# Patient Record
Sex: FEMALE | Race: WHITE | Employment: OTHER | URBAN - METROPOLITAN AREA
[De-identification: names, ages, dates, MRNs, and addresses within clinical notes are randomized per-mention and may not be internally consistent; named-entity substitution may affect disease eponyms.]

---

## 2017-02-19 ENCOUNTER — HOSPITAL ENCOUNTER (INPATIENT)
Age: 82
LOS: 3 days | Discharge: NURSING, REHAB OR LONG-TERM CARE | DRG: 254 | End: 2017-02-23
Attending: EMERGENCY MEDICINE
Payer: MEDICARE

## 2017-02-19 DIAGNOSIS — I73.9 ARTERIAL INSUFFICIENCY OF LOWER EXTREMITY (HCC): Primary | ICD-10-CM

## 2017-02-19 LAB
ALBUMIN SERPL BCP-MCNC: 3.5 G/DL (ref 3.5–5)
ALBUMIN/GLOB SERPL: 0.9 {RATIO} (ref 1.1–2.2)
ALP SERPL-CCNC: 98 U/L (ref 45–117)
ALT SERPL-CCNC: 20 U/L (ref 12–78)
ANION GAP BLD CALC-SCNC: 9 MMOL/L (ref 5–15)
APTT PPP: 27.1 SEC (ref 22.1–32.5)
AST SERPL W P-5'-P-CCNC: 12 U/L (ref 15–37)
BASOPHILS # BLD AUTO: 0 K/UL (ref 0–0.1)
BASOPHILS # BLD: 0 % (ref 0–1)
BILIRUB SERPL-MCNC: 0.7 MG/DL (ref 0.2–1)
BUN SERPL-MCNC: 19 MG/DL (ref 6–20)
BUN/CREAT SERPL: 18 (ref 12–20)
CALCIUM SERPL-MCNC: 9.3 MG/DL (ref 8.5–10.1)
CHLORIDE SERPL-SCNC: 96 MMOL/L (ref 97–108)
CO2 SERPL-SCNC: 24 MMOL/L (ref 21–32)
CREAT SERPL-MCNC: 1.07 MG/DL (ref 0.55–1.02)
EOSINOPHIL # BLD: 0.5 K/UL (ref 0–0.4)
EOSINOPHIL NFR BLD: 4 % (ref 0–7)
ERYTHROCYTE [DISTWIDTH] IN BLOOD BY AUTOMATED COUNT: 13.3 % (ref 11.5–14.5)
GLOBULIN SER CALC-MCNC: 3.7 G/DL (ref 2–4)
GLUCOSE SERPL-MCNC: 119 MG/DL (ref 65–100)
HCT VFR BLD AUTO: 36.3 % (ref 35–47)
HGB BLD-MCNC: 12.4 G/DL (ref 11.5–16)
INR PPP: 0.9 (ref 0.9–1.1)
LYMPHOCYTES # BLD AUTO: 26 % (ref 12–49)
LYMPHOCYTES # BLD: 3.6 K/UL (ref 0.8–3.5)
MAGNESIUM SERPL-MCNC: 1.6 MG/DL (ref 1.6–2.4)
MCH RBC QN AUTO: 26.6 PG (ref 26–34)
MCHC RBC AUTO-ENTMCNC: 34.2 G/DL (ref 30–36.5)
MCV RBC AUTO: 77.9 FL (ref 80–99)
MONOCYTES # BLD: 1.1 K/UL (ref 0–1)
MONOCYTES NFR BLD AUTO: 8 % (ref 5–13)
NEUTS SEG # BLD: 8.6 K/UL (ref 1.8–8)
NEUTS SEG NFR BLD AUTO: 62 % (ref 32–75)
PHOSPHATE SERPL-MCNC: 3.1 MG/DL (ref 2.6–4.7)
PLATELET # BLD AUTO: 238 K/UL (ref 150–400)
POTASSIUM SERPL-SCNC: 4.3 MMOL/L (ref 3.5–5.1)
PROT SERPL-MCNC: 7.2 G/DL (ref 6.4–8.2)
PROTHROMBIN TIME: 9.5 SEC (ref 9–11.1)
RBC # BLD AUTO: 4.66 M/UL (ref 3.8–5.2)
SODIUM SERPL-SCNC: 129 MMOL/L (ref 136–145)
THERAPEUTIC RANGE,PTTT: NORMAL SECS (ref 58–77)
WBC # BLD AUTO: 13.9 K/UL (ref 3.6–11)

## 2017-02-19 PROCEDURE — 93005 ELECTROCARDIOGRAM TRACING: CPT

## 2017-02-19 PROCEDURE — 85610 PROTHROMBIN TIME: CPT | Performed by: EMERGENCY MEDICINE

## 2017-02-19 PROCEDURE — 85025 COMPLETE CBC W/AUTO DIFF WBC: CPT | Performed by: EMERGENCY MEDICINE

## 2017-02-19 PROCEDURE — 83735 ASSAY OF MAGNESIUM: CPT | Performed by: EMERGENCY MEDICINE

## 2017-02-19 PROCEDURE — 85730 THROMBOPLASTIN TIME PARTIAL: CPT | Performed by: EMERGENCY MEDICINE

## 2017-02-19 PROCEDURE — 84100 ASSAY OF PHOSPHORUS: CPT | Performed by: EMERGENCY MEDICINE

## 2017-02-19 PROCEDURE — 36415 COLL VENOUS BLD VENIPUNCTURE: CPT | Performed by: EMERGENCY MEDICINE

## 2017-02-19 PROCEDURE — 80053 COMPREHEN METABOLIC PANEL: CPT | Performed by: EMERGENCY MEDICINE

## 2017-02-19 PROCEDURE — 99285 EMERGENCY DEPT VISIT HI MDM: CPT

## 2017-02-19 NOTE — IP AVS SNAPSHOT
Current Discharge Medication List  
  
Take these medications at their scheduled times Dose & Instructions Dispensing Information Comments Morning Noon Evening Bedtime * amLODIPine 5 mg tablet Commonly known as:  Perrin Mend Your next dose is: Today, Tomorrow Other:  ____________ Dose:  5 mg Take 5 mg by mouth every evening. Indications: hypertension Refills:  0  
     
   
   
   
  
 * amLODIPine 10 mg tablet Commonly known as:  Perrin Mend Your next dose is: Today, Tomorrow Other:  ____________ Dose:  10 mg Take 1 Tab by mouth daily. Quantity:  180 Tab Refills:  3 Evening dose stopped 12/9/16  
    
   
   
   
  
 aspirin 325 mg tablet Commonly known as:  ASPIRIN Your next dose is: Today, Tomorrow Other:  ____________ Dose:  325 mg Take 1 Tab by mouth daily. Quantity:  30 Tab Refills:  6  
     
   
   
   
  
 isosorbide mononitrate ER 60 mg CR tablet Commonly known as:  IMDUR Your next dose is: Today, Tomorrow Other:  ____________ Dose:  60 mg Take 1 Tab by mouth two (2) times a day. Quantity:  180 Tab Refills:  3  
     
   
   
   
  
 metFORMIN 500 mg tablet Commonly known as:  GLUCOPHAGE Your next dose is: Today, Tomorrow Other:  ____________ Dose:  500 mg Take 1 Tab by mouth two (2) times daily (with meals). Quantity:  180 Tab Refills:  3 Resume from 3/14/14 on  
    
   
   
   
  
 omeprazole 20 mg capsule Commonly known as:  PriLOSEC Your next dose is: Today, Tomorrow Other:  ____________ Dose:  20 mg Take 1 Cap by mouth daily. Quantity:  90 Cap Refills:  3  
     
   
   
   
  
 spironolactone 25 mg tablet Commonly known as:  ALDACTONE Your next dose is: Today, Tomorrow Other:  ____________ Dose:  25 mg Take 1 Tab by mouth daily. Quantity:  90 Tab Refills:  3 VITAMIN D3 1,000 unit tablet Generic drug:  cholecalciferol Your next dose is: Today, Tomorrow Other:  ____________ Dose:  2000 Units Take 2,000 Units by mouth daily. Refills:  0  
     
   
   
   
  
 * Notice: This list has 2 medication(s) that are the same as other medications prescribed for you. Read the directions carefully, and ask your doctor or other care provider to review them with you. Take these medications as needed Dose & Instructions Dispensing Information Comments Morning Noon Evening Bedtime HYDROcodone-acetaminophen 7.5-325 mg per tablet Commonly known as:  Viva Uribe Your next dose is: Today, Tomorrow Other:  ____________ Dose:  1 Tab Take 1 Tab by mouth every four (4) hours as needed. Max Daily Amount: 6 Tabs. Quantity:  25 Tab Refills:  0  
     
   
   
   
  
 nitroglycerin 0.4 mg SL tablet Commonly known as:  NITROSTAT Your next dose is: Today, Tomorrow Other:  ____________ Dose:  0.4 mg  
1 Tab by SubLINGual route every five (5) minutes as needed for Chest Pain. Quantity:  25 Tab Refills:  3 Take these medications as directed Dose & Instructions Dispensing Information Comments Morning Noon Evening Bedtime  
 atorvastatin 40 mg tablet Commonly known as:  LIPITOR Your next dose is: Today, Tomorrow Other:  ____________ TAKE 1 TABLET DAILY Quantity:  90 Tab Refills:  2  
     
   
   
   
  
 enalapril 20 mg tablet Commonly known as:  Sherren Bookbinder Your next dose is: Today, Tomorrow Other:  ____________ TAKE 1 TABLET TWICE A DAY Quantity:  180 Tab Refills:  3  
     
   
   
   
  
 glipiZIDE 5 mg tablet Commonly known as:  Yael  Your next dose is: Today, Tomorrow Other:  ____________ Take 2 tabs bid Quantity:  360 Tab Refills:  3  
     
   
   
   
  
 glucose blood VI test strips strip Commonly known as:  ONETOUCH ULTRA TEST Your next dose is: Today, Tomorrow Other:  ____________ Check blood sugar bid Quantity:  200 Strip Refills:  3 One touch test strips  
    
   
   
   
  
 hydroCHLOROthiazide 50 mg tablet Commonly known as:  HYDRODIURIL Your next dose is: Today, Tomorrow Other:  ____________ TAKE 1 TABLET DAILY Quantity:  90 Tab Refills:  3  
     
   
   
   
  
 insulin detemir 100 unit/mL (3 mL) Inpn Commonly known as:  Anselmo Dose Your next dose is: Today, Tomorrow Other:  ____________ Inject 10 units sc daily Quantity:  15 Pen Refills:  3 Insulin Syringe-Needle U-100 0.5 mL 31 gauge x 5/16 Syrg Commonly known as:  BD INSULIN SYRINGE ULT-FINE II Your next dose is: Today, Tomorrow Other:  ____________ Use as directed Quantity:  100 Syringe Refills:  4  
 E11.9  
    
   
   
   
  
 metoprolol tartrate 100 mg IR tablet Commonly known as:  LOPRESSOR Your next dose is: Today, Tomorrow Other:  ____________ TAKE 1 TABLET TWICE A DAY Quantity:  180 Tab Refills:  3 Where to Get Your Medications Information about where to get these medications is not yet available ! Ask your nurse or doctor about these medications HYDROcodone-acetaminophen 7.5-325 mg per tablet

## 2017-02-19 NOTE — IP AVS SNAPSHOT
[address] 
[phone] Patient: Saravanan Merino MRN: [id number] MRV:1/2/5090 You are allergic to the following Allergen Reactions Codeine Unknown (comments) Keflin Itching Pcn (Penicillins) Unknown (comments) Tramadol Other (comments) Makes her head feel swishy Recent Documentation Height 1.6 m Emergency Contacts Name Discharge Info Relation Home Work Mobile Melvina Whitehead  Child [2] [phone] Laura Garvin  Child [2] [phone] About your hospitalization You were admitted on:  February 20, 2017 You last received care in the:  [id number] You were discharged on:  February 23, 2017 Unit phone number:  [phone] Why you were hospitalized Your primary diagnosis was:  Not on File Your diagnoses also included:  Pvd (Peripheral Vascular Disease) With Claudication (Hcc) Providers Seen During Your Hospitalizations Provider Role Specialty Primary office phone Irlanda Uribe MD Attending Provider Emergency Medicine [phone] Shira Blount MD Attending Provider Vascular Surgery [phone] Your Primary Care Physician (PCP) Primary Care Physician Office Phone Office Fax [address] [phone] Follow-up Information Follow up With Details Comments [place]   [address] [address] 
[phone] [address] [place] [address], [address] In 1 day [address] [place] [address] 
[phone] Your Appointments Tuesday March 21, 2017 10:15 AM EDT  
ESTABLISHED PATIENT with MD Umu [address], [address] [facility] ([address]) [address] [address]  
[phone] Current Discharge Medication List  
  
START taking these medications Dose & Instructions Dispensing Information Comments Morning Noon Evening Bedtime HYDROcodone-acetaminophen 7.5-325 mg per tablet Commonly known as:  Tiffany Rincon Your next dose is: Today, Tomorrow Other:  _________ Dose:  1 Tab Take 1 Tab by mouth every four (4) hours as needed. Max Daily Amount: 6 Tabs. Quantity:  25 Tab Refills:  0 CONTINUE these medications which have NOT CHANGED Dose & Instructions Dispensing Information Comments Morning Noon Evening Bedtime * amLODIPine 5 mg tablet Commonly known as:  Herriman David Your next dose is: Today, Tomorrow Other:  _________ Dose:  5 mg Take 5 mg by mouth every evening. Indications: hypertension Refills:  0  
     
   
   
   
  
 * amLODIPine 10 mg tablet Commonly known as:  Moose David Your next dose is: Today, Tomorrow Other:  _________ Dose:  10 mg Take 1 Tab by mouth daily. Quantity:  180 Tab Refills:  3 Evening dose stopped 12/9/16  
    
   
   
   
  
 aspirin 325 mg tablet Commonly known as:  ASPIRIN Your next dose is: Today, Tomorrow Other:  _________ Dose:  325 mg Take 1 Tab by mouth daily. Quantity:  30 Tab Refills:  6  
     
   
   
   
  
 atorvastatin 40 mg tablet Commonly known as:  LIPITOR Your next dose is: Today, Tomorrow Other:  _________ TAKE 1 TABLET DAILY Quantity:  90 Tab Refills:  2  
     
   
   
   
  
 enalapril 20 mg tablet Commonly known as:  Pricila Cam Your next dose is: Today, Tomorrow Other:  _________ TAKE 1 TABLET TWICE A DAY Quantity:  180 Tab Refills:  3  
     
   
   
   
  
 glipiZIDE 5 mg tablet Commonly known as:  Atlee Crystal Your next dose is: Today, Tomorrow Other:  _________ Take 2 tabs bid Quantity:  360 Tab Refills:  3  
     
   
   
   
  
 glucose blood VI test strips strip Commonly known as:  ONETOUCH ULTRA TEST Your next dose is: Today, Tomorrow Other:  _________ Check blood sugar bid Quantity:  200 Strip Refills:  3 One touch test strips  
    
   
   
   
  
 hydroCHLOROthiazide 50 mg tablet Commonly known as:  HYDRODIURIL Your next dose is: Today, Tomorrow Other:  _________ TAKE 1 TABLET DAILY Quantity:  90 Tab Refills:  3  
     
   
   
   
  
 insulin detemir 100 unit/mL (3 mL) Inpn Commonly known as:  Scot Spry Your next dose is: Today, Tomorrow Other:  _________ Inject 10 units sc daily Quantity:  15 Pen Refills:  3 Insulin Syringe-Needle U-100 0.5 mL 31 gauge x 5/16 Syrg Commonly known as:  BD INSULIN SYRINGE ULT-FINE II Your next dose is: Today, Tomorrow Other:  _________ Use as directed Quantity:  100 Syringe Refills:  4  
 E11.9  
    
   
   
   
  
 isosorbide mononitrate ER 60 mg CR tablet Commonly known as:  IMDUR Your next dose is: Today, Tomorrow Other:  _________ Dose:  60 mg Take 1 Tab by mouth two (2) times a day. Quantity:  180 Tab Refills:  3  
     
   
   
   
  
 metFORMIN 500 mg tablet Commonly known as:  GLUCOPHAGE Your next dose is: Today, Tomorrow Other:  _________ Dose:  500 mg Take 1 Tab by mouth two (2) times daily (with meals). Quantity:  180 Tab Refills:  3 Resume from 3/14/14 on  
    
   
   
   
  
 metoprolol tartrate 100 mg IR tablet Commonly known as:  LOPRESSOR Your next dose is: Today, Tomorrow Other:  _________ TAKE 1 TABLET TWICE A DAY Quantity:  180 Tab Refills:  3  
     
   
   
   
  
 nitroglycerin 0.4 mg SL tablet Commonly known as:  NITROSTAT Your next dose is: Today, Tomorrow Other:  _________ Dose:  0.4 mg  
1 Tab by SubLINGual route every five (5) minutes as needed for Chest Pain. Quantity:  25 Tab Refills:  3  
     
   
   
   
  
 omeprazole 20 mg capsule Commonly known as:  PriLOSEC Your next dose is: Today, Tomorrow Other:  _________ Dose:  20 mg Take 1 Cap by mouth daily. Quantity:  90 Cap Refills:  3  
     
   
   
   
  
 spironolactone 25 mg tablet Commonly known as:  ALDACTONE Your next dose is: Today, Tomorrow Other:  _________ Dose:  25 mg Take 1 Tab by mouth daily. Quantity:  90 Tab Refills:  3 VITAMIN D3 1,000 unit tablet Generic drug:  cholecalciferol Your next dose is: Today, Tomorrow Other:  _________ Dose:  2000 Units Take 2,000 Units by mouth daily. Refills:  0  
     
   
   
   
  
 * Notice: This list has 2 medication(s) that are the same as other medications prescribed for you. Read the directions carefully, and ask your doctor or other care provider to review them with you. Where to Get Your Medications Information on where to get these meds will be given to you by the nurse or doctor. ! Ask your nurse or doctor about these medications HYDROcodone-acetaminophen 7.5-325 mg per tablet Discharge Instructions Patient Discharge Instructions Gaston Curet / [id number] : 1935 Admitted 2017 Discharged: 2017 Take Home Medications · It is important that you take the medication exactly as they are prescribed. · Keep your medication in the bottles provided by the pharmacist and keep a list of the medication names, dosages, and times to be taken in your wallet. · Do not take other medications without consulting your doctor. What to do at [facility] Recommended diet: Diabetic Diet, Recommended activity: Activity as tolerated,  
 
 Additional Instructions: elevate leg for swelling Follow-up with Dr Dilan Tucker in 2 weeks, call [phone] for appt Information obtained by : 
I understand that if any problems occur once I am at home I am to contact my physician. I understand and acknowledge receipt of the instructions indicated above. Physician's or R.N.'s Signature                                                                  Date/Time DISCHARGE SUMMARY from Nurse The following personal items are in your possession at time of discharge: 
 
Dental Appliances: None Visual Aid: Glasses Home Medications: None Jewelry: Watch Clothing: None, Sent home Other Valuables: Elías Foots PATIENT INSTRUCTIONS: 
 
 
F-face looks uneven A-arms unable to move or move unevenly S-speech slurred or non-existent T-time-call 911 as soon as signs and symptoms begin-DO NOT go Back to bed or wait to see if you get better-TIME IS BRAIN. Warning Signs of HEART ATTACK Call 911 if you have these symptoms: 
? Chest discomfort. Most heart attacks involve discomfort in the center of the chest that lasts more than a few minutes, or that goes away and comes back. It can feel like uncomfortable pressure, squeezing, fullness, or pain. ? Discomfort in other areas of the upper body. Symptoms can include pain or discomfort in one or both arms, the back, neck, jaw, or stomach. ? Shortness of breath with or without chest discomfort. ? Other signs may include breaking out in a cold sweat, nausea, or lightheadedness. Don't wait more than five minutes to call [address]! Fast action can save your life. Calling 911 is almost always the fastest way to get lifesaving treatment. Emergency Medical Services staff can begin treatment when they arrive  up to an hour sooner than if someone gets to the hospital by car. The discharge information has been reviewed with the {PATIENT PARENT GUARDIAN:29627}. The {PATIENT PARENT GUARDIAN:92188} verbalized understanding. Discharge medications reviewed with the {Dishcarge meds reviewed CSLU:37403} and appropriate educational materials and side effects teaching were provided. Patient or Representative Signature                                                          Date/Time Campaign Monitorhart Activation Thank you for requesting access to [employer]. Please follow the instructions below to securely access and download your online medical record. [employer] allows you to send messages to your doctor, view your test results, renew your prescriptions, schedule appointments, and more. How Do I Sign Up? 1. In your internet browser, go to [email or web address]. 2. Click on the First Time User? Click Here link in the Sign In box. You will see the New Member Sign Up page. 3. Enter your [employer] Access Code exactly as it appears below. You will not need to use this code after youve completed the sign-up process. If you do not sign up before the expiration date, you must request a new code. [employer] Access Code: [email or web address] Expires: 2017 11:12 PM (This is the date your [employer] access code will ) 4. Enter the last four digits of your Social Security Number (xxxx) and Date of Birth (mm/dd/yyyy) as indicated and click Submit. You will be taken to the next sign-up page. 5. Create a [employer] ID. This will be your [employer] login ID and cannot be changed, so think of one that is secure and easy to remember. 6. Create a [email or web address] password. You can change your password at any time. 7. Enter your Password Reset Question and Answer. This can be used at a later time if you forget your password. 8. Enter your e-mail address. You will receive e-mail notification when new information is available in [address] 9. Click Sign Up. You can now view and download portions of your medical record. 10. Click the Download Summary menu link to download a portable copy of your medical information. Additional Information If you have questions, please visit the Frequently Asked Questions section of the [email or web address] website at [email or web address]/. Remember, [email or web address] is NOT to be used for urgent needs. For medical emergencies, dial 911. Discharge Orders None Introducing [facility]! Janie Mcarthur introduces [employer] patient portal. Now you can access parts of your medical record, email your doctor's office, and request medication refills online. 1. In your internet browser, go to [email or web address] 2. Click on the First Time User? Click Here link in the Sign In box. You will see the New Member Sign Up page. 3. Enter your [employer] Access Code exactly as it appears below. You will not need to use this code after youve completed the sign-up process. If you do not sign up before the expiration date, you must request a new code. · [email or web address] Access Code: [email or web address] Expires: 5/20/2017 11:12 PM 
 
4. Enter the last four digits of your Social Security Number (xxxx) and Date of Birth (mm/dd/yyyy) as indicated and click Submit. You will be taken to the next sign-up page. 5. Create a [employer] ID. This will be your [employer] login ID and cannot be changed, so think of one that is secure and easy to remember. 6. Create a [employer] password. You can change your password at any time. 7. Enter your Password Reset Question and Answer.  This can be used at a later time if you forget your password. 8. Enter your e-mail address. You will receive e-mail notification when new information is available in [address] 9. Click Sign Up. You can now view and download portions of your medical record. 10. Click the Download Summary menu link to download a portable copy of your medical information. If you have questions, please visit the Frequently Asked Questions section of the [employer] website. Remember, [employer] is NOT to be used for urgent needs. For medical emergencies, dial 911. Now available from your iPhone and Android! General Information Please provide this summary of care documentation to your next provider. Patient Signature:  ____________________________________________________________ Date:  ____________________________________________________________  
  
Monica Santiagoer Provider Signature:  ____________________________________________________________ Date:  ____________________________________________________________

## 2017-02-20 ENCOUNTER — APPOINTMENT (OUTPATIENT)
Dept: GENERAL RADIOLOGY | Age: 82
DRG: 254 | End: 2017-02-20
Attending: EMERGENCY MEDICINE
Payer: MEDICARE

## 2017-02-20 ENCOUNTER — ANESTHESIA (OUTPATIENT)
Dept: SURGERY | Age: 82
DRG: 254 | End: 2017-02-20
Payer: MEDICARE

## 2017-02-20 ENCOUNTER — ANESTHESIA EVENT (OUTPATIENT)
Dept: SURGERY | Age: 82
DRG: 254 | End: 2017-02-20
Payer: MEDICARE

## 2017-02-20 PROBLEM — I73.9 PVD (PERIPHERAL VASCULAR DISEASE) WITH CLAUDICATION (HCC): Status: ACTIVE | Noted: 2017-02-20

## 2017-02-20 LAB
ABO + RH BLD: NORMAL
APPEARANCE UR: CLEAR
ATRIAL RATE: 68 BPM
BACTERIA URNS QL MICRO: NEGATIVE /HPF
BILIRUB UR QL: NEGATIVE
BLOOD GROUP ANTIBODIES SERPL: NORMAL
CALCULATED P AXIS, ECG09: 49 DEGREES
CALCULATED R AXIS, ECG10: -4 DEGREES
CALCULATED T AXIS, ECG11: 39 DEGREES
COLOR UR: NORMAL
DIAGNOSIS, 93000: NORMAL
EPITH CASTS URNS QL MICRO: NORMAL /LPF
GLUCOSE BLD STRIP.AUTO-MCNC: 104 MG/DL (ref 65–100)
GLUCOSE BLD STRIP.AUTO-MCNC: 115 MG/DL (ref 65–100)
GLUCOSE BLD STRIP.AUTO-MCNC: 174 MG/DL (ref 65–100)
GLUCOSE BLD STRIP.AUTO-MCNC: 213 MG/DL (ref 65–100)
GLUCOSE BLD STRIP.AUTO-MCNC: 333 MG/DL (ref 65–100)
GLUCOSE UR STRIP.AUTO-MCNC: NEGATIVE MG/DL
HGB UR QL STRIP: NEGATIVE
HYALINE CASTS URNS QL MICRO: NORMAL /LPF (ref 0–5)
KETONES UR QL STRIP.AUTO: NEGATIVE MG/DL
LEUKOCYTE ESTERASE UR QL STRIP.AUTO: NEGATIVE
NITRITE UR QL STRIP.AUTO: NEGATIVE
P-R INTERVAL, ECG05: 176 MS
PH UR STRIP: 7 [PH] (ref 5–8)
PROT UR STRIP-MCNC: NEGATIVE MG/DL
Q-T INTERVAL, ECG07: 404 MS
QRS DURATION, ECG06: 96 MS
QTC CALCULATION (BEZET), ECG08: 429 MS
RBC #/AREA URNS HPF: NORMAL /HPF (ref 0–5)
SERVICE CMNT-IMP: ABNORMAL
SP GR UR REFRACTOMETRY: 1.01 (ref 1–1.03)
SPECIMEN EXP DATE BLD: NORMAL
UA: UC IF INDICATED,UAUC: NORMAL
UROBILINOGEN UR QL STRIP.AUTO: 0.2 EU/DL (ref 0.2–1)
VENTRICULAR RATE, ECG03: 68 BPM
WBC URNS QL MICRO: NORMAL /HPF (ref 0–4)

## 2017-02-20 PROCEDURE — 77030008467 HC STPLR SKN COVD -B

## 2017-02-20 PROCEDURE — 041K09L BYPASS RIGHT FEMORAL ARTERY TO POPLITEAL ARTERY WITH AUTOLOGOUS VENOUS TISSUE, OPEN APPROACH: ICD-10-PCS

## 2017-02-20 PROCEDURE — 74011250636 HC RX REV CODE- 250/636

## 2017-02-20 PROCEDURE — 74011000250 HC RX REV CODE- 250

## 2017-02-20 PROCEDURE — 77030011640 HC PAD GRND REM COVD -A

## 2017-02-20 PROCEDURE — 77030026438 HC STYL ET INTUB CARD -A: Performed by: ANESTHESIOLOGY

## 2017-02-20 PROCEDURE — 93922 UPR/L XTREMITY ART 2 LEVELS: CPT

## 2017-02-20 PROCEDURE — 36415 COLL VENOUS BLD VENIPUNCTURE: CPT | Performed by: ANESTHESIOLOGY

## 2017-02-20 PROCEDURE — 86900 BLOOD TYPING SEROLOGIC ABO: CPT | Performed by: ANESTHESIOLOGY

## 2017-02-20 PROCEDURE — 77030002987 HC SUT PROL J&J -B

## 2017-02-20 PROCEDURE — 77030005518 HC CATH URETH FOL 2W BARD -B

## 2017-02-20 PROCEDURE — 81001 URINALYSIS AUTO W/SCOPE: CPT | Performed by: EMERGENCY MEDICINE

## 2017-02-20 PROCEDURE — 77030020256 HC SOL INJ NACL 0.9%  500ML

## 2017-02-20 PROCEDURE — 77030002986 HC SUT PROL J&J -A

## 2017-02-20 PROCEDURE — 74011250637 HC RX REV CODE- 250/637

## 2017-02-20 PROCEDURE — 77030008684 HC TU ET CUF COVD -B: Performed by: ANESTHESIOLOGY

## 2017-02-20 PROCEDURE — 77030031139 HC SUT VCRL2 J&J -A

## 2017-02-20 PROCEDURE — 65660000000 HC RM CCU STEPDOWN

## 2017-02-20 PROCEDURE — 74011250636 HC RX REV CODE- 250/636: Performed by: ANESTHESIOLOGY

## 2017-02-20 PROCEDURE — 76010000171 HC OR TIME 2 TO 2.5 HR INTENSV-TIER 1

## 2017-02-20 PROCEDURE — 71010 XR CHEST PORT: CPT

## 2017-02-20 PROCEDURE — 74011250636 HC RX REV CODE- 250/636: Performed by: EMERGENCY MEDICINE

## 2017-02-20 PROCEDURE — 77030018846 HC SOL IRR STRL H20 ICUM -A

## 2017-02-20 PROCEDURE — P9045 ALBUMIN (HUMAN), 5%, 250 ML: HCPCS

## 2017-02-20 PROCEDURE — 76210000016 HC OR PH I REC 1 TO 1.5 HR

## 2017-02-20 PROCEDURE — 74011636637 HC RX REV CODE- 636/637

## 2017-02-20 PROCEDURE — 77030013079 HC BLNKT BAIR HGGR 3M -A: Performed by: ANESTHESIOLOGY

## 2017-02-20 PROCEDURE — 82962 GLUCOSE BLOOD TEST: CPT

## 2017-02-20 PROCEDURE — 76060000035 HC ANESTHESIA 2 TO 2.5 HR

## 2017-02-20 RX ORDER — LIDOCAINE HYDROCHLORIDE 20 MG/ML
INJECTION, SOLUTION EPIDURAL; INFILTRATION; INTRACAUDAL; PERINEURAL AS NEEDED
Status: DISCONTINUED | OUTPATIENT
Start: 2017-02-20 | End: 2017-02-20 | Stop reason: HOSPADM

## 2017-02-20 RX ORDER — MIDAZOLAM HYDROCHLORIDE 1 MG/ML
0.5 INJECTION, SOLUTION INTRAMUSCULAR; INTRAVENOUS
Status: DISCONTINUED | OUTPATIENT
Start: 2017-02-20 | End: 2017-02-20 | Stop reason: HOSPADM

## 2017-02-20 RX ORDER — ENOXAPARIN SODIUM 100 MG/ML
40 INJECTION SUBCUTANEOUS EVERY 24 HOURS
Status: DISCONTINUED | OUTPATIENT
Start: 2017-02-20 | End: 2017-02-23 | Stop reason: HOSPADM

## 2017-02-20 RX ORDER — ACETAMINOPHEN 10 MG/ML
INJECTION, SOLUTION INTRAVENOUS AS NEEDED
Status: DISCONTINUED | OUTPATIENT
Start: 2017-02-20 | End: 2017-02-20 | Stop reason: HOSPADM

## 2017-02-20 RX ORDER — SODIUM CHLORIDE, SODIUM LACTATE, POTASSIUM CHLORIDE, CALCIUM CHLORIDE 600; 310; 30; 20 MG/100ML; MG/100ML; MG/100ML; MG/100ML
75 INJECTION, SOLUTION INTRAVENOUS CONTINUOUS
Status: DISCONTINUED | OUTPATIENT
Start: 2017-02-20 | End: 2017-02-20 | Stop reason: HOSPADM

## 2017-02-20 RX ORDER — HEPARIN SODIUM 5000 [USP'U]/ML
5000 INJECTION, SOLUTION INTRAVENOUS; SUBCUTANEOUS
Status: COMPLETED | OUTPATIENT
Start: 2017-02-20 | End: 2017-02-20

## 2017-02-20 RX ORDER — ONDANSETRON 2 MG/ML
4 INJECTION INTRAMUSCULAR; INTRAVENOUS AS NEEDED
Status: DISCONTINUED | OUTPATIENT
Start: 2017-02-20 | End: 2017-02-20 | Stop reason: HOSPADM

## 2017-02-20 RX ORDER — FENTANYL CITRATE 50 UG/ML
INJECTION, SOLUTION INTRAMUSCULAR; INTRAVENOUS AS NEEDED
Status: DISCONTINUED | OUTPATIENT
Start: 2017-02-20 | End: 2017-02-20 | Stop reason: HOSPADM

## 2017-02-20 RX ORDER — ISOSORBIDE MONONITRATE 60 MG/1
60 TABLET, EXTENDED RELEASE ORAL DAILY
Status: DISCONTINUED | OUTPATIENT
Start: 2017-02-20 | End: 2017-02-23 | Stop reason: HOSPADM

## 2017-02-20 RX ORDER — INSULIN LISPRO 100 [IU]/ML
INJECTION, SOLUTION INTRAVENOUS; SUBCUTANEOUS
Status: DISCONTINUED | OUTPATIENT
Start: 2017-02-20 | End: 2017-02-23 | Stop reason: HOSPADM

## 2017-02-20 RX ORDER — MAGNESIUM SULFATE 100 %
4 CRYSTALS MISCELLANEOUS AS NEEDED
Status: DISCONTINUED | OUTPATIENT
Start: 2017-02-20 | End: 2017-02-23 | Stop reason: HOSPADM

## 2017-02-20 RX ORDER — MIDAZOLAM HYDROCHLORIDE 1 MG/ML
1 INJECTION, SOLUTION INTRAMUSCULAR; INTRAVENOUS AS NEEDED
Status: DISCONTINUED | OUTPATIENT
Start: 2017-02-20 | End: 2017-02-20 | Stop reason: HOSPADM

## 2017-02-20 RX ORDER — HEPARIN SODIUM 1000 [USP'U]/ML
INJECTION, SOLUTION INTRAVENOUS; SUBCUTANEOUS AS NEEDED
Status: DISCONTINUED | OUTPATIENT
Start: 2017-02-20 | End: 2017-02-20 | Stop reason: HOSPADM

## 2017-02-20 RX ORDER — ATORVASTATIN CALCIUM 40 MG/1
40 TABLET, FILM COATED ORAL
Status: DISCONTINUED | OUTPATIENT
Start: 2017-02-20 | End: 2017-02-23 | Stop reason: HOSPADM

## 2017-02-20 RX ORDER — SODIUM CHLORIDE 0.9 % (FLUSH) 0.9 %
5-10 SYRINGE (ML) INJECTION AS NEEDED
Status: DISCONTINUED | OUTPATIENT
Start: 2017-02-20 | End: 2017-02-20 | Stop reason: HOSPADM

## 2017-02-20 RX ORDER — OMEPRAZOLE 20 MG/1
20 CAPSULE, DELAYED RELEASE ORAL DAILY
Status: DISCONTINUED | OUTPATIENT
Start: 2017-02-20 | End: 2017-02-20 | Stop reason: CLARIF

## 2017-02-20 RX ORDER — OXYCODONE AND ACETAMINOPHEN 5; 325 MG/1; MG/1
1 TABLET ORAL AS NEEDED
Status: DISCONTINUED | OUTPATIENT
Start: 2017-02-20 | End: 2017-02-20 | Stop reason: HOSPADM

## 2017-02-20 RX ORDER — SODIUM CHLORIDE, SODIUM LACTATE, POTASSIUM CHLORIDE, CALCIUM CHLORIDE 600; 310; 30; 20 MG/100ML; MG/100ML; MG/100ML; MG/100ML
INJECTION, SOLUTION INTRAVENOUS
Status: DISCONTINUED | OUTPATIENT
Start: 2017-02-20 | End: 2017-02-20 | Stop reason: HOSPADM

## 2017-02-20 RX ORDER — FENTANYL CITRATE 50 UG/ML
50 INJECTION, SOLUTION INTRAMUSCULAR; INTRAVENOUS AS NEEDED
Status: DISCONTINUED | OUTPATIENT
Start: 2017-02-20 | End: 2017-02-20 | Stop reason: HOSPADM

## 2017-02-20 RX ORDER — ONDANSETRON 2 MG/ML
INJECTION INTRAMUSCULAR; INTRAVENOUS AS NEEDED
Status: DISCONTINUED | OUTPATIENT
Start: 2017-02-20 | End: 2017-02-20 | Stop reason: HOSPADM

## 2017-02-20 RX ORDER — DEXTROSE 50 % IN WATER (D50W) INTRAVENOUS SYRINGE
12.5-25 AS NEEDED
Status: DISCONTINUED | OUTPATIENT
Start: 2017-02-20 | End: 2017-02-23 | Stop reason: HOSPADM

## 2017-02-20 RX ORDER — METOPROLOL TARTRATE 50 MG/1
100 TABLET ORAL 2 TIMES DAILY
Status: DISCONTINUED | OUTPATIENT
Start: 2017-02-20 | End: 2017-02-23 | Stop reason: HOSPADM

## 2017-02-20 RX ORDER — SODIUM CHLORIDE 0.9 % (FLUSH) 0.9 %
5-10 SYRINGE (ML) INJECTION EVERY 8 HOURS
Status: DISCONTINUED | OUTPATIENT
Start: 2017-02-20 | End: 2017-02-20 | Stop reason: HOSPADM

## 2017-02-20 RX ORDER — ASPIRIN 325 MG
325 TABLET ORAL DAILY
Status: DISCONTINUED | OUTPATIENT
Start: 2017-02-20 | End: 2017-02-23 | Stop reason: HOSPADM

## 2017-02-20 RX ORDER — PANTOPRAZOLE SODIUM 40 MG/1
40 TABLET, DELAYED RELEASE ORAL DAILY
Status: DISCONTINUED | OUTPATIENT
Start: 2017-02-20 | End: 2017-02-23 | Stop reason: HOSPADM

## 2017-02-20 RX ORDER — GLYCOPYRROLATE 0.2 MG/ML
INJECTION INTRAMUSCULAR; INTRAVENOUS AS NEEDED
Status: DISCONTINUED | OUTPATIENT
Start: 2017-02-20 | End: 2017-02-20 | Stop reason: HOSPADM

## 2017-02-20 RX ORDER — HYDROMORPHONE HYDROCHLORIDE 1 MG/ML
0.2 INJECTION, SOLUTION INTRAMUSCULAR; INTRAVENOUS; SUBCUTANEOUS
Status: DISCONTINUED | OUTPATIENT
Start: 2017-02-20 | End: 2017-02-20 | Stop reason: HOSPADM

## 2017-02-20 RX ORDER — ACETAMINOPHEN 325 MG/1
650 TABLET ORAL
Status: DISCONTINUED | OUTPATIENT
Start: 2017-02-20 | End: 2017-02-23 | Stop reason: HOSPADM

## 2017-02-20 RX ORDER — DIPHENHYDRAMINE HYDROCHLORIDE 50 MG/ML
12.5 INJECTION, SOLUTION INTRAMUSCULAR; INTRAVENOUS AS NEEDED
Status: DISCONTINUED | OUTPATIENT
Start: 2017-02-20 | End: 2017-02-20 | Stop reason: HOSPADM

## 2017-02-20 RX ORDER — AMLODIPINE BESYLATE 5 MG/1
5 TABLET ORAL EVERY EVENING
COMMUNITY
End: 2017-04-24

## 2017-02-20 RX ORDER — SUCCINYLCHOLINE CHLORIDE 20 MG/ML
INJECTION INTRAMUSCULAR; INTRAVENOUS AS NEEDED
Status: DISCONTINUED | OUTPATIENT
Start: 2017-02-20 | End: 2017-02-20 | Stop reason: HOSPADM

## 2017-02-20 RX ORDER — ACETAMINOPHEN 325 MG/1
650 TABLET ORAL
Status: DISCONTINUED | OUTPATIENT
Start: 2017-02-20 | End: 2017-02-20 | Stop reason: SDUPTHER

## 2017-02-20 RX ORDER — FENTANYL CITRATE 50 UG/ML
25 INJECTION, SOLUTION INTRAMUSCULAR; INTRAVENOUS
Status: COMPLETED | OUTPATIENT
Start: 2017-02-20 | End: 2017-02-20

## 2017-02-20 RX ORDER — SODIUM CHLORIDE 0.9 % (FLUSH) 0.9 %
5-10 SYRINGE (ML) INJECTION EVERY 8 HOURS
Status: DISCONTINUED | OUTPATIENT
Start: 2017-02-20 | End: 2017-02-23 | Stop reason: HOSPADM

## 2017-02-20 RX ORDER — ALBUMIN HUMAN 50 G/1000ML
SOLUTION INTRAVENOUS AS NEEDED
Status: DISCONTINUED | OUTPATIENT
Start: 2017-02-20 | End: 2017-02-20 | Stop reason: HOSPADM

## 2017-02-20 RX ORDER — AMLODIPINE BESYLATE 5 MG/1
10 TABLET ORAL DAILY
Status: DISCONTINUED | OUTPATIENT
Start: 2017-02-20 | End: 2017-02-23 | Stop reason: HOSPADM

## 2017-02-20 RX ORDER — SPIRONOLACTONE 25 MG/1
25 TABLET ORAL DAILY
Status: DISCONTINUED | OUTPATIENT
Start: 2017-02-20 | End: 2017-02-23 | Stop reason: HOSPADM

## 2017-02-20 RX ORDER — SODIUM CHLORIDE 9 MG/ML
250 INJECTION, SOLUTION INTRAVENOUS AS NEEDED
Status: DISCONTINUED | OUTPATIENT
Start: 2017-02-20 | End: 2017-02-20

## 2017-02-20 RX ORDER — MORPHINE SULFATE 10 MG/ML
2 INJECTION, SOLUTION INTRAMUSCULAR; INTRAVENOUS
Status: DISCONTINUED | OUTPATIENT
Start: 2017-02-20 | End: 2017-02-20 | Stop reason: HOSPADM

## 2017-02-20 RX ORDER — SODIUM CHLORIDE 0.9 % (FLUSH) 0.9 %
5-10 SYRINGE (ML) INJECTION AS NEEDED
Status: DISCONTINUED | OUTPATIENT
Start: 2017-02-20 | End: 2017-02-23 | Stop reason: HOSPADM

## 2017-02-20 RX ORDER — DEXAMETHASONE SODIUM PHOSPHATE 4 MG/ML
INJECTION, SOLUTION INTRA-ARTICULAR; INTRALESIONAL; INTRAMUSCULAR; INTRAVENOUS; SOFT TISSUE AS NEEDED
Status: DISCONTINUED | OUTPATIENT
Start: 2017-02-20 | End: 2017-02-20 | Stop reason: HOSPADM

## 2017-02-20 RX ORDER — LIDOCAINE HYDROCHLORIDE 10 MG/ML
0.1 INJECTION, SOLUTION EPIDURAL; INFILTRATION; INTRACAUDAL; PERINEURAL AS NEEDED
Status: DISCONTINUED | OUTPATIENT
Start: 2017-02-20 | End: 2017-02-20 | Stop reason: HOSPADM

## 2017-02-20 RX ORDER — ONDANSETRON 2 MG/ML
4 INJECTION INTRAMUSCULAR; INTRAVENOUS
Status: DISCONTINUED | OUTPATIENT
Start: 2017-02-20 | End: 2017-02-23 | Stop reason: HOSPADM

## 2017-02-20 RX ORDER — MORPHINE SULFATE 4 MG/ML
4 INJECTION, SOLUTION INTRAMUSCULAR; INTRAVENOUS
Status: DISCONTINUED | OUTPATIENT
Start: 2017-02-20 | End: 2017-02-23 | Stop reason: HOSPADM

## 2017-02-20 RX ORDER — SODIUM CHLORIDE 9 MG/ML
50 INJECTION, SOLUTION INTRAVENOUS CONTINUOUS
Status: DISCONTINUED | OUTPATIENT
Start: 2017-02-20 | End: 2017-02-20 | Stop reason: HOSPADM

## 2017-02-20 RX ORDER — ROCURONIUM BROMIDE 10 MG/ML
INJECTION, SOLUTION INTRAVENOUS AS NEEDED
Status: DISCONTINUED | OUTPATIENT
Start: 2017-02-20 | End: 2017-02-20 | Stop reason: HOSPADM

## 2017-02-20 RX ORDER — NEOSTIGMINE METHYLSULFATE 1 MG/ML
INJECTION INTRAVENOUS AS NEEDED
Status: DISCONTINUED | OUTPATIENT
Start: 2017-02-20 | End: 2017-02-20 | Stop reason: HOSPADM

## 2017-02-20 RX ORDER — ENALAPRIL MALEATE 5 MG/1
20 TABLET ORAL 2 TIMES DAILY
Status: DISCONTINUED | OUTPATIENT
Start: 2017-02-20 | End: 2017-02-23 | Stop reason: HOSPADM

## 2017-02-20 RX ORDER — ENOXAPARIN SODIUM 100 MG/ML
40 INJECTION SUBCUTANEOUS EVERY 24 HOURS
Status: DISCONTINUED | OUTPATIENT
Start: 2017-02-20 | End: 2017-02-20 | Stop reason: SDUPTHER

## 2017-02-20 RX ORDER — SODIUM CHLORIDE 9 MG/ML
75 INJECTION, SOLUTION INTRAVENOUS CONTINUOUS
Status: DISCONTINUED | OUTPATIENT
Start: 2017-02-20 | End: 2017-02-21

## 2017-02-20 RX ORDER — HYDROCODONE BITARTRATE AND ACETAMINOPHEN 7.5; 325 MG/1; MG/1
1 TABLET ORAL
Status: DISCONTINUED | OUTPATIENT
Start: 2017-02-20 | End: 2017-02-23 | Stop reason: HOSPADM

## 2017-02-20 RX ORDER — PROPOFOL 10 MG/ML
INJECTION, EMULSION INTRAVENOUS AS NEEDED
Status: DISCONTINUED | OUTPATIENT
Start: 2017-02-20 | End: 2017-02-20 | Stop reason: HOSPADM

## 2017-02-20 RX ORDER — HYDROCHLOROTHIAZIDE 25 MG/1
50 TABLET ORAL DAILY
Status: DISCONTINUED | OUTPATIENT
Start: 2017-02-20 | End: 2017-02-23 | Stop reason: HOSPADM

## 2017-02-20 RX ADMIN — ENALAPRIL MALEATE 20 MG: 5 TABLET ORAL at 09:28

## 2017-02-20 RX ADMIN — ROCURONIUM BROMIDE 35 MG: 10 INJECTION, SOLUTION INTRAVENOUS at 12:57

## 2017-02-20 RX ADMIN — Medication 10 ML: at 06:51

## 2017-02-20 RX ADMIN — FENTANYL CITRATE 25 MCG: 50 INJECTION, SOLUTION INTRAMUSCULAR; INTRAVENOUS at 16:40

## 2017-02-20 RX ADMIN — FENTANYL CITRATE 50 MCG: 50 INJECTION, SOLUTION INTRAMUSCULAR; INTRAVENOUS at 12:39

## 2017-02-20 RX ADMIN — NEOSTIGMINE METHYLSULFATE 3 MG: 1 INJECTION INTRAVENOUS at 14:41

## 2017-02-20 RX ADMIN — ISOSORBIDE MONONITRATE 60 MG: 60 TABLET, EXTENDED RELEASE ORAL at 09:30

## 2017-02-20 RX ADMIN — SODIUM CHLORIDE, SODIUM LACTATE, POTASSIUM CHLORIDE, CALCIUM CHLORIDE: 600; 310; 30; 20 INJECTION, SOLUTION INTRAVENOUS at 12:32

## 2017-02-20 RX ADMIN — ATORVASTATIN CALCIUM 40 MG: 40 TABLET, FILM COATED ORAL at 22:31

## 2017-02-20 RX ADMIN — PROPOFOL 120 MG: 10 INJECTION, EMULSION INTRAVENOUS at 12:39

## 2017-02-20 RX ADMIN — HEPARIN SODIUM 5000 UNITS: 1000 INJECTION, SOLUTION INTRAVENOUS; SUBCUTANEOUS at 13:43

## 2017-02-20 RX ADMIN — HYDROCODONE BITARTRATE AND ACETAMINOPHEN 1 TABLET: 7.5; 325 TABLET ORAL at 20:50

## 2017-02-20 RX ADMIN — HEPARIN SODIUM 5000 UNITS: 5000 INJECTION, SOLUTION INTRAVENOUS; SUBCUTANEOUS at 01:22

## 2017-02-20 RX ADMIN — LIDOCAINE HYDROCHLORIDE 60 MG: 20 INJECTION, SOLUTION EPIDURAL; INFILTRATION; INTRACAUDAL; PERINEURAL at 12:39

## 2017-02-20 RX ADMIN — SODIUM CHLORIDE 75 ML/HR: 900 INJECTION, SOLUTION INTRAVENOUS at 06:59

## 2017-02-20 RX ADMIN — FENTANYL CITRATE 25 MCG: 50 INJECTION, SOLUTION INTRAMUSCULAR; INTRAVENOUS at 15:10

## 2017-02-20 RX ADMIN — METOPROLOL TARTRATE 100 MG: 50 TABLET ORAL at 09:29

## 2017-02-20 RX ADMIN — INSULIN LISPRO 2 UNITS: 100 INJECTION, SOLUTION INTRAVENOUS; SUBCUTANEOUS at 11:45

## 2017-02-20 RX ADMIN — METOPROLOL TARTRATE 100 MG: 50 TABLET ORAL at 22:31

## 2017-02-20 RX ADMIN — DEXAMETHASONE SODIUM PHOSPHATE 4 MG: 4 INJECTION, SOLUTION INTRA-ARTICULAR; INTRALESIONAL; INTRAMUSCULAR; INTRAVENOUS; SOFT TISSUE at 12:58

## 2017-02-20 RX ADMIN — ONDANSETRON 4 MG: 2 INJECTION INTRAMUSCULAR; INTRAVENOUS at 14:42

## 2017-02-20 RX ADMIN — FENTANYL CITRATE 25 MCG: 50 INJECTION, SOLUTION INTRAMUSCULAR; INTRAVENOUS at 16:15

## 2017-02-20 RX ADMIN — INSULIN LISPRO 4 UNITS: 100 INJECTION, SOLUTION INTRAVENOUS; SUBCUTANEOUS at 22:00

## 2017-02-20 RX ADMIN — AMLODIPINE BESYLATE 10 MG: 5 TABLET ORAL at 09:30

## 2017-02-20 RX ADMIN — SUCCINYLCHOLINE CHLORIDE 100 MG: 20 INJECTION INTRAMUSCULAR; INTRAVENOUS at 12:40

## 2017-02-20 RX ADMIN — PANTOPRAZOLE SODIUM 40 MG: 40 TABLET, DELAYED RELEASE ORAL at 09:31

## 2017-02-20 RX ADMIN — SPIRONOLACTONE 25 MG: 25 TABLET, FILM COATED ORAL at 09:31

## 2017-02-20 RX ADMIN — SODIUM CHLORIDE 75 ML/HR: 900 INJECTION, SOLUTION INTRAVENOUS at 15:28

## 2017-02-20 RX ADMIN — ENALAPRIL MALEATE 20 MG: 5 TABLET ORAL at 22:31

## 2017-02-20 RX ADMIN — ACETAMINOPHEN 1000 MG: 10 INJECTION, SOLUTION INTRAVENOUS at 12:37

## 2017-02-20 RX ADMIN — HYDROCHLOROTHIAZIDE 50 MG: 25 TABLET ORAL at 09:29

## 2017-02-20 RX ADMIN — ENOXAPARIN SODIUM 40 MG: 40 INJECTION SUBCUTANEOUS at 06:51

## 2017-02-20 RX ADMIN — ALBUMIN HUMAN 250 ML: 50 SOLUTION INTRAVENOUS at 12:44

## 2017-02-20 RX ADMIN — GLYCOPYRROLATE 0.4 MG: 0.2 INJECTION INTRAMUSCULAR; INTRAVENOUS at 14:41

## 2017-02-20 RX ADMIN — ROCURONIUM BROMIDE 5 MG: 10 INJECTION, SOLUTION INTRAVENOUS at 12:39

## 2017-02-20 RX ADMIN — FENTANYL CITRATE 25 MCG: 50 INJECTION, SOLUTION INTRAMUSCULAR; INTRAVENOUS at 15:15

## 2017-02-20 RX ADMIN — INSULIN LISPRO 3 UNITS: 100 INJECTION, SOLUTION INTRAVENOUS; SUBCUTANEOUS at 17:42

## 2017-02-20 RX ADMIN — FENTANYL CITRATE 50 MCG: 50 INJECTION, SOLUTION INTRAMUSCULAR; INTRAVENOUS at 12:59

## 2017-02-20 NOTE — PROCEDURES
[facility]  *** FINAL REPORT ***    Name: Laure Gann  MRN: [id number]  : 1935  HIS Order #: [id number]  [id number] [facility] Visit #: [id number]  Date: 2017    TYPE OF TEST: Peripheral Arterial Testing    REASON FOR TEST  Claudication, Right leg pain. S/P stent 17. Right Leg  Doppler:  Ankle/Brachial: 0.40    Left Leg  Doppler:  Ankle/Brachial: 0.59    INTERPRETATION/FINDINGS  PROCEDURE:  Evaluation of lower extremity arteries with systolic blood   pressure measurement at the ankle and brachial level and calculation  of the ankle/brachial pressure index (ARUNA). Unless otherwise  indicated, the exam also includes pulse volume recording (PVR)  plethysmography at the ankle level. FINDINGS:  ARUNA is 0.44 on the right and 0.59 on the left. PVR  waveforms are severe at the right ankle and moderate at the left  ankle. IMPRESSION:  There is evidence of hemodynamically significant lower  extremity arterial obstruction on the right and left leg. .    ADDITIONAL COMMENTS  S/P stent in right leg on 17. I have personally reviewed the data relevant to the interpretation of  this  study.     TECHNOLOGIST: DAISY Alba, ERMIAS  Signed: 2017 12:54 AM    PHYSICIAN: Chuck Harris., MD  Signed: 2017 06:23 AM

## 2017-02-20 NOTE — H&P
Brief Admit Note    Impression: Occluded right SFA stent with recurrent claudication and rest pain    Plan: Admit for bypass        Full H & P dictated

## 2017-02-20 NOTE — CDMP QUERY
Dr. Linda Bourgeois,     Please clarify if this patient is being treated/managed for:    =>Hyponatremia in the setting of abnormal serum sodium level requiring NS-IVFs and monitoring  =>Other Explanation of clinical findings  =>Unable to Determine (no explanation of clinical findings)    The medical record reflects the following clinical findings, treatment, and risk factors:    Risk Factors: [de-identified] y/o pt   Clinical Indicators: Na+ 129  Treatment: NS-IVFs, monitoring    Please clarify and document your clinical opinion in the progress notes and discharge summary including the definitive and/or presumptive diagnosis, (suspected or probable), related to the above clinical findings. Please include clinical findings supporting your diagnosis.     Thank you,   Sophie Franklin, [facility]

## 2017-02-20 NOTE — H&P
[address] [place] [address], [address] [place] [address]   HISTORY AND PHYSICAL       Name:  Eliza Rasmussen   MR#:  [id number]   :  1935   Account #:  [de-identified]        Date of Adm:  2017       REASON FOR ADMISSION: Peripheral vascular disease with   claudication and rest pain, right leg. HISTORY: The patient is an 76-year-old female who has a recent   history of right leg and foot symptoms related to peripheral vascular   disease. She underwent angiographic evaluation with placement of a   superficial femoral artery and proximal popliteal artery stent 3 days   ago. She initially had a good result with a palpable posterior tibial   pulse. She developed recurrent symptoms today consistent with   thrombosis of the stent with claudication and foot pain. She presented   to the ER and was confirmed to have a reduced ankle pressure with an   ARUNA of 0.4. She is otherwise stable and without complaints. PAST MEDICAL HISTORY: Diabetes, hypertension, coronary artery   disease with previous coronary stent placement. MEDICATIONS:   1. Amlodipine. 2. Aspirin. 3. Lipitor. 4. Enalapril. 5. Glipizide. 6. Hydrochlorothiazide. 7. Insulin. 8. Isosorbide. 9. Metformin. 10. Metoprolol. 11. Prilosec. 12. Spironolactone. ALLERGIES:   1. CODEINE. 2. Ervin Fogo. 3. PENICILLIN. 4. TRAMADOL. SOCIAL HISTORY: The patient is single. She lives alone. She has a   daughter who is her closest family member who is here with her in the   emergency room. FAMILY HISTORY: Unremarkable. REVIEW OF SYSTEMS   She has had no recent cardiac, respiratory, GI, , neurologic,   hematologic or psychiatric complaints. PHYSICAL EXAMINATION   GENERAL: She is an elderly female in no distress. LUNGS: Clear. HEART: Regular rate and rhythm. ABDOMEN: Soft and nontender. PERIPHERAL VASCULAR: She has palpable femoral pulses   bilaterally but no pulses distally in either leg.    EXTREMITIES: She has intact motor and sensory function in both feet   and legs. NEUROLOGIC: Grossly normal with intact motor and sensory function. IMPRESSION: The patient has occluded her right distal SFA popliteal   stent with recurrent claudication symptoms and recurrent foot pain. She is not in imminent jeopardy. She will be admitted for   revascularization with a fem-pop bypass.         MD HANY Chapman / HC   D:  02/20/2017   01:22   T:  02/20/2017   03:01   Job #:  [id number]

## 2017-02-20 NOTE — PROGRESS NOTES
Vascular:    Stable overnight - plan fem pop bypass today    In response to coding inquiry - patient has mild hyponatremia not currently of clinical significance

## 2017-02-20 NOTE — PERIOP NOTES
TRANSFER - IN REPORT:    Verbal report received from Lora(name) on Chema Marina  being received from [facility](unit) for ordered procedure  Right fem-pop    Report consisted of patients Situation, Background, Assessment and   Recommendations(SBAR). Information from the following report(s) SBAR and Recent Results was reviewed with the receiving nurse. Opportunity for questions and clarification was provided. Assessment completed upon patients arrival to unit and care assumed.      Efraín Cormier RN

## 2017-02-20 NOTE — ED NOTES
Doppler tech to bedside to perform study. Pt is A&OX3 airway patent resting on str without signs of distress. Vital signs within defined limits.

## 2017-02-20 NOTE — PROGRESS NOTES
TRANSFER - OUT REPORT:    Verbal report given to PIPER Aguirre(name) on Liberty Ventura  being transferred to 449(unit) for routine post - op       Report consisted of patients Situation, Background, Assessment and   Recommendations(SBAR). Time Pre op antibiotic given:12:59 Vancomycin  Anesthesia Stop time: 14:55  Ness Present on Transfer to floor:yes  Order for Ness on Chart:yes  May remove per Dr. Celestina Gallegos if it is too uncomfortable for patient    Information from the following report(s) SBAR, Kardex, OR Summary, Procedure Summary, Intake/Output and MAR was reviewed with the receiving nurse. Opportunity for questions and clarification was provided. Is the patient on 02? YES       L/Min 2       Other     Is the patient on a monitor? YES    Is the nurse transporting with the patient? YES    Surgical Waiting Area notified of patient's transfer from PACU? YES, talked to daughter at 16:30,but when called at 17:00 to come down, daughter had gone home. The following personal items collected during your admission accompanied patient upon transfer:   Dental Appliance: Dental Appliances: None  Vision: Visual Aid: Glasses  Hearing Aid:    Jewelry: Jewelry: Watch  Clothing: Clothing: None, Sent home  Other Valuables:  Other Valuables: Eyeglasses  Valuables sent to safe:

## 2017-02-20 NOTE — ROUTINE PROCESS
Patient: Fredi Morales MRN: [id number]  SSN: [id number]   YOB: 1935  Age: [de-identified] y.o. Sex: female     Patient is status post Procedure(s):  Right FEMORAL-POPLITEAL BYPASS WITH VEIN. Surgeon(s) and Role:     * Damian Forrester MD - Primary    Local/Dose/Irrigation:  Heparinized saline (2,000 units heparin in 500mL ns) for irrigation                  Peripheral IV 02/19/17 Left Antecubital (Active)   Site Assessment Clean, dry, & intact 2/20/2017 10:00 AM   Phlebitis Assessment 0 2/20/2017 10:00 AM   Infiltration Assessment 0 2/20/2017 10:00 AM   Dressing Status Clean, dry, & intact 2/20/2017 10:00 AM   Dressing Type Transparent 2/20/2017 10:00 AM   Hub Color/Line Status Green; Infusing 2/20/2017 10:00 AM   Alcohol Cap Used Yes 2/20/2017  2:45 AM       Peripheral IV 02/20/17 Left Wrist (Active)            Airway - Endotracheal Tube 02/20/17 Oral (Active)                   Dressing/Packing:  Wound Leg Right-DRESSING TYPE: Staples;Special tape (comment);4 x 4 (02/20/17 0405)    Indwelling urinary catheter in place.

## 2017-02-20 NOTE — PROGRESS NOTES
Primary Nurse Nickie Duarte RN and PIPER Gutierrez performed a dual skin assessment on this patient No impairment noted other than surgical incision. Ruperto score is 18    7:32 PM  Bedside shift change report given to Lucía Saini RN (oncoming nurse) by PIPER Rojas (offgoing nurse). Report included the following information SBAR, OR Summary, Intake/Output and Recent Results.

## 2017-02-20 NOTE — ED NOTES
TRANSFER - OUT REPORT:    Verbal report given to Stephanie(name) on Stiven Rojas  being transferred to [facility](unit) for routine progression of care       Report consisted of patients Situation, Background, Assessment and   Recommendations(SBAR). Information from the following report(s) SBAR and ED Summary was reviewed with the receiving nurse. Lines:   Peripheral IV 02/19/17 Left Antecubital (Active)   Site Assessment Clean, dry, & intact 2/19/2017 11:01 PM   Dressing Status Clean, dry, & intact 2/19/2017 11:01 PM        Opportunity for questions and clarification was provided.       Patient transported with:   Registered Nurse

## 2017-02-20 NOTE — BRIEF OP NOTE
BRIEF OPERATIVE NOTE    Date of Procedure: 2/20/2017   Preoperative Diagnosis: PVD with claudication  Postoperative Diagnosis: PVD with claudication    Procedure(s):  Right FEMORAL-POPLITEAL BYPASS WITH VEIN  Surgeon(s) and Role:     * Lissette Ahuja MD - Primary            Surgical Staff:  Circ-1: Dylon Neal RN  Circ-Intern: José Miguel Bedoya RN  Scrub RN-1: Ruben Velazco RN  Surg Asst-1: Tierney Cary  Event Time In   Incision Start 1300   Incision Close 1437     Anesthesia: General   Estimated Blood Loss: 100 ml  Specimens: * No specimens in log *   Findings: good flow at completion   Complications: none  Implants: * No implants in log *

## 2017-02-20 NOTE — ED PROVIDER NOTES
HPI Comments: [de-identified] y.o. female with past medical history significant for CAD, T2DM, HLD, HTN, heart catheterization with stent, hysterectomy, and stroke who presents from home with chief complaint of leg pain. Pt reports to the ED c/o R-leg pain that began this morning. Pt states that the pain is localized to the posterior aspect of her R-calf, R-knee, and R-thigh. Pt states that the pain has led to her having difficulty walking even \"2 feet,\" and she notes that the pain seems to be exacerbated when she gets cold. Pt had a stent placed in her R-leg 3 days ago by Dr. Nickie Lewis at [facility]. Pt takes 324 mg ASA daily, but denies taking any other blood thinners. Pt is allergic to Tramadol, Codeine, Percocet, and other unspecified pain medications. Pt denies experiencing any CP, SOB, abdominal pain, back pain, nausea, or vomiting. There are no other acute medical concerns at this time. PCP: Raeann Trejo MD    Note written by Kamari Gómez, as dictated by Ana Maria Meadows MD 10:57 PM      The history is provided by the patient and a relative. Past Medical History:   Diagnosis Date    CAD (coronary artery disease)     Cerebrovascular accident stroke, other, unspec 11/18/2010    Coronary atherosclerosis of native coronary artery 11/18/2010    Essential hypertension     Essential hypertension, benign 11/18/2010    HLD (hyperlipidemia) 11/18/2010    Type II or unspecified type diabetes mellitus without mention of complication, not stated as uncontrolled 11/18/2010       Past Surgical History:   Procedure Laterality Date    Hx heart catheterization      Hx coronary stent placement      Hx hysterectomy  1972         History reviewed. No pertinent family history. Social History     Social History    Marital status:      Spouse name: N/A    Number of children: N/A    Years of education: N/A     Occupational History    Not on file.      Social History Main Topics    Smoking status: Former Smoker     Packs/day: 0.80     Years: 20.00     Types: Cigarettes     Quit date: 4/18/1986    Smokeless tobacco: Never Used    Alcohol use No    Drug use: No    Sexual activity: Not on file     Other Topics Concern    Not on file     Social History Narrative         ALLERGIES: Codeine; Keflin; Pcn [penicillins]; and Tramadol    Review of Systems   Respiratory: Negative for shortness of breath. Cardiovascular: Negative for chest pain. Gastrointestinal: Negative for abdominal pain, nausea and vomiting. Musculoskeletal: Positive for gait problem. Negative for back pain. +R-leg pain   All other systems reviewed and are negative. Vitals:    02/19/17 2246   BP: 154/74   Pulse: 72   Resp: 16   Temp: 98.2 °F (36.8 °C)   SpO2: 98%   Weight: 72.6 kg (160 lb)   Height: 5' 3\" (1.6 m)            Physical Exam   Nursing note and vitals reviewed. CONSTITUTIONAL: Well-appearing; well-nourished; in mild distress  HEAD: Normocephalic; atraumatic  EYES: PERRL; EOM intact; conjunctiva and sclera are clear bilaterally. ENT: No rhinorrhea; normal pharynx with no tonsillar hypertrophy; mucous membranes pink/moist, no erythema, no exudate. NECK: Supple; non-tender; no cervical lymphadenopathy  CARD: Normal S1, S2; no murmurs, rubs, or gallops. Regular rate and rhythm. RESP: Normal respiratory effort; breath sounds clear and equal bilaterally; no wheezes, rhonchi, or rales. ABD: Normal bowel sounds; non-distended; non-tender; no palpable organomegaly, no masses, no bruits. Back Exam: Normal inspection; no vertebral point tenderness, no CVA tenderness. Normal range of motion. EXT: Normal ROM in all four extremities; tender to palpation in right leg and thigh; no swelling or deformity; Cool right foot with very weak DP, no edema. SKIN: Warm; dry; no rash.   NEURO:Alert and oriented x 3, coherent, STEPHENIE-XII grossly intact, sensory and motor are non-focal.        MDM  Number of Diagnoses or Management Options  Arterial insufficiency of lower extremity [facility]):   Diagnosis management comments: Assessment: 59-year-old female, status post stent placement and history of claudication, peripheral vascular disease, who presents with increased pain; ambulation, decreased range of motion of the right foot since procedure 4 days ago, the symptoms appear consistent with stent occlusion, and acute arterial insufficiency. Plan: EKG/ lab/ IV fluid/ analgesia/  Arterial study of the right leg and foot/ consult vascular surgeon/ serial exam/ Monitor and Reevaluate. Amount and/or Complexity of Data Reviewed  Clinical lab tests: ordered and reviewed  Tests in the radiology section of CPT®: ordered and reviewed  Tests in the medicine section of CPT®: reviewed and ordered  Discussion of test results with the performing providers: yes  Decide to obtain previous medical records or to obtain history from someone other than the patient: yes  Obtain history from someone other than the patient: yes  Review and summarize past medical records: yes  Independent visualization of images, tracings, or specimens: yes    Risk of Complications, Morbidity, and/or Mortality  Presenting problems: moderate  Diagnostic procedures: moderate  Management options: moderate    Critical Care  Total time providing critical care: (Total critical care time spend exclusive of procedures: 45 minutes)    ED Course       Procedures     ED EKG interpretation:  Rhythm: normal sinus rhythm; and regular . Rate (approx.): 68; Axis: left axis deviation; P wave: normal; QRS interval: normal ; ST/T wave: non-specific changes; in  Lead: Diffusely; Other findings: abnormal ekg. This EKG was interpreted by Preet Borja MD,ED Provider. XRAY INTERPRETATION (ED MD)  Chest Xray  No acute process seen. Normal heart size. No bony abnormalities. No infiltrate.   Preet Borja MD 12:49 AM    PROGRESS NOTE:  Pt has been reexamined by Preet Borja MD all available results have been reviewed with pt and any available family. Pt understands sx, dx, and tx in ED. Care plan has been outlined and questions have been answered. Pt and any available family understands and agrees to need for admission to hospital for further tx not available in ED. Pt is ready for admission. Written by Isabelle Royal MD,  12:30 AM    CONSULT NOTE:  Isabelle Royal MD spoke with Dr. Leonard Rosales of vascular surgery. Discussed patient's presentation, history, physical assessment, and available diagnostic results.  He will evaluate, write orders and admit the patient to the hospital. 12:49 AM    .

## 2017-02-20 NOTE — ANESTHESIA PREPROCEDURE EVALUATION
Anesthetic History   No history of anesthetic complications            Review of Systems / Medical History  Patient summary reviewed, nursing notes reviewed and pertinent labs reviewed    Pulmonary  Within defined limits                 Neuro/Psych   Within defined limits           Cardiovascular  Within defined limits  Hypertension          CAD         GI/Hepatic/Renal  Within defined limits              Endo/Other  Within defined limits  Diabetes: type 2         Other Findings              Physical Exam    Airway  Mallampati: III  TM Distance: > 6 cm  Neck ROM: normal range of motion   Mouth opening: Normal     Cardiovascular  Regular rate and rhythm,  S1 and S2 normal,  no murmur, click, rub, or gallop             Dental  No notable dental hx       Pulmonary  Breath sounds clear to auscultation               Abdominal  GI exam deferred       Other Findings            Anesthetic Plan    ASA: 3  Anesthesia type: general          Induction: Intravenous  Anesthetic plan and risks discussed with: Patient

## 2017-02-20 NOTE — ED TRIAGE NOTES
Pt reports she had a stent placed in her R leg on Thursday, today reports right leg pain, cold to touch, and numb.

## 2017-02-20 NOTE — ROUTINE PROCESS
TRANSFER - IN REPORT:    Verbal report received from [facility]) on Estrella Me  being received from ED(unit) for routine progression of care      Report consisted of patients Situation, Background, Assessment and   Recommendations(SBAR). Information from the following report(s) SBAR, Kardex, ED Summary, Intake/Output, MAR, Accordion and Recent Results was reviewed with the receiving nurse. Opportunity for questions and clarification was provided. Assessment completed upon patients arrival to unit and care assumed. Primary Nurse Percy Encinas and Damián Henriquez RN performed a dual skin assessment on this patient No impairment noted  Ruperto score is 22    Bedside shift change report given to Marcelo Johnson (oncoming nurse) by Arleth Lyn (offgoing nurse). Report included the following information SBAR, Kardex, Intake/Output, MAR, Accordion and Recent Results.

## 2017-02-20 NOTE — PROGRESS NOTES
Reviewed medical chart; met with the patient at the bedside. History: Patient has a history of PVD, CAD, stroke, coronary atherosclerosis of native coronary artery, hypertension, HLD, type II diabetes. Living Situation:  Patient states that her daughter, Rafat ROBB#[phone], lives with her. The patient owns DME from when her  was alive, but does not use any of it (walker, wheelchair, motor scooter). She has two daughters and a son. Her PCP is Dr. David Hdz and she gets her prescriptions at [facility] on [address] or through [facility] [place] mail order. She explained that her  of 62 years  10 years ago. Current Discharge Plan:  Patient will have a fem pop bypass today. Patient will return home with her daughter. Care Management will continue to follow her disposition. ROSA Villa     Care Management Interventions  PCP Verified by CM:  Yes  Palliative Care Consult (Criteria: CHF and RRAT>21): No  Mode of Transport at Discharge:  (Patient will likely travel by car at discharge.  )  Transition of Care Consult (CM Consult): Discharge Planning  MyChart Signup: No  Discharge Durable Medical Equipment: No  Physical Therapy Consult: No  Occupational Therapy Consult: No  Speech Therapy Consult: No  Current Support Network:  (Patient's daughter lives with her.  )  Confirm Follow Up Transport:  (Patient will likely travel by car at discharge.  )  Plan discussed with Pt/Family/Caregiver: Yes  Freedom of Choice Offered: Yes  Discharge Location  Discharge Placement: Home

## 2017-02-20 NOTE — ED NOTES
Pt is A&OX3 airway patent resting on str without signs of distress. Pt taken to bathroom, by wheelchair, per request. Urine obtained and sent to lab.

## 2017-02-20 NOTE — PROGRESS NOTES
TRANSFER - IN REPORT:    Verbal report received from [address] RN(name) on Damaris Ward  being received from PACU(unit) for routine progression of care      Report consisted of patients Situation, Background, Assessment and   Recommendations(SBAR). Information from the following report(s) SBAR, OR Summary, Intake/Output and Recent Results was reviewed with the receiving nurse. Opportunity for questions and clarification was provided. Assessment completed upon patients arrival to unit and care assumed.

## 2017-02-20 NOTE — ANESTHESIA POSTPROCEDURE EVALUATION
Post-Anesthesia Evaluation and Assessment    Patient: Claudine Pascual MRN: [id number]  SSN: [id number]    YOB: 1935  Age: [de-identified] y.o. Sex: female       Cardiovascular Function/Vital Signs  Visit Vitals    /49    Pulse 60    Temp 36.7 °C (98 °F)    Resp 15    Ht 5' 3\" (1.6 m)    Wt 72.6 kg (160 lb)    SpO2 96%    BMI 28.34 kg/m2       Patient is status post general anesthesia for Procedure(s):  Right FEMORAL-POPLITEAL BYPASS WITH VEIN. Nausea/Vomiting: None    Postoperative hydration reviewed and adequate. Pain:  Pain Scale 1: Numeric (0 - 10) (02/20/17 1530)  Pain Intensity 1: 0 (02/20/17 1530)   Managed    Neurological Status:   Neuro (WDL): Exceptions to WDL (02/20/17 1530)  Neuro  Neurologic State: Drowsy (02/20/17 1530)  LUE Motor Response: Purposeful (02/20/17 1530)  LLE Motor Response: Purposeful (02/20/17 1530)  RUE Motor Response: Purposeful (02/20/17 1530)  RLE Motor Response: Purposeful (02/20/17 1530)   At baseline    Mental Status and Level of Consciousness: Alert and oriented     Pulmonary Status:   O2 Device: Nasal cannula (02/20/17 1530)   Adequate oxygenation and airway patent    Complications related to anesthesia: None    Post-anesthesia assessment completed.  No concerns    Signed By: Thalia Davis DO     February 20, 2017

## 2017-02-20 NOTE — OP NOTES
[address] [facility] [place] [address]   OP NOTE       Name:  Uriah Wakefield   MR#:  [id number]   :  1935   Account #:  [de-identified]    Surgery Date:  2017   Date of Adm:  2017       PREOPERATIVE DIAGNOSIS: Peripheral vascular disease with   claudication. POSTOPERATIVE DIAGNOSIS: Peripheral vascular disease with   claudication. PROCEDURES PERFORMED: Right mid superficial femoral artery to   below-knee popliteal artery bypass using nonreversed saphenous vein. SURGEON: Grayson Bajwa MD.    ANESTHESIA: General.    ESTIMATED BLOOD LOSS: 100 mL. SPECIMENS REMOVED: None. COMPLICATIONS: None. INDICATIONS: The patient is an [de-identified] female with significant   peripheral vascular disease, who had a stent placed in her occluded   distal superficial femoral and proximal popliteal arteries last week. The   patient initially had a good result with a palpable posterior tibial pulse. However, the stent thrombosed yesterday and she had recurrent   symptoms. She will now undergo bypass. DESCRIPTION OF PROCEDURE: The patient's right leg was prepped   and draped. A longitudinal incision was made in the proximal medial   thigh. The saphenous vein was identified and dissected free up to the   saphenofemoral junction, where it was ligated and divided. The vein   was then dissected to the distal portion of the incision. Side branches   were ligated with silk ties. A longitudinal incision was made in the mid   medial thigh and the saphenous vein was further dissected free into   the distal thigh. The superficial part femoral artery was dissected free   through the mid thigh incision. A longitudinal incision was then made in   the proximal medial lower leg and the popliteal artery was identified   and dissected free. The patient was systemically heparinized. The vein   was harvested from the groin to the distal thigh.  The superficial femoral   artery was opened longitudinally and an end-to-side anastomosis was   performed between the vein graft and the artery using running 5-0   Prolene. The vein was oriented in a nonreversed fashion. Flow was   then restored. The valves within the vein graft were then cut using a   valvulotome. This established good flow through the graft. The   graft was then tunneled through the popliteal space. It was then sewn   end-to-side to the popliteal artery below the knee using running 6-0   Prolene. Following this, there was a good pulse in the graft and good   flow by Doppler. Hemostasis was obtained and the incisions were   closed with Vicryl subcutaneous and fascial sutures and skin staples. Dressings were applied, and the patient was returned to the recovery   room in stable condition.         Reese See MD      Dutch Fillers / [place]   D:  02/20/2017   14:48   T:  02/20/2017   15:45   Job #:  [id number]

## 2017-02-21 LAB
ANION GAP BLD CALC-SCNC: 10 MMOL/L (ref 5–15)
BASOPHILS # BLD AUTO: 0 K/UL (ref 0–0.1)
BASOPHILS # BLD: 0 % (ref 0–1)
BUN SERPL-MCNC: 19 MG/DL (ref 6–20)
BUN/CREAT SERPL: 19 (ref 12–20)
CALCIUM SERPL-MCNC: 8.2 MG/DL (ref 8.5–10.1)
CHLORIDE SERPL-SCNC: 97 MMOL/L (ref 97–108)
CO2 SERPL-SCNC: 23 MMOL/L (ref 21–32)
CREAT SERPL-MCNC: 0.99 MG/DL (ref 0.55–1.02)
EOSINOPHIL # BLD: 0 K/UL (ref 0–0.4)
EOSINOPHIL NFR BLD: 0 % (ref 0–7)
ERYTHROCYTE [DISTWIDTH] IN BLOOD BY AUTOMATED COUNT: 12.9 % (ref 11.5–14.5)
GLUCOSE BLD STRIP.AUTO-MCNC: 228 MG/DL (ref 65–100)
GLUCOSE BLD STRIP.AUTO-MCNC: 256 MG/DL (ref 65–100)
GLUCOSE BLD STRIP.AUTO-MCNC: 284 MG/DL (ref 65–100)
GLUCOSE BLD STRIP.AUTO-MCNC: 298 MG/DL (ref 65–100)
GLUCOSE SERPL-MCNC: 274 MG/DL (ref 65–100)
HCT VFR BLD AUTO: 31.2 % (ref 35–47)
HGB BLD-MCNC: 10.7 G/DL (ref 11.5–16)
LYMPHOCYTES # BLD AUTO: 7 % (ref 12–49)
LYMPHOCYTES # BLD: 0.9 K/UL (ref 0.8–3.5)
MCH RBC QN AUTO: 27 PG (ref 26–34)
MCHC RBC AUTO-ENTMCNC: 34.3 G/DL (ref 30–36.5)
MCV RBC AUTO: 78.8 FL (ref 80–99)
MONOCYTES # BLD: 0.4 K/UL (ref 0–1)
MONOCYTES NFR BLD AUTO: 3 % (ref 5–13)
NEUTS SEG # BLD: 10.8 K/UL (ref 1.8–8)
NEUTS SEG NFR BLD AUTO: 90 % (ref 32–75)
PLATELET # BLD AUTO: 206 K/UL (ref 150–400)
POTASSIUM SERPL-SCNC: 4.6 MMOL/L (ref 3.5–5.1)
RBC # BLD AUTO: 3.96 M/UL (ref 3.8–5.2)
SERVICE CMNT-IMP: ABNORMAL
SODIUM SERPL-SCNC: 130 MMOL/L (ref 136–145)
WBC # BLD AUTO: 12 K/UL (ref 3.6–11)

## 2017-02-21 PROCEDURE — 85025 COMPLETE CBC W/AUTO DIFF WBC: CPT

## 2017-02-21 PROCEDURE — 65270000032 HC RM SEMIPRIVATE

## 2017-02-21 PROCEDURE — 74011636637 HC RX REV CODE- 636/637

## 2017-02-21 PROCEDURE — 82962 GLUCOSE BLOOD TEST: CPT

## 2017-02-21 PROCEDURE — 36415 COLL VENOUS BLD VENIPUNCTURE: CPT

## 2017-02-21 PROCEDURE — G8979 MOBILITY GOAL STATUS: HCPCS

## 2017-02-21 PROCEDURE — 77010033678 HC OXYGEN DAILY

## 2017-02-21 PROCEDURE — 74011250637 HC RX REV CODE- 250/637

## 2017-02-21 PROCEDURE — 97161 PT EVAL LOW COMPLEX 20 MIN: CPT

## 2017-02-21 PROCEDURE — 74011250636 HC RX REV CODE- 250/636

## 2017-02-21 PROCEDURE — G8978 MOBILITY CURRENT STATUS: HCPCS

## 2017-02-21 PROCEDURE — 80048 BASIC METABOLIC PNL TOTAL CA: CPT

## 2017-02-21 RX ORDER — GLIPIZIDE 5 MG/1
5 TABLET ORAL
Status: DISCONTINUED | OUTPATIENT
Start: 2017-02-21 | End: 2017-02-23 | Stop reason: HOSPADM

## 2017-02-21 RX ORDER — INSULIN GLARGINE 100 [IU]/ML
10 INJECTION, SOLUTION SUBCUTANEOUS DAILY
Status: DISCONTINUED | OUTPATIENT
Start: 2017-02-22 | End: 2017-02-23 | Stop reason: HOSPADM

## 2017-02-21 RX ORDER — AMLODIPINE BESYLATE 5 MG/1
5 TABLET ORAL EVERY EVENING
Status: DISCONTINUED | OUTPATIENT
Start: 2017-02-21 | End: 2017-02-23 | Stop reason: HOSPADM

## 2017-02-21 RX ORDER — METFORMIN HYDROCHLORIDE 500 MG/1
500 TABLET ORAL 2 TIMES DAILY WITH MEALS
Status: DISCONTINUED | OUTPATIENT
Start: 2017-02-21 | End: 2017-02-23 | Stop reason: HOSPADM

## 2017-02-21 RX ADMIN — GLIPIZIDE 5 MG: 5 TABLET ORAL at 17:16

## 2017-02-21 RX ADMIN — INSULIN LISPRO 5 UNITS: 100 INJECTION, SOLUTION INTRAVENOUS; SUBCUTANEOUS at 11:50

## 2017-02-21 RX ADMIN — GLIPIZIDE 5 MG: 5 TABLET ORAL at 09:42

## 2017-02-21 RX ADMIN — METFORMIN HYDROCHLORIDE 500 MG: 500 TABLET, FILM COATED ORAL at 09:39

## 2017-02-21 RX ADMIN — ASPIRIN 325 MG: 325 TABLET ORAL at 09:39

## 2017-02-21 RX ADMIN — INSULIN LISPRO 5 UNITS: 100 INJECTION, SOLUTION INTRAVENOUS; SUBCUTANEOUS at 06:37

## 2017-02-21 RX ADMIN — ACETAMINOPHEN 650 MG: 325 TABLET ORAL at 09:38

## 2017-02-21 RX ADMIN — ACETAMINOPHEN 650 MG: 325 TABLET ORAL at 22:09

## 2017-02-21 RX ADMIN — ISOSORBIDE MONONITRATE 60 MG: 60 TABLET, EXTENDED RELEASE ORAL at 09:38

## 2017-02-21 RX ADMIN — INSULIN LISPRO 3 UNITS: 100 INJECTION, SOLUTION INTRAVENOUS; SUBCUTANEOUS at 22:09

## 2017-02-21 RX ADMIN — METOPROLOL TARTRATE 100 MG: 50 TABLET ORAL at 09:39

## 2017-02-21 RX ADMIN — ENOXAPARIN SODIUM 40 MG: 40 INJECTION SUBCUTANEOUS at 06:28

## 2017-02-21 RX ADMIN — AMLODIPINE BESYLATE 10 MG: 5 TABLET ORAL at 09:39

## 2017-02-21 RX ADMIN — INSULIN LISPRO 3 UNITS: 100 INJECTION, SOLUTION INTRAVENOUS; SUBCUTANEOUS at 17:15

## 2017-02-21 RX ADMIN — PANTOPRAZOLE SODIUM 40 MG: 40 TABLET, DELAYED RELEASE ORAL at 09:39

## 2017-02-21 RX ADMIN — SPIRONOLACTONE 25 MG: 25 TABLET, FILM COATED ORAL at 09:40

## 2017-02-21 RX ADMIN — HYDROCODONE BITARTRATE AND ACETAMINOPHEN 1 TABLET: 7.5; 325 TABLET ORAL at 00:32

## 2017-02-21 RX ADMIN — HYDROCHLOROTHIAZIDE 50 MG: 25 TABLET ORAL at 09:39

## 2017-02-21 RX ADMIN — Medication 10 ML: at 14:00

## 2017-02-21 RX ADMIN — METFORMIN HYDROCHLORIDE 500 MG: 500 TABLET, FILM COATED ORAL at 17:16

## 2017-02-21 RX ADMIN — ATORVASTATIN CALCIUM 40 MG: 40 TABLET, FILM COATED ORAL at 22:09

## 2017-02-21 RX ADMIN — ENALAPRIL MALEATE 20 MG: 5 TABLET ORAL at 09:38

## 2017-02-21 NOTE — PROGRESS NOTES
Bedside and Verbal shift change report given to Darrick Ruiz RN (oncoming nurse) by Jair Antonio RN (offgoing nurse). Report included the following information SBAR, Kardex, Intake/Output, MAR and Recent Results.

## 2017-02-21 NOTE — PROGRESS NOTES
Bedside shift change report given to [facility] (oncoming nurse) by Carlene Sandy (offgoing nurse). Report included the following information SBAR, ED Summary, OR Summary, Intake/Output, MAR, Recent Results and Cardiac Rhythm NSR-Sinus Arrhythmia.

## 2017-02-21 NOTE — INTERDISCIPLINARY ROUNDS
Interdisciplinary team rounds were held 2/21/2017 with the following team members:Care Management, Nursing, Nutrition and Clinical Coordinator and the patient. Plan of care discussed. See clinical pathway and/or care plan for interventions and desired outcomes.

## 2017-02-21 NOTE — PROGRESS NOTES
Vascular:    Awake, alert, no complaints    Leg dressed, palpable PT pulse    Labs OK    Doing well POD #1 after right fem pop bypass - to floor, DC Grover, PT consult

## 2017-02-21 NOTE — DIABETES MGMT
DTC Progress Note    Recommendations/ Comments: Chart reviewed for hyperglycemia. If appropriate, please consider adding Lantus 10 units - patient takes Levemir 10 units at home. Patient is a [de-identified] y.o. female with a history of Type 2 Diabetes on oral agents (dual therapy): glipizide (generic), metformin (generic) and Levemir at home. A1c:   Lab Results   Component Value Date/Time    Hemoglobin A1c 8.1 06/09/2011 08:56 AM       Recent Glucose Results:   Lab Results   Component Value Date/Time     (H) 02/21/2017 12:36 AM    GLUCPOC 298 (H) 02/21/2017 11:45 AM    GLUCPOC 256 (H) 02/21/2017 06:34 AM    GLUCPOC 333 (H) 02/20/2017 10:37 PM        Lab Results   Component Value Date/Time    Creatinine 0.99 02/21/2017 12:36 AM       Active Orders   Diet    DIET CARDIAC Regular; No Conc. Sweets        PO intake: No data found. Current hospital DM medication: Glipizide, Metformin and Humalog for correction, normal sensitivity    Will continue to follow as needed.     Thank you  Deonte Escalera, MS, RN, CDE

## 2017-02-21 NOTE — PROGRESS NOTES
Spiritual Care Partner Volunteer visited patient in [address] [place] [address] on 2/21/17. Documented by:  Juan Soria M.Div.    Paging Service [phone])

## 2017-02-21 NOTE — PROGRESS NOTES
Problem: Surgical Pathway Day of Surgery  Goal: *No signs and symptoms of infection or wound complications  Outcome: Progressing Towards Goal  Pt afebrile with no erythema or swelling of sx site  Goal: *Optimal pain control at patients stated goal  Outcome: Progressing Towards Goal  Pt states pain adequately controlled by prn meds  Goal: *Adequate urinary output (equal to or greater than 30 milliliters/hour)  Ambulatory Surgery patients voiding without difficulty. Outcome: Progressing Towards Goal  Pt producing adequate urinary output  Goal: *Hemodynamically stable  Outcome: Progressing Towards Goal  Vital signs stable at this time  Goal: *Tolerating diet  Outcome: Progressing Towards Goal  Pt tolerating cardiac diet     Problem: Falls - Risk of  Goal: *Absence of falls  Outcome: Progressing Towards Goal  Pt alert and oriented. Call bell and belongings within reach. Ness in place  Goal: *Knowledge of fall prevention  Outcome: Not Progressing Towards Goal  Pt calls out appropriately for assistance.     Problem: Pressure Ulcer - Risk of  Goal: *Prevention of pressure ulcer  Outcome: Progressing Towards Goal  Skin on pressure points cdi and blanchable with no breakdown

## 2017-02-21 NOTE — PROGRESS NOTES
TRANSFER - OUT REPORT:    Verbal report given to Deven Manning RN(name) on Kate Alcala  being transferred to (unit) for routine progression of care       Report consisted of patients Situation, Background, Assessment and   Recommendations(SBAR). Information from the following report(s) SBAR was reviewed with the receiving nurse. Lines:   Peripheral IV 02/19/17 Left Antecubital (Active)   Site Assessment Clean, dry, & intact 2/21/2017  8:00 AM   Phlebitis Assessment 0 2/21/2017  8:00 AM   Infiltration Assessment 0 2/21/2017  8:00 AM   Dressing Status Clean, dry, & intact 2/21/2017  8:00 AM   Dressing Type Transparent 2/21/2017  8:00 AM   Hub Color/Line Status Green;Flushed 2/21/2017  8:00 AM   Alcohol Cap Used Yes 2/21/2017  8:00 AM       Peripheral IV 02/20/17 Left Wrist (Active)   Site Assessment Clean, dry, & intact 2/21/2017  8:00 AM   Phlebitis Assessment 0 2/21/2017  8:00 AM   Infiltration Assessment 0 2/21/2017  8:00 AM   Dressing Status Clean, dry, & intact 2/21/2017  8:00 AM   Dressing Type Transparent 2/21/2017  8:00 AM   Hub Color/Line Status Green; Infusing 2/21/2017  8:00 AM   Action Taken Open ports on tubing capped 2/21/2017  8:00 AM   Alcohol Cap Used Yes 2/21/2017  8:00 AM        Opportunity for questions and clarification was provided.       Patient transported with:   [employer]

## 2017-02-21 NOTE — PROGRESS NOTES
Problem: Mobility Impaired (Adult and Pediatric)  Goal: *Acute Goals and Plan of Care (Insert Text)  Physical Therapy Goals  Initiated 2/21/2017  1. Patient will move from supine to sit and sit to supine , scoot up and down and roll side to side in bed with modified independence within 7 day(s). 2. Patient will transfer from bed to chair and chair to bed with modified independence using the least restrictive device within 7 day(s). 3. Patient will perform sit to stand with modified independence within 7 day(s). 4. Patient will ambulate with modified independence for 150 feet with the least restrictive device within 7 day(s). 5. Patient will ascend/descend 4 stairs with 2 handrail(s) with supervision/set-up within 7 day(s). PHYSICAL THERAPY EVALUATION  Patient: Lulu Garcia (89 y.o. female)  Date: 2/21/2017  Primary Diagnosis: PVD (peripheral vascular disease) with claudication (HCC)  unknown  Procedure(s) (LRB):  Right FEMORAL-POPLITEAL BYPASS WITH VEIN (Right) 1 Day Post-Op   Precautions:   Fall      ASSESSMENT :  Based on the objective data described below, the patient presents with decreased LE strength, impaired balance, and decreased independence following R fem pop bypass POD 1. PTA patient was independent. Currently limited by pain. Overall mod A for bed mobility and min A x 2 for transfers and sidestepping. Unable to tolerate ambulation further than sidestepping due to lightheadedness, unable to obtain BP in standing. Returned to supine with mod A. Patient preparing for transfer to [address] [place] [address] good gains once pain improves. Recommend HHPT. Patient will benefit from skilled intervention to address the above impairments.   Patients rehabilitation potential is considered to be Good  Factors which may influence rehabilitation potential include:   [X]         None noted  [ ]         Mental ability/status  [ ]         Medical condition  [ ]         Home/family situation and support systems  [ ]         Safety awareness  [ ]         Pain tolerance/management  [ ]         Other:        PLAN :  Recommendations and Planned Interventions:  [X]           Bed Mobility Training             [X]    Neuromuscular Re-Education  [X]           Transfer Training                   [ ]    Orthotic/Prosthetic Training  [X]           Gait Training                         [ ]    Modalities  [X]           Therapeutic Exercises           [ ]    Edema Management/Control  [X]           Therapeutic Activities            [X]    Patient and Family Training/Education  [ ]           Other (comment):     Frequency/Duration: Patient will be followed by physical therapy  5 times a week to address goals. Discharge Recommendations: Home Health  Further Equipment Recommendations for Discharge: TBD       SUBJECTIVE:   Patient stated It hurts more than I thought.       OBJECTIVE DATA SUMMARY:   HISTORY:    Past Medical History   Diagnosis Date    CAD (coronary artery disease)      Cerebrovascular accident stroke, other, unspec 11/18/2010    Coronary atherosclerosis of native coronary artery 11/18/2010    Essential hypertension      Essential hypertension, benign 11/18/2010    HLD (hyperlipidemia) 11/18/2010    PVD (peripheral vascular disease) ([address] 2017    Type II or unspecified type diabetes mellitus without mention of complication, not stated as uncontrolled 11/18/2010     Past Surgical History   Procedure Laterality Date    Hx heart catheterization        Hx coronary stent placement        Hx hysterectomy   1972     Prior Level of Function/Home Situation: independent  Personal factors and/or comorbidities impacting plan of care:      Home Situation  Home Environment: Private residence  # Steps to Enter: 4  Rails to Enter: Yes  Hand Rails : Bilateral  One/Two Story Residence: Two story, live on 1st floor  Living Alone: No  Support Systems: Family member(s)  Patient Expects to be Discharged to[de-identified] Private residence  Current DME Used/Available at Home: [employer], straight, Walker, rolling     EXAMINATION/PRESENTATION/DECISION MAKING:   Critical Behavior:  Neurologic State: Alert           Skin:    Strength:    Strength: Generally decreased, functional                    Tone & Sensation:                                  Range Of Motion:  AROM: Generally decreased, functional           PROM: Generally decreased, functional           Coordination:        Functional Mobility:  Bed Mobility:     Supine to Sit: Moderate assistance;Assist x1  Sit to Supine: Moderate assistance;Assist x1     Transfers:  Sit to Stand: Assist x2;Minimum assistance  Stand to Sit: Minimum assistance                       Balance:   Sitting: Intact  Standing: Impaired  Standing - Static: Fair  Standing - Dynamic : Fair  Ambulation/Gait Training:              Gait Description (WDL):  (sidestep to HOB min A x 2)                                                                Stairs: Therapeutic Exercises:         Functional Measure:  Timed up and go:      Timed Get Up And Go Test:  (unable, greater than 20 seconds)      Timed Up and Go and G-code impairment scale:  Percentage of Impairment CH     0%    CI     1-19% CJ     20-39% CK     40-59% CL     60-79% CM     80-99% CN      100%   Timed   Score 0-56 10 11-12 13-14 15-16 17-18 19 20          < than 10 seconds=Normal  Greater then 13.5 seconds (in elderly)=Increased fall risk   Dee Dee COLEMAN, Alexandru Barkre. Predicting the probability for falls in community dwelling older adults using the Timed Up and Go Test. Phys Ther. 2000;80:896-903. G codes: In compliance with CMSs Claims Based Outcome Reporting, the following G-code set was chosen for this patient based on their primary functional limitation being treated:      The outcome measure chosen to determine the severity of the functional limitation was the TUG with a score of UNABLE which was correlated with the impairment scale. · Mobility - Walking and Moving Around:               - CURRENT STATUS:    CN - 100% impaired, limited or restricted               - GOAL STATUS:           CM - 80%-99% impaired, limited or restricted               - D/C STATUS:                       ---------------To be determined---------------      Physical Therapy Evaluation Charge Determination   History Examination Presentation Decision-Making   HIGH Complexity :3+ comorbidities / personal factors will impact the outcome/ POC  LOW Complexity : 1-2 Standardized tests and measures addressing body structure, function, activity limitation and / or participation in recreation  LOW Complexity : Stable, uncomplicated  Other outcome measures TUG  HIGH       Based on the above components, the patient evaluation is determined to be of the following complexity level: LOW      Pain:  Pain Scale 1: Numeric (0 - 10)  Pain Intensity 1: 2  Pain Location 1: Leg  Pain Orientation 1: Right  Pain Description 1: Sore  Pain Intervention(s) 1: Medication (see MAR)  Activity Tolerance:   Fair  Please refer to the flowsheet for vital signs taken during this treatment. After treatment:   [ ]         Patient left in no apparent distress sitting up in chair  [X]         Patient left in no apparent distress in bed  [X]         Call bell left within reach  [X]         Nursing notified  [X]         Caregiver present  [ ]         Bed alarm activated      COMMUNICATION/EDUCATION:   The patients plan of care was discussed with: Registered Nurse.  [X]         Fall prevention education was provided and the patient/caregiver indicated understanding. [X]         Patient/family have participated as able in goal setting and plan of care. [X]         Patient/family agree to work toward stated goals and plan of care. [ ]         Patient understands intent and goals of therapy, but is neutral about his/her participation.   [ ] Patient is unable to participate in goal setting and plan of care.      Thank you for this referral.  Melissa Bustos, PT   Time Calculation: 15 mins

## 2017-02-21 NOTE — PROGRESS NOTES
Spiritual Care Partner Volunteer visited patient in [address] [place] [address] on 2/21/17. Documented by:  Winter Harper M.Div.    Paging Service [phone])

## 2017-02-22 LAB
GLUCOSE BLD STRIP.AUTO-MCNC: 101 MG/DL (ref 65–100)
GLUCOSE BLD STRIP.AUTO-MCNC: 147 MG/DL (ref 65–100)
GLUCOSE BLD STRIP.AUTO-MCNC: 217 MG/DL (ref 65–100)
GLUCOSE BLD STRIP.AUTO-MCNC: 219 MG/DL (ref 65–100)
SERVICE CMNT-IMP: ABNORMAL

## 2017-02-22 PROCEDURE — 82962 GLUCOSE BLOOD TEST: CPT

## 2017-02-22 PROCEDURE — 97535 SELF CARE MNGMENT TRAINING: CPT

## 2017-02-22 PROCEDURE — 74011250637 HC RX REV CODE- 250/637: Performed by: SURGERY

## 2017-02-22 PROCEDURE — 65270000032 HC RM SEMIPRIVATE

## 2017-02-22 PROCEDURE — 97166 OT EVAL MOD COMPLEX 45 MIN: CPT

## 2017-02-22 PROCEDURE — 97116 GAIT TRAINING THERAPY: CPT

## 2017-02-22 PROCEDURE — 74011636637 HC RX REV CODE- 636/637

## 2017-02-22 PROCEDURE — 74011250637 HC RX REV CODE- 250/637

## 2017-02-22 PROCEDURE — 74011250636 HC RX REV CODE- 250/636

## 2017-02-22 PROCEDURE — 97530 THERAPEUTIC ACTIVITIES: CPT

## 2017-02-22 RX ORDER — FACIAL-BODY WIPES
10 EACH TOPICAL DAILY PRN
Status: DISCONTINUED | OUTPATIENT
Start: 2017-02-22 | End: 2017-02-23 | Stop reason: HOSPADM

## 2017-02-22 RX ORDER — DOCUSATE SODIUM 100 MG/1
100 CAPSULE, LIQUID FILLED ORAL DAILY
Status: DISCONTINUED | OUTPATIENT
Start: 2017-02-22 | End: 2017-02-23 | Stop reason: HOSPADM

## 2017-02-22 RX ORDER — ADHESIVE BANDAGE
30 BANDAGE TOPICAL DAILY PRN
Status: DISCONTINUED | OUTPATIENT
Start: 2017-02-22 | End: 2017-02-23 | Stop reason: HOSPADM

## 2017-02-22 RX ADMIN — METOPROLOL TARTRATE 100 MG: 50 TABLET ORAL at 17:19

## 2017-02-22 RX ADMIN — PANTOPRAZOLE SODIUM 40 MG: 40 TABLET, DELAYED RELEASE ORAL at 10:28

## 2017-02-22 RX ADMIN — INSULIN GLARGINE 10 UNITS: 100 INJECTION, SOLUTION SUBCUTANEOUS at 10:38

## 2017-02-22 RX ADMIN — ATORVASTATIN CALCIUM 40 MG: 40 TABLET, FILM COATED ORAL at 22:35

## 2017-02-22 RX ADMIN — AMLODIPINE BESYLATE 10 MG: 5 TABLET ORAL at 10:29

## 2017-02-22 RX ADMIN — Medication 10 ML: at 15:45

## 2017-02-22 RX ADMIN — SPIRONOLACTONE 25 MG: 25 TABLET, FILM COATED ORAL at 10:29

## 2017-02-22 RX ADMIN — ENALAPRIL MALEATE 20 MG: 5 TABLET ORAL at 17:18

## 2017-02-22 RX ADMIN — ASPIRIN 325 MG: 325 TABLET ORAL at 10:29

## 2017-02-22 RX ADMIN — METOPROLOL TARTRATE 100 MG: 50 TABLET ORAL at 10:29

## 2017-02-22 RX ADMIN — GLIPIZIDE 5 MG: 5 TABLET ORAL at 07:44

## 2017-02-22 RX ADMIN — METFORMIN HYDROCHLORIDE 500 MG: 500 TABLET, FILM COATED ORAL at 07:44

## 2017-02-22 RX ADMIN — HYDROCHLOROTHIAZIDE 50 MG: 25 TABLET ORAL at 10:29

## 2017-02-22 RX ADMIN — GLIPIZIDE 5 MG: 5 TABLET ORAL at 17:19

## 2017-02-22 RX ADMIN — INSULIN LISPRO 3 UNITS: 100 INJECTION, SOLUTION INTRAVENOUS; SUBCUTANEOUS at 13:14

## 2017-02-22 RX ADMIN — ACETAMINOPHEN 650 MG: 325 TABLET ORAL at 03:15

## 2017-02-22 RX ADMIN — ISOSORBIDE MONONITRATE 60 MG: 60 TABLET, EXTENDED RELEASE ORAL at 10:29

## 2017-02-22 RX ADMIN — Medication 10 ML: at 22:35

## 2017-02-22 RX ADMIN — DOCUSATE SODIUM 100 MG: 100 CAPSULE, LIQUID FILLED ORAL at 10:29

## 2017-02-22 RX ADMIN — INSULIN LISPRO 3 UNITS: 100 INJECTION, SOLUTION INTRAVENOUS; SUBCUTANEOUS at 18:24

## 2017-02-22 RX ADMIN — ENALAPRIL MALEATE 20 MG: 5 TABLET ORAL at 10:29

## 2017-02-22 RX ADMIN — METFORMIN HYDROCHLORIDE 500 MG: 500 TABLET, FILM COATED ORAL at 17:19

## 2017-02-22 RX ADMIN — ENOXAPARIN SODIUM 40 MG: 40 INJECTION SUBCUTANEOUS at 05:23

## 2017-02-22 RX ADMIN — AMLODIPINE BESYLATE 5 MG: 5 TABLET ORAL at 17:18

## 2017-02-22 NOTE — PROGRESS NOTES
Reviewed medical chart; met with the patient at the bedside. Patient asked to send a referral to [address] [place] [address]. Referral was sent via [employer] Note that the patient may be ready for discharge as of tomorrow. Care Management will continue to follow her disposition.    ROSA Teixeira

## 2017-02-22 NOTE — PROGRESS NOTES
Vascular:    Complains of pain with ambulation     Leg dressed, foot warm with palpable PT pulse    Doing well - not getting around well enough for DC.

## 2017-02-22 NOTE — PROGRESS NOTES
Problem: Mobility Impaired (Adult and Pediatric)  Goal: *Acute Goals and Plan of Care (Insert Text)  Physical Therapy Goals  Initiated 2/21/2017  1. Patient will move from supine to sit and sit to supine , scoot up and down and roll side to side in bed with modified independence within 7 day(s). 2. Patient will transfer from bed to chair and chair to bed with modified independence using the least restrictive device within 7 day(s). 3. Patient will perform sit to stand with modified independence within 7 day(s). 4. Patient will ambulate with modified independence for 150 feet with the least restrictive device within 7 day(s). 5. Patient will ascend/descend 4 stairs with 2 handrail(s) with supervision/set-up within 7 day(s). PHYSICAL THERAPY TREATMENT  Patient: Deven Iniguez (25 y.o. female)  Date: 2/22/2017  Diagnosis: PVD (peripheral vascular disease) with claudication ([address]  unknown <principal problem not specified>  Procedure(s) (LRB):  Right FEMORAL-POPLITEAL BYPASS WITH VEIN (Right) 2 Days Post-Op  Precautions: Fall      ASSESSMENT: Patient improving but continues to need assist for all mobility. Today she needed min assist for supine to sit. She was able to stand with min assist. Using a wheeled walker she was able to ambulate 35 feet with min assist for walker management limiting weight bearing on RLE secondary to pain. Without the walker she needed HHA( min assist) of therapist to off load weight on right during swing phase on left secondary to pain. At times she also needed assist for balance. Patient would benefit from inpatient rehab before returning home. If she does return home, she would need 24/7 assist for mobility and benefit from [address]      Progression toward goals:  [ ]    Improving appropriately and progressing toward goals  [X]    Improving slowly and progressing toward goals  [ ]    Not making progress toward goals and plan of care will be adjusted       PLAN:  Patient continues to benefit from skilled intervention to address the above impairments. Continue treatment per established plan of care. Discharge Recommendations:  Rehab, Home Health and To Be Determined  Further Equipment Recommendations for Discharge: Will continue to assess       SUBJECTIVE:   Patient stated I don't think I can go home like this.       OBJECTIVE DATA SUMMARY:   Critical Behavior:  Neurologic State: Alert   Functional Mobility Training:  Bed Mobility:   Supine to Sit: Minimum assistance;Assist x1;Additional time   Transfers:  Sit to Stand: Minimum assistance;Assist x1;Additional time  Stand to Sit: Contact guard assistance         Balance:  Sitting: Intact  Standing: Impaired; With support  Standing - Static: Fair  Standing - Dynamic : Fair  Ambulation/Gait Training:  Distance (ft): 35 Feet (ft) (x2)  Assistive Device: Walker, rolling;Gait belt; Other (comment) (HHA)  Ambulation - Level of Assistance: Minimal assistance (constant min assist for balance when not using an assistive device, occasional min assist for walker managment when using walker)   Gait Abnormalities: Antalgic;Decreased step clearance; Step to gait   Stance: Right decreased  Speed/Teri: Slow  Step Length: Left shortened;Right shortened        Therapeutic Exercises: Ankle pumps and LAQ x 10 reps  Pain:  Pain Scale 1: Numeric (0 - 10)  Pain Intensity 1: 0   Activity Tolerance:   Sitting /64 HR 84 Standing /63 HR 86 Post activity sitting /65 HR 92  Please refer to the flowsheet for vital signs taken during this treatment.   After treatment:   [ ]    Patient left in no apparent distress sitting up in chair  [ ]    Patient left in no apparent distress in bed  [ ]    Call bell left within reach  [ ]    Nursing notified  [ ]    Caregiver present  [ ]    Bed alarm activated      COMMUNICATION/COLLABORATION:   The patients plan of care was discussed with: Registered Nurse     Kevin Diaz, PT   Time Calculation: 24 mins

## 2017-02-22 NOTE — PROGRESS NOTES
Problem: Self Care Deficits Care Plan (Adult)  Goal: *Acute Goals and Plan of Care (Insert Text)  Occupational Therapy Goals  Initiated 2/22/2017  1. Patient will perform grooming in standing at the sink without a seated rest break with modified independence within 7 day(s). 2. Patient will perform lower body dressing with minimal assistance/contact guard assist and AE PRN within 7 day(s). 3. Patient will perform bathing with minimal assistance/contact guard assist and AE PRN within 7 day(s). 4. Patient will perform toilet transfers with minimal assistance/contact guard assist within 7 day(s). 5. Patient will perform all aspects of toileting with minimal assistance/contact guard assist within 7 day(s). 6. Patient will participate in upper extremity therapeutic exercise/activities with minimal assistance/contact guard assist for 15 minutes within 7 day(s). 7. Patient will utilize energy conservation techniques during functional activities with min verbal cues within 7 day(s). OCCUPATIONAL THERAPY EVALUATION  Patient: Lauren Bautista (7 y.o. female)  Date: 2/22/2017  Primary Diagnosis: PVD (peripheral vascular disease) with claudication (HCC)  unknown  Procedure(s) (LRB):  Right FEMORAL-POPLITEAL BYPASS WITH VEIN (Right) 2 Days Post-Op   Precautions:   Fall      ASSESSMENT :  Based on the objective data described below, the patient presents with pain with all functional transfers up to 8/10, decreased mobility, inability to reach to feet or get feet up for dressing, slight residual weakness on right side from CVA 9 years ago, decreased AROM in hands due to arthritis and decreased activity tolerance all leading to a decreased ability to perform self care. She is at a max a level for LE bathing and dressing, and needs help with toilet transfers. She is unable to get into a tub/shower at this time. She is eager to return to her prior level of self care as she was independent and used no adaptive equipment or DME. She still drives and cooks 2 meals a day and cuts her own grass. She lives with her daughter who is an LPN, and she has 2 other children in the area. She will require intensive skilled OT to maximize her recovery process and is familiar with inpatient rehab as she went to an IRF after her stroke 9 years ago. She states she wants to work hard and be pushed to be better so she can return home mod I. Patient will benefit from skilled intervention to address the above impairments. Patients rehabilitation potential is considered to be Excellent  Factors which may influence rehabilitation potential include:   [X]             None noted  [ ]             Mental ability/status  [ ]             Medical condition  [ ]             Home/family situation and support systems  [ ]             Safety awareness  [ ]             Pain tolerance/management  [ ]             Other:        PLAN :  Recommendations and Planned Interventions:  [X]               Self Care Training                  [ ]        Therapeutic Activities  [X]               Functional Mobility Training    [ ]        Cognitive Retraining  [X]               Therapeutic Exercises           [X]        Endurance Activities  [ ]               Balance Training                   [ ]        Neuromuscular Re-Education  [ ]               Visual/Perceptual Training     [X]   Home Safety Training  [X]               Patient Education                 [X]        Family Training/Education  [ ]               Other (comment):     Frequency/Duration: Patient will be followed by occupational therapy 5 times a week to address goals. Discharge Recommendations: Rehab  Further Equipment Recommendations for Discharge: has her husbands equipment- he passed away 9 years ago so it may not be functional       SUBJECTIVE:   Patient stated I want to be pushed hard so I can go home. I don't want anything to happen when I am (at home).       OBJECTIVE DATA SUMMARY:   HISTORY:   Past Medical History:   Diagnosis Date    CAD (coronary artery disease)      Cerebrovascular accident stroke, other, unspec 11/18/2010    Coronary atherosclerosis of native coronary artery 11/18/2010    Essential hypertension      Essential hypertension, benign 11/18/2010    HLD (hyperlipidemia) 11/18/2010    PVD (peripheral vascular disease) ([facility] [address] 2017    Type II or unspecified type diabetes mellitus without mention of complication, not stated as uncontrolled 11/18/2010     Past Surgical History:   Procedure Laterality Date    HX CORONARY STENT PLACEMENT        HX HEART CATHETERIZATION        HX HYSTERECTOMY   1972        Prior Level of Function/Home Situation: independent  Expanded or extensive additional review of patient history: She is a retired . She was independent in all self care. She is still driving, cuts her own grass, and cooks 2 meals a day. Home Situation  Home Environment: Private residence  # Steps to Enter: 4  Rails to Enter: Yes  Hand Rails : Bilateral  One/Two Story Residence: Two story, live on 1st floor  Living Alone: No ([facility])  Support Systems: Child(baldemar), Family member(s)  Patient Expects to be Discharged to[de-identified] Private residence  Current DME Used/Available at Home: None (has husbands old equipment)  Tub or Shower Type: Tub/Shower combination (has shower seat from )  [X]  Right hand dominant             [ ]  Left hand dominant     EXAMINATION OF PERFORMANCE DEFICITS:  Cognitive/Behavioral Status:  Neurologic State: Alert                 Skin: thin and dry but intact  Edema: none in UE  Vision/Perceptual:                                Corrective Lenses: Glasses  Range of Motion:  Functional in bilateral shoulders and elbows. Notable arthritis in hands that limits full ROM and strength but still functional.                          Strength:  Fair in both UE but not great                 Coordination:     Fine Motor Skills-Upper: Left Intact; Right Intact; Comment (arthritis limits strength)    Gross Motor Skills-Upper: Left Intact; Right Intact  Tone & Sensation:  Not formally assessed - states she has some residual weakness from her stroke and noted a tremor recently           Balance:  Sitting: Intact  Standing: Impaired; With support  Standing - Static: Fair  Standing - Dynamic : Fair     Functional Mobility and Transfers for ADLs:  Bed Mobility:  Supine to Sit: Minimum assistance;Assist x1;Additional time  Sit to Supine: Moderate assistance;Assist x1;Additional time (assiost with legs)     Transfers:  Sit to Stand: Minimum assistance;Assist x1;Additional time  Stand to Sit: Contact guard assistance  Bed to Chair: Moderate assistance;Assist x1  Toilet Transfer : Moderate assistance (tall toilet with grab bar at home)  Tub Transfer: Maximum assistance     ADL Assessment:  Feeding: Independent     Oral Facial Hygiene/Grooming: Setup     Bathing: Moderate assistance     Upper Body Dressing: Minimum assistance     Lower Body Dressing: Maximum assistance     Toileting: Moderate assistance                 ADL Intervention and task modifications:     Completed a full sponge bath. Required education for safety with sit to stand and max assist to wash right leg and buttocks as well as left leg below the knee. She required extra time due to fatigue. Therapeutic Exercise:  Recommend education on a home exercise program   Functional Measure:  Barthel Index:      Bathin  Bladder: 10  Bowels: 0 (no BM since surgery)  Groomin  Dressin  Feeding: 10  Mobility: 10  Stairs: 0  Toilet Use: 5  Transfer (Bed to Chair and Back): 10  Total: 55         Barthel and G-code impairment scale:  Percentage of impairment CH  0% CI  1-19% CJ  20-39% CK  40-59% CL  60-79% CM  80-99% CN  100%   Barthel Score 0-100 100 99-80 79-60 59-40 20-39 1-19    0   Barthel Score 0-20 20 17-19 13-16 9-12 5-8 1-4 0      The Barthel ADL Index: Guidelines  1.  The index should be used as a record of what a patient does, not as a record of what a patient could do. 2. The main aim is to establish degree of independence from any help, physical or verbal, however minor and for whatever reason. 3. The need for supervision renders the patient not independent. 4. A patient's performance should be established using the best available evidence. Asking the patient, friends/relatives and nurses are the usual sources, but direct observation and common sense are also important. However direct testing is not needed. 5. Usually the patient's performance over the preceding 24-48 hours is important, but occasionally longer periods will be relevant. 6. Middle categories imply that the patient supplies over 50 per cent of the effort. 7. Use of aids to be independent is allowed. Beny Garcia., Barthel, D.W. (1408). Functional evaluation: the Barthel Index. [address] [facility] (14)2. COLTON Hartman, Nick Rosado., Carlo Cervantes., [facility], [address] [place] [address] (1999). Measuring the change indisability after inpatient rehabilitation; comparison of the responsiveness of the Barthel Index and Functional [place] Measure. Journal of Neurology, Neurosurgery, and Psychiatry, 66(4), 589-070. Garett Escobar, N.J.A, Kendra Song,  W.J.M, & Kevin Ortiz, M.A. (2004.) Assessment of post-stroke quality of life in cost-effectiveness studies: The usefulness of the Barthel Index and the EuroQoL-5D. Quality of Life Research, 13, 881-18            G codes: In compliance with CMSs Claims Based Outcome Reporting, the following G-code set was chosen for this patient based on their primary functional limitation being treated: The outcome measure chosen to determine the severity of the functional limitation was the Barthel with a score of 55/100 which was correlated with the impairment scale.       · Self Care:               - CURRENT STATUS:    CK - 40%-59% impaired, limited or restricted               - GOAL STATUS:           CI - 1%-19% impaired, limited or restricted               - D/C STATUS:                       ---------------To be determined---------------      Occupational Therapy Evaluation Charge Determination   History Examination Decision-Making   MEDIUM Complexity : Expanded review of history including physical, cognitive and psychosocial  history  MEDIUM Complexity : 3-5 performance deficits relating to physical, cognitive , or psychosocial skils that result in activity limitations and / or participation restrictions MEDIUM Complexity : Patient may present with comorbidities that affect occupational performnce. Miniml to moderate modification of tasks or assistance (eg, physical or verbal ) with assesment(s) is necessary to enable patient to complete evaluation       Based on the above components, the patient evaluation is determined to be of the following complexity level: MEDIUM  Pain:  Pain Scale 1: Numeric (0 - 10)  Pain Intensity 1: 0              Activity Tolerance:   Fair- needed to return to bed after sponge bath  Please refer to the flowsheet for vital signs taken during this treatment. After treatment:   [ ] Patient left in no apparent distress sitting up in chair  [X] Patient left in no apparent distress in bed  [X] Call bell left within reach  [X] Nursing notified  [ ] Caregiver present  [ ] Bed alarm activated      COMMUNICATION/EDUCATION:   The patients plan of care was discussed with: Physical Therapist, Registered Nurse and .  [X] Home safety education was provided and the patient/caregiver indicated understanding. [X] Patient/family have participated as able in goal setting and plan of care. [X] Patient/family agree to work toward stated goals and plan of care. [ ] Patient understands intent and goals of therapy, but is neutral about his/her participation. [ ] Patient is unable to participate in goal setting and plan of care. This patients plan of care is appropriate for delegation to [facility]. Thank you for this referral.  Sudhir Wright, OT  Time Calculation: 35 mins

## 2017-02-23 VITALS
HEIGHT: 63 IN | WEIGHT: 159.4 LBS | HEART RATE: 80 BPM | TEMPERATURE: 97.9 F | DIASTOLIC BLOOD PRESSURE: 64 MMHG | BODY MASS INDEX: 28.24 KG/M2 | RESPIRATION RATE: 16 BRPM | OXYGEN SATURATION: 96 % | SYSTOLIC BLOOD PRESSURE: 114 MMHG

## 2017-02-23 LAB
GLUCOSE BLD STRIP.AUTO-MCNC: 125 MG/DL (ref 65–100)
GLUCOSE BLD STRIP.AUTO-MCNC: 274 MG/DL (ref 65–100)
SERVICE CMNT-IMP: ABNORMAL
SERVICE CMNT-IMP: ABNORMAL

## 2017-02-23 PROCEDURE — 82962 GLUCOSE BLOOD TEST: CPT

## 2017-02-23 PROCEDURE — 74011250637 HC RX REV CODE- 250/637

## 2017-02-23 PROCEDURE — 74011250637 HC RX REV CODE- 250/637: Performed by: SURGERY

## 2017-02-23 PROCEDURE — 74011636637 HC RX REV CODE- 636/637

## 2017-02-23 PROCEDURE — 74011250636 HC RX REV CODE- 250/636

## 2017-02-23 RX ORDER — HYDROCODONE BITARTRATE AND ACETAMINOPHEN 7.5; 325 MG/1; MG/1
1 TABLET ORAL
Qty: 25 TAB | Refills: 0 | Status: SHIPPED | OUTPATIENT
Start: 2017-02-23 | End: 2017-03-21

## 2017-02-23 RX ADMIN — INSULIN LISPRO 5 UNITS: 100 INJECTION, SOLUTION INTRAVENOUS; SUBCUTANEOUS at 12:07

## 2017-02-23 RX ADMIN — HYDROCHLOROTHIAZIDE 50 MG: 25 TABLET ORAL at 10:09

## 2017-02-23 RX ADMIN — METOPROLOL TARTRATE 100 MG: 50 TABLET ORAL at 10:08

## 2017-02-23 RX ADMIN — SPIRONOLACTONE 25 MG: 25 TABLET, FILM COATED ORAL at 10:10

## 2017-02-23 RX ADMIN — ENOXAPARIN SODIUM 40 MG: 40 INJECTION SUBCUTANEOUS at 06:34

## 2017-02-23 RX ADMIN — AMLODIPINE BESYLATE 10 MG: 5 TABLET ORAL at 10:08

## 2017-02-23 RX ADMIN — PANTOPRAZOLE SODIUM 40 MG: 40 TABLET, DELAYED RELEASE ORAL at 10:09

## 2017-02-23 RX ADMIN — INSULIN GLARGINE 10 UNITS: 100 INJECTION, SOLUTION SUBCUTANEOUS at 10:17

## 2017-02-23 RX ADMIN — METFORMIN HYDROCHLORIDE 500 MG: 500 TABLET, FILM COATED ORAL at 07:12

## 2017-02-23 RX ADMIN — ASPIRIN 325 MG: 325 TABLET ORAL at 10:07

## 2017-02-23 RX ADMIN — ISOSORBIDE MONONITRATE 60 MG: 60 TABLET, EXTENDED RELEASE ORAL at 10:10

## 2017-02-23 RX ADMIN — GLIPIZIDE 5 MG: 5 TABLET ORAL at 07:13

## 2017-02-23 RX ADMIN — ENALAPRIL MALEATE 20 MG: 5 TABLET ORAL at 10:07

## 2017-02-23 NOTE — PROGRESS NOTES
Hospital to [address] [place] [address] ZEYNEP Dunaway                                                                        [de-identified] y.o.   female    [address]   Room: 508/01    [id number]  Unit Phone# :  [phone]      [address] [address]  Dept: [address] [place] [address] Street: [phone]                    SITUATION     Admitted:  2/19/2017         Attending Provider:  No att. providers found       Consultations:  None    PCP:  Paul Baig MD   [phone]    Treatment Team: Consulting Provider: Doc Hdz MD; Utilization Review: Rita Handley RN; Care Manager: ROSA Aviles    Admitting Dx:  PVD (peripheral vascular disease) with claudication ([facility] [address]  unknown       Principal Problem: <principal problem not specified>    3 Days Post-Op of   Procedure(s):  Right FEMORAL-POPLITEAL BYPASS WITH VEIN   BY: Doc Hdz MD             ON: 2/20/2017                  Code Status: Full Code                Advance Directives:   Advance Care Planning 2/20/2017   Patient's Healthcare Decision Maker is: Legal Next of Kin   Confirm Advance Directive None   Patient Would Like to Complete Advance Directive No    (Send w/patient)   No Doesnt Have       Isolation:  There are currently no Active Isolations       MDRO: No current active infections      Special Equipment needed: yes  Type of equipment:       BACKGROUND     Allergies:   Allergies   Allergen Reactions    Codeine Unknown (comments)    Keflin Itching    Pcn [Penicillins] Unknown (comments)    Tramadol Other (comments)     Makes her head feel swishy       Past Medical History:   Diagnosis Date    CAD (coronary artery disease)     Cerebrovascular accident stroke, other, unspec 11/18/2010    Coronary atherosclerosis of native coronary artery 11/18/2010    Essential hypertension     Essential hypertension, benign 11/18/2010    HLD (hyperlipidemia) 11/18/2010    PVD (peripheral vascular disease) ([facility] [address] 2017    Type II or unspecified type diabetes mellitus without mention of complication, not stated as uncontrolled 11/18/2010       Past Surgical History:   Procedure Laterality Date    HX CORONARY STENT PLACEMENT      HX HEART CATHETERIZATION      HX HYSTERECTOMY  1972       No prescriptions prior to admission. Hard scripts included in transfer packet yes    Vaccinations: There is no immunization history on file for this patient. The Charlson Co[employer] Index tool is an evidenced based tool that has more automatic generated information. The tool looks at many different items such as the age of the patient, how many times they were admitted in the last calendar year, current length of stay in the hospital and their diagnosis. All of these items are pulled automatically from information documented in the chart from various places and will generate a score that predicts whether a patient is at low (less than 13), medium (13-20) or high (21 or greater) risk of being readmitted. ASSESSMENT                Temp: 97.9 °F (36.6 °C) (02/23/17 1217) Pulse (Heart Rate): 80 (02/23/17 1217)     Resp Rate: 16 (02/23/17 1217)           BP: 114/64 (02/23/17 1217)     O2 Sat (%): 96 % (02/23/17 1217)     Weight: 72.3 kg (159 lb 6.4 oz)    Height: 5' 3\" (160 cm) (02/19/17 2246)       If above not within 1 hour of discharge:    BP:_____  P:____  R:____ T:_____ O2 Sat: ___%  O2: ______    Active Orders   Diet    DIET CARDIAC Regular; No Conc.  Sweets         Orientation: oriented to time, place, person and situation                               Active Lines/Drains:  (Peg Tube / Ness / CL or S/L?): no    Urinary Status: Voiding     Last BM: Last Bowel Movement Date: 02/22/17     Skin Integrity: Incision (comment), Intact   Wound Leg Right-DRESSING STATUS: Clean, dry, and intact    Wound Leg Right-DRESSING TYPE: Open to air    Mobility: Slightly limited   Weight Bearing Status: WBAT (Weight Bearing as Tolerated)      Gait Training  Assistive Device: Walker, rolling, Gait belt, Other (comment) (HHA)  Ambulation - Level of Assistance: Minimal assistance (constant min assist for balance when not using an assistive device, occasional min assist for walker managment when using walker)  Distance (ft): 35 Feet (ft) (x2)         Lab Results   Component Value Date/Time    Glucose 274 02/21/2017 12:36 AM    Hemoglobin A1c 8.1 06/09/2011 08:56 AM    INR 0.9 02/19/2017 11:15 PM    INR 1.0 03/05/2014 11:47 AM    HGB 10.7 02/21/2017 12:36 AM    HGB 12.4 02/19/2017 11:15 PM        RECOMMENDATION     See After Visit Summary (AVS) for:  · Discharge instructions  · After [address]   · Special equipment needed (entered pre-discharge by Care Management)  · Medication Reconciliation    · Follow up Appointment(s)         Report given/sent by:  Chavo Bowling                    Verbal report given to:  Phil SALDAÑA           Estimated discharge time:  2/23/2017 at 1215

## 2017-02-23 NOTE — PROGRESS NOTES
Vascular:    Doing well POD #3 after right fem pop bypass    Incisions OK, foot well perfused with palpable PT pulse    To rehab when bed available

## 2017-02-23 NOTE — PROGRESS NOTES
[facility], report given to [employer]. Report included information on SBAR, MAR, Kardex, shift assessments and continuous care needs.

## 2017-02-23 NOTE — PROGRESS NOTES
2/23/17, 8:28AM - Reviewed medical chart; called and left a voicemail message with the [facility] liaison, Johney Eisenmenger - F#[phone]. Still awaiting a decision. Note that the patient has a discharge order. Care Management will continue to follow her disposition. ROSA Carter     2/23/17, 10:24AM Amrti Sanders called back; [facility] can accept the patient into care today. Spoke with the patient who feels that [facility] [address] [facility] and spoke with Brody MOLINA#[phone]; they can pick her up at 12PM.  Patient's daughter will pay for the trip and bring clothes to the hospital at [address],[place]      Discharge envelope is on the hard chart. The nurse will need to add the [facility], Kardex, discharge instructions, completed EMTALA, and call report to G#[phone]. This  faxed the discharge instructions to E#[phone]. The patient was notified of the Medicare Important Message and signed a copy of this document that was then placed on the hard chart. Care Management will continue to follow her disposition.    ROSA Carter

## 2017-02-23 NOTE — PROGRESS NOTES
Bedside shift change report given to Milana Simon RN (oncoming nurse) by Jeana Rg RN (offgoing nurse). Report included the following information SBAR, Kardex, Procedure Summary, Intake/Output, Recent Results and Cardiac Rhythm NSR.

## 2017-02-23 NOTE — PROGRESS NOTES
Went over discharge instructions and follow-up appointments with patient and patient's daughter. Patient had no further questions.

## 2017-02-23 NOTE — DISCHARGE INSTRUCTIONS
Patient Discharge Instructions    Montse Carvalho / [id number] : 1935    Admitted 2017 Discharged: 2017     Take Home Medications            · It is important that you take the medication exactly as they are prescribed. · Keep your medication in the bottles provided by the pharmacist and keep a list of the medication names, dosages, and times to be taken in your wallet. · Do not take other medications without consulting your doctor. What to do at Home    Recommended diet: Diabetic Diet,     Recommended activity: Activity as tolerated,     Additional Instructions: elevate leg for swelling    Follow-up with Dr Micheal Matos in 2 weeks, call [phone] for appt        Information obtained by :  I understand that if any problems occur once I am at home I am to contact my physician. I understand and acknowledge receipt of the instructions indicated above. Physician's or R.N.'s Signature                                                                  Date/Time                                                                                                                                                  DISCHARGE SUMMARY from Nurse    The following personal items are in your possession at time of discharge:    Dental Appliances: None  Visual Aid: Glasses     Home Medications: None  Jewelry: Watch  Clothing: None, Sent home  Other Valuables: Eyeglasses             PATIENT INSTRUCTIONS:    After general anesthesia or intravenous sedation, for 24 hours or while taking prescription Narcotics:  · Limit your activities  · Do not drive and operate hazardous machinery  · Do not make important personal or business decisions  · Do  not drink alcoholic beverages  · If you have not urinated within 8 hours after discharge, please contact your surgeon on call.     Report the following to your surgeon:  · Excessive pain, swelling, redness or odor of or around the surgical area  · Temperature over 100.5  · Nausea and vomiting lasting longer than 4 hours or if unable to take medications  · Any signs of decreased circulation or nerve impairment to extremity: change in color, persistent  numbness, tingling, coldness or increase pain  · Any questions        What to do at Home:    If you experience any of the following symptoms ***, please follow up with ***. *  Please give a list of your current medications to your Primary Care Provider. *  Please update this list whenever your medications are discontinued, doses are      changed, or new medications (including over-the-counter products) are added. *  Please carry medication information at all times in case of emergency situations. These are general instructions for a healthy lifestyle:    No smoking/ No tobacco products/ Avoid exposure to second hand smoke    Surgeon General's Warning:  Quitting smoking now greatly reduces serious risk to your health. Obesity, smoking, and sedentary lifestyle greatly increases your risk for illness    A healthy diet, regular physical exercise & weight monitoring are important for maintaining a healthy lifestyle    You may be retaining fluid if you have a history of heart failure or if you experience any of the following symptoms:  Weight gain of 3 pounds or more overnight or 5 pounds in a week, increased swelling in our hands or feet or shortness of breath while lying flat in bed. Please call your doctor as soon as you notice any of these symptoms; do not wait until your next office visit. Recognize signs and symptoms of STROKE:    F-face looks uneven    A-arms unable to move or move unevenly    S-speech slurred or non-existent    T-time-call 911 as soon as signs and symptoms begin-DO NOT go       Back to bed or wait to see if you get better-TIME IS BRAIN.     Warning Signs of HEART ATTACK     Call 911 if you have these symptoms:   Chest discomfort. Most heart attacks involve discomfort in the center of the chest that lasts more than a few minutes, or that goes away and comes back. It can feel like uncomfortable pressure, squeezing, fullness, or pain.  Discomfort in other areas of the upper body. Symptoms can include pain or discomfort in one or both arms, the back, neck, jaw, or stomach.  Shortness of breath with or without chest discomfort.  Other signs may include breaking out in a cold sweat, nausea, or lightheadedness. Don't wait more than five minutes to call 911 - MINUTES MATTER! Fast action can save your life. Calling 911 is almost always the fastest way to get lifesaving treatment. Emergency Medical Services staff can begin treatment when they arrive -- up to an hour sooner than if someone gets to the hospital by car. The discharge information has been reviewed with the {PATIENT PARENT GUARDIAN:45990}. The {PATIENT PARENT GUARDIAN:26621} verbalized understanding. Discharge medications reviewed with the {Dishcarge meds reviewed MDTZ:76188} and appropriate educational materials and side effects teaching were provided. Patient or Representative Signature                                                          Date/Time      [employer] Activation    Thank you for requesting access to [employer]. Please follow the instructions below to securely access and download your online medical record. [employer] allows you to send messages to your doctor, view your test results, renew your prescriptions, schedule appointments, and more. How Do I Sign Up? 1. In your internet browser, go to [email or web address]. 2. Click on the First Time User? Click Here link in the Sign In box. You will see the New Member Sign Up page. 3. Enter your [employer] Access Code exactly as it appears below.  You will not need to use this code after youve completed the sign-up process. If you do not sign up before the expiration date, you must request a new code. [employer] Access Code: [email or web address]  Expires: 2017 11:12 PM (This is the date your [employer] access code will )    4. Enter the last four digits of your Social Security Number (xxxx) and Date of Birth (mm/dd/yyyy) as indicated and click Submit. You will be taken to the next sign-up page. 5. Create a [employer] ID. This will be your [employer] login ID and cannot be changed, so think of one that is secure and easy to remember. 6. Create a [employer] password. You can change your password at any time. 7. Enter your Password Reset Question and Answer. This can be used at a later time if you forget your password. 8. Enter your e-mail address. You will receive e-mail notification when new information is available in [address]. 9. Click Sign Up. You can now view and download portions of your medical record. 10. Click the Download Summary menu link to download a portable copy of your medical information. Additional Information    If you have questions, please visit the Frequently Asked Questions section of the [employer] website at [email or web address] Remember, [employer] is NOT to be used for urgent needs. For medical emergencies, dial 911.

## 2017-02-28 NOTE — DISCHARGE SUMMARY
[address] [place] [address], [address] [place] [address] SUMMARY       Name:  Suhas De Jesus   MR#:  [id number]   :  1935   Account #:  [de-identified]        Date of Adm:  2017       FINAL DIAGNOSES: Peripheral vascular disease with rest pain. PROCEDURES PERFORMED: Right fem-pop bypass. HISTORY: The patient is an 26-year-old female with peripheral   vascular disease, who underwent a stent placement in her right distal   superficial femoral and popliteal arteries several days prior to   presentation for rest pain in the right foot. She developed recurrence of   symptoms approximately 3 days after the procedure and presented to   the emergency room. She was found to have occlusion of the stent   and was admitted for further revascularization. HOSPITAL COURSE: The patient was admitted to the surgical floor. She was taken to the operating room on 2017, underwent a fem-  pop bypass using saphenous vein. Postoperative course was   uncomplicated. The procedure reestablished palpable pulse in the   posterior tibial artery. Her ambulation was limited and inpatient   rehabilitation was recommended. She was discharged to [facility] on 2017. At that time she was clinically stable with well   healing incisions and a patent bypass. She is discharged on a cardiac diabetic diet, activity as tolerated, her   usual medications with the addition of Norco for pain, and followup in   my office in 2 weeks.         MD HANY Peraza / CHAPIN   D:  2017   07:19   T:  2017   10:56   Job #:  [id number]

## 2017-04-24 ENCOUNTER — HOSPITAL ENCOUNTER (INPATIENT)
Age: 82
LOS: 4 days | Discharge: HOME | DRG: 309 | End: 2017-04-28
Attending: EMERGENCY MEDICINE | Admitting: SPECIALIST
Payer: MEDICARE

## 2017-04-24 ENCOUNTER — APPOINTMENT (OUTPATIENT)
Dept: GENERAL RADIOLOGY | Age: 82
DRG: 309 | End: 2017-04-24
Attending: EMERGENCY MEDICINE
Payer: MEDICARE

## 2017-04-24 ENCOUNTER — APPOINTMENT (OUTPATIENT)
Dept: CT IMAGING | Age: 82
DRG: 309 | End: 2017-04-24
Attending: EMERGENCY MEDICINE
Payer: MEDICARE

## 2017-04-24 DIAGNOSIS — I48.91 ATRIAL FIBRILLATION WITH RAPID VENTRICULAR RESPONSE (HCC): Primary | ICD-10-CM

## 2017-04-24 LAB
ALBUMIN SERPL BCP-MCNC: 3.3 G/DL (ref 3.5–5)
ALBUMIN/GLOB SERPL: 1 {RATIO} (ref 1.1–2.2)
ALP SERPL-CCNC: 104 U/L (ref 45–117)
ALT SERPL-CCNC: 27 U/L (ref 12–78)
ANION GAP BLD CALC-SCNC: 8 MMOL/L (ref 5–15)
APPEARANCE UR: CLEAR
APTT PPP: 21.6 SEC (ref 22.1–32.5)
AST SERPL W P-5'-P-CCNC: 20 U/L (ref 15–37)
ATRIAL RATE: 357 BPM
BASOPHILS # BLD AUTO: 0 K/UL (ref 0–0.1)
BASOPHILS # BLD: 0 % (ref 0–1)
BILIRUB SERPL-MCNC: 0.5 MG/DL (ref 0.2–1)
BILIRUB UR QL: NEGATIVE
BUN SERPL-MCNC: 21 MG/DL (ref 6–20)
BUN/CREAT SERPL: 18 (ref 12–20)
CALCIUM SERPL-MCNC: 9 MG/DL (ref 8.5–10.1)
CALCULATED R AXIS, ECG10: 3 DEGREES
CALCULATED T AXIS, ECG11: 42 DEGREES
CHLORIDE SERPL-SCNC: 101 MMOL/L (ref 97–108)
CK SERPL-CCNC: 54 U/L (ref 26–192)
CO2 SERPL-SCNC: 29 MMOL/L (ref 21–32)
COLOR UR: NORMAL
CREAT SERPL-MCNC: 1.19 MG/DL (ref 0.55–1.02)
DIAGNOSIS, 93000: NORMAL
EOSINOPHIL # BLD: 0.3 K/UL (ref 0–0.4)
EOSINOPHIL NFR BLD: 3 % (ref 0–7)
ERYTHROCYTE [DISTWIDTH] IN BLOOD BY AUTOMATED COUNT: 13.9 % (ref 11.5–14.5)
GLOBULIN SER CALC-MCNC: 3.4 G/DL (ref 2–4)
GLUCOSE BLD STRIP.AUTO-MCNC: 156 MG/DL (ref 65–100)
GLUCOSE BLD STRIP.AUTO-MCNC: 157 MG/DL (ref 65–100)
GLUCOSE BLD STRIP.AUTO-MCNC: 186 MG/DL (ref 65–100)
GLUCOSE BLD STRIP.AUTO-MCNC: 203 MG/DL (ref 65–100)
GLUCOSE SERPL-MCNC: 198 MG/DL (ref 65–100)
GLUCOSE UR STRIP.AUTO-MCNC: NEGATIVE MG/DL
HCT VFR BLD AUTO: 36.4 % (ref 35–47)
HGB BLD-MCNC: 11.8 G/DL (ref 11.5–16)
HGB UR QL STRIP: NEGATIVE
INR PPP: 1 (ref 0.9–1.1)
KETONES UR QL STRIP.AUTO: NEGATIVE MG/DL
LEUKOCYTE ESTERASE UR QL STRIP.AUTO: NEGATIVE
LYMPHOCYTES # BLD AUTO: 14 % (ref 12–49)
LYMPHOCYTES # BLD: 1.9 K/UL (ref 0.8–3.5)
MAGNESIUM SERPL-MCNC: 1.6 MG/DL (ref 1.6–2.4)
MCH RBC QN AUTO: 26 PG (ref 26–34)
MCHC RBC AUTO-ENTMCNC: 32.4 G/DL (ref 30–36.5)
MCV RBC AUTO: 80.2 FL (ref 80–99)
MONOCYTES # BLD: 0.9 K/UL (ref 0–1)
MONOCYTES NFR BLD AUTO: 7 % (ref 5–13)
NEUTS SEG # BLD: 9.7 K/UL (ref 1.8–8)
NEUTS SEG NFR BLD AUTO: 76 % (ref 32–75)
NITRITE UR QL STRIP.AUTO: NEGATIVE
PH UR STRIP: 7 [PH] (ref 5–8)
PLATELET # BLD AUTO: 256 K/UL (ref 150–400)
POTASSIUM SERPL-SCNC: 3.9 MMOL/L (ref 3.5–5.1)
PROT SERPL-MCNC: 6.7 G/DL (ref 6.4–8.2)
PROT UR STRIP-MCNC: NEGATIVE MG/DL
PROTHROMBIN TIME: 9.9 SEC (ref 9–11.1)
Q-T INTERVAL, ECG07: 294 MS
QRS DURATION, ECG06: 84 MS
QTC CALCULATION (BEZET), ECG08: 399 MS
RBC # BLD AUTO: 4.54 M/UL (ref 3.8–5.2)
SERVICE CMNT-IMP: ABNORMAL
SODIUM SERPL-SCNC: 138 MMOL/L (ref 136–145)
SP GR UR REFRACTOMETRY: 1.01 (ref 1–1.03)
T4 SERPL-MCNC: 10.5 UG/DL (ref 4.8–13.9)
THERAPEUTIC RANGE,PTTT: ABNORMAL SECS (ref 58–77)
TROPONIN I SERPL-MCNC: <0.04 NG/ML
TSH SERPL DL<=0.05 MIU/L-ACNC: 0.35 UIU/ML (ref 0.36–3.74)
UROBILINOGEN UR QL STRIP.AUTO: 0.2 EU/DL (ref 0.2–1)
VENTRICULAR RATE, ECG03: 111 BPM
WBC # BLD AUTO: 12.8 K/UL (ref 3.6–11)

## 2017-04-24 PROCEDURE — 82550 ASSAY OF CK (CPK): CPT | Performed by: EMERGENCY MEDICINE

## 2017-04-24 PROCEDURE — 85730 THROMBOPLASTIN TIME PARTIAL: CPT | Performed by: EMERGENCY MEDICINE

## 2017-04-24 PROCEDURE — 74011250636 HC RX REV CODE- 250/636: Performed by: NURSE PRACTITIONER

## 2017-04-24 PROCEDURE — 74011636637 HC RX REV CODE- 636/637: Performed by: NURSE PRACTITIONER

## 2017-04-24 PROCEDURE — 71020 XR CHEST PA LAT: CPT

## 2017-04-24 PROCEDURE — 83735 ASSAY OF MAGNESIUM: CPT | Performed by: EMERGENCY MEDICINE

## 2017-04-24 PROCEDURE — 74011250637 HC RX REV CODE- 250/637: Performed by: NURSE PRACTITIONER

## 2017-04-24 PROCEDURE — 82962 GLUCOSE BLOOD TEST: CPT

## 2017-04-24 PROCEDURE — 84484 ASSAY OF TROPONIN QUANT: CPT | Performed by: EMERGENCY MEDICINE

## 2017-04-24 PROCEDURE — 85025 COMPLETE CBC W/AUTO DIFF WBC: CPT | Performed by: EMERGENCY MEDICINE

## 2017-04-24 PROCEDURE — 74011000250 HC RX REV CODE- 250: Performed by: EMERGENCY MEDICINE

## 2017-04-24 PROCEDURE — 76060000032 HC ANESTHESIA 0.5 TO 1 HR

## 2017-04-24 PROCEDURE — 74011000258 HC RX REV CODE- 258: Performed by: NURSE PRACTITIONER

## 2017-04-24 PROCEDURE — 99285 EMERGENCY DEPT VISIT HI MDM: CPT

## 2017-04-24 PROCEDURE — 84443 ASSAY THYROID STIM HORMONE: CPT | Performed by: EMERGENCY MEDICINE

## 2017-04-24 PROCEDURE — 36415 COLL VENOUS BLD VENIPUNCTURE: CPT | Performed by: EMERGENCY MEDICINE

## 2017-04-24 PROCEDURE — 93306 TTE W/DOPPLER COMPLETE: CPT

## 2017-04-24 PROCEDURE — 65660000000 HC RM CCU STEPDOWN

## 2017-04-24 PROCEDURE — 96374 THER/PROPH/DIAG INJ IV PUSH: CPT

## 2017-04-24 PROCEDURE — 80053 COMPREHEN METABOLIC PANEL: CPT | Performed by: EMERGENCY MEDICINE

## 2017-04-24 PROCEDURE — 81003 URINALYSIS AUTO W/O SCOPE: CPT | Performed by: EMERGENCY MEDICINE

## 2017-04-24 PROCEDURE — 85610 PROTHROMBIN TIME: CPT | Performed by: EMERGENCY MEDICINE

## 2017-04-24 PROCEDURE — 74011000250 HC RX REV CODE- 250: Performed by: NURSE PRACTITIONER

## 2017-04-24 PROCEDURE — 71250 CT THORAX DX C-: CPT

## 2017-04-24 PROCEDURE — 84436 ASSAY OF TOTAL THYROXINE: CPT | Performed by: EMERGENCY MEDICINE

## 2017-04-24 PROCEDURE — 93005 ELECTROCARDIOGRAM TRACING: CPT

## 2017-04-24 RX ORDER — ISOSORBIDE MONONITRATE 60 MG/1
60 TABLET, EXTENDED RELEASE ORAL 2 TIMES DAILY
Status: DISCONTINUED | OUTPATIENT
Start: 2017-04-24 | End: 2017-04-28 | Stop reason: HOSPADM

## 2017-04-24 RX ORDER — INSULIN LISPRO 100 [IU]/ML
INJECTION, SOLUTION INTRAVENOUS; SUBCUTANEOUS
Status: DISCONTINUED | OUTPATIENT
Start: 2017-04-24 | End: 2017-04-28 | Stop reason: HOSPADM

## 2017-04-24 RX ORDER — ATORVASTATIN CALCIUM 40 MG/1
40 TABLET, FILM COATED ORAL DAILY
Status: DISCONTINUED | OUTPATIENT
Start: 2017-04-25 | End: 2017-04-28 | Stop reason: HOSPADM

## 2017-04-24 RX ORDER — INSULIN GLARGINE 100 [IU]/ML
10 INJECTION, SOLUTION SUBCUTANEOUS
Status: DISCONTINUED | OUTPATIENT
Start: 2017-04-25 | End: 2017-04-28 | Stop reason: HOSPADM

## 2017-04-24 RX ORDER — ASPIRIN 325 MG
325 TABLET ORAL DAILY
Status: DISCONTINUED | OUTPATIENT
Start: 2017-04-25 | End: 2017-04-25

## 2017-04-24 RX ORDER — MAGNESIUM SULFATE 100 %
4 CRYSTALS MISCELLANEOUS AS NEEDED
Status: DISCONTINUED | OUTPATIENT
Start: 2017-04-24 | End: 2017-04-28 | Stop reason: HOSPADM

## 2017-04-24 RX ORDER — DILTIAZEM HYDROCHLORIDE 5 MG/ML
10 INJECTION INTRAVENOUS
Status: COMPLETED | OUTPATIENT
Start: 2017-04-24 | End: 2017-04-24

## 2017-04-24 RX ORDER — HYDROCHLOROTHIAZIDE 25 MG/1
50 TABLET ORAL DAILY
Status: DISCONTINUED | OUTPATIENT
Start: 2017-04-25 | End: 2017-04-25

## 2017-04-24 RX ORDER — METFORMIN HYDROCHLORIDE 500 MG/1
250 TABLET ORAL 2 TIMES DAILY WITH MEALS
COMMUNITY
End: 2017-07-14 | Stop reason: SDUPTHER

## 2017-04-24 RX ORDER — ENALAPRIL MALEATE 5 MG/1
10 TABLET ORAL 2 TIMES DAILY
Status: DISCONTINUED | OUTPATIENT
Start: 2017-04-24 | End: 2017-04-28 | Stop reason: HOSPADM

## 2017-04-24 RX ORDER — MAGNESIUM SULFATE HEPTAHYDRATE 40 MG/ML
2 INJECTION, SOLUTION INTRAVENOUS ONCE
Status: COMPLETED | OUTPATIENT
Start: 2017-04-24 | End: 2017-04-24

## 2017-04-24 RX ORDER — METOPROLOL TARTRATE 50 MG/1
100 TABLET ORAL 2 TIMES DAILY
Status: DISCONTINUED | OUTPATIENT
Start: 2017-04-24 | End: 2017-04-27

## 2017-04-24 RX ORDER — DEXTROSE 50 % IN WATER (D50W) INTRAVENOUS SYRINGE
12.5-25 AS NEEDED
Status: DISCONTINUED | OUTPATIENT
Start: 2017-04-24 | End: 2017-04-28 | Stop reason: HOSPADM

## 2017-04-24 RX ORDER — ENOXAPARIN SODIUM 100 MG/ML
1 INJECTION SUBCUTANEOUS EVERY 12 HOURS
Status: DISCONTINUED | OUTPATIENT
Start: 2017-04-24 | End: 2017-04-25

## 2017-04-24 RX ORDER — GLIPIZIDE 5 MG/1
10 TABLET ORAL
Status: DISCONTINUED | OUTPATIENT
Start: 2017-04-24 | End: 2017-04-28 | Stop reason: HOSPADM

## 2017-04-24 RX ORDER — METFORMIN HYDROCHLORIDE 500 MG/1
250 TABLET ORAL 2 TIMES DAILY WITH MEALS
Status: DISCONTINUED | OUTPATIENT
Start: 2017-04-24 | End: 2017-04-28 | Stop reason: HOSPADM

## 2017-04-24 RX ORDER — PANTOPRAZOLE SODIUM 40 MG/1
40 TABLET, DELAYED RELEASE ORAL
Status: DISCONTINUED | OUTPATIENT
Start: 2017-04-24 | End: 2017-04-28 | Stop reason: HOSPADM

## 2017-04-24 RX ORDER — INSULIN GLARGINE 100 [IU]/ML
10 INJECTION, SOLUTION SUBCUTANEOUS DAILY
COMMUNITY
End: 2017-08-23

## 2017-04-24 RX ORDER — MELATONIN
2000 DAILY
Status: DISCONTINUED | OUTPATIENT
Start: 2017-04-25 | End: 2017-04-28 | Stop reason: HOSPADM

## 2017-04-24 RX ORDER — NITROGLYCERIN 0.4 MG/1
0.4 TABLET SUBLINGUAL
Status: DISCONTINUED | OUTPATIENT
Start: 2017-04-24 | End: 2017-04-28 | Stop reason: HOSPADM

## 2017-04-24 RX ADMIN — METOPROLOL TARTRATE 100 MG: 50 TABLET ORAL at 22:25

## 2017-04-24 RX ADMIN — ENALAPRIL MALEATE 10 MG: 5 TABLET ORAL at 17:27

## 2017-04-24 RX ADMIN — PANTOPRAZOLE SODIUM 40 MG: 40 TABLET, DELAYED RELEASE ORAL at 14:22

## 2017-04-24 RX ADMIN — DILTIAZEM HYDROCHLORIDE 10 MG: 5 INJECTION INTRAVENOUS at 07:40

## 2017-04-24 RX ADMIN — ISOSORBIDE MONONITRATE 60 MG: 60 TABLET, EXTENDED RELEASE ORAL at 16:11

## 2017-04-24 RX ADMIN — ENOXAPARIN SODIUM 80 MG: 80 INJECTION SUBCUTANEOUS at 22:25

## 2017-04-24 RX ADMIN — DILTIAZEM HYDROCHLORIDE 10 MG/HR: 5 INJECTION, SOLUTION INTRAVENOUS at 22:59

## 2017-04-24 RX ADMIN — DILTIAZEM HYDROCHLORIDE 10 MG/HR: 5 INJECTION, SOLUTION INTRAVENOUS at 10:18

## 2017-04-24 RX ADMIN — METFORMIN HYDROCHLORIDE 250 MG: 500 TABLET, FILM COATED ORAL at 16:12

## 2017-04-24 RX ADMIN — MAGNESIUM SULFATE HEPTAHYDRATE 2 G: 40 INJECTION, SOLUTION INTRAVENOUS at 10:18

## 2017-04-24 RX ADMIN — ENOXAPARIN SODIUM 80 MG: 80 INJECTION SUBCUTANEOUS at 10:17

## 2017-04-24 RX ADMIN — ENALAPRIL MALEATE 10 MG: 5 TABLET ORAL at 14:22

## 2017-04-24 RX ADMIN — INSULIN LISPRO 2 UNITS: 100 INJECTION, SOLUTION INTRAVENOUS; SUBCUTANEOUS at 10:50

## 2017-04-24 RX ADMIN — GLIPIZIDE 10 MG: 5 TABLET ORAL at 16:12

## 2017-04-24 RX ADMIN — INSULIN LISPRO 3 UNITS: 100 INJECTION, SOLUTION INTRAVENOUS; SUBCUTANEOUS at 17:27

## 2017-04-24 NOTE — CONSULTS
CARDIOLOGY CONSULT NOTE     [facility] [address] [place] [address] [address], [address] [address],[address], [address]   [phone] fax [phone]    Name: Lory Longoria  1935 [id number]  4/24/2017 9:03 AM     Assessment/Plan:      1. Atrial Fib - new onset, tachycardic, will continue Metoprolol 100mg BID and begin IV Diltiazem 10mg/hr for rate control, K 3.9 and Mg 1.6 - will replete Mg level as below, TSH low 0.35 but T4 ok at 10.5, will order TTE to assess cardiac structure and function, [id number] (age, sex, HTN, DM, CVA, Vascular disease) so will begin Lovenox 80mg SQ every 12 hours and plan to begin Eliquis prior to discharge for anticoagulation  2. CAD - hx of false positive stress test in 2014, hx of CVA post cath in the past, now admitted with chest heaviness/dyspnea - see plan for evaluation below, continue ASA, statin, metoprolol, imdur  3. Dyspnea/chest heaviness - could be related to AFib or CAD, will follow troponin for trend with serial ECGs and plan to proceed with Lexiscan nuclear stress test in AM to assess for ischemia  4. HTN- elevated currently, will order home medications and reassess, off amlodipine now due to LE edema  5. Dyslipidemia - on atorvastatin 40mg daily, will check fasting lipids in AM  -could not tolerate niacin in the past due to feeling \"sick\"  6. DM type 2 - on lantus, metformin and glipizide, Hgb A1c 7.0% in 3/17, will check POC blood glucose AC TID and at bedtime, lispro sliding scale insulin PRN  7. PAD - s/p right fem-pop bypass with Dr. Roly Lara in 2/17, continue ASA and statin   8. Elevated WBC - no evidence of infection on CXR or Chest CT, awaiting UA results, no symptoms of infection  9. Hypomagnesemia - Mg 1.6, will give Mg Sulfate 2g IV now, recheck in AM  I have seen and examined the patient and agree with N.P. assessment.   Discussed above plan with patient   Rate control and anticoagulation  Chest ct noted  Check up  Obtain echo follow tropoinin  ecg with Af and minor nstt   On account of her cad  And recent cp proceed with nuclear stress test as far as she rules out    1996 - PTCA and stent of right coronary artery in 1996 7/26/2007 - cardiac catheterization revealed mild 10% tapering of the ostium. The left anterior descending artery had mild intimal disease throughout. There was a 30% stenosis in the proximal portion of the LAD between the first and second septal . The remainder of the LAD had only mild intimal disease. The circumflex artery had a 30%-40% stenosis in the proximal mid segment prior to take off of obtuse marginal branch. The right coronary artery was a large dominant artery with 80% proximal stenosis followed by 90% proximal to mid stenosis, followed by 30% residual stenosis. Ejection fraction was estimated at 50%-55%. Stress test normal on 11/10    2/14: nuclear stress test: mild ischemia mid distal kailey lateral wall EF 59%    3/14: cardiac catheterization: Left main: The vessel was normal sized. Angiography showed minor luminal irregularities. LAD: The vessel was normal sized. Angiography showed mild atherosclerosis. There was a 30 % stenosis in the middle third of the vessel segment. 1st diagonal: The vessel was small sized. Angiography showed minor luminal irregularities. 2nd diagonal: The vessel was normal sized. Angiography showed mild atherosclerosis. Circumflex: The vessel was normal sized. Angiography showed mild atherosclerosis. There was a 30 % stenosis. RCA: The vessel was large sized (dominant). Angiography showed mild atherosclerosis and 3 patent prior stents. There was a tubular 30 % stenosis in the distal third of the vessel segment.  MEDICAL RX ONLY at that time   2/15 - seen at [facility] for HTN - aldactone increased to 25 mg at that time  Renal US on 3/15 no MANDO    Admit Date: 4/24/2017     Admit Diagnosis: Atrial fibrillation [facility])  Primary Care Physician:TRE Black MD     Attending Provider: Annie Ott MD  Primary Cardiologist: Dr. Amanda Lund Cardiologist: Dr. [place]: Atrial Fib  Requesting Physician: Dr. Herrera Scale    Subjective:    Bonnie Jenkins is a [de-identified] y.o. female with hx of HTN, dyslipidemia, DM Type 2, CAD, CVA post cath, PAD s/p right fem-pop bypass 2/17 who has been having increasing dyspnea the last few days which worsened last night. She was having some orthopnea and also intermittent mid-sternal chest heaviness. Prior to the last few days she had not been having any chest pain/tightness/pressure. She denies any palpitations but was found in Afib with RVR upon arrival to ER. She denies any new neurologic symptoms but had a brief episode of dizziness this AM.  She has been feeling pretty well since her fem-pop bypass in 2/17. She denies any LE edema, headache, PND, syncope. She has not taken any of her medications yet this AM.  She denies any fevers or chills, productive cough or dysuria. Review of Symptoms:  Pertinent items are noted in HPI. Previous treatment/evaluation includes Percutaneous Coronary Intervention, stress thallium and cardiac catheterization .   Cardiac risk factors: dyslipidemia, diabetes mellitus, sedentary life style, hypertension, stress, post-menopausal.    Past Medical History:   Diagnosis Date    CAD (coronary artery disease)     Cerebrovascular accident stroke, other, unspec 11/18/2010    Coronary atherosclerosis of native coronary artery 11/18/2010    Essential hypertension     Essential hypertension, benign 11/18/2010    HLD (hyperlipidemia) 11/18/2010    PVD (peripheral vascular disease) ([facility] [address] 2017    Type II or unspecified type diabetes mellitus without mention of complication, not stated as uncontrolled 11/18/2010     Past Surgical History:   Procedure Laterality Date    HX CORONARY STENT PLACEMENT      HX HEART CATHETERIZATION      HX HYSTERECTOMY  1972     Current Facility-Administered Medications   Medication Dose Route Frequency    enoxaparin (LOVENOX) injection 80 mg  1 mg/kg SubCUTAneous Q12H    magnesium sulfate 2 g/50 ml IVPB (premix or compounded)  2 g IntraVENous ONCE    dilTIAZem (CARDIZEM) 125 mg in dextrose 5% 125 mL infusion  10 mg/hr IntraVENous CONTINUOUS    insulin lispro (HUMALOG) injection   SubCUTAneous AC&HS    glucose chewable tablet 16 g  4 Tab Oral PRN    dextrose (D50W) injection syrg 12.5-25 g  12.5-25 g IntraVENous PRN    glucagon (GLUCAGEN) injection 1 mg  1 mg IntraMUSCular PRN     Current Outpatient Prescriptions   Medication Sig    insulin glargine (LANTUS) 100 unit/mL injection 10 Units by SubCUTAneous route nightly.  atorvastatin (LIPITOR) 40 mg tablet TAKE 1 TABLET DAILY    enalapril (VASOTEC) 20 mg tablet TAKE 1 TABLET TWICE A DAY    hydroCHLOROthiazide (HYDRODIURIL) 50 mg tablet TAKE 1 TABLET DAILY    isosorbide mononitrate ER (IMDUR) 60 mg CR tablet Take 1 Tab by mouth two (2) times a day.  metFORMIN (GLUCOPHAGE) 500 mg tablet Take 1 Tab by mouth two (2) times daily (with meals).  glipiZIDE (GLUCOTROL) 5 mg tablet Take 2 tabs bid (Patient taking differently: Take 10 mg by mouth two (2) times a day. Take 2 tabs bid)    omeprazole (PRILOSEC) 20 mg capsule Take 1 Cap by mouth daily.  metoprolol tartrate (LOPRESSOR) 100 mg IR tablet TAKE 1 TABLET TWICE A DAY    aspirin (ASPIRIN) 325 mg tablet Take 1 Tab by mouth daily.  cholecalciferol, vitamin d3, (VITAMIN D) 1,000 unit tablet Take 2,000 Units by mouth daily.  nitroglycerin (NITROSTAT) 0.4 mg SL tablet 1 Tab by SubLINGual route every five (5) minutes as needed for Chest Pain. Allergies   Allergen Reactions    Codeine Unknown (comments)    Keflin Itching    Pcn [Penicillins] Unknown (comments)    Tramadol Other (comments)     Makes her head feel swishy      History reviewed. No pertinent family history.    Social History     Social History    Marital status:      Spouse name: N/A    Number of children: N/A    Years of education: N/A     Social History Main Topics    Smoking status: Former Smoker     Packs/day: 0.80     Years: 20.00     Types: Cigarettes     Quit date: 4/18/1986    Smokeless tobacco: Never Used    Alcohol use No    Drug use: No    Sexual activity: Not Asked     Other Topics Concern    None     Social History Narrative          Objective:      Physical Exam  Vitals:    04/24/17 0800 04/24/17 0830 04/24/17 0900 04/24/17 0930   BP: 148/83 167/84 161/79 140/85   Pulse: 97 96 (!) 107 (!) 114   Resp:       Temp:       SpO2: 96% 95% 96% 95%   Weight:       Height:           General:  Alert, cooperative, no distress, appears stated age. Eyes:  Conjunctivae/corneas clear. Ears:  Normal external ear canals both ears. Nose: Nares normal.     Mouth/Throat: Moist mucous membranes. Neck: Supple, symmetrical, trachea midline, no carotid bruit and no JVD. Back:   Symmetric, no curvature. ROM normal.    Lungs:   Clear to auscultation bilaterally. Heart:  Tachycardic, irregular rhythm, S1, S2 normal, no murmur, click, rub or gallop. Abdomen:   Soft, non-tender. Bowel sounds normal.    Extremities: Extremities normal, atraumatic, no cyanosis, trace LE edema in right leg, no LE edema in left leg    Vascular: 2+ and symmetric all extremities. Skin: Skin color normal. No rashes or lesions   Lymph nodes: Not assessed   Neurologic: CNII-XII intact. Normal strength throughout.        Telemetry: AFIB VR 100s  ECG: atrial fibrillation, rate 111 bpm, PVC    Data Review:     Recent Labs      04/24/17 0725   TROIQ  <0.04     Recent Labs      04/24/17 0725   NA  138   K  3.9   CL  101   CO2  29   BUN  21*   CREA  1.19*   GLU  198*   CA  9.0     Recent Labs      04/24/17 0725   WBC  12.8*   HGB  11.8   HCT  36.4   PLT  256     Recent Labs      04/24/17 0725   PTP  9.9   INR  1.0   SGOT  20   AP  104     No results for input(s): TGL, CHOL, LDLC in the last 72 hours. No lab exists for component: HDLC,  HBA1C  Recent Labs      04/24/17   0725   TSH  0.35*     Thank you very much for this referral. I appreciate the opportunity to participate in this patient's care. I will follow along with above stated plan.     Bartolo Auguste MD  [facility] [address] [place] [address] 13, [address] [place] [address],[address] [place] [address]  [phone]    CC:TRE Doran MD

## 2017-04-24 NOTE — IP AVS SNAPSHOT
[address] [facility] Maninder Plascencia  
[phone] Patient: Siena Black MRN: [id number] ZQU:[id number] You are allergic to the following Allergen Reactions Codeine Unknown (comments) Keflin Itching Pcn (Penicillins) Unknown (comments) Tramadol Other (comments) Makes her head feel swishy Recent Documentation Height Weight BMI OB Status Smoking Status 1.6 m 75 kg 29.29 kg/m2 Hysterectomy Former Smoker Emergency Contacts Name Discharge Info Relation Home Work Mobile Ronal Mayer  Child [2] [phone] Melina Castellano  Child [2] [phone] About your hospitalization You were admitted on:  April 24, 2017 You last received care in the:  [facility] CV SERVICES UNIT You were discharged on:  April 28, 2017 Unit phone number:  [phone] Why you were hospitalized Your primary diagnosis was:  Not on File Your diagnoses also included:  Atrial Fibrillation (Hcc) Providers Seen During Your Hospitalizations Provider Role Specialty Primary office phone Roberth Mosher MD Attending Provider Emergency Medicine [phone] Tonie Sellers MD Attending Provider Cardiology [phone] Your Primary Care Physician (PCP) Primary Care Physician Office Phone Office Fax [address] [phone] Follow-up Information Follow up With Details Comments Contact Info Angela Bustamante MD   [address] [address] [facility] 57 
[phone] Tonie Sellers MD On 5/3/2017 Appointment scheduled for 11 am [address] [facility] 57 
[phone] Your Appointments Wednesday May 03, 2017 11:00 AM EDT HOSPITAL DISCHARGE with Tonie Sellers MD  
[facility] ([facility]) [address] [place] [address] [address] [facility] 57  
[phone] Wednesday June 07, 2017 11:20 AM EDT  
ESTABLISHED PATIENT with Zamzam Harrington MD  
[facility] ([facility]) [address]  [address] [facility]  
[phone] Current Discharge Medication List  
  
START taking these medications Dose & Instructions Dispensing Information Comments Morning Noon Evening Bedtime  
 amiodarone 400 mg tablet Commonly known as:  Kenya Hillary Your last dose was: Your next dose is:    
   
   
 Dose:  400 mg Take 1 Tab by mouth three (3) times daily. Quantity:  60 Tab Refills:  3  
     
   
   
   
  
 apixaban 5 mg tablet Commonly known as:  Lottie Rojas Your last dose was: Your next dose is:    
   
   
 Dose:  5 mg Take 1 Tab by mouth two (2) times a day. Indications: PREVENT THROMBOEMBOLISM IN CHRONIC ATRIAL FIBRILLATION Quantity:  60 Tab Refills:  3  
     
   
   
   
  
 aspirin 81 mg chewable tablet Replaces:  aspirin 325 mg tablet Your last dose was: Your next dose is:    
   
   
 Dose:  81 mg Take 1 Tab by mouth daily. Quantity:  30 Tab Refills:  3 CONTINUE these medications which have CHANGED Dose & Instructions Dispensing Information Comments Morning Noon Evening Bedtime  
 glipiZIDE 5 mg tablet Commonly known as:  Kaye Chambers What changed:   
- how much to take 
- how to take this - when to take this 
- additional instructions Your last dose was: Your next dose is: Take 2 tabs bid Quantity:  360 Tab Refills:  3  
     
   
   
   
  
 metoprolol tartrate 50 mg tablet Commonly known as:  LOPRESSOR What changed:   
- medication strength 
- how much to take 
- how to take this - when to take this 
- additional instructions Your last dose was: Your next dose is:    
   
   
 Dose:  50 mg Take 1 Tab by mouth two (2) times a day. Quantity:  60 Tab Refills:  3 CONTINUE these medications which have NOT CHANGED Dose & Instructions Dispensing Information Comments Morning Noon Evening Bedtime  
 atorvastatin 40 mg tablet Commonly known as:  LIPITOR Your last dose was: Your next dose is: TAKE 1 TABLET DAILY Quantity:  90 Tab Refills:  2  
     
   
   
   
  
 enalapril 20 mg tablet Commonly known as:  Bernarda Genesis Your last dose was: Your next dose is: TAKE 1 TABLET TWICE A DAY Quantity:  180 Tab Refills:  3  
     
   
   
   
  
 hydroCHLOROthiazide 50 mg tablet Commonly known as:  HYDRODIURIL Your last dose was: Your next dose is: TAKE 1 TABLET DAILY Quantity:  90 Tab Refills:  3  
     
   
   
   
  
 isosorbide mononitrate ER 60 mg CR tablet Commonly known as:  IMDUR Your last dose was: Your next dose is:    
   
   
 Dose:  60 mg Take 1 Tab by mouth two (2) times a day. Quantity:  180 Tab Refills:  3 LANTUS 100 unit/mL injection Generic drug:  insulin glargine Your last dose was: Your next dose is:    
   
   
 Dose:  10 Units 10 Units by SubCUTAneous route daily. Refills:  0  
     
   
   
   
  
 * metFORMIN 500 mg tablet Commonly known as:  GLUCOPHAGE Your last dose was: Your next dose is:    
   
   
 Dose:  250 mg Take 250 mg by mouth two (2) times daily (with meals). Refills:  0  
     
   
   
   
  
 * metFORMIN 500 mg tablet Commonly known as:  GLUCOPHAGE Your last dose was: Your next dose is:    
   
   
 Dose:  500 mg Take 1 Tab by mouth two (2) times daily (with meals). Quantity:  180 Tab Refills:  3 Resume from 3/14/14 on  
    
   
   
   
  
 nitroglycerin 0.4 mg SL tablet Commonly known as:  NITROSTAT Your last dose was: Your next dose is: Dose:  0.4 mg  
1 Tab by SubLINGual route every five (5) minutes as needed for Chest Pain. Quantity:  25 Tab Refills:  3  
     
   
   
   
  
 omeprazole 20 mg capsule Commonly known as:  PriLOSEC Your last dose was: Your next dose is:    
   
   
 Dose:  20 mg Take 1 Cap by mouth daily. Quantity:  90 Cap Refills:  3 VITAMIN D3 1,000 unit tablet Generic drug:  cholecalciferol Your last dose was: Your next dose is:    
   
   
 Dose:  2000 Units Take 2,000 Units by mouth daily. Refills:  0  
     
   
   
   
  
 * Notice: This list has 2 medication(s) that are the same as other medications prescribed for you. Read the directions carefully, and ask your doctor or other care provider to review them with you. STOP taking these medications   
 aspirin 325 mg tablet Commonly known as:  ASPIRIN Replaced by:  aspirin 81 mg chewable tablet Where to Get Your Medications These medications were sent to [address] [place] [address] Phone:  [phone]  
  amiodarone 400 mg tablet  
 apixaban 5 mg tablet  
 aspirin 81 mg chewable tablet  
 metoprolol tartrate 50 mg tablet Discharge Instructions None Discharge Orders None [employer] Announcement We are excited to announce that we are making your provider's discharge notes available to you in [employer]. You will see these notes when they are completed and signed by the physician that discharged you from your recent hospital stay. If you have any questions or concerns about any information you see in [employer], please call the Health Information Department where you were seen or reach out to your Primary Care Provider for more information about your plan of care. Introducing [facility]!    
 Agata Williamson introduces [employer] patient portal. Now you can access parts of your medical record, email your doctor's office, and request medication refills online. 1. In your internet browser, go to [email or web address] 2. Click on the First Time User? Click Here link in the Sign In box. You will see the New Member Sign Up page. 3. Enter your [employer] Access Code exactly as it appears below. You will not need to use this code after youve completed the sign-up process. If you do not sign up before the expiration date, you must request a new code. · [employer] Access Code: [email or web address] Expires: 5/21/2017 12:12 AM 
 
4. Enter the last four digits of your Social Security Number (xxxx) and Date of Birth (mm/dd/yyyy) as indicated and click Submit. You will be taken to the next sign-up page. 5. Create a [employer] ID. This will be your [employer] login ID and cannot be changed, so think of one that is secure and easy to remember. 6. Create a [employer] password. You can change your password at any time. 7. Enter your Password Reset Question and Answer. This can be used at a later time if you forget your password. 8. Enter your e-mail address. You will receive e-mail notification when new information is available in [address] 9. Click Sign Up. You can now view and download portions of your medical record. 10. Click the Download Summary menu link to download a portable copy of your medical information. If you have questions, please visit the Frequently Asked Questions section of the [employer] website. Remember, [employer] is NOT to be used for urgent needs. For medical emergencies, dial 911. Now available from your iPhone and Android! General Information Please provide this summary of care documentation to your next provider. Patient Signature:  ____________________________________________________________ Date:  ____________________________________________________________  
  
Cynthea Mates  Provider Signature: ____________________________________________________________ Date:  ____________________________________________________________

## 2017-04-24 NOTE — ED TRIAGE NOTES
Over the last couple of days I have been having a hard time getting my breath. Last night was the worse. No pain. No cough.

## 2017-04-24 NOTE — PROGRESS NOTES
Patient with a pmh for coronary atherosclerosis, type II DM, HLD, essential hypertension, CVA, CAD, PVD and hysterectomy who presents from home with chief complaint of shortness of breath. Care manager met with patient to explain role and discuss transitions of care. Per patient she was fairly independent prior to admission, was receiving home health physical therapy up until last week [facility] [address] Her daughter [facility] [phone] lives with her. Patient confirmed her PCP to be Dr Dejuan Freeman and she sees him about every 4 months and uses the mail order prescription benefit through her insurance as well as [employer] for her medications. Care management will follow for potential discharge planning needs. Genevieve Roberts RN,CRM  Care Management Interventions  PCP Verified by CM:  Yes  MyChart Signup: No  Discharge Durable Medical Equipment: No  Physical Therapy Consult: No  Occupational Therapy Consult: No  Speech Therapy Consult: No  Current Support Network: Own Home (Daughter Guillermo [facility] [phone], [facility] [phone])  Confirm Follow Up Transport: Family (daughters will transport)  Discharge Location  Discharge Placement: Home

## 2017-04-24 NOTE — ROUTINE PROCESS
TRANSFER - OUT REPORT:    Verbal report given to PIPER Dodd(name) on Siena Black  being transferred to [facility]SU(unit) for routine progression of care       Report consisted of patients Situation, Background, Assessment and   Recommendations(SBAR). Information from the following report(s) SBAR and Recent Results was reviewed with the receiving nurse. Lines:   Peripheral IV 04/24/17 Right (Active)   Site Assessment Clean, dry, & intact 4/24/2017 12:00 PM   Phlebitis Assessment 0 4/24/2017 12:00 PM   Infiltration Assessment 0 4/24/2017 12:00 PM   Dressing Status Clean, dry, & intact 4/24/2017 12:00 PM   Dressing Type Transparent 4/24/2017 12:00 PM   Hub Color/Line Status Green 4/24/2017 12:00 PM   Alcohol Cap Used Yes 4/24/2017 12:00 PM       Peripheral IV 04/24/17 Right Forearm (Active)   Site Assessment Clean, dry, & intact 4/24/2017 12:00 PM   Phlebitis Assessment 0 4/24/2017 12:00 PM   Infiltration Assessment 0 4/24/2017 12:00 PM   Dressing Status Clean, dry, & intact 4/24/2017 12:00 PM   Dressing Type Tape;Transparent 4/24/2017 12:00 PM   Hub Color/Line Status Blue; Infusing 4/24/2017 12:00 PM   Alcohol Cap Used Yes 4/24/2017 12:00 PM        Opportunity for questions and clarification was provided. Patient transported with:   Monitor  Registered Nurse     Update: Patient aware of plan of care, assessment unchanged, IV infusing without difficulty, on cardiac monitor, all upon transfer to inpatient   unit.      Visit Vitals    /79 (BP 1 Location: Left arm, BP Patient Position: At rest)    Pulse 88    Temp 98.2 °F (36.8 °C)    Resp 18    Ht 5' 3\" (1.6 m)    Wt 75.9 kg (167 lb 4 oz)    SpO2 97%    BMI 29.63 kg/m2

## 2017-04-24 NOTE — ED PROVIDER NOTES
HPI Comments: [de-identified] y.o. female with past medical history significant for coronary atherosclerosis, type II DM, HLD, essential hypertension, CVA, CAD, PVD and hysterectomy who presents from home with chief complaint of shortness of breath. Patient reports that she has had intermittent shortness for the past few days which last night was notably worse than usual. She denies history of similar. She reports feeling lightheaded this morning. She denies fever, chills, abdominal pain, chest pain or dizziness. Patient notes chronic right leg swelling. She denies history of atrial fibrillation. She denies any recent changes in medication. There are no other acute medical concerns at this time. Social hx: Former smoker, no alcohol use    PCP: Phi Flores MD    Note written by tracey Dalton, as dictated by Arturo Flores MD 8:13 AM      The history is provided by the patient. Past Medical History:   Diagnosis Date    CAD (coronary artery disease)     Cerebrovascular accident stroke, other, unspec 11/18/2010    Coronary atherosclerosis of native coronary artery 11/18/2010    Essential hypertension     Essential hypertension, benign 11/18/2010    HLD (hyperlipidemia) 11/18/2010    PVD (peripheral vascular disease) ([address] 2017    Type II or unspecified type diabetes mellitus without mention of complication, not stated as uncontrolled 11/18/2010       Past Surgical History:   Procedure Laterality Date    HX CORONARY STENT PLACEMENT      HX HEART CATHETERIZATION      HX HYSTERECTOMY  1972         History reviewed. No pertinent family history. Social History     Social History    Marital status:      Spouse name: N/A    Number of children: N/A    Years of education: N/A     Occupational History    Not on file.      Social History Main Topics    Smoking status: Former Smoker     Packs/day: 0.80     Years: 20.00     Types: Cigarettes     Quit date: 4/18/1986    Smokeless tobacco: Never Used    Alcohol use No    Drug use: No    Sexual activity: Not on file     Other Topics Concern    Not on file     Social History Narrative         ALLERGIES: Codeine; Keflin; Pcn [penicillins]; and Tramadol    Review of Systems   Constitutional: Negative for chills and fever. HENT: Negative for rhinorrhea and sore throat. Respiratory: Positive for shortness of breath. Negative for cough. Cardiovascular: Positive for leg swelling (chronic). Negative for chest pain. Gastrointestinal: Negative for abdominal pain, diarrhea, nausea and vomiting. Genitourinary: Negative for dysuria and urgency. Musculoskeletal: Negative for arthralgias and back pain. Skin: Negative for rash. Neurological: Positive for light-headedness. Negative for dizziness and weakness. All other systems reviewed and are negative.       Vitals:    04/24/17 0707 04/24/17 0733   BP: (!) 169/110 166/81   Pulse: (!) 117 (!) 122   Resp: 18 26   Temp: 98.2 °F (36.8 °C)    SpO2: 99% 97%   Weight: 75.9 kg (167 lb 4 oz)    Height: 5' 3\" (1.6 m)             Physical Exam   Const:  No acute distress, well developed, well nourished  Head:  Atraumatic, normocephalic  Eyes:  PERRL, conjunctiva normal, no scleral icterus  Neck:  Supple, trachea midline  Cardiovascular:  Tachycardic, irregularly irregular, no murmurs, no gallops, no rubs  Resp:  No resp distress, no increased work of breathing, no wheezes, no rhonchi, no rales,  Abd:  Soft, non-tender, non-distended, no rebound, no guarding, no CVA tenderness  :  Deferred  MSK:  No pedal edema, normal ROM  Neuro:  Alert and oriented x3, no cranial nerve defect  Skin:  Warm, dry, intact  Psych: normal mood and affect, behavior is normal, judgement and thought content is normal    Note written by tracey Frausto, as dictated by Mery Sorensen MD 8:14 AM    MDM  Number of Diagnoses or Management Options  Atrial fibrillation with rapid ventricular response [facility]):      Amount and/or Complexity of Data Reviewed  Clinical lab tests: ordered and reviewed  Tests in the radiology section of CPT®: ordered and reviewed  Review and summarize past medical records: yes    Patient Progress  Patient progress: stable    ED Course     Pt. Presents to the ER with SOB and dizziness. Pt. Found to be in new onset afib. Pt's rate improved with diltiazem. No pneumonia on xray. Pt. Seen by cardiology and to be admitted for further care. Procedures    EKG interpretation: (Preliminary)  Rhythm: atrial fib with RVR; and irregular. Rate (approx.): 111; Axis: normal; P wave: normal; QRS interval: normal ; ST/T wave: non-specific changes; Other findings: abnormal ekg.

## 2017-04-25 ENCOUNTER — APPOINTMENT (OUTPATIENT)
Dept: NUCLEAR MEDICINE | Age: 82
DRG: 309 | End: 2017-04-25
Attending: NURSE PRACTITIONER
Payer: MEDICARE

## 2017-04-25 LAB
ANION GAP BLD CALC-SCNC: 7 MMOL/L (ref 5–15)
ATTENDING PHYSICIAN, CST07: NORMAL
BUN SERPL-MCNC: 21 MG/DL (ref 6–20)
BUN/CREAT SERPL: 18 (ref 12–20)
CALCIUM SERPL-MCNC: 8.8 MG/DL (ref 8.5–10.1)
CHLORIDE SERPL-SCNC: 96 MMOL/L (ref 97–108)
CHOLEST SERPL-MCNC: 79 MG/DL
CO2 SERPL-SCNC: 27 MMOL/L (ref 21–32)
CREAT SERPL-MCNC: 1.15 MG/DL (ref 0.55–1.02)
DIAGNOSIS, 93000: NORMAL
DUKE TM SCORE RESULT, CST14: NORMAL
DUKE TREADMILL SCORE, CST13: NORMAL
ECG INTERP BEFORE EX, CST11: NORMAL
ECG INTERP DURING EX, CST12: NORMAL
ERYTHROCYTE [DISTWIDTH] IN BLOOD BY AUTOMATED COUNT: 14.1 % (ref 11.5–14.5)
FUNCTIONAL CAPACITY, CST17: NORMAL
GLUCOSE BLD STRIP.AUTO-MCNC: 154 MG/DL (ref 65–100)
GLUCOSE BLD STRIP.AUTO-MCNC: 161 MG/DL (ref 65–100)
GLUCOSE BLD STRIP.AUTO-MCNC: 193 MG/DL (ref 65–100)
GLUCOSE BLD STRIP.AUTO-MCNC: 204 MG/DL (ref 65–100)
GLUCOSE SERPL-MCNC: 227 MG/DL (ref 65–100)
HCT VFR BLD AUTO: 34.9 % (ref 35–47)
HDLC SERPL-MCNC: 40 MG/DL
HDLC SERPL: 2 {RATIO} (ref 0–5)
HGB BLD-MCNC: 11.4 G/DL (ref 11.5–16)
KNOWN CARDIAC CONDITION, CST08: NORMAL
LDLC SERPL CALC-MCNC: 19.2 MG/DL (ref 0–100)
LIPID PROFILE,FLP: NORMAL
MAGNESIUM SERPL-MCNC: 2 MG/DL (ref 1.6–2.4)
MAX. DIASTOLIC BP, CST04: 80 MMHG
MAX. HEART RATE, CST05: 88 BPM
MAX. SYSTOLIC BP, CST03: 161 MMHG
MCH RBC QN AUTO: 26 PG (ref 26–34)
MCHC RBC AUTO-ENTMCNC: 32.7 G/DL (ref 30–36.5)
MCV RBC AUTO: 79.7 FL (ref 80–99)
OVERALL BP RESPONSE TO EXERCISE, CST16: NORMAL
OVERALL HR RESPONSE TO EXERCISE, CST15: NORMAL
PEAK EX METS, CST10: 1 METS
PLATELET # BLD AUTO: 260 K/UL (ref 150–400)
POTASSIUM SERPL-SCNC: 4.4 MMOL/L (ref 3.5–5.1)
PROTOCOL NAME, CST01: NORMAL
RBC # BLD AUTO: 4.38 M/UL (ref 3.8–5.2)
SERVICE CMNT-IMP: ABNORMAL
SODIUM SERPL-SCNC: 130 MMOL/L (ref 136–145)
TEST INDICATION, CST09: NORMAL
TRIGL SERPL-MCNC: 99 MG/DL (ref ?–150)
VLDLC SERPL CALC-MCNC: 19.8 MG/DL
WBC # BLD AUTO: 15.1 K/UL (ref 3.6–11)

## 2017-04-25 PROCEDURE — 65660000000 HC RM CCU STEPDOWN

## 2017-04-25 PROCEDURE — 74011636637 HC RX REV CODE- 636/637: Performed by: NURSE PRACTITIONER

## 2017-04-25 PROCEDURE — 82962 GLUCOSE BLOOD TEST: CPT

## 2017-04-25 PROCEDURE — A9500 TC99M SESTAMIBI: HCPCS

## 2017-04-25 PROCEDURE — 74011250636 HC RX REV CODE- 250/636: Performed by: NURSE PRACTITIONER

## 2017-04-25 PROCEDURE — 83735 ASSAY OF MAGNESIUM: CPT | Performed by: NURSE PRACTITIONER

## 2017-04-25 PROCEDURE — 85027 COMPLETE CBC AUTOMATED: CPT | Performed by: NURSE PRACTITIONER

## 2017-04-25 PROCEDURE — 36415 COLL VENOUS BLD VENIPUNCTURE: CPT | Performed by: NURSE PRACTITIONER

## 2017-04-25 PROCEDURE — 74011250636 HC RX REV CODE- 250/636: Performed by: SPECIALIST

## 2017-04-25 PROCEDURE — 93017 CV STRESS TEST TRACING ONLY: CPT

## 2017-04-25 PROCEDURE — 74011000250 HC RX REV CODE- 250: Performed by: NURSE PRACTITIONER

## 2017-04-25 PROCEDURE — 74011250637 HC RX REV CODE- 250/637: Performed by: SPECIALIST

## 2017-04-25 PROCEDURE — 80048 BASIC METABOLIC PNL TOTAL CA: CPT | Performed by: NURSE PRACTITIONER

## 2017-04-25 PROCEDURE — 74011000258 HC RX REV CODE- 258: Performed by: NURSE PRACTITIONER

## 2017-04-25 PROCEDURE — 80061 LIPID PANEL: CPT | Performed by: NURSE PRACTITIONER

## 2017-04-25 PROCEDURE — 74011250637 HC RX REV CODE- 250/637: Performed by: NURSE PRACTITIONER

## 2017-04-25 RX ORDER — HYDROCHLOROTHIAZIDE 25 MG/1
25 TABLET ORAL DAILY
Status: DISCONTINUED | OUTPATIENT
Start: 2017-04-26 | End: 2017-04-27

## 2017-04-25 RX ORDER — SODIUM CHLORIDE 0.9 % (FLUSH) 0.9 %
20 SYRINGE (ML) INJECTION
Status: COMPLETED | OUTPATIENT
Start: 2017-04-25 | End: 2017-04-25

## 2017-04-25 RX ORDER — GUAIFENESIN 100 MG/5ML
81 LIQUID (ML) ORAL DAILY
Status: DISCONTINUED | OUTPATIENT
Start: 2017-04-26 | End: 2017-04-28 | Stop reason: HOSPADM

## 2017-04-25 RX ORDER — DILTIAZEM HYDROCHLORIDE 30 MG/1
30 TABLET, FILM COATED ORAL
Status: DISCONTINUED | OUTPATIENT
Start: 2017-04-25 | End: 2017-04-27

## 2017-04-25 RX ORDER — AMINOPHYLLINE 25 MG/ML
125 INJECTION, SOLUTION INTRAVENOUS
Status: COMPLETED | OUTPATIENT
Start: 2017-04-25 | End: 2017-04-25

## 2017-04-25 RX ADMIN — METFORMIN HYDROCHLORIDE 250 MG: 500 TABLET, FILM COATED ORAL at 18:12

## 2017-04-25 RX ADMIN — ENALAPRIL MALEATE 10 MG: 5 TABLET ORAL at 18:02

## 2017-04-25 RX ADMIN — ATORVASTATIN CALCIUM 40 MG: 40 TABLET, FILM COATED ORAL at 08:11

## 2017-04-25 RX ADMIN — PANTOPRAZOLE SODIUM 40 MG: 40 TABLET, DELAYED RELEASE ORAL at 07:14

## 2017-04-25 RX ADMIN — ASPIRIN 325 MG: 325 TABLET ORAL at 08:11

## 2017-04-25 RX ADMIN — ENALAPRIL MALEATE 10 MG: 5 TABLET ORAL at 13:09

## 2017-04-25 RX ADMIN — METOPROLOL TARTRATE 100 MG: 50 TABLET ORAL at 08:11

## 2017-04-25 RX ADMIN — GLIPIZIDE 10 MG: 5 TABLET ORAL at 07:14

## 2017-04-25 RX ADMIN — METOPROLOL TARTRATE 100 MG: 50 TABLET ORAL at 21:37

## 2017-04-25 RX ADMIN — HYDROCHLOROTHIAZIDE 50 MG: 25 TABLET ORAL at 13:09

## 2017-04-25 RX ADMIN — ISOSORBIDE MONONITRATE 60 MG: 60 TABLET, EXTENDED RELEASE ORAL at 08:11

## 2017-04-25 RX ADMIN — INSULIN GLARGINE 10 UNITS: 100 INJECTION, SOLUTION SUBCUTANEOUS at 13:09

## 2017-04-25 RX ADMIN — METFORMIN HYDROCHLORIDE 250 MG: 500 TABLET, FILM COATED ORAL at 13:09

## 2017-04-25 RX ADMIN — ISOSORBIDE MONONITRATE 60 MG: 60 TABLET, EXTENDED RELEASE ORAL at 18:00

## 2017-04-25 RX ADMIN — Medication 20 ML: at 09:22

## 2017-04-25 RX ADMIN — ENOXAPARIN SODIUM 80 MG: 80 INJECTION SUBCUTANEOUS at 08:12

## 2017-04-25 RX ADMIN — INSULIN LISPRO 3 UNITS: 100 INJECTION, SOLUTION INTRAVENOUS; SUBCUTANEOUS at 07:14

## 2017-04-25 RX ADMIN — REGADENOSON 0.4 MG: 0.08 INJECTION, SOLUTION INTRAVENOUS at 09:21

## 2017-04-25 RX ADMIN — AMINOPHYLLINE 125 MG: 25 INJECTION, SOLUTION INTRAVENOUS at 09:45

## 2017-04-25 RX ADMIN — VITAMIN D, TAB 1000IU (100/BT) 2000 UNITS: 25 TAB at 08:11

## 2017-04-25 RX ADMIN — INSULIN LISPRO 2 UNITS: 100 INJECTION, SOLUTION INTRAVENOUS; SUBCUTANEOUS at 13:10

## 2017-04-25 RX ADMIN — INSULIN LISPRO 2 UNITS: 100 INJECTION, SOLUTION INTRAVENOUS; SUBCUTANEOUS at 16:30

## 2017-04-25 RX ADMIN — APIXABAN 2.5 MG: 2.5 TABLET, FILM COATED ORAL at 18:00

## 2017-04-25 RX ADMIN — DILTIAZEM HYDROCHLORIDE 30 MG: 30 TABLET, FILM COATED ORAL at 18:00

## 2017-04-25 RX ADMIN — GLIPIZIDE 10 MG: 5 TABLET ORAL at 18:00

## 2017-04-25 RX ADMIN — DILTIAZEM HYDROCHLORIDE 10 MG/HR: 5 INJECTION, SOLUTION INTRAVENOUS at 08:51

## 2017-04-25 NOTE — DIABETES MGMT
DTC Progress Note    Recommendations/ Comments: Charts reviewed due to hyperglycemia. Pt has required 6 units of correction insulin over the last 24 hours. Noted Lantus 10 units ordered. DTC to continue to follow. Chart reviewed on Mark Dennison. Patient is a [de-identified] y.o. female with Lantus 10 units, Metformin BID and Glipizide 5 mg BID at home. A1c:   Lab Results   Component Value Date/Time    Hemoglobin A1c 8.1 06/09/2011 08:56 AM       Recent Glucose Results: Lab Results   Component Value Date/Time     (H) 04/25/2017 03:53 AM    GLUCPOC 204 (H) 04/25/2017 06:33 AM    GLUCPOC 157 (H) 04/24/2017 09:34 PM    GLUCPOC 203 (H) 04/24/2017 04:33 PM        Lab Results   Component Value Date/Time    Creatinine 1.15 04/25/2017 03:53 AM       Active Orders   Diet    DIET DIABETIC WITH OPTIONS Consistent Carb 2000kcal; Regular; 2 GM NA (House Low NA)        PO intake: Patient Vitals for the past 72 hrs:   % Diet Eaten   04/24/17 1846 75 %   04/24/17 1426 75 %       Current hospital DM medication: Glipizide 10 mg BID, Lantus 10 units, Metformin 250 BID and correction scale Humalog, normal sensitivity. Will continue to follow as needed.     Thank you  Pan Shannon, ALEN, CDE

## 2017-04-25 NOTE — PROGRESS NOTES
0730 Bedside and Verbal shift change report given to Denny Mills RN (oncoming nurse) by PIPER Dinh (offgoing nurse). Report included the following information SBAR, Kardex, ED Summary, OR Summary, Procedure Summary, Intake/Output, MAR, Med Rec Status and Cardiac Rhythm AFib.   0900 Patient left the floor for stress test.  1130 Patient returned to room. 1500 Per MD orders, d/c cardizem. Patient tolerated well. 2000 Bedside and Verbal shift change report given to PIPER Dinh (oncoming nurse) by Denny Mills RN (offgoing nurse). Report included the following information SBAR, Kardex, ED Summary, Procedure Summary, Intake/Output, MAR, Recent Results, Med Rec Status and Cardiac Rhythm AFib. Problem: Falls - Risk of  Goal: *Absence of falls  Outcome: Progressing Towards Goal  Patient belonging and call bell within reach. Bed in low and locked position. Problem: Pressure Ulcer - Risk of  Goal: *Prevention of pressure ulcer  Outcome: Progressing Towards Goal    04/25/17 0815   Wound Prevention and Protection Methods   Orientation of Wound Prevention Posterior   Location of Wound Prevention Sacrum/Coccyx   Dressing Present  No   Read Only, Retired: Wound Treatment (non-mechanical)   Wound Offloading (Prevention Methods) Repositioning;Bed, pressure redistribution/air         Problem: Afib Pathway: Day 2  Goal: *Hemodynamically stable  Outcome: Progressing Towards Goal  Patient VSS. Goal: *Optimal pain control at patients stated goal  Outcome: Progressing Towards Goal  Patient not reporting any pain.

## 2017-04-25 NOTE — PROGRESS NOTES
[facility] Cardiology Progress Note         NAME:  Stefan Nicole   :   1935   MRN:   [id number]     Assessment/Plan:1) cp sob : she feels ok at rest nuclear stress test with no ischemia or mi echo with normal EF no additional interventions for now. Sob likely related top AF. Also chest ct ok   Ruled out for mi  2)AF: of unknown duration , no procedure needed at this time start eliquis. Discussed with patient risks and benefits of nOAC risks including but not limited to major bleeding episodes requiring at times hospitalization and transfusions and more rarely leading to death. She is agreeable  Continue same dose of lopressor adding cardizem po, follow hr  Potential eliquis cardizem interaction known patient his [de-identified] with borderline creatinine I will start lower dose of eliquis for now  Discussed potential for AF conversion, use of AA rx and cardioversion (also risks and benefits discussed including death cva ppm) 3 weeks eliquis or ignacia needed prior to that patient wants to wait and see how she does first. Decrease asa to 81 mg  3)elevated wbcs , no fever no uti and normal chest ct unclear etiology at this time  Re check in am  4)hyponatremia: decrease hctz and follow for now       Subjective:   Cardiac ROS: Patient denies any exertional chest pain, dyspnea, palpitations, syncope, orthopnea, edema or paroxysmal nocturnal dyspnea. Review of Systems:   Pertinent items are noted in the History of Present Illness. Objective:             1901 -  0700  In: 966.5 [P.O.:840;  I.V.:126.5]  Out: 950 [Urine:950]    Telemetry: AFIB    Physical Exam:  Visit Vitals    /60 (BP 1 Location: Left arm, BP Patient Position: Sitting)    Pulse 71    Temp 97.5 °F (36.4 °C)    Resp 18    Ht 5' 3\" (1.6 m)    Wt 75.8 kg (167 lb 1.7 oz)    SpO2 98%    BMI 29.6 kg/m2       Neck: no JVD  Heart: irregularly irregular rhythm  Lungs: clear to auscultation bilaterally  Abdomen: soft, non-tender. Bowel sounds normal. No masses,  no organomegaly  Extremities: edema trace    Additional comments: None    Care Plan discussed with:    Comments   Patient     Family      RN     Care Manager                    Consultant:        Data Review:     No results for input(s): TNIPOC in the last 72 hours.     No lab exists for component: ITNL   Recent Labs      04/24/17   1435  04/24/17   1019  04/24/17   0725   TROIQ  <0.04  <0.04  <0.04     Recent Labs      04/25/17   0353  04/24/17   0725   NA  130*  138   K  4.4  3.9   CL  96*  101   CO2  27  29   BUN  21*  21*   CREA  1.15*  1.19*   GLU  227*  198*   MG  2.0  1.6   ALB   --   3.3*   WBC  15.1*  12.8*   HGB  11.4*  11.8   HCT  34.9*  36.4   PLT  260  256     Recent Labs      04/24/17   0725   INR  1.0   PTP  9.9   APTT  21.6*       Medications reviewed  Current Facility-Administered Medications   Medication Dose Route Frequency    dilTIAZem (CARDIZEM) IR tablet 30 mg  30 mg Oral TIDAC    enoxaparin (LOVENOX) injection 80 mg  1 mg/kg SubCUTAneous Q12H    insulin lispro (HUMALOG) injection   SubCUTAneous AC&HS    glucose chewable tablet 16 g  4 Tab Oral PRN    dextrose (D50W) injection syrg 12.5-25 g  12.5-25 g IntraVENous PRN    glucagon (GLUCAGEN) injection 1 mg  1 mg IntraMUSCular PRN    aspirin (ASPIRIN) tablet 325 mg  325 mg Oral DAILY    atorvastatin (LIPITOR) tablet 40 mg  40 mg Oral DAILY    cholecalciferol (VITAMIN D3) tablet 2,000 Units  2,000 Units Oral DAILY    enalapril (VASOTEC) tablet 10 mg  10 mg Oral BID    glipiZIDE (GLUCOTROL) tablet 10 mg  10 mg Oral ACB&D    hydroCHLOROthiazide (HYDRODIURIL) tablet 50 mg  50 mg Oral DAILY    insulin glargine (LANTUS) injection 10 Units  10 Units SubCUTAneous DAILY AFTER BREAKFAST    isosorbide mononitrate ER (IMDUR) tablet 60 mg  60 mg Oral BID    metFORMIN (GLUCOPHAGE) tablet 250 mg  250 mg Oral BID WITH MEALS    metoprolol tartrate (LOPRESSOR) tablet 100 mg 100 mg Oral BID    nitroglycerin (NITROSTAT) tablet 0.4 mg  0.4 mg SubLINGual Q5MIN PRN    pantoprazole (PROTONIX) tablet 40 mg  40 mg Oral ACB       Data Reviewed: current meds, labs,recent radiology, intake/output/weight and problem list reviewed    Jl Miranda MD

## 2017-04-25 NOTE — PROGRESS NOTES
Problem: Falls - Risk of  Goal: *Absence of falls  Outcome: Progressing Towards Goal  Patient free of falls at this time. Call bell within reach.      Problem: Pressure Ulcer - Risk of  Goal: *Prevention of pressure ulcer  Outcome: Progressing Towards Goal    04/24/17 2010   Wound Prevention and Protection Methods   Orientation of Wound Prevention Posterior   Location of Wound Prevention Sacrum/Coccyx   Dressing Present  No   Read Only, Retired: Wound Treatment (non-mechanical)   Wound Offloading (Prevention Methods) Bed, pressure redistribution/air;Bed, pressure reduction mattress;Repositioning

## 2017-04-25 NOTE — PROGRESS NOTES
1930: Bedside and Verbal shift change report given to PIPER Harvey (oncoming nurse) by Fidel Zhang RN (offgoing nurse). Report included the following information SBAR, Kardex, Intake/Output, MAR, Recent Results and Cardiac Rhythm Afib.     0730: Bedside and Verbal shift change report given to PIPER Forbes (oncoming nurse) by Garland Schmidt RN (offgoing nurse). Report included the following information SBAR, Kardex, Intake/Output, MAR, Recent Results and Cardiac Rhythm Afib.

## 2017-04-26 LAB
ANION GAP BLD CALC-SCNC: 6 MMOL/L (ref 5–15)
BUN SERPL-MCNC: 17 MG/DL (ref 6–20)
BUN/CREAT SERPL: 15 (ref 12–20)
CALCIUM SERPL-MCNC: 9.5 MG/DL (ref 8.5–10.1)
CHLORIDE SERPL-SCNC: 97 MMOL/L (ref 97–108)
CO2 SERPL-SCNC: 28 MMOL/L (ref 21–32)
CREAT SERPL-MCNC: 1.17 MG/DL (ref 0.55–1.02)
ERYTHROCYTE [DISTWIDTH] IN BLOOD BY AUTOMATED COUNT: 13.9 % (ref 11.5–14.5)
GLUCOSE BLD STRIP.AUTO-MCNC: 171 MG/DL (ref 65–100)
GLUCOSE BLD STRIP.AUTO-MCNC: 212 MG/DL (ref 65–100)
GLUCOSE BLD STRIP.AUTO-MCNC: 281 MG/DL (ref 65–100)
GLUCOSE BLD STRIP.AUTO-MCNC: 83 MG/DL (ref 65–100)
GLUCOSE SERPL-MCNC: 93 MG/DL (ref 65–100)
HCT VFR BLD AUTO: 35.9 % (ref 35–47)
HGB BLD-MCNC: 12 G/DL (ref 11.5–16)
MAGNESIUM SERPL-MCNC: 1.7 MG/DL (ref 1.6–2.4)
MCH RBC QN AUTO: 26.3 PG (ref 26–34)
MCHC RBC AUTO-ENTMCNC: 33.4 G/DL (ref 30–36.5)
MCV RBC AUTO: 78.6 FL (ref 80–99)
PLATELET # BLD AUTO: 267 K/UL (ref 150–400)
POTASSIUM SERPL-SCNC: 3.9 MMOL/L (ref 3.5–5.1)
RBC # BLD AUTO: 4.57 M/UL (ref 3.8–5.2)
SERVICE CMNT-IMP: ABNORMAL
SERVICE CMNT-IMP: NORMAL
SODIUM SERPL-SCNC: 131 MMOL/L (ref 136–145)
WBC # BLD AUTO: 12.3 K/UL (ref 3.6–11)

## 2017-04-26 PROCEDURE — 82962 GLUCOSE BLOOD TEST: CPT

## 2017-04-26 PROCEDURE — 80048 BASIC METABOLIC PNL TOTAL CA: CPT | Performed by: SPECIALIST

## 2017-04-26 PROCEDURE — 36415 COLL VENOUS BLD VENIPUNCTURE: CPT | Performed by: SPECIALIST

## 2017-04-26 PROCEDURE — 83735 ASSAY OF MAGNESIUM: CPT | Performed by: SPECIALIST

## 2017-04-26 PROCEDURE — 74011636637 HC RX REV CODE- 636/637: Performed by: NURSE PRACTITIONER

## 2017-04-26 PROCEDURE — 85027 COMPLETE CBC AUTOMATED: CPT | Performed by: SPECIALIST

## 2017-04-26 PROCEDURE — 74011250637 HC RX REV CODE- 250/637: Performed by: NURSE PRACTITIONER

## 2017-04-26 PROCEDURE — 65660000000 HC RM CCU STEPDOWN

## 2017-04-26 PROCEDURE — 74011250637 HC RX REV CODE- 250/637: Performed by: SPECIALIST

## 2017-04-26 RX ADMIN — METFORMIN HYDROCHLORIDE 250 MG: 500 TABLET, FILM COATED ORAL at 08:28

## 2017-04-26 RX ADMIN — APIXABAN 2.5 MG: 2.5 TABLET, FILM COATED ORAL at 17:48

## 2017-04-26 RX ADMIN — METFORMIN HYDROCHLORIDE 250 MG: 500 TABLET, FILM COATED ORAL at 17:48

## 2017-04-26 RX ADMIN — ENALAPRIL MALEATE 10 MG: 5 TABLET ORAL at 17:56

## 2017-04-26 RX ADMIN — DILTIAZEM HYDROCHLORIDE 30 MG: 30 TABLET, FILM COATED ORAL at 17:48

## 2017-04-26 RX ADMIN — GLIPIZIDE 10 MG: 5 TABLET ORAL at 17:48

## 2017-04-26 RX ADMIN — VITAMIN D, TAB 1000IU (100/BT) 2000 UNITS: 25 TAB at 08:31

## 2017-04-26 RX ADMIN — METOPROLOL TARTRATE 100 MG: 50 TABLET ORAL at 21:43

## 2017-04-26 RX ADMIN — DILTIAZEM HYDROCHLORIDE 30 MG: 30 TABLET, FILM COATED ORAL at 12:07

## 2017-04-26 RX ADMIN — GLIPIZIDE 10 MG: 5 TABLET ORAL at 06:46

## 2017-04-26 RX ADMIN — INSULIN LISPRO 2 UNITS: 100 INJECTION, SOLUTION INTRAVENOUS; SUBCUTANEOUS at 21:43

## 2017-04-26 RX ADMIN — INSULIN LISPRO 5 UNITS: 100 INJECTION, SOLUTION INTRAVENOUS; SUBCUTANEOUS at 12:07

## 2017-04-26 RX ADMIN — ENALAPRIL MALEATE 10 MG: 5 TABLET ORAL at 08:31

## 2017-04-26 RX ADMIN — APIXABAN 2.5 MG: 2.5 TABLET, FILM COATED ORAL at 08:28

## 2017-04-26 RX ADMIN — METOPROLOL TARTRATE 100 MG: 50 TABLET ORAL at 07:26

## 2017-04-26 RX ADMIN — PANTOPRAZOLE SODIUM 40 MG: 40 TABLET, DELAYED RELEASE ORAL at 06:46

## 2017-04-26 RX ADMIN — INSULIN GLARGINE 10 UNITS: 100 INJECTION, SOLUTION SUBCUTANEOUS at 09:53

## 2017-04-26 RX ADMIN — INSULIN LISPRO 2 UNITS: 100 INJECTION, SOLUTION INTRAVENOUS; SUBCUTANEOUS at 17:48

## 2017-04-26 RX ADMIN — DILTIAZEM HYDROCHLORIDE 30 MG: 30 TABLET, FILM COATED ORAL at 06:47

## 2017-04-26 RX ADMIN — ATORVASTATIN CALCIUM 40 MG: 40 TABLET, FILM COATED ORAL at 08:28

## 2017-04-26 RX ADMIN — ASPIRIN 81 MG CHEWABLE TABLET 81 MG: 81 TABLET CHEWABLE at 08:28

## 2017-04-26 RX ADMIN — ISOSORBIDE MONONITRATE 60 MG: 60 TABLET, EXTENDED RELEASE ORAL at 17:48

## 2017-04-26 RX ADMIN — ISOSORBIDE MONONITRATE 60 MG: 60 TABLET, EXTENDED RELEASE ORAL at 08:28

## 2017-04-26 RX ADMIN — HYDROCHLOROTHIAZIDE 25 MG: 25 TABLET ORAL at 08:28

## 2017-04-26 NOTE — PROGRESS NOTES
[facility] Progress Note         NAME:  Fabiola De La Cruz   :   1935   MRN:   [id number]     Assessment/Plan:  1) cp sob : Ruled out for mi       - she feels ok at rest        - nuclear stress test with no ischemia or mi        - echo with normal EF        - no additional interventions for now. - Sob likely related top AF. Also chest ct ok   2)AF: of unknown duration        - start eliquis. - Continue same dose of lopressor adding cardizem po - Heart rate stable        - Potential eliquis cardizem interaction known patient his [de-identified] with borderline creatinine       - lower dose of eliquis for now - Cr stable   [Discussed potential for AF conversion, use of AA rx and cardioversion (also risks and   benefits discussed including death cva ppm) 3 weeks eliquis or ignacia needed prior to that patient wants to wait and see how she does first.] Decrease asa to 81 mg  3)elevated wbcs , no fever no uti and normal chest ct unclear etiology at this time       - WBC down to 12.3  4)hyponatremia: decrease hctz and follow for now Na 131. Will have RT walk patient on oximetry given SOB with activities. Discharge planning. I have seen and examined the patient and agree with PA assessment. Still with sob, cardiac and pulmonary w/u negative with the exception of AF which likely contributes to her SOB  Discussed again at length potential of ignacia cardioversion v. 3 weeks OAC, patient will discuss with her daughter and let me know  Wbcs decreasing also viral infection contributing to sob? Subjective:   Cardiac ROS: Patient denies any exertional chest pain, dyspnea, palpitations, syncope, orthopnea, edema or paroxysmal nocturnal dyspnea. Review of Systems:   Pertinent items are noted in the History of Present Illness. Objective:       701 - 1900  In: 460 [P.O.:460]  Out: -     1901 - 700  In: 943.2 [P.O.:720;  I.V.:223.2]  Out: 700 [Urine:700]    Telemetry: AFIB    Physical Exam:  Visit Vitals    BP (!) 128/91 (BP 1 Location: Left arm, BP Patient Position: Sitting)    Pulse 70    Temp 97.8 °F (36.6 °C)    Resp 18    Ht 5' 3\" (1.6 m)    Wt 74.2 kg (163 lb 9.3 oz)    SpO2 96%    BMI 28.98 kg/m2       Neck: no JVD  Heart: irregularly irregular rhythm  Lungs: clear to auscultation bilaterally  Abdomen: soft, non-tender. Bowel sounds normal. No masses,  no organomegaly  Extremities: edema trace    Additional comments: None    Care Plan discussed with:    Comments   Patient x    Family      RN     Care Manager                    Consultant:        Data Review:     No results for input(s): TNIPOC in the last 72 hours.     No lab exists for component: ITNL   Recent Labs      04/24/17   1435  04/24/17   1019  04/24/17   0725   TROIQ  <0.04  <0.04  <0.04     Recent Labs      04/26/17   0355  04/25/17   0353  04/24/17   0725   NA  131*  130*  138   K  3.9  4.4  3.9   CL  97  96*  101   CO2  28  27  29   BUN  17  21*  21*   CREA  1.17*  1.15*  1.19*   GLU  93  227*  198*   MG  1.7  2.0  1.6   ALB   --    --   3.3*   WBC  12.3*  15.1*  12.8*   HGB  12.0  11.4*  11.8   HCT  35.9  34.9*  36.4   PLT  267  260  256     Recent Labs      04/24/17   0725   INR  1.0   PTP  9.9   APTT  21.6*       Medications reviewed  Current Facility-Administered Medications   Medication Dose Route Frequency    dilTIAZem (CARDIZEM) IR tablet 30 mg  30 mg Oral TIDAC    aspirin chewable tablet 81 mg  81 mg Oral DAILY    hydroCHLOROthiazide (HYDRODIURIL) tablet 25 mg  25 mg Oral DAILY    apixaban (ELIQUIS) tablet 2.5 mg  2.5 mg Oral BID    insulin lispro (HUMALOG) injection   SubCUTAneous AC&HS    glucose chewable tablet 16 g  4 Tab Oral PRN    dextrose (D50W) injection syrg 12.5-25 g  12.5-25 g IntraVENous PRN    glucagon (GLUCAGEN) injection 1 mg  1 mg IntraMUSCular PRN    atorvastatin (LIPITOR) tablet 40 mg  40 mg Oral DAILY    cholecalciferol (VITAMIN D3) tablet 2,000 Units  2,000 Units Oral DAILY    enalapril (VASOTEC) tablet 10 mg  10 mg Oral BID    glipiZIDE (GLUCOTROL) tablet 10 mg  10 mg Oral ACB&D    insulin glargine (LANTUS) injection 10 Units  10 Units SubCUTAneous DAILY AFTER BREAKFAST    isosorbide mononitrate ER (IMDUR) tablet 60 mg  60 mg Oral BID    metFORMIN (GLUCOPHAGE) tablet 250 mg  250 mg Oral BID WITH MEALS    metoprolol tartrate (LOPRESSOR) tablet 100 mg  100 mg Oral BID    nitroglycerin (NITROSTAT) tablet 0.4 mg  0.4 mg SubLINGual Q5MIN PRN    pantoprazole (PROTONIX) tablet 40 mg  40 mg Oral ACB       Data Reviewed: current meds, labs,recent radiology, intake/output/weight and problem list reviewed    Heather Pierre MD

## 2017-04-26 NOTE — PROGRESS NOTES
1930: Bedside and Verbal shift change report given to Sindy/PIPER Ernandez (oncoming nurse) by INES/PIPER aCllaway (offgoing nurse). Report included the following information SBAR, Kardex, Intake/Output, MAR, Recent Results and Cardiac Rhythm Afib.     0351: Patient /84, HR 98, asymptomatic. Will continue to monitor. 1240: Patient /110. Will administer Metoprolol early and inform oncoming nurse of elevated BP.     0730: Bedside and Verbal shift change report given to Mana Sprague RN (oncoming nurse) by Anahi Mejia RN (offgoing nurse). Report included the following information SBAR, Kardex, Intake/Output, MAR, Recent Results and Cardiac Rhythm Afib.

## 2017-04-26 NOTE — PROGRESS NOTES
0730: Bedside and Verbal shift change report given to Cirilo Antony (oncoming nurse) by Kateryna Ngo (offgoing nurse). Report included the following information SBAR, Kardex, MAR and Recent Results. 1930: Uneventful shift. Bedside and Verbal shift change report given to Brendan (oncoming nurse) by Cirilo Antony (offgoing nurse). Report included the following information SBAR, Kardex, MAR and Recent Results. Problem: Falls - Risk of  Goal: *Absence of falls  Outcome: Progressing Towards Goal  Safety measures in place    Problem: Afib Pathway: Day 2  Goal: Medications  Outcome: Progressing Towards Goal  Cardizem and metoprolol. Goal: *Hemodynamically stable  Outcome: Progressing Towards Goal  VSS on room air.   Goal: *Stable cardiac rhythm  Outcome: Progressing Towards Goal  A-fib rate 's

## 2017-04-26 NOTE — PROGRESS NOTES
Problem: Falls - Risk of  Goal: *Absence of falls  Outcome: Progressing Towards Goal  Patient free of falls at this time. Call bell within reach. Problem: Pressure Ulcer - Risk of  Goal: *Prevention of pressure ulcer  Outcome: Progressing Towards Goal    04/25/17 2011   Wound Prevention and Protection Methods   Orientation of Wound Prevention Posterior   Location of Wound Prevention Sacrum/Coccyx   Dressing Present  No   Read Only, Retired: Wound Treatment (non-mechanical)   Wound Offloading (Prevention Methods) Bed, pressure reduction mattress;Bed, pressure redistribution/air; Turning;Repositioning         Problem: Afib Pathway: Day 3  Goal: Medications  Outcome: Progressing Towards Goal  PO cardizem and eliquis ordered.

## 2017-04-27 LAB
ANION GAP BLD CALC-SCNC: 5 MMOL/L (ref 5–15)
BUN SERPL-MCNC: 21 MG/DL (ref 6–20)
BUN/CREAT SERPL: 17 (ref 12–20)
CALCIUM SERPL-MCNC: 8.7 MG/DL (ref 8.5–10.1)
CHLORIDE SERPL-SCNC: 95 MMOL/L (ref 97–108)
CO2 SERPL-SCNC: 30 MMOL/L (ref 21–32)
CREAT SERPL-MCNC: 1.21 MG/DL (ref 0.55–1.02)
GLUCOSE BLD STRIP.AUTO-MCNC: 100 MG/DL (ref 65–100)
GLUCOSE BLD STRIP.AUTO-MCNC: 132 MG/DL (ref 65–100)
GLUCOSE BLD STRIP.AUTO-MCNC: 157 MG/DL (ref 65–100)
GLUCOSE BLD STRIP.AUTO-MCNC: 271 MG/DL (ref 65–100)
GLUCOSE SERPL-MCNC: 132 MG/DL (ref 65–100)
MAGNESIUM SERPL-MCNC: 1.7 MG/DL (ref 1.6–2.4)
POTASSIUM SERPL-SCNC: 3.8 MMOL/L (ref 3.5–5.1)
SERVICE CMNT-IMP: ABNORMAL
SERVICE CMNT-IMP: NORMAL
SODIUM SERPL-SCNC: 130 MMOL/L (ref 136–145)

## 2017-04-27 PROCEDURE — 74011250637 HC RX REV CODE- 250/637: Performed by: NURSE PRACTITIONER

## 2017-04-27 PROCEDURE — 83735 ASSAY OF MAGNESIUM: CPT | Performed by: NURSE PRACTITIONER

## 2017-04-27 PROCEDURE — 36415 COLL VENOUS BLD VENIPUNCTURE: CPT | Performed by: NURSE PRACTITIONER

## 2017-04-27 PROCEDURE — 74011636637 HC RX REV CODE- 636/637: Performed by: NURSE PRACTITIONER

## 2017-04-27 PROCEDURE — 82962 GLUCOSE BLOOD TEST: CPT

## 2017-04-27 PROCEDURE — 74011250637 HC RX REV CODE- 250/637: Performed by: SPECIALIST

## 2017-04-27 PROCEDURE — 80048 BASIC METABOLIC PNL TOTAL CA: CPT | Performed by: NURSE PRACTITIONER

## 2017-04-27 PROCEDURE — 74011250637 HC RX REV CODE- 250/637: Performed by: PHYSICIAN ASSISTANT

## 2017-04-27 PROCEDURE — 65660000000 HC RM CCU STEPDOWN

## 2017-04-27 RX ORDER — AMIODARONE HYDROCHLORIDE 200 MG/1
400 TABLET ORAL 3 TIMES DAILY
Status: DISCONTINUED | OUTPATIENT
Start: 2017-04-27 | End: 2017-04-28 | Stop reason: HOSPADM

## 2017-04-27 RX ORDER — METOPROLOL TARTRATE 50 MG/1
50 TABLET ORAL 2 TIMES DAILY
Status: DISCONTINUED | OUTPATIENT
Start: 2017-04-27 | End: 2017-04-28 | Stop reason: HOSPADM

## 2017-04-27 RX ADMIN — PANTOPRAZOLE SODIUM 40 MG: 40 TABLET, DELAYED RELEASE ORAL at 06:56

## 2017-04-27 RX ADMIN — AMIODARONE HYDROCHLORIDE 400 MG: 200 TABLET ORAL at 09:02

## 2017-04-27 RX ADMIN — GLIPIZIDE 10 MG: 5 TABLET ORAL at 06:56

## 2017-04-27 RX ADMIN — METOPROLOL TARTRATE 50 MG: 50 TABLET ORAL at 22:02

## 2017-04-27 RX ADMIN — AMIODARONE HYDROCHLORIDE 400 MG: 200 TABLET ORAL at 22:02

## 2017-04-27 RX ADMIN — INSULIN LISPRO 5 UNITS: 100 INJECTION, SOLUTION INTRAVENOUS; SUBCUTANEOUS at 12:25

## 2017-04-27 RX ADMIN — GLIPIZIDE 10 MG: 5 TABLET ORAL at 17:43

## 2017-04-27 RX ADMIN — METFORMIN HYDROCHLORIDE 250 MG: 500 TABLET, FILM COATED ORAL at 09:02

## 2017-04-27 RX ADMIN — DILTIAZEM HYDROCHLORIDE 30 MG: 30 TABLET, FILM COATED ORAL at 06:56

## 2017-04-27 RX ADMIN — ATORVASTATIN CALCIUM 40 MG: 40 TABLET, FILM COATED ORAL at 09:01

## 2017-04-27 RX ADMIN — ASPIRIN 81 MG CHEWABLE TABLET 81 MG: 81 TABLET CHEWABLE at 09:02

## 2017-04-27 RX ADMIN — HYDROCHLOROTHIAZIDE 25 MG: 25 TABLET ORAL at 09:01

## 2017-04-27 RX ADMIN — APIXABAN 2.5 MG: 2.5 TABLET, FILM COATED ORAL at 09:01

## 2017-04-27 RX ADMIN — METFORMIN HYDROCHLORIDE 250 MG: 500 TABLET, FILM COATED ORAL at 17:43

## 2017-04-27 RX ADMIN — INSULIN GLARGINE 10 UNITS: 100 INJECTION, SOLUTION SUBCUTANEOUS at 09:03

## 2017-04-27 RX ADMIN — VITAMIN D, TAB 1000IU (100/BT) 2000 UNITS: 25 TAB at 09:02

## 2017-04-27 RX ADMIN — ISOSORBIDE MONONITRATE 60 MG: 60 TABLET, EXTENDED RELEASE ORAL at 17:43

## 2017-04-27 RX ADMIN — ENALAPRIL MALEATE 10 MG: 5 TABLET ORAL at 09:09

## 2017-04-27 RX ADMIN — INSULIN LISPRO 2 UNITS: 100 INJECTION, SOLUTION INTRAVENOUS; SUBCUTANEOUS at 17:44

## 2017-04-27 RX ADMIN — AMIODARONE HYDROCHLORIDE 400 MG: 200 TABLET ORAL at 17:43

## 2017-04-27 RX ADMIN — ENALAPRIL MALEATE 10 MG: 5 TABLET ORAL at 17:43

## 2017-04-27 RX ADMIN — ISOSORBIDE MONONITRATE 60 MG: 60 TABLET, EXTENDED RELEASE ORAL at 09:02

## 2017-04-27 RX ADMIN — APIXABAN 5 MG: 5 TABLET, FILM COATED ORAL at 17:43

## 2017-04-27 RX ADMIN — METOPROLOL TARTRATE 50 MG: 50 TABLET ORAL at 09:03

## 2017-04-27 NOTE — PROGRESS NOTES
[facility] Progress Note         NAME:  Siena Black   :   1935   MRN:   [id number]     Assessment/Plan:  1) cp sob : Ruled out for mi       - she feels ok at rest        - nuclear stress test with no ischemia or mi        - echo with normal EF        - no additional interventions for now. - Sob likely related top AF. Also chest ct ok   2)AF: of unknown duration        - Patient would now like to have CV done. Again discussed risks for POP and CV. Patient states understanding and would like to proceed. - eliquis 2.5 mg BID       - Amiodarone started 400 mg TID to load - Discussed potential side effects and risks. Patient understands need for use with CV and agrees       - Cardizem stopped       - Lopressor down to 50 mg  3)elevated wbcs , no fever no uti and normal chest ct unclear etiology at this time       - WBC down to 12.3  4)hyponatremia: decrease hctz and follow for now Na 131. Patient would opts for CV on this admission. As above, discussed risks and benefits of both POP and CV. Also discussed risks and side effects of Amiodarone use. Patient understands and would like to process with POP/CV and Amiodarone. Will post for tomorrow around noon time. NPO after midnight. I have seen and examined the patient and agree with N.P. assessment. Discussed at length with patient again options  She wants to proceed with POP/cardioversion  Risks and benefits of pop cardioversion explained risks including but not limited to death cva PPM emergently esophageal perforation, sore throat, vocal chords damage bleeding  Also discussed potential multiple side effects of amiodarone  Stop hctz iv fluids and follow labs     Subjective:   Cardiac ROS: Patient denies any exertional chest pain, dyspnea, palpitations, syncope, orthopnea, edema or paroxysmal nocturnal dyspnea.     Review of Systems:   Pertinent items are noted in the History of Present Illness. Objective:       04/27 0701 - 04/27 1900  In: 240 [P.O.:240]  Out: 250 [Urine:250]    04/25 1901 - 04/27 0700  In: 1180 [P.O.:1180]  Out: 1200 [Urine:1200]    Telemetry: AFIB    Physical Exam:  Visit Vitals    /68 (BP Patient Position: Sitting)    Pulse 94    Temp 97.6 °F (36.4 °C)    Resp 18    Ht 5' 3\" (1.6 m)    Wt 74.4 kg (164 lb 0.4 oz)    SpO2 96%    BMI 29.06 kg/m2       Neck: no JVD  Heart: irregularly irregular rhythm  Lungs: clear to auscultation bilaterally  Abdomen: soft, non-tender. Bowel sounds normal. No masses,  no organomegaly  Extremities: edema trace    Additional comments: None    Care Plan discussed with:    Comments   Patient x    Family      RN x    Care Manager                    Consultant:        Data Review:     No results for input(s): TNIPOC in the last 72 hours. No lab exists for component: ITNL   Recent Labs      04/24/17   1435   TROIQ  <0.04     Recent Labs      04/27/17   0304  04/26/17   0355  04/25/17   0353   NA  130*  131*  130*   K  3.8  3.9  4.4   CL  95*  97  96*   CO2  30  28  27   BUN  21*  17  21*   CREA  1.21*  1.17*  1.15*   GLU  132*  93  227*   MG  1.7  1.7  2.0   WBC   --   12.3*  15.1*   HGB   --   12.0  11.4*   HCT   --   35.9  34.9*   PLT   --   267  260     No results for input(s): INR, PTP, APTT in the last 72 hours.     No lab exists for component: INREXT, INREXT    Medications reviewed  Current Facility-Administered Medications   Medication Dose Route Frequency    metoprolol tartrate (LOPRESSOR) tablet 50 mg  50 mg Oral BID    amiodarone (CORDARONE) tablet 400 mg  400 mg Oral TID    aspirin chewable tablet 81 mg  81 mg Oral DAILY    hydroCHLOROthiazide (HYDRODIURIL) tablet 25 mg  25 mg Oral DAILY    apixaban (ELIQUIS) tablet 2.5 mg  2.5 mg Oral BID    insulin lispro (HUMALOG) injection   SubCUTAneous AC&HS    glucose chewable tablet 16 g  4 Tab Oral PRN    dextrose (D50W) injection syrg 12.5-25 g  12.5-25 g IntraVENous PRN    glucagon (GLUCAGEN) injection 1 mg  1 mg IntraMUSCular PRN    atorvastatin (LIPITOR) tablet 40 mg  40 mg Oral DAILY    cholecalciferol (VITAMIN D3) tablet 2,000 Units  2,000 Units Oral DAILY    enalapril (VASOTEC) tablet 10 mg  10 mg Oral BID    glipiZIDE (GLUCOTROL) tablet 10 mg  10 mg Oral ACB&D    insulin glargine (LANTUS) injection 10 Units  10 Units SubCUTAneous DAILY AFTER BREAKFAST    isosorbide mononitrate ER (IMDUR) tablet 60 mg  60 mg Oral BID    metFORMIN (GLUCOPHAGE) tablet 250 mg  250 mg Oral BID WITH MEALS    nitroglycerin (NITROSTAT) tablet 0.4 mg  0.4 mg SubLINGual Q5MIN PRN    pantoprazole (PROTONIX) tablet 40 mg  40 mg Oral ACB       Data Reviewed: current meds, labs,recent radiology, intake/output/weight and problem list reviewed    Ailin Murdock MD

## 2017-04-27 NOTE — PROGRESS NOTES
1930: Bedside and Verbal shift change report given to Nadeen Paula RN (oncoming nurse) by Lane Zimmerman RN (offgoing nurse). Report included the following information SBAR, Kardex, Intake/Output, MAR, Recent Results and Cardiac Rhythm Afib   Uneventful shift. 0730: Bedside and Verbal shift change report given to Violeta Garcia RN (oncoming nurse) by Laure Marie RN (offgoing nurse). Report included the following information SBAR, Kardex, Intake/Output, MAR, Recent Results and Cardiac Rhythm Afib.

## 2017-04-27 NOTE — PROGRESS NOTES
Problem: Falls - Risk of  Goal: *Absence of falls  Outcome: Progressing Towards Goal  Patient free of falls at this time. Call bell within reach.      Problem: Pressure Ulcer - Risk of  Goal: *Prevention of pressure ulcer  Outcome: Progressing Towards Goal    04/26/17 2045   Wound Prevention and Protection Methods   Orientation of Wound Prevention Posterior   Location of Wound Prevention Sacrum/Coccyx   Dressing Present  No   Read Only, Retired: Wound Treatment (non-mechanical)   Wound Offloading (Prevention Methods) Bed, pressure redistribution/air;Bed, pressure reduction mattress;Repositioning;Turning;Pillows

## 2017-04-28 ENCOUNTER — ANESTHESIA EVENT (OUTPATIENT)
Dept: NON INVASIVE DIAGNOSTICS | Age: 82
DRG: 309 | End: 2017-04-28
Payer: MEDICARE

## 2017-04-28 ENCOUNTER — ANESTHESIA (OUTPATIENT)
Dept: NON INVASIVE DIAGNOSTICS | Age: 82
DRG: 309 | End: 2017-04-28
Payer: MEDICARE

## 2017-04-28 VITALS
TEMPERATURE: 98.1 F | RESPIRATION RATE: 16 BRPM | WEIGHT: 165.34 LBS | HEART RATE: 65 BPM | HEIGHT: 63 IN | SYSTOLIC BLOOD PRESSURE: 135 MMHG | OXYGEN SATURATION: 97 % | BODY MASS INDEX: 29.3 KG/M2 | DIASTOLIC BLOOD PRESSURE: 58 MMHG

## 2017-04-28 LAB
ANION GAP BLD CALC-SCNC: 9 MMOL/L (ref 5–15)
BUN SERPL-MCNC: 30 MG/DL (ref 6–20)
BUN/CREAT SERPL: 22 (ref 12–20)
CALCIUM SERPL-MCNC: 8.8 MG/DL (ref 8.5–10.1)
CHLORIDE SERPL-SCNC: 95 MMOL/L (ref 97–108)
CO2 SERPL-SCNC: 26 MMOL/L (ref 21–32)
CREAT SERPL-MCNC: 1.35 MG/DL (ref 0.55–1.02)
ERYTHROCYTE [DISTWIDTH] IN BLOOD BY AUTOMATED COUNT: 14 % (ref 11.5–14.5)
GLUCOSE BLD STRIP.AUTO-MCNC: 168 MG/DL (ref 65–100)
GLUCOSE BLD STRIP.AUTO-MCNC: 281 MG/DL (ref 65–100)
GLUCOSE SERPL-MCNC: 155 MG/DL (ref 65–100)
HCT VFR BLD AUTO: 32.7 % (ref 35–47)
HGB BLD-MCNC: 11.1 G/DL (ref 11.5–16)
MAGNESIUM SERPL-MCNC: 1.7 MG/DL (ref 1.6–2.4)
MCH RBC QN AUTO: 26.3 PG (ref 26–34)
MCHC RBC AUTO-ENTMCNC: 33.9 G/DL (ref 30–36.5)
MCV RBC AUTO: 77.5 FL (ref 80–99)
PLATELET # BLD AUTO: 245 K/UL (ref 150–400)
POTASSIUM SERPL-SCNC: 4 MMOL/L (ref 3.5–5.1)
RBC # BLD AUTO: 4.22 M/UL (ref 3.8–5.2)
SERVICE CMNT-IMP: ABNORMAL
SERVICE CMNT-IMP: ABNORMAL
SODIUM SERPL-SCNC: 130 MMOL/L (ref 136–145)
WBC # BLD AUTO: 11.4 K/UL (ref 3.6–11)

## 2017-04-28 PROCEDURE — 93325 DOPPLER ECHO COLOR FLOW MAPG: CPT

## 2017-04-28 PROCEDURE — 82962 GLUCOSE BLOOD TEST: CPT

## 2017-04-28 PROCEDURE — 92960 CARDIOVERSION ELECTRIC EXT: CPT

## 2017-04-28 PROCEDURE — 74011250637 HC RX REV CODE- 250/637: Performed by: NURSE PRACTITIONER

## 2017-04-28 PROCEDURE — 83735 ASSAY OF MAGNESIUM: CPT | Performed by: SPECIALIST

## 2017-04-28 PROCEDURE — 5A2204Z RESTORATION OF CARDIAC RHYTHM, SINGLE: ICD-10-PCS | Performed by: SPECIALIST

## 2017-04-28 PROCEDURE — B246ZZ4 ULTRASONOGRAPHY OF RIGHT AND LEFT HEART, TRANSESOPHAGEAL: ICD-10-PCS | Performed by: SPECIALIST

## 2017-04-28 PROCEDURE — 74011636637 HC RX REV CODE- 636/637: Performed by: NURSE PRACTITIONER

## 2017-04-28 PROCEDURE — 85027 COMPLETE CBC AUTOMATED: CPT | Performed by: SPECIALIST

## 2017-04-28 PROCEDURE — 36415 COLL VENOUS BLD VENIPUNCTURE: CPT | Performed by: SPECIALIST

## 2017-04-28 PROCEDURE — 74011250637 HC RX REV CODE- 250/637: Performed by: PHYSICIAN ASSISTANT

## 2017-04-28 PROCEDURE — 93005 ELECTROCARDIOGRAM TRACING: CPT

## 2017-04-28 PROCEDURE — 74011250637 HC RX REV CODE- 250/637: Performed by: SPECIALIST

## 2017-04-28 PROCEDURE — 80048 BASIC METABOLIC PNL TOTAL CA: CPT | Performed by: SPECIALIST

## 2017-04-28 PROCEDURE — 74011250636 HC RX REV CODE- 250/636

## 2017-04-28 RX ORDER — AMIODARONE HYDROCHLORIDE 400 MG/1
400 TABLET ORAL 3 TIMES DAILY
Qty: 60 TAB | Refills: 3 | Status: SHIPPED | OUTPATIENT
Start: 2017-04-28 | End: 2017-05-02 | Stop reason: DRUGHIGH

## 2017-04-28 RX ORDER — PROPOFOL 10 MG/ML
INJECTION, EMULSION INTRAVENOUS AS NEEDED
Status: DISCONTINUED | OUTPATIENT
Start: 2017-04-28 | End: 2017-04-28 | Stop reason: HOSPADM

## 2017-04-28 RX ORDER — SODIUM CHLORIDE 9 MG/ML
INJECTION, SOLUTION INTRAVENOUS
Status: DISCONTINUED | OUTPATIENT
Start: 2017-04-28 | End: 2017-04-28 | Stop reason: HOSPADM

## 2017-04-28 RX ORDER — FENTANYL CITRATE 50 UG/ML
25-200 INJECTION, SOLUTION INTRAMUSCULAR; INTRAVENOUS
Status: DISCONTINUED | OUTPATIENT
Start: 2017-04-28 | End: 2017-04-28 | Stop reason: HOSPADM

## 2017-04-28 RX ORDER — METOPROLOL TARTRATE 50 MG/1
50 TABLET ORAL 2 TIMES DAILY
Qty: 60 TAB | Refills: 3 | Status: SHIPPED | OUTPATIENT
Start: 2017-04-28 | End: 2017-05-02 | Stop reason: SDUPTHER

## 2017-04-28 RX ORDER — AMIODARONE HYDROCHLORIDE 400 MG/1
400 TABLET ORAL 3 TIMES DAILY
Qty: 30 TAB | Refills: 1 | Status: SHIPPED | OUTPATIENT
Start: 2017-04-28 | End: 2017-05-02

## 2017-04-28 RX ORDER — AMIODARONE HYDROCHLORIDE 400 MG/1
400 TABLET ORAL 3 TIMES DAILY
Qty: 30 TAB | Refills: 1 | Status: SHIPPED | OUTPATIENT
Start: 2017-04-28 | End: 2017-04-28

## 2017-04-28 RX ORDER — ATROPINE SULFATE 0.1 MG/ML
1 INJECTION INTRAVENOUS AS NEEDED
Status: DISCONTINUED | OUTPATIENT
Start: 2017-04-28 | End: 2017-04-28 | Stop reason: HOSPADM

## 2017-04-28 RX ORDER — SODIUM CHLORIDE 0.9 % (FLUSH) 0.9 %
10 SYRINGE (ML) INJECTION AS NEEDED
Status: DISCONTINUED | OUTPATIENT
Start: 2017-04-28 | End: 2017-04-28 | Stop reason: HOSPADM

## 2017-04-28 RX ORDER — GUAIFENESIN 100 MG/5ML
81 LIQUID (ML) ORAL DAILY
Qty: 30 TAB | Refills: 3 | Status: SHIPPED | OUTPATIENT
Start: 2017-04-28 | End: 2018-01-30 | Stop reason: SDUPTHER

## 2017-04-28 RX ORDER — MIDAZOLAM HYDROCHLORIDE 1 MG/ML
.5-1 INJECTION, SOLUTION INTRAMUSCULAR; INTRAVENOUS
Status: DISCONTINUED | OUTPATIENT
Start: 2017-04-28 | End: 2017-04-28 | Stop reason: HOSPADM

## 2017-04-28 RX ADMIN — PROPOFOL 60 MG: 10 INJECTION, EMULSION INTRAVENOUS at 08:28

## 2017-04-28 RX ADMIN — INSULIN GLARGINE 10 UNITS: 100 INJECTION, SOLUTION SUBCUTANEOUS at 10:39

## 2017-04-28 RX ADMIN — GLIPIZIDE 10 MG: 5 TABLET ORAL at 15:30

## 2017-04-28 RX ADMIN — AMIODARONE HYDROCHLORIDE 400 MG: 200 TABLET ORAL at 07:21

## 2017-04-28 RX ADMIN — METFORMIN HYDROCHLORIDE 250 MG: 500 TABLET, FILM COATED ORAL at 10:39

## 2017-04-28 RX ADMIN — VITAMIN D, TAB 1000IU (100/BT) 2000 UNITS: 25 TAB at 10:39

## 2017-04-28 RX ADMIN — SODIUM CHLORIDE: 9 INJECTION, SOLUTION INTRAVENOUS at 08:10

## 2017-04-28 RX ADMIN — AMIODARONE HYDROCHLORIDE 400 MG: 200 TABLET ORAL at 15:30

## 2017-04-28 RX ADMIN — ISOSORBIDE MONONITRATE 60 MG: 60 TABLET, EXTENDED RELEASE ORAL at 07:22

## 2017-04-28 RX ADMIN — METOPROLOL TARTRATE 50 MG: 50 TABLET ORAL at 07:22

## 2017-04-28 RX ADMIN — GLIPIZIDE 10 MG: 5 TABLET ORAL at 10:39

## 2017-04-28 RX ADMIN — ATORVASTATIN CALCIUM 40 MG: 40 TABLET, FILM COATED ORAL at 10:39

## 2017-04-28 RX ADMIN — ASPIRIN 81 MG CHEWABLE TABLET 81 MG: 81 TABLET CHEWABLE at 10:39

## 2017-04-28 RX ADMIN — INSULIN LISPRO 5 UNITS: 100 INJECTION, SOLUTION INTRAVENOUS; SUBCUTANEOUS at 12:50

## 2017-04-28 RX ADMIN — APIXABAN 5 MG: 5 TABLET, FILM COATED ORAL at 07:22

## 2017-04-28 RX ADMIN — PANTOPRAZOLE SODIUM 40 MG: 40 TABLET, DELAYED RELEASE ORAL at 05:52

## 2017-04-28 RX ADMIN — ENALAPRIL MALEATE 10 MG: 5 TABLET ORAL at 07:21

## 2017-04-28 RX ADMIN — METFORMIN HYDROCHLORIDE 250 MG: 500 TABLET, FILM COATED ORAL at 15:30

## 2017-04-28 RX ADMIN — PROPOFOL 20 MG: 10 INJECTION, EMULSION INTRAVENOUS at 08:32

## 2017-04-28 NOTE — ANESTHESIA PREPROCEDURE EVALUATION
Anesthetic History   No history of anesthetic complications            Review of Systems / Medical History  Patient summary reviewed, nursing notes reviewed and pertinent labs reviewed    Pulmonary  Within defined limits                 Neuro/Psych   Within defined limits           Cardiovascular    Hypertension        Dysrhythmias : atrial fibrillation  CAD, PAD and cardiac stents      Comments: EF 55%   GI/Hepatic/Renal  Within defined limits              Endo/Other    Diabetes: type 2, using insulin         Other Findings            Physical Exam    Airway  Mallampati: III  TM Distance: 4 - 6 cm  Neck ROM: normal range of motion   Mouth opening: Normal     Cardiovascular  Regular rate and rhythm,  S1 and S2 normal,  no murmur, click, rub, or gallop             Dental  No notable dental hx       Pulmonary  Breath sounds clear to auscultation               Abdominal  GI exam deferred       Other Findings            Anesthetic Plan    ASA: 3  Anesthesia type: general          Induction: Intravenous  Anesthetic plan and risks discussed with: Patient

## 2017-04-28 NOTE — DISCHARGE SUMMARY
Cardiology Discharge Summary     Patient ID:  Gael Rodas  [id number]  57 y.o.  1935    Admit Date: 4/24/2017    Discharge Date: 04/28/17    Admitting Physician: Huyen May MD     Discharge Physician: same    Admission Diagnoses:   Atrial fibrillation [facility])    Discharge Diagnoses: Active Problems:    Atrial fibrillation ([address] (4/24/2017)      Discharge Condition: Good    Cardiology Procedures this Admission:  POP/Cardioversion    Consults: None    Hospital Course:   Admitted for new onset Afib. H/o CAD - patient r/o for MI with negative nuclear stress test.  [facility] [address]. Patient ultimately opted for POP with CV. Amiodarone started and POP and CV completed on 4/28    Successfully achieved sinus rhythm. Amiodarone continues through discharge. VSS and appropriate for discharge. I have seen and examined the patient and agree with PA assessment  Successful dc cardioversion in NSR  She will need taper of amiodarone  toprol decreased by half to avoid bradycardia  Asa to 81 mg daily  No issues with nOAC thus far      Visit Vitals    /58 (BP 1 Location: Left arm, BP Patient Position: Sitting)    Pulse 65    Temp 98.1 °F (36.7 °C)    Resp 16    Ht 5' 3\" (1.6 m)    Wt 75 kg (165 lb 5.5 oz)    SpO2 97%    BMI 29.29 kg/m2       Physical Exam  Abdomen: soft, non-tender.  Bowel sounds normal. No masses,  no organomegaly  Extremities: no LE edema, + PP bilaterally   Heart: regular rate and rhythm, S1, S2 normal, no murmurs, clicks, rubs or gallops  Lungs: clear to auscultation bilaterally  Neck: supple, symmetrical, trachea midline, no adenopathy, no JVD, no carotid bruits  Neurologic: Grossly normal  Pulses: 2+ and symmetrical    Labs:   Recent Labs      04/28/17   0512  04/26/17   0355   WBC  11.4*  12.3*   HGB  11.1*  12.0   HCT  32.7*  35.9   PLT  245  267     Recent Labs      04/28/17   0512  04/27/17   0304  04/26/17   0355   NA  130*  130*  131*   K  4.0 3.8  3.9   CL  95*  95*  97   CO2  26  30  28   GLU  155*  132*  93   BUN  30*  21*  17   CREA  1.35*  1.21*  1.17*   CA  8.8  8.7  9.5   MG  1.7  1.7  1.7       No results for input(s): TROIQ, CPK, CKMB in the last 72 hours. Disposition: home    Patient Instructions:   Discharge Medication List as of 4/28/2017  2:56 PM      START taking these medications    Details   apixaban (ELIQUIS) 5 mg tablet Take 1 Tab by mouth two (2) times a day. Indications: PREVENT THROMBOEMBOLISM IN CHRONIC ATRIAL FIBRILLATION, Normal, Disp-60 Tab, R-3      aspirin 81 mg chewable tablet Take 1 Tab by mouth daily. , Normal, Disp-30 Tab, R-3      amiodarone (PACERONE) 400 mg tablet Take 1 Tab by mouth three (3) times daily. , Normal, Disp-60 Tab, R-3         CONTINUE these medications which have CHANGED    Details   metoprolol tartrate (LOPRESSOR) 50 mg tablet Take 1 Tab by mouth two (2) times a day., Normal, Disp-60 Tab, R-3         CONTINUE these medications which have NOT CHANGED    Details   insulin glargine (LANTUS) 100 unit/mL injection 10 Units by SubCUTAneous route daily. , Historical Med      !! metFORMIN (GLUCOPHAGE) 500 mg tablet Take 250 mg by mouth two (2) times daily (with meals). , Historical Med      atorvastatin (LIPITOR) 40 mg tablet TAKE 1 TABLET DAILY, Normal, Disp-90 Tab, R-2      enalapril (VASOTEC) 20 mg tablet TAKE 1 TABLET TWICE A DAY, Print, Disp-180 Tab, R-3      hydroCHLOROthiazide (HYDRODIURIL) 50 mg tablet TAKE 1 TABLET DAILY, Print, Disp-90 Tab, R-3      isosorbide mononitrate ER (IMDUR) 60 mg CR tablet Take 1 Tab by mouth two (2) times a day., Print, Disp-180 Tab, R-3      !! metFORMIN (GLUCOPHAGE) 500 mg tablet Take 1 Tab by mouth two (2) times daily (with meals). , NormalResume from 3/14/14 onDisp-180 Tab, R-3      glipiZIDE (GLUCOTROL) 5 mg tablet Take 2 tabs bid, Normal, Disp-360 Tab, R-3      omeprazole (PRILOSEC) 20 mg capsule Take 1 Cap by mouth daily. , Normal, Disp-90 Cap, R-3      cholecalciferol, vitamin d3, (VITAMIN D) 1,000 unit tablet Take 2,000 Units by mouth daily. , Historical Med      nitroglycerin (NITROSTAT) 0.4 mg SL tablet 1 Tab by SubLINGual route every five (5) minutes as needed for Chest Pain., Print, Disp-25 Tab, R-3       !! - Potential duplicate medications found. Please discuss with provider. STOP taking these medications       aspirin (ASPIRIN) 325 mg tablet Comments:   Reason for Stopping:               Reference discharge instructions provided by nursing for diet and activity.     Follow-up with   Future Appointments  Date Time Provider Carly Mcgrath   5/3/2017 11:00 AM Daniel Marcum  [address] 6/7/2017 11:20 AM Daniel Marcum  [address] 7/19/2017 11:00 AM Jennifer Jc IV, MD [facility] SCHED       Signed:  Daniel Marcum MD

## 2017-04-28 NOTE — PROGRESS NOTES
Cardiac Cath Lab Procedure Area Arrival Note:    Keesha Morales arrived to Cardiac Cath Lab, Procedure Area. Patient identifiers verified with NAME and DATE OF BIRTH. Procedure verified with patient. Consent forms verified. Allergies verified. Patient informed of procedure and plan of care. Questions answered with review. Patient voiced understanding of procedure and plan of care. Patient on cardiac monitor, non-invasive blood pressure, SPO2 monitor. On   or O2 @ 2 lpm via NC.  IV of NS on pump at 50 ml/hr. Patient status doing well without problems. Patient is A&Ox 3. Patient reports no pain. Patient medicated during procedure with orders obtained and verified by Dr. Polina Valle. Refer to patients Cardiac Cath Lab PROCEDURE REPORT for vital signs, assessment, status, and response during procedure, printed at end of case. Printed report on chart or scanned into chart.

## 2017-04-28 NOTE — PROGRESS NOTES
Problem: Falls - Risk of  Goal: *Absence of falls  Outcome: Progressing Towards Goal  Belongings at bedside, call bell within reach. Bed in locked, low position. Wearing non skid footwear. Problem: Pressure Ulcer - Risk of  Goal: *Prevention of pressure ulcer  Outcome: Progressing Towards Goal  Able to turn self in bed, able to transfer from bed to chair with assistance. 0710: Report given to Nick in cath lab. Discussed meds. Will give BP meds, amiodarone, eliquis. 0800: Bedside shift change report given to Ju (oncoming nurse) by Carmel Dewitt (offgoing nurse). Report included the following information SBAR, Kardex, MAR and Recent Results.

## 2017-04-28 NOTE — ANESTHESIA POSTPROCEDURE EVALUATION
Post-Anesthesia Evaluation and Assessment    Patient: Birgit Flores MRN: [id number]  SSN: [id number]    YOB: 1935  Age: [de-identified] y.o. Sex: female       Cardiovascular Function/Vital Signs  Visit Vitals    /43 (BP 1 Location: Left arm, BP Patient Position: At rest)    Pulse (!) 58    Temp 36.7 °C (98.1 °F)    Resp 15    Ht 5' 3\" (1.6 m)    Wt 75 kg (165 lb 5.5 oz)    SpO2 96%    BMI 29.29 kg/m2       Patient is status post general anesthesia for * No procedures listed *. Nausea/Vomiting: None    Postoperative hydration reviewed and adequate. Pain:  Pain Scale 1: Numeric (0 - 10) (04/28/17 0930)  Pain Intensity 1: 0 (04/28/17 0930)   Managed    Neurological Status:   Neuro  Neurologic State: Alert (04/27/17 2000)  Orientation Level: Oriented X4 (04/27/17 2000)  Cognition: Appropriate for age attention/concentration; Appropriate safety awareness (04/27/17 2000)  Speech: Clear (04/27/17 2000)   At baseline    Mental Status and Level of Consciousness: Arousable    Pulmonary Status:   O2 Device: Room air (04/28/17 0930)   Adequate oxygenation and airway patent    Complications related to anesthesia: None    Post-anesthesia assessment completed.  No concerns    Signed By: Wild Ruggiero MD     April 28, 2017

## 2017-04-28 NOTE — PROGRESS NOTES
TRANSFER - IN REPORT:    Verbal report received from [facility], [address] [facility] on Michel Javier  being received from procedure for routine progression of care. Report consisted of patients Situation, Background, Assessment and Recommendations(SBAR). Information from the following report(s) SBAR, Procedure Summary, MAR and Recent Results was reviewed with the receiving clinician. Opportunity for questions and clarification was provided. Assessment completed upon patients arrival to [address] [place] [address] and care assumed. Cardiac Cath Lab Recovery Arrival Note:    Michel Javier arrived to [facility] recovery area. Patient procedure= POP / cardioversion. Patient on cardiac monitor, non-invasive blood pressure, SPO2 monitor. On O2 @ 2 lpm via nasal cannula. IV  of saline locked. Patient status doing well without problems. Patient is A&Ox 4. Patient reports no complaints. No change in patient status. Continue to monitor patient and status. EKG obtained as ordered. Dr. Roly Christensen at bedside to talk to patient.

## 2017-04-28 NOTE — PROCEDURES
Cardiac Catheterization Procedure Note   Patient: Steven Pruitt  MRN: [id number]  SSN: [id number]   YOB: 1935 Age: [de-identified] y.o.   Sex: female    Date of Procedure: 4/28/2017   Pre-procedure Diagnosis: Atrial Fibrillation/Atrial Flutter  Post-procedure Diagnosis: No Cardiac Source of Embolism  Procedure: POP and Cardioversion  :  Dr. Sarmad Guevara MD    Assistant(s):  None  Anesthesia: Moderate Sedation   Estimated Blood Loss: Less than 10 mL   Specimens Removed: None  Findings:   POP with normal EF mild mr and TR no evidence for gross intracardiac thrombi  Cardioversion: 150 joules were delivered in a synch fashion with restoration of NSR  Continue metoprolol and amiodarone  Discussed again with her potential side effects of amiodarone at length  Complications: None   Implants:  None  Signed by:  Sarmad Guevara MD  4/28/2017  8:43 AM

## 2017-04-28 NOTE — PROGRESS NOTES
0800 - received bedside report from [employer], per Junior Watters - patient is in the cath lab.  1021 - received patient back from cath lab, tele in place and verified with monitor tech, vss, assessment completed. 26 - received discharge orders, reviewed discharge instructions and medications, no further questions, tele and piv removed per hospital policy, discharged home with family.

## 2017-04-28 NOTE — PROGRESS NOTES
TRANSFER - IN REPORT:    Verbal report received from Leanna Saldivar RN(name) on Chase Cherry  being received from [facility](unit) for ordered procedure      Report consisted of patients Situation, Background, Assessment and   Recommendations(SBAR). Information from the following report(s) SBAR, MAR, Recent Results and Cardiac Rhythm atrial fibrillation    was reviewed with the receiving nurse. Opportunity for questions and clarification was provided. Assessment completed upon patients arrival to unit and care assumed.

## 2017-04-28 NOTE — PROGRESS NOTES
TRANSFER - OUT REPORT:    Verbal report given to PIPER Dodd(name) on Keesha Morales  being transferred to [facility](unit) for routine progression of care       Report consisted of patients Situation, Background, Assessment and   Recommendations(SBAR). Information from the following report(s) SBAR, Procedure Summary, MAR and Recent Results was reviewed with the receiving nurse. Lines:   Peripheral IV 04/24/17 Right Forearm (Active)   Site Assessment Clean, dry, & intact 4/28/2017  8:43 AM   Phlebitis Assessment 0 4/28/2017  8:43 AM   Infiltration Assessment 0 4/28/2017  8:43 AM   Dressing Status Clean, dry, & intact 4/28/2017  8:43 AM   Dressing Type Tape;Transparent 4/28/2017  8:43 AM   Hub Color/Line Status Blue;Capped 4/28/2017  8:43 AM   Action Taken Open ports on tubing capped 4/27/2017  8:00 PM   Alcohol Cap Used Yes 4/27/2017  8:00 PM        Opportunity for questions and clarification was provided.       Patient transported with:   Monitor  Registered Nurse

## 2017-04-29 ENCOUNTER — TELEPHONE (OUTPATIENT)
Dept: CARDIOLOGY CLINIC | Age: 82
End: 2017-04-29

## 2017-04-29 LAB
ATRIAL RATE: 61 BPM
CALCULATED P AXIS, ECG09: 30 DEGREES
CALCULATED R AXIS, ECG10: 3 DEGREES
CALCULATED T AXIS, ECG11: 29 DEGREES
DIAGNOSIS, 93000: NORMAL
P-R INTERVAL, ECG05: 170 MS
Q-T INTERVAL, ECG07: 470 MS
QRS DURATION, ECG06: 90 MS
QTC CALCULATION (BEZET), ECG08: 473 MS
VENTRICULAR RATE, ECG03: 61 BPM

## 2017-04-29 NOTE — TELEPHONE ENCOUNTER
Called on call doc  Id x 2  Mild burning but not pain with cant catch breath  Very mild  No cp, had ct chest in hospital unremarkable  Stressed about meds  Does not sound like allergic rexn  bp 160 and HR 81   Has no tachycardia , palpitations or sense of arrythmia   No edema, SOB at rest   Very specifically denies feeling faint or cp  Advised to keep appt with Dr Jennifer Barlow   If worse to ER  In meantime reduce amio to 400 mg once day from TID  [facility] call her Monday

## 2017-05-01 ENCOUNTER — APPOINTMENT (OUTPATIENT)
Dept: GENERAL RADIOLOGY | Age: 82
End: 2017-05-01
Attending: NURSE PRACTITIONER
Payer: MEDICARE

## 2017-05-01 ENCOUNTER — HOSPITAL ENCOUNTER (EMERGENCY)
Age: 82
Discharge: HOME | End: 2017-05-01
Attending: EMERGENCY MEDICINE | Admitting: EMERGENCY MEDICINE
Payer: MEDICARE

## 2017-05-01 VITALS
SYSTOLIC BLOOD PRESSURE: 163 MMHG | TEMPERATURE: 98.2 F | DIASTOLIC BLOOD PRESSURE: 60 MMHG | HEIGHT: 63 IN | RESPIRATION RATE: 15 BRPM | WEIGHT: 162 LBS | HEART RATE: 63 BPM | BODY MASS INDEX: 28.7 KG/M2 | OXYGEN SATURATION: 99 %

## 2017-05-01 DIAGNOSIS — I10 ESSENTIAL HYPERTENSION: Primary | ICD-10-CM

## 2017-05-01 LAB
ALBUMIN SERPL BCP-MCNC: 3.7 G/DL (ref 3.5–5)
ALBUMIN/GLOB SERPL: 1 {RATIO} (ref 1.1–2.2)
ALP SERPL-CCNC: 112 U/L (ref 45–117)
ALT SERPL-CCNC: 36 U/L (ref 12–78)
ANION GAP BLD CALC-SCNC: 7 MMOL/L (ref 5–15)
AST SERPL W P-5'-P-CCNC: 25 U/L (ref 15–37)
BASOPHILS # BLD AUTO: 0 K/UL (ref 0–0.1)
BASOPHILS # BLD: 0 % (ref 0–1)
BILIRUB SERPL-MCNC: 0.6 MG/DL (ref 0.2–1)
BUN SERPL-MCNC: 20 MG/DL (ref 6–20)
BUN/CREAT SERPL: 16 (ref 12–20)
CALCIUM SERPL-MCNC: 8.8 MG/DL (ref 8.5–10.1)
CHLORIDE SERPL-SCNC: 96 MMOL/L (ref 97–108)
CK SERPL-CCNC: 64 U/L (ref 26–192)
CO2 SERPL-SCNC: 28 MMOL/L (ref 21–32)
CREAT SERPL-MCNC: 1.28 MG/DL (ref 0.55–1.02)
EOSINOPHIL # BLD: 0.4 K/UL (ref 0–0.4)
EOSINOPHIL NFR BLD: 3 % (ref 0–7)
ERYTHROCYTE [DISTWIDTH] IN BLOOD BY AUTOMATED COUNT: 14 % (ref 11.5–14.5)
GLOBULIN SER CALC-MCNC: 3.6 G/DL (ref 2–4)
GLUCOSE SERPL-MCNC: 134 MG/DL (ref 65–100)
HCT VFR BLD AUTO: 35.2 % (ref 35–47)
HGB BLD-MCNC: 11.6 G/DL (ref 11.5–16)
LYMPHOCYTES # BLD AUTO: 22 % (ref 12–49)
LYMPHOCYTES # BLD: 2.6 K/UL (ref 0.8–3.5)
MCH RBC QN AUTO: 26 PG (ref 26–34)
MCHC RBC AUTO-ENTMCNC: 33 G/DL (ref 30–36.5)
MCV RBC AUTO: 78.7 FL (ref 80–99)
MONOCYTES # BLD: 0.9 K/UL (ref 0–1)
MONOCYTES NFR BLD AUTO: 8 % (ref 5–13)
NEUTS SEG # BLD: 7.7 K/UL (ref 1.8–8)
NEUTS SEG NFR BLD AUTO: 67 % (ref 32–75)
PLATELET # BLD AUTO: 284 K/UL (ref 150–400)
POTASSIUM SERPL-SCNC: 4 MMOL/L (ref 3.5–5.1)
PROT SERPL-MCNC: 7.3 G/DL (ref 6.4–8.2)
RBC # BLD AUTO: 4.47 M/UL (ref 3.8–5.2)
SODIUM SERPL-SCNC: 131 MMOL/L (ref 136–145)
TROPONIN I SERPL-MCNC: <0.04 NG/ML
WBC # BLD AUTO: 11.6 K/UL (ref 3.6–11)

## 2017-05-01 PROCEDURE — 82550 ASSAY OF CK (CPK): CPT | Performed by: EMERGENCY MEDICINE

## 2017-05-01 PROCEDURE — 80053 COMPREHEN METABOLIC PANEL: CPT | Performed by: EMERGENCY MEDICINE

## 2017-05-01 PROCEDURE — 85025 COMPLETE CBC W/AUTO DIFF WBC: CPT | Performed by: EMERGENCY MEDICINE

## 2017-05-01 PROCEDURE — 84484 ASSAY OF TROPONIN QUANT: CPT | Performed by: EMERGENCY MEDICINE

## 2017-05-01 PROCEDURE — 36415 COLL VENOUS BLD VENIPUNCTURE: CPT | Performed by: EMERGENCY MEDICINE

## 2017-05-01 PROCEDURE — 71020 XR CHEST PA LAT: CPT

## 2017-05-01 PROCEDURE — 93005 ELECTROCARDIOGRAM TRACING: CPT

## 2017-05-01 PROCEDURE — 99284 EMERGENCY DEPT VISIT MOD MDM: CPT

## 2017-05-01 NOTE — ED NOTES
Shift change report given to Buster Mendoza RN(oncoming nurse) by Declan Navarro RN (offgoing nurse). Report included the following information SBAR, ED Summary, MAR and Recent Results.

## 2017-05-01 NOTE — TELEPHONE ENCOUNTER
Verified patient with two patient identifiers. Spoke with patient to check on how she is doing. She said that she is feeling about the same no worse. If symptoms gets worse she will go to ED and will keep pending appointment.

## 2017-05-01 NOTE — ED PROVIDER NOTES
HPI Comments: This is an [de-identified] y/o female who presents to the ED via private vehicle with a cc of elevated blood pressure. Patient with PMHx DM, hyperlipidemia, HTN, CVA, CAD, PVD s/p R fem-pop bypass 2/2017, atrial fibrillation. She was admitted here 1 week ago with new onset atrial fibrillation, was r/o for MI with negative nuclear stress test, she had POP with cardioversion and was d/c home 4 days ago. She is taking Eliquis; her toprol was decreased by half to avoid bradycardia and she was started on amoiodarone. She notes increased BP today. She went to the pharmacy around noon and had her BP checked and systolic was 113. She went home and took extra Lopressor and also Norvasc (which she had taken previously but was recently discontinued). She states that she has had some discomfort around her lower chest with breathing since being in the hospital with intermittent dyspnea. She last had this around noon today. She denies CP or dyspnea currently. She denies cough, fever. She notes that she is s/p R fem-pop bypass 2/2017 and has had some swelling in her R leg since the procedure which her surgeon has told her is normal post operatively (states she saw Dr. Geo Deleon 2 weeks ago), denies any acute changes or pain in her LE. She denies HA, cough, fever, hemoptysis. SocHX: former smoker, no alcohol use    Patient is a [de-identified] y.o. female presenting with hypertension. Hypertension    Associated symptoms include shortness of breath. Pertinent negatives include no vomiting.         Past Medical History:   Diagnosis Date    Atrial fibrillation ([address]     CAD (coronary artery disease)     Cerebrovascular accident stroke, other, unspec 11/18/2010    Coronary atherosclerosis of native coronary artery 11/18/2010    Essential hypertension     Essential hypertension, benign 11/18/2010    HLD (hyperlipidemia) 11/18/2010    PVD (peripheral vascular disease) ([address] 2017    Type II or unspecified type diabetes mellitus without mention of complication, not stated as uncontrolled 11/18/2010       Past Surgical History:   Procedure Laterality Date    HX CORONARY STENT PLACEMENT      HX HEART CATHETERIZATION      HX HYSTERECTOMY  1972         History reviewed. No pertinent family history. Social History     Social History    Marital status:      Spouse name: N/A    Number of children: N/A    Years of education: N/A     Occupational History    Not on file. Social History Main Topics    Smoking status: Former Smoker     Packs/day: 0.80     Years: 20.00     Types: Cigarettes     Quit date: 4/18/1986    Smokeless tobacco: Never Used    Alcohol use No    Drug use: No    Sexual activity: Not on file     Other Topics Concern    Not on file     Social History Narrative         ALLERGIES: Codeine; Keflin; Pcn [penicillins]; and Tramadol    Review of Systems   Constitutional: Negative for fever. Respiratory: Positive for shortness of breath. Negative for cough. Gastrointestinal: Negative for vomiting. Allergic/Immunologic: Negative for immunocompromised state. 10 systems reviewed and are negative except as indicated in the HPI. Vitals:    05/01/17 1819   BP: (!) 211/76   Pulse: 66   Resp: 16   Temp: 98.2 °F (36.8 °C)   SpO2: 95%   Weight: 73.5 kg (162 lb)   Height: 5' 3\" (1.6 m)            Physical Exam   Constitutional: She is oriented to person, place, and time. She appears well-developed and well-nourished. No distress. HENT:   Head: Normocephalic and atraumatic. Nose: Nose normal.   Mouth/Throat: Oropharynx is clear and moist.   Eyes: Conjunctivae and EOM are normal. Pupils are equal, round, and reactive to light. Right eye exhibits no discharge. Left eye exhibits no discharge. Neck: Normal range of motion. Neck supple. Cardiovascular: Normal rate and regular rhythm. Pulmonary/Chest: Effort normal and breath sounds normal. No respiratory distress. She has no wheezes. She has no rales. Abdominal: Soft. She exhibits no distension. There is no tenderness. There is no rebound and no guarding. Musculoskeletal:   Bilateral lower extremities warm, dry, equal color and temperature  RLE: Dp pulse easily palpated, mild distal lower extremity swelling without tenderness or palpable cords  LLE: warm, dry, no edema, no tenderness, appears to have 1+ palpable Dp pulse - will confirm with Doppler   Neurological: She is alert and oriented to person, place, and time. She has normal strength. She displays no atrophy and no tremor. She exhibits normal muscle tone. She displays no seizure activity. GCS eye subscore is 4. GCS verbal subscore is 5. GCS motor subscore is 6. CN II-XII intact   strong and equal  Normal strength  No focal neurologic deficit     Skin: Skin is warm and dry. No rash noted. She is not diaphoretic. No erythema. Psychiatric: She has a normal mood and affect. Her behavior is normal. Judgment and thought content normal.   Nursing note and vitals reviewed. MDM  Number of Diagnoses or Management Options  Essential hypertension:      Amount and/or Complexity of Data Reviewed  Clinical lab tests: reviewed and ordered  Tests in the radiology section of CPT®: ordered and reviewed  Discuss the patient with other providers: yes (Dr. Denzel Lopez)  Independent visualization of images, tracings, or specimens: yes      ED Course     [de-identified] y/o female with elevated blood pressure. Also with dyspnea on and off, currently denies dyspnea. Denies CP currently. She is currently taking Eliquis and is not tachypnea or hypoxic, not tachycardic, as noted, no sx currently; EKG non ischemic; doubt ACS, doubt PE. CXR reviewed with Dr. Denzel Lopez, small left effusion. BP improved here without intervention. Pt has appt with Dr. Vidya Arevalo tomorrow AM and was advised to keep this appt. Pt d/c'ed home in stable condition. Pt also evaluated by ED attending Dr. Denzel Lopez.       EKG: sinus rhythm, rate 61, normal axis, no ST changes overall impression NSR      XR Results (most recent):    Results from Hospital Encounter encounter on 05/01/17   XR CHEST PA LAT   Narrative Exam:  2 view chest    Indication: Dyspnea    PA and lateral views demonstrate normal heart size. There is a small left  pleural effusion with underlying atelectasis. Degenerative changes are seen in  the thoracic spine and shoulder joints bilaterally. Impression Impression: Small left effusion with underlying atelectasis.             Visit Vitals    /60 (BP 1 Location: Right arm, BP Patient Position: At rest)    Pulse 63    Temp 98.2 °F (36.8 °C)    Resp 15    Ht 5' 3\" (1.6 m)    Wt 73.5 kg (162 lb)    SpO2 99%    BMI 28.7 kg/m2       Procedures

## 2017-05-01 NOTE — ED TRIAGE NOTES
Pt reports he BP has been elevated all day. Pt went to the drug store around noon today and had BP taken with reading systolic was 415. Pt went home and took an extra Lopressor 100 mg PO. Pt also took Norvasc 10 mg PO even though she has been taken off of it due to swelling. Pt states she began with SOB around noon today as well. Pt denies chest pain or SOB @ this time. BP in triage 211/76. Pt was recently admitted last month for new onset of Atrial Fib.

## 2017-05-02 ENCOUNTER — OFFICE VISIT (OUTPATIENT)
Dept: CARDIOLOGY CLINIC | Age: 82
End: 2017-05-02

## 2017-05-02 VITALS
DIASTOLIC BLOOD PRESSURE: 80 MMHG | BODY MASS INDEX: 29.23 KG/M2 | OXYGEN SATURATION: 97 % | RESPIRATION RATE: 16 BRPM | WEIGHT: 165 LBS | HEART RATE: 60 BPM | HEIGHT: 63 IN | SYSTOLIC BLOOD PRESSURE: 130 MMHG

## 2017-05-02 DIAGNOSIS — I25.10 ATHEROSCLEROSIS OF NATIVE CORONARY ARTERY OF NATIVE HEART WITHOUT ANGINA PECTORIS: Primary | ICD-10-CM

## 2017-05-02 DIAGNOSIS — I48.91 ATRIAL FIBRILLATION, UNSPECIFIED TYPE (HCC): ICD-10-CM

## 2017-05-02 LAB
ATRIAL RATE: 61 BPM
CALCULATED P AXIS, ECG09: 59 DEGREES
CALCULATED R AXIS, ECG10: 10 DEGREES
CALCULATED T AXIS, ECG11: 22 DEGREES
DIAGNOSIS, 93000: NORMAL
P-R INTERVAL, ECG05: 194 MS
Q-T INTERVAL, ECG07: 480 MS
QRS DURATION, ECG06: 98 MS
QTC CALCULATION (BEZET), ECG08: 483 MS
VENTRICULAR RATE, ECG03: 61 BPM

## 2017-05-02 RX ORDER — AMLODIPINE BESYLATE 5 MG/1
5 TABLET ORAL
COMMUNITY
End: 2017-07-17 | Stop reason: SDUPTHER

## 2017-05-02 RX ORDER — AMIODARONE HYDROCHLORIDE 200 MG/1
200 TABLET ORAL DAILY
COMMUNITY
End: 2017-05-08 | Stop reason: SDUPTHER

## 2017-05-02 NOTE — DISCHARGE INSTRUCTIONS

## 2017-05-02 NOTE — ED NOTES
Assumed care of patient. In NAD. Reports 5/10 frontal head \"pressure\" states this is due to elevated BP. BP much improved. Pedal pulses present bilaterally with Doppler. NP aware. Pt has call bell in reach.   On CM x 3

## 2017-05-02 NOTE — MR AVS SNAPSHOT
Visit Information Date & Time Provider Department Dept. Phone Encounter #  
 5/2/2017  9:00 AM Catherine Wagner MD [facility] Megha Nunez [phone] [id number] Follow-up Instructions Return in about 2 months (around 7/2/2017). Follow-up and Disposition History Your Appointments 5/3/2017 11:00 AM  
HOSPITAL DISCHARGE with Catherine Wagner MD  
[facility] ([address]) Appt Note: hosp fu appt [address] [facility]  
[phone]  
  
   
 [address]  
  
    
 6/7/2017 11:20 AM  
ESTABLISHED PATIENT with Catherine Wagner MD  
[facility] ([facility]) Appt Note: 6 mo [address] [facility] [address]  
[phone]  
  
   
 [address] [facility] [address]  
  
    
 7/19/2017 11:00 AM  
ESTABLISHED PATIENT with MD Blanca Krishnan [place], [address] [facility] ([address]) Appt Note: 4m  
 [address] [facility] [employer] [address] [address]  
[phone]  
  
   
 [address] [facility] [employer] [address] [address] Upcoming Health Maintenance Date Due  
 FOOT EXAM Q1 7/4/1945 MICROALBUMIN Q1 7[birth year]4/1945 EYE EXAM RETINAL OR DILATED Q1 7/4/1946 DTaP/Tdap/Td series (1 - Tdap) 7/4/1957 ZOSTER VACCINE AGE 60> 7/4/1996 GLAUCOMA SCREENING Q2Y 7/4/2001 OSTEOPOROSIS SCREENING (DEXA) 7/4/2001 MEDICARE YEARLY EXAM 7/4/2001 Pneumococcal 65+ Low/Medium Risk (2 of 2 - PPSV23) 10/7/2016 INFLUENZA AGE 9 TO ADULT 8/1/2017 HEMOGLOBIN A1C Q6M 9/21/2017 LIPID PANEL Q1 4/25/2018 Allergies as of 5/2/2017  Review Complete On: 5/2/2017 By: Catherine Wagner MD  
  
 Severity Noted Reaction Type Reactions Codeine  11/18/2010    Unknown (comments) Keflin  04/18/2011    Itching Pcn [Penicillins]  11/18/2010    Unknown (comments) Tramadol  04/14/2016    Other (comments) Makes her head feel swishy Current Immunizations  Reviewed on 2/21/2017 No immunizations on file. Not reviewed this visit You Were Diagnosed With   
  
 Codes Comments Atherosclerosis of native coronary artery of native heart without angina pectoris    -  Primary ICD-10-CM: I25.10 ICD-9-CM: 414.01 Atrial fibrillation, unspecified type ([facility]     ICD-10-CM: I48.91 
ICD-9-CM: 427.31 Vitals BP Pulse Resp Height(growth percentile) Weight(growth percentile) SpO2  
 130/80 (BP 1 Location: Left arm, BP Patient Position: Sitting) 60 16 5' 3\" (1.6 m) 165 lb (74.8 kg) 97% BMI OB Status Smoking Status 29.23 kg/m2 Hysterectomy Former Smoker Vitals History BMI and BSA Data Body Mass Index Body Surface Area  
 29.23 kg/m 2 1.82 m 2 Preferred Pharmacy Pharmacy Name Phone  [facility] [phone] Your Updated Medication List  
  
   
This list is accurate as of: 5/2/17  9:45 AM.  Always use your most recent med list.  
  
  
  
  
 amiodarone 200 mg tablet Commonly known as:  CORDARONE Take 200 mg by mouth daily. apixaban 5 mg tablet Commonly known as:  Venida Darwin Take 1 Tab by mouth two (2) times a day. Indications: PREVENT THROMBOEMBOLISM IN CHRONIC ATRIAL FIBRILLATION  
  
 aspirin 81 mg chewable tablet Take 1 Tab by mouth daily. atorvastatin 40 mg tablet Commonly known as:  LIPITOR  
TAKE 1 TABLET DAILY  
  
 enalapril 20 mg tablet Commonly known as:  VASOTEC  
TAKE 1 TABLET TWICE A DAY  
  
 glipiZIDE 5 mg tablet Commonly known as:  Elveria Seen Take 2 tabs bid  
  
 hydroCHLOROthiazide 50 mg tablet Commonly known as:  HYDRODIURIL  
TAKE 1 TABLET DAILY  
  
 isosorbide mononitrate ER 60 mg CR tablet Commonly known as:  IMDUR Take 1 Tab by mouth two (2) times a day. LANTUS 100 unit/mL injection Generic drug:  insulin glargine 10 Units by SubCUTAneous route daily. metFORMIN 500 mg tablet Commonly known as:  GLUCOPHAGE Take 250 mg by mouth two (2) times daily (with meals). metoprolol tartrate 50 mg tablet Commonly known as:  LOPRESSOR Take 1 Tab by mouth two (2) times a day. nitroglycerin 0.4 mg SL tablet Commonly known as:  NITROSTAT  
1 Tab by SubLINGual route every five (5) minutes as needed for Chest Pain. NORVASC 5 mg tablet Generic drug:  amLODIPine Take 5 mg by mouth every morning. omeprazole 20 mg capsule Commonly known as:  PriLOSEC Take 1 Cap by mouth daily. VITAMIN D3 1,000 unit tablet Generic drug:  cholecalciferol Take 2,000 Units by mouth daily. We Performed the Following AMB POC EKG ROUTINE W/ 12 LEADS, INTER & REP [73123 CPT(R)] CBC W/O DIFF [31709 CPT(R)] LIPID PANEL [61559 CPT(R)] TSH 3RD GENERATION [86292 CPT(R)] Follow-up Instructions Return in about 2 months (around 7/2/2017). Patient Instructions Decrease Amiodarone 200 mg daily (Start on Monday) Start Norvasc 5 mg daily (morning) Have blood work drawn in 6 weeks (FASTING) BP check next week Follow up with Tatiana Cope in 2 months Introducing [facility]! Analisa Tobias introduces [employer] patient portal. Now you can access parts of your medical record, email your doctor's office, and request medication refills online. 1. In your internet browser, go to [email or web address] 2. Click on the First Time User? Click Here link in the Sign In box. You will see the New Member Sign Up page. 3. Enter your [employer] Access Code exactly as it appears below. You will not need to use this code after youve completed the sign-up process. If you do not sign up before the expiration date, you must request a new code. · [employer] Access Code: [email or web address] Expires: 5/21/2017 12:12 AM 
 
 4. Enter the last four digits of your Social Security Number (xxxx) and Date of Birth (mm/dd/yyyy) as indicated and click Submit. You will be taken to the next sign-up page. 5. Create a [employer] ID. This will be your [employer] login ID and cannot be changed, so think of one that is secure and easy to remember. 6. Create a [employer] password. You can change your password at any time. 7. Enter your Password Reset Question and Answer. This can be used at a later time if you forget your password. 8. Enter your e-mail address. You will receive e-mail notification when new information is available in [address] 9. Click Sign Up. You can now view and download portions of your medical record. 10. Click the Download Summary menu link to download a portable copy of your medical information. If you have questions, please visit the Frequently Asked Questions section of the [employer] website. Remember, [employer] is NOT to be used for urgent needs. For medical emergencies, dial 911. Now available from your iPhone and Android! Please provide this summary of care documentation to your next provider. Your primary care clinician is listed as [id number] [address] IV. If you have any questions after today's visit, please call [phone].

## 2017-05-02 NOTE — ED NOTES
Pt given discharge instructions. Verbalizes understanding. Left via [facility] with all belongings with female .

## 2017-05-02 NOTE — PATIENT INSTRUCTIONS
Decrease Amiodarone 200 mg daily (Start on Monday)    Start Norvasc 5 mg daily (morning)    Have blood work drawn in 6 weeks (FASTING)    BP check next week    Follow up with Tatiana Cope in 2 months

## 2017-05-02 NOTE — PROGRESS NOTES
HISTORY OF PRESENT ILLNESS  Taylor Spencer is a [de-identified] y.o. female. She has h/o HTN, HLD, AODM and CAD  And PAF (diagnosed on 4/17 for which she underwent successful cardioversion on oac and amiodarone)here for follow up  Stress test normal on 11/10    Doppler /US renal NO MANDO b/l    PTCA and stent of right coronary artery in 1996 July 26, 2007. The cardiac catheterization revealed mild 10% tapering of the ostium. The left anterior descending artery had mild intimal disease throughout. There was a 30% stenosis in the proximal portion of the LAD between the first and second septal . The remainder of the LAD had only mild intimal disease. The circumflex artery had a 30%-40% stenosis in the proximal mid segment prior to take off of obtuse marginal branch. The right coronary artery was a large dominant artery with 80% proximal stenosis followed by 90% proximal to mid stenosis, followed by 30% residual stenosis. Ejection fraction was estimated at 50%-55%. 2/14: nuclear stress test: mild ischemia mid distal kailey lateral wall EF 59%    3/14: cardiac catheterization:Left main: The vessel was normal sized. Angiography  showed minor luminal irregularities. LAD: The vessel was normal sized. Angiography showed mild atherosclerosis. There was a 30 % stenosis in the  middle third of the vessel segment. 1st diagonal: The vessel was small  sized. Angiography showed minor luminal irregularities. 2nd diagonal: The  vessel was normal sized. Angiography showed mild atherosclerosis. Circumflex: The vessel was normal sized. Angiography showed mild  atherosclerosis. There was a 30 % stenosis. RCA: The vessel was large  sized (dominant). Angiography showed mild atherosclerosis and 3 patent  prior stents.  There was a tubular 30 % stenosis in the distal third of the  vessel segment.    MEDICAL RX ONLY at that time   Stopped niacin in 2014 for \"sick \" feeling    Seen in [facility] for HTN on 2/15 aldactone increased to 25 mg at that time  Renal US on 3/15 no MANDO  ON 4/17 admitted to [facility] for sob and found to be in AF  Nuclear stress test on 4/25/17:No ischemia demonstrated. No infarct visible. LVEF 61%.      Chest ct on 4/17:1. Atherosclerotic aorta with coronary artery calcification. 2. Small bilateral pleural effusions with right pleural calcifications. 3. Status post cholecystectomy. Valentin/Cardioversion  on 4/28/17:VALENTIN with normal EF mild mr and TR no evidence for gross intracardiac thrombi  Cardioversion: 150 joules were delivered in a synch fashion with restoration of NSR  Amiodarone started lopressor decreased  HPI  Doing better sob resolved but has issues with BP for which she was seen in er on 5/1/17   No cp reported some abdominal tightness but nor resolved  Review of Systems   Constitutional: Negative. Respiratory: Negative. Cardiovascular: Negative. Physical Exam  Physical Exam   Blood pressure 130/80, pulse 60, resp. rate 16, height 5' 3\" (1.6 m), weight 74.8 kg (165 lb), SpO2 97 %. Constitutional: She is oriented to person, place, and time. She appears well-developed and well-nourished. No distress. HENT: Head: Normocephalic. Eyes: No scleral icterus. Neck: Normal range of motion. Neck supple. No JVD present. No tracheal deviation present. Cardiovascular: Normal rate, regular rhythm, normal heart sounds and intact distal pulses. Exam reveals no gallop and no friction rub. No murmur heard. Pulmonary/Chest: Effort normal and breath sounds normal. No stridor. No respiratory distress, wheezes or  rales. Abdominal: She exhibits no distension. Musculoskeletal: She exhibits no edema. Neurological: She is alert and oriented to person, place, and time. Coordination normal.   Skin: Skin is warm. No rash noted. Not diaphoretic. No erythema. Psychiatric:  Normal mood and affect.   Behavior is normal.   Current Outpatient Prescriptions on File Prior to Visit   Medication Sig Dispense Refill    apixaban (ELIQUIS) 5 mg tablet Take 1 Tab by mouth two (2) times a day. Indications: PREVENT THROMBOEMBOLISM IN CHRONIC ATRIAL FIBRILLATION 60 Tab 3    aspirin 81 mg chewable tablet Take 1 Tab by mouth daily. 30 Tab 3    amiodarone (PACERONE) 400 mg tablet Take 1 Tab by mouth three (3) times daily. 60 Tab 3    metoprolol tartrate (LOPRESSOR) 50 mg tablet Take 1 Tab by mouth two (2) times a day. 60 Tab 3    insulin glargine (LANTUS) 100 unit/mL injection 10 Units by SubCUTAneous route daily.  metFORMIN (GLUCOPHAGE) 500 mg tablet Take 250 mg by mouth two (2) times daily (with meals).  atorvastatin (LIPITOR) 40 mg tablet TAKE 1 TABLET DAILY 90 Tab 2    enalapril (VASOTEC) 20 mg tablet TAKE 1 TABLET TWICE A  Tab 3    hydroCHLOROthiazide (HYDRODIURIL) 50 mg tablet TAKE 1 TABLET DAILY 90 Tab 3    isosorbide mononitrate ER (IMDUR) 60 mg CR tablet Take 1 Tab by mouth two (2) times a day. 180 Tab 3    glipiZIDE (GLUCOTROL) 5 mg tablet Take 2 tabs bid (Patient taking differently: Take 10 mg by mouth two (2) times a day. Take 2 tabs bid) 360 Tab 3    omeprazole (PRILOSEC) 20 mg capsule Take 1 Cap by mouth daily. 90 Cap 3    nitroglycerin (NITROSTAT) 0.4 mg SL tablet 1 Tab by SubLINGual route every five (5) minutes as needed for Chest Pain. 25 Tab 3    cholecalciferol, vitamin d3, (VITAMIN D) 1,000 unit tablet Take 2,000 Units by mouth daily. No current facility-administered medications on file prior to visit. ASSESSMENT and PLAN  HTN: now with recurrent issues of htn after lopressor decreased. Discussed options. Resume norvasc 5 mg qam. She will return in 1 week for bp check and will bring her bp machine. She is aware of norvasc side effects  PAF: now in NSR after cardioversion. Continue eliquis no untoward effects thus far. Discussed potential side effects of amiodarone.  Need for yearly cxr and eye exam and bi annual lft and tsh discussed  Decrease amiodarone to 200 mg every day next Monday  lft and tsh in 6 weeks  CAD: seemingly completely asymptomatic.  Her ECG with NSR and no significant st t changes and pacs continue toprol imdur asa.normal stress test on 4/25/17  HLD:closely followed by her PCP and well controlled  AODM: also closely followed by her PCP  Encouraged her to stay as active as possible otherwise see her back in 2 months

## 2017-05-05 NOTE — SENIOR SERVICES NOTE
Follow up call placed to patient - introduced to self.  states she is feeling better and will make 5/8/17 Cardiac appointment, daughter will drive. No transitional care needs verbalized at this time.

## 2017-05-08 ENCOUNTER — CLINICAL SUPPORT (OUTPATIENT)
Dept: CARDIOLOGY CLINIC | Age: 82
End: 2017-05-08

## 2017-05-08 VITALS — SYSTOLIC BLOOD PRESSURE: 110 MMHG | DIASTOLIC BLOOD PRESSURE: 52 MMHG

## 2017-05-08 DIAGNOSIS — I10 ESSENTIAL HYPERTENSION, BENIGN: ICD-10-CM

## 2017-05-08 DIAGNOSIS — I25.10 ATHEROSCLEROSIS OF NATIVE CORONARY ARTERY OF NATIVE HEART WITHOUT ANGINA PECTORIS: ICD-10-CM

## 2017-05-08 DIAGNOSIS — I25.10 ATHEROSCLEROSIS OF NATIVE CORONARY ARTERY OF NATIVE HEART WITHOUT ANGINA PECTORIS: Primary | ICD-10-CM

## 2017-05-08 DIAGNOSIS — I48.91 ATRIAL FIBRILLATION, UNSPECIFIED TYPE (HCC): ICD-10-CM

## 2017-05-08 RX ORDER — METOPROLOL TARTRATE 50 MG/1
50 TABLET ORAL 2 TIMES DAILY
Qty: 180 TAB | Refills: 3 | Status: SHIPPED | OUTPATIENT
Start: 2017-05-08 | End: 2017-07-17 | Stop reason: SDUPTHER

## 2017-05-08 RX ORDER — AMIODARONE HYDROCHLORIDE 200 MG/1
200 TABLET ORAL DAILY
Qty: 90 TAB | Refills: 3 | Status: SHIPPED | OUTPATIENT
Start: 2017-05-08 | End: 2017-07-17 | Stop reason: SDUPTHER

## 2017-05-08 NOTE — PROGRESS NOTES
[address] continue current medicines for now. Her own machine was Left 149/65,Right 148/52 and by Ej Anderson left arm 120/50 and right arm 110/52

## 2017-06-13 ENCOUNTER — HOSPITAL ENCOUNTER (OUTPATIENT)
Dept: LAB | Age: 82
Discharge: HOME | End: 2017-06-13
Payer: MEDICARE

## 2017-06-13 PROCEDURE — 36415 COLL VENOUS BLD VENIPUNCTURE: CPT

## 2017-06-13 PROCEDURE — 84443 ASSAY THYROID STIM HORMONE: CPT

## 2017-06-13 PROCEDURE — 85027 COMPLETE CBC AUTOMATED: CPT

## 2017-06-13 PROCEDURE — 80061 LIPID PANEL: CPT

## 2017-06-14 LAB
CHOLEST SERPL-MCNC: 110 MG/DL (ref 100–199)
ERYTHROCYTE [DISTWIDTH] IN BLOOD BY AUTOMATED COUNT: 14.2 % (ref 12.3–15.4)
HCT VFR BLD AUTO: 37.8 % (ref 34–46.6)
HDLC SERPL-MCNC: 55 MG/DL
HGB BLD-MCNC: 12.3 G/DL (ref 11.1–15.9)
INTERPRETATION, 910389: NORMAL
LDLC SERPL CALC-MCNC: 36 MG/DL (ref 0–99)
MCH RBC QN AUTO: 25.7 PG (ref 26.6–33)
MCHC RBC AUTO-ENTMCNC: 32.5 G/DL (ref 31.5–35.7)
MCV RBC AUTO: 79 FL (ref 79–97)
PLATELET # BLD AUTO: 260 X10E3/UL (ref 150–379)
RBC # BLD AUTO: 4.78 X10E6/UL (ref 3.77–5.28)
TRIGL SERPL-MCNC: 93 MG/DL (ref 0–149)
TSH SERPL DL<=0.005 MIU/L-ACNC: 1.23 UIU/ML (ref 0.45–4.5)
VLDLC SERPL CALC-MCNC: 19 MG/DL (ref 5–40)
WBC # BLD AUTO: 9.8 X10E3/UL (ref 3.4–10.8)

## 2017-06-19 ENCOUNTER — CLINICAL SUPPORT (OUTPATIENT)
Dept: CARDIOLOGY CLINIC | Age: 82
End: 2017-06-19

## 2017-06-19 VITALS — SYSTOLIC BLOOD PRESSURE: 160 MMHG | DIASTOLIC BLOOD PRESSURE: 90 MMHG

## 2017-06-19 DIAGNOSIS — I48.91 ATRIAL FIBRILLATION, UNSPECIFIED TYPE (HCC): Primary | ICD-10-CM

## 2017-06-19 LAB
INR BLD: NORMAL
PT POC: NORMAL SECONDS
VALID INTERNAL CONTROL?: NORMAL

## 2017-06-19 NOTE — PROGRESS NOTES
Spoke with patient this morning she said her BP still high. Asked her to come in today for BP orthos. And EKG per . In absence of  spoke with  and he said to increase Norvasc 10 mg daily. If still dizzy refer to to ENT. Patient will call me back the end of the week for update.

## 2017-06-19 NOTE — MR AVS SNAPSHOT
Visit Information Date & Time Provider Department Dept. Phone Encounter #  
 6/19/2017  1:30 PM Sukumar Rivas MD [facility] [phone] [id number] Your Appointments 6/19/2017  1:30 PM  
EKG with Sukumar Rivas MD  
[facility] ([facility] SCHEDULING) Appt Note: EKG and BP check [address] [place]  
[phone]  
  
   
 [address]  [address] [facility] [address]  
  
    
 7/14/2017 10:45 AM  
ESTABLISHED PATIENT with MD Robert Bell [place], [address] ([facility] Appt Note: 4m; 4m  
 [address] [facility] [place]  
[phone]  
  
   
 [address] [address]  
  
    
 7/17/2017  2:20 PM  
ESTABLISHED PATIENT with Sukumar Rivas MD  
[facility] ([facility] Appt Note: 2 months per Dr. Fei Gamboa [address] [place] [address]  
[address] [address] Upcoming Health Maintenance Date Due  
 FOOT EXAM Q1 7/4/1945 MICROALBUMIN Q1 7/4/1945 EYE EXAM RETINAL OR DILATED Q1 7/4/1945 DTaP/Tdap/Td series (1 - Tdap) 7/4/1957 ZOSTER VACCINE AGE 60> 7/4/1996 GLAUCOMA SCREENING Q2Y 7/4/2001 OSTEOPOROSIS SCREENING (DEXA) 7/4/2001 MEDICARE YEARLY EXAM 7/4/2001 Pneumococcal 65+ Low/Medium Risk (2 of 2 - PPSV23) 10/7/2016 INFLUENZA AGE 9 TO ADULT 8/1/2017 HEMOGLOBIN A1C Q6M 9/21/2017 LIPID PANEL Q1 6/13/2018 Allergies as of 6/19/2017  Review Complete On: 5/2/2017 By: Sukumar Rivas MD  
  
 Severity Noted Reaction Type Reactions Codeine  11/18/2010    Unknown (comments) Keflin  04/18/2011    Itching Pcn [Penicillins]  11/18/2010    Unknown (comments) Tramadol  04/14/2016    Other (comments) Makes her head feel swishy Current Immunizations  Reviewed on 2/21/2017 No immunizations on file. Not reviewed this visit Vitals OB Status Smoking Status Hysterectomy Former Smoker Your Updated Medication List  
  
   
This list is accurate as of: 6/19/17 11:30 AM.  Always use your most recent med list.  
  
  
  
  
 amiodarone 200 mg tablet Commonly known as:  CORDARONE Take 1 Tab by mouth daily. apixaban 5 mg tablet Commonly known as:  Elta Cheek Take 1 Tab by mouth two (2) times a day. Indications: PREVENT THROMBOEMBOLISM IN CHRONIC ATRIAL FIBRILLATION  
  
 aspirin 81 mg chewable tablet Take 1 Tab by mouth daily. atorvastatin 40 mg tablet Commonly known as:  LIPITOR  
TAKE 1 TABLET DAILY  
  
 enalapril 20 mg tablet Commonly known as:  VASOTEC  
TAKE 1 TABLET TWICE A DAY  
  
 glipiZIDE 5 mg tablet Commonly known as:  Patrici Linwood Take 2 tabs bid  
  
 hydroCHLOROthiazide 50 mg tablet Commonly known as:  HYDRODIURIL  
TAKE 1 TABLET DAILY  
  
 isosorbide mononitrate ER 60 mg CR tablet Commonly known as:  IMDUR Take 1 Tab by mouth two (2) times a day. LANTUS 100 unit/mL injection Generic drug:  insulin glargine 10 Units by SubCUTAneous route daily. metFORMIN 500 mg tablet Commonly known as:  GLUCOPHAGE Take 250 mg by mouth two (2) times daily (with meals). metoprolol tartrate 50 mg tablet Commonly known as:  LOPRESSOR Take 1 Tab by mouth two (2) times a day. nitroglycerin 0.4 mg SL tablet Commonly known as:  NITROSTAT  
1 Tab by SubLINGual route every five (5) minutes as needed for Chest Pain. NORVASC 5 mg tablet Generic drug:  amLODIPine Take 5 mg by mouth every morning. omeprazole 20 mg capsule Commonly known as:  PriLOSEC Take 1 Cap by mouth daily. VITAMIN D3 1,000 unit tablet Generic drug:  cholecalciferol Take 2,000 Units by mouth daily. Introducing [facility] & HEALTH SERVICES! Toribio Nyhan introduces [employer] patient portal. Now you can access parts of your medical record, email your doctor's office, and request medication refills online. 1. In your internet browser, go to [email or web address] 2. Click on the First Time User? Click Here link in the Sign In box. You will see the New Member Sign Up page. 3. Enter your [employer] Access Code exactly as it appears below. You will not need to use this code after youve completed the sign-up process. If you do not sign up before the expiration date, you must request a new code. · [employer] Access Code: [email or web address] Expires: 8/22/2017  7:39 AM 
 
4. Enter the last four digits of your Social Security Number (xxxx) and Date of Birth (mm/dd/yyyy) as indicated and click Submit. You will be taken to the next sign-up page. 5. Create a [employer] ID. This will be your [employer] login ID and cannot be changed, so think of one that is secure and easy to remember. 6. Create a [employer] password. You can change your password at any time. 7. Enter your Password Reset Question and Answer. This can be used at a later time if you forget your password. 8. Enter your e-mail address. You will receive e-mail notification when new information is available in [address] 9. Click Sign Up. You can now view and download portions of your medical record. 10. Click the Download Summary menu link to download a portable copy of your medical information. If you have questions, please visit the Frequently Asked Questions section of the [employer] website. Remember, [employer] is NOT to be used for urgent needs. For medical emergencies, dial 911. Now available from your iPhone and Android! Please provide this summary of care documentation to your next provider. Your primary care clinician is listed as [phone] [address] IV. If you have any questions after today's visit, please call [phone].

## 2017-07-17 ENCOUNTER — OFFICE VISIT (OUTPATIENT)
Dept: CARDIOLOGY CLINIC | Age: 82
End: 2017-07-17

## 2017-07-17 VITALS
DIASTOLIC BLOOD PRESSURE: 64 MMHG | BODY MASS INDEX: 31.68 KG/M2 | WEIGHT: 178.8 LBS | SYSTOLIC BLOOD PRESSURE: 156 MMHG | OXYGEN SATURATION: 97 % | HEART RATE: 60 BPM | HEIGHT: 63 IN | RESPIRATION RATE: 16 BRPM

## 2017-07-17 DIAGNOSIS — I10 ESSENTIAL HYPERTENSION, BENIGN: ICD-10-CM

## 2017-07-17 DIAGNOSIS — E11.8 TYPE 2 DIABETES MELLITUS WITH COMPLICATION, WITHOUT LONG-TERM CURRENT USE OF INSULIN (HCC): ICD-10-CM

## 2017-07-17 DIAGNOSIS — I25.10 ATHEROSCLEROSIS OF NATIVE CORONARY ARTERY OF NATIVE HEART WITHOUT ANGINA PECTORIS: ICD-10-CM

## 2017-07-17 DIAGNOSIS — Z79.899 ENCOUNTER FOR LONG-TERM (CURRENT) DRUG USE: ICD-10-CM

## 2017-07-17 DIAGNOSIS — I48.91 ATRIAL FIBRILLATION, UNSPECIFIED TYPE (HCC): Primary | ICD-10-CM

## 2017-07-17 DIAGNOSIS — E78.00 PURE HYPERCHOLESTEROLEMIA: ICD-10-CM

## 2017-07-17 RX ORDER — HYDRALAZINE HYDROCHLORIDE 25 MG/1
25 TABLET, FILM COATED ORAL 2 TIMES DAILY
Qty: 180 TAB | Refills: 3 | Status: SHIPPED | OUTPATIENT
Start: 2017-07-17 | End: 2017-08-07 | Stop reason: DRUGHIGH

## 2017-07-17 RX ORDER — ATORVASTATIN CALCIUM 40 MG/1
40 TABLET, FILM COATED ORAL DAILY
Qty: 90 TAB | Refills: 3 | Status: SHIPPED | OUTPATIENT
Start: 2017-07-17 | End: 2017-08-23

## 2017-07-17 RX ORDER — METOPROLOL TARTRATE 50 MG/1
50 TABLET ORAL 2 TIMES DAILY
Qty: 180 TAB | Refills: 3 | Status: SHIPPED | OUTPATIENT
Start: 2017-07-17 | End: 2018-05-01 | Stop reason: DRUGHIGH

## 2017-07-17 RX ORDER — HYDRALAZINE HYDROCHLORIDE 25 MG/1
25 TABLET, FILM COATED ORAL 2 TIMES DAILY
Qty: 180 TAB | Refills: 3 | Status: SHIPPED | OUTPATIENT
Start: 2017-07-17 | End: 2017-07-17 | Stop reason: SDUPTHER

## 2017-07-17 RX ORDER — ENALAPRIL MALEATE 20 MG/1
TABLET ORAL
Qty: 180 TAB | Refills: 3 | Status: SHIPPED | OUTPATIENT
Start: 2017-07-17 | End: 2017-12-12 | Stop reason: SDUPTHER

## 2017-07-17 RX ORDER — AMLODIPINE BESYLATE 5 MG/1
5 TABLET ORAL DAILY
COMMUNITY
End: 2017-07-17 | Stop reason: ALTCHOICE

## 2017-07-17 RX ORDER — AMIODARONE HYDROCHLORIDE 100 MG/1
100 TABLET ORAL DAILY
Qty: 90 TAB | Refills: 3 | Status: SHIPPED | OUTPATIENT
Start: 2017-07-17 | End: 2017-07-17 | Stop reason: SDUPTHER

## 2017-07-17 RX ORDER — AMIODARONE HYDROCHLORIDE 100 MG/1
100 TABLET ORAL DAILY
Qty: 90 TAB | Refills: 3 | Status: SHIPPED | OUTPATIENT
Start: 2017-07-17 | End: 2017-08-31

## 2017-07-17 RX ORDER — METFORMIN HYDROCHLORIDE 500 MG/1
250 TABLET ORAL 2 TIMES DAILY WITH MEALS
COMMUNITY
End: 2017-07-29

## 2017-07-17 RX ORDER — HYDROCHLOROTHIAZIDE 25 MG/1
TABLET ORAL
Qty: 90 TAB | Refills: 3 | Status: SHIPPED | OUTPATIENT
Start: 2017-07-17 | End: 2017-08-23 | Stop reason: DRUGHIGH

## 2017-07-17 RX ORDER — NITROGLYCERIN 0.4 MG/1
0.4 TABLET SUBLINGUAL
Qty: 25 TAB | Refills: 3 | Status: SHIPPED | OUTPATIENT
Start: 2017-07-17

## 2017-07-17 NOTE — PATIENT INSTRUCTIONS
Stop Norvasc,Decrease HCTZ to 25mg daily, Decrease Amiodarone to 100mg daily, Start Hydralazine 25mg twice daily,Follow up in office in 3 weeks

## 2017-07-17 NOTE — MR AVS SNAPSHOT
Visit Information Date & Time Provider Department Dept. Phone Encounter #  
 7/17/2017  2:20 PM Marisel Zavala [facility] [phone] [id number] Follow-up Instructions Return in about 3 weeks (around 8/7/2017). Follow-up and Disposition History Your Appointments 7/20/2017 11:15 AM  
ESTABLISHED PATIENT with MD Emily Bryant [address] , [address] ([address]) Appt Note: lab only bmp  
 [address] [facility] [address]  
[phone]  
  
   
 [address] [address]  
  
    
 10/18/2017 10:30 AM  
ESTABLISHED PATIENT with MD Emily Bryant [place], [address] ([address]) Appt Note: 3m  
 [address] [facility] [address]  
[phone] Upcoming Health Maintenance Date Due  
 EYE EXAM RETINAL OR DILATED Q1 7/4/1945 DTaP/Tdap/Td series (1 - Tdap) 7/4/1957 ZOSTER VACCINE AGE 60> 7/4/1996 GLAUCOMA SCREENING Q2Y 7/4/2001 OSTEOPOROSIS SCREENING (DEXA) 7/4/2001 MEDICARE YEARLY EXAM 7/4/2001 Pneumococcal 65+ Low/Medium Risk (2 of 2 - PPSV23) 10/7/2016 INFLUENZA AGE 9 TO ADULT 8/1/2017 HEMOGLOBIN A1C Q6M 12/29/2017 LIPID PANEL Q1 6/13/2018 FOOT EXAM Q1 6/29/2018 MICROALBUMIN Q1 6/29/2018 Allergies as of 7/17/2017  Review Complete On: 7/17/2017 By: Marisel Zavala MD  
  
 Severity Noted Reaction Type Reactions Codeine  11/18/2010    Unknown (comments) Keflin  04/18/2011    Itching Pcn [Penicillins]  11/18/2010    Unknown (comments) Tramadol  04/14/2016    Other (comments) Makes her head feel swishy Current Immunizations  Reviewed on 2/21/2017 No immunizations on file. Not reviewed this visit You Were Diagnosed With   
  
 Codes Comments  At[facility] fibrillation, unspecified [facility]    -  Primary ICD-10-CM: I48.91 
ICD-9-CM: 427.31   
 Atherosclerosis of native coronary artery of native heart without angina pectoris     ICD-10-CM: I25.10 ICD-9-CM: 414.01 Type 2 diabetes mellitus with complication, without long-term current use of insulin (HCC)     ICD-10-CM: E11.8 ICD-9-CM: 250.90 Essential hypertension, benign     ICD-10-CM: I10 
ICD-9-CM: 401.1 Encounter for long-term (current) drug use     ICD-10-CM: Z79.899 ICD-9-CM: V58.69 Pure hypercholesterolemia     ICD-10-CM: E78.00 ICD-9-CM: 272.0 Vitals BP Pulse Resp Height(growth percentile) Weight(growth percentile) SpO2  
 156/64 (BP 1 Location: Right arm) 60 16 5' 3\" (1.6 m) 178 lb 12.8 oz (81.1 kg) 97% BMI OB Status Smoking Status 31.67 kg/m2 Hysterectomy Former Smoker Vitals History BMI and BSA Data Body Mass Index Body Surface Area  
 31.67 kg/m 2 1.9 m 2 Preferred Pharmacy Pharmacy Name Phone [facility] [address] [id number] [phone] Your Updated Medication List  
  
   
This list is accurate as of: 7/17/17  3:36 PM.  Always use your most recent med list.  
  
  
  
  
 amiodarone 100 mg tablet Commonly known as:  Pat Mass Take 1 Tab by mouth daily. apixaban 5 mg tablet Commonly known as:  Verl Nailer Take 1 Tab by mouth two (2) times a day. Indications: PREVENT THROMBOEMBOLISM IN CHRONIC ATRIAL FIBRILLATION  
  
 aspirin 81 mg chewable tablet Take 1 Tab by mouth daily. atorvastatin 40 mg tablet Commonly known as:  LIPITOR Take 1 Tab by mouth daily. enalapril 20 mg tablet Commonly known as:  VASOTEC  
TAKE 1 TABLET TWICE A DAY  
  
 glipiZIDE 5 mg tablet Commonly known as:  Lawayne Early Take 2 tabs bid  
  
 glucose blood VI test strips strip Commonly known as:  ASCENSIA AUTODISC VI, ONE TOUCH ULTRA TEST VI Check blood sugars 4 times daily  
  
 hydrALAZINE 25 mg tablet Commonly known as:  APRESOLINE  
 Take 1 Tab by mouth two (2) times a day. hydroCHLOROthiazide 25 mg tablet Commonly known as:  HYDRODIURIL  
TAKE 1 TABLET DAILY  
  
 isosorbide mononitrate ER 60 mg CR tablet Commonly known as:  IMDUR Take 1 Tab by mouth two (2) times a day. LANTUS 100 unit/mL injection Generic drug:  insulin glargine 10 Units by SubCUTAneous route daily. metFORMIN 500 mg tablet Commonly known as:  GLUCOPHAGE Take 250 mg by mouth two (2) times daily (with meals). metoprolol tartrate 50 mg tablet Commonly known as:  LOPRESSOR Take 1 Tab by mouth two (2) times a day. nitroglycerin 0.4 mg SL tablet Commonly known as:  NITROSTAT  
1 Tab by SubLINGual route every five (5) minutes as needed for Chest Pain. omeprazole 20 mg capsule Commonly known as:  PriLOSEC Take 1 Cap by mouth daily. VITAMIN D3 1,000 unit tablet Generic drug:  cholecalciferol Take 2,000 Units by mouth daily. Prescriptions Printed Refills  
 hydroCHLOROthiazide (HYDRODIURIL) 25 mg tablet 3 Sig: TAKE 1 TABLET DAILY Class: Print  
 metoprolol tartrate (LOPRESSOR) 50 mg tablet 3 Sig: Take 1 Tab by mouth two (2) times a day. Class: Print Route: Oral  
 atorvastatin (LIPITOR) 40 mg tablet 3 Sig: Take 1 Tab by mouth daily. Class: Print Route: Oral  
 enalapril (VASOTEC) 20 mg tablet 3 Sig: TAKE 1 TABLET TWICE A DAY Class: Print  
 apixaban (ELIQUIS) 5 mg tablet 3 Sig: Take 1 Tab by mouth two (2) times a day. Indications: PREVENT THROMBOEMBOLISM IN CHRONIC ATRIAL FIBRILLATION Class: Print Route: Oral  
  
Prescriptions Sent to Pharmacy Refills  
 amiodarone (PACERONE) 100 mg tablet 3 Sig: Take 1 Tab by mouth daily. Class: Normal  
 Pharmacy: [facility] [address] [place] [address] #: [phone]  Route: Oral  
 hydrALAZINE (APRESOLINE) 25 mg tablet 3  
 Sig: Take 1 Tab by mouth two (2) times a day. Class: Normal  
 Pharmacy: [facility] [address] [place] [address] #: [phone] Route: Oral  
  
Follow-up Instructions Return in about 3 weeks (around 8/7/2017). Patient Instructions Stop Norvasc,Decrease HCTZ to 25mg daily, Decrease Amiodarone to 100mg daily, Start Hydralazine 25mg twice daily,Follow up in office in 3 weeks Introducing [facility] & HEALTH SERVICES! Marilee Loo introduces [employer] patient portal. Now you can access parts of your medical record, email your doctor's office, and request medication refills online. 1. In your internet browser, go to [email or web address] 2. Click on the First Time User? Click Here link in the Sign In box. You will see the New Member Sign Up page. 3. Enter your [employer] Access Code exactly as it appears below. You will not need to use this code after youve completed the sign-up process. If you do not sign up before the expiration date, you must request a new code. · [employer] Access Code: [email or web address] Expires: 8/22/2017  7:39 AM 
 
4. Enter the last four digits of your Social Security Number (xxxx) and Date of Birth (mm/dd/yyyy) as indicated and click Submit. You will be taken to the next sign-up page. 5. Create a [employer] ID. This will be your [employer] login ID and cannot be changed, so think of one that is secure and easy to remember. 6. Create a [employer] password. You can change your password at any time. 7. Enter your Password Reset Question and Answer. This can be used at a later time if you forget your password. 8. Enter your e-mail address. You will receive e-mail notification when new information is available in [address] 9. Click Sign Up. You can now view and download portions of your medical record. 10. Click the Download Summary menu link to download a portable copy of your medical information. If you have questions, please visit the Frequently Asked Questions section of the [employer] website. Remember, [employer] is NOT to be used for urgent needs. For medical emergencies, dial 911. Now available from your iPhone and Android! Please provide this summary of care documentation to your next provider. Your primary care clinician is listed as [phone] [address] Almaraz IV. If you have any questions after today's visit, please call [phone].

## 2017-07-17 NOTE — PROGRESS NOTES
HISTORY OF PRESENT ILLNESS  Micheal Bhandari is a 81 y.o. female. .She has h/o HTN, HLD, AODM and CAD  And PAF (diagnosed on 4/17 for which she underwent successful cardioversion on oac and amiodarone)here for follow up  Stress test normal on 11/10    Doppler /US renal NO MANDO b/l    PTCA and stent of right coronary artery in 1996 July 26, 2007. The cardiac catheterization revealed mild 10% tapering of the ostium. The left anterior descending artery had mild intimal disease throughout. There was a 30% stenosis in the proximal portion of the LAD between the first and second septal . The remainder of the LAD had only mild intimal disease. The circumflex artery had a 30%-40% stenosis in the proximal mid segment prior to take off of obtuse marginal branch. The right coronary artery was a large dominant artery with 80% proximal stenosis followed by 90% proximal to mid stenosis, followed by 30% residual stenosis. Ejection fraction was estimated at 50%-55%. 2/14: nuclear stress test: mild ischemia mid distal kailey lateral wall EF 59%    3/14: cardiac catheterization:Left main: The vessel was normal sized. Angiography  showed minor luminal irregularities. LAD: The vessel was normal sized. Angiography showed mild atherosclerosis. There was a 30 % stenosis in the  middle third of the vessel segment. 1st diagonal: The vessel was small  sized. Angiography showed minor luminal irregularities. 2nd diagonal: The  vessel was normal sized. Angiography showed mild atherosclerosis. Circumflex: The vessel was normal sized. Angiography showed mild  atherosclerosis. There was a 30 % stenosis. RCA: The vessel was large  sized (dominant). Angiography showed mild atherosclerosis and 3 patent  prior stents.  There was a tubular 30 % stenosis in the distal third of the  vessel segment.    MEDICAL RX ONLY at that time   Stopped niacin in 2014 for \"sick \" feeling    Seen in [facility] for HTN on 2/15 aldactone increased to 25 mg at that time  Renal US on 3/15 no MANDO  ON 4/17 admitted to [facility] for sob and found to be in AF  Nuclear stress test on 4/25/17:No ischemia demonstrated. No infarct visible. LVEF 61%.      Chest ct on 4/17:1. Atherosclerotic aorta with coronary artery calcification. 2. Small bilateral pleural effusions with right pleural calcifications. 3. Status post cholecystectomy. Valentin/Cardioversion  on 4/28/17:VALENTIN with normal EF mild mr and TR no evidence for gross intracardiac thrombi  Cardioversion: 150 joules were delivered in a synch fashion with restoration of NSR  Amiodarone started lopressor decreased  HPI  Since amiodarone and norvasc she has noticed more dizziness and le edema   Review of Systems   Constitutional: Positive for malaise/fatigue. Respiratory: Negative. Cardiovascular: Positive for leg swelling. Negative for chest pain, palpitations, orthopnea, claudication and PND. Neurological: Positive for dizziness. Visit Vitals    /64 (BP 1 Location: Right arm)    Pulse 60    Resp 16    Ht 5' 3\" (1.6 m)    Wt 81.1 kg (178 lb 12.8 oz)    SpO2 97%    BMI 31.67 kg/m2         Physical Exam   Neck: No JVD present. Carotid bruit is not present. Cardiovascular: Normal rate and regular rhythm. Pulmonary/Chest: Effort normal and breath sounds normal.   Abdominal: Soft. Musculoskeletal: She exhibits edema. 2-3+   Psychiatric: She has a normal mood and affect. Lab Results   Component Value Date/Time    ALT (SGPT) 36 05/01/2017 06:34 PM    AST (SGOT) 25 05/01/2017 06:34 PM    Alk.  phosphatase 112 05/01/2017 06:34 PM    Bilirubin, direct 0.18 02/12/2014 09:42 AM    Bilirubin, total 0.6 05/01/2017 06:34 PM     Lab Results   Component Value Date/Time    TSH 1.230 06/13/2017 09:31 AM     Lab Results   Component Value Date/Time    Sodium 125 07/14/2017 11:11 AM    Potassium 4.8 07/14/2017 11:11 AM    Chloride 86 07/14/2017 11:11 AM    CO2 24 07/14/2017 11:11 AM    Anion gap 7 05/01/2017 06:34 PM    Glucose 167 07/14/2017 11:11 AM    BUN 20 07/14/2017 11:11 AM    Creatinine 1.18 07/14/2017 11:11 AM    BUN/Creatinine ratio 17 07/14/2017 11:11 AM    GFR est AA 50 07/14/2017 11:11 AM    GFR est non-AA 43 07/14/2017 11:11 AM    Calcium 8.9 07/14/2017 11:11 AM     Current Outpatient Prescriptions on File Prior to Visit   Medication Sig Dispense Refill    glipiZIDE (GLUCOTROL) 5 mg tablet Take 2 tabs bid 360 Tab 3    omeprazole (PRILOSEC) 20 mg capsule Take 1 Cap by mouth daily. 90 Cap 3    glucose blood VI test strips (ASCENSIA AUTODISC VI, ONE TOUCH ULTRA TEST VI) strip Check blood sugars 4 times daily 360 Strip 3    amiodarone (CORDARONE) 200 mg tablet Take 1 Tab by mouth daily. 90 Tab 3    apixaban (ELIQUIS) 5 mg tablet Take 1 Tab by mouth two (2) times a day. Indications: PREVENT THROMBOEMBOLISM IN CHRONIC ATRIAL FIBRILLATION 180 Tab 3    metoprolol tartrate (LOPRESSOR) 50 mg tablet Take 1 Tab by mouth two (2) times a day. 180 Tab 3    aspirin 81 mg chewable tablet Take 1 Tab by mouth daily. 30 Tab 3    insulin glargine (LANTUS) 100 unit/mL injection 10 Units by SubCUTAneous route daily.  atorvastatin (LIPITOR) 40 mg tablet TAKE 1 TABLET DAILY 90 Tab 2    enalapril (VASOTEC) 20 mg tablet TAKE 1 TABLET TWICE A  Tab 3    hydroCHLOROthiazide (HYDRODIURIL) 50 mg tablet TAKE 1 TABLET DAILY 90 Tab 3    isosorbide mononitrate ER (IMDUR) 60 mg CR tablet Take 1 Tab by mouth two (2) times a day. 180 Tab 3    nitroglycerin (NITROSTAT) 0.4 mg SL tablet 1 Tab by SubLINGual route every five (5) minutes as needed for Chest Pain. 25 Tab 3    cholecalciferol, vitamin d3, (VITAMIN D) 1,000 unit tablet Take 2,000 Units by mouth daily. No current facility-administered medications on file prior to visit.       Lab Results   Component Value Date/Time    TSH 1.230 06/13/2017 09:31 AM     Lab Results   Component Value Date/Time    Cholesterol, total 110 06/13/2017 09:31 AM    Cholesterol (POC) 100 08/17/2016 11:37 AM    HDL Cholesterol 55 06/13/2017 09:31 AM    HDL Cholesterol (POC) 46 08/17/2016 11:37 AM    LDL Cholesterol (POC) n/a 08/17/2016 11:37 AM    LDL, calculated 36 06/13/2017 09:31 AM    VLDL, calculated 19 06/13/2017 09:31 AM    Triglyceride 93 06/13/2017 09:31 AM    Triglycerides (POC) 112 08/17/2016 11:37 AM    CHOL/HDL Ratio 2.0 04/25/2017 03:53 AM       ASSESSMENT and PLAN  HTN: now with recurrent issues of LE edema which I suspect related to at least in good part to norvasc. Discussed options with patient , stop norvasc and start hydralazine 25 mg bid, side ffects explained, BP check in 1-2 weeks  PAF: now in NSR after cardioversion. Continue eliquis no untoward effects thus far. Unfortunately dizziness may be related to amiodarone. Discussed options , for now proce ed with ent evaluation as planned and decrease amiodarone to 100 mg daily. If ent evaluation is normal then we may have to consider stopping amiodarone altogether   lft and tsh ok in 6/17  holter on hold for now despite dizziness hr is 60 today  ECG on 7/14/17 NSR FAV and IVCD  CAD: seemingly completely asymptomatic.  Her ECG showed with NSR and no significant st t changes and pacs continue toprol imdur asa.  normal stress test on 4/25/17  HLD:closely followed by her PCP and well controlled  AODM: also closely followed by her PCP  Hyponatremia: in my opinion excessive dose of hctz this will be reduced to 25 mg for now  Encouraged her to stay as active as possible otherwise see her back in 3-4 weeks

## 2017-07-26 ENCOUNTER — HOSPITAL ENCOUNTER (EMERGENCY)
Age: 82
Discharge: HOME | End: 2017-07-26
Attending: STUDENT IN AN ORGANIZED HEALTH CARE EDUCATION/TRAINING PROGRAM | Admitting: STUDENT IN AN ORGANIZED HEALTH CARE EDUCATION/TRAINING PROGRAM
Payer: MEDICARE

## 2017-07-26 VITALS
WEIGHT: 168.4 LBS | TEMPERATURE: 97.8 F | BODY MASS INDEX: 29.84 KG/M2 | DIASTOLIC BLOOD PRESSURE: 72 MMHG | RESPIRATION RATE: 16 BRPM | OXYGEN SATURATION: 97 % | HEIGHT: 63 IN | SYSTOLIC BLOOD PRESSURE: 190 MMHG | HEART RATE: 67 BPM

## 2017-07-26 DIAGNOSIS — L03.115 CELLULITIS OF RIGHT LOWER EXTREMITY: Primary | ICD-10-CM

## 2017-07-26 LAB
ALBUMIN SERPL BCP-MCNC: 3.4 G/DL (ref 3.5–5)
ALBUMIN/GLOB SERPL: 0.9 {RATIO} (ref 1.1–2.2)
ALP SERPL-CCNC: 99 U/L (ref 45–117)
ALT SERPL-CCNC: 31 U/L (ref 12–78)
ANION GAP BLD CALC-SCNC: 7 MMOL/L (ref 5–15)
AST SERPL W P-5'-P-CCNC: 25 U/L (ref 15–37)
BASOPHILS # BLD AUTO: 0 K/UL (ref 0–0.1)
BASOPHILS # BLD: 0 % (ref 0–1)
BILIRUB SERPL-MCNC: 0.6 MG/DL (ref 0.2–1)
BUN SERPL-MCNC: 14 MG/DL (ref 6–20)
BUN/CREAT SERPL: 13 (ref 12–20)
CALCIUM SERPL-MCNC: 9.1 MG/DL (ref 8.5–10.1)
CHLORIDE SERPL-SCNC: 90 MMOL/L (ref 97–108)
CO2 SERPL-SCNC: 27 MMOL/L (ref 21–32)
CREAT SERPL-MCNC: 1.1 MG/DL (ref 0.55–1.02)
DIFFERENTIAL METHOD BLD: ABNORMAL
EOSINOPHIL # BLD: 0.3 K/UL (ref 0–0.4)
EOSINOPHIL NFR BLD: 2 % (ref 0–7)
ERYTHROCYTE [DISTWIDTH] IN BLOOD BY AUTOMATED COUNT: 15.3 % (ref 11.5–14.5)
GLOBULIN SER CALC-MCNC: 3.8 G/DL (ref 2–4)
GLUCOSE SERPL-MCNC: 117 MG/DL (ref 65–100)
HCT VFR BLD AUTO: 32.6 % (ref 35–47)
HGB BLD-MCNC: 10.9 G/DL (ref 11.5–16)
LYMPHOCYTES # BLD AUTO: 13 % (ref 12–49)
LYMPHOCYTES # BLD: 1.9 K/UL (ref 0.8–3.5)
MCH RBC QN AUTO: 25.1 PG (ref 26–34)
MCHC RBC AUTO-ENTMCNC: 33.4 G/DL (ref 30–36.5)
MCV RBC AUTO: 74.9 FL (ref 80–99)
METAMYELOCYTES NFR BLD MANUAL: 2 %
MONOCYTES # BLD: 0.6 K/UL (ref 0–1)
MONOCYTES NFR BLD AUTO: 4 % (ref 5–13)
NEUTS BAND NFR BLD MANUAL: 1 % (ref 0–6)
NEUTS SEG # BLD: 11.5 K/UL (ref 1.8–8)
NEUTS SEG NFR BLD AUTO: 78 % (ref 32–75)
PLATELET # BLD AUTO: 319 K/UL (ref 150–400)
POTASSIUM SERPL-SCNC: 4.6 MMOL/L (ref 3.5–5.1)
PROT SERPL-MCNC: 7.2 G/DL (ref 6.4–8.2)
RBC # BLD AUTO: 4.35 M/UL (ref 3.8–5.2)
RBC MORPH BLD: ABNORMAL
RBC MORPH BLD: ABNORMAL
SODIUM SERPL-SCNC: 124 MMOL/L (ref 136–145)
WBC # BLD AUTO: 14.6 K/UL (ref 3.6–11)
WBC MORPH BLD: ABNORMAL

## 2017-07-26 PROCEDURE — 96366 THER/PROPH/DIAG IV INF ADDON: CPT

## 2017-07-26 PROCEDURE — 80053 COMPREHEN METABOLIC PANEL: CPT | Performed by: EMERGENCY MEDICINE

## 2017-07-26 PROCEDURE — 85025 COMPLETE CBC W/AUTO DIFF WBC: CPT | Performed by: EMERGENCY MEDICINE

## 2017-07-26 PROCEDURE — 36415 COLL VENOUS BLD VENIPUNCTURE: CPT | Performed by: EMERGENCY MEDICINE

## 2017-07-26 PROCEDURE — 96365 THER/PROPH/DIAG IV INF INIT: CPT

## 2017-07-26 PROCEDURE — 74011250636 HC RX REV CODE- 250/636: Performed by: STUDENT IN AN ORGANIZED HEALTH CARE EDUCATION/TRAINING PROGRAM

## 2017-07-26 PROCEDURE — 99283 EMERGENCY DEPT VISIT LOW MDM: CPT

## 2017-07-26 RX ORDER — SODIUM CHLORIDE 0.9 % (FLUSH) 0.9 %
5-10 SYRINGE (ML) INJECTION AS NEEDED
Status: CANCELLED | OUTPATIENT
Start: 2017-07-27 | End: 2017-07-27

## 2017-07-26 RX ORDER — SODIUM CHLORIDE 9 MG/ML
25 INJECTION, SOLUTION INTRAVENOUS AS NEEDED
Status: CANCELLED | OUTPATIENT
Start: 2017-07-27 | End: 2017-07-27

## 2017-07-26 RX ORDER — VANCOMYCIN 1.75 GRAM/500 ML IN 0.9 % SODIUM CHLORIDE INTRAVENOUS
1750
Status: COMPLETED | OUTPATIENT
Start: 2017-07-26 | End: 2017-07-26

## 2017-07-26 RX ADMIN — VANCOMYCIN HYDROCHLORIDE 1750 MG: 10 INJECTION, POWDER, LYOPHILIZED, FOR SOLUTION INTRAVENOUS at 16:54

## 2017-07-26 NOTE — PROGRESS NOTES
Care Management-Received consult to assist with arranging IV vancomycin in the outpatient infusion center. Patient seen by Dr. Avril Stark in his office today. Patient has right lower extremity cellulitis. He sent patient to ED for first IV vanc dose in ED and requested follow up doses in infusion center. Called [facility] ([phone]) and spoke with Bob and then later with Ricardo Cisneros. Spoke with ED physician-Dr. Patrice Koenig in person and then with Dr. Avril Stark on the phone. Spoke with patient and her daughter Marie Fragoso ([phone]) at bedside. Patient is agreeable to Health system at the [facility] location. Faxed order sheet to Dr. Avril Stark ([phone]). He will complete the order form for the infusion center and fax it to them at ([phone]) and to  at [phone]. Daughter will transport patient to her appointments. Patient's daughter-Amy lives with her. Patient has a cane, shower chair, and a walker. She currently uses the cane but used the walker in February after having 'bypass in my leg\". Patient has used [facility] for home health in the past. She has been to [facility] for inpatient rehab in February. She has never been to a SNF. Care Management Interventions  PCP Verified by CM: Yes (Dr. Tavon Ruiz)  Last Visit to PCP: 07/26/17  Mode of Transport at Discharge: Other (see comment) (daughter)  Transition of Care Consult (CM Consult):  Infusion Center  Discharge Durable Medical Equipment: No  Health Maintenance Reviewed: Yes  Physical Therapy Consult: No  Occupational Therapy Consult: No  Speech Therapy Consult: No  Current Support Network: Lives with Caregiver Jasbir Brown ([phone]))  Confirm Follow Up Transport: Family (daughter )  Plan discussed with Pt/Family/Caregiver: Yes  Freedom of Choice Offered: Yes  Discharge Location  Discharge Placement: Home with infusion therapy    Jean Carlos Shabazz MSW

## 2017-07-26 NOTE — ED TRIAGE NOTES
TRIAGE NOTE: \"My leg is swollen and red. It's been that way since last Thursday. I have been on Doxycycline but it just seems to be spreading instead of shrinking. \" Dr. Sherif Renee sent her here

## 2017-07-26 NOTE — DISCHARGE INSTRUCTIONS
We hope that we have addressed all of your medical concerns. The examination and treatment you received in the Emergency Department were for an emergent problem and were not intended as complete care. It is important that you follow up with your healthcare provider(s) for ongoing care. If your symptoms worsen or do not improve as expected, and you are unable to reach your usual health care provider(s), you should return to the Emergency Department. Today's healthcare is undergoing tremendous change, and patient satisfaction surveys are one of the many tools to assess the quality of medical care. You may receive a survey from the [employer] regarding your experience in the Emergency Department. I hope that your experience has been completely positive, particularly the medical care that I provided. As such, please participate in the survey; anything less than excellent does not meet my expectations or intentions. [facility] and [address] [place] [address] participate in nationally recognized quality of care measures. If your blood pressure is greater than 120/80, as reported below, we urge that you seek medical care to address the potential of high blood pressure, commonly known as hypertension. Hypertension can be hereditary or can be caused by certain medical conditions, pain, stress, or \"white coat syndrome. \"       Please make an appointment with your health care provider(s) for follow up of your Emergency Department visit. VITALS:   Patient Vitals for the past 8 hrs:   Temp Pulse Resp BP SpO2   07/26/17 1631 97.5 °F (36.4 °C) 65 16 191/77 98 %   07/26/17 1347 97.7 °F (36.5 °C) 65 16 (!) 218/81 -          Thank you for allowing us to provide you with medical care today. We realize that you have many choices for your emergency care needs. Please choose us in the future for any continued health care needs. Roland Keys Anita Chamberlain, [address] [place] [address].   Office: [phone]            Recent Results (from the past 24 hour(s))   CBC WITH AUTOMATED DIFF    Collection Time: 07/26/17  1:55 PM   Result Value Ref Range    WBC 14.6 (H) 3.6 - 11.0 K/uL    RBC 4.35 3.80 - 5.20 M/uL    HGB 10.9 (L) 11.5 - 16.0 g/dL    HCT 32.6 (L) 35.0 - 47.0 %    MCV 74.9 (L) 80.0 - 99.0 FL    MCH 25.1 (L) 26.0 - 34.0 PG    MCHC 33.4 30.0 - 36.5 g/dL    RDW 15.3 (H) 11.5 - 14.5 %    PLATELET 998 304 - 654 K/uL    NEUTROPHILS 78 (H) 32 - 75 %    BAND NEUTROPHILS 1 0 - 6 %    LYMPHOCYTES 13 12 - 49 %    MONOCYTES 4 (L) 5 - 13 %    EOSINOPHILS 2 0 - 7 %    BASOPHILS 0 0 - 1 %    METAMYELOCYTES 2 (H) 0 %    ABS. NEUTROPHILS 11.5 (H) 1.8 - 8.0 K/UL    ABS. LYMPHOCYTES 1.9 0.8 - 3.5 K/UL    ABS. MONOCYTES 0.6 0.0 - 1.0 K/UL    ABS. EOSINOPHILS 0.3 0.0 - 0.4 K/UL    ABS. BASOPHILS 0.0 0.0 - 0.1 K/UL    DF MANUAL      RBC COMMENTS ANISOCYTOSIS  1+        RBC COMMENTS MICROCYTOSIS  1+        WBC COMMENTS TOXIC GRANULATION     METABOLIC PANEL, COMPREHENSIVE    Collection Time: 07/26/17  1:55 PM   Result Value Ref Range    Sodium 124 (L) 136 - 145 mmol/L    Potassium 4.6 3.5 - 5.1 mmol/L    Chloride 90 (L) 97 - 108 mmol/L    CO2 27 21 - 32 mmol/L    Anion gap 7 5 - 15 mmol/L    Glucose 117 (H) 65 - 100 mg/dL    BUN 14 6 - 20 MG/DL    Creatinine 1.10 (H) 0.55 - 1.02 MG/DL    BUN/Creatinine ratio 13 12 - 20      GFR est AA 58 (L) >60 ml/min/1.73m2    GFR est non-AA 48 (L) >60 ml/min/1.73m2    Calcium 9.1 8.5 - 10.1 MG/DL    Bilirubin, total 0.6 0.2 - 1.0 MG/DL    ALT (SGPT) 31 12 - 78 U/L    AST (SGOT) 25 15 - 37 U/L    Alk. phosphatase 99 45 - 117 U/L    Protein, total 7.2 6.4 - 8.2 g/dL    Albumin 3.4 (L) 3.5 - 5.0 g/dL    Globulin 3.8 2.0 - 4.0 g/dL    A-G Ratio 0.9 (L) 1.1 - 2.2         No results found. Cellulitis: Care Instructions  Your Care Instructions    Cellulitis is a skin infection.  It often occurs after a break in the skin from a scrape, cut, bite, or puncture, or after a rash. The doctor has checked you carefully, but problems can develop later. If you notice any problems or new symptoms, get medical treatment right away. Follow-up care is a key part of your treatment and safety. Be sure to make and go to all appointments, and call your doctor if you are having problems. It's also a good idea to know your test results and keep a list of the medicines you take. How can you care for yourself at home? · Take your antibiotics as directed. Do not stop taking them just because you feel better. You need to take the full course of antibiotics. · Prop up the infected area on pillows to reduce pain and swelling. Try to keep the area above the level of your heart as often as you can. · If your doctor told you how to care for your wound, follow your doctor's instructions. If you did not get instructions, follow this general advice:  ¨ Wash the wound with clean water 2 times a day. Don't use hydrogen peroxide or alcohol, which can slow healing. ¨ You may cover the wound with a thin layer of petroleum jelly, such as Vaseline, and a nonstick bandage. ¨ Apply more petroleum jelly and replace the bandage as needed. · Be safe with medicines. Take pain medicines exactly as directed. ¨ If the doctor gave you a prescription medicine for pain, take it as prescribed. ¨ If you are not taking a prescription pain medicine, ask your doctor if you can take an over-the-counter medicine. To prevent cellulitis in the future  · Try to prevent cuts, scrapes, or other injuries to your skin. Cellulitis most often occurs where there is a break in the skin. · If you get a scrape, cut, mild burn, or bite, wash the wound with clean water as soon as you can to help avoid infection. Don't use hydrogen peroxide or alcohol, which can slow healing.   · If you have swelling in your legs (edema), support stockings and good skin care may help prevent leg sores and cellulitis. · Take care of your feet, especially if you have diabetes or other conditions that increase the risk of infection. Wear shoes and socks. Do not go barefoot. If you have athlete's foot or other skin problems on your feet, talk to your doctor about how to treat them. When should you call for help? Call your doctor now or seek immediate medical care if:  · You have signs that your infection is getting worse, such as:  ¨ Increased pain, swelling, warmth, or redness. ¨ Red streaks leading from the area. ¨ Pus draining from the area. ¨ A fever. · You get a rash. Watch closely for changes in your health, and be sure to contact your doctor if:  · You are not getting better after 1 day (24 hours). · You do not get better as expected. Where can you learn more? Go to http://flora-rand.info/. Bill Brandon in the search box to learn more about \"Cellulitis: Care Instructions. \"  Current as of: October 13, 2016  Content Version: 11.3  © 1261-6649 [employer]. Care instructions adapted under license by [employer] (which disclaims liability or warranty for this information). If you have questions about a medical condition or this instruction, always ask your healthcare professional. [address] any warranty or liability for your use of this information.

## 2017-07-26 NOTE — ED PROVIDER NOTES
HPI Comments: 81 y.o. female with past medical history significant for DM, HLD, HTN, CVA, CAD and afib who presents for evaluation of skin infection. Pt reports she was dx with cellulitis by her PCP. Pt states she was prompted to come to ED by her PCP after cellulitis spread. Pt is currently taking doxycycline to treat skin infection. Pt denies fever, chills, bodyaches, nausea and vomiting. There are no other acute medical concerns at this time. Social hx: former tobacco smoker (quit 1986)  PCP: Travis Cox MD    Note written by Rodolfo Cabezas, as dictated by Ricco Rivers MD 4:25 PM      The history is provided by the patient. Past Medical History:   Diagnosis Date    [facility] [address]     CAD (coronary artery disease)     Cerebrovascular accident stroke, other, unspec 11/18/2010    Coronary atherosclerosis of native coronary artery 11/18/2010    Essential hypertension     Essential hypertension, benign 11/18/2010    HLD (hyperlipidemia) 11/18/2010    PVD (peripheral vascular disease) ([facility] [address] 2017    Type II or unspecified type diabetes mellitus without mention of complication, not stated as uncontrolled 11/18/2010       Past Surgical History:   Procedure Laterality Date    HX CORONARY STENT PLACEMENT      HX HEART CATHETERIZATION      HX HYSTERECTOMY  1972         History reviewed. No pertinent family history. Social History     Social History    Marital status:      Spouse name: N/A    Number of children: N/A    Years of education: N/A     Occupational History    Not on file. Social History Main Topics    Smoking status: Former Smoker     Packs/day: 0.80     Years: 20.00     Types: Cigarettes     Quit date: 4/18/1986    Smokeless tobacco: Never Used    Alcohol use No    Drug use: No    Sexual activity: Not on file     Other Topics Concern    Not on file     Social History Narrative         ALLERGIES: Codeine;  Keflin; Pcn [penicillins]; and Tramadol    Review of Systems   Constitutional: Negative for activity change, chills, diaphoresis, fatigue and fever. HENT: Negative for congestion and sore throat. Eyes: Negative for photophobia and visual disturbance. Respiratory: Negative for chest tightness and shortness of breath. Cardiovascular: Negative for chest pain, palpitations and leg swelling. Gastrointestinal: Negative for abdominal pain, blood in stool, constipation, diarrhea, nausea and vomiting. Genitourinary: Negative for difficulty urinating, dysuria, flank pain, frequency and hematuria. Musculoskeletal: Negative for back pain. Neurological: Negative for dizziness, syncope, numbness and headaches. All other systems reviewed and are negative. Vitals:    07/26/17 1347 07/26/17 1631   BP: (!) 218/81 191/77   Pulse: 65 65   Resp: 16 16   Temp: 97.7 °F (36.5 °C) 97.5 °F (36.4 °C)   SpO2:  98%   Weight: 76.4 kg (168 lb 6.4 oz)    Height: 5' 3\" (1.6 m)             Physical Exam   Constitutional: She is oriented to person, place, and time. She appears well-developed and well-nourished. No distress. HENT:   Head: Normocephalic and atraumatic. Nose: Nose normal.   Mouth/Throat: Oropharynx is clear and moist. No oropharyngeal exudate. Eyes: Conjunctivae and EOM are normal. Right eye exhibits no discharge. Left eye exhibits no discharge. No scleral icterus. Neck: Normal range of motion. Neck supple. No JVD present. No tracheal deviation present. No thyromegaly present. Cardiovascular: Normal rate, regular rhythm, normal heart sounds and intact distal pulses. Exam reveals no gallop and no friction rub. No murmur heard. Pulmonary/Chest: Effort normal and breath sounds normal. No stridor. No respiratory distress. She has no wheezes. She has no rales. She exhibits no tenderness. Abdominal: Bowel sounds are normal. She exhibits no distension and no mass. There is no tenderness. There is no rebound.    Musculoskeletal: Normal range of motion. She exhibits no edema or tenderness. Lymphadenopathy:     She has no cervical adenopathy. Neurological: She is alert and oriented to person, place, and time. No cranial nerve deficit. Coordination normal.   Skin: Skin is warm and dry. No rash noted. She is not diaphoretic. There is erythema. No pallor. Erythema of midcalf of right leg extending down to dorsum of right foot (see picture)   Psychiatric: She has a normal mood and affect. Her behavior is normal. Judgment and thought content normal.   Nursing note and vitals reviewed. Note written by Rodolfo Silva, as dictated by Aylin Prajapati MD 4:25 PM    MDM  Number of Diagnoses or Management Options  Cellulitis of right lower extremity:      Amount and/or Complexity of Data Reviewed  Clinical lab tests: ordered and reviewed  Review and summarize past medical records: yes  Discuss the patient with other providers: yes    Risk of Complications, Morbidity, and/or Mortality  Presenting problems: moderate  Diagnostic procedures: moderate  Management options: moderate    Patient Progress  Patient progress: stable    ED Course       Procedures    CONSULT NOTE:  5:30 PM Aylin Prajapati MD spoke with Mario Altamirano MD, Consult for PCP. Discussed available diagnostic tests and clinical findings. He recommends Coban wrapping on her leg and f/u in his office Friday at 1200. PROGRESS NOTE:  5:48 PM  Discussed lab work with pt. Pt will follow up with Dr. Chinyere Stevens. 10:44 AM  The patient has been reevaluated. The patient is ready for discharge. The patient's signs, symptoms, diagnosis, and discharge instructions have been discussed and the patient/ family has conveyed their understanding. The patient is to follow up as recommended or return to the ED should their symptoms worsen. Plan has been discussed and the patient is in agreement.     LABORATORY TESTS:  No results found for this or any previous visit (from the past 12 hour(s)). IMAGING RESULTS:  No orders to display     No results found. MEDICATIONS GIVEN:  Medications   vancomycin (VANCOCIN) 1750 mg in  ml infusion (0 mg IntraVENous IV Completed 7/26/17 1927)       IMPRESSION:  1. Cellulitis of right lower extremity        PLAN:  1. Discharge Medication List as of 7/26/2017  5:39 PM        2. Follow-up Information     Follow up With Details Comments Contact Info    Tobias Hodgson IV, MD  You have an appt.  with Dr. Fatoumata Suarez on Friday July 28, 2017 at 12:00 noon [address] [facility] [place] EM[facility]GENCY DEP  If symptoms worsen [address] [facility]  [phone]            Return to ED for new or worsening symptoms       Elizabeth Samayoa MD

## 2017-07-27 ENCOUNTER — HOSPITAL ENCOUNTER (OUTPATIENT)
Dept: INFUSION THERAPY | Age: 82
Discharge: HOME | End: 2017-07-27
Payer: MEDICARE

## 2017-07-27 VITALS
RESPIRATION RATE: 18 BRPM | SYSTOLIC BLOOD PRESSURE: 170 MMHG | HEART RATE: 5 BPM | TEMPERATURE: 97.9 F | DIASTOLIC BLOOD PRESSURE: 68 MMHG

## 2017-07-27 PROCEDURE — 96366 THER/PROPH/DIAG IV INF ADDON: CPT

## 2017-07-27 PROCEDURE — 96365 THER/PROPH/DIAG IV INF INIT: CPT

## 2017-07-27 PROCEDURE — 74011250636 HC RX REV CODE- 250/636: Performed by: INTERNAL MEDICINE

## 2017-07-27 RX ADMIN — VANCOMYCIN HYDROCHLORIDE 1000 MG: 1 INJECTION, POWDER, LYOPHILIZED, FOR SOLUTION INTRAVENOUS at 15:47

## 2017-07-27 NOTE — PROGRESS NOTES
Problem: Knowledge Deficit  Goal: *Verbalizes understanding of procedures and medications  Outcome: Progressing Towards Goal  Vanc and infusion

## 2017-07-27 NOTE — PROGRESS NOTES
[facility]  Vancomycin Dosing and Monitoring    Pharmacy consulted to dose vancomycin for this 81 y.o. female being treated for Celllitis  Day of Therapy 2  Estimated body mass index is 29.83 kg/(m^2) as calculated from the following:    Height as of 7/26/17: 160 cm (63\"). Weight as of 7/26/17: 76.4 kg (168 lb 6.4 oz). Current Dose Vancomycin 1750 mg x 1 given on 7/26  Vancomycin 1000 mg every 24 hours starting 7/27   Last Level none   Goal Trough 10-15   Other Current Antibiotics none   Significant Cultures none   Renal Function Lab Results   Component Value Date/Time    Creatinine 1.10 07/26/2017 01:55 PM     Estimated Creatinine Clearance: 39.3 mL/min (based on Cr of 1.1). WBC Lab Results   Component Value Date/Time    WBC 14.6 07/26/2017 01:55 PM        Pharmacy will follow the patient on a daily basis and make adjustments based on patient's clinical status. Additional antibiotic recommendations: Estimated dose 1000 mg every 24 hours will provide an estimated trough of 13. Will obtain trough prior to 7/29 dose for further dose adjustments.     Thank you, Keisha Bautista, [address]

## 2017-07-27 NOTE — SENIOR SERVICES NOTE
Call placed to [facility] - spoke w/ [place] informed did not receive orders from PCP office. Call placed to [facility] provided updates states order was faxed and will get office to refax to [phone] in 30/40 minutes. Contact made back w/ [facility] and provided CM name/number when faxed received (outcome pending). 1315 spoke w/ Bob in [facility] - orders received no additional documentation required.

## 2017-07-28 ENCOUNTER — HOSPITAL ENCOUNTER (OUTPATIENT)
Dept: INFUSION THERAPY | Age: 82
Discharge: HOME | End: 2017-07-28
Payer: MEDICARE

## 2017-07-28 VITALS
TEMPERATURE: 97.6 F | SYSTOLIC BLOOD PRESSURE: 177 MMHG | RESPIRATION RATE: 18 BRPM | DIASTOLIC BLOOD PRESSURE: 69 MMHG | HEART RATE: 65 BPM

## 2017-07-28 LAB
ANION GAP BLD CALC-SCNC: 9 MMOL/L (ref 5–15)
BASOPHILS # BLD AUTO: 0.2 K/UL (ref 0–0.1)
BASOPHILS # BLD: 2 % (ref 0–1)
BUN SERPL-MCNC: 20 MG/DL (ref 6–20)
BUN/CREAT SERPL: 13 (ref 12–20)
CALCIUM SERPL-MCNC: 8.6 MG/DL (ref 8.5–10.1)
CHLORIDE SERPL-SCNC: 93 MMOL/L (ref 97–108)
CO2 SERPL-SCNC: 25 MMOL/L (ref 21–32)
CREAT SERPL-MCNC: 1.52 MG/DL (ref 0.55–1.02)
DATE LAST DOSE: ABNORMAL
DIFFERENTIAL METHOD BLD: ABNORMAL
EOSINOPHIL # BLD: 0.5 K/UL (ref 0–0.4)
EOSINOPHIL NFR BLD: 4 % (ref 0–7)
ERYTHROCYTE [DISTWIDTH] IN BLOOD BY AUTOMATED COUNT: 15.5 % (ref 11.5–14.5)
GLUCOSE SERPL-MCNC: 164 MG/DL (ref 65–100)
HCT VFR BLD AUTO: 30.3 % (ref 35–47)
HGB BLD-MCNC: 10.1 G/DL (ref 11.5–16)
LYMPHOCYTES # BLD AUTO: 14 % (ref 12–49)
LYMPHOCYTES # BLD: 1.6 K/UL (ref 0.8–3.5)
MCH RBC QN AUTO: 25 PG (ref 26–34)
MCHC RBC AUTO-ENTMCNC: 33.3 G/DL (ref 30–36.5)
MCV RBC AUTO: 75 FL (ref 80–99)
MONOCYTES # BLD: 0.5 K/UL (ref 0–1)
MONOCYTES NFR BLD AUTO: 4 % (ref 5–13)
MYELOCYTES NFR BLD MANUAL: 1 %
NEUTS SEG # BLD: 8.8 K/UL (ref 1.8–8)
NEUTS SEG NFR BLD AUTO: 75 % (ref 32–75)
PLATELET # BLD AUTO: 298 K/UL (ref 150–400)
PLATELET COMMENTS,PCOM: ABNORMAL
POTASSIUM SERPL-SCNC: 4.2 MMOL/L (ref 3.5–5.1)
RBC # BLD AUTO: 4.04 M/UL (ref 3.8–5.2)
RBC MORPH BLD: ABNORMAL
REPORTED DOSE,DOSE: ABNORMAL UNITS
REPORTED DOSE/TIME,TMG: ABNORMAL
SODIUM SERPL-SCNC: 127 MMOL/L (ref 136–145)
VANCOMYCIN TROUGH SERPL-MCNC: 10.3 UG/ML (ref 5–10)
WBC # BLD AUTO: 11.7 K/UL (ref 3.6–11)

## 2017-07-28 PROCEDURE — 36415 COLL VENOUS BLD VENIPUNCTURE: CPT | Performed by: INTERNAL MEDICINE

## 2017-07-28 PROCEDURE — 96366 THER/PROPH/DIAG IV INF ADDON: CPT

## 2017-07-28 PROCEDURE — 74011250636 HC RX REV CODE- 250/636: Performed by: INTERNAL MEDICINE

## 2017-07-28 PROCEDURE — 80048 BASIC METABOLIC PNL TOTAL CA: CPT | Performed by: INTERNAL MEDICINE

## 2017-07-28 PROCEDURE — 96365 THER/PROPH/DIAG IV INF INIT: CPT

## 2017-07-28 PROCEDURE — 80202 ASSAY OF VANCOMYCIN: CPT | Performed by: INTERNAL MEDICINE

## 2017-07-28 PROCEDURE — 85025 COMPLETE CBC W/AUTO DIFF WBC: CPT | Performed by: INTERNAL MEDICINE

## 2017-07-28 RX ORDER — SODIUM CHLORIDE 0.9 % (FLUSH) 0.9 %
5-10 SYRINGE (ML) INJECTION AS NEEDED
Status: CANCELLED | OUTPATIENT
Start: 2017-07-28 | End: 2017-07-29

## 2017-07-28 RX ORDER — SODIUM CHLORIDE 0.9 % (FLUSH) 0.9 %
5-10 SYRINGE (ML) INJECTION AS NEEDED
Status: ACTIVE | OUTPATIENT
Start: 2017-07-28 | End: 2017-07-29

## 2017-07-28 RX ORDER — SODIUM CHLORIDE 9 MG/ML
25 INJECTION, SOLUTION INTRAVENOUS AS NEEDED
Status: CANCELLED | OUTPATIENT
Start: 2017-07-28 | End: 2017-07-29

## 2017-07-28 RX ORDER — SODIUM CHLORIDE 9 MG/ML
25 INJECTION, SOLUTION INTRAVENOUS AS NEEDED
Status: DISPENSED | OUTPATIENT
Start: 2017-07-28 | End: 2017-07-29

## 2017-07-28 RX ADMIN — SODIUM CHLORIDE 25 ML/HR: 900 INJECTION, SOLUTION INTRAVENOUS at 15:02

## 2017-07-28 RX ADMIN — Medication 10 ML: at 15:02

## 2017-07-28 RX ADMIN — VANCOMYCIN HYDROCHLORIDE 1000 MG: 1 INJECTION, POWDER, LYOPHILIZED, FOR SOLUTION INTRAVENOUS at 15:37

## 2017-07-28 NOTE — PROGRESS NOTES
1445 Pt admit to [facility] for daily Vancomycin ambulatory in stable condition. Accompanied  By supportive sister. Assessment completed, states saw MD this AM who redressed her right leg. No new concerns voiced. Peripheral IV access with positive blood return intact. Labs drawn per order and sent. Labs cleared by pharmacist, Tam Delgado. Vancomycin ordered and given. Visit Vitals    /69 (BP 1 Location: Left arm, BP Patient Position: Sitting)    Pulse 65    Temp 97.6 °F (36.4 °C)    Resp 18       Medications:  Normal Saline  Vancomycin    1705 Received telephone order from Dr. Courtney Davila to terminate any remaining therapy, removed IV, and have patient follow up with him in his office for labs, etc on Monday. Patient verbalized understanding. IV removed. D/c home ambulatory in no distress. Pt aware of no further [facility] appointments.

## 2017-07-29 ENCOUNTER — HOSPITAL ENCOUNTER (OUTPATIENT)
Dept: INFUSION THERAPY | Age: 82
End: 2017-07-29
Payer: MEDICARE

## 2017-07-30 ENCOUNTER — HOSPITAL ENCOUNTER (OUTPATIENT)
Dept: INFUSION THERAPY | Age: 82
End: 2017-07-30
Payer: MEDICARE

## 2017-07-31 ENCOUNTER — HOSPITAL ENCOUNTER (OUTPATIENT)
Dept: INFUSION THERAPY | Age: 82
End: 2017-07-31
Payer: MEDICARE

## 2017-07-31 ENCOUNTER — APPOINTMENT (OUTPATIENT)
Dept: INFUSION THERAPY | Age: 82
End: 2017-07-31
Payer: MEDICARE

## 2017-08-01 ENCOUNTER — HOSPITAL ENCOUNTER (OUTPATIENT)
Dept: INFUSION THERAPY | Age: 82
End: 2017-08-01

## 2017-08-02 ENCOUNTER — APPOINTMENT (OUTPATIENT)
Dept: INFUSION THERAPY | Age: 82
End: 2017-08-02

## 2017-08-04 NOTE — PROGRESS NOTES
[facility] Note:  1500*Pt arrived at [facility] ambulatory and in no distress for Vanc  For 7 days. Pt had first dose in ED yesterday. She is concerned about getting here daily but daughters are supportive,   Explained about her antibiotic and the cellulitis in rt leg. Pt is diabetic too. .    Assessment stable, no ncomplaints voiced. BP high and will take meds when she gets home. Medications received:  vancomycin  IV secured in rt arm.    *1815 Tolerated treatment well, no adverse reaction noted. D/Cd from [facility] ambulatory and in no distress accompanied by family. Next appt 1500 7/28  Visit Vitals    /71    Pulse (!) 5    Temp 97.9 °F (36.6 °C)    Resp 18     No results found for this or any previous visit (from the past 12 hour(s)). no

## 2017-08-07 ENCOUNTER — OFFICE VISIT (OUTPATIENT)
Dept: CARDIOLOGY CLINIC | Age: 82
End: 2017-08-07

## 2017-08-07 VITALS
WEIGHT: 172.8 LBS | OXYGEN SATURATION: 95 % | DIASTOLIC BLOOD PRESSURE: 76 MMHG | HEART RATE: 56 BPM | BODY MASS INDEX: 30.62 KG/M2 | HEIGHT: 63 IN | RESPIRATION RATE: 12 BRPM | SYSTOLIC BLOOD PRESSURE: 180 MMHG

## 2017-08-07 DIAGNOSIS — E78.5 HYPERLIPIDEMIA, UNSPECIFIED HYPERLIPIDEMIA TYPE: ICD-10-CM

## 2017-08-07 DIAGNOSIS — I25.10 ATHEROSCLEROSIS OF NATIVE CORONARY ARTERY OF NATIVE HEART WITHOUT ANGINA PECTORIS: ICD-10-CM

## 2017-08-07 DIAGNOSIS — I67.89 ACUTE, BUT ILL-DEFINED, CEREBROVASCULAR DISEASE: ICD-10-CM

## 2017-08-07 DIAGNOSIS — I48.91 ATRIAL FIBRILLATION, UNSPECIFIED TYPE (HCC): Primary | ICD-10-CM

## 2017-08-07 RX ORDER — HYDRALAZINE HYDROCHLORIDE 50 MG/1
50 TABLET, FILM COATED ORAL 3 TIMES DAILY
COMMUNITY
End: 2017-08-07 | Stop reason: SDUPTHER

## 2017-08-07 RX ORDER — HYDRALAZINE HYDROCHLORIDE 50 MG/1
50 TABLET, FILM COATED ORAL 3 TIMES DAILY
Qty: 270 TAB | Refills: 2 | Status: SHIPPED | OUTPATIENT
Start: 2017-08-07 | End: 2018-04-13 | Stop reason: SDUPTHER

## 2017-08-07 NOTE — PROGRESS NOTES
HISTORY OF PRESENT ILLNESS  Gwyn Mcqueen is a 81 y.o. female. She has h/o HTN, HLD, AODM and CAD  And PAF (diagnosed on 4/17 for which she underwent successful cardioversion on oac and amiodarone)here for follow up  Stress test normal on 11/10    Doppler /US renal NO MANDO b/l    PTCA and stent of right coronary artery in 1996 July 26, 2007. The cardiac catheterization revealed mild 10% tapering of the ostium. The left anterior descending artery had mild intimal disease throughout. There was a 30% stenosis in the proximal portion of the LAD between the first and second septal . The remainder of the LAD had only mild intimal disease. The circumflex artery had a 30%-40% stenosis in the proximal mid segment prior to take off of obtuse marginal branch. The right coronary artery was a large dominant artery with 80% proximal stenosis followed by 90% proximal to mid stenosis, followed by 30% residual stenosis. Ejection fraction was estimated at 50%-55%. 2/14: nuclear stress test: mild ischemia mid distal kailey lateral wall EF 59%    3/14: cardiac catheterization:Left main: The vessel was normal sized. Angiography  showed minor luminal irregularities. LAD: The vessel was normal sized. Angiography showed mild atherosclerosis. There was a 30 % stenosis in the  middle third of the vessel segment. 1st diagonal: The vessel was small  sized. Angiography showed minor luminal irregularities. 2nd diagonal: The  vessel was normal sized. Angiography showed mild atherosclerosis. Circumflex: The vessel was normal sized. Angiography showed mild  atherosclerosis. There was a 30 % stenosis. RCA: The vessel was large  sized (dominant). Angiography showed mild atherosclerosis and 3 patent  prior stents.  There was a tubular 30 % stenosis in the distal third of the  vessel segment.    MEDICAL RX ONLY at that time   Stopped niacin in 2014 for \"sick \" feeling    Seen in [facility] for HTN on 2/15 aldactone increased to 25 mg at that time  Renal US on 3/15 no MANDO  ON 4/17 admitted to [facility] for sob and found to be in AF  Nuclear stress test on 4/25/17:No ischemia demonstrated. No infarct visible. LVEF 61%.      Chest ct on 4/17:1. Atherosclerotic aorta with coronary artery calcification. 2. Small bilateral pleural effusions with right pleural calcifications. 3. Status post cholecystectomy. Valentin/Cardioversion  on 4/28/17:VALENTIN with normal EF mild mr and TR no evidence for gross intracardiac thrombi  Cardioversion: 150 joules were delivered in a synch fashion with restoration of NSR  Amiodarone started lopressor decreased  HPI  Patient still with occasional dizziness. This does not occur every day. No chest pain or shortness of breath are reported at this time. No syncopal episodes  reported  Review of Systems   Respiratory: Negative. Cardiovascular: Negative. Neurological: Positive for dizziness. Visit Vitals    /76 (BP 1 Location: Left arm, BP Patient Position: Sitting)    Pulse (!) 56    Resp 12    Ht 5' 3\" (1.6 m)    Wt 78.4 kg (172 lb 12.8 oz)    SpO2 95%    BMI 30.61 kg/m2       Physical Exam   Neck: No JVD present. Carotid bruit is not present. Cardiovascular: Normal rate and regular rhythm. Pulmonary/Chest: Effort normal and breath sounds normal.   Abdominal: Soft. Musculoskeletal: She exhibits edema. Psychiatric: She has a normal mood and affect. Current Outpatient Prescriptions on File Prior to Visit   Medication Sig Dispense Refill    hydroCHLOROthiazide (HYDRODIURIL) 25 mg tablet TAKE 1 TABLET DAILY 90 Tab 3    metoprolol tartrate (LOPRESSOR) 50 mg tablet Take 1 Tab by mouth two (2) times a day. 180 Tab 3    atorvastatin (LIPITOR) 40 mg tablet Take 1 Tab by mouth daily. 90 Tab 3    enalapril (VASOTEC) 20 mg tablet TAKE 1 TABLET TWICE A  Tab 3    apixaban (ELIQUIS) 5 mg tablet Take 1 Tab by mouth two (2) times a day.  Indications: PREVENT THROMBOEMBOLISM IN CHRONIC ATRIAL FIBRILLATION 180 Tab 3    amiodarone (PACERONE) 100 mg tablet Take 1 Tab by mouth daily. 90 Tab 3    nitroglycerin (NITROSTAT) 0.4 mg SL tablet 1 Tab by SubLINGual route every five (5) minutes as needed for Chest Pain. 25 Tab 3    glipiZIDE (GLUCOTROL) 5 mg tablet Take 2 tabs bid (Patient taking differently: Take 2 tabs in am.  1 tablet in pm.) 360 Tab 3    omeprazole (PRILOSEC) 20 mg capsule Take 1 Cap by mouth daily. 90 Cap 3    glucose blood VI test strips (ASCENSIA AUTODISC VI, ONE TOUCH ULTRA TEST VI) strip Check blood sugars 4 times daily 360 Strip 3    aspirin 81 mg chewable tablet Take 1 Tab by mouth daily. 30 Tab 3    insulin glargine (LANTUS) 100 unit/mL injection 10 Units by SubCUTAneous route daily.  isosorbide mononitrate ER (IMDUR) 60 mg CR tablet Take 1 Tab by mouth two (2) times a day. 180 Tab 3    cholecalciferol, vitamin d3, (VITAMIN D) 1,000 unit tablet Take 2,000 Units by mouth daily. No current facility-administered medications on file prior to visit. Lab Results   Component Value Date/Time    Sodium 129 07/31/2017 10:31 AM    Potassium 4.6 07/31/2017 10:31 AM    Chloride 90 07/31/2017 10:31 AM    CO2 23 07/31/2017 10:31 AM    Anion gap 9 07/28/2017 02:57 PM    Glucose 124 07/31/2017 10:31 AM    BUN 20 07/31/2017 10:31 AM    Creatinine 1.15 07/31/2017 10:31 AM    BUN/Creatinine ratio 17 07/31/2017 10:31 AM    GFR est AA 52 07/31/2017 10:31 AM    GFR est non-AA 45 07/31/2017 10:31 AM    Calcium 9.3 07/31/2017 10:31 AM         ASSESSMENT and PLAN  HTN:still elevated even at hi=ome by her log. Now off norvasc on account of le edema. Increase hydralazine to 50 mg tid and bp check in 1 week  PAF:she remains  in NSR after cardioversion. Continue eliquis no untoward effects thus far. Unfortunately dizziness may be related to amiodarone, HTN, ENT. Discussed options , for now continue with ent evaluation as planned and decreased dose of  amiodarone to 100 mg daily.    If ent evaluation is normal then we may have to consider stopping amiodarone altogether  lft and tsh ok in 6/17  holter on hold for now despite dizziness hr is 60 today  ECG on 7/14/17 NSR FAV and IVCD  CAD: seemingly completely asymptomatic.  Her ECG showed with NSR and no significant st t changes and pacs continue toprol imdur asa.  normal stress test on 4/25/17  HLD:closely followed by her PCP and well controlled  AODM: also closely followed by her PCP  Hyponatremia:hctz may be contributing (reduced recently the dose), siadh in the differential closely followed by her pcp  Right leg cellulitis: on iv vancomycin and now on po abx   See her back in 6-8 weeks otherwise

## 2017-08-07 NOTE — MR AVS SNAPSHOT
Visit Information Date & Time Provider Department Dept. Phone Encounter #  
 8/7/2017 11:20 AM Michel Rodriguez MD [facility] [phone] [id number] Your Appointments 8/8/2017 11:00 AM  
ESTABLISHED PATIENT with MD Fletcher Abbasi [place], [address] ([address]) Appt Note: follow up on her leg  
 [address]  
[phone]  
  
   
 [address] [id number]  
  
    
 10/18/2017 10:30 AM  
ESTABLISHED PATIENT with MD Fletcher Abbasi [place], [address] ([address]) Appt Note: 3m  
 [address]  
[phone] Upcoming Health Maintenance Date Due  
 EYE EXAM RETINAL OR DILATED Q1 7/4/1945 DTaP/Tdap/Td series (1 - Tdap) 7/4/1957 ZOSTER VACCINE AGE 60> 5/4/1996 GLAUCOMA SCREENING Q2Y 7/4/2001 OSTEOPOROSIS SCREENING (DEXA) 7/4/2001 MEDICARE YEARLY EXAM 7/4/2001 Pneumococcal 65+ Low/Medium Risk (2 of 2 - PPSV23) 10/7/2016 INFLUENZA AGE 9 TO ADULT 8/1/2017 HEMOGLOBIN A1C Q6M 12/29/2017 LIPID PANEL Q1 6/13/2018 FOOT EXAM Q1 6/29/2018 MICROALBUMIN Q1 6/29/2018 Allergies as of 8/7/2017  Review Complete On: 8/7/2017 By: Ronan Prado RN Severity Noted Reaction Type Reactions Codeine  11/18/2010    Unknown (comments) Keflin  04/18/2011    Itching Pcn [Penicillins]  11/18/2010    Unknown (comments) Tramadol  04/14/2016    Other (comments) Makes her head feel swishy Current Immunizations  Reviewed on 7/28/2017 No immunizations on file. Not reviewed this visit Vitals BP Pulse Resp Height(growth percentile) Weight(growth percentile) SpO2  
 180/76 (BP 1 Location: Left arm, BP Patient Position: Sitting) (!) 56 12 5' 3\" (1.6 m) 172 lb 12.8 oz (78.4 kg) 95% BMI OB Status Smoking Status 30.61 kg/m2 Hysterectomy Former Smoker BMI and BSA Data Body Mass Index Body Surface Area  
 30.61 kg/m 2 1.87 m 2 Preferred Pharmacy Pharmacy Name Phone Shon Arguello [address] [facility] [place] [phone] Your Updated Medication List  
  
   
This list is accurate as of: 8/7/17 12:00 PM.  Always use your most recent med list.  
  
  
  
  
 amiodarone 100 mg tablet Commonly known as:  Radha Edgar Take 1 Tab by mouth daily. apixaban 5 mg tablet Commonly known as:  Dorgreg Alex Take 1 Tab by mouth two (2) times a day. Indications: PREVENT THROMBOEMBOLISM IN CHRONIC ATRIAL FIBRILLATION  
  
 aspirin 81 mg chewable tablet Take 1 Tab by mouth daily. atorvastatin 40 mg tablet Commonly known as:  LIPITOR Take 1 Tab by mouth daily. enalapril 20 mg tablet Commonly known as:  VASOTEC  
TAKE 1 TABLET TWICE A DAY  
  
 glipiZIDE 5 mg tablet Commonly known as:  Evangelista Perish Take 2 tabs bid  
  
 glucose blood VI test strips strip Commonly known as:  ASCENSIA AUTODISC VI, ONE TOUCH ULTRA TEST VI Check blood sugars 4 times daily  
  
 hydrALAZINE 50 mg tablet Commonly known as:  APRESOLINE Take 50 mg by mouth three (3) times daily. hydroCHLOROthiazide 25 mg tablet Commonly known as:  HYDRODIURIL  
TAKE 1 TABLET DAILY  
  
 isosorbide mononitrate ER 60 mg CR tablet Commonly known as:  IMDUR Take 1 Tab by mouth two (2) times a day. LANTUS 100 unit/mL injection Generic drug:  insulin glargine 10 Units by SubCUTAneous route daily. metoprolol tartrate 50 mg tablet Commonly known as:  LOPRESSOR Take 1 Tab by mouth two (2) times a day. nitroglycerin 0.4 mg SL tablet Commonly known as:  NITROSTAT  
1 Tab by SubLINGual route every five (5) minutes as needed for Chest Pain. omeprazole 20 mg capsule Commonly known as:  PriLOSEC Take 1 Cap by mouth daily. VITAMIN D3 1,000 unit tablet Generic drug:  cholecalciferol Take 2,000 Units by mouth daily. Patient Instructions Increase Hydralazine 50 mg three times a day BP check in 2 weeks Follow up with Jmaes Craft in 2 months Introducing [facility]! Rashida Roberts introduces [employer] patient portal. Now you can access parts of your medical record, email your doctor's office, and request medication refills online. 1. In your internet browser, go to [email or web address] 2. Click on the First Time User? Click Here link in the Sign In box. You will see the New Member Sign Up page. 3. Enter your [employer] Access Code exactly as it appears below. You will not need to use this code after youve completed the sign-up process. If you do not sign up before the expiration date, you must request a new code. · [employer] Access Code: [email or web address] Expires: 8/22/2017  7:39 AM 
 
4. Enter the last four digits of your Social Security Number (xxxx) and Date of Birth (mm/dd/yyyy) as indicated and click Submit. You will be taken to the next sign-up page. 5. Create a [employer] ID. This will be your [employer] login ID and cannot be changed, so think of one that is secure and easy to remember. 6. Create a [employer] password. You can change your password at any time. 7. Enter your Password Reset Question and Answer. This can be used at a later time if you forget your password. 8. Enter your e-mail address. You will receive e-mail notification when new information is available in [address] 9. Click Sign Up. You can now view and download portions of your medical record. 10. Click the Download Summary menu link to download a portable copy of your medical information. If you have questions, please visit the Frequently Asked Questions section of the [employer] website. Remember, [employer] is NOT to be used for urgent needs. For medical emergencies, dial 911. Now available from your iPhone and Android! Please provide this summary of care documentation to your next provider. Your primary care clinician is listed as [phone] [address] ellie IV. If you have any questions after today's visit, please call [phone].

## 2017-08-07 NOTE — PATIENT INSTRUCTIONS
Increase Hydralazine 50 mg three times a day    BP check in 2 weeks    Follow up with Raquel Kwon in 2 months

## 2017-08-07 NOTE — PROGRESS NOTES
Chief Complaint   Patient presents with    Hypertension     3 week follow up. Denies chest pain. Continue shortness of breath/dizziness. Receiving treatment for cellulits of right leg. Completed course of doxycycline today. To follow up with Dr. Patricia Palomares on 8-8-17 regarding further evaluation of cellulits. Medication profile updated. Verbal order per Dr. Elena Elliott.

## 2017-08-14 ENCOUNTER — TELEPHONE (OUTPATIENT)
Dept: CARDIOLOGY CLINIC | Age: 82
End: 2017-08-14

## 2017-08-14 NOTE — TELEPHONE ENCOUNTER
Verified patient with two patient identifiers. Spoke with patient,she said her dizziness comes and goes. Her BP today was 135/73. Per patient she thinks has something to do with Hydralazine and forgets to take it. Encourage her to take all medicines and to call me in 1 week regarding her BP and dizziness.

## 2017-08-14 NOTE — TELEPHONE ENCOUNTER
Please call Ms. Dunaway at [phone].   She's dealing with extreme dizziness and BP is not normal.      Thank you, [facility]

## 2017-08-17 ENCOUNTER — APPOINTMENT (OUTPATIENT)
Dept: CT IMAGING | Age: 82
End: 2017-08-17
Attending: STUDENT IN AN ORGANIZED HEALTH CARE EDUCATION/TRAINING PROGRAM
Payer: MEDICARE

## 2017-08-17 ENCOUNTER — HOSPITAL ENCOUNTER (EMERGENCY)
Age: 82
Discharge: HOME | End: 2017-08-18
Attending: STUDENT IN AN ORGANIZED HEALTH CARE EDUCATION/TRAINING PROGRAM
Payer: MEDICARE

## 2017-08-17 DIAGNOSIS — R42 DIZZINESS: Primary | ICD-10-CM

## 2017-08-17 PROCEDURE — 96374 THER/PROPH/DIAG INJ IV PUSH: CPT

## 2017-08-17 PROCEDURE — 85027 COMPLETE CBC AUTOMATED: CPT | Performed by: STUDENT IN AN ORGANIZED HEALTH CARE EDUCATION/TRAINING PROGRAM

## 2017-08-17 PROCEDURE — 36415 COLL VENOUS BLD VENIPUNCTURE: CPT | Performed by: STUDENT IN AN ORGANIZED HEALTH CARE EDUCATION/TRAINING PROGRAM

## 2017-08-17 PROCEDURE — 74011250637 HC RX REV CODE- 250/637: Performed by: STUDENT IN AN ORGANIZED HEALTH CARE EDUCATION/TRAINING PROGRAM

## 2017-08-17 PROCEDURE — 84484 ASSAY OF TROPONIN QUANT: CPT | Performed by: STUDENT IN AN ORGANIZED HEALTH CARE EDUCATION/TRAINING PROGRAM

## 2017-08-17 PROCEDURE — 96361 HYDRATE IV INFUSION ADD-ON: CPT

## 2017-08-17 PROCEDURE — 74011250636 HC RX REV CODE- 250/636: Performed by: STUDENT IN AN ORGANIZED HEALTH CARE EDUCATION/TRAINING PROGRAM

## 2017-08-17 PROCEDURE — 80053 COMPREHEN METABOLIC PANEL: CPT | Performed by: STUDENT IN AN ORGANIZED HEALTH CARE EDUCATION/TRAINING PROGRAM

## 2017-08-17 PROCEDURE — 99284 EMERGENCY DEPT VISIT MOD MDM: CPT

## 2017-08-17 PROCEDURE — 70450 CT HEAD/BRAIN W/O DYE: CPT

## 2017-08-17 PROCEDURE — 93005 ELECTROCARDIOGRAM TRACING: CPT

## 2017-08-17 RX ORDER — MECLIZINE HCL 12.5 MG 12.5 MG/1
25 TABLET ORAL
Status: COMPLETED | OUTPATIENT
Start: 2017-08-17 | End: 2017-08-17

## 2017-08-17 RX ORDER — CLONIDINE HYDROCHLORIDE 0.1 MG/1
0.2 TABLET ORAL
Status: COMPLETED | OUTPATIENT
Start: 2017-08-17 | End: 2017-08-17

## 2017-08-17 RX ORDER — HYDRALAZINE HYDROCHLORIDE 20 MG/ML
20 INJECTION INTRAMUSCULAR; INTRAVENOUS
Status: COMPLETED | OUTPATIENT
Start: 2017-08-17 | End: 2017-08-17

## 2017-08-17 RX ADMIN — CLONIDINE HYDROCHLORIDE 0.2 MG: 0.1 TABLET ORAL at 23:24

## 2017-08-17 RX ADMIN — HYDRALAZINE HYDROCHLORIDE 20 MG: 20 INJECTION INTRAMUSCULAR; INTRAVENOUS at 23:40

## 2017-08-17 RX ADMIN — SODIUM CHLORIDE 500 ML: 900 INJECTION, SOLUTION INTRAVENOUS at 23:28

## 2017-08-17 RX ADMIN — MECLIZINE 25 MG: 12.5 TABLET ORAL at 23:24

## 2017-08-18 VITALS
OXYGEN SATURATION: 95 % | BODY MASS INDEX: 30.12 KG/M2 | HEIGHT: 63 IN | DIASTOLIC BLOOD PRESSURE: 50 MMHG | TEMPERATURE: 98 F | RESPIRATION RATE: 15 BRPM | SYSTOLIC BLOOD PRESSURE: 129 MMHG | WEIGHT: 170 LBS | HEART RATE: 54 BPM

## 2017-08-18 LAB
ALBUMIN SERPL-MCNC: 3.7 G/DL (ref 3.5–5)
ALBUMIN/GLOB SERPL: 1.1 {RATIO} (ref 1.1–2.2)
ALP SERPL-CCNC: 90 U/L (ref 45–117)
ALT SERPL-CCNC: 43 U/L (ref 12–78)
ANION GAP SERPL CALC-SCNC: 9 MMOL/L (ref 5–15)
AST SERPL-CCNC: 28 U/L (ref 15–37)
ATRIAL RATE: 61 BPM
BILIRUB SERPL-MCNC: 0.6 MG/DL (ref 0.2–1)
BUN SERPL-MCNC: 17 MG/DL (ref 6–20)
BUN/CREAT SERPL: 14 (ref 12–20)
CALCIUM SERPL-MCNC: 9 MG/DL (ref 8.5–10.1)
CALCULATED P AXIS, ECG09: 105 DEGREES
CALCULATED R AXIS, ECG10: 14 DEGREES
CALCULATED T AXIS, ECG11: 25 DEGREES
CHLORIDE SERPL-SCNC: 96 MMOL/L (ref 97–108)
CO2 SERPL-SCNC: 27 MMOL/L (ref 21–32)
CREAT SERPL-MCNC: 1.22 MG/DL (ref 0.55–1.02)
DIAGNOSIS, 93000: NORMAL
ERYTHROCYTE [DISTWIDTH] IN BLOOD BY AUTOMATED COUNT: 16.5 % (ref 11.5–14.5)
GLOBULIN SER CALC-MCNC: 3.4 G/DL (ref 2–4)
GLUCOSE SERPL-MCNC: 113 MG/DL (ref 65–100)
HCT VFR BLD AUTO: 34.9 % (ref 35–47)
HGB BLD-MCNC: 11.5 G/DL (ref 11.5–16)
MCH RBC QN AUTO: 25.8 PG (ref 26–34)
MCHC RBC AUTO-ENTMCNC: 33 G/DL (ref 30–36.5)
MCV RBC AUTO: 78.3 FL (ref 80–99)
P-R INTERVAL, ECG05: 184 MS
PLATELET # BLD AUTO: 257 K/UL (ref 150–400)
POTASSIUM SERPL-SCNC: 3.8 MMOL/L (ref 3.5–5.1)
PROT SERPL-MCNC: 7.1 G/DL (ref 6.4–8.2)
Q-T INTERVAL, ECG07: 456 MS
QRS DURATION, ECG06: 90 MS
QTC CALCULATION (BEZET), ECG08: 459 MS
RBC # BLD AUTO: 4.46 M/UL (ref 3.8–5.2)
SODIUM SERPL-SCNC: 132 MMOL/L (ref 136–145)
TROPONIN I SERPL-MCNC: <0.04 NG/ML
VENTRICULAR RATE, ECG03: 61 BPM
WBC # BLD AUTO: 11.8 K/UL (ref 3.6–11)

## 2017-08-18 RX ORDER — MECLIZINE HYDROCHLORIDE 25 MG/1
25 TABLET ORAL
Qty: 15 TAB | Refills: 0 | Status: SHIPPED | OUTPATIENT
Start: 2017-08-18 | End: 2017-08-18

## 2017-08-18 RX ORDER — MECLIZINE HYDROCHLORIDE 25 MG/1
25 TABLET ORAL
Qty: 15 TAB | Refills: 0 | Status: SHIPPED | OUTPATIENT
Start: 2017-08-18 | End: 2017-08-23

## 2017-08-18 RX ORDER — MECLIZINE HYDROCHLORIDE 25 MG/1
25 TABLET ORAL
Qty: 12 TAB | Refills: 0 | Status: SHIPPED | OUTPATIENT
Start: 2017-08-18 | End: 2017-08-28

## 2017-08-18 RX ORDER — MECLIZINE HYDROCHLORIDE 25 MG/1
25 TABLET ORAL
Qty: 1 TAB | Refills: 0 | Status: SHIPPED | OUTPATIENT
Start: 2017-08-18 | End: 2017-08-18

## 2017-08-18 NOTE — ED PROVIDER NOTES
HPI Comments: 81 y.o. female with past medical history significant for type 2 DM, HLD, HTN, CVA, CAD, a fib, PVD who presents accompanied by daughter with chief complaint of dizziness. Patient complains of intermittent dizziness and feeling \"tight\" in her head, stating it has been worsening since 8/12/17. Patient states she's had past issues with the dizziness but not to this extent. Patient states the dizziness is not exacerbated with positional changes. Patient states her pressure was \"jumping around\" while at home. Patient states her medications have been adjusted to try to combat the dizziness and she is still on Amiodarone 100 mg. Patient denies missing any doses. Patient states her breathing has been off and she is feeling short of breath. Patient states she went to an ENT last week and had a normal exam. Patient states she is occasionally lightheaded when the dizziness worsens but denies any currently. Patient is on Eliquis for a fib and recently began Hydralazine. Patient denies recent fall or trauma. Patient denies weakness. There are no other acute medical concerns at this time. Social hx: former smoker, no alcohol  PCP: Samuel Vo IV, MD  Cardiologist: Chele Pierre MD    Note written by Rodolfo Lizama, as dictated by Kendra Pitt MD 10:52 PM     The history is provided by the patient. No  was used.         Past Medical History:   Diagnosis Date    [facility]     CAD (coronary artery disease)     Cerebrovascular accident stroke, other, unspec 11/18/2010    Coronary atherosclerosis of native coronary artery 11/18/2010    Essential hypertension     Essential hypertension, benign 11/18/2010    HLD (hyperlipidemia) 11/18/2010    PVD (peripheral vascular disease) ([facility] [address] 2017    Type II or unspecified type diabetes mellitus without mention of complication, not stated as uncontrolled 11/18/2010       Past Surgical History:   Procedure Laterality Date    HX CORONARY STENT PLACEMENT      HX HEART CATHETERIZATION      HX HYSTERECTOMY  1972         History reviewed. No pertinent family history. Social History     Social History    Marital status:      Spouse name: N/A    Number of children: N/A    Years of education: N/A     Occupational History    Not on file. Social History Main Topics    Smoking status: Former Smoker     Packs/day: 0.80     Years: 20.00     Types: Cigarettes     Quit date: 4/18/1986    Smokeless tobacco: Never Used    Alcohol use No    Drug use: No    Sexual activity: Not on file     Other Topics Concern    Not on file     Social History Narrative         ALLERGIES: Codeine; Keflin; Pcn [penicillins]; and Tramadol    Review of Systems   Constitutional: Negative for appetite change, chills, fatigue and fever. HENT: Negative for congestion, rhinorrhea and sore throat. Respiratory: Positive for shortness of breath. Negative for cough. Cardiovascular: Negative for chest pain and leg swelling. Gastrointestinal: Negative for abdominal pain, constipation, nausea and vomiting. Genitourinary: Negative for difficulty urinating and dysuria. Musculoskeletal: Negative for back pain and neck pain. Skin: Negative for rash and wound. Neurological: Positive for dizziness and headaches. Negative for weakness and light-headedness. All other systems reviewed and are negative. Vitals:    08/17/17 2038   BP: 195/72   Pulse: 65   Resp: 16   Temp: 98.2 °F (36.8 °C)   SpO2: 98%   Weight: 77.1 kg (170 lb)   Height: 5' 3\" (1.6 m)            Physical Exam   Constitutional: She is oriented to person, place, and time. She appears well-developed. No distress. HENT:   Head: Normocephalic and atraumatic. Eyes: Conjunctivae and EOM are normal. Pupils are equal, round, and reactive to light. Neck: Normal range of motion. Neck supple. Cardiovascular: Normal rate, regular rhythm and normal heart sounds.     No murmur heard.  Pulmonary/Chest: Effort normal and breath sounds normal. No respiratory distress. Abdominal: Soft. Bowel sounds are normal. She exhibits no distension. There is no tenderness. There is no rebound. Musculoskeletal: Normal range of motion. She exhibits edema. 1+ edema of lower extremities b/l   Neurological: She is alert and oriented to person, place, and time. No cranial nerve deficit. She exhibits normal muscle tone. Coordination normal.   Skin: Skin is warm and dry. No rash noted. Psychiatric: She has a normal mood and affect. Her behavior is normal.   Nursing note and vitals reviewed. Note written by Rodolfo Beltran, as dictated by Ramesh Doe MD 10:52 PM      [facility]  ED Course       Procedures    ED EKG interpretation:  Rhythm: normal sinus rhythm; and regular . Rate (approx.): 61; Axis: normal; ST/T wave: normal; No STEMI; No ischemia; Normal intervals. Note written by Rodolfo Beltran, as dictated by Ramesh Doe MD 10:54 PM     The patient is resting comfortably and feels better, is alert, talkative, interactive and in no distress. The repeat examination is unremarkable and benign. The patient is neurologically intact, has a normal mental status and is ambulatory in the ED. The history, exam, diagnostic testing (if any) and the patient's current condition do not suggest pathologic dysrhythmias, meningitis, stroke, sepsis, subarachnoid hemorrhage, intracranial bleeding, encephalitis or other significant pathology that would warrant further testing, continued ED treatment, admission, neurological consultation, or other specialist evaluation at this point. The vital signs have been stable. The patient's condition is stable and appropriate for discharge. The patient will pursue further outpatient evaluation with the primary care physician or other designated or consulting physician as indicated in the discharge instructions.

## 2017-08-18 NOTE — ED TRIAGE NOTES
Patient arrives c/o dizziness, which has been a problem for a while now per patient. Patient also c/o SOB. HX afib and being cardioverted. Denies headache, n/v. Pt describing her head pain as tightness but its not like a headache.

## 2017-08-18 NOTE — DISCHARGE INSTRUCTIONS
Dizziness: Care Instructions  Your Care Instructions  Dizziness is the feeling of unsteadiness or fuzziness in your head. It is different than having vertigo, which is a feeling that the room is spinning or that you are moving or falling. It is also different from lightheadedness, which is the feeling that you are about to faint. It can be hard to know what causes dizziness. Some people feel dizzy when they have migraine headaches. Sometimes bouts of flu can make you feel dizzy. Some medical conditions, such as heart problems or high blood pressure, can make you feel dizzy. Many medicines can cause dizziness, including medicines for high blood pressure, pain, or anxiety. If a medicine causes your symptoms, your doctor may recommend that you stop or change the medicine. If it is a problem with your heart, you may need medicine to help your heart work better. If there is no clear reason for your symptoms, your doctor may suggest watching and waiting for a while to see if the dizziness goes away on its own. Follow-up care is a key part of your treatment and safety. Be sure to make and go to all appointments, and call your doctor if you are having problems. It's also a good idea to know your test results and keep a list of the medicines you take. How can you care for yourself at home? · If your doctor recommends or prescribes medicine, take it exactly as directed. Call your doctor if you think you are having a problem with your medicine. · Do not drive while you feel dizzy. · Try to prevent falls. Steps you can take include:  ¨ Using nonskid mats, adding grab bars near the tub, and using night-lights. ¨ Clearing your home so that walkways are free of anything you might trip on. ¨ Letting family and friends know that you have been feeling dizzy. This will help them know how to help you. When should you call for help? Call 911 anytime you think you may need emergency care.  For example, call if:  · You passed out (lost consciousness). · You have dizziness along with symptoms of a heart attack. These may include:  ¨ Chest pain or pressure, or a strange feeling in the chest.  ¨ Sweating. ¨ Shortness of breath. ¨ Nausea or vomiting. ¨ Pain, pressure, or a strange feeling in the back, neck, jaw, or upper belly or in one or both shoulders or arms. ¨ Lightheadedness or sudden weakness. ¨ A fast or irregular heartbeat. · You have symptoms of a stroke. These may include:  ¨ Sudden numbness, tingling, weakness, or loss of movement in your face, arm, or leg, especially on only one side of your body. ¨ Sudden vision changes. ¨ Sudden trouble speaking. ¨ Sudden confusion or trouble understanding simple statements. ¨ Sudden problems with walking or balance. ¨ A sudden, severe headache that is different from past headaches. Call your doctor now or seek immediate medical care if:  · You feel dizzy and have a fever, headache, or ringing in your ears. · You have new or increased nausea and vomiting. · Your dizziness does not go away or comes back. Watch closely for changes in your health, and be sure to contact your doctor if:  · You do not get better as expected. Where can you learn more? Go to http://flora-rand.info/. Enter Y634 in the search box to learn more about \"Dizziness: Care Instructions. \"  Current as of: March 20, 2017  Content Version: 11.3  © 2446-9100 [employer]. Care instructions adapted under license by [employer] (which disclaims liability or warranty for this information). If you have questions about a medical condition or this instruction, always ask your healthcare professional. [place] [address] any warranty or liability for your use of this information. We hope that we have addressed all of your medical concerns.  The examination and treatment you received in the Emergency Department were for an emergent problem and were not intended as complete care. It is important that you follow up with your healthcare provider(s) for ongoing care. If your symptoms worsen or do not improve as expected, and you are unable to reach your usual health care provider(s), you should return to the Emergency Department. Today's healthcare is undergoing tremendous change, and patient satisfaction surveys are one of the many tools to assess the quality of medical care. You may receive a survey from the [employer] regarding your experience in the Emergency Department. I hope that your experience has been completely positive, particularly the medical care that I provided. As such, please participate in the survey; anything less than excellent does not meet my expectations or intentions. [address] and [facility] participate in nationally recognized quality of care measures. If your blood pressure is greater than 120/80, as reported below, we urge that you seek medical care to address the potential of high blood pressure, commonly known as hypertension. Hypertension can be hereditary or can be caused by certain medical conditions, pain, stress, or \"white coat syndrome. \"       Please make an appointment with your health care provider(s) for follow up of your Emergency Department visit. VITALS:   Patient Vitals for the past 8 hrs:   Temp Pulse Resp BP SpO2   08/18/17 0030 - (!) 57 16 141/52 95 %   08/18/17 0015 - (!) 59 17 161/56 97 %   08/18/17 0000 - 62 13 (!) 215/66 99 %   08/17/17 2345 - 62 17 (!) 207/92 97 %   08/17/17 2340 - 60 - (!) 215/66 -   08/17/17 2330 - 62 19 (!) 223/79 97 %   08/17/17 2324 - 60 - (!) 212/64 -   08/17/17 2245 - 61 17 (!) 217/69 97 %   08/17/17 2038 98.2 °F (36.8 °C) 65 16 195/72 98 %          Thank you for allowing us to provide you with medical care today. We realize that you have many choices for your emergency care needs.   Please choose us in the future for any continued health care needs. Val Ashley MD    [facility].   Office: [phone]            Recent Results (from the past 24 hour(s))   EKG, 12 LEAD, INITIAL    Collection Time: 08/17/17 10:54 PM   Result Value Ref Range    Ventricular Rate 61 BPM    Atrial Rate 61 BPM    P-R Interval 184 ms    QRS Duration 90 ms    Q-T Interval 456 ms    QTC Calculation (Bezet) 459 ms    Calculated P Axis 105 degrees    Calculated R Axis 14 degrees    Calculated T Axis 25 degrees    Diagnosis       Normal sinus rhythm  When compared with ECG of 01-MAY-2017 18:31,  No significant change was found     TROPONIN I    Collection Time: 08/17/17 11:18 PM   Result Value Ref Range    Troponin-I, Qt. <0.04 <6.94 ng/mL   METABOLIC PANEL, COMPREHENSIVE    Collection Time: 08/17/17 11:18 PM   Result Value Ref Range    Sodium 132 (L) 136 - 145 mmol/L    Potassium 3.8 3.5 - 5.1 mmol/L    Chloride 96 (L) 97 - 108 mmol/L    CO2 27 21 - 32 mmol/L    Anion gap 9 5 - 15 mmol/L    Glucose 113 (H) 65 - 100 mg/dL    BUN 17 6 - 20 MG/DL    Creatinine 1.22 (H) 0.55 - 1.02 MG/DL    BUN/Creatinine ratio 14 12 - 20      GFR est AA 51 (L) >60 ml/min/1.73m2    GFR est non-AA 42 (L) >60 ml/min/1.73m2    Calcium 9.0 8.5 - 10.1 MG/DL    Bilirubin, total 0.6 0.2 - 1.0 MG/DL    ALT (SGPT) 43 12 - 78 U/L    AST (SGOT) 28 15 - 37 U/L    Alk.  phosphatase 90 45 - 117 U/L    Protein, total 7.1 6.4 - 8.2 g/dL    Albumin 3.7 3.5 - 5.0 g/dL    Globulin 3.4 2.0 - 4.0 g/dL    A-G Ratio 1.1 1.1 - 2.2     CBC W/O DIFF    Collection Time: 08/17/17 11:18 PM   Result Value Ref Range    WBC 11.8 (H) 3.6 - 11.0 K/uL    RBC 4.46 3.80 - 5.20 M/uL    HGB 11.5 11.5 - 16.0 g/dL    HCT 34.9 (L) 35.0 - 47.0 %    MCV 78.3 (L) 80.0 - 99.0 FL    MCH 25.8 (L) 26.0 - 34.0 PG    MCHC 33.0 30.0 - 36.5 g/dL    RDW 16.5 (H) 11.5 - 14.5 %    PLATELET 467 806 - 604 K/uL       Ct Head Wo Cont    Result Date: 8/17/2017  EXAM:  CT HEAD WO CONT Collateral history: Dizziness and headache INDICATION:   dizziness, headache, on Eliquis COMPARISON: 10/19/2015. CONTRAST:  None. TECHNIQUE: Unenhanced CT of the head was performed using 5 mm images. Brain and bone windows were generated. CT dose reduction was achieved through use of a standardized protocol tailored for this examination and automatic exposure control for dose modulation. FINDINGS: There is an area of encephalomalacia in the left cerebellum and a craniectomy defect in the left occipital region this is chronic in nature. Extensive confluent periventricular and scattered hypodensities in the cerebral white matter also chronic in nature. Vertebrobasilar vascular calcifications. . . There is no intracranial hemorrhage, extra-axial collection, mass, mass effect or midline shift. The basilar cisterns are open. No acute infarct is identified. The bone windows demonstrate no abnormalities. The visualized portions of the paranasal sinuses and mastoid air cells are clear. IMPRESSION: Severe chronic microvascular ischemic change. 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

## 2017-08-18 NOTE — ED NOTES
I have reviewed discharge instructions with the patient. The patient verbalized understanding. Time allotted for questions. VSS. Pt wheeled out of unit with family member.

## 2017-08-19 ENCOUNTER — HOSPITAL ENCOUNTER (EMERGENCY)
Age: 82
Discharge: HOME | End: 2017-08-19
Attending: FAMILY MEDICINE
Payer: MEDICARE

## 2017-08-19 VITALS
OXYGEN SATURATION: 97 % | RESPIRATION RATE: 19 BRPM | WEIGHT: 170 LBS | HEART RATE: 60 BPM | DIASTOLIC BLOOD PRESSURE: 72 MMHG | TEMPERATURE: 98 F | HEIGHT: 63 IN | SYSTOLIC BLOOD PRESSURE: 191 MMHG | BODY MASS INDEX: 30.12 KG/M2

## 2017-08-19 DIAGNOSIS — N30.00 ACUTE CYSTITIS WITHOUT HEMATURIA: Primary | ICD-10-CM

## 2017-08-19 LAB
BILIRUB UR QL: NEGATIVE
GLUCOSE UR QL STRIP.AUTO: NEGATIVE MG/DL
KETONES UR-MCNC: NEGATIVE MG/DL
LEUKOCYTE ESTERASE UR QL STRIP: ABNORMAL
NITRITE UR QL: NEGATIVE
PH UR: 7 [PH] (ref 5–8)
PROT UR QL: NEGATIVE MG/DL
RBC # UR STRIP: ABNORMAL /UL
SP GR UR: 1.01 (ref 1–1.03)
UROBILINOGEN UR QL: 0.2 EU/DL (ref 0.2–1)

## 2017-08-19 PROCEDURE — 99203 OFFICE O/P NEW LOW 30 MIN: CPT | Performed by: FAMILY MEDICINE

## 2017-08-19 RX ORDER — CIPROFLOXACIN 500 MG/1
500 TABLET ORAL 2 TIMES DAILY
Qty: 14 TAB | Refills: 0 | Status: SHIPPED | OUTPATIENT
Start: 2017-08-19 | End: 2017-08-23

## 2017-08-19 RX ORDER — PHENAZOPYRIDINE HYDROCHLORIDE 200 MG/1
200 TABLET, FILM COATED ORAL 3 TIMES DAILY
Qty: 6 TAB | Refills: 0 | Status: SHIPPED | OUTPATIENT
Start: 2017-08-19 | End: 2017-08-21

## 2017-08-19 NOTE — UC PROVIDER NOTE
Patient is a 81 y.o. female presenting with urinary tract infection. The history is provided by the patient. Bladder Infection    This is a new problem. The current episode started yesterday. The problem occurs every urination. The problem has been gradually worsening. The quality of the pain is described as burning. There has been no fever. She is not sexually active. Associated symptoms include hesitancy, urgency and flank pain. Pertinent negatives include no chills, no sweats, no nausea and no hematuria. She has tried nothing for the symptoms. Past Medical History:   Diagnosis Date    Atrial fibrillation ([facility] [address]     CAD (coronary artery disease)     Cerebrovascular accident stroke, other, unspec 11/18/2010    Coronary atherosclerosis of native coronary artery 11/18/2010    Essential hypertension     Essential hypertension, benign 11/18/2010    HLD (hyperlipidemia) 11/18/2010    PVD (peripheral vascular disease) ([facility] [address] 2017    Type II or unspecified type diabetes mellitus without mention of complication, not stated as uncontrolled 11/18/2010        Past Surgical History:   Procedure Laterality Date    HX CORONARY STENT PLACEMENT      HX HEART CATHETERIZATION      HX HYSTERECTOMY  1972         History reviewed. No pertinent family history. Social History     Social History    Marital status:      Spouse name: N/A    Number of children: N/A    Years of education: N/A     Occupational History    Not on file. Social History Main Topics    Smoking status: Former Smoker     Packs/day: 0.80     Years: 20.00     Types: Cigarettes     Quit date: 4/18/1986    Smokeless tobacco: Never Used    Alcohol use No    Drug use: No    Sexual activity: Not on file     Other Topics Concern    Not on file     Social History Narrative                ALLERGIES: Codeine; Keflin; Pcn [penicillins]; and Tramadol    Review of Systems   Constitutional: Negative for chills.    Gastrointestinal: Negative for nausea. Genitourinary: Positive for flank pain, hesitancy and urgency. Negative for hematuria. Vitals:    08/19/17 1217   BP: 191/72   Pulse: 60   Resp: 19   Temp: 98 °F (36.7 °C)   SpO2: 97%   Weight: 77.1 kg (170 lb)   Height: 5' 3\" (1.6 m)       Physical Exam   Constitutional: She is oriented to person, place, and time. She appears well-developed and well-nourished. Eyes: Conjunctivae and EOM are normal.   Pulmonary/Chest: Effort normal.   Neurological: She is alert and oriented to person, place, and time. Skin: Skin is warm and dry. Psychiatric: She has a normal mood and affect. Her behavior is normal. Judgment and thought content normal.   Nursing note and vitals reviewed. MDM     Differential Diagnosis; Clinical Impression; Plan:     CLINICAL IMPRESSION:  Acute cystitis without hematuria  (primary encounter diagnosis)    Plan:  1. Cipro  2. Pyridium  3. Amount and/or Complexity of Data Reviewed:   Clinical lab tests:  Ordered and reviewed  Risk of Significant Complications, Morbidity, and/or Mortality:   Presenting problems: Moderate  Diagnostic procedures: Moderate  Management options:   Moderate  Progress:   Patient progress:  Stable      Procedures

## 2017-08-19 NOTE — DISCHARGE INSTRUCTIONS
Urinary Tract Infection in Women: Care Instructions  Your Care Instructions    A urinary tract infection, or UTI, is a general term for an infection anywhere between the kidneys and the urethra (where urine comes out). Most UTIs are bladder infections. They often cause pain or burning when you urinate. UTIs are caused by bacteria and can be cured with antibiotics. Be sure to complete your treatment so that the infection goes away. Follow-up care is a key part of your treatment and safety. Be sure to make and go to all appointments, and call your doctor if you are having problems. It's also a good idea to know your test results and keep a list of the medicines you take. How can you care for yourself at home? · Take your antibiotics as directed. Do not stop taking them just because you feel better. You need to take the full course of antibiotics. · Drink extra water and other fluids for the next day or two. This may help wash out the bacteria that are causing the infection. (If you have kidney, heart, or liver disease and have to limit fluids, talk with your doctor before you increase your fluid intake.)  · Avoid drinks that are carbonated or have caffeine. They can irritate the bladder. · Urinate often. Try to empty your bladder each time. · To relieve pain, take a hot bath or lay a heating pad set on low over your lower belly or genital area. Never go to sleep with a heating pad in place. To prevent UTIs  · Drink plenty of water each day. This helps you urinate often, which clears bacteria from your system. (If you have kidney, heart, or liver disease and have to limit fluids, talk with your doctor before you increase your fluid intake.)  · Urinate when you need to. · Urinate right after you have sex. · Change sanitary pads often. · Avoid douches, bubble baths, feminine hygiene sprays, and other feminine hygiene products that have deodorants.   · After going to the bathroom, wipe from front to back.  When should you call for help? Call your doctor now or seek immediate medical care if:  · Symptoms such as fever, chills, nausea, or vomiting get worse or appear for the first time. · You have new pain in your back just below your rib cage. This is called flank pain. · There is new blood or pus in your urine. · You have any problems with your antibiotic medicine. Watch closely for changes in your health, and be sure to contact your doctor if:  · You are not getting better after taking an antibiotic for 2 days. · Your symptoms go away but then come back. Where can you learn more? Go to http://flora-rand.info/. Enter E221 in the search box to learn more about \"Urinary Tract Infection in Women: Care Instructions. \"  Current as of: November 28, 2016  Content Version: 11.3  © 2487-6940 [employer]. Care instructions adapted under license by [employer] (which disclaims liability or warranty for this information). If you have questions about a medical condition or this instruction, always ask your healthcare professional. [address] any warranty or liability for your use of this information.

## 2017-08-21 NOTE — CALL BACK NOTE
[facility] Emergency Department Follow Up Call Record    Discharged to : Home/Family Home/Home Health/Skilled Facility/Rehab/Assisted Living/Other_home with daughter______  1) Did you receive your discharge instructions? Yes        2) Do you understand them? Yes     This writer spoke with Bertha Schultz who reports she is SOB today. She feels like she is not getting better. She reports she is taking the antibiotic for cystitis. She feels her Amiodarone is causing her to have some unfavorable symptoms. 3) Are you able to follow them? Yes          If NO, what can I clarify for you? 4) Do you understand your diagnosis? Yes         5) Do you know which symptoms should prompt you to call the doctor? Yes     6) Were you able to fill and  any medications that were prescribed? Yes     7) You were prescribed __ciprofloxacin, pyridium_________for cystitis ____________________. Common side effects of this medication are____, rash, orange urine from pyridium ________________. This is not a complete list so please review the forms given from the pharmacy for a complete list.      8) Are there any questions about your medications? No            Have you scheduled any recommended doctors appointments (specialty, PCP) Appointment with Dr. John Paul Paredes (Cardiologist) Wednesday 8/23/17. If NO, what barriers are you encountering (transportation/lost contact info/cost/  didnt think necessary/no PCP  9) If discharged with Home Health, has the agency contacted you to schedule visit? N/A   10) Is there anyone available to help you at home (meals, errands, transportation    monitoring) (adult children, neighbors, private duty companions) Yes Lives with daughter   6) Are you on a special diet? No         If YES, do you understand the requirements for this diet? Education provided? 12) If presented with cough, bronchitis, COPD, asthma, is it ok to ask that the   respiratory disease management educator call you?  Not applicable      13)  A) If presented with fall, were you issued an assistive device in the ED    Are you using? Not applicable  B) If given RX for device, have you obtained? Not applicable       If NO, barriers? C) Therapist recommended:NO     Are you able to implement the suggestions? N/A        If NO, barriers to implementation? D) Are you having any difficulties with mobility inside your home?     (steps, bed, tub) No   If YES, ask if the [facility] PT can contact patient and good time and number?  14)  At the end of your discharge instructions, there is information about accessing [facility] SERVICES, have you had a chance to review those? No         Do you have any questions about signing up for this service? NO   We encourage our patients to be active participants in their healthcare and this site is one of the ways to do that. It will allow you to access parts of your medical record, email your doctors office, schedule appointments, and request medications refills . 15) Are there any other questions that I can answer for you regarding    your Emergency department visit?  NO             Estimated Call Time:______3:33 PM  _____________ Date/Time:_______________

## 2017-08-23 ENCOUNTER — OFFICE VISIT (OUTPATIENT)
Dept: CARDIOLOGY CLINIC | Age: 82
End: 2017-08-23

## 2017-08-23 ENCOUNTER — HOSPITAL ENCOUNTER (INPATIENT)
Age: 82
LOS: 8 days | Discharge: HOME HEALTH | DRG: 641 | End: 2017-08-31
Attending: STUDENT IN AN ORGANIZED HEALTH CARE EDUCATION/TRAINING PROGRAM | Admitting: SPECIALIST
Payer: MEDICARE

## 2017-08-23 ENCOUNTER — APPOINTMENT (OUTPATIENT)
Dept: CT IMAGING | Age: 82
DRG: 641 | End: 2017-08-23
Attending: PHYSICIAN ASSISTANT
Payer: MEDICARE

## 2017-08-23 ENCOUNTER — APPOINTMENT (OUTPATIENT)
Dept: GENERAL RADIOLOGY | Age: 82
DRG: 641 | End: 2017-08-23
Attending: EMERGENCY MEDICINE
Payer: MEDICARE

## 2017-08-23 VITALS — OXYGEN SATURATION: 99 % | HEIGHT: 63 IN | WEIGHT: 177 LBS | BODY MASS INDEX: 31.36 KG/M2

## 2017-08-23 DIAGNOSIS — E78.5 HYPERLIPIDEMIA, UNSPECIFIED HYPERLIPIDEMIA TYPE: ICD-10-CM

## 2017-08-23 DIAGNOSIS — I48.91 ATRIAL FIBRILLATION, UNSPECIFIED TYPE (HCC): Primary | ICD-10-CM

## 2017-08-23 DIAGNOSIS — R42 DIZZINESS: ICD-10-CM

## 2017-08-23 DIAGNOSIS — R42 DIZZINESS: Primary | ICD-10-CM

## 2017-08-23 DIAGNOSIS — R53.1 WEAKNESS: ICD-10-CM

## 2017-08-23 DIAGNOSIS — R06.02 SOB (SHORTNESS OF BREATH): ICD-10-CM

## 2017-08-23 DIAGNOSIS — I67.89 ACUTE, BUT ILL-DEFINED, CEREBROVASCULAR DISEASE: ICD-10-CM

## 2017-08-23 LAB
ALBUMIN SERPL-MCNC: 3.4 G/DL (ref 3.5–5)
ALBUMIN/GLOB SERPL: 1.1 {RATIO} (ref 1.1–2.2)
ALP SERPL-CCNC: 88 U/L (ref 45–117)
ALT SERPL-CCNC: 61 U/L (ref 12–78)
ANION GAP SERPL CALC-SCNC: 10 MMOL/L (ref 5–15)
AST SERPL-CCNC: 32 U/L (ref 15–37)
ATRIAL RATE: 60 BPM
BASOPHILS # BLD: 0 K/UL (ref 0–0.1)
BASOPHILS NFR BLD: 0 % (ref 0–1)
BILIRUB SERPL-MCNC: 0.6 MG/DL (ref 0.2–1)
BNP SERPL-MCNC: 1671 PG/ML (ref 0–450)
BUN SERPL-MCNC: 16 MG/DL (ref 6–20)
BUN/CREAT SERPL: 12 (ref 12–20)
CALCIUM SERPL-MCNC: 8.8 MG/DL (ref 8.5–10.1)
CALCULATED P AXIS, ECG09: 37 DEGREES
CALCULATED R AXIS, ECG10: 21 DEGREES
CALCULATED T AXIS, ECG11: 16 DEGREES
CHLORIDE SERPL-SCNC: 92 MMOL/L (ref 97–108)
CO2 SERPL-SCNC: 25 MMOL/L (ref 21–32)
CREAT SERPL-MCNC: 1.32 MG/DL (ref 0.55–1.02)
DIAGNOSIS, 93000: NORMAL
EOSINOPHIL # BLD: 0.1 K/UL (ref 0–0.4)
EOSINOPHIL NFR BLD: 1 % (ref 0–7)
ERYTHROCYTE [DISTWIDTH] IN BLOOD BY AUTOMATED COUNT: 16.1 % (ref 11.5–14.5)
GLOBULIN SER CALC-MCNC: 3.2 G/DL (ref 2–4)
GLUCOSE SERPL-MCNC: 118 MG/DL (ref 65–100)
HCT VFR BLD AUTO: 31.6 % (ref 35–47)
HGB BLD-MCNC: 10.4 G/DL (ref 11.5–16)
LYMPHOCYTES # BLD: 1.4 K/UL (ref 0.8–3.5)
LYMPHOCYTES NFR BLD: 13 % (ref 12–49)
MCH RBC QN AUTO: 25.7 PG (ref 26–34)
MCHC RBC AUTO-ENTMCNC: 32.9 G/DL (ref 30–36.5)
MCV RBC AUTO: 78.2 FL (ref 80–99)
MONOCYTES # BLD: 0.7 K/UL (ref 0–1)
MONOCYTES NFR BLD: 7 % (ref 5–13)
NEUTS SEG # BLD: 8.3 K/UL (ref 1.8–8)
NEUTS SEG NFR BLD: 79 % (ref 32–75)
P-R INTERVAL, ECG05: 198 MS
PLATELET # BLD AUTO: 242 K/UL (ref 150–400)
POTASSIUM SERPL-SCNC: 4.4 MMOL/L (ref 3.5–5.1)
PROT SERPL-MCNC: 6.6 G/DL (ref 6.4–8.2)
Q-T INTERVAL, ECG07: 444 MS
QRS DURATION, ECG06: 86 MS
QTC CALCULATION (BEZET), ECG08: 444 MS
RBC # BLD AUTO: 4.04 M/UL (ref 3.8–5.2)
SODIUM SERPL-SCNC: 127 MMOL/L (ref 136–145)
TSH SERPL DL<=0.05 MIU/L-ACNC: 1.41 UIU/ML (ref 0.36–3.74)
URATE SERPL-MCNC: 4.4 MG/DL (ref 2.6–6)
VENTRICULAR RATE, ECG03: 60 BPM
WBC # BLD AUTO: 10.6 K/UL (ref 3.6–11)

## 2017-08-23 PROCEDURE — 74011636320 HC RX REV CODE- 636/320: Performed by: STUDENT IN AN ORGANIZED HEALTH CARE EDUCATION/TRAINING PROGRAM

## 2017-08-23 PROCEDURE — 71020 XR CHEST PA LAT: CPT

## 2017-08-23 PROCEDURE — 84443 ASSAY THYROID STIM HORMONE: CPT | Performed by: STUDENT IN AN ORGANIZED HEALTH CARE EDUCATION/TRAINING PROGRAM

## 2017-08-23 PROCEDURE — 93005 ELECTROCARDIOGRAM TRACING: CPT

## 2017-08-23 PROCEDURE — 85025 COMPLETE CBC W/AUTO DIFF WBC: CPT | Performed by: EMERGENCY MEDICINE

## 2017-08-23 PROCEDURE — 71275 CT ANGIOGRAPHY CHEST: CPT

## 2017-08-23 PROCEDURE — 80053 COMPREHEN METABOLIC PANEL: CPT | Performed by: EMERGENCY MEDICINE

## 2017-08-23 PROCEDURE — 65660000000 HC RM CCU STEPDOWN

## 2017-08-23 PROCEDURE — 74011000258 HC RX REV CODE- 258: Performed by: STUDENT IN AN ORGANIZED HEALTH CARE EDUCATION/TRAINING PROGRAM

## 2017-08-23 PROCEDURE — 99285 EMERGENCY DEPT VISIT HI MDM: CPT

## 2017-08-23 PROCEDURE — 83880 ASSAY OF NATRIURETIC PEPTIDE: CPT | Performed by: STUDENT IN AN ORGANIZED HEALTH CARE EDUCATION/TRAINING PROGRAM

## 2017-08-23 PROCEDURE — 74011250637 HC RX REV CODE- 250/637: Performed by: PHYSICIAN ASSISTANT

## 2017-08-23 PROCEDURE — 84550 ASSAY OF BLOOD/URIC ACID: CPT | Performed by: SPECIALIST

## 2017-08-23 PROCEDURE — 36415 COLL VENOUS BLD VENIPUNCTURE: CPT | Performed by: EMERGENCY MEDICINE

## 2017-08-23 RX ORDER — AZELASTINE 1 MG/ML
1 SPRAY, METERED NASAL 2 TIMES DAILY
Status: DISCONTINUED | OUTPATIENT
Start: 2017-08-23 | End: 2017-08-31 | Stop reason: HOSPADM

## 2017-08-23 RX ORDER — SODIUM CHLORIDE 0.9 % (FLUSH) 0.9 %
5-10 SYRINGE (ML) INJECTION AS NEEDED
Status: DISCONTINUED | OUTPATIENT
Start: 2017-08-23 | End: 2017-08-31 | Stop reason: HOSPADM

## 2017-08-23 RX ORDER — METOPROLOL TARTRATE 50 MG/1
100 TABLET ORAL 2 TIMES DAILY
Status: DISCONTINUED | OUTPATIENT
Start: 2017-08-23 | End: 2017-08-31 | Stop reason: HOSPADM

## 2017-08-23 RX ORDER — ATORVASTATIN CALCIUM 40 MG/1
20 TABLET, FILM COATED ORAL
COMMUNITY
End: 2020-01-01 | Stop reason: DRUGHIGH

## 2017-08-23 RX ORDER — SODIUM CHLORIDE 0.9 % (FLUSH) 0.9 %
10 SYRINGE (ML) INJECTION
Status: COMPLETED | OUTPATIENT
Start: 2017-08-23 | End: 2017-08-23

## 2017-08-23 RX ORDER — ATORVASTATIN CALCIUM 40 MG/1
40 TABLET, FILM COATED ORAL
Status: DISCONTINUED | OUTPATIENT
Start: 2017-08-23 | End: 2017-08-31 | Stop reason: HOSPADM

## 2017-08-23 RX ORDER — LISINOPRIL 20 MG/1
40 TABLET ORAL
Status: DISCONTINUED | OUTPATIENT
Start: 2017-08-23 | End: 2017-08-24

## 2017-08-23 RX ORDER — GLIPIZIDE 5 MG/1
10 TABLET ORAL
Status: DISCONTINUED | OUTPATIENT
Start: 2017-08-24 | End: 2017-08-31 | Stop reason: HOSPADM

## 2017-08-23 RX ORDER — SULFAMETHOXAZOLE AND TRIMETHOPRIM 800; 160 MG/1; MG/1
1 TABLET ORAL 2 TIMES DAILY
COMMUNITY
End: 2017-10-18

## 2017-08-23 RX ORDER — INSULIN GLARGINE 100 [IU]/ML
10 INJECTION, SOLUTION SUBCUTANEOUS DAILY
Status: DISCONTINUED | OUTPATIENT
Start: 2017-08-24 | End: 2017-08-31 | Stop reason: HOSPADM

## 2017-08-23 RX ORDER — GUAIFENESIN 100 MG/5ML
81 LIQUID (ML) ORAL DAILY
Status: DISCONTINUED | OUTPATIENT
Start: 2017-08-24 | End: 2017-08-31 | Stop reason: HOSPADM

## 2017-08-23 RX ORDER — HYDROCHLOROTHIAZIDE 12.5 MG/1
12.5 TABLET ORAL DAILY
COMMUNITY
End: 2017-08-31

## 2017-08-23 RX ORDER — HYDROCHLOROTHIAZIDE 25 MG/1
12.5 TABLET ORAL DAILY
Status: DISCONTINUED | OUTPATIENT
Start: 2017-08-24 | End: 2017-08-23

## 2017-08-23 RX ORDER — SODIUM CHLORIDE 0.9 % (FLUSH) 0.9 %
5-10 SYRINGE (ML) INJECTION EVERY 8 HOURS
Status: DISCONTINUED | OUTPATIENT
Start: 2017-08-23 | End: 2017-08-31 | Stop reason: HOSPADM

## 2017-08-23 RX ORDER — ENALAPRIL MALEATE 5 MG/1
20 TABLET ORAL 2 TIMES DAILY
Status: DISCONTINUED | OUTPATIENT
Start: 2017-08-23 | End: 2017-08-23 | Stop reason: CLARIF

## 2017-08-23 RX ORDER — GLIPIZIDE 5 MG/1
10 TABLET ORAL DAILY
COMMUNITY
End: 2017-10-18

## 2017-08-23 RX ORDER — GLIPIZIDE 5 MG/1
10 TABLET ORAL 2 TIMES DAILY
COMMUNITY
End: 2017-12-12

## 2017-08-23 RX ORDER — GLIPIZIDE 5 MG/1
5 TABLET ORAL
Status: DISCONTINUED | OUTPATIENT
Start: 2017-08-23 | End: 2017-08-26

## 2017-08-23 RX ORDER — SULFAMETHOXAZOLE AND TRIMETHOPRIM 800; 160 MG/1; MG/1
1 TABLET ORAL 2 TIMES DAILY
Status: COMPLETED | OUTPATIENT
Start: 2017-08-23 | End: 2017-08-25

## 2017-08-23 RX ADMIN — ATORVASTATIN CALCIUM 40 MG: 40 TABLET, FILM COATED ORAL at 23:13

## 2017-08-23 RX ADMIN — SODIUM CHLORIDE 100 ML: 900 INJECTION, SOLUTION INTRAVENOUS at 16:45

## 2017-08-23 RX ADMIN — SULFAMETHOXAZOLE AND TRIMETHOPRIM 1 TABLET: 800; 160 TABLET ORAL at 18:46

## 2017-08-23 RX ADMIN — AZELASTINE HYDROCHLORIDE 1 SPRAY: 137 SPRAY, METERED NASAL at 20:53

## 2017-08-23 RX ADMIN — HYDRALAZINE HYDROCHLORIDE 75 MG: 50 TABLET ORAL at 17:27

## 2017-08-23 RX ADMIN — Medication 10 ML: at 15:55

## 2017-08-23 RX ADMIN — LISINOPRIL 40 MG: 20 TABLET ORAL at 18:46

## 2017-08-23 RX ADMIN — APIXABAN 5 MG: 5 TABLET, FILM COATED ORAL at 20:53

## 2017-08-23 RX ADMIN — METOPROLOL TARTRATE 100 MG: 50 TABLET ORAL at 18:45

## 2017-08-23 RX ADMIN — HYDRALAZINE HYDROCHLORIDE 75 MG: 50 TABLET ORAL at 23:13

## 2017-08-23 RX ADMIN — Medication 10 ML: at 23:13

## 2017-08-23 RX ADMIN — GLIPIZIDE 5 MG: 5 TABLET ORAL at 20:53

## 2017-08-23 RX ADMIN — Medication 10 ML: at 16:45

## 2017-08-23 RX ADMIN — IOPAMIDOL 100 ML: 755 INJECTION, SOLUTION INTRAVENOUS at 16:45

## 2017-08-23 NOTE — IP AVS SNAPSHOT
[address] [facility] [address] [place] [address] 
[phone] Patient: Fela Vasquez MRN: [id number] HONEY:[birth year] You are allergic to the following Allergen Reactions Clonidine Shortness of Breath Swelling Lightheaded, Codeine Unknown (comments) Keflin Itching Pcn (Penicillins) Unknown (comments) Tramadol Other (comments) Makes her head feel swishy Recent Documentation Height Weight Breastfeeding? BMI OB Status Smoking Status 1.6 m 80.3 kg No 31.35 kg/m2 Hysterectomy Former Smoker Emergency Contacts Name Discharge Info Relation Home Work Mobile Griselda Conner  Daughter [21] [phone] [address] [facility] CAREGIVER [3] Child [2] [phone] About your hospitalization You were admitted on:  August 23, 2017 You last received care in the:  [facility] 4 CV SERVICES UNIT You were discharged on:  August 31, 2017 Unit phone number:  [phone] Why you were hospitalized Your primary diagnosis was:  Not on File Your diagnoses also included:  Sob (Shortness Of Breath) Providers Seen During Your Hospitalizations Provider Role Specialty Primary office phone Wm Toure MD Attending Provider Emergency Medicine [phone] Hong Vazquez MD Attending Provider Cardiology [phone] Your Primary Care Physician (PCP) Primary Care Physician Office Phone Office Fax [address] [phone] Follow-up Information Follow up With Details Comments Contact Info [place]   [address] [facility] [address] [facility] 
[phone] [address] [place] [address] On 8/31/2017 Physical therapy eval and treat [address] 
1st Floor [facility] [address] 
[phone] Hong Vazquez MD On 9/13/2017 Appointment scheduled for 9 am. [place] [address] [address] [facility] 
[phone] Your Appointments Wednesday September 13, 2017  9:00 AM EDT HOSPITAL DISCHARGE with Roxan Goltz, MD  
[facility] ([address]) [address]  [address] [place] [address]  
[phone] Current Discharge Medication List  
  
START taking these medications Dose & Instructions Dispensing Information Comments Morning Noon Evening Bedtime  
 amLODIPine 5 mg tablet Commonly known as:  Sundra [place] Your last dose was: Your next dose is:    
   
   
 Dose:  5 mg Take 1 Tab by mouth daily. Quantity:  30 Tab Refills:  3  
     
   
   
   
  
 bumetanide 1 mg tablet Commonly known as:  Londell Dancer Your last dose was: Your next dose is:    
   
   
 Dose:  1 mg Take 1 Tab by mouth daily. Quantity:  30 Tab Refills:  3 CONTINUE these medications which have CHANGED Dose & Instructions Dispensing Information Comments Morning Noon Evening Bedtime * glipiZIDE 5 mg tablet Commonly known as:  Janny Raglandr What changed:  Another medication with the same name was removed. Continue taking this medication, and follow the directions you see here. Your last dose was: Your next dose is:    
   
   
 Dose:  10 mg Take 10 mg by mouth daily. Glipizide IR 10 mg before, 5 mg before dinner Refills:  0  
     
   
   
   
  
 * glipiZIDE 5 mg tablet Commonly known as:  Janny Huger What changed:  Another medication with the same name was removed. Continue taking this medication, and follow the directions you see here. Your last dose was: Your next dose is:    
   
   
 Dose:  5 mg Take 5 mg by mouth Daily (before dinner). Glipizide IR 10 mg before, 5 mg before dinner Refills:  0  
     
   
   
   
  
 * Notice: This list has 2 medication(s) that are the same as other medications prescribed for you.  Read the directions carefully, and ask your doctor or other care provider to review them with you. CONTINUE these medications which have NOT CHANGED Dose & Instructions Dispensing Information Comments Morning Noon Evening Bedtime  
 apixaban 5 mg tablet Commonly known as:  Romero Trona Your last dose was: Your next dose is:    
   
   
 Dose:  5 mg Take 1 Tab by mouth two (2) times a day. Indications: PREVENT THROMBOEMBOLISM IN CHRONIC ATRIAL FIBRILLATION Quantity:  180 Tab Refills:  3  
     
   
   
   
  
 aspirin 81 mg chewable tablet Your last dose was: Your next dose is:    
   
   
 Dose:  81 mg Take 1 Tab by mouth daily. Quantity:  30 Tab Refills:  3  
     
   
   
   
  
 atorvastatin 40 mg tablet Commonly known as:  LIPITOR Your last dose was: Your next dose is:    
   
   
 Dose:  40 mg Take 40 mg by mouth nightly. Refills:  0  
     
   
   
   
  
 azelastine 137 mcg (0.1 %) nasal spray Commonly known as:  ASTELIN Your last dose was: Your next dose is:    
   
   
 Dose:  1 Spray 1 [place] by Both Nostrils route two (2) times a day. Use in each nostril as directed Quantity:  1 Bottle Refills:  12 BACTRIM -800 mg per tablet Generic drug:  trimethoprim-sulfamethoxazole Your last dose was: Your next dose is:    
   
   
 Dose:  1 Tab Take 1 Tab by mouth two (2) times a day. Refills:  0  
     
   
   
   
  
 cloNIDine HCl 0.1 mg tablet Commonly known as:  CATAPRES Your last dose was: Your next dose is:    
   
   
 Dose:  0.1 mg Take 1 Tab by mouth two (2) times a day. Quantity:  60 Tab Refills:  12  
     
   
   
   
  
 enalapril 20 mg tablet Commonly known as:  Case Sleeper Your last dose was: Your next dose is: TAKE 1 TABLET TWICE A DAY Quantity:  180 Tab Refills:  3  
     
   
   
   
  
 hydrALAZINE 50 mg tablet Commonly known as:  APRESOLINE Your last dose was: Your next dose is:    
   
   
 Dose:  50 mg Take 1 Tab by mouth three (3) times daily. Quantity:  270 Tab Refills:  2  
     
   
   
   
  
 isosorbide mononitrate ER 60 mg CR tablet Commonly known as:  IMDUR Your last dose was: Your next dose is:    
   
   
 Dose:  60 mg Take 1 Tab by mouth two (2) times a day. Quantity:  180 Tab Refills:  3 LEVEMIR FLEXPEN 100 unit/mL (3 mL) Inpn Generic drug:  insulin detemir Your last dose was: Your next dose is:    
   
   
 Dose:  10 Units 10 Units by SubCUTAneous route daily. Refills:  0  
     
   
   
   
  
 metoprolol tartrate 50 mg tablet Commonly known as:  LOPRESSOR Your last dose was: Your next dose is:    
   
   
 Dose:  50 mg Take 1 Tab by mouth two (2) times a day. Quantity:  180 Tab Refills:  3  
     
   
   
   
  
 nitroglycerin 0.4 mg SL tablet Commonly known as:  NITROSTAT Your last dose was: Your next dose is:    
   
   
 Dose:  0.4 mg  
1 Tab by SubLINGual route every five (5) minutes as needed for Chest Pain. Quantity:  25 Tab Refills:  3  
     
   
   
   
  
 omeprazole 20 mg capsule Commonly known as:  PriLOSEC Your last dose was: Your next dose is:    
   
   
 Dose:  20 mg Take 1 Cap by mouth daily. Quantity:  90 Cap Refills:  3 VITAMIN D3 1,000 unit tablet Generic drug:  cholecalciferol Your last dose was: Your next dose is:    
   
   
 Dose:  2000 Units Take 2,000 Units by mouth daily. Refills:  0 STOP taking these medications   
 amiodarone 100 mg tablet Commonly known as:  PACERONE  
   
  
 hydroCHLOROthiazide 12.5 mg tablet Commonly known as:  HYDRODIURIL  
   
  
  
ASK your doctor about these medications Dose & Instructions Dispensing Information Comments Morning Noon Evening Bedtime  
 hydroCHLOROthiazide 25 mg tablet Commonly known as:  HYDRODIURIL Your last dose was: Your next dose is: TAKE 1 TABLET DAILY Quantity:  90 Tab Refills:  3  
     
   
   
   
  
 meclizine 25 mg tablet Commonly known as:  ANTIVERT Ask about: Should I take this medication? Your last dose was: Your next dose is:    
   
   
 Dose:  25 mg Take 1 Tab by mouth three (3) times daily as needed for up to 10 days. Quantity:  12 Tab Refills:  0 Where to Get Your Medications These medications were sent to [address] [place] [id number] Phone:  [phone]  
  amLODIPine 5 mg tablet  
 bumetanide 1 mg tablet Discharge Instructions None Discharge Orders None Strava Announcement We are excited to announce that we are making your provider's discharge notes available to you in Strava. You will see these notes when they are completed and signed by the physician that discharged you from your recent hospital stay. If you have any questions or concerns about any information you see in Strava, please call the Health Information Department where you were seen or reach out to your Primary Care Provider for more information about your plan of care. Introducing [facility]! Marilee Garcia introduces Strava patient portal. Now you can access parts of your medical record, email your doctor's office, and request medication refills online. 1. In your internet browser, go to [email or web address] 2. Click on the First Time User? Click Here link in the Sign In box. You will see the New Member Sign Up page. 3. Enter your Strava Access Code exactly as it appears below.  You will not need to use this code after youve completed the sign-up process. If you do not sign up before the expiration date, you must request a new code. · [employer] Access Code: [email or web address] Expires: 11/21/2017  7:15 AM 
 
4. Enter the last four digits of your Social Security Number (xxxx) and Date of Birth (mm/dd/yyyy) as indicated and click Submit. You will be taken to the next sign-up page. 5. Create a [employer] ID. This will be your [employer] login ID and cannot be changed, so think of one that is secure and easy to remember. 6. Create a [employer] password. You can change your password at any time. 7. Enter your Password Reset Question and Answer. This can be used at a later time if you forget your password. 8. Enter your e-mail address. You will receive e-mail notification when new information is available in [address] 9. Click Sign Up. You can now view and download portions of your medical record. 10. Click the Download Summary menu link to download a portable copy of your medical information. If you have questions, please visit the Frequently Asked Questions section of the [employer] website. Remember, [employer] is NOT to be used for urgent needs. For medical emergencies, dial 911. Now available from your iPhone and Android! General Information Please provide this summary of care documentation to your next provider. Patient Signature:  ____________________________________________________________ Date:  ____________________________________________________________  
  
Pham Aguila Provider Signature:  ____________________________________________________________ Date:  ____________________________________________________________

## 2017-08-23 NOTE — ROUTINE PROCESS
TRANSFER - OUT REPORT:    Verbal report given to Rose Marie RN(name) on Chidi Akins  being transferred to Room 450(unit) for routine progression of care       Report consisted of patients Situation, Background, Assessment and   Recommendations(SBAR). Information from the following report(s) SBAR, Kardex, ED Summary, MAR, Recent Results and Cardiac Rhythm NSR was reviewed with the receiving nurse. Lines:   Peripheral IV 08/23/17 Left Antecubital (Active)   Site Assessment Clean, dry, & intact 8/23/2017  2:37 PM   Phlebitis Assessment 0 8/23/2017  2:37 PM   Infiltration Assessment 0 8/23/2017  2:37 PM   Dressing Status Clean, dry, & intact 8/23/2017  2:37 PM   Dressing Type Transparent 8/23/2017  2:37 PM   Hub Color/Line Status Pink;Flushed;Patent 8/23/2017  2:37 PM   Action Taken Blood drawn 8/23/2017  2:37 PM        Opportunity for questions and clarification was provided.       Patient transported with:   [employer]

## 2017-08-23 NOTE — ED TRIAGE NOTES
Triage: Pt was referred here by PCP and was going to admit her but no beds available so sent her here to admitted thru the ER. Pt c/o SOB, HTN and dizziness starting after beginning Amiodarone for AFIB since 4/29/17. Denies chest pain, cough, fever, N/V/D.

## 2017-08-23 NOTE — PROGRESS NOTES
Admission Medication Reconciliation:    Information obtained from:   Patient/Patient's medication list/RxQuery    Comments/Recommendations: Updated PTA meds/reviewed patient's allergies. 1)  Patient on Bactrim RLE    2)  Patient confirmed DM regimen. Glipizide 10 mg AM, 5 mg PM, Levemir pen 10 units AM, and currently Metformin on HOLD d/t SCr increase (per patient report)    3)  Patient took Nitrostat yesterday for SOB; she reports \"it didn't help my breathing much\"    4)  Last doses as below       Significant PMH/Disease States:   Past Medical History:   Diagnosis Date    [facility]     CAD (coronary artery disease)     Cerebrovascular accident stroke, other, unspec 11/18/2010    Coronary atherosclerosis of native coronary artery 11/18/2010    Essential hypertension     Essential hypertension, benign 11/18/2010    HLD (hyperlipidemia) 11/18/2010    PVD (peripheral vascular disease) ([facility] 2017    Type II or unspecified type diabetes mellitus without mention of complication, not stated as uncontrolled 11/18/2010     Chief Complaint for this Admission:    Chief Complaint   Patient presents with    Dizziness    Shortness of Breath     Allergies:  Clonidine; Codeine; Keflin; Pcn [penicillins]; and Tramadol    Prior to Admission Medications:   Prior to Admission Medications   Prescriptions Last Dose Informant Patient Reported? Taking?   amiodarone (PACERONE) 100 mg tablet 8/22/2017 at Unknown time  No Yes   Sig: Take 1 Tab by mouth daily. apixaban (ELIQUIS) 5 mg tablet 8/23/2017 at Unknown time  No Yes   Sig: Take 1 Tab by mouth two (2) times a day. Indications: PREVENT THROMBOEMBOLISM IN CHRONIC ATRIAL FIBRILLATION   aspirin 81 mg chewable tablet 8/23/2017 at Unknown time  No Yes   Sig: Take 1 Tab by mouth daily. atorvastatin (LIPITOR) 40 mg tablet 8/22/2017 at HS  Yes Yes   Sig: Take 40 mg by mouth nightly.    azelastine (ASTELIN) 137 mcg (0.1 %) nasal spray 8/23/2017 at Unknown time No Yes   Si [place] by Both Nostrils route two (2) times a day. Use in each nostril as directed   cholecalciferol, vitamin d3, (VITAMIN D) 1,000 unit tablet 2017 at Unknown time  Yes Yes   Sig: Take 2,000 Units by mouth daily. cloNIDine HCl (CATAPRES) 0.1 mg tablet 2017 at Unknown time  No Yes   Sig: Take 1 Tab by mouth two (2) times a day. enalapril (VASOTEC) 20 mg tablet 2017 at Unknown time  No Yes   Sig: TAKE 1 TABLET TWICE A DAY   glipiZIDE (GLUCOTROL) 5 mg tablet   Yes Yes   Sig: Take 10 mg by mouth daily. Glipizide IR 10 mg before, 5 mg before dinner   glipiZIDE (GLUCOTROL) 5 mg tablet 2017 at Unknown time  Yes Yes   Sig: Take 5 mg by mouth Daily (before dinner). Glipizide IR 10 mg before, 5 mg before dinner   hydrALAZINE (APRESOLINE) 50 mg tablet 2017 at Unknown time  No Yes   Sig: Take 1 Tab by mouth three (3) times daily. hydroCHLOROthiazide (HYDRODIURIL) 12.5 mg tablet 2017 at Unknown time  Yes Yes   Sig: Take 12.5 mg by mouth daily. insulin detemir (LEVEMIR FLEXPEN) 100 unit/mL (3 mL) inpn 2017 at Unknown time  Yes Yes   Sig: 10 Units by SubCUTAneous route daily. isosorbide mononitrate ER (IMDUR) 60 mg CR tablet 2017 at Unknown time  No Yes   Sig: Take 1 Tab by mouth two (2) times a day. meclizine (ANTIVERT) 25 mg tablet   No Yes   Sig: Take 1 Tab by mouth three (3) times daily as needed for up to 10 days. metoprolol tartrate (LOPRESSOR) 50 mg tablet 2017 at Unknown time  No Yes   Sig: Take 1 Tab by mouth two (2) times a day. nitroglycerin (NITROSTAT) 0.4 mg SL tablet 2017  No Yes   Si Tab by SubLINGual route every five (5) minutes as needed for Chest Pain. omeprazole (PRILOSEC) 20 mg capsule 2017 at Unknown time  No Yes   Sig: Take 1 Cap by mouth daily. trimethoprim-sulfamethoxazole (BACTRIM DS) 160-800 mg per tablet 2017 at Until Friday  Yes Yes   Sig: Take 1 Tab by mouth two (2) times a day. Facility-Administered Medications: None

## 2017-08-23 NOTE — H&P
Date of  Admission: 8/23/2017  2:50 PM     Dmitriy Trotter is a 81 y.o. female admitted for There are no admission diagnoses documented for this encounter. Subjective: patient presents to office with multiple complaints buty mostly sob and dizziness  Recently treated for uti and cellulitis  No cp reported  Some le edema also noticed    reports dyspnea. Assessment/Plan:  She has h/o HTN, HLD, AODM and CAD  And PAF (diagnosed on 4/17 for which she underwent successful cardioversion on oac and amiodarone)here for multiple complaints sob and dizziness mostly    sob: unclear etiology of symptoms but clearly amiodarone could be playing a role, this will be stopped. Proceed with chest ct with and without contrast. tsh is normal. Dizziness may also be related to amiodarone, started afterwards. steroidss on hold for now waiting for chest ct . Stop imdur. Obtain echo  HTN: still hypertensive stopped clonidine, increase lopressor to 100 mg bis and hydralazine to 75 mg tid , stop imdure  AF: she remains in NSR for now. Continue eliquis  Dizziness: amiodarone possible cause , recent head ct noted hold off neuro eval. For now  DM: continue hole meds  Hyponatremia stop hctz, start lasix 40 mg iv with potassium possibly tomorrow but need to follow creatinine first    Patient Active Problem List    Diagnosis Date Noted    Atrial fibrillation ([facility] [address] [facility] 04/24/2017    PVD (peripheral vascular disease) with claudication ([facility] 02/20/2017    Hypertension complicating diabetes ([facility] [facility] 11/17/2016    Coronary atherosclerosis of native coronary artery 11/18/2010    HLD (hyperlipidemia) 11/18/2010    Essential hypertension, benign 11/18/2010    Cerebrovascular accident stroke, other, unspec 11/18/2010      TRE Granados MD  Past Medical History:   Diagnosis Date    Atrial fibrillation [facility])     CAD (coronary artery disease)     Cerebrovascular accident stroke, other, unspec 11/18/2010    Coronary atherosclerosis of native coronary artery 11/18/2010    Essential hypertension     Essential hypertension, benign 11/18/2010    HLD (hyperlipidemia) 11/18/2010    PVD (peripheral vascular disease) ([facility] [address] [facility] 2017    Type II or unspecified type diabetes mellitus without mention of complication, not stated as uncontrolled 11/18/2010      Past Surgical History:   Procedure Laterality Date    HX CORONARY STENT PLACEMENT      HX HEART CATHETERIZATION      HX HYSTERECTOMY  1972     Allergies   Allergen Reactions    Clonidine Shortness of Breath and Swelling     Lightheaded,     Codeine Unknown (comments)    Keflin Itching    Pcn [Penicillins] Unknown (comments)    Tramadol Other (comments)     Makes her head feel swishy      History reviewed. No pertinent family history. No current facility-administered medications for this encounter. Current Outpatient Prescriptions   Medication Sig    insulin detemir (LEVEMIR FLEXPEN) 100 unit/mL (3 mL) inpn 10 Units by SubCUTAneous route daily.  trimethoprim-sulfamethoxazole (BACTRIM DS) 160-800 mg per tablet Take 1 Tab by mouth two (2) times a day.  glipiZIDE (GLUCOTROL) 5 mg tablet Take 10 mg by mouth daily. Glipizide IR 10 mg before, 5 mg before dinner    glipiZIDE (GLUCOTROL) 5 mg tablet Take 5 mg by mouth Daily (before dinner). Glipizide IR 10 mg before, 5 mg before dinner    hydroCHLOROthiazide (HYDRODIURIL) 12.5 mg tablet Take 12.5 mg by mouth daily.  atorvastatin (LIPITOR) 40 mg tablet Take 40 mg by mouth nightly.  meclizine (ANTIVERT) 25 mg tablet Take 1 Tab by mouth three (3) times daily as needed for up to 10 days.  cloNIDine HCl (CATAPRES) 0.1 mg tablet Take 1 Tab by mouth two (2) times a day.  azelastine (ASTELIN) 137 mcg (0.1 %) nasal spray 1 [place] by Both Nostrils route two (2) times a day. Use in each nostril as directed    hydrALAZINE (APRESOLINE) 50 mg tablet Take 1 Tab by mouth three (3) times daily.     metoprolol tartrate (LOPRESSOR) 50 mg tablet Take 1 Tab by mouth two (2) times a day.  enalapril (VASOTEC) 20 mg tablet TAKE 1 TABLET TWICE A DAY    apixaban (ELIQUIS) 5 mg tablet Take 1 Tab by mouth two (2) times a day. Indications: PREVENT THROMBOEMBOLISM IN CHRONIC ATRIAL FIBRILLATION    amiodarone (PACERONE) 100 mg tablet Take 1 Tab by mouth daily.  nitroglycerin (NITROSTAT) 0.4 mg SL tablet 1 Tab by SubLINGual route every five (5) minutes as needed for Chest Pain.  omeprazole (PRILOSEC) 20 mg capsule Take 1 Cap by mouth daily.  aspirin 81 mg chewable tablet Take 1 Tab by mouth daily.  isosorbide mononitrate ER (IMDUR) 60 mg CR tablet Take 1 Tab by mouth two (2) times a day.  cholecalciferol, vitamin d3, (VITAMIN D) 1,000 unit tablet Take 2,000 Units by mouth daily. Review of Symptoms:  Pertinent items are noted in HPI. Physical Exam    Visit Vitals    /70 (BP 1 Location: Left arm, BP Patient Position: At rest)    Pulse (!) 59    Temp 97.7 °F (36.5 °C)    Resp 20    Ht 5' 3\" (1.6 m)    Wt 80.3 kg (177 lb)    SpO2 96%    BMI 31.35 kg/m2     Neck: no JVD  Heart: regular rate and rhythm  Lungs: diminished breath sounds R base, L base  Abdomen: soft, non-tender.  Bowel sounds normal. No masses,  no organomegaly  Extremities: edema 1+    Cardiographics    Telemetry: normal sinus rhythm  ECG: normal sinus rhythm, nonspecific ST and T waves changes  Echocardiogram: Not done    Recent radiology, intake/output and wt reviewed    Labs:   Recent Results (from the past 24 hour(s))   CBC WITH AUTOMATED DIFF    Collection Time: 08/23/17  2:29 PM   Result Value Ref Range    WBC 10.6 3.6 - 11.0 K/uL    RBC 4.04 3.80 - 5.20 M/uL    HGB 10.4 (L) 11.5 - 16.0 g/dL    HCT 31.6 (L) 35.0 - 47.0 %    MCV 78.2 (L) 80.0 - 99.0 FL    MCH 25.7 (L) 26.0 - 34.0 PG    MCHC 32.9 30.0 - 36.5 g/dL    RDW 16.1 (H) 11.5 - 14.5 %    PLATELET 655 416 - 900 K/uL    NEUTROPHILS 79 (H) 32 - 75 %    LYMPHOCYTES 13 12 - 49 %    MONOCYTES 7 5 - 13 % EOSINOPHILS 1 0 - 7 %    BASOPHILS 0 0 - 1 %    ABS. NEUTROPHILS 8.3 (H) 1.8 - 8.0 K/UL    ABS. LYMPHOCYTES 1.4 0.8 - 3.5 K/UL    ABS. MONOCYTES 0.7 0.0 - 1.0 K/UL    ABS. EOSINOPHILS 0.1 0.0 - 0.4 K/UL    ABS. BASOPHILS 0.0 0.0 - 0.1 K/UL   METABOLIC PANEL, COMPREHENSIVE    Collection Time: 08/23/17  2:29 PM   Result Value Ref Range    Sodium 127 (L) 136 - 145 mmol/L    Potassium 4.4 3.5 - 5.1 mmol/L    Chloride 92 (L) 97 - 108 mmol/L    CO2 25 21 - 32 mmol/L    Anion gap 10 5 - 15 mmol/L    Glucose 118 (H) 65 - 100 mg/dL    BUN 16 6 - 20 MG/DL    Creatinine 1.32 (H) 0.55 - 1.02 MG/DL    BUN/Creatinine ratio 12 12 - 20      GFR est AA 47 (L) >60 ml/min/1.73m2    GFR est non-AA 39 (L) >60 ml/min/1.73m2    Calcium 8.8 8.5 - 10.1 MG/DL    Bilirubin, total 0.6 0.2 - 1.0 MG/DL    ALT (SGPT) 61 12 - 78 U/L    AST (SGOT) 32 15 - 37 U/L    Alk.  phosphatase 88 45 - 117 U/L    Protein, total 6.6 6.4 - 8.2 g/dL    Albumin 3.4 (L) 3.5 - 5.0 g/dL    Globulin 3.2 2.0 - 4.0 g/dL    A-G Ratio 1.1 1.1 - 2.2     TSH 3RD GENERATION    Collection Time: 08/23/17  2:29 PM   Result Value Ref Range    TSH 1.41 0.36 - 3.74 uIU/mL   EKG, 12 LEAD, INITIAL    Collection Time: 08/23/17  2:30 PM   Result Value Ref Range    Ventricular Rate 60 BPM    Atrial Rate 60 BPM    P-R Interval 198 ms    QRS Duration 86 ms    Q-T Interval 444 ms    QTC Calculation (Bezet) 444 ms    Calculated P Axis 37 degrees    Calculated R Axis 21 degrees    Calculated T Axis 16 degrees    Diagnosis       Normal sinus rhythm  Nonspecific ST abnormality  When compared with ECG of 17-AUG-2017 22:54,  No significant change was found

## 2017-08-23 NOTE — IP AVS SNAPSHOT
[address] 
[phone] Patient: Micheal Bhandari MRN: [id number]:[id number] Current Discharge Medication List  
  
START taking these medications Dose & Instructions Dispensing Information Comments Morning Noon Evening Bedtime  
 amLODIPine 5 mg tablet Commonly known as:  [place] Cordial Your last dose was: Your next dose is:    
   
   
 Dose:  5 mg Take 1 Tab by mouth daily. Quantity:  30 Tab Refills:  3  
     
   
   
   
  
 bumetanide 1 mg tablet Commonly known as:  Cynthia Mungo Your last dose was: Your next dose is:    
   
   
 Dose:  1 mg Take 1 Tab by mouth daily. Quantity:  30 Tab Refills:  3 CONTINUE these medications which have CHANGED Dose & Instructions Dispensing Information Comments Morning Noon Evening Bedtime * glipiZIDE 5 mg tablet Commonly known as:  Kassandra Wongchen What changed:  Another medication with the same name was removed. Continue taking this medication, and follow the directions you see here. Your last dose was: Your next dose is:    
   
   
 Dose:  10 mg Take 10 mg by mouth daily. Glipizide IR 10 mg before, 5 mg before dinner Refills:  0  
     
   
   
   
  
 * glipiZIDE 5 mg tablet Commonly known as:  Kassandra Kitchen What changed:  Another medication with the same name was removed. Continue taking this medication, and follow the directions you see here. Your last dose was: Your next dose is:    
   
   
 Dose:  5 mg Take 5 mg by mouth Daily (before dinner). Glipizide IR 10 mg before, 5 mg before dinner Refills:  0  
     
   
   
   
  
 * Notice: This list has 2 medication(s) that are the same as other medications prescribed for you. Read the directions carefully, and ask your doctor or other care provider to review them with you. CONTINUE these medications which have NOT CHANGED Dose & Instructions Dispensing Information Comments Morning Noon Evening Bedtime  
 apixaban 5 mg tablet Commonly known as:  Donnise Or Your last dose was: Your next dose is:    
   
   
 Dose:  5 mg Take 1 Tab by mouth two (2) times a day. Indications: PREVENT THROMBOEMBOLISM IN CHRONIC ATRIAL FIBRILLATION Quantity:  180 Tab Refills:  3  
     
   
   
   
  
 aspirin 81 mg chewable tablet Your last dose was: Your next dose is:    
   
   
 Dose:  81 mg Take 1 Tab by mouth daily. Quantity:  30 Tab Refills:  3  
     
   
   
   
  
 atorvastatin 40 mg tablet Commonly known as:  LIPITOR Your last dose was: Your next dose is:    
   
   
 Dose:  40 mg Take 40 mg by mouth nightly. Refills:  0  
     
   
   
   
  
 azelastine 137 mcg (0.1 %) nasal spray Commonly known as:  ASTELIN Your last dose was: Your next dose is:    
   
   
 Dose:  1 Spray 1 [place] by Both Nostrils route two (2) times a day. Use in each nostril as directed Quantity:  1 Bottle Refills:  12 BACTRIM -800 mg per tablet Generic drug:  trimethoprim-sulfamethoxazole Your last dose was: Your next dose is:    
   
   
 Dose:  1 Tab Take 1 Tab by mouth two (2) times a day. Refills:  0  
     
   
   
   
  
 cloNIDine HCl 0.1 mg tablet Commonly known as:  CATAPRES Your last dose was: Your next dose is:    
   
   
 Dose:  0.1 mg Take 1 Tab by mouth two (2) times a day. Quantity:  60 Tab Refills:  12  
     
   
   
   
  
 enalapril 20 mg tablet Commonly known as:  Meredith Richaber Your last dose was: Your next dose is: TAKE 1 TABLET TWICE A DAY Quantity:  180 Tab Refills:  3  
     
   
   
   
  
 hydrALAZINE 50 mg tablet Commonly known as:  APRESOLINE Your last dose was: Your next dose is: Dose:  50 mg Take 1 Tab by mouth three (3) times daily. Quantity:  270 Tab Refills:  2  
     
   
   
   
  
 isosorbide mononitrate ER 60 mg CR tablet Commonly known as:  IMDUR Your last dose was: Your next dose is:    
   
   
 Dose:  60 mg Take 1 Tab by mouth two (2) times a day. Quantity:  180 Tab Refills:  3 LEVEMIR FLEXPEN 100 unit/mL (3 mL) Inpn Generic drug:  insulin detemir Your last dose was: Your next dose is:    
   
   
 Dose:  10 Units 10 Units by SubCUTAneous route daily. Refills:  0  
     
   
   
   
  
 metoprolol tartrate 50 mg tablet Commonly known as:  LOPRESSOR Your last dose was: Your next dose is:    
   
   
 Dose:  50 mg Take 1 Tab by mouth two (2) times a day. Quantity:  180 Tab Refills:  3  
     
   
   
   
  
 nitroglycerin 0.4 mg SL tablet Commonly known as:  NITROSTAT Your last dose was: Your next dose is:    
   
   
 Dose:  0.4 mg  
1 Tab by SubLINGual route every five (5) minutes as needed for Chest Pain. Quantity:  25 Tab Refills:  3  
     
   
   
   
  
 omeprazole 20 mg capsule Commonly known as:  PriLOSEC Your last dose was: Your next dose is:    
   
   
 Dose:  20 mg Take 1 Cap by mouth daily. Quantity:  90 Cap Refills:  3 VITAMIN D3 1,000 unit tablet Generic drug:  cholecalciferol Your last dose was: Your next dose is:    
   
   
 Dose:  2000 Units Take 2,000 Units by mouth daily. Refills:  0 STOP taking these medications   
 amiodarone 100 mg tablet Commonly known as:  PACERONE  
   
  
 hydroCHLOROthiazide 12.5 mg tablet Commonly known as:  HYDRODIURIL  
   
  
  
ASK your doctor about these medications Dose & Instructions Dispensing Information Comments Morning Noon Evening Bedtime  
 hydroCHLOROthiazide 25 mg tablet Commonly known as:  HYDRODIURIL Your last dose was: Your next dose is: TAKE 1 TABLET DAILY Quantity:  90 Tab Refills:  3  
     
   
   
   
  
 meclizine 25 mg tablet Commonly known as:  ANTIVERT Ask about: Should I take this medication? Your last dose was: Your next dose is:    
   
   
 Dose:  25 mg Take 1 Tab by mouth three (3) times daily as needed for up to 10 days. Quantity:  12 Tab Refills:  0 Where to Get Your Medications These medications were sent to [address] [address] Phone:  [phone]  
  amLODIPine 5 mg tablet  
 bumetanide 1 mg tablet

## 2017-08-23 NOTE — ED PROVIDER NOTES
HPI Comments: 81 y.o. female with past medical history significant for CAD, diabetes, HLD, essential HTN, CVA, PVD, and a-fib who presents from Dr. Abdullahi Guzman office with chief complaint of dizziness. Pt states she has had progressively worsening dizziness over the past couple weeks with accompanying SOB. Pt reports having exacerbated SOB with exertion. Pt was seen at [facility] ED six days ago for these symptoms and states her dizziness temporarily improved after being discharged but returned shortly afterwards. Pt states her symptoms have persisted and she saw her cardiologist, Dr. Didier Hinton, today who referred her to the ED for admission. Dr. Didier Hinton believes these symptoms may be related to her medications and wants her to stop taking Amiodarone. There are no other acute medical concerns at this time. PCP: Mario Altamirano MD    Cardiologist: Dr. Manuel See    Note written by Lord Michelle Treviño, as dictated by Aly Sharif MD 3:11 PM    The history is provided by the patient. Past Medical History:   Diagnosis Date    Atrial fibrillation ([address]     CAD (coronary artery disease)     Cerebrovascular accident stroke, other, unspec 11/18/2010    Coronary atherosclerosis of native coronary artery 11/18/2010    Essential hypertension     Essential hypertension, benign 11/18/2010    HLD (hyperlipidemia) 11/18/2010    PVD (peripheral vascular disease) ([address] 2017    Type II or unspecified type diabetes mellitus without mention of complication, not stated as uncontrolled 11/18/2010       Past Surgical History:   Procedure Laterality Date    HX CORONARY STENT PLACEMENT      HX HEART CATHETERIZATION      HX HYSTERECTOMY  1972         History reviewed. No pertinent family history. Social History     Social History    Marital status:      Spouse name: N/A    Number of children: N/A    Years of education: N/A     Occupational History    Not on file.      Social History Main Topics    Smoking status: Former Smoker     Packs/day: 0.80     Years: 20.00     Types: Cigarettes     Quit date: 4/18/1986    Smokeless tobacco: Never Used    Alcohol use No    Drug use: No    Sexual activity: Not on file     Other Topics Concern    Not on file     Social History Narrative         ALLERGIES: Clonidine; Codeine; Keflin; Pcn [penicillins]; and Tramadol    Review of Systems   Respiratory: Positive for shortness of breath. Neurological: Positive for dizziness. All other systems reviewed and are negative. Vitals:    08/23/17 1402   BP: 185/70   Pulse: (!) 59   Resp: 20   Temp: 97.7 °F (36.5 °C)   SpO2: 96%   Weight: 80.3 kg (177 lb)   Height: 5' 3\" (1.6 m)            Physical Exam   Constitutional: She is oriented to person, place, and time. She appears well-developed. She appears distressed (mild). HENT:   Head: Normocephalic and atraumatic. Eyes: Conjunctivae and EOM are normal. Pupils are equal, round, and reactive to light. Neck: Normal range of motion. Neck supple. Cardiovascular: Normal rate, regular rhythm and normal heart sounds. No murmur heard. Pulmonary/Chest: Effort normal and breath sounds normal. No respiratory distress. Abdominal: Soft. Bowel sounds are normal. She exhibits no distension. There is no tenderness. There is no rebound. Musculoskeletal: Normal range of motion. She exhibits no edema. Neurological: She is alert and oriented to person, place, and time. No cranial nerve deficit. She exhibits normal muscle tone. Coordination normal.   Skin: Skin is warm and dry. No rash noted. Psychiatric: She has a normal mood and affect. Her behavior is normal.   Nursing note and vitals reviewed. Note written by Myranda Cortez. Carrie Garcia, as dictated by Wm Toure MD 3:11 PM       [facility]  ED Course       Procedures    ED EKG interpretation:  Rhythm: normal sinus rhythm; Rate (approx.): 60 bpm; Axis: normal; Normal Intervals. Nonspecific ST abnormality. No STEMI. Note written by Vivek Charles, as dictated by Ramesh Doe MD 3:10 PM    CONSULT NOTE:  3:26 PM Ramesh Doe MD spoke with Dr. Ewelina Singh, Consult for Cardiology. Discussed available diagnostic tests and clinical findings. He is in agreement with care plans as outlined. He will see and admit to cardiology. 3:47 PM  Dr. Violet Boothe is in the ED now.

## 2017-08-23 NOTE — PROGRESS NOTES
HISTORY OF PRESENT ILLNESS  Bryon Irene is a 81 y.o. female. She has h/o HTN, HLD, AODM and CAD  And PAF (diagnosed on 4/17 for which she underwent successful cardioversion on oac and amiodarone)here for follow up  Stress test normal on 11/10    Doppler /US renal NO MANDO b/l    PTCA and stent of right coronary artery in 1996 July 26, 2007. The cardiac catheterization revealed mild 10% tapering of the ostium. The left anterior descending artery had mild intimal disease throughout. There was a 30% stenosis in the proximal portion of the LAD between the first and second septal . The remainder of the LAD had only mild intimal disease. The circumflex artery had a 30%-40% stenosis in the proximal mid segment prior to take off of obtuse marginal branch. The right coronary artery was a large dominant artery with 80% proximal stenosis followed by 90% proximal to mid stenosis, followed by 30% residual stenosis. Ejection fraction was estimated at 50%-55%. 2/14: nuclear stress test: mild ischemia mid distal kailey lateral wall EF 59%    3/14: cardiac catheterization:Left main: The vessel was normal sized. Angiography  showed minor luminal irregularities. LAD: The vessel was normal sized. Angiography showed mild atherosclerosis. There was a 30 % stenosis in the  middle third of the vessel segment. 1st diagonal: The vessel was small  sized. Angiography showed minor luminal irregularities. 2nd diagonal: The  vessel was normal sized. Angiography showed mild atherosclerosis. Circumflex: The vessel was normal sized. Angiography showed mild  atherosclerosis. There was a 30 % stenosis. RCA: The vessel was large  sized (dominant). Angiography showed mild atherosclerosis and 3 patent  prior stents.  There was a tubular 30 % stenosis in the distal third of the  vessel segment.    MEDICAL RX ONLY at that time   Stopped niacin in 2014 for \"sick \" feeling    Seen in [facility] for HTN on 2/15 aldactone increased to 25 mg at that time  Renal US on 3/15 no MANDO  ON 4/17 admitted to [facility] for sob and found to be in AF  Nuclear stress test on 4/25/17:No ischemia demonstrated. No infarct visible. LVEF 61%.      Chest ct on 4/17:1. Atherosclerotic aorta with coronary artery calcification. 2. Small bilateral pleural effusions with right pleural calcifications. 3. Status post cholecystectomy. Valentin/Cardioversion  on 4/28/17:VALENTIN with normal EF mild mr and TR no evidence for gross intracardiac thrombi  Cardioversion: 150 joules were delivered in a synch fashion with restoration of NSR  Amiodarone started lopressor decreased  Head ct on 8/18/18:Severe chronic microvascular ischemic change.     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 Respiratory: Positive for shortness of breath. Negative for cough, hemoptysis, sputum production and wheezing. Cardiovascular: Positive for leg swelling. Negative for chest pain, palpitations, orthopnea, claudication and PND. Neurological: Positive for dizziness, tremors and focal weakness. Negative for weakness. Physical Exam   Neck: No JVD present. Carotid bruit is not present. Cardiovascular: Normal rate and regular rhythm. Pulmonary/Chest: Effort normal and breath sounds normal.   Abdominal: Soft. Musculoskeletal: She exhibits edema. 1-2+ b/l   Psychiatric: She has a normal mood and affect.       Visit Vitals    Ht 5' 3\" (1.6 m)    Wt 80.3 kg (177 lb)    SpO2 99%    BMI 31.35 kg/m2       Lab Results   Component Value Date/Time    Sodium 132 08/17/2017 11:18 PM    Potassium 3.8 08/17/2017 11:18 PM    Chloride 96 08/17/2017 11:18 PM    CO2 27 08/17/2017 11:18 PM    Anion gap 9 08/17/2017 11:18 PM    Glucose 113 08/17/2017 11:18 PM    BUN 17 08/17/2017 11:18 PM    Creatinine 1.22 08/17/2017 11:18 PM    BUN/Creatinine ratio 14 08/17/2017 11:18 PM    GFR est AA 51 08/17/2017 11:18 PM    GFR est non-AA 42 08/17/2017 11:18 PM    Calcium 9.0 08/17/2017 11:18 PM       ASSESSMENT and PLAN  Multiple complaints, I do suspect most of those are related to medications. Unfortunately we need to stop amiodarone. Admit to hospital patient very weak cannot even dress herself and very sob  Obtain chest ct to determine if amiodarone toxicity and r/o pe (even if this would be unusual being on eliquis)  Obtain new echo for now to determine EF  HTN:still elevated even at home by her log. Now off norvasc on account of le edema and off clonidine for ha and drowsiness and made her feel horrible she is not taking clonidine any longer  Continue  Hydralazine but increase to 75  mg tid also increase lopressor to 75 mg bid  Head ct noted but also consider neuro consultation  PAF:she remains  in NSR after cardioversion at least by physical examination. Continue eliquis no untoward effects thus far. Aware that AF may be recurring off amiodarone  Re Check TSH  lft and tsh ok in 6/17  CAD: seemingly completely asymptomatic. Her ECG showed with NSR and no significant st t changes and pacs continue toprol imdur asa.  normal stress test on 4/25/17  HLD:closely followed by her PCP and well controlled  AODM: also closely followed by her PCP  Hyponatremia:hctz may be contributing (reduced  the dose), siadh in the differential closely followed by her pcp  Right leg cellulitis: on iv vancomycin and now on po abx it appears resolved  Admit to hospital, unfortunately unable to admit directly no telemetry beds available.  We'll refer to ER

## 2017-08-23 NOTE — MR AVS SNAPSHOT
Visit Information Date & Time Provider Department Dept. Phone Encounter #  
 8/23/2017 11:40 AM Sandra Hinojosa MD [facility] Geoff Barrier [phone] [id number] Your Appointments 8/23/2017 11:40 AM  
ESTABLISHED PATIENT with Sandra Hinojosa MD  
[facility] ([address]) Appt Note: Dizziness/ED follow up  
 [address] [place]  
[address]  
  
    
 10/17/2017 11:40 AM  
ESTABLISHED PATIENT with Sandra Hinojosa MD  
[facility] ([address]) Appt Note: 2mo fu  
 [address] [place]  
[phone]  
  
    
 10/18/2017 10:30 AM  
ESTABLISHED PATIENT with Hassie Nephew, MD [place] [place], [place], [address] [place] [address] ([address]) Appt Note: 3m  
 [address] [facility] [address] [id number]  
[phone]  
  
   
 [address] [facility] [address] [id number] Upcoming Health Maintenance Date Due  
 EYE EXAM RETINAL OR DILATED Q1 7/4/1945 DTaP/Tdap/Td series (1 - Tdap) 7/4/1957 ZOSTER VACCINE AGE 60> 5/4/1996 GLAUCOMA SCREENING Q2Y 7/4/2001 OSTEOPOROSIS SCREENING (DEXA) 7/4/2001 MEDICARE YEARLY EXAM 7/4/2001 Pneumococcal 65+ Low/Medium Risk (2 of 2 - PPSV23) 10/7/2016 INFLUENZA AGE 9 TO ADULT 8/1/2017 HEMOGLOBIN A1C Q6M 12/29/2017 LIPID PANEL Q1 6/13/2018 FOOT EXAM Q1 6/29/2018 MICROALBUMIN Q1 6/29/2018 Allergies as of 8/23/2017  Review Complete On: 8/19/2017 By: Katrin See RN Severity Noted Reaction Type Reactions Codeine  11/18/2010    Unknown (comments) Keflin  04/18/2011    Itching Pcn [Penicillins]  11/18/2010    Unknown (comments) Tramadol  04/14/2016    Other (comments) Makes her head feel swishy Current Immunizations  Reviewed on 7/28/2017 No immunizations on file. Not reviewed this visit Vitals OB Status Smoking Status Hysterectomy Former Smoker Your Updated Medication List  
  
   
This list is accurate as of: 8/23/17  7:15 AM.  Always use your most recent med list.  
  
  
  
  
 amiodarone 100 mg tablet Commonly known as:  Lutricia Suzie Take 1 Tab by mouth daily. apixaban 5 mg tablet Commonly known as:  Brooke Gregory Take 1 Tab by mouth two (2) times a day. Indications: PREVENT THROMBOEMBOLISM IN CHRONIC ATRIAL FIBRILLATION  
  
 aspirin 81 mg chewable tablet Take 1 Tab by mouth daily. atorvastatin 40 mg tablet Commonly known as:  LIPITOR Take 1 Tab by mouth daily. azelastine 137 mcg (0.1 %) nasal spray Commonly known as:  ASTELIN  
1 Spray by Both Nostrils route two (2) times a day. Use in each nostril as directed  
  
 ciprofloxacin HCl 500 mg tablet Commonly known as:  CIPRO Take 1 Tab by mouth two (2) times a day for 7 days. cloNIDine HCl 0.1 mg tablet Commonly known as:  CATAPRES Take 1 Tab by mouth two (2) times a day. enalapril 20 mg tablet Commonly known as:  VASOTEC  
TAKE 1 TABLET TWICE A DAY  
  
 glipiZIDE 5 mg tablet Commonly known as:  Fartun Lat Take 2 tabs bid  
  
 glucose blood VI test strips strip Commonly known as:  ASCENSIA AUTODISC VI, ONE TOUCH ULTRA TEST VI Check blood sugars 4 times daily  
  
 hydrALAZINE 50 mg tablet Commonly known as:  APRESOLINE Take 1 Tab by mouth three (3) times daily. hydroCHLOROthiazide 25 mg tablet Commonly known as:  HYDRODIURIL  
TAKE 1 TABLET DAILY Insulin Needles (Disposable) 30 gauge x 1/3\" by SubCUTAneous route daily. isosorbide mononitrate ER 60 mg CR tablet Commonly known as:  IMDUR Take 1 Tab by mouth two (2) times a day. LANTUS 100 unit/mL injection Generic drug:  insulin glargine 10 Units by SubCUTAneous route daily. * meclizine 25 mg tablet Commonly known as:  ANTIVERT  
 Take 1 Tab by mouth three (3) times daily as needed for up to 10 days. * meclizine 25 mg tablet Commonly known as:  ANTIVERT Take 1 Tab by mouth three (3) times daily as needed for up to 5 days. metoprolol tartrate 50 mg tablet Commonly known as:  LOPRESSOR Take 1 Tab by mouth two (2) times a day. nitroglycerin 0.4 mg SL tablet Commonly known as:  NITROSTAT  
1 Tab by SubLINGual route every five (5) minutes as needed for Chest Pain. omeprazole 20 mg capsule Commonly known as:  PriLOSEC Take 1 Cap by mouth daily. VITAMIN D3 1,000 unit tablet Generic drug:  cholecalciferol Take 2,000 Units by mouth daily. * Notice: This list has 2 medication(s) that are the same as other medications prescribed for you. Read the directions carefully, and ask your doctor or other care provider to review them with you. Introducing [facility]! Arline Russell introduces Web[employer] patient portal. Now you can access parts of your medical record, email your doctor's office, and request medication refills online. 1. In your internet browser, go to [email or web address]t 2. Click on the First Time User? Click Here link in the Sign In box. You will see the New Member Sign Up page. 3. Enter your WebLayers Access Code exactly as it appears below. You will not need to use this code after youve completed the sign-up process. If you do not sign up before the expiration date, you must request a new code. · WebLayers Access Code: [email or web address] Expires: 11/21/2017  7:15 AM 
 
4. Enter the last four digits of your Social Security Number (xxxx) and Date of Birth (mm/dd/yyyy) as indicated and click Submit. You will be taken to the next sign-up page. 5. Create a Solapa[employer]t ID. This will be your WebLayers login ID and cannot be changed, so think of one that is secure and easy to remember. 6. Create a [employer] password. You can change your password at any time. 7. Enter your Password Reset Question and Answer. This can be used at a later time if you forget your password. 8. Enter your e-mail address. You will receive e-mail notification when new information is available in [address] 9. Click Sign Up. You can now view and download portions of your medical record. 10. Click the Download Summary menu link to download a portable copy of your medical information. If you have questions, please visit the Frequently Asked Questions section of the [employer] website. Remember, Secure Software is NOT to be used for urgent needs. For medical emergencies, dial 911. Now available from your iPhone and Android! Please provide this summary of care documentation to your next provider. Your primary care clinician is listed as [id number] [address] [facility]. If you have any questions after today's visit, please call [phone].

## 2017-08-24 LAB
ANION GAP SERPL CALC-SCNC: 9 MMOL/L (ref 5–15)
BUN SERPL-MCNC: 18 MG/DL (ref 6–20)
BUN/CREAT SERPL: 12 (ref 12–20)
CALCIUM SERPL-MCNC: 9 MG/DL (ref 8.5–10.1)
CHLORIDE SERPL-SCNC: 95 MMOL/L (ref 97–108)
CO2 SERPL-SCNC: 21 MMOL/L (ref 21–32)
CREAT SERPL-MCNC: 1.55 MG/DL (ref 0.55–1.02)
CREAT UR-MCNC: 101 MG/DL
ERYTHROCYTE [DISTWIDTH] IN BLOOD BY AUTOMATED COUNT: 16.5 % (ref 11.5–14.5)
ERYTHROCYTE [SEDIMENTATION RATE] IN BLOOD: 12 MM/HR (ref 0–30)
GLUCOSE BLD STRIP.AUTO-MCNC: 102 MG/DL (ref 65–100)
GLUCOSE BLD STRIP.AUTO-MCNC: 145 MG/DL (ref 65–100)
GLUCOSE BLD STRIP.AUTO-MCNC: 154 MG/DL (ref 65–100)
GLUCOSE BLD STRIP.AUTO-MCNC: 205 MG/DL (ref 65–100)
GLUCOSE BLD STRIP.AUTO-MCNC: 238 MG/DL (ref 65–100)
GLUCOSE BLD STRIP.AUTO-MCNC: 267 MG/DL (ref 65–100)
GLUCOSE BLD STRIP.AUTO-MCNC: 306 MG/DL (ref 65–100)
GLUCOSE BLD STRIP.AUTO-MCNC: 64 MG/DL (ref 65–100)
GLUCOSE BLD STRIP.AUTO-MCNC: 68 MG/DL (ref 65–100)
GLUCOSE SERPL-MCNC: 45 MG/DL (ref 65–100)
HCT VFR BLD AUTO: 33.4 % (ref 35–47)
HGB BLD-MCNC: 11 G/DL (ref 11.5–16)
MCH RBC QN AUTO: 25.9 PG (ref 26–34)
MCHC RBC AUTO-ENTMCNC: 32.9 G/DL (ref 30–36.5)
MCV RBC AUTO: 78.6 FL (ref 80–99)
PLATELET # BLD AUTO: 262 K/UL (ref 150–400)
POTASSIUM SERPL-SCNC: 4.4 MMOL/L (ref 3.5–5.1)
RBC # BLD AUTO: 4.25 M/UL (ref 3.8–5.2)
SERVICE CMNT-IMP: ABNORMAL
SERVICE CMNT-IMP: NORMAL
SODIUM SERPL-SCNC: 125 MMOL/L (ref 136–145)
SODIUM UR-SCNC: 47 MMOL/L
SODIUM UR-SCNC: 91 MMOL/L
WBC # BLD AUTO: 11.6 K/UL (ref 3.6–11)

## 2017-08-24 PROCEDURE — 84300 ASSAY OF URINE SODIUM: CPT | Performed by: SPECIALIST

## 2017-08-24 PROCEDURE — 93306 TTE W/DOPPLER COMPLETE: CPT

## 2017-08-24 PROCEDURE — 77030029684 HC NEB SM VOL KT MONA -A

## 2017-08-24 PROCEDURE — 94640 AIRWAY INHALATION TREATMENT: CPT

## 2017-08-24 PROCEDURE — 84300 ASSAY OF URINE SODIUM: CPT | Performed by: INTERNAL MEDICINE

## 2017-08-24 PROCEDURE — 74011250637 HC RX REV CODE- 250/637: Performed by: SPECIALIST

## 2017-08-24 PROCEDURE — 82962 GLUCOSE BLOOD TEST: CPT

## 2017-08-24 PROCEDURE — 85652 RBC SED RATE AUTOMATED: CPT | Performed by: INTERNAL MEDICINE

## 2017-08-24 PROCEDURE — 74011250636 HC RX REV CODE- 250/636: Performed by: PHYSICIAN ASSISTANT

## 2017-08-24 PROCEDURE — 85027 COMPLETE CBC AUTOMATED: CPT | Performed by: PHYSICIAN ASSISTANT

## 2017-08-24 PROCEDURE — 74011250637 HC RX REV CODE- 250/637

## 2017-08-24 PROCEDURE — 74011636637 HC RX REV CODE- 636/637: Performed by: PHYSICIAN ASSISTANT

## 2017-08-24 PROCEDURE — 74011000250 HC RX REV CODE- 250: Performed by: PHYSICIAN ASSISTANT

## 2017-08-24 PROCEDURE — 82570 ASSAY OF URINE CREATININE: CPT | Performed by: INTERNAL MEDICINE

## 2017-08-24 PROCEDURE — 80048 BASIC METABOLIC PNL TOTAL CA: CPT | Performed by: PHYSICIAN ASSISTANT

## 2017-08-24 PROCEDURE — 74011000250 HC RX REV CODE- 250: Performed by: INTERNAL MEDICINE

## 2017-08-24 PROCEDURE — 74011250637 HC RX REV CODE- 250/637: Performed by: PHYSICIAN ASSISTANT

## 2017-08-24 PROCEDURE — 36415 COLL VENOUS BLD VENIPUNCTURE: CPT | Performed by: PHYSICIAN ASSISTANT

## 2017-08-24 PROCEDURE — 65660000000 HC RM CCU STEPDOWN

## 2017-08-24 PROCEDURE — 83935 ASSAY OF URINE OSMOLALITY: CPT | Performed by: INTERNAL MEDICINE

## 2017-08-24 RX ORDER — INSULIN LISPRO 100 [IU]/ML
INJECTION, SOLUTION INTRAVENOUS; SUBCUTANEOUS
Status: DISCONTINUED | OUTPATIENT
Start: 2017-08-24 | End: 2017-08-31 | Stop reason: HOSPADM

## 2017-08-24 RX ORDER — DEXTROSE 50 % IN WATER (D50W) INTRAVENOUS SYRINGE
12.5-25 AS NEEDED
Status: DISCONTINUED | OUTPATIENT
Start: 2017-08-24 | End: 2017-08-31 | Stop reason: HOSPADM

## 2017-08-24 RX ORDER — MAGNESIUM SULFATE 100 %
4 CRYSTALS MISCELLANEOUS AS NEEDED
Status: DISCONTINUED | OUTPATIENT
Start: 2017-08-24 | End: 2017-08-31 | Stop reason: HOSPADM

## 2017-08-24 RX ORDER — IPRATROPIUM BROMIDE AND ALBUTEROL SULFATE 2.5; .5 MG/3ML; MG/3ML
3 SOLUTION RESPIRATORY (INHALATION)
Status: DISCONTINUED | OUTPATIENT
Start: 2017-08-24 | End: 2017-08-24

## 2017-08-24 RX ORDER — MAGNESIUM SULFATE 100 %
CRYSTALS MISCELLANEOUS
Status: COMPLETED
Start: 2017-08-24 | End: 2017-08-24

## 2017-08-24 RX ORDER — SODIUM CHLORIDE 9 MG/ML
50 INJECTION, SOLUTION INTRAVENOUS CONTINUOUS
Status: DISCONTINUED | OUTPATIENT
Start: 2017-08-24 | End: 2017-08-24

## 2017-08-24 RX ORDER — IPRATROPIUM BROMIDE AND ALBUTEROL SULFATE 2.5; .5 MG/3ML; MG/3ML
3 SOLUTION RESPIRATORY (INHALATION)
Status: DISCONTINUED | OUTPATIENT
Start: 2017-08-24 | End: 2017-08-31 | Stop reason: HOSPADM

## 2017-08-24 RX ORDER — LORAZEPAM 0.5 MG/1
0.5 TABLET ORAL
Status: DISCONTINUED | OUTPATIENT
Start: 2017-08-24 | End: 2017-08-31 | Stop reason: HOSPADM

## 2017-08-24 RX ORDER — BUMETANIDE 0.25 MG/ML
1 INJECTION INTRAMUSCULAR; INTRAVENOUS ONCE
Status: COMPLETED | OUTPATIENT
Start: 2017-08-24 | End: 2017-08-24

## 2017-08-24 RX ADMIN — GLIPIZIDE 5 MG: 5 TABLET ORAL at 16:49

## 2017-08-24 RX ADMIN — ASPIRIN 81 MG 81 MG: 81 TABLET ORAL at 10:04

## 2017-08-24 RX ADMIN — HYDRALAZINE HYDROCHLORIDE 75 MG: 50 TABLET ORAL at 22:31

## 2017-08-24 RX ADMIN — Medication 10 ML: at 07:29

## 2017-08-24 RX ADMIN — Medication 10 ML: at 14:00

## 2017-08-24 RX ADMIN — BUMETANIDE 1 MG: 0.25 INJECTION INTRAMUSCULAR; INTRAVENOUS at 23:17

## 2017-08-24 RX ADMIN — AZELASTINE HYDROCHLORIDE 1 SPRAY: 137 SPRAY, METERED NASAL at 10:07

## 2017-08-24 RX ADMIN — HYDRALAZINE HYDROCHLORIDE 75 MG: 50 TABLET ORAL at 16:49

## 2017-08-24 RX ADMIN — ATORVASTATIN CALCIUM 40 MG: 40 TABLET, FILM COATED ORAL at 22:32

## 2017-08-24 RX ADMIN — INSULIN LISPRO 3 UNITS: 100 INJECTION, SOLUTION INTRAVENOUS; SUBCUTANEOUS at 16:30

## 2017-08-24 RX ADMIN — AZELASTINE HYDROCHLORIDE 1 SPRAY: 137 SPRAY, METERED NASAL at 19:08

## 2017-08-24 RX ADMIN — SULFAMETHOXAZOLE AND TRIMETHOPRIM 1 TABLET: 800; 160 TABLET ORAL at 22:32

## 2017-08-24 RX ADMIN — HYDRALAZINE HYDROCHLORIDE 75 MG: 50 TABLET ORAL at 10:04

## 2017-08-24 RX ADMIN — APIXABAN 2.5 MG: 2.5 TABLET, FILM COATED ORAL at 22:32

## 2017-08-24 RX ADMIN — SODIUM CHLORIDE 50 ML/HR: 900 INJECTION, SOLUTION INTRAVENOUS at 12:20

## 2017-08-24 RX ADMIN — Medication: at 06:10

## 2017-08-24 RX ADMIN — APIXABAN 5 MG: 5 TABLET, FILM COATED ORAL at 10:25

## 2017-08-24 RX ADMIN — METOPROLOL TARTRATE 100 MG: 50 TABLET ORAL at 10:04

## 2017-08-24 RX ADMIN — IPRATROPIUM BROMIDE AND ALBUTEROL SULFATE 3 ML: .5; 3 SOLUTION RESPIRATORY (INHALATION) at 08:56

## 2017-08-24 RX ADMIN — SULFAMETHOXAZOLE AND TRIMETHOPRIM 1 TABLET: 800; 160 TABLET ORAL at 10:04

## 2017-08-24 RX ADMIN — Medication 10 ML: at 22:34

## 2017-08-24 RX ADMIN — IPRATROPIUM BROMIDE AND ALBUTEROL SULFATE 3 ML: .5; 3 SOLUTION RESPIRATORY (INHALATION) at 11:56

## 2017-08-24 RX ADMIN — INSULIN GLARGINE 10 UNITS: 100 INJECTION, SOLUTION SUBCUTANEOUS at 10:24

## 2017-08-24 RX ADMIN — METOPROLOL TARTRATE 100 MG: 50 TABLET ORAL at 19:11

## 2017-08-24 NOTE — PROGRESS NOTES
Patient with a PMH for HTN, HLD, AODM and CAD  And PAF ,( successfully cardioverted in March) presented to MD office for shortness of breath and dizziness. Care manager met with patient to explain role and discuss transitions of care. Patient and her daughter Tori Main [phone] live together in patient's home. Per patient she has a shower chair, wheelchair, cane and walker at home. She has used [facility] in the past and went to [facility] after surgery in February. Patient confirmed her PCP to be Dr Karo Dunlap and sees him every 3 months and uses the mail order prescription benefit through her insurance as well as [email or web address] for her prescriptions. Care management will follow for potential discharge planning needs. Geovanny Morse RN,CRM  Care Management Interventions  PCP Verified by CM:  Yes (Dr Karo Dunlap)  [address] [place] [place]: No  Discharge Durable Medical Equipment: No  Physical Therapy Consult: No  Occupational Therapy Consult: No  Speech Therapy Consult: No  Current Support Network: Own Home (Daughter Tori Main [phone])  Confirm Follow Up Transport: Family (daughter will transport)

## 2017-08-24 NOTE — CONSULTS
[facility]  Pulmonary, Critical Care, and Sleep Medicine    Initial Patient Consult    Name: Jen Christensen MRN: [id number]   : 1935 Hospital: [place] [address]   Date: 2017        IMPRESSION:   · SOB- I suspect HFpEF.  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 Very remote smoker, less suspicious for COPD  · PAF  · CAD  · HTN  · DM2  · Hyponatremia- likely causing \"dizziness\"      RECOMMENDATIONS:   · Suggest diuresis  · Get spirogram eval for COPD  · Get ESR- nml ESR makes amio pulm toxicity less likely  · Nebs PRN     Subjective: This patient has been seen and evaluated at the request of Dr. Marylu Dejesus for SOB. Patient is a 81 y.o. female   With h/o CAD admitted  with SOB and dizziness. Pt states over past 3-4 months since starting amiodarone she has been SOB with exertion. Remote smoker quit 35 yrs ago. Denies CP or cough. No n/v/d. No HA. Past Medical History:   Diagnosis Date    Atrial fibrillation ([facility] [address]     CAD (coronary artery disease)     Cerebrovascular accident stroke, other, unspec 2010    Coronary atherosclerosis of native coronary artery 2010    Essential hypertension     Essential hypertension, benign 2010    HLD (hyperlipidemia) 2010    PVD (peripheral vascular disease) ([facility] [address]     Type II or unspecified type diabetes mellitus without mention of complication, not stated as uncontrolled 2010      Past Surgical History:   Procedure Laterality Date    HX CORONARY STENT PLACEMENT      HX HEART CATHETERIZATION      HX HYSTERECTOMY        Prior to Admission medications    Medication Sig Start Date End Date Taking? Authorizing Provider   insulin detemir (LEVEMIR FLEXPEN) 100 unit/mL (3 mL) inpn 10 Units by SubCUTAneous route daily.    Yes Historical Provider   trimethoprim-sulfamethoxazole (BACTRIM DS) 160-800 mg per tablet Take 1 Tab by mouth two (2) times a day.   Yes Historical Provider   glipiZIDE (GLUCOTROL) 5 mg tablet Take 10 mg by mouth daily. Glipizide IR 10 mg before, 5 mg before dinner   Yes Historical Provider   glipiZIDE (GLUCOTROL) 5 mg tablet Take 5 mg by mouth Daily (before dinner). Glipizide IR 10 mg before, 5 mg before dinner   Yes Historical Provider   hydroCHLOROthiazide (HYDRODIURIL) 12.5 mg tablet Take 12.5 mg by mouth daily. Yes Historical Provider   atorvastatin (LIPITOR) 40 mg tablet Take 40 mg by mouth nightly. Yes Historical Provider   meclizine (ANTIVERT) 25 mg tablet Take 1 Tab by mouth three (3) times daily as needed for up to 10 days. 8/18/17 8/28/17 Yes Hien Bautista MD   cloNIDine HCl (CATAPRES) 0.1 mg tablet Take 1 Tab by mouth two (2) times a day. 8/18/17  Yes Tessa Turner IV, MD   azelastine (ASTELIN) 137 mcg (0.1 %) nasal spray 1 [place] by Both Nostrils route two (2) times a day. Use in each nostril as directed 8/8/17  Yes Tessa Turner IV, MD   hydrALAZINE (APRESOLINE) 50 mg tablet Take 1 Tab by mouth three (3) times daily. 8/7/17  Yes Miriam Damon MD   metoprolol tartrate (LOPRESSOR) 50 mg tablet Take 1 Tab by mouth two (2) times a day. 7/17/17  Yes Miriam Damon MD   enalapril (VASOTEC) 20 mg tablet TAKE 1 TABLET TWICE A DAY 7/17/17  Yes Miriam Damon MD   apixaban (ELIQUIS) 5 mg tablet Take 1 Tab by mouth two (2) times a day. Indications: PREVENT THROMBOEMBOLISM IN CHRONIC ATRIAL FIBRILLATION 7/17/17  Yes Miriam Damon MD   amiodarone (PACERONE) 100 mg tablet Take 1 Tab by mouth daily. 7/17/17  Yes Miriam Damon MD   nitroglycerin (NITROSTAT) 0.4 mg SL tablet 1 Tab by SubLINGual route every five (5) minutes as needed for Chest Pain. 7/17/17  Yes Miriam Damon MD   omeprazole (PRILOSEC) 20 mg capsule Take 1 Cap by mouth daily. 7/14/17  Yes Tessa Turner IV, MD   aspirin 81 mg chewable tablet Take 1 Tab by mouth daily.  4/28/17  Yes Mirela Roach PA-C   isosorbide mononitrate ER (IMDUR) 60 mg CR tablet Take 1 Tab by mouth two (2) times a day. 12/15/16  Yes Michel Rodriguez MD   cholecalciferol, vitamin d3, (VITAMIN D) 1,000 unit tablet Take 2,000 Units by mouth daily. Yes Historical Provider     Allergies   Allergen Reactions    Clonidine Shortness of Breath and Swelling     Lightheaded,     Codeine Unknown (comments)    Keflin Itching    Pcn [Penicillins] Unknown (comments)    Tramadol Other (comments)     Makes her head feel swishy      Social History   Substance Use Topics    Smoking status: Former Smoker     Packs/day: 0.80     Years: 20.00     Types: Cigarettes     Quit date: 4/18/1986    Smokeless tobacco: Never Used    Alcohol use No      History reviewed. No pertinent family history. Current Facility-Administered Medications   Medication Dose Route Frequency    albuterol-ipratropium (DUO-NEB) 2.5 MG-0.5 MG/3 ML  3 mL Nebulization Q4H RT    0.9% sodium chloride infusion  50 mL/hr IntraVENous CONTINUOUS    apixaban (ELIQUIS) tablet 2.5 mg  2.5 mg Oral Q12H    sodium chloride (NS) flush 5-10 mL  5-10 mL IntraVENous Q8H    atorvastatin (LIPITOR) tablet 40 mg  40 mg Oral QHS    glipiZIDE (GLUCOTROL) tablet 10 mg  10 mg Oral ACB    glipiZIDE (GLUCOTROL) tablet 5 mg  5 mg Oral ACD    hydrALAZINE (APRESOLINE) tablet 75 mg  75 mg Oral TID    azelastine (ASTELIN) 137mcg/spray nasal spray  1 Spray Both Nostrils BID    metoprolol tartrate (LOPRESSOR) tablet 100 mg  100 mg Oral BID    aspirin chewable tablet 81 mg  81 mg Oral DAILY    insulin glargine (LANTUS) injection 10 Units  10 Units SubCUTAneous DAILY    trimethoprim-sulfamethoxazole (BACTRIM DS, SEPTRA DS) 160-800 mg per tablet 1 Tab  1 Tab Oral BID       Review of Systems:  A comprehensive review of systems was negative except for that written in the HPI.     Objective:   Vital Signs:    Visit Vitals    /51 (BP 1 Location: Right arm)    Pulse (!) 55    Temp 97.9 °F (36.6 °C)    Resp 18    Ht 5' 3\" (1.6 m)    Wt 78.8 kg (173 lb 11.6 oz)    SpO2 (!) 54%    Breastfeeding No    BMI 30.77 kg/m2       O2 Device: Nasal cannula   O2 Flow Rate (L/min): 1 l/min   Temp (24hrs), Av.8 °F (36.6 °C), Min:97.6 °F (36.4 °C), Max:98.3 °F (36.8 °C)       Intake/Output:   Last shift:      701 - 1900  In: 240 [P.O.:240]  Out: 300 [Urine:300]  Last 3 shifts: 1901 -  07  In: 270 [P.O.:270]  Out: 800 [Urine:800]    Intake/Output Summary (Last 24 hours) at 17 1358  Last data filed at 17 0813   Gross per 24 hour   Intake              510 ml   Output             1100 ml   Net             -590 ml      Physical Exam:   General:  Alert, cooperative, no distress, appears stated age. Head:  Normocephalic, without obvious abnormality, atraumatic. Eyes:  Conjunctivae/corneas clear. PERRL, EOMs intact. Neck: Supple, symmetrical, trachea midline,    Back:   Symmetric, no curvature. ROM normal.   Lungs:   Clear to auscultation bilaterally. Chest wall:  No tenderness or deformity. Heart:  Regular rate and rhythm, S1, S2 normal, no murmur, click, rub or gallop. Abdomen:   Soft, non-tender. Bowel sounds normal. No masses,  No organomegaly. Extremities: Extremities normal, atraumatic, no cyanosis or edema. Pulses: 2+ and symmetric all extremities. Data review:     Recent Results (from the past 24 hour(s))   CBC WITH AUTOMATED DIFF    Collection Time: 17  2:29 PM   Result Value Ref Range    WBC 10.6 3.6 - 11.0 K/uL    RBC 4.04 3.80 - 5.20 M/uL    HGB 10.4 (L) 11.5 - 16.0 g/dL    HCT 31.6 (L) 35.0 - 47.0 %    MCV 78.2 (L) 80.0 - 99.0 FL    MCH 25.7 (L) 26.0 - 34.0 PG    MCHC 32.9 30.0 - 36.5 g/dL    RDW 16.1 (H) 11.5 - 14.5 %    PLATELET 003 266 - 244 K/uL    NEUTROPHILS 79 (H) 32 - 75 %    LYMPHOCYTES 13 12 - 49 %    MONOCYTES 7 5 - 13 %    EOSINOPHILS 1 0 - 7 %    BASOPHILS 0 0 - 1 %    ABS. NEUTROPHILS 8.3 (H) 1.8 - 8.0 K/UL    ABS. LYMPHOCYTES 1.4 0.8 - 3.5 K/UL    ABS. MONOCYTES 0.7 0.0 - 1.0 K/UL    ABS. EOSINOPHILS 0.1 0.0 - 0.4 K/UL    ABS. BASOPHILS 0.0 0.0 - 0.1 K/UL   METABOLIC PANEL, COMPREHENSIVE    Collection Time: 08/23/17  2:29 PM   Result Value Ref Range    Sodium 127 (L) 136 - 145 mmol/L    Potassium 4.4 3.5 - 5.1 mmol/L    Chloride 92 (L) 97 - 108 mmol/L    CO2 25 21 - 32 mmol/L    Anion gap 10 5 - 15 mmol/L    Glucose 118 (H) 65 - 100 mg/dL    BUN 16 6 - 20 MG/DL    Creatinine 1.32 (H) 0.55 - 1.02 MG/DL    BUN/Creatinine ratio 12 12 - 20      GFR est AA 47 (L) >60 ml/min/1.73m2    GFR est non-AA 39 (L) >60 ml/min/1.73m2    Calcium 8.8 8.5 - 10.1 MG/DL    Bilirubin, total 0.6 0.2 - 1.0 MG/DL    ALT (SGPT) 61 12 - 78 U/L    AST (SGOT) 32 15 - 37 U/L    Alk.  phosphatase 88 45 - 117 U/L    Protein, total 6.6 6.4 - 8.2 g/dL    Albumin 3.4 (L) 3.5 - 5.0 g/dL    Globulin 3.2 2.0 - 4.0 g/dL    A-G Ratio 1.1 1.1 - 2.2     TSH 3RD GENERATION    Collection Time: 08/23/17  2:29 PM   Result Value Ref Range    TSH 1.41 0.36 - 3.74 uIU/mL   URIC ACID    Collection Time: 08/23/17  2:29 PM   Result Value Ref Range    Uric acid 4.4 2.6 - 6.0 MG/DL   NT-PRO BNP    Collection Time: 08/23/17  2:29 PM   Result Value Ref Range    NT pro-BNP 1671 (H) 0 - 450 PG/ML   EKG, 12 LEAD, INITIAL    Collection Time: 08/23/17  2:30 PM   Result Value Ref Range    Ventricular Rate 60 BPM    Atrial Rate 60 BPM    P-R Interval 198 ms    QRS Duration 86 ms    Q-T Interval 444 ms    QTC Calculation (Bezet) 444 ms    Calculated P Axis 37 degrees    Calculated R Axis 21 degrees    Calculated T Axis 16 degrees    Diagnosis       Normal sinus rhythm  Nonspecific ST and T wave abnormality  When compared with ECG of 17-AUG-2017 22:54,  No significant change was found  Confirmed by Lachelle Simon M.D., Cynthia Holder ([id number]) on 8/23/2017 5:22:42 PM     METABOLIC PANEL, BASIC    Collection Time: 08/24/17  4:35 AM   Result Value Ref Range    Sodium 125 (L) 136 - 145 mmol/L    Potassium 4.4 3.5 - 5.1 mmol/L    Chloride 95 (L) 97 - 108 mmol/L    CO2 21 21 - 32 mmol/L    Anion gap 9 5 - 15 mmol/L    Glucose 45 (LL) 65 - 100 mg/dL    BUN 18 6 - 20 MG/DL    Creatinine 1.55 (H) 0.55 - 1.02 MG/DL    BUN/Creatinine ratio 12 12 - 20      GFR est AA 39 (L) >60 ml/min/1.73m2    GFR est non-AA 32 (L) >60 ml/min/1.73m2    Calcium 9.0 8.5 - 10.1 MG/DL   CBC W/O DIFF    Collection Time: 08/24/17  4:35 AM   Result Value Ref Range    WBC 11.6 (H) 3.6 - 11.0 K/uL    RBC 4.25 3.80 - 5.20 M/uL    HGB 11.0 (L) 11.5 - 16.0 g/dL    HCT 33.4 (L) 35.0 - 47.0 %    MCV 78.6 (L) 80.0 - 99.0 FL    MCH 25.9 (L) 26.0 - 34.0 PG    MCHC 32.9 30.0 - 36.5 g/dL    RDW 16.5 (H) 11.5 - 14.5 %    PLATELET 180 696 - 248 K/uL   SODIUM, UR, RANDOM    Collection Time: 08/24/17  4:40 AM   Result Value Ref Range    Sodium urine, random 91 MMOL/L   GLUCOSE, POC    Collection Time: 08/24/17  5:47 AM   Result Value Ref Range    Glucose (POC) 64 (L) 65 - 100 mg/dL    Performed by Anatoly Hoang, POC    Collection Time: 08/24/17  6:07 AM   Result Value Ref Range    Glucose (POC) 68 65 - 100 mg/dL    Performed by Rico Samuels    GLUCOSE, POC    Collection Time: 08/24/17  6:28 AM   Result Value Ref Range    Glucose (POC) 102 (H) 65 - 100 mg/dL    Performed by Rico Samuels    GLUCOSE, POC    Collection Time: 08/24/17  7:13 AM   Result Value Ref Range    Glucose (POC) 205 (H) 65 - 100 mg/dL    Performed by [address] [place] [address], POC    Collection Time: 08/24/17 10:13 AM   Result Value Ref Range    Glucose (POC) 306 (H) 65 - 100 mg/dL    Performed by [address] [place] [address], POC    Collection Time: 08/24/17 11:07 AM   Result Value Ref Range    Glucose (POC) 267 (H) 65 - 100 mg/dL    Performed by Chidi Bowles  PCT    GLUCOSE, POC    Collection Time: 08/24/17 12:50 PM   Result Value Ref Range    Glucose (POC) 154 (H) 65 - 100 mg/dL    Performed by Joanne Gill        Imaging:  I have personally reviewed the patients radiographs and have reviewed the reports:  CT chest with diffuse GGO and b/l effusions no LAD no masses or ASD no fibrotic changes        Arcadio Max MD

## 2017-08-24 NOTE — PROGRESS NOTES
Bedside shift change report given to MYNOR Hazel (oncoming nurse) by JYOTI Barrera (offgoing nurse). Report included the following information SBAR, Kardex, Procedure Summary, Intake/Output, MAR and Recent Results.

## 2017-08-24 NOTE — PROGRESS NOTES
Cardiology Progress Note            Admit Date: 8/23/2017  Admit Diagnosis: SOB (shortness of breath)  Date: 8/24/2017     Time: 8:35 AM    Subjective:  Denies CP. SOB lingering, but better. She states she is feeling better than yesterday     Assessment and Plan     1. SOB   - CTA without ILD from Amiodarone, no PTE. Emphysema/COPD changes noted as well as blebs. B/L effusions.   - Amiodarone stopped indefinitely   - Jet Nebs added for COPD   - pro BNP 1671   - Echo for today. 2. Hyponatremia   - Na 125   - Fluid restrict   - daily labs  3. Cr elevated   - 1.55 post contrast   - will start gentle fluids   - Follow labs   - Hold off on Lasix today  4. HTN   - a bit better with Metoprolol at 100 and Hydralazine at 75 mg  5. AF   - NSR currently   - Eliquis and Metoprolol     She is feeling better today. CT without Amiodarone changes, it is stopped indefinitely non the less. NO PTE, but noted COPD/Emphysema changes with blebs. Effusions as well. Jet nebs started for COPD. Cr up some following contrast from CT. Will start maintenance fluid. Na lower, will oral fluid restrict. Continue to follow labs. I have seen and examined the patient and agree with PA assessment. ?siadh, consult nephrology  Off diuretics for now but will need to resume soon likely  Consult pulmonary as well for copd  Stay off amiodarone for the time being  Lisinopril stopped and off hctz as well follow creatinine  eliquis to renal dose for now       ROS:  A comprehensive review of systems was negative except for that written in the HPI.     Objective:      Physical Exam:                Visit Vitals    /52 (BP 1 Location: Right arm, BP Patient Position: Sitting)    Pulse (!) 54    Temp 97.9 °F (36.6 °C)    Resp 18    Ht 5' 3\" (1.6 m)    Wt 78.8 kg (173 lb 11.6 oz)    SpO2 (!) 54%    Breastfeeding No    BMI 30.77 kg/m2        General Appearance: Well developed, well nourished,alert and oriented x 3, and   individual in no acute distress. Ears/Nose/Mouth/Throat:    Hearing grossly normal.         Neck:  Supple. Chest:    Lungs decreased BS, crackles in bases to auscultation bilaterally. Cardiovascular:   Regular rate and rhythm, S1, S2 normal, no murmur. Abdomen:    Soft, non-tender, bowel sounds are active. Extremities:  No edema bilaterally. Skin:  Warm and dry. Telemetry: normal sinus rhythm          Data Review:    Labs:    Recent Results (from the past 24 hour(s))   CBC WITH AUTOMATED DIFF    Collection Time: 08/23/17  2:29 PM   Result Value Ref Range    WBC 10.6 3.6 - 11.0 K/uL    RBC 4.04 3.80 - 5.20 M/uL    HGB 10.4 (L) 11.5 - 16.0 g/dL    HCT 31.6 (L) 35.0 - 47.0 %    MCV 78.2 (L) 80.0 - 99.0 FL    MCH 25.7 (L) 26.0 - 34.0 PG    MCHC 32.9 30.0 - 36.5 g/dL    RDW 16.1 (H) 11.5 - 14.5 %    PLATELET 232 000 - 651 K/uL    NEUTROPHILS 79 (H) 32 - 75 %    LYMPHOCYTES 13 12 - 49 %    MONOCYTES 7 5 - 13 %    EOSINOPHILS 1 0 - 7 %    BASOPHILS 0 0 - 1 %    ABS. NEUTROPHILS 8.3 (H) 1.8 - 8.0 K/UL    ABS. LYMPHOCYTES 1.4 0.8 - 3.5 K/UL    ABS. MONOCYTES 0.7 0.0 - 1.0 K/UL    ABS. EOSINOPHILS 0.1 0.0 - 0.4 K/UL    ABS. BASOPHILS 0.0 0.0 - 0.1 K/UL   METABOLIC PANEL, COMPREHENSIVE    Collection Time: 08/23/17  2:29 PM   Result Value Ref Range    Sodium 127 (L) 136 - 145 mmol/L    Potassium 4.4 3.5 - 5.1 mmol/L    Chloride 92 (L) 97 - 108 mmol/L    CO2 25 21 - 32 mmol/L    Anion gap 10 5 - 15 mmol/L    Glucose 118 (H) 65 - 100 mg/dL    BUN 16 6 - 20 MG/DL    Creatinine 1.32 (H) 0.55 - 1.02 MG/DL    BUN/Creatinine ratio 12 12 - 20      GFR est AA 47 (L) >60 ml/min/1.73m2    GFR est non-AA 39 (L) >60 ml/min/1.73m2    Calcium 8.8 8.5 - 10.1 MG/DL    Bilirubin, total 0.6 0.2 - 1.0 MG/DL    ALT (SGPT) 61 12 - 78 U/L    AST (SGOT) 32 15 - 37 U/L    Alk.  phosphatase 88 45 - 117 U/L    Protein, total 6.6 6.4 - 8.2 g/dL    Albumin 3.4 (L) 3.5 - 5.0 g/dL Globulin 3.2 2.0 - 4.0 g/dL    A-G Ratio 1.1 1.1 - 2.2     TSH 3RD GENERATION    Collection Time: 08/23/17  2:29 PM   Result Value Ref Range    TSH 1.41 0.36 - 3.74 uIU/mL   URIC ACID    Collection Time: 08/23/17  2:29 PM   Result Value Ref Range    Uric acid 4.4 2.6 - 6.0 MG/DL   NT-PRO BNP    Collection Time: 08/23/17  2:29 PM   Result Value Ref Range    NT pro-BNP 1671 (H) 0 - 450 PG/ML   EKG, 12 LEAD, INITIAL    Collection Time: 08/23/17  2:30 PM   Result Value Ref Range    Ventricular Rate 60 BPM    Atrial Rate 60 BPM    P-R Interval 198 ms    QRS Duration 86 ms    Q-T Interval 444 ms    QTC Calculation (Bezet) 444 ms    Calculated P Axis 37 degrees    Calculated R Axis 21 degrees    Calculated T Axis 16 degrees    Diagnosis       Normal sinus rhythm  Nonspecific ST and T wave abnormality  When compared with ECG of 17-AUG-2017 22:54,  No significant change was found  Confirmed by Violet Boothe M.D., [facility] ([id number]) on 8/23/2017 0:42:76 PM     METABOLIC PANEL, BASIC    Collection Time: 08/24/17  4:35 AM   Result Value Ref Range    Sodium 125 (L) 136 - 145 mmol/L    Potassium 4.4 3.5 - 5.1 mmol/L    Chloride 95 (L) 97 - 108 mmol/L    CO2 21 21 - 32 mmol/L    Anion gap 9 5 - 15 mmol/L    Glucose 45 (LL) 65 - 100 mg/dL    BUN 18 6 - 20 MG/DL    Creatinine 1.55 (H) 0.55 - 1.02 MG/DL    BUN/Creatinine ratio 12 12 - 20      GFR est AA 39 (L) >60 ml/min/1.73m2    GFR est non-AA 32 (L) >60 ml/min/1.73m2    Calcium 9.0 8.5 - 10.1 MG/DL   CBC W/O DIFF    Collection Time: 08/24/17  4:35 AM   Result Value Ref Range    WBC 11.6 (H) 3.6 - 11.0 K/uL    RBC 4.25 3.80 - 5.20 M/uL    HGB 11.0 (L) 11.5 - 16.0 g/dL    HCT 33.4 (L) 35.0 - 47.0 %    MCV 78.6 (L) 80.0 - 99.0 FL    MCH 25.9 (L) 26.0 - 34.0 PG    MCHC 32.9 30.0 - 36.5 g/dL    RDW 16.5 (H) 11.5 - 14.5 %    PLATELET 764 825 - 648 K/uL   SODIUM, UR, RANDOM    Collection Time: 08/24/17  4:40 AM   Result Value Ref Range    Sodium urine, random 91 MMOL/L   GLUCOSE, POC Collection Time: 08/24/17  5:47 AM   Result Value Ref Range    Glucose (POC) 64 (L) 65 - 100 mg/dL    Performed by [address] [place] [address], POC    Collection Time: 08/24/17  6:07 AM   Result Value Ref Range    Glucose (POC) 68 65 - 100 mg/dL    Performed by [address] [place] [address], POC    Collection Time: 08/24/17  6:28 AM   Result Value Ref Range    Glucose (POC) 102 (H) 65 - 100 mg/dL    Performed by Elijah Khan    GLUCOSE, POC    Collection Time: 08/24/17  7:13 AM   Result Value Ref Range    Glucose (POC) 205 (H) 65 - 100 mg/dL    Performed by [address] [place] [address], POC    Collection Time: 08/24/17 10:13 AM   Result Value Ref Range    Glucose (POC) 306 (H) 65 - 100 mg/dL    Performed by [address] [place] [address], POC    Collection Time: 08/24/17 11:07 AM   Result Value Ref Range    Glucose (POC) 267 (H) 65 - 100 mg/dL    Performed by Kaitlin Godinez  PCT           Radiology:        Current Facility-Administered Medications   Medication Dose Route Frequency    albuterol-ipratropium (DUO-NEB) 2.5 MG-0.5 MG/3 ML  3 mL Nebulization Q4H RT    0.9% sodium chloride infusion  50 mL/hr IntraVENous CONTINUOUS    apixaban (ELIQUIS) tablet 2.5 mg  2.5 mg Oral Q12H    sodium chloride (NS) flush 5-10 mL  5-10 mL IntraVENous Q8H    sodium chloride (NS) flush 5-10 mL  5-10 mL IntraVENous PRN    atorvastatin (LIPITOR) tablet 40 mg  40 mg Oral QHS    glipiZIDE (GLUCOTROL) tablet 10 mg  10 mg Oral ACB    glipiZIDE (GLUCOTROL) tablet 5 mg  5 mg Oral ACD    hydrALAZINE (APRESOLINE) tablet 75 mg  75 mg Oral TID    azelastine (ASTELIN) 137mcg/spray nasal spray  1 Spray Both Nostrils BID    metoprolol tartrate (LOPRESSOR) tablet 100 mg  100 mg Oral BID    aspirin chewable tablet 81 mg  81 mg Oral DAILY    insulin glargine (LANTUS) injection 10 Units  10 Units SubCUTAneous DAILY    trimethoprim-sulfamethoxazole (BACTRIM DS, SEPTRA DS) 160-800 mg per tablet 1 Tab  1 Tab Oral BID          Anne Garb, MD     [facility] [address] [place] [address], [address] [place] [address] [address]   Gab Ricks   [phone]

## 2017-08-24 NOTE — PROGRESS NOTES
Problem: Falls - Risk of  Goal: *Absence of Falls  Document Iain Fall Risk and appropriate interventions in the flowsheet.    Outcome: Progressing Towards Goal  Fall Risk Interventions:  Mobility Interventions: Communicate number of staff needed for ambulation/transfer, OT consult for ADLs, Patient to call before getting OOB, PT Consult for mobility concerns, Strengthening exercises (ROM-active/passive), Utilize walker, cane, or other assitive device           Medication Interventions: Evaluate medications/consider consulting pharmacy, Patient to call before getting OOB, Teach patient to arise slowly     Elimination Interventions: Call light in reach, Patient to call for help with toileting needs, Toilet paper/wipes in reach, Toileting schedule/hourly rounds

## 2017-08-25 ENCOUNTER — APPOINTMENT (OUTPATIENT)
Dept: ULTRASOUND IMAGING | Age: 82
DRG: 641 | End: 2017-08-25
Attending: INTERNAL MEDICINE
Payer: MEDICARE

## 2017-08-25 LAB
ALBUMIN SERPL-MCNC: 3.3 G/DL (ref 3.5–5)
ANION GAP SERPL CALC-SCNC: 12 MMOL/L (ref 5–15)
BNP SERPL-MCNC: 358 PG/ML (ref 0–100)
BUN SERPL-MCNC: 20 MG/DL (ref 6–20)
BUN/CREAT SERPL: 13 (ref 12–20)
CALCIUM SERPL-MCNC: 8.9 MG/DL (ref 8.5–10.1)
CHLORIDE SERPL-SCNC: 92 MMOL/L (ref 97–108)
CO2 SERPL-SCNC: 22 MMOL/L (ref 21–32)
CREAT SERPL-MCNC: 1.53 MG/DL (ref 0.55–1.02)
EOSINOPHIL #/AREA URNS HPF: NEGATIVE /[HPF]
ERYTHROCYTE [DISTWIDTH] IN BLOOD BY AUTOMATED COUNT: 16.4 % (ref 11.5–14.5)
GLUCOSE BLD STRIP.AUTO-MCNC: 101 MG/DL (ref 65–100)
GLUCOSE BLD STRIP.AUTO-MCNC: 133 MG/DL (ref 65–100)
GLUCOSE BLD STRIP.AUTO-MCNC: 169 MG/DL (ref 65–100)
GLUCOSE BLD STRIP.AUTO-MCNC: 175 MG/DL (ref 65–100)
GLUCOSE BLD STRIP.AUTO-MCNC: 70 MG/DL (ref 65–100)
GLUCOSE BLD STRIP.AUTO-MCNC: 90 MG/DL (ref 65–100)
GLUCOSE SERPL-MCNC: 55 MG/DL (ref 65–100)
HCT VFR BLD AUTO: 32.6 % (ref 35–47)
HGB BLD-MCNC: 11 G/DL (ref 11.5–16)
MAGNESIUM SERPL-MCNC: 1.9 MG/DL (ref 1.6–2.4)
MCH RBC QN AUTO: 26.1 PG (ref 26–34)
MCHC RBC AUTO-ENTMCNC: 33.7 G/DL (ref 30–36.5)
MCV RBC AUTO: 77.3 FL (ref 80–99)
OSMOLALITY UR: 497 MOSM/KG H2O
PHOSPHATE SERPL-MCNC: 4 MG/DL (ref 2.6–4.7)
PLATELET # BLD AUTO: 275 K/UL (ref 150–400)
POTASSIUM SERPL-SCNC: 4.3 MMOL/L (ref 3.5–5.1)
RBC # BLD AUTO: 4.22 M/UL (ref 3.8–5.2)
SERVICE CMNT-IMP: ABNORMAL
SERVICE CMNT-IMP: NORMAL
SERVICE CMNT-IMP: NORMAL
SODIUM SERPL-SCNC: 126 MMOL/L (ref 136–145)
WBC # BLD AUTO: 11.9 K/UL (ref 3.6–11)

## 2017-08-25 PROCEDURE — 76770 US EXAM ABDO BACK WALL COMP: CPT

## 2017-08-25 PROCEDURE — 36415 COLL VENOUS BLD VENIPUNCTURE: CPT | Performed by: INTERNAL MEDICINE

## 2017-08-25 PROCEDURE — 87205 SMEAR GRAM STAIN: CPT | Performed by: INTERNAL MEDICINE

## 2017-08-25 PROCEDURE — 74011636637 HC RX REV CODE- 636/637: Performed by: PHYSICIAN ASSISTANT

## 2017-08-25 PROCEDURE — 83880 ASSAY OF NATRIURETIC PEPTIDE: CPT | Performed by: INTERNAL MEDICINE

## 2017-08-25 PROCEDURE — 82962 GLUCOSE BLOOD TEST: CPT

## 2017-08-25 PROCEDURE — 74011250637 HC RX REV CODE- 250/637: Performed by: PHYSICIAN ASSISTANT

## 2017-08-25 PROCEDURE — 83735 ASSAY OF MAGNESIUM: CPT | Performed by: INTERNAL MEDICINE

## 2017-08-25 PROCEDURE — 80069 RENAL FUNCTION PANEL: CPT | Performed by: INTERNAL MEDICINE

## 2017-08-25 PROCEDURE — 65660000000 HC RM CCU STEPDOWN

## 2017-08-25 PROCEDURE — 74011250637 HC RX REV CODE- 250/637: Performed by: SPECIALIST

## 2017-08-25 PROCEDURE — 85027 COMPLETE CBC AUTOMATED: CPT | Performed by: INTERNAL MEDICINE

## 2017-08-25 RX ORDER — HYDRALAZINE HYDROCHLORIDE 50 MG/1
100 TABLET, FILM COATED ORAL 3 TIMES DAILY
Status: DISCONTINUED | OUTPATIENT
Start: 2017-08-25 | End: 2017-08-31 | Stop reason: HOSPADM

## 2017-08-25 RX ORDER — BUMETANIDE 1 MG/1
1 TABLET ORAL DAILY
Status: DISCONTINUED | OUTPATIENT
Start: 2017-08-26 | End: 2017-08-31 | Stop reason: HOSPADM

## 2017-08-25 RX ADMIN — HYDRALAZINE HYDROCHLORIDE 100 MG: 50 TABLET ORAL at 17:41

## 2017-08-25 RX ADMIN — Medication 5 ML: at 07:17

## 2017-08-25 RX ADMIN — SULFAMETHOXAZOLE AND TRIMETHOPRIM 1 TABLET: 800; 160 TABLET ORAL at 08:50

## 2017-08-25 RX ADMIN — HYDRALAZINE HYDROCHLORIDE 100 MG: 50 TABLET ORAL at 21:37

## 2017-08-25 RX ADMIN — HYDRALAZINE HYDROCHLORIDE 100 MG: 50 TABLET ORAL at 08:51

## 2017-08-25 RX ADMIN — APIXABAN 2.5 MG: 2.5 TABLET, FILM COATED ORAL at 21:37

## 2017-08-25 RX ADMIN — ASPIRIN 81 MG 81 MG: 81 TABLET ORAL at 08:51

## 2017-08-25 RX ADMIN — ATORVASTATIN CALCIUM 40 MG: 40 TABLET, FILM COATED ORAL at 21:37

## 2017-08-25 RX ADMIN — INSULIN GLARGINE 10 UNITS: 100 INJECTION, SOLUTION SUBCUTANEOUS at 08:52

## 2017-08-25 RX ADMIN — INSULIN LISPRO 2 UNITS: 100 INJECTION, SOLUTION INTRAVENOUS; SUBCUTANEOUS at 12:14

## 2017-08-25 RX ADMIN — AZELASTINE HYDROCHLORIDE 1 SPRAY: 137 SPRAY, METERED NASAL at 08:59

## 2017-08-25 RX ADMIN — GLIPIZIDE 10 MG: 5 TABLET ORAL at 08:56

## 2017-08-25 RX ADMIN — Medication 10 ML: at 21:37

## 2017-08-25 RX ADMIN — GLIPIZIDE 5 MG: 5 TABLET ORAL at 17:38

## 2017-08-25 RX ADMIN — METOPROLOL TARTRATE 100 MG: 50 TABLET ORAL at 20:12

## 2017-08-25 RX ADMIN — Medication 10 ML: at 15:05

## 2017-08-25 RX ADMIN — METOPROLOL TARTRATE 100 MG: 50 TABLET ORAL at 08:51

## 2017-08-25 RX ADMIN — APIXABAN 2.5 MG: 2.5 TABLET, FILM COATED ORAL at 08:51

## 2017-08-25 NOTE — PROGRESS NOTES
[phone] - notified by monitor tech that patient had 2 episodes of atrial flutter that lasted for approximately 8 seconds each, paged Dr. Lachelle Simon. [id number] notified Sergei FLORENCE,, no orders received.

## 2017-08-25 NOTE — DIABETES MGMT
DTC Progress Note    Recommendations/ Comments:  Chart reviewed on Luane Juneau due to hypoglycemia, likely due to renal status. If appropriate, consider:  -  Changing correction scale to high sensitivity scale   -  Decreasing her mealtime Glipizide to 2.5 mg     If hypoglycemia persists consider decreasing Lantus to 8 units daily     Patient is a 81 y.o. female with hx Type 2 Diabetes on Levemir 10 units daily and Glipizide before dinner at home. A1c:   Lab Results   Component Value Date/Time    Hemoglobin A1c 8.1 06/09/2011 08:56 AM       Recent Glucose Results: Lab Results   Component Value Date/Time    GLU 55 (L) 08/25/2017 04:40 AM    GLUCPOC 133 (H) 08/25/2017 06:23 AM    GLUCPOC 90 08/25/2017 04:51 AM    GLUCPOC 70 08/25/2017 04:33 AM        Lab Results   Component Value Date/Time    Creatinine 1.53 08/25/2017 04:40 AM     Estimated Creatinine Clearance: 28.7 mL/min (based on Cr of 1.53). Active Orders   Diet    DIET CARDIAC Regular; FR 1200ML        PO intake: Patient Vitals for the past 72 hrs:   % Diet Eaten   08/25/17 0957 75 %   08/24/17 1523 75 %   08/24/17 1105 50 %   08/24/17 0813 90 %       Current hospital DM medication: Lantus 10 units daily, Humalog for correction normal sensitivity scale and Glipizide 5 mg AM and 10 mg PM    Will continue to follow as needed.     Thank you  Billy Mixon RD

## 2017-08-25 NOTE — PROGRESS NOTES
Spiritual Care Partner Volunteer visited patient in [place] [address] on 8/25/17. Documented by:  YOVANA Gonzalez

## 2017-08-25 NOTE — PROGRESS NOTES
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 Na improving, Cr stable,  No cp, sob, n/v/d reported this AM.  UOP stable after diuretics yesterday. Current Medications: all current  Medications have been eviewed in EPIC  Review of Systems: Pertinent items are noted in HPI.     Objective:  Vitals:    Vitals:    08/25/17 0444 08/25/17 0740 08/25/17 0851 08/25/17 1135   BP:  170/51  143/89   Pulse:  (!) 57 (!) 59 (!) 52   Resp:  18  18   Temp:  97.8 °F (36.6 °C)  98.2 °F (36.8 °C)   SpO2:  98%  99%   Weight: 78.8 kg (173 lb 11.6 oz)      Height:         Intake and Output:  08/25 0701 - 08/25 1900  In: 240 [P.O.:240]  Out: 425 [Urine:425]  08/23 1901 - 08/25 0700  In: 1020 [P.O.:1020]  Out: 2350 [Urine:2350]    Physical Examination:  General: NAD,Conversant   Neck:  Supple, no mass  Resp:  Lungs CTA B/L, no wheezing , normal respiratory effort  CV:  RRR,  no murmur or rub, trace LE edema  GI:  Soft, NT, + Bowel sounds, no hepatosplenomegaly  Neurologic:  Non focal  Psych:             AAO x 3 appropriate affect   Skin:  No Rash      []    High complexity decision making was performed  []    Patient is at high-risk of decompensation with multiple organ involvement    Lab Data Personally Reviewed: I have reviewed all the pertinent labs, microbiology data and radiology studies during assessment.     Recent Labs      08/25/17   0440  08/24/17   0435  08/23/17   1429   NA  126*  125*  127*   K  4.3  4.4  4.4   CL  92*  95*  92*   CO2  22  21  25   GLU  55*  45*  118*   BUN  20  18  16   CREA  1.53*  1.55*  1.32*   CA  8.9  9.0  8.8   MG  1.9   --    --    PHOS  4.0   --    --    ALB  3.3*   --   3.4*   SGOT   --    --   32   ALT   --    --   61     Recent Labs      08/25/17   0440  08/24/17   0435  08/23/17   1429   WBC  11.9*  11.6*  10.6   HGB  11.0*  11.0*  10.4*   HCT  32.6*  33.4*  31.6*   PLT  275  262  242     No results found for: SDES  No results found for: CULT  Recent Results (from the past 24 hour(s))   GLUCOSE, POC    Collection Time: 08/24/17 12:50 PM   Result Value Ref Range    Glucose (POC) 154 (H) 65 - 100 mg/dL    Performed by Lamin Hernandez    SED RATE (ESR)    Collection Time: 08/24/17  3:25 PM   Result Value Ref Range    Sed rate, automated 12 0 - 30 mm/hr   GLUCOSE, POC    Collection Time: 08/24/17  6:00 PM   Result Value Ref Range    Glucose (POC) 238 (H) 65 - 100 mg/dL    Performed by Constance Cano, POC    Collection Time: 08/24/17  9:21 PM   Result Value Ref Range    Glucose (POC) 145 (H) 65 - 100 mg/dL    Performed by Iron Gan (PCT)    OSMOLALITY, UR    Collection Time: 08/24/17 10:35 PM   Result Value Ref Range    Osmolality,urine 497 MOSM/kg H2O   CREATININE, UR, RANDOM    Collection Time: 08/24/17 10:35 PM   Result Value Ref Range    Creatinine, urine 101.00 mg/dL   SODIUM, UR, RANDOM    Collection Time: 08/24/17 10:35 PM   Result Value Ref Range    Sodium urine, random 47 MMOL/L   GLUCOSE, POC    Collection Time: 08/25/17  4:33 AM   Result Value Ref Range    Glucose (POC) 70 65 - 100 mg/dL    Performed by Grisel Jones    RENAL FUNCTION PANEL    Collection Time: 08/25/17  4:40 AM   Result Value Ref Range    Sodium 126 (L) 136 - 145 mmol/L    Potassium 4.3 3.5 - 5.1 mmol/L    Chloride 92 (L) 97 - 108 mmol/L    CO2 22 21 - 32 mmol/L    Anion gap 12 5 - 15 mmol/L    Glucose 55 (L) 65 - 100 mg/dL    BUN 20 6 - 20 MG/DL    Creatinine 1.53 (H) 0.55 - 1.02 MG/DL    BUN/Creatinine ratio 13 12 - 20      GFR est AA 39 (L) >60 ml/min/1.73m2    GFR est non-AA 33 (L) >60 ml/min/1.73m2    Calcium 8.9 8.5 - 10.1 MG/DL    Phosphorus 4.0 2.6 - 4.7 MG/DL    Albumin 3.3 (L) 3.5 - 5.0 g/dL   CBC W/O DIFF    Collection Time: 08/25/17  4:40 AM   Result Value Ref Range    WBC 11.9 (H) 3.6 - 11.0 K/uL    RBC 4.22 3.80 - 5.20 M/uL    HGB 11.0 (L) 11.5 - 16.0 g/dL    HCT 32.6 (L) 35.0 - 47.0 %    MCV 77.3 (L) 80.0 - 99.0 FL    MCH 26.1 26.0 - 34.0 PG    MCHC 33.7 30.0 - 36.5 g/dL    RDW 16.4 (H) 11.5 - 14.5 %    PLATELET 571 884 - 910 K/uL   MAGNESIUM    Collection Time: 08/25/17  4:40 AM   Result Value Ref Range    Magnesium 1.9 1.6 - 2.4 mg/dL   BNP    Collection Time: 08/25/17  4:40 AM   Result Value Ref Range     (H) 0 - 100 pg/mL   GLUCOSE, POC    Collection Time: 08/25/17  4:51 AM   Result Value Ref Range    Glucose (POC) 90 65 - 100 mg/dL    Performed by [facility], POC    Collection Time: 08/25/17  6:23 AM   Result Value Ref Range    Glucose (POC) 133 (H) 65 - 100 mg/dL    Performed by [address] [place] [address], POC    Collection Time: 08/25/17 11:39 AM   Result Value Ref Range    Glucose (POC) 175 (H) 65 - 100 mg/dL    Performed by Vi Lester MD  [address]  Phone - [phone]   Fax - [phone]  [email or web address]

## 2017-08-25 NOTE — PROGRESS NOTES
Bedside shift change report given to STEPH Avila (oncoming nurse) by JYOTI Barrera (offgoing nurse). Report included the following information SBAR, Procedure Summary, Intake/Output, MAR and Recent Results.

## 2017-08-25 NOTE — PROGRESS NOTES
Cardiology Progress Note            Admit Date: 8/23/2017  Admit Diagnosis: SOB (shortness of breath)  Date: 8/25/2017     Time: 8:35 AM    Subjective:  Denies CP. SOB lingering, but better. She states she is feeling better than yesterday     Assessment and Plan     1. SOB   - CTA without ILD from Amiodarone, no PTE. Emphysema/COPD changes noted as well as blebs. B/L effusions.   - Amiodarone stopped indefinitely   - Jet Nebs PRN   -    - Echo EF 55-60%, Mild LA dilation, RA size normal.  RV size normal.  Mild MR, TR.  2. Hyponatremia   - Na 125   - Fluid restrict 1200cc   - daily labs   - HCTZ and Bactrim stopped. 3. Cr elevated   - 1.53 post contrast   - can start PO Bumex  4. HTN   - a bit better with Metoprolol at 100 and Hydralazine at 75 mg  5. AF   - NSR currently   - Eliquis and Metoprolol     Appreciate Nephrology help. Cr peaked, Na a little better. HCTZ and Bactrim stopped. Fluid restriction and start Bumex PO. BNP low. Continue current therapies and follow labs. I have seen and examined the patient and agree with N.P. assessment. Continue diuresis and off amio for now  follow BP  ROS:  A comprehensive review of systems was negative except for that written in the HPI. Objective:      Physical Exam:                Visit Vitals    BP (!) 136/35 (BP 1 Location: Right arm, BP Patient Position: Sitting)    Pulse (!) 53    Temp 98.1 °F (36.7 °C)    Resp 18    Ht 5' 3\" (1.6 m)    Wt 78.8 kg (173 lb 11.6 oz)    SpO2 98%    Breastfeeding No    BMI 30.77 kg/m2        General Appearance:   Well developed, well nourished,alert and oriented x 3, and   individual in no acute distress. Ears/Nose/Mouth/Throat:    Hearing grossly normal.         Neck:  Supple. Chest:    Lungs decreased BS, crackles in bases to auscultation   bilaterally. Cardiovascular:   Regular rate and rhythm, S1, S2 normal, no murmur. Abdomen:    Soft, non-tender, bowel sounds are active. Extremities:  No edema bilaterally. Skin:  Warm and dry.      Telemetry: normal sinus rhythm          Data Review:    Labs:    Recent Results (from the past 24 hour(s))   GLUCOSE, POC    Collection Time: 08/24/17  6:00 PM   Result Value Ref Range    Glucose (POC) 238 (H) 65 - 100 mg/dL    Performed by [address] [place] [address], POC    Collection Time: 08/24/17  9:21 PM   Result Value Ref Range    Glucose (POC) 145 (H) 65 - 100 mg/dL    Performed by Dalia Smith (PCT)    OSMOLALITY, UR    Collection Time: 08/24/17 10:35 PM   Result Value Ref Range    Osmolality,urine 497 MOSM/kg H2O   CREATININE, UR, RANDOM    Collection Time: 08/24/17 10:35 PM   Result Value Ref Range    Creatinine, urine 101.00 mg/dL   SODIUM, UR, RANDOM    Collection Time: 08/24/17 10:35 PM   Result Value Ref Range    Sodium urine, random 47 MMOL/L   GLUCOSE, POC    Collection Time: 08/25/17  4:33 AM   Result Value Ref Range    Glucose (POC) 70 65 - 100 mg/dL    Performed by Nicol Schroeder    RENAL FUNCTION PANEL    Collection Time: 08/25/17  4:40 AM   Result Value Ref Range    Sodium 126 (L) 136 - 145 mmol/L    Potassium 4.3 3.5 - 5.1 mmol/L    Chloride 92 (L) 97 - 108 mmol/L    CO2 22 21 - 32 mmol/L    Anion gap 12 5 - 15 mmol/L    Glucose 55 (L) 65 - 100 mg/dL    BUN 20 6 - 20 MG/DL    Creatinine 1.53 (H) 0.55 - 1.02 MG/DL    BUN/Creatinine ratio 13 12 - 20      GFR est AA 39 (L) >60 ml/min/1.73m2    GFR est non-AA 33 (L) >60 ml/min/1.73m2    Calcium 8.9 8.5 - 10.1 MG/DL    Phosphorus 4.0 2.6 - 4.7 MG/DL    Albumin 3.3 (L) 3.5 - 5.0 g/dL   CBC W/O DIFF    Collection Time: 08/25/17  4:40 AM   Result Value Ref Range    WBC 11.9 (H) 3.6 - 11.0 K/uL    RBC 4.22 3.80 - 5.20 M/uL    HGB 11.0 (L) 11.5 - 16.0 g/dL    HCT 32.6 (L) 35.0 - 47.0 %    MCV 77.3 (L) 80.0 - 99.0 FL    MCH 26.1 26.0 - 34.0 PG    MCHC 33.7 30.0 - 36.5 g/dL    RDW 16.4 (H) 11.5 - 14.5 %    PLATELET 960 300 - 173 K/uL MAGNESIUM    Collection Time: 08/25/17  4:40 AM   Result Value Ref Range    Magnesium 1.9 1.6 - 2.4 mg/dL   BNP    Collection Time: 08/25/17  4:40 AM   Result Value Ref Range     (H) 0 - 100 pg/mL   GLUCOSE, POC    Collection Time: 08/25/17  4:51 AM   Result Value Ref Range    Glucose (POC) 90 65 - 100 mg/dL    Performed by [address] [place] [address], POC    Collection Time: 08/25/17  6:23 AM   Result Value Ref Range    Glucose (POC) 133 (H) 65 - 100 mg/dL    Performed by [address] [place] [address], POC    Collection Time: 08/25/17 11:39 AM   Result Value Ref Range    Glucose (POC) 175 (H) 65 - 100 mg/dL    Performed by Beti VILLAREAL    \[facility] [place] [facility]\"", URINE    Collection Time: 08/25/17 12:22 PM   Result Value Ref Range    Eosinophils,urine NEGATIVE      GLUCOSE, POC    Collection Time: 08/25/17  4:24 PM   Result Value Ref Range    Glucose (POC) 101 (H) 65 - 100 mg/dL    Performed by Chris Hernández           Radiology:        Current Facility-Administered Medications   Medication Dose Route Frequency    hydrALAZINE (APRESOLINE) tablet 100 mg  100 mg Oral TID    [START ON 8/26/2017] bumetanide (BUMEX) tablet 1 mg  1 mg Oral DAILY    apixaban (ELIQUIS) tablet 2.5 mg  2.5 mg Oral Q12H    insulin lispro (HUMALOG) injection   SubCUTAneous AC&HS    glucose chewable tablet 16 g  4 Tab Oral PRN    dextrose (D50W) injection syrg 12.5-25 g  12.5-25 g IntraVENous PRN    glucagon (GLUCAGEN) injection 1 mg  1 mg IntraMUSCular PRN    albuterol-ipratropium (DUO-NEB) 2.5 MG-0.5 MG/3 ML  3 mL Nebulization Q6H PRN    LORazepam (ATIVAN) tablet 0.5 mg  0.5 mg Oral Q6H PRN    sodium chloride (NS) flush 5-10 mL  5-10 mL IntraVENous Q8H    sodium chloride (NS) flush 5-10 mL  5-10 mL IntraVENous PRN    atorvastatin (LIPITOR) tablet 40 mg  40 mg Oral QHS    glipiZIDE (GLUCOTROL) tablet 10 mg  10 mg Oral ACB    glipiZIDE (GLUCOTROL) tablet 5 mg  5 mg Oral ACD    azelastine (ASTELIN) 137mcg/spray nasal spray  1 Spray Both Nostrils BID    metoprolol tartrate (LOPRESSOR) tablet 100 mg  100 mg Oral BID    aspirin chewable tablet 81 mg  81 mg Oral DAILY    insulin glargine (LANTUS) injection 10 Units  10 Units SubCUTAneous DAILY          Dasha Kessler MD     [facility] [address] [place] [address] 13, [address] [facility] [address] 0[address]   Salas Ricks   [phone]

## 2017-08-26 LAB
ANION GAP SERPL CALC-SCNC: 10 MMOL/L (ref 5–15)
BNP SERPL-MCNC: 1293 PG/ML (ref 0–450)
BUN SERPL-MCNC: 24 MG/DL (ref 6–20)
BUN/CREAT SERPL: 13 (ref 12–20)
CALCIUM SERPL-MCNC: 8.3 MG/DL (ref 8.5–10.1)
CHLORIDE SERPL-SCNC: 89 MMOL/L (ref 97–108)
CO2 SERPL-SCNC: 25 MMOL/L (ref 21–32)
CREAT SERPL-MCNC: 1.81 MG/DL (ref 0.55–1.02)
GLUCOSE BLD STRIP.AUTO-MCNC: 119 MG/DL (ref 65–100)
GLUCOSE BLD STRIP.AUTO-MCNC: 132 MG/DL (ref 65–100)
GLUCOSE BLD STRIP.AUTO-MCNC: 140 MG/DL (ref 65–100)
GLUCOSE BLD STRIP.AUTO-MCNC: 344 MG/DL (ref 65–100)
GLUCOSE BLD STRIP.AUTO-MCNC: 55 MG/DL (ref 65–100)
GLUCOSE BLD STRIP.AUTO-MCNC: 78 MG/DL (ref 65–100)
GLUCOSE BLD STRIP.AUTO-MCNC: 82 MG/DL (ref 65–100)
GLUCOSE SERPL-MCNC: 92 MG/DL (ref 65–100)
OSMOLALITY UR: 350 MOSM/KG H2O
POTASSIUM SERPL-SCNC: 4.5 MMOL/L (ref 3.5–5.1)
SERVICE CMNT-IMP: ABNORMAL
SERVICE CMNT-IMP: NORMAL
SERVICE CMNT-IMP: NORMAL
SODIUM SERPL-SCNC: 124 MMOL/L (ref 136–145)

## 2017-08-26 PROCEDURE — 74011250637 HC RX REV CODE- 250/637: Performed by: INTERNAL MEDICINE

## 2017-08-26 PROCEDURE — 74011250637 HC RX REV CODE- 250/637: Performed by: SPECIALIST

## 2017-08-26 PROCEDURE — 74011250637 HC RX REV CODE- 250/637: Performed by: PHYSICIAN ASSISTANT

## 2017-08-26 PROCEDURE — 36415 COLL VENOUS BLD VENIPUNCTURE: CPT | Performed by: PHYSICIAN ASSISTANT

## 2017-08-26 PROCEDURE — 80048 BASIC METABOLIC PNL TOTAL CA: CPT | Performed by: PHYSICIAN ASSISTANT

## 2017-08-26 PROCEDURE — 83880 ASSAY OF NATRIURETIC PEPTIDE: CPT | Performed by: SPECIALIST

## 2017-08-26 PROCEDURE — 65660000000 HC RM CCU STEPDOWN

## 2017-08-26 PROCEDURE — 82962 GLUCOSE BLOOD TEST: CPT

## 2017-08-26 PROCEDURE — 74011636637 HC RX REV CODE- 636/637: Performed by: PHYSICIAN ASSISTANT

## 2017-08-26 PROCEDURE — 83935 ASSAY OF URINE OSMOLALITY: CPT | Performed by: INTERNAL MEDICINE

## 2017-08-26 RX ORDER — AMLODIPINE BESYLATE 5 MG/1
5 TABLET ORAL DAILY
Status: DISCONTINUED | OUTPATIENT
Start: 2017-08-26 | End: 2017-08-31 | Stop reason: HOSPADM

## 2017-08-26 RX ADMIN — INSULIN LISPRO 7 UNITS: 100 INJECTION, SOLUTION INTRAVENOUS; SUBCUTANEOUS at 11:16

## 2017-08-26 RX ADMIN — Medication 10 ML: at 20:12

## 2017-08-26 RX ADMIN — Medication 10 ML: at 17:35

## 2017-08-26 RX ADMIN — Medication 10 ML: at 07:20

## 2017-08-26 RX ADMIN — METOPROLOL TARTRATE 100 MG: 50 TABLET ORAL at 17:34

## 2017-08-26 RX ADMIN — HYDRALAZINE HYDROCHLORIDE 100 MG: 50 TABLET ORAL at 08:46

## 2017-08-26 RX ADMIN — ATORVASTATIN CALCIUM 40 MG: 40 TABLET, FILM COATED ORAL at 22:41

## 2017-08-26 RX ADMIN — AZELASTINE HYDROCHLORIDE 1 SPRAY: 137 SPRAY, METERED NASAL at 08:47

## 2017-08-26 RX ADMIN — HYDRALAZINE HYDROCHLORIDE 100 MG: 50 TABLET ORAL at 17:34

## 2017-08-26 RX ADMIN — HYDRALAZINE HYDROCHLORIDE 100 MG: 50 TABLET ORAL at 22:41

## 2017-08-26 RX ADMIN — ASPIRIN 81 MG 81 MG: 81 TABLET ORAL at 08:46

## 2017-08-26 RX ADMIN — APIXABAN 2.5 MG: 2.5 TABLET, FILM COATED ORAL at 22:41

## 2017-08-26 RX ADMIN — BUMETANIDE 1 MG: 1 TABLET ORAL at 08:46

## 2017-08-26 RX ADMIN — AMLODIPINE BESYLATE 5 MG: 5 TABLET ORAL at 11:16

## 2017-08-26 RX ADMIN — METOPROLOL TARTRATE 100 MG: 50 TABLET ORAL at 08:46

## 2017-08-26 RX ADMIN — APIXABAN 2.5 MG: 2.5 TABLET, FILM COATED ORAL at 11:11

## 2017-08-26 RX ADMIN — INSULIN LISPRO 2 UNITS: 100 INJECTION, SOLUTION INTRAVENOUS; SUBCUTANEOUS at 17:33

## 2017-08-26 RX ADMIN — INSULIN GLARGINE 10 UNITS: 100 INJECTION, SOLUTION SUBCUTANEOUS at 11:16

## 2017-08-26 NOTE — PROGRESS NOTES
1930: Bedside shift change report given to Rodolfo Erickson (oncoming nurse) by Courtney Givens (offgoing nurse). Report included the following information SBAR, Intake/Output, MAR and Recent Results.

## 2017-08-26 NOTE — PROGRESS NOTES
1930: Bedside shift change report given to Rodolfo Erickson (oncoming nurse) by Mariluz Parry (offgoing nurse). Report included the following information SBAR, MAR and Recent Results. 0130: Pt called out feeling SOB. Lungs sounds clear, O2 sats 99%. Pt felt dizzy and wanted me to check her blood sugar. Pt's BG 82. Gave OJ and shannon crackers with peanut butter. Pt states blood sugar always decreases at night and that is why she refused her insulin before bed. HYPOGLYCEMIC EPISODE DOCUMENTATION    Patient with hypoglycemic episode(s) at 0657 (time) on 8/26/17(date). BG value(s) pre-treatment 54    Was patient symptomatic? [] yes, [x] no  Patient was treated with the following rescue medications/treatments: [] D50                [] Glucose tablets                [] Glucagon                [x] 4oz juice                [] 6oz reg soda                [] 8oz low fat milk  BG value post-treatment: 78  Once BG treated and value greater than 80mg/dl, pt was provided with the following:  [x] snack  [x] meal    2610: Pt's blood glucose 78. Pt given more OJ.     0732: Pt's blood glucose 132.     0735: Bedside shift change report given to Millicent Ngo (oncoming nurse) by Rodolfo Erickson (offgoing nurse). Report included the following information SBAR, Intake/Output, MAR and Recent Results.

## 2017-08-26 NOTE — PROGRESS NOTES
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 Wt down, UOP stable. Cr up today, Na down. Feeling ok. No cp or sob. Still with sig edema. Sob when laying flat. Current Medications: all current  Medications have been eviewed in EPIC  Review of Systems: Pertinent items are noted in HPI.     Objective:  Vitals:    Vitals:    08/25/17 2010 08/25/17 2310 08/26/17 0307 08/26/17 0746   BP: 170/46 175/41 (!) 147/32 157/57   Pulse: (!) 56 (!) 53 (!) 56 (!) 57   Resp:  18 20 20   Temp:  97.9 °F (36.6 °C) 97.7 °F (36.5 °C) 97.9 °F (36.6 °C)   SpO2:  98%  100%   Weight:   78 kg (171 lb 15.3 oz)    Height:         Intake and Output:  08/26 0701 - 08/26 1900  In: 240 [P.O.:240]  Out: 0   08/24 1901 - 08/26 0700  In: 1426 [P.O.:1426]  Out: 2025 [Urine:2025]    Physical Examination:  General: NAD,Conversant   Neck:  Supple, no mass  Resp:  Lungs CTA B/L, no wheezing , normal respiratory effort  CV:  RRR,  no murmur or rub, trace LE edema  GI:  Soft, NT, + Bowel sounds, no hepatosplenomegaly  Neurologic:  Non focal  Psych:             AAO x 3 appropriate affect   Skin:  No Rash      []    High complexity decision making was performed  []    Patient is at high-risk of decompensation with multiple organ involvement    Lab Data Personally Reviewed: I have reviewed all the pertinent labs, microbiology data and radiology studies during assessment.     Recent Labs      08/26/17   0325  08/25/17 0440  08/24/17 0435  08/23/17   1429   NA  124*  126*  125*  127*   K  4.5  4.3  4.4  4.4   CL  89*  92*  95*  92*   CO2  25  22  21  25   GLU  92  55*  45*  118*   BUN  24*  20  18  16   CREA  1.81*  1.53*  1.55*  1.32*   CA  8.3*  8.9  9.0  8.8   MG   --   1.9   --    --    PHOS   --   4.0   --    --    ALB   --   3.3*   --   3.4*   SGOT   --    --    --   32   ALT   --    --    --   61     Recent Labs      08/25/17 0440 08/24/17 0435 08/23/17   1429   WBC  11.9*  11.6*  10.6   HGB  11.0*  11.0*  10.4*   HCT  32.6*  33.4*  31.6*   PLT  275  262  242     No results found for: SDES  No results found for: CULT  Recent Results (from the past 24 hour(s))   GLUCOSE, POC    Collection Time: 08/25/17 11:39 AM   Result Value Ref Range    Glucose (POC) 175 (H) 65 - 100 mg/dL    Performed by Quinten VILLAREAL    EOSINOPHILS, URINE    Collection Time: 08/25/17 12:22 PM   Result Value Ref Range    Eosinophils,urine NEGATIVE      GLUCOSE, POC    Collection Time: 08/25/17  4:24 PM   Result Value Ref Range    Glucose (POC) 101 (H) 65 - 100 mg/dL    Performed by Alfredo West    GLUCOSE, POC    Collection Time: 08/25/17  9:10 PM   Result Value Ref Range    Glucose (POC) 169 (H) 65 - 100 mg/dL    Performed by Lawrance Fleischer    GLUCOSE, POC    Collection Time: 08/26/17  1:45 AM   Result Value Ref Range    Glucose (POC) 82 65 - 100 mg/dL    Performed by Kait Watson, BASIC    Collection Time: 08/26/17  3:25 AM   Result Value Ref Range    Sodium 124 (L) 136 - 145 mmol/L    Potassium 4.5 3.5 - 5.1 mmol/L    Chloride 89 (L) 97 - 108 mmol/L    CO2 25 21 - 32 mmol/L    Anion gap 10 5 - 15 mmol/L    Glucose 92 65 - 100 mg/dL    BUN 24 (H) 6 - 20 MG/DL    Creatinine 1.81 (H) 0.55 - 1.02 MG/DL    BUN/Creatinine ratio 13 12 - 20      GFR est AA 33 (L) >60 ml/min/1.73m2    GFR est non-AA 27 (L) >60 ml/min/1.73m2    Calcium 8.3 (L) 8.5 - 10.1 MG/DL   NT-PRO BNP    Collection Time: 08/26/17  3:25 AM   Result Value Ref Range    NT pro-BNP 1293 (H) 0 - 450 PG/ML   GLUCOSE, POC    Collection Time: 08/26/17  6:57 AM   Result Value Ref Range    Glucose (POC) 55 (L) 65 - 100 mg/dL    Performed by Kalli Majano, POC    Collection Time: 08/26/17  7:17 AM   Result Value Ref Range    Glucose (POC) 78 65 - 100 mg/dL    Performed by Eugenia Cuenca    GLUCOSE, POC    Collection Time: 08/26/17  7:40 AM   Result Value Ref Range    Glucose (POC) 132 (H) 65 - 100 mg/dL    Performed by Mitch Gale MD  [address]  Phone - [phone]   Fax - [phone]  [email or web address] [email or web address]

## 2017-08-26 NOTE — PROGRESS NOTES
Cardiology Progress Note            Admit Date: 8/23/2017  Admit Diagnosis: SOB (shortness of breath)  Date: 8/26/2017     Time: 8:35 AM    Subjective:  Denies CP. Breathing improved. Assessment and Plan     1. SOB   - CTA without ILD from Amiodarone, no PTE. Emphysema/COPD changes noted as well as blebs. B/L effusions.   - Amiodarone stopped indefinitely   - Jet Nebs PRN   -    - Echo EF 55-60%, Mild LA dilation, RA size normal.  RV size normal.  Mild MR, TR.  2. Hyponatremia   - Na 125   - Fluid restrict 1200cc   - daily labs   - HCTZ and Bactrim stopped. 3. Cr elevated   - 1.53 post contrast   - can start PO Bumex  4. HTN   - a bit better with Metoprolol at 100 and Hydralazine. Added norvasc. 5. AF   - NSR currently   - Eliquis and Metoprolol  ]ROS:  A comprehensive review of systems was negative except for that written in the HPI. Objective:      Physical Exam:                Visit Vitals    /44 (BP 1 Location: Right arm, BP Patient Position: At rest)    Pulse (!) 58    Temp 98 °F (36.7 °C)    Resp 18    Ht 5' 3\" (1.6 m)    Wt 171 lb 15.3 oz (78 kg)    SpO2 100%    Breastfeeding No    BMI 30.46 kg/m2        General Appearance:   no acute distress. Ears/Nose/Mouth/Throat:    Hearing grossly normal.         Neck:  Supple. Chest:    Lungs decreased BS, crackles in bases to auscultation   bilaterally. Cardiovascular:   Regular rate and rhythm, S1, S2 normal, no murmur. Abdomen:    Soft, non-tender, bowel sounds are active. Extremities:  No edema bilaterally. Skin:  Warm and dry.      Telemetry: normal sinus rhythm          Data Review:    Labs:    Recent Results (from the past 24 hour(s))   GLUCOSE, POC    Collection Time: 08/25/17  4:24 PM   Result Value Ref Range    Glucose (POC) 101 (H) 65 - 100 mg/dL    Performed by [address] [place] [address], POC    Collection Time: 08/25/17  9:10 PM Result Value Ref Range    Glucose (POC) 169 (H) 65 - 100 mg/dL    Performed by [id number] [place] [address], POC    Collection Time: 08/26/17  1:45 AM   Result Value Ref Range    Glucose (POC) 82 65 - 100 mg/dL    Performed by Kait Watson, BASIC    Collection Time: 08/26/17  3:25 AM   Result Value Ref Range    Sodium 124 (L) 136 - 145 mmol/L    Potassium 4.5 3.5 - 5.1 mmol/L    Chloride 89 (L) 97 - 108 mmol/L    CO2 25 21 - 32 mmol/L    Anion gap 10 5 - 15 mmol/L    Glucose 92 65 - 100 mg/dL    BUN 24 (H) 6 - 20 MG/DL    Creatinine 1.81 (H) 0.55 - 1.02 MG/DL    BUN/Creatinine ratio 13 12 - 20      GFR est AA 33 (L) >60 ml/min/1.73m2    GFR est non-AA 27 (L) >60 ml/min/1.73m2    Calcium 8.3 (L) 8.5 - 10.1 MG/DL   NT-PRO BNP    Collection Time: 08/26/17  3:25 AM   Result Value Ref Range    NT pro-BNP 1293 (H) 0 - 450 PG/ML   GLUCOSE, POC    Collection Time: 08/26/17  6:57 AM   Result Value Ref Range    Glucose (POC) 55 (L) 65 - 100 mg/dL    Performed by Kalli Majano, POC    Collection Time: 08/26/17  7:17 AM   Result Value Ref Range    Glucose (POC) 78 65 - 100 mg/dL    Performed by Mima Norman    GLUCOSE, POC    Collection Time: 08/26/17  7:40 AM   Result Value Ref Range    Glucose (POC) 132 (H) 65 - 100 mg/dL    Performed by Denny Scott    GLUCOSE, POC    Collection Time: 08/26/17 11:08 AM   Result Value Ref Range    Glucose (POC) 344 (H) 65 - 100 mg/dL    Performed by [address] [place] [address]           Radiology:        Current Facility-Administered Medications   Medication Dose Route Frequency    amLODIPine (NORVASC) tablet 5 mg  5 mg Oral DAILY    hydrALAZINE (APRESOLINE) tablet 100 mg  100 mg Oral TID    bumetanide (BUMEX) tablet 1 mg  1 mg Oral DAILY    apixaban (ELIQUIS) tablet 2.5 mg  2.5 mg Oral Q12H    insulin lispro (HUMALOG) injection   SubCUTAneous AC&HS    glucose chewable tablet 16 g  4 Tab Oral PRN    dextrose (D50W) injection syrg 12.5-25 g  12.5-25 g IntraVENous PRN    glucagon (GLUCAGEN) injection 1 mg  1 mg IntraMUSCular PRN    albuterol-ipratropium (DUO-NEB) 2.5 MG-0.5 MG/3 ML  3 mL Nebulization Q6H PRN    LORazepam (ATIVAN) tablet 0.5 mg  0.5 mg Oral Q6H PRN    sodium chloride (NS) flush 5-10 mL  5-10 mL IntraVENous Q8H    sodium chloride (NS) flush 5-10 mL  5-10 mL IntraVENous PRN    atorvastatin (LIPITOR) tablet 40 mg  40 mg Oral QHS    glipiZIDE (GLUCOTROL) tablet 10 mg  10 mg Oral ACB    azelastine (ASTELIN) 137mcg/spray nasal spray  1 Spray Both Nostrils BID    metoprolol tartrate (LOPRESSOR) tablet 100 mg  100 mg Oral BID    aspirin chewable tablet 81 mg  81 mg Oral DAILY    insulin glargine (LANTUS) injection 10 Units  10 Units SubCUTAneous DAILY          Senia Sotomayor MD     [facility] [address] [place] [address], [address] [place] [address] [address]   Lynda Ricks   [phone]

## 2017-08-26 NOTE — PROGRESS NOTES
Problem: Falls - Risk of  Goal: *Absence of Falls  Document Iain Fall Risk and appropriate interventions in the flowsheet. Outcome: Progressing Towards Goal  Fall Risk Interventions:  Mobility Interventions: Communicate number of staff needed for ambulation/transfer, OT consult for ADLs, Patient to call before getting OOB, PT Consult for mobility concerns, Utilize walker, cane, or other assitive device           Medication Interventions: Assess postural VS orthostatic hypotension, Patient to call before getting OOB, Teach patient to arise slowly     Elimination Interventions: Call light in reach, Patient to call for help with toileting needs, Toileting schedule/hourly rounds                 Problem: Pressure Injury - Risk of  Goal: *Prevention of pressure ulcer  Outcome: Progressing Towards Goal  Pt has no pressure ulcer.

## 2017-08-27 LAB
ALBUMIN SERPL-MCNC: 3.1 G/DL (ref 3.5–5)
ANION GAP SERPL CALC-SCNC: 9 MMOL/L (ref 5–15)
BUN SERPL-MCNC: 32 MG/DL (ref 6–20)
BUN/CREAT SERPL: 17 (ref 12–20)
CALCIUM SERPL-MCNC: 8.3 MG/DL (ref 8.5–10.1)
CHLORIDE SERPL-SCNC: 89 MMOL/L (ref 97–108)
CO2 SERPL-SCNC: 25 MMOL/L (ref 21–32)
CREAT SERPL-MCNC: 1.83 MG/DL (ref 0.55–1.02)
GLUCOSE BLD STRIP.AUTO-MCNC: 135 MG/DL (ref 65–100)
GLUCOSE BLD STRIP.AUTO-MCNC: 169 MG/DL (ref 65–100)
GLUCOSE BLD STRIP.AUTO-MCNC: 172 MG/DL (ref 65–100)
GLUCOSE BLD STRIP.AUTO-MCNC: 190 MG/DL (ref 65–100)
GLUCOSE BLD STRIP.AUTO-MCNC: 99 MG/DL (ref 65–100)
GLUCOSE SERPL-MCNC: 99 MG/DL (ref 65–100)
PHOSPHATE SERPL-MCNC: 4.2 MG/DL (ref 2.6–4.7)
POTASSIUM SERPL-SCNC: 4.4 MMOL/L (ref 3.5–5.1)
SERVICE CMNT-IMP: ABNORMAL
SERVICE CMNT-IMP: NORMAL
SODIUM SERPL-SCNC: 123 MMOL/L (ref 136–145)
SODIUM SERPL-SCNC: 123 MMOL/L (ref 136–145)

## 2017-08-27 PROCEDURE — 80069 RENAL FUNCTION PANEL: CPT | Performed by: INTERNAL MEDICINE

## 2017-08-27 PROCEDURE — 74011250637 HC RX REV CODE- 250/637: Performed by: PHYSICIAN ASSISTANT

## 2017-08-27 PROCEDURE — 65660000000 HC RM CCU STEPDOWN

## 2017-08-27 PROCEDURE — 82962 GLUCOSE BLOOD TEST: CPT

## 2017-08-27 PROCEDURE — 36415 COLL VENOUS BLD VENIPUNCTURE: CPT | Performed by: INTERNAL MEDICINE

## 2017-08-27 PROCEDURE — 74011636637 HC RX REV CODE- 636/637: Performed by: PHYSICIAN ASSISTANT

## 2017-08-27 PROCEDURE — 84295 ASSAY OF SERUM SODIUM: CPT | Performed by: INTERNAL MEDICINE

## 2017-08-27 PROCEDURE — 74011250637 HC RX REV CODE- 250/637: Performed by: SPECIALIST

## 2017-08-27 PROCEDURE — 74011250637 HC RX REV CODE- 250/637: Performed by: INTERNAL MEDICINE

## 2017-08-27 RX ORDER — TOLVAPTAN 15 MG/1
15 TABLET ORAL ONCE
Status: COMPLETED | OUTPATIENT
Start: 2017-08-27 | End: 2017-08-27

## 2017-08-27 RX ORDER — ONDANSETRON 2 MG/ML
4 INJECTION INTRAMUSCULAR; INTRAVENOUS
Status: DISCONTINUED | OUTPATIENT
Start: 2017-08-27 | End: 2017-08-31 | Stop reason: HOSPADM

## 2017-08-27 RX ORDER — ACETAMINOPHEN 325 MG/1
650 TABLET ORAL
Status: DISCONTINUED | OUTPATIENT
Start: 2017-08-27 | End: 2017-08-31 | Stop reason: HOSPADM

## 2017-08-27 RX ADMIN — BUMETANIDE 1 MG: 1 TABLET ORAL at 08:45

## 2017-08-27 RX ADMIN — METOPROLOL TARTRATE 100 MG: 50 TABLET ORAL at 17:17

## 2017-08-27 RX ADMIN — APIXABAN 2.5 MG: 2.5 TABLET, FILM COATED ORAL at 11:22

## 2017-08-27 RX ADMIN — APIXABAN 2.5 MG: 2.5 TABLET, FILM COATED ORAL at 21:27

## 2017-08-27 RX ADMIN — HYDRALAZINE HYDROCHLORIDE 100 MG: 50 TABLET ORAL at 17:17

## 2017-08-27 RX ADMIN — INSULIN GLARGINE 10 UNITS: 100 INJECTION, SOLUTION SUBCUTANEOUS at 11:21

## 2017-08-27 RX ADMIN — TOLVAPTAN 15 MG: 15 TABLET ORAL at 11:22

## 2017-08-27 RX ADMIN — HYDRALAZINE HYDROCHLORIDE 100 MG: 50 TABLET ORAL at 21:27

## 2017-08-27 RX ADMIN — ACETAMINOPHEN 650 MG: 325 TABLET, FILM COATED ORAL at 21:27

## 2017-08-27 RX ADMIN — AMLODIPINE BESYLATE 5 MG: 5 TABLET ORAL at 08:45

## 2017-08-27 RX ADMIN — ATORVASTATIN CALCIUM 40 MG: 40 TABLET, FILM COATED ORAL at 21:27

## 2017-08-27 RX ADMIN — INSULIN LISPRO 2 UNITS: 100 INJECTION, SOLUTION INTRAVENOUS; SUBCUTANEOUS at 11:21

## 2017-08-27 RX ADMIN — GLIPIZIDE 10 MG: 5 TABLET ORAL at 08:45

## 2017-08-27 RX ADMIN — Medication 10 ML: at 06:00

## 2017-08-27 RX ADMIN — Medication 5 ML: at 22:00

## 2017-08-27 RX ADMIN — METOPROLOL TARTRATE 100 MG: 50 TABLET ORAL at 08:45

## 2017-08-27 RX ADMIN — ASPIRIN 81 MG 81 MG: 81 TABLET ORAL at 08:45

## 2017-08-27 RX ADMIN — AZELASTINE HYDROCHLORIDE 1 SPRAY: 137 SPRAY, METERED NASAL at 08:46

## 2017-08-27 RX ADMIN — HYDRALAZINE HYDROCHLORIDE 100 MG: 50 TABLET ORAL at 08:45

## 2017-08-27 RX ADMIN — Medication 10 ML: at 17:19

## 2017-08-27 NOTE — PROGRESS NOTES
Cardiology Progress Note            Admit Date: 8/23/2017  Admit Diagnosis: SOB (shortness of breath)  Date: 8/27/2017     Time: 8:35 AM    Subjective:  Denies CP. Breathing improved. Assessment and Plan     1. SOB   - CTA without ILD from Amiodarone, no PTE. Emphysema/COPD changes noted as well as blebs. B/L effusions.   - Amiodarone stopped indefinitely   - Jet Nebs PRN   -    - Echo EF 55-60%, Mild LA dilation, RA size normal.  RV size normal.  Mild MR, TR.  2. Hyponatremia   - Na 123   - Fluid restrict 1200cc   - daily labs   - HCTZ and Bactrim stopped. 3. Cr elevated   - 1.8  4. HTN   - Better after addition of norvasc. Metoprolol at 100 and Hydralazine. 5. AF   - NSR currently   - Eliquis and Metoprolol  ]ROS:  A comprehensive review of systems was negative except for that written in the HPI. Objective:      Physical Exam:                Visit Vitals    /49 (BP 1 Location: Left arm, BP Patient Position: At rest)    Pulse (!) 54    Temp 98 °F (36.7 °C)    Resp 18    Ht 5' 3\" (1.6 m)    Wt 175 lb 14.8 oz (79.8 kg)    SpO2 99%    Breastfeeding No    BMI 31.16 kg/m2        General Appearance:   no acute distress. Ears/Nose/Mouth/Throat:    Hearing grossly normal.         Neck:  Supple. Chest:    Lungs decreased BS bases   bilaterally. Cardiovascular:   Regular rate and rhythm, S1, S2 normal, no murmur. Abdomen:    Soft, non-tender, bowel sounds are active. Extremities:  No edema bilaterally. Skin:  Warm and dry.      Telemetry: normal sinus rhythm          Data Review:    Labs:    Recent Results (from the past 24 hour(s))   GLUCOSE, POC    Collection Time: 08/26/17 11:08 AM   Result Value Ref Range    Glucose (POC) 344 (H) 65 - 100 mg/dL    Performed by [address], POC    Collection Time: 08/26/17  4:45 PM   Result Value Ref Range    Glucose (POC) 140 (H) 65 - 100 mg/dL Performed by KUSH Stephens    Collection Time: 08/26/17  7:20 PM   Result Value Ref Range    Osmolality,urine 350 MOSM/kg H2O   GLUCOSE, POC    Collection Time: 08/26/17  9:54 PM   Result Value Ref Range    Glucose (POC) 119 (H) 65 - 100 mg/dL    Performed by Annie Wright    RENAL FUNCTION PANEL    Collection Time: 08/27/17  3:20 AM   Result Value Ref Range    Sodium 123 (L) 136 - 145 mmol/L    Potassium 4.4 3.5 - 5.1 mmol/L    Chloride 89 (L) 97 - 108 mmol/L    CO2 25 21 - 32 mmol/L    Anion gap 9 5 - 15 mmol/L    Glucose 99 65 - 100 mg/dL    BUN 32 (H) 6 - 20 MG/DL    Creatinine 1.83 (H) 0.55 - 1.02 MG/DL    BUN/Creatinine ratio 17 12 - 20      GFR est AA 32 (L) >60 ml/min/1.73m2    GFR est non-AA 26 (L) >60 ml/min/1.73m2    Calcium 8.3 (L) 8.5 - 10.1 MG/DL    Phosphorus 4.2 2.6 - 4.7 MG/DL    Albumin 3.1 (L) 3.5 - 5.0 g/dL   GLUCOSE, POC    Collection Time: 08/27/17  6:47 AM   Result Value Ref Range    Glucose (POC) 99 65 - 100 mg/dL    Performed by Heather Topete           Radiology:        Current Facility-Administered Medications   Medication Dose Route Frequency    amLODIPine (NORVASC) tablet 5 mg  5 mg Oral DAILY    hydrALAZINE (APRESOLINE) tablet 100 mg  100 mg Oral TID    bumetanide (BUMEX) tablet 1 mg  1 mg Oral DAILY    apixaban (ELIQUIS) tablet 2.5 mg  2.5 mg Oral Q12H    insulin lispro (HUMALOG) injection   SubCUTAneous AC&HS    glucose chewable tablet 16 g  4 Tab Oral PRN    dextrose (D50W) injection syrg 12.5-25 g  12.5-25 g IntraVENous PRN    glucagon (GLUCAGEN) injection 1 mg  1 mg IntraMUSCular PRN    albuterol-ipratropium (DUO-NEB) 2.5 MG-0.5 MG/3 ML  3 mL Nebulization Q6H PRN    LORazepam (ATIVAN) tablet 0.5 mg  0.5 mg Oral Q6H PRN    sodium chloride (NS) flush 5-10 mL  5-10 mL IntraVENous Q8H    sodium chloride (NS) flush 5-10 mL  5-10 mL IntraVENous PRN    atorvastatin (LIPITOR) tablet 40 mg  40 mg Oral QHS    glipiZIDE (GLUCOTROL) tablet 10 mg  10 mg Oral ACB  azelastine (ASTELIN) 137mcg/spray nasal spray  1 Spray Both Nostrils BID    metoprolol tartrate (LOPRESSOR) tablet 100 mg  100 mg Oral BID    aspirin chewable tablet 81 mg  81 mg Oral DAILY    insulin glargine (LANTUS) injection 10 Units  10 Units SubCUTAneous DAILY          Jm Lugo MD     [facility] [address] [place] [address] Drive, [address] [facility] [address]   [address], [address]   [phone]

## 2017-08-27 NOTE — PROGRESS NOTES
0730: Bedside shift change report given to Liyah Mays (oncoming nurse) by [address] RN (offgoing nurse). Report included the following information SBAR, Kardex, Intake/Output, MAR, Recent Results, Med Rec Status and Cardiac Rhythm Sinus yanira. 1836: /51, Dr Babs Yen paged    1841: /48, pt states she feels dizzy, denies chest pain and SOB, Dr Babs Yen aware, no orders received for BP, cont. to monitor, ordered Tylenol and Zofran prn see MAR      1954: /38    1930: Bedside shift change report given to Greg Pablo (oncoming nurse) by Liyah Mays (offgoing nurse). Report included the following information SBAR, Kardex, Intake/Output, MAR, Recent Results, Med Rec Status and Cardiac Rhythm Sinus yanira. Problem: Falls - Risk of  Goal: *Absence of Falls  Document Iain Fall Risk and appropriate interventions in the flowsheet.    Fall Risk Interventions:  Mobility Interventions: Patient to call before getting OOB     Mentation Interventions: Increase mobility, Evaluate medications/consider consulting pharmacy     Medication Interventions: Evaluate medications/consider consulting pharmacy, Patient to call before getting OOB     Elimination Interventions: Call light in reach, Patient to call for help with toileting needs      Calls out for assistance          Problem: Pressure Injury - Risk of  Goal: *Prevention of pressure ulcer  Outcome: Progressing Towards Goal  Turns self, no pressure ulcers noted

## 2017-08-27 NOTE — PROGRESS NOTES
2330:  Bedside shift change report given to John Peres (oncoming nurse) by Lidya Fraire (offgoing nurse). Report included the following information SBAR, Kardex, MAR and Recent Results. 0730: Bedside shift change report given to Courtney Givens (oncoming nurse) by John Peres (offgoing nurse). Report included the following information SBAR, Kardex, MAR and Recent Results.

## 2017-08-27 NOTE — PROGRESS NOTES
0730: Bedside shift change report given to Shaheen Swift  (oncoming nurse) by Gustavo Godinez RN (offgoing nurse). Report included the following information SBAR, Kardex, Intake/Output, MAR, Recent Results, Med Rec Status and Cardiac Rhythm Sinus yanira. 1930:Bedside shift change report given to [address] [place] [address] Juan RN and Ana Owens RN (oncoming nurse) by Liyah Serra [address] (offgoing nurse). Report included the following information SBAR, Kardex, Intake/Output, MAR, Recent Results, Med Rec Status and Cardiac Rhythm Sinus yanira. Problem: Falls - Risk of  Goal: *Absence of Falls  Document Iain Fall Risk and appropriate interventions in the flowsheet.    Outcome: Progressing Towards Goal  Fall Risk Interventions:  Mobility Interventions: OT consult for ADLs, Patient to call before getting OOB, PT Consult for mobility concerns     Mentation Interventions: Adequate sleep, hydration, pain control, Increase mobility, More frequent rounding     Medication Interventions: Evaluate medications/consider consulting pharmacy, Assess postural VS orthostatic hypotension, Teach patient to arise slowly     Elimination Interventions: Call light in reach, Patient to call for help with toileting needs   Calls out for assistance               Problem: Pressure Injury - Risk of  Goal: *Prevention of pressure ulcer  Outcome: Progressing Towards Goal  Turns self, no pressure ulcers noted

## 2017-08-27 NOTE — PROGRESS NOTES
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 UOP 1.3 liters. Cr stable today. Na down to 123. Feeling ok. No cp or sob. Still with sig edema. Sob better today. Current Medications: all current  Medications have been eviewed in EPIC  Review of Systems: Pertinent items are noted in HPI.     Objective:  Vitals:    Vitals:    08/26/17 2232 08/26/17 2342 08/27/17 0309 08/27/17 0728   BP: 139/90 128/40 138/44 117/49   Pulse: (!) 55 60 (!) 56 (!) 54   Resp:  18 18 18   Temp:  98.2 °F (36.8 °C) 98.4 °F (36.9 °C) 98 °F (36.7 °C)   SpO2:  97% 95% 99%   Weight:   79.8 kg (175 lb 14.8 oz)    Height:         Intake and Output:     08/25 1901 - 08/27 0700  In: 716 [P.O.:716]  Out: 1725 [Urine:1725]    Physical Examination:  General: NAD,Conversant   Neck:  Supple, no mass  Resp:  Lungs CTA B/L, no wheezing , normal respiratory effort  CV:  RRR,  no murmur or rub, trace LE edema  GI:  Soft, NT, + Bowel sounds, no hepatosplenomegaly  Neurologic:  Non focal  Psych:             AAO x 3 appropriate affect   Skin:  No Rash      []    High complexity decision making was performed  []    Patient is at high-risk of decompensation with multiple organ involvement    Lab Data Personally Reviewed: I have reviewed all the pertinent labs, microbiology data and radiology studies during assessment.     Recent Labs      08/27/17   0320  08/26/17   0325  08/25/17   0440   NA  123*  124*  126*   K  4.4  4.5  4.3   CL  89*  89*  92*   CO2  25  25  22   GLU  99  92  55*   BUN  32*  24*  20   CREA  1.83*  1.81*  1.53*   CA  8.3*  8.3*  8.9   MG   --    --   1.9   PHOS  4.2   --   4.0   ALB  3.1*   --   3.3*     Recent Labs      08/25/17   0440   WBC  11.9*   HGB  11.0*   HCT  32.6*   PLT  275     No results found for: SDES  No results found for: CULT  Recent Results (from the past 24 hour(s))   GLUCOSE, POC    Collection Time: 08/26/17 11:08 AM   Result Value Ref Range    Glucose (POC) 344 (H) 65 - 100 mg/dL    Performed by [address], POC    Collection Time: 08/26/17  4:45 PM   Result Value Ref Range    Glucose (POC) 140 (H) 65 - 100 mg/dL    Performed by Giovanny Pedro UR    Collection Time: 08/26/17  7:20 PM   Result Value Ref Range    Osmolality,urine 350 MOSM/kg H2O   GLUCOSE, POC    Collection Time: 08/26/17  9:54 PM   Result Value Ref Range    Glucose (POC) 119 (H) 65 - 100 mg/dL    Performed by Dalton Delcid    RENAL FUNCTION PANEL    Collection Time: 08/27/17  3:20 AM   Result Value Ref Range    Sodium 123 (L) 136 - 145 mmol/L    Potassium 4.4 3.5 - 5.1 mmol/L    Chloride 89 (L) 97 - 108 mmol/L    CO2 25 21 - 32 mmol/L    Anion gap 9 5 - 15 mmol/L    Glucose 99 65 - 100 mg/dL    BUN 32 (H) 6 - 20 MG/DL    Creatinine 1.83 (H) 0.55 - 1.02 MG/DL    BUN/Creatinine ratio 17 12 - 20      GFR est AA 32 (L) >60 ml/min/1.73m2    GFR est non-AA 26 (L) >60 ml/min/1.73m2    Calcium 8.3 (L) 8.5 - 10.1 MG/DL    Phosphorus 4.2 2.6 - 4.7 MG/DL    Albumin 3.1 (L) 3.5 - 5.0 g/dL   GLUCOSE, POC    Collection Time: 08/27/17  6:47 AM   Result Value Ref Range    Glucose (POC) 99 65 - 100 mg/dL    Performed by MD Millicent Willis [facility] [address], [place] [address] [place] [address]  Phone - [phone]   Fax - [phone]  [email or web address]

## 2017-08-28 LAB
ALBUMIN SERPL-MCNC: 2.8 G/DL (ref 3.5–5)
ANION GAP SERPL CALC-SCNC: 9 MMOL/L (ref 5–15)
BUN SERPL-MCNC: 38 MG/DL (ref 6–20)
BUN/CREAT SERPL: 21 (ref 12–20)
CALCIUM SERPL-MCNC: 8.3 MG/DL (ref 8.5–10.1)
CHLORIDE SERPL-SCNC: 91 MMOL/L (ref 97–108)
CO2 SERPL-SCNC: 26 MMOL/L (ref 21–32)
CREAT SERPL-MCNC: 1.84 MG/DL (ref 0.55–1.02)
GLUCOSE BLD STRIP.AUTO-MCNC: 127 MG/DL (ref 65–100)
GLUCOSE BLD STRIP.AUTO-MCNC: 190 MG/DL (ref 65–100)
GLUCOSE BLD STRIP.AUTO-MCNC: 237 MG/DL (ref 65–100)
GLUCOSE BLD STRIP.AUTO-MCNC: 254 MG/DL (ref 65–100)
GLUCOSE BLD STRIP.AUTO-MCNC: 272 MG/DL (ref 65–100)
GLUCOSE SERPL-MCNC: 114 MG/DL (ref 65–100)
PHOSPHATE SERPL-MCNC: 4 MG/DL (ref 2.6–4.7)
POTASSIUM SERPL-SCNC: 4.2 MMOL/L (ref 3.5–5.1)
SERVICE CMNT-IMP: ABNORMAL
SODIUM SERPL-SCNC: 126 MMOL/L (ref 136–145)
SODIUM SERPL-SCNC: 127 MMOL/L (ref 136–145)

## 2017-08-28 PROCEDURE — 65660000000 HC RM CCU STEPDOWN

## 2017-08-28 PROCEDURE — 82962 GLUCOSE BLOOD TEST: CPT

## 2017-08-28 PROCEDURE — 74011250637 HC RX REV CODE- 250/637: Performed by: SPECIALIST

## 2017-08-28 PROCEDURE — 74011250637 HC RX REV CODE- 250/637: Performed by: INTERNAL MEDICINE

## 2017-08-28 PROCEDURE — 74011250637 HC RX REV CODE- 250/637: Performed by: PHYSICIAN ASSISTANT

## 2017-08-28 PROCEDURE — 36415 COLL VENOUS BLD VENIPUNCTURE: CPT | Performed by: INTERNAL MEDICINE

## 2017-08-28 PROCEDURE — 84295 ASSAY OF SERUM SODIUM: CPT | Performed by: INTERNAL MEDICINE

## 2017-08-28 PROCEDURE — 80069 RENAL FUNCTION PANEL: CPT | Performed by: INTERNAL MEDICINE

## 2017-08-28 PROCEDURE — 74011636637 HC RX REV CODE- 636/637: Performed by: PHYSICIAN ASSISTANT

## 2017-08-28 RX ORDER — ATROPINE SULFATE 0.1 MG/ML
INJECTION INTRAVENOUS
Status: DISPENSED
Start: 2017-08-28 | End: 2017-08-29

## 2017-08-28 RX ADMIN — BUMETANIDE 1 MG: 1 TABLET ORAL at 09:22

## 2017-08-28 RX ADMIN — APIXABAN 2.5 MG: 2.5 TABLET, FILM COATED ORAL at 21:40

## 2017-08-28 RX ADMIN — AMLODIPINE BESYLATE 5 MG: 5 TABLET ORAL at 09:22

## 2017-08-28 RX ADMIN — INSULIN LISPRO 5 UNITS: 100 INJECTION, SOLUTION INTRAVENOUS; SUBCUTANEOUS at 17:45

## 2017-08-28 RX ADMIN — Medication 10 ML: at 06:36

## 2017-08-28 RX ADMIN — HYDRALAZINE HYDROCHLORIDE 100 MG: 50 TABLET ORAL at 09:21

## 2017-08-28 RX ADMIN — ASPIRIN 81 MG 81 MG: 81 TABLET ORAL at 09:22

## 2017-08-28 RX ADMIN — METOPROLOL TARTRATE 100 MG: 50 TABLET ORAL at 17:46

## 2017-08-28 RX ADMIN — INSULIN GLARGINE 10 UNITS: 100 INJECTION, SOLUTION SUBCUTANEOUS at 09:21

## 2017-08-28 RX ADMIN — HYDRALAZINE HYDROCHLORIDE 100 MG: 50 TABLET ORAL at 16:59

## 2017-08-28 RX ADMIN — APIXABAN 2.5 MG: 2.5 TABLET, FILM COATED ORAL at 09:21

## 2017-08-28 RX ADMIN — Medication 10 ML: at 17:07

## 2017-08-28 RX ADMIN — HYDRALAZINE HYDROCHLORIDE 100 MG: 50 TABLET ORAL at 21:39

## 2017-08-28 RX ADMIN — AZELASTINE HYDROCHLORIDE 1 SPRAY: 137 SPRAY, METERED NASAL at 12:55

## 2017-08-28 RX ADMIN — Medication 10 ML: at 21:42

## 2017-08-28 RX ADMIN — ATORVASTATIN CALCIUM 40 MG: 40 TABLET, FILM COATED ORAL at 21:39

## 2017-08-28 RX ADMIN — GLIPIZIDE 10 MG: 5 TABLET ORAL at 06:42

## 2017-08-28 RX ADMIN — INSULIN LISPRO 3 UNITS: 100 INJECTION, SOLUTION INTRAVENOUS; SUBCUTANEOUS at 12:54

## 2017-08-28 NOTE — PROGRESS NOTES
Nephrology Progress Note  Thom Perez  Date of Admission : 8/23/2017    CC: Follow up for DESTINI and hyponatremia       Assessment and Plan     Mild DESTINI on CKD:  - Cr stabilized  - Likely from contrast and diuresis  - cont current dose bumex for now  - daily labs    CKD III:  - baseline Cr around 1.2 to 1.3  - likely from chronic HTN    Hyponatremia:  - likely SIADH + volume overload  - Na improving  - urine osms still c/w excess ADH state  - repeat Na at noon    Hypervolemia:  - edema and pleural effusions  - preserved EF  - cont bumex    HTN:  - cont current BP meds    DM2:  - on insulin    COPD     PAF:  - on eliquis       Interval History:  Seen and examined. UOP and cr stable  Na 126 after tolvaptan. Feeling ok. No cp or sob. Edema and  Sob better today. Current Medications: all current  Medications have been eviewed in EPIC  Review of Systems: Pertinent items are noted in HPI. Objective:  Vitals:    Vitals:    08/27/17 2316 08/28/17 0241 08/28/17 0308 08/28/17 0722   BP: (!) 130/39  133/44 133/40   Pulse: (!) 53  (!) 50 (!) 50   Resp: 20  18 18   Temp: 97.7 °F (36.5 °C)  97.9 °F (36.6 °C) 97.8 °F (36.6 °C)   SpO2: 100%  100% 100%   Weight:  79.4 kg (175 lb 0.7 oz)     Height:         Intake and Output:     08/26 1901 - 08/28 0700  In: 360 [P.O.:360]  Out: 1626 [Urine:1626]    Physical Examination:  General: NAD,Conversant   Neck:  Supple, no mass  Resp:  Lungs CTA B/L, no wheezing , normal respiratory effort  CV:  RRR,  no murmur or rub, trace LE edema  GI:  Soft, NT, + Bowel sounds, no hepatosplenomegaly  Neurologic:  Non focal  Psych:             AAO x 3 appropriate affect   Skin:  No Rash      []    High complexity decision making was performed  []    Patient is at high-risk of decompensation with multiple organ involvement    Lab Data Personally Reviewed: I have reviewed all the pertinent labs, microbiology data and radiology studies during assessment.     Recent Labs      08/28/17   0440 08/27/17   1725  08/27/17   0320  08/26/17   0325   NA  126*  123*  123*  124*   K  4.2   --   4.4  4.5   CL  91*   --   89*  89*   CO2  26   --   25  25   GLU  114*   --   99  92   BUN  38*   --   32*  24*   CREA  1.84*   --   1.83*  1.81*   CA  8.3*   --   8.3*  8.3*   PHOS  4.0   --   4.2   --    ALB  2.8*   --   3.1*   --      No results for input(s): WBC, HGB, HCT, PLT, HGBEXT, HCTEXT, PLTEXT, HGBEXT, HCTEXT, PLTEXT in the last 72 hours.   No results found for: SDES  No results found for: CULT  Recent Results (from the past 24 hour(s))   GLUCOSE, POC    Collection Time: 08/27/17 11:10 AM   Result Value Ref Range    Glucose (POC) 190 (H) 65 - 100 mg/dL    Performed by Chidi VILLAREAL    GLUCOSE, POC    Collection Time: 08/27/17  4:14 PM   Result Value Ref Range    Glucose (POC) 135 (H) 65 - 100 mg/dL    Performed by Smiley Pinedo    SODIUM    Collection Time: 08/27/17  5:25 PM   Result Value Ref Range    Sodium 123 (L) 136 - 145 mmol/L   GLUCOSE, POC    Collection Time: 08/27/17  6:42 PM   Result Value Ref Range    Glucose (POC) 169 (H) 65 - 100 mg/dL    Performed by [address], POC    Collection Time: 08/27/17  9:00 PM   Result Value Ref Range    Glucose (POC) 172 (H) 65 - 100 mg/dL    Performed by Rico Samuels    RENAL FUNCTION PANEL    Collection Time: 08/28/17  4:40 AM   Result Value Ref Range    Sodium 126 (L) 136 - 145 mmol/L    Potassium 4.2 3.5 - 5.1 mmol/L    Chloride 91 (L) 97 - 108 mmol/L    CO2 26 21 - 32 mmol/L    Anion gap 9 5 - 15 mmol/L    Glucose 114 (H) 65 - 100 mg/dL    BUN 38 (H) 6 - 20 MG/DL    Creatinine 1.84 (H) 0.55 - 1.02 MG/DL    BUN/Creatinine ratio 21 (H) 12 - 20      GFR est AA 32 (L) >60 ml/min/1.73m2    GFR est non-AA 26 (L) >60 ml/min/1.73m2    Calcium 8.3 (L) 8.5 - 10.1 MG/DL    Phosphorus 4.0 2.6 - 4.7 MG/DL    Albumin 2.8 (L) 3.5 - 5.0 g/dL   GLUCOSE, POC    Collection Time: 08/28/17  6:33 AM   Result Value Ref Range    Glucose (POC) 127 (H) 65 - 100 mg/dL Performed by Vannesa Sanders MD  [facility]   [address] [facility] [address]  Phone - [phone]   Fax - [phone]  [email or web address]

## 2017-08-28 NOTE — PROGRESS NOTES
Bedside shift change report given to [facility] LETITIA Kiran (oncoming nurse) by Dorota Grossman RN (offgoing nurse). Report included the following information SBAR, Kardex and Cardiac Rhythm SB. Problem: Falls - Risk of  Goal: *Absence of Falls  Document Iain Fall Risk and appropriate interventions in the flowsheet. Outcome: Progressing Towards Goal  Fall Risk Interventions:  Mobility Interventions: Patient to call before getting OOB     Mentation Interventions: Room close to nurse's station, Door open when patient unattended     Medication Interventions: Patient to call before getting OOB, Teach patient to arise slowly     Elimination Interventions: Call light in reach, Patient to call for help with toileting needs, Toilet paper/wipes in reach, Toileting schedule/hourly rounds       During the shift the patient complains on and off of being unable to breath but her O2 sats are 100% on 1 L via NC. Patient uses Yordy's chest rub in her nose to help her breath.

## 2017-08-28 NOTE — PROGRESS NOTES
Cardiology Progress Note            Admit Date: 8/23/2017  Admit Diagnosis: SOB (shortness of breath)  Date: 8/28/2017     Time: 8:35 AM    Subjective:  Denies CP. Breathing improved. Assessment and Plan     1. SOB   - CTA without ILD from Amiodarone, no PTE. Emphysema/COPD changes noted as well as blebs. B/L effusions.   - Amiodarone stopped indefinitely   - Jet Nebs PRN   -    - Echo EF 55-60%, Mild LA dilation, RA size normal.  RV size normal.  Mild MR, TR.  2. Hyponatremia   - Na 126   - Fluid restrict liftted   - daily labs   - SIADH + volume   - HCTZ and Bactrim stopped. Bumex 1 mg PO  3. Cr elevated   - 1.8  4. HTN   - Better after addition of norvasc. Metoprolol at 100 and Hydralazine. 5. AF   - NSR currently   - Eliquis and Metoprolol    Breathing better, still with low Na. Bumex PO for volume. Will get PT/OT to see. Needs to get moving. I have seen and examined the patient and agree with PA assessment. continue diuresis      ]ROS:  A comprehensive review of systems was negative except for that written in the HPI. Objective:      Physical Exam:                Visit Vitals    /43    Pulse (!) 55    Temp 98.2 °F (36.8 °C)    Resp 18    Ht 5' 3\" (1.6 m)    Wt 79.4 kg (175 lb 0.7 oz)    SpO2 97%    Breastfeeding No    BMI 31.01 kg/m2        General Appearance:   no acute distress. Ears/Nose/Mouth/Throat:    Hearing grossly normal.         Neck:  Supple. Chest:    Lungs decreased BS bases   bilaterally. Cardiovascular:   Regular rate and rhythm, S1, S2 normal, no murmur. Abdomen:    Soft, non-tender, bowel sounds are active. Extremities:  No edema bilaterally. Skin:  Warm and dry.      Telemetry: normal sinus rhythm          Data Review:    Labs:    Recent Results (from the past 24 hour(s))   SODIUM    Collection Time: 08/27/17  5:25 PM   Result Value Ref Range    Sodium 123 (L) 136 - 145 mmol/L   GLUCOSE, POC    Collection Time: 08/27/17  6:42 PM   Result Value Ref Range    Glucose (POC) 169 (H) 65 - 100 mg/dL    Performed by [address], POC    Collection Time: 08/27/17  9:00 PM   Result Value Ref Range    Glucose (POC) 172 (H) 65 - 100 mg/dL    Performed by Rico Samuels    RENAL FUNCTION PANEL    Collection Time: 08/28/17  4:40 AM   Result Value Ref Range    Sodium 126 (L) 136 - 145 mmol/L    Potassium 4.2 3.5 - 5.1 mmol/L    Chloride 91 (L) 97 - 108 mmol/L    CO2 26 21 - 32 mmol/L    Anion gap 9 5 - 15 mmol/L    Glucose 114 (H) 65 - 100 mg/dL    BUN 38 (H) 6 - 20 MG/DL    Creatinine 1.84 (H) 0.55 - 1.02 MG/DL    BUN/Creatinine ratio 21 (H) 12 - 20      GFR est AA 32 (L) >60 ml/min/1.73m2    GFR est non-AA 26 (L) >60 ml/min/1.73m2    Calcium 8.3 (L) 8.5 - 10.1 MG/DL    Phosphorus 4.0 2.6 - 4.7 MG/DL    Albumin 2.8 (L) 3.5 - 5.0 g/dL   GLUCOSE, POC    Collection Time: 08/28/17  6:33 AM   Result Value Ref Range    Glucose (POC) 127 (H) 65 - 100 mg/dL    Performed by Edison Lanes    GLUCOSE, POC    Collection Time: 08/28/17 12:04 PM   Result Value Ref Range    Glucose (POC) 237 (H) 65 - 100 mg/dL    Performed by Melissa Jerome    SODIUM    Collection Time: 08/28/17 12:25 PM   Result Value Ref Range    Sodium 127 (L) 136 - 145 mmol/L   GLUCOSE, POC    Collection Time: 08/28/17  5:05 PM   Result Value Ref Range    Glucose (POC) 254 (H) 65 - 100 mg/dL    Performed by Shira Miguel    GLUCOSE, POC    Collection Time: 08/28/17  5:06 PM   Result Value Ref Range    Glucose (POC) 272 (H) 65 - 100 mg/dL    Performed by Shira Miguel           Radiology:        Current Facility-Administered Medications   Medication Dose Route Frequency    acetaminophen (TYLENOL) tablet 650 mg  650 mg Oral Q6H PRN    ondansetron (ZOFRAN) injection 4 mg  4 mg IntraVENous Q6H PRN    amLODIPine (NORVASC) tablet 5 mg  5 mg Oral DAILY    hydrALAZINE (APRESOLINE) tablet 100 mg  100 mg Oral TID    bumetanide (BUMEX) tablet 1 mg  1 mg Oral DAILY    apixaban (ELIQUIS) tablet 2.5 mg  2.5 mg Oral Q12H    insulin lispro (HUMALOG) injection   SubCUTAneous AC&HS    glucose chewable tablet 16 g  4 Tab Oral PRN    dextrose (D50W) injection syrg 12.5-25 g  12.5-25 g IntraVENous PRN    glucagon (GLUCAGEN) injection 1 mg  1 mg IntraMUSCular PRN    albuterol-ipratropium (DUO-NEB) 2.5 MG-0.5 MG/3 ML  3 mL Nebulization Q6H PRN    LORazepam (ATIVAN) tablet 0.5 mg  0.5 mg Oral Q6H PRN    sodium chloride (NS) flush 5-10 mL  5-10 mL IntraVENous Q8H    sodium chloride (NS) flush 5-10 mL  5-10 mL IntraVENous PRN    atorvastatin (LIPITOR) tablet 40 mg  40 mg Oral QHS    glipiZIDE (GLUCOTROL) tablet 10 mg  10 mg Oral ACB    azelastine (ASTELIN) 137mcg/spray nasal spray  1 Spray Both Nostrils BID    metoprolol tartrate (LOPRESSOR) tablet 100 mg  100 mg Oral BID    aspirin chewable tablet 81 mg  81 mg Oral DAILY    insulin glargine (LANTUS) injection 10 Units  10 Units SubCUTAneous DAILY          Arline Meza MD     [facility] [address] [facility] [address] [facility] [address]   [place] [address]   [phone]

## 2017-08-28 NOTE — PROGRESS NOTES
Problem: Falls - Risk of  Goal: *Absence of Falls  Document Iain Fall Risk and appropriate interventions in the flowsheet.    Outcome: Progressing Towards Goal  Fall Risk Interventions:  Mobility Interventions: Patient to call before getting OOB, PT Consult for assist device competence, Communicate number of staff needed for ambulation/transfer     Mentation Interventions: Door open when patient unattended, Increase mobility     Medication Interventions: Patient to call before getting OOB, Teach patient to arise slowly     Elimination Interventions: Call light in reach, Patient to call for help with toileting needs, Toileting schedule/hourly rounds

## 2017-08-28 NOTE — PROGRESS NOTES
Problem: Discharge Planning  Goal: *Discharge to safe environment  Outcome: Progressing Towards Goal  Agrees to a safe home environment

## 2017-08-29 LAB
ALBUMIN SERPL-MCNC: 3.2 G/DL (ref 3.5–5)
ANION GAP SERPL CALC-SCNC: 5 MMOL/L (ref 5–15)
BUN SERPL-MCNC: 36 MG/DL (ref 6–20)
BUN/CREAT SERPL: 20 (ref 12–20)
CALCIUM SERPL-MCNC: 8.4 MG/DL (ref 8.5–10.1)
CHLORIDE SERPL-SCNC: 93 MMOL/L (ref 97–108)
CO2 SERPL-SCNC: 32 MMOL/L (ref 21–32)
CREAT SERPL-MCNC: 1.78 MG/DL (ref 0.55–1.02)
GLUCOSE BLD STRIP.AUTO-MCNC: 148 MG/DL (ref 65–100)
GLUCOSE BLD STRIP.AUTO-MCNC: 173 MG/DL (ref 65–100)
GLUCOSE BLD STRIP.AUTO-MCNC: 174 MG/DL (ref 65–100)
GLUCOSE BLD STRIP.AUTO-MCNC: 262 MG/DL (ref 65–100)
GLUCOSE SERPL-MCNC: 197 MG/DL (ref 65–100)
PHOSPHATE SERPL-MCNC: 3.1 MG/DL (ref 2.6–4.7)
POTASSIUM SERPL-SCNC: 4.3 MMOL/L (ref 3.5–5.1)
SERVICE CMNT-IMP: ABNORMAL
SODIUM SERPL-SCNC: 130 MMOL/L (ref 136–145)

## 2017-08-29 PROCEDURE — 97161 PT EVAL LOW COMPLEX 20 MIN: CPT

## 2017-08-29 PROCEDURE — 74011250637 HC RX REV CODE- 250/637: Performed by: INTERNAL MEDICINE

## 2017-08-29 PROCEDURE — 80069 RENAL FUNCTION PANEL: CPT | Performed by: INTERNAL MEDICINE

## 2017-08-29 PROCEDURE — 82962 GLUCOSE BLOOD TEST: CPT

## 2017-08-29 PROCEDURE — 36415 COLL VENOUS BLD VENIPUNCTURE: CPT | Performed by: INTERNAL MEDICINE

## 2017-08-29 PROCEDURE — 97116 GAIT TRAINING THERAPY: CPT

## 2017-08-29 PROCEDURE — 74011250636 HC RX REV CODE- 250/636: Performed by: PHYSICIAN ASSISTANT

## 2017-08-29 PROCEDURE — 74011636637 HC RX REV CODE- 636/637: Performed by: PHYSICIAN ASSISTANT

## 2017-08-29 PROCEDURE — 74011250637 HC RX REV CODE- 250/637: Performed by: SPECIALIST

## 2017-08-29 PROCEDURE — 74011250637 HC RX REV CODE- 250/637: Performed by: PHYSICIAN ASSISTANT

## 2017-08-29 PROCEDURE — 65660000000 HC RM CCU STEPDOWN

## 2017-08-29 RX ORDER — MECLIZINE HCL 12.5 MG 12.5 MG/1
25 TABLET ORAL
Status: DISCONTINUED | OUTPATIENT
Start: 2017-08-29 | End: 2017-08-31 | Stop reason: HOSPADM

## 2017-08-29 RX ADMIN — INSULIN LISPRO 2 UNITS: 100 INJECTION, SOLUTION INTRAVENOUS; SUBCUTANEOUS at 17:25

## 2017-08-29 RX ADMIN — APIXABAN 2.5 MG: 2.5 TABLET, FILM COATED ORAL at 11:29

## 2017-08-29 RX ADMIN — BUMETANIDE 1 MG: 1 TABLET ORAL at 08:43

## 2017-08-29 RX ADMIN — HYDRALAZINE HYDROCHLORIDE 100 MG: 50 TABLET ORAL at 15:02

## 2017-08-29 RX ADMIN — Medication 10 ML: at 15:03

## 2017-08-29 RX ADMIN — METOPROLOL TARTRATE 100 MG: 50 TABLET ORAL at 17:25

## 2017-08-29 RX ADMIN — ATORVASTATIN CALCIUM 40 MG: 40 TABLET, FILM COATED ORAL at 21:38

## 2017-08-29 RX ADMIN — INSULIN GLARGINE 10 UNITS: 100 INJECTION, SOLUTION SUBCUTANEOUS at 08:47

## 2017-08-29 RX ADMIN — APIXABAN 2.5 MG: 2.5 TABLET, FILM COATED ORAL at 21:38

## 2017-08-29 RX ADMIN — HYDRALAZINE HYDROCHLORIDE 100 MG: 50 TABLET ORAL at 21:44

## 2017-08-29 RX ADMIN — MECLIZINE 25 MG: 12.5 TABLET ORAL at 15:02

## 2017-08-29 RX ADMIN — AMLODIPINE BESYLATE 5 MG: 5 TABLET ORAL at 08:43

## 2017-08-29 RX ADMIN — ASPIRIN 81 MG 81 MG: 81 TABLET ORAL at 08:43

## 2017-08-29 RX ADMIN — INSULIN LISPRO 5 UNITS: 100 INJECTION, SOLUTION INTRAVENOUS; SUBCUTANEOUS at 12:20

## 2017-08-29 RX ADMIN — AZELASTINE HYDROCHLORIDE 1 SPRAY: 137 SPRAY, METERED NASAL at 23:22

## 2017-08-29 RX ADMIN — MECLIZINE 25 MG: 12.5 TABLET ORAL at 23:29

## 2017-08-29 RX ADMIN — Medication 10 ML: at 21:40

## 2017-08-29 RX ADMIN — Medication 10 ML: at 06:33

## 2017-08-29 RX ADMIN — GLIPIZIDE 10 MG: 5 TABLET ORAL at 06:38

## 2017-08-29 RX ADMIN — INSULIN LISPRO 2 UNITS: 100 INJECTION, SOLUTION INTRAVENOUS; SUBCUTANEOUS at 06:38

## 2017-08-29 RX ADMIN — METOPROLOL TARTRATE 100 MG: 50 TABLET ORAL at 08:43

## 2017-08-29 RX ADMIN — HYDRALAZINE HYDROCHLORIDE 100 MG: 50 TABLET ORAL at 08:42

## 2017-08-29 NOTE — PROGRESS NOTES
Problem: Falls - Risk of  Goal: *Absence of Falls  Document Iain Fall Risk and appropriate interventions in the flowsheet. Outcome: Progressing Towards Goal  Fall Risk Interventions:     Patient has remained free of falls since admission. Mobility Interventions: Communicate number of staff needed for ambulation/transfer, Patient to call before getting OOB, PT Consult for mobility concerns     Mentation Interventions: Door open when patient unattended, Eyeglasses and hearing aids, Increase mobility, Toileting rounds     Medication Interventions: Evaluate medications/consider consulting pharmacy, Patient to call before getting OOB     Elimination Interventions: Call light in reach, Patient to call for help with toileting needs, Toileting schedule/hourly rounds                 Problem: Pressure Injury - Risk of  Goal: *Prevention of pressure ulcer  Outcome: Progressing Towards Goal    08/29/17 0712   Wound Prevention and Protection Methods   Orientation of Wound Prevention Posterior   Location of Wound Prevention Sacrum/Coccyx   Dressing Present  No   Read Only, Retired: Wound Treatment (non-mechanical)   Wound Offloading (Prevention Methods) Bed, pressure reduction mattress      Patient has remained free of pressure injury since admission.

## 2017-08-29 NOTE — PROGRESS NOTES
Problem: Mobility Impaired (Adult and Pediatric)  Goal: *Acute Goals and Plan of Care (Insert Text)  Physical Therapy Goals  Initiated 8/29/2017  1. Patient will move from supine to sit and sit to supine , scoot up and down and roll side to side in bed with modified independence within 7 day(s). 2. Patient will transfer from bed to chair and chair to bed with modified independence using the least restrictive device within 7 day(s). 3. Patient will perform sit to stand with modified independence within 7 day(s). 4. Patient will ambulate with modified independence for 250 feet with the least restrictive device within 7 day(s). 5. Patient will ascend/descend 3 stairs with 2 handrail(s) with modified independence within 7 day(s). PHYSICAL THERAPY EVALUATION  Patient: Dk Marquez (10 y.o. female)  Date: 8/29/2017  Primary Diagnosis: SOB (shortness of breath)        Precautions:   Fall      ASSESSMENT :  Based on the objective data described below, the patient presents with impaired standing balance, inconsistent blood pressure readings, feeling of fogginess, decreased activity tolerance and functional mobility below her baseline level of Mod I. Pt w/ past medical history significant for CAD, diabetes, HLD, essential HTN, CVA, PVD, and a-fib admitted 5 days ago with chief complaint of dizziness. Pt reported she has had progressively worsening dizziness over the past couple weeks with accompanying SOB and came to the hospital when she was tired of always feeling liek she was in a \"fog\". Pt lives with her daughter who assists with transportation and errands. Pt continues to drive when she feels well. Today pt's BP readings had a large range, particularly DBP. 155/68(at rest, sitting R arm), 148/120(standing,after activity R arm+tremors), 160/52(after resting, L arm), 168/35(sitting, L arm). Pt CGA for transfers and Min A to ambulate with HHA on the L. Pt reports she uses a SPC in the community but no AD at home. Pt unsteady on her feet with HHA and constantly requiring Min A to regain balance. Pt would benefit from RW trial next session to increase stability. Pt w/o complaint of increased dizziness with walking but reports it is there at baseline. O2 saturations >97% throughout. 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 Returned to chair at end of session and RN in for rounds. All needs met and questions answered. Pt left in NAD with call bell in hand. Pt will benefit from continued skilled therapy to address balance deficits and progress mobility. Recommending HHPT at discharge and assistance provided by pt's daughter upon returning home. Patient will benefit from skilled intervention to address the above impairments. Patients rehabilitation potential is considered to be Fair  Factors which may influence rehabilitation potential include:   [ ]         None noted  [ ]         Mental ability/status  [X]         Medical condition  [ ]         Home/family situation and support systems  [ ]         Safety awareness  [ ]         Pain tolerance/management  [ ]         Other:        PLAN :  Recommendations and Planned Interventions:  [X]           Bed Mobility Training             [X]    Neuromuscular Re-Education  [X]           Transfer Training                   [ ]    Orthotic/Prosthetic Training  [X]           Gait Training                         [ ]    Modalities  [X]           Therapeutic Exercises           [ ]    Edema Management/Control  [X]           Therapeutic Activities            [X]    Patient and Family Training/Education  [ ]           Other (comment):     Frequency/Duration: Patient will be followed by physical therapy  5 times a week to address goals.   Discharge Recommendations: Home Health  Further Equipment Recommendations for Discharge: None needed at this time       SUBJECTIVE:   Patient stated I feel like my brain is in a fog.      OBJECTIVE DATA SUMMARY:   HISTORY:    Past Medical History:   Diagnosis Date    Atrial fibrillation ([facility] [address] [facility]      CAD (coronary artery disease)      Cerebrovascular accident stroke, other, unspec 11/18/2010    Coronary atherosclerosis of native coronary artery 11/18/2010    Essential hypertension      Essential hypertension, benign 11/18/2010    HLD (hyperlipidemia) 11/18/2010    PVD (peripheral vascular disease) ([facility] [address] 2017    Type II or unspecified type diabetes mellitus without mention of complication, not stated as uncontrolled 11/18/2010     Past Surgical History:   Procedure Laterality Date    HX CORONARY STENT PLACEMENT        HX HEART CATHETERIZATION        HX HYSTERECTOMY   1972     Prior Level of Function/Home Situation: Mod I. Lives with daughter. Uses cane at baseline. Motorized cart when shopping. Daughter assists with driving and meals when pt is not feeling well. Personal factors and/or comorbidities impacting plan of care: [facility]     Home Situation  Home Environment: Private residence  # Steps to Enter: 3  Rails to Enter: Yes  Hand Rails : Bilateral  One/Two Story Residence: Two story, live on 1st floor  Living Alone: No  Support Systems: Family member(s), Friends \ neighbors, Child(baldemar)  Patient Expects to be Discharged to[de-identified] Private residence  Current DME Used/Available at Home: Cane, straight, Transfer bench, Raised toilet seat, Walker, rolling, Grab bars  Tub or Shower Type: Shower     EXAMINATION/PRESENTATION/DECISION MAKING:   Critical Behavior:  Neurologic State: Alert  Orientation Level: Oriented X4  Cognition: Appropriate decision making, Appropriate for age attention/concentration     Hearing:   Auditory  Auditory Impairment: None     Edema: edema of R LE  Range Of Motion:  AROM: Within functional limits           PROM: Within functional limits           Strength:    Strength: Generally decreased, functional                    Tone & Sensation:   Tone: Normal Sensation: Intact               Coordination:  Coordination: Within functional limits  Vision:      Functional Mobility:  Bed Mobility:              Transfers:  Sit to Stand: Contact guard assistance  Stand to Sit: Contact guard assistance                       Balance:   Sitting: Intact  Standing: Impaired  Standing - Static: Good;Constant support  Standing - Dynamic : Fair  Ambulation/Gait Training:  Distance (ft): 120 Feet (ft)  Assistive Device: Gait belt; Other (comment) (HHA)  Ambulation - Level of Assistance: Minimal assistance        Gait Abnormalities: Decreased step clearance; Path deviations;Trunk sway increased        Base of Support: Widened;Shift to left     Speed/Teri: Slow;Shuffled;Pace decreased (<100 feet/min)  Step Length: Right shortened;Left shortened                 Functional Measure:  Tinetti test:      Sitting Balance: 1  Arises: 1  Attempts to Rise: 2  Immediate Standing Balance: 1  Standing Balance: 1  Nudged: 1  Eyes Closed: 1  Turn 360 Degrees - Continuous/Discontinuous: 1  Turn 360 Degrees - Steady/Unsteady: 1  Sitting Down: 1  Balance Score: 11  Indication of Gait: 1  R Step Length/Height: 1  L Step Length/Height: 1  R Foot Clearance: 1  L Foot Clearance: 1  Step Symmetry: 1  Step Continuity: 0  Path: 0  Trunk: 0  Walking Time: 0  Gait Score: 6  Total Score: 17         Tinetti Test and G-code impairment scale:  Percentage of Impairment CH     0%    CI     1-19% CJ     20-39% CK     40-59% CL     60-79% CM     80-99% CN      100%   Tinetti  Score 0-28 28 23-27 17-22 12-16 6-11 1-5 0          Tinetti Tool Score Risk of Falls  <19 = High Fall Risk  19-24 = Moderate Fall Risk  25-28 = Low Fall Risk  Tinetti ME. Performance-Oriented Assessment of Mobility Problems in Elderly Patients. [address] 67; [id number].  (Scoring Description: PT Bulletin Feb. 10, 1993)     Older adults: Gilda Gallegos et al, 2009; n = [address] elderly evaluated with ABC, ARTURO, ADL, and IADL)  · Mean ARTURO score for males aged 65-79 years = 26.21(3.40)  · Mean ARTURO score for females age 69-68 years = 25.16(4.30)  · Mean ARTURO score for males over 80 years = 23.29(6.02)  · Mean ARTUOR score for females over 80 years = 17.20(8.32)            G codes: In compliance with CMSs Claims Based Outcome Reporting, the following G-code set was chosen for this patient based on their primary functional limitation being treated: The outcome measure chosen to determine the severity of the functional limitation was the Tinetti with a score of 17/28 which was correlated with the impairment scale. · Mobility - Walking and Moving Around:               - CURRENT STATUS:    CJ - 20%-39% impaired, limited or restricted               - GOAL STATUS:           CI - 1%-19% impaired, limited or restricted               - D/C STATUS:              ---------------To be determined---------------        Physical Therapy Evaluation Charge Determination   History Examination Presentation Decision-Making   MEDIUM  Complexity : 1-2 comorbidities / personal factors will impact the outcome/ POC  MEDIUM Complexity : 3 Standardized tests and measures addressing body structure, function, activity limitation and / or participation in recreation  LOW Complexity : Stable, uncomplicated  LOW Complexity : FOTO score of       Based on the above components, the patient evaluation is determined to be of the following complexity level: LOW      Pain:  Pain Scale 1: Numeric (0 - 10)  Pain Intensity 1: 0              Activity Tolerance:   Good  Please refer to the flowsheet for vital signs taken during this treatment.   After treatment:   [X]         Patient left in no apparent distress sitting up in chair  [ ]         Patient left in no apparent distress in bed  [X]         Call bell left within reach  [X]         Nursing notified  [ ]         Caregiver present  [ ]         Bed alarm activated      COMMUNICATION/EDUCATION:   The patients plan of care was discussed with: Registered Nurse.  [X]         Fall prevention education was provided and the patient/caregiver indicated understanding. [X]         Patient/family have participated as able in goal setting and plan of care. [X]         Patient/family agree to work toward stated goals and plan of care. [ ]         Patient understands intent and goals of therapy, but is neutral about his/her participation. [ ]         Patient is unable to participate in goal setting and plan of care.      Thank you for this referral.  Yolanda West, PT   Time Calculation: 27 mins

## 2017-08-29 NOTE — PROGRESS NOTES
0730: Bedside and Verbal shift change report given to Jah RN (oncoming nurse) by Kait Conte RN (offgoing nurse). Report included the following information SBAR, Kardex, Procedure Summary and Cardiac Rhythm Sinus [facility] with AV first degree block. 1930: Bedside and Verbal shift change report given to PIPER Quintero (oncoming nurse) by Rossana Argueta RN (offgoing nurse). Report included the following information SBAR, Kardex, Med Rec Status and Cardiac Rhythm Sinus [facility] AVB first degree.

## 2017-08-29 NOTE — PROGRESS NOTES
Nephrology Progress Note  Honey Ga  Date of Admission : 8/23/2017    CC: Follow up for DESTINI and hyponatremia       Assessment and Plan     Mild DESTINI on CKD:  - Cr stabilized  - Likely from contrast and diuresis  - cont current dose bumex for now  - daily labs while here    CKD III:  - baseline Cr around 1.2 to 1.3  - likely from chronic HTN    Hyponatremia:  - likely SIADH + volume overload  - Na stable after tolvaptan x 1 dose  - daily Na levels and fluid restriction    Hypervolemia:  - edema and pleural effusions  - preserved EF  - cont bumex    HTN:  - cont current BP meds    DM2:  - on insulin    COPD     PAF:  - on eliquis       Interval History:  Seen and examined. Cr stable, Na now 130. C/o some dizziness with ambulation with PT. Feeling ok in bed, off O2. No cp or sob. Edema and  Sob better today. Current Medications: all current  Medications have been eviewed in EPIC  Review of Systems: Pertinent items are noted in HPI. Objective:  Vitals:    Vitals:    08/28/17 2314 08/29/17 0305 08/29/17 0712 08/29/17 1107   BP: 155/50 (!) 135/33 177/49 134/40   Pulse: (!) 59 (!) 54 (!) 59 62   Resp: 18 18 18 18   Temp: 98 °F (36.7 °C) 97.8 °F (36.6 °C) 98 °F (36.7 °C) 97.9 °F (36.6 °C)   SpO2: 95% 98% 98% 98%   Weight:  78.8 kg (173 lb 11.6 oz)     Height:         Intake and Output:  08/29 0701 - 08/29 1900  In: 600 [P.O.:600]  Out: 50 [Urine:50]  08/27 1901 - 08/29 0700  In: 580 [P.O.:580]  Out: 401 [Urine:401]    Physical Examination:  General: NAD,Conversant   Neck:  Supple, no mass  Resp:  Lungs CTA B/L, no wheezing , normal respiratory effort  CV:  RRR,  no murmur or rub, trace LE edema  GI:  Soft, NT, + Bowel sounds, no hepatosplenomegaly  Neurologic:  Non focal  Psych:             AAO x 3 appropriate affect   Skin:  No Rash      []    High complexity decision making was performed  []    Patient is at high-risk of decompensation with multiple organ involvement    Lab Data Personally Reviewed:  I have reviewed all the pertinent labs, microbiology data and radiology studies during assessment. Recent Labs      08/29/17   1015  08/28/17   1225  08/28/17   0440   08/27/17   0320   NA  130*  127*  126*   < >  123*   K  4.3   --   4.2   --   4.4   CL  93*   --   91*   --   89*   CO2  32   --   26   --   25   GLU  197*   --   114*   --   99   BUN  36*   --   38*   --   32*   CREA  1.78*   --   1.84*   --   1.83*   CA  8.4*   --   8.3*   --   8.3*   PHOS  3.1   --   4.0   --   4.2   ALB  3.2*   --   2.8*   --   3.1*    < > = values in this interval not displayed. No results for input(s): WBC, HGB, HCT, PLT, HGBEXT, HCTEXT, PLTEXT, HGBEXT, HCTEXT, PLTEXT in the last 72 hours.   No results found for: SDES  No results found for: CULT  Recent Results (from the past 24 hour(s))   GLUCOSE, POC    Collection Time: 08/28/17 12:04 PM   Result Value Ref Range    Glucose (POC) 237 (H) 65 - 100 mg/dL    Performed by Austin Alba    SODIUM    Collection Time: 08/28/17 12:25 PM   Result Value Ref Range    Sodium 127 (L) 136 - 145 mmol/L   GLUCOSE, POC    Collection Time: 08/28/17  5:05 PM   Result Value Ref Range    Glucose (POC) 254 (H) 65 - 100 mg/dL    Performed by [address] [place] [address], POC    Collection Time: 08/28/17  5:06 PM   Result Value Ref Range    Glucose (POC) 272 (H) 65 - 100 mg/dL    Performed by [address] [place] [address], POC    Collection Time: 08/28/17  9:22 PM   Result Value Ref Range    Glucose (POC) 190 (H) 65 - 100 mg/dL    Performed by Merry Alejandro, POC    Collection Time: 08/29/17  6:32 AM   Result Value Ref Range    Glucose (POC) 173 (H) 65 - 100 mg/dL    Performed by Karoline Moseley    RENAL FUNCTION PANEL    Collection Time: 08/29/17 10:15 AM   Result Value Ref Range    Sodium 130 (L) 136 - 145 mmol/L    Potassium 4.3 3.5 - 5.1 mmol/L    Chloride 93 (L) 97 - 108 mmol/L    CO2 32 21 - 32 mmol/L    Anion gap 5 5 - 15 mmol/L    Glucose 197 (H) 65 - 100 mg/dL    BUN 36 (H) 6 - 20 MG/DL Creatinine 1.78 (H) 0.55 - 1.02 MG/DL    BUN/Creatinine ratio 20 12 - 20      GFR est AA 33 (L) >60 ml/min/1.73m2    GFR est non-AA 27 (L) >60 ml/min/1.73m2    Calcium 8.4 (L) 8.5 - 10.1 MG/DL    Phosphorus 3.1 2.6 - 4.7 MG/DL    Albumin 3.2 (L) 3.5 - 5.0 g/dL   GLUCOSE, POC    Collection Time: 08/29/17 11:05 AM   Result Value Ref Range    Glucose (POC) 262 (H) 65 - 100 mg/dL    Performed by Chidi Cedillo MD  [address]  Phone - [phone]   Fax - [phone]  [email or web address]

## 2017-08-29 NOTE — PROGRESS NOTES
Cardiology Progress Note            Admit Date: 8/23/2017  Admit Diagnosis: SOB (shortness of breath)  Date: 8/29/2017     Time: 8:35 AM    Subjective:  Denies CP. Breathing improved. Assessment and Plan     1. SOB   - CTA without ILD from Amiodarone, no PTE. Emphysema/COPD changes noted as well as blebs. B/L effusions.   - Amiodarone stopped indefinitely   - Jet Nebs PRN   -    - Echo EF 55-60%, Mild LA dilation, RA size normal.  RV size normal.  Mild MR, TR.  2. Hyponatremia   - Na 127 and slowly improving   - Fluid restrict liftted   - daily labs   - SIADH + volume   - HCTZ and Bactrim stopped. Bumex 1 mg PO  3. Cr elevated   - 1.78  4. HTN   - Better after addition of norvasc. Metoprolol at 100 and Hydralazine. 5. AF   - NSR currently   - Eliquis and Metoprolol    She feels breathing is improved with diuresis. Walking with PT and feeling weak. Will need at least home health. Hoping to get Na up to 130 to discharge. Appreciate Nephrology's help her. I have seen and examined the patient and agree with PA assessment. Sob better good uo thus far -2265 cc  Still dizzy suspect non cardiac even if hr at times<60, no clear pauses  Consider neuro consultation given also results of head ct        ]ROS:  A comprehensive review of systems was negative except for that written in the HPI. Objective:      Physical Exam:                Visit Vitals    /40 (BP 1 Location: Right arm, BP Patient Position: Sitting)    Pulse 62    Temp 97.9 °F (36.6 °C)    Resp 18    Ht 5' 3\" (1.6 m)    Wt 78.8 kg (173 lb 11.6 oz)    SpO2 98%    Breastfeeding No    BMI 30.77 kg/m2        General Appearance:   no acute distress. Ears/Nose/Mouth/Throat:    Hearing grossly normal.         Neck:  Supple. Chest:    Lungs decreased BS bases   bilaterally. Cardiovascular:   Regular rate and rhythm, S1, S2 normal, no murmur.    Abdomen: Soft, non-tender, bowel sounds are active. Extremities:  No edema bilaterally. Skin:  Warm and dry.      Telemetry: normal sinus rhythm          Data Review:    Labs:    Recent Results (from the past 24 hour(s))   GLUCOSE, POC    Collection Time: 08/28/17  5:05 PM   Result Value Ref Range    Glucose (POC) 254 (H) 65 - 100 mg/dL    Performed by Sandra PadVenmo    GLUCOSE, POC    Collection Time: 08/28/17  5:06 PM   Result Value Ref Range    Glucose (POC) 272 (H) 65 - 100 mg/dL    Performed by [employer]    GLUCOSE, POC    Collection Time: 08/28/17  9:22 PM   Result Value Ref Range    Glucose (POC) 190 (H) 65 - 100 mg/dL    Performed by Hyun Duke    GLUCOSE, POC    Collection Time: 08/29/17  6:32 AM   Result Value Ref Range    Glucose (POC) 173 (H) 65 - 100 mg/dL    Performed by Alicia Morrison    RENAL FUNCTION PANEL    Collection Time: 08/29/17 10:15 AM   Result Value Ref Range    Sodium 130 (L) 136 - 145 mmol/L    Potassium 4.3 3.5 - 5.1 mmol/L    Chloride 93 (L) 97 - 108 mmol/L    CO2 32 21 - 32 mmol/L    Anion gap 5 5 - 15 mmol/L    Glucose 197 (H) 65 - 100 mg/dL    BUN 36 (H) 6 - 20 MG/DL    Creatinine 1.78 (H) 0.55 - 1.02 MG/DL    BUN/Creatinine ratio 20 12 - 20      GFR est AA 33 (L) >60 ml/min/1.73m2    GFR est non-AA 27 (L) >60 ml/min/1.73m2    Calcium 8.4 (L) 8.5 - 10.1 MG/DL    Phosphorus 3.1 2.6 - 4.7 MG/DL    Albumin 3.2 (L) 3.5 - 5.0 g/dL   GLUCOSE, POC    Collection Time: 08/29/17 11:05 AM   Result Value Ref Range    Glucose (POC) 262 (H) 65 - 100 mg/dL    Performed by Yanique Maynard  PCT      Lab Results   Component Value Date/Time    Sodium 130 08/29/2017 10:15 AM    Potassium 4.3 08/29/2017 10:15 AM    Chloride 93 08/29/2017 10:15 AM    CO2 32 08/29/2017 10:15 AM    Anion gap 5 08/29/2017 10:15 AM    Glucose 197 08/29/2017 10:15 AM    BUN 36 08/29/2017 10:15 AM    Creatinine 1.78 08/29/2017 10:15 AM    BUN/Creatinine ratio 20 08/29/2017 10:15 AM    GFR est AA 33 08/29/2017 10:15 AM    GFR est non-AA 27 08/29/2017 10:15 AM    Calcium 8.4 08/29/2017 10:15 AM          Radiology:        Current Facility-Administered Medications   Medication Dose Route Frequency    meclizine (ANTIVERT) tablet 25 mg  25 mg Oral TID PRN    acetaminophen (TYLENOL) tablet 650 mg  650 mg Oral Q6H PRN    ondansetron (ZOFRAN) injection 4 mg  4 mg IntraVENous Q6H PRN    amLODIPine (NORVASC) tablet 5 mg  5 mg Oral DAILY    hydrALAZINE (APRESOLINE) tablet 100 mg  100 mg Oral TID    bumetanide (BUMEX) tablet 1 mg  1 mg Oral DAILY    apixaban (ELIQUIS) tablet 2.5 mg  2.5 mg Oral Q12H    insulin lispro (HUMALOG) injection   SubCUTAneous AC&HS    glucose chewable tablet 16 g  4 Tab Oral PRN    dextrose (D50W) injection syrg 12.5-25 g  12.5-25 g IntraVENous PRN    glucagon (GLUCAGEN) injection 1 mg  1 mg IntraMUSCular PRN    albuterol-ipratropium (DUO-NEB) 2.5 MG-0.5 MG/3 ML  3 mL Nebulization Q6H PRN    LORazepam (ATIVAN) tablet 0.5 mg  0.5 mg Oral Q6H PRN    sodium chloride (NS) flush 5-10 mL  5-10 mL IntraVENous Q8H    sodium chloride (NS) flush 5-10 mL  5-10 mL IntraVENous PRN    atorvastatin (LIPITOR) tablet 40 mg  40 mg Oral QHS    glipiZIDE (GLUCOTROL) tablet 10 mg  10 mg Oral ACB    azelastine (ASTELIN) 137mcg/spray nasal spray  1 Spray Both Nostrils BID    metoprolol tartrate (LOPRESSOR) tablet 100 mg  100 mg Oral BID    aspirin chewable tablet 81 mg  81 mg Oral DAILY    insulin glargine (LANTUS) injection 10 Units  10 Units SubCUTAneous DAILY          Gal Cabrera MD     [facility] [address] [place] [address] [address] [facility] [address]   Lynda Ricks   [phone]

## 2017-08-29 NOTE — DIABETES MGMT
DTC Consult Note    Recommendations/ Comments: BG results are ranging 127 mg/dL - 272 mg/dL. Today , pre lunch 272 mg/dL. Highest BG's are post prandial.  PO intake good; on a cardiac regular diet     If appropriate, please consider  Add carb consistent to curernt diet order  Document po intake    Consult received for:  [x]             Assessment of home management                [x]      Medication Recommendations                  Chart reviewed and initial evaluation complete on [place] Jack. Patient is a 81 y.o. female with known DM on Levemir 10 units at bedtime and Glipizide 10 mg each AM and 5 mg each PM.     BG monitoring at home 3x/day. Assessed and instructed patient on the following:   · interpretation of lab results, A1c 7.7%. She reports is was 7.1%  · blood sugar goals,   · complications of diabetes mellitus,   · hypoglycemia prevention and treatment - she reports hypoglycemia overnight and when she gets up. Reviewed symptoms and treatment. Emphasized  importance of reproting hypoglycemia to her MD so that medication can be adjusted. · illness  - effects of illness on BG   · SMBG skills - she has a working meter and supplies  · nutrition - she avoids sweet beverages and foods  · referred to Diabetes Educator     Encouraged the following:   · Follow up with Dr. Sharon Roberts  · Report persistent hypoglycemia to MD    Provided patient with the following: [x]             Survival skills education materials               []             Insulin education materials               []             CHO counting education materials               [x]             Outpatient DTC contact number               []             Glucometer               Discussed with patient and/or family need for follow up appointment for diabetes management after discharge.       A1c:   Lab Results   Component Value Date/Time    Hemoglobin A1c 8.1 06/09/2011 08:56 AM       Recent Glucose Results: Lab Results   Component Value Date/Time     (H) 08/29/2017 10:15 AM    GLUCPOC 262 (H) 08/29/2017 11:05 AM    GLUCPOC 173 (H) 08/29/2017 06:32 AM    GLUCPOC 190 (H) 08/28/2017 09:22 PM        Lab Results   Component Value Date/Time    Creatinine 1.78 08/29/2017 10:15 AM     Estimated Creatinine Clearance: 24.7 mL/min (based on Cr of 1.78). Active Orders   Diet    DIET CARDIAC Regular        PO intake: Patient Vitals for the past 72 hrs:   % Diet Eaten   08/29/17 0852 100 %   08/27/17 1421 100 %   08/27/17 0800 60 %   08/26/17 1733 100 %       Current hospital DM medication: Lantus 10 units each AM,Glipizide 10 mg each AM and lispro normal sensitivity correction scale    Will continue to follow as needed. Thank you.   Marianna Guzman RN, CDE

## 2017-08-29 NOTE — PROGRESS NOTES
Bedside shift change report given to Silverio Lozoya RN (oncoming nurse) by Mana Medina RN (offgoing nurse). Report included the following information SBAR, Kardex, Intake/Output, Recent Results and Cardiac Rhythm SB/NSR.

## 2017-08-30 ENCOUNTER — HOME HEALTH ADMISSION (OUTPATIENT)
Dept: HOME HEALTH SERVICES | Facility: HOME HEALTH | Age: 82
End: 2017-08-30
Payer: MEDICARE

## 2017-08-30 LAB
ANION GAP SERPL CALC-SCNC: 10 MMOL/L (ref 5–15)
BUN SERPL-MCNC: 35 MG/DL (ref 6–20)
BUN/CREAT SERPL: 21 (ref 12–20)
CALCIUM SERPL-MCNC: 8.7 MG/DL (ref 8.5–10.1)
CHLORIDE SERPL-SCNC: 93 MMOL/L (ref 97–108)
CO2 SERPL-SCNC: 27 MMOL/L (ref 21–32)
CREAT SERPL-MCNC: 1.65 MG/DL (ref 0.55–1.02)
ERYTHROCYTE [DISTWIDTH] IN BLOOD BY AUTOMATED COUNT: 16.1 % (ref 11.5–14.5)
GLUCOSE BLD STRIP.AUTO-MCNC: 132 MG/DL (ref 65–100)
GLUCOSE BLD STRIP.AUTO-MCNC: 186 MG/DL (ref 65–100)
GLUCOSE BLD STRIP.AUTO-MCNC: 203 MG/DL (ref 65–100)
GLUCOSE BLD STRIP.AUTO-MCNC: 258 MG/DL (ref 65–100)
GLUCOSE SERPL-MCNC: 90 MG/DL (ref 65–100)
HCT VFR BLD AUTO: 31.8 % (ref 35–47)
HGB BLD-MCNC: 10.4 G/DL (ref 11.5–16)
MCH RBC QN AUTO: 25.9 PG (ref 26–34)
MCHC RBC AUTO-ENTMCNC: 32.7 G/DL (ref 30–36.5)
MCV RBC AUTO: 79.1 FL (ref 80–99)
PLATELET # BLD AUTO: 292 K/UL (ref 150–400)
POTASSIUM SERPL-SCNC: 4.2 MMOL/L (ref 3.5–5.1)
RBC # BLD AUTO: 4.02 M/UL (ref 3.8–5.2)
SERVICE CMNT-IMP: ABNORMAL
SODIUM SERPL-SCNC: 130 MMOL/L (ref 136–145)
WBC # BLD AUTO: 9.5 K/UL (ref 3.6–11)

## 2017-08-30 PROCEDURE — 80048 BASIC METABOLIC PNL TOTAL CA: CPT | Performed by: SPECIALIST

## 2017-08-30 PROCEDURE — 97116 GAIT TRAINING THERAPY: CPT

## 2017-08-30 PROCEDURE — 36415 COLL VENOUS BLD VENIPUNCTURE: CPT | Performed by: SPECIALIST

## 2017-08-30 PROCEDURE — 82962 GLUCOSE BLOOD TEST: CPT

## 2017-08-30 PROCEDURE — 74011250637 HC RX REV CODE- 250/637: Performed by: SPECIALIST

## 2017-08-30 PROCEDURE — 74011250637 HC RX REV CODE- 250/637: Performed by: INTERNAL MEDICINE

## 2017-08-30 PROCEDURE — 74011636637 HC RX REV CODE- 636/637: Performed by: PHYSICIAN ASSISTANT

## 2017-08-30 PROCEDURE — 85027 COMPLETE CBC AUTOMATED: CPT | Performed by: SPECIALIST

## 2017-08-30 PROCEDURE — 65660000000 HC RM CCU STEPDOWN

## 2017-08-30 PROCEDURE — 74011250636 HC RX REV CODE- 250/636: Performed by: PHYSICIAN ASSISTANT

## 2017-08-30 PROCEDURE — 74011250637 HC RX REV CODE- 250/637: Performed by: PHYSICIAN ASSISTANT

## 2017-08-30 RX ADMIN — APIXABAN 2.5 MG: 2.5 TABLET, FILM COATED ORAL at 21:40

## 2017-08-30 RX ADMIN — Medication 10 ML: at 21:40

## 2017-08-30 RX ADMIN — ASPIRIN 81 MG 81 MG: 81 TABLET ORAL at 08:47

## 2017-08-30 RX ADMIN — BUMETANIDE 1 MG: 1 TABLET ORAL at 08:46

## 2017-08-30 RX ADMIN — HYDRALAZINE HYDROCHLORIDE 100 MG: 50 TABLET ORAL at 16:01

## 2017-08-30 RX ADMIN — Medication 10 ML: at 06:09

## 2017-08-30 RX ADMIN — ATORVASTATIN CALCIUM 40 MG: 40 TABLET, FILM COATED ORAL at 21:40

## 2017-08-30 RX ADMIN — INSULIN GLARGINE 10 UNITS: 100 INJECTION, SOLUTION SUBCUTANEOUS at 08:46

## 2017-08-30 RX ADMIN — INSULIN LISPRO 2 UNITS: 100 INJECTION, SOLUTION INTRAVENOUS; SUBCUTANEOUS at 16:01

## 2017-08-30 RX ADMIN — GLIPIZIDE 10 MG: 5 TABLET ORAL at 06:39

## 2017-08-30 RX ADMIN — MECLIZINE 25 MG: 12.5 TABLET ORAL at 21:47

## 2017-08-30 RX ADMIN — APIXABAN 2.5 MG: 2.5 TABLET, FILM COATED ORAL at 09:20

## 2017-08-30 RX ADMIN — METOPROLOL TARTRATE 100 MG: 50 TABLET ORAL at 17:07

## 2017-08-30 RX ADMIN — INSULIN LISPRO 5 UNITS: 100 INJECTION, SOLUTION INTRAVENOUS; SUBCUTANEOUS at 12:02

## 2017-08-30 RX ADMIN — MECLIZINE 25 MG: 12.5 TABLET ORAL at 08:51

## 2017-08-30 RX ADMIN — HYDRALAZINE HYDROCHLORIDE 100 MG: 50 TABLET ORAL at 08:47

## 2017-08-30 RX ADMIN — INSULIN LISPRO 2 UNITS: 100 INJECTION, SOLUTION INTRAVENOUS; SUBCUTANEOUS at 21:39

## 2017-08-30 RX ADMIN — METOPROLOL TARTRATE 100 MG: 50 TABLET ORAL at 08:51

## 2017-08-30 RX ADMIN — AMLODIPINE BESYLATE 5 MG: 5 TABLET ORAL at 08:47

## 2017-08-30 RX ADMIN — HYDRALAZINE HYDROCHLORIDE 100 MG: 50 TABLET ORAL at 21:39

## 2017-08-30 RX ADMIN — MECLIZINE 25 MG: 12.5 TABLET ORAL at 16:07

## 2017-08-30 NOTE — PROGRESS NOTES
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 Cr stable, Na now 130. On meclizine and dizziness improving. No cp or sob. Edema and  Sob better today. Current Medications: all current  Medications have been eviewed in EPIC  Review of Systems: Pertinent items are noted in HPI.     Objective:  Vitals:    Vitals:    08/30/17 0030 08/30/17 0400 08/30/17 0610 08/30/17 0738   BP:  173/43  137/52   Pulse: (!) 54 (!) 55  (!) 53   Resp:  14  18   Temp:  97.3 °F (36.3 °C)  97.8 °F (36.6 °C)   SpO2:  98%  96%   Weight:   79 kg (174 lb 2.6 oz)    Height:         Intake and Output:  08/30 0701 - 08/30 1900  In: 240 [P.O.:240]  Out: 500 [Urine:500]  08/28 1901 - 08/30 0700  In: 1060 [P.O.:1060]  Out: 2400 [Urine:2400]    Physical Examination:  General: NAD,Conversant   Neck:  Supple, no mass  Resp:  Lungs CTA B/L, no wheezing , normal respiratory effort  CV:  RRR,  no murmur or rub, trace LE edema  GI:  Soft, NT, + Bowel sounds, no hepatosplenomegaly  Neurologic:  Non focal  Psych:             AAO x 3 appropriate affect   Skin:  No Rash      []    High complexity decision making was performed  []    Patient is at high-risk of decompensation with multiple organ involvement    Lab Data Personally Reviewed: I have reviewed all the pertinent labs, microbiology data and radiology studies during assessment.     Recent Labs      08/30/17   0420  08/29/17   1015  08/28/17   1225  08/28/17   0440   NA  130*  130*  127*  126*   K  4.2  4.3   --   4.2   CL  93*  93*   --   91*   CO2  27  32   --   26   GLU  90  197*   --   114*   BUN  35*  36*   --   38*   CREA  1.65*  1.78*   --   1.84*   CA  8.7  8.4*   --   8.3*   PHOS   --   3.1   --   4.0   ALB   --   3.2*   --   2.8*     Recent Labs      08/30/17   0420   WBC  9.5   HGB  10.4*   HCT  31.8*   PLT  292     No results found for: SDES  No results found for: CULT  Recent Results (from the past 24 hour(s))   GLUCOSE, POC    Collection Time: 08/29/17 11:05 AM   Result Value Ref Range    Glucose (POC) 262 (H) 65 - 100 mg/dL    Performed by Paty Cantrell  PCT    GLUCOSE, POC    Collection Time: 08/29/17  4:23 PM   Result Value Ref Range    Glucose (POC) 174 (H) 65 - 100 mg/dL    Performed by Evelyn Yogurtistanslow  Float    GLUCOSE, POC    Collection Time: 08/29/17  9:27 PM   Result Value Ref Range    Glucose (POC) 148 (H) 65 - 100 mg/dL    Performed by Evelyn Breslow  Float    CBC W/O DIFF    Collection Time: 08/30/17  4:20 AM   Result Value Ref Range    WBC 9.5 3.6 - 11.0 K/uL    RBC 4.02 3.80 - 5.20 M/uL    HGB 10.4 (L) 11.5 - 16.0 g/dL    HCT 31.8 (L) 35.0 - 47.0 %    MCV 79.1 (L) 80.0 - 99.0 FL    MCH 25.9 (L) 26.0 - 34.0 PG    MCHC 32.7 30.0 - 36.5 g/dL    RDW 16.1 (H) 11.5 - 14.5 %    PLATELET 946 851 - 921 K/uL   METABOLIC PANEL, BASIC    Collection Time: 08/30/17  4:20 AM   Result Value Ref Range    Sodium 130 (L) 136 - 145 mmol/L    Potassium 4.2 3.5 - 5.1 mmol/L    Chloride 93 (L) 97 - 108 mmol/L    CO2 27 21 - 32 mmol/L    Anion gap 10 5 - 15 mmol/L    Glucose 90 65 - 100 mg/dL    BUN 35 (H) 6 - 20 MG/DL    Creatinine 1.65 (H) 0.55 - 1.02 MG/DL    BUN/Creatinine ratio 21 (H) 12 - 20      GFR est AA 36 (L) >60 ml/min/1.73m2    GFR est non-AA 30 (L) >60 ml/min/1.73m2    Calcium 8.7 8.5 - 10.1 MG/DL   GLUCOSE, POC Collection Time: 08/30/17  6:19 AM   Result Value Ref Range    Glucose (POC) 132 (H) 65 - 100 mg/dL    Performed by Mariela Pereira, MD  [address]  Phone - [phone]   Fax - [phone]  [email or web address]

## 2017-08-30 NOTE — PROGRESS NOTES
Bedside, Verbal and Written shift change report given to Gabrielle (oncoming nurse) by Alistair Yung (offgoing nurse). Report included the following information SBAR, Kardex, MAR and Recent Results.

## 2017-08-30 NOTE — PROGRESS NOTES
NUTRITION- DIETETIC tECHnICIAN    Pt seen for:       [x]                  Rescreen  []                  Food preferences/tolerances  []                  Food Allergies  []                  PO intake check  []                  Supplements  []                  Diet order clarification  []                  Education  []                  Other     Rescreen:    [x]                  Not at Nutrition Risk, rescreen per screening protocol  []                  At Nutrition Risk- RD referral         SUBJECTIVE/OBJECTIVE:     Information obtained from:  patient      Diet:  Regular Cardiac Consistent Carbohydrate 1800 Kcal    Intake: excellent    Patient Vitals for the past 100 hrs:   % Diet Eaten   08/29/17 1745 50 %   08/29/17 1220 75 %   08/29/17 0852 100 %   08/27/17 1421 100 %   08/27/17 0800 60 %   08/26/17 1733 100 %   08/26/17 1221 100 %       Weight Changes: Wt Readings from Last 3 Encounters:   08/30/17 79 kg (174 lb 2.6 oz)   08/23/17 80.3 kg (177 lb)   08/19/17 77.1 kg (170 lb)       Problems Identified:      [x]                  Eating well. Discussed general healthy eating practices and menu planning.    []                  Specified food preferences   []                  Dislikes supplements              []                  Allergies:   []                  Difficulty chewing      []                  Dentition    []                  Nausea/Vomiting   []                  Constipation   []                  Diarrhea    PLAN:     [x]                   Continue current diet and encourage intake  []                   Obtained/adjusted food preferences/tolerances and/or snacks options   []                   Dislikes supplements will try a substitution  []                   Modify diet for food allergies  []                   Adjust texture due to difficulty chewing   []                   Educated patient  []                   RD Referral  [x]                   Rescreen per screening protocol          Roscoe Carlin Layla Ambrocio

## 2017-08-30 NOTE — PROGRESS NOTES
Cardiology Progress Note            Admit Date: 8/23/2017  Admit Diagnosis: SOB (shortness of breath)  Date: 8/30/2017     Time: 8:35 AM    Subjective:  Denies CP. Breathing improved. Dizziness improved with home Meclizine. Assessment and Plan     1. SOB   - CTA without ILD from Amiodarone, no PTE. Emphysema/COPD changes noted as well as blebs. B/L effusions.   - Amiodarone stopped indefinitely   - Jet Nebs PRN   - Echo EF 55-60%, Mild LA dilation, RA size normal.  RV size normal.  Mild MR, TR.  2. Hyponatremia   - Na 130 and slowly improving   - Fluid restrict    - daily labs   - SIADH + volume   - HCTZ and Bactrim stopped. Bumex 1 mg PO  3. Cr elevated   - 1.78  4. HTN   - Better after addition of norvasc. Metoprolol at 100 and Hydralazine. 5. AF   - NSR currently   - Eliquis and Metoprolol    SOB better, as is Na. Meclizine improved dizziness. D/w Dr. King Montero, stable from Nephrology standpoint for discharge. He asks to continue Bumex as is and stay off HCTZ. Keep of Amiodarone indefinitely. Will prepare for discharge planning either later today or tomorrow am.  [facility] consulted and will need at least home PT as she is debilitated. I have seen and examined the patient and agree with PA assessment. Overall doing better  Creatinine better and stable and so is the sodium  Dizziness improved continue meclizine  Home hopefully tomorrow          ]ROS:  A comprehensive review of systems was negative except for that written in the HPI. Objective:      Physical Exam:                Visit Vitals    /45 (BP 1 Location: Right arm, BP Patient Position: Sitting)    Pulse (!) 57    Temp 98.4 °F (36.9 °C)    Resp 16    Ht 5' 3\" (1.6 m)    Wt 79 kg (174 lb 2.6 oz)    SpO2 98%    Breastfeeding No    BMI 30.85 kg/m2        General Appearance:   no acute distress.    Ears/Nose/Mouth/Throat:    Hearing grossly normal.         Neck: Supple. Chest:    Lungs decreased BS bases   bilaterally. Cardiovascular:   Regular rate and rhythm, S1, S2 normal, no murmur. Abdomen:    Soft, non-tender, bowel sounds are active. Extremities:  No edema bilaterally. Skin:  Warm and dry.      Telemetry: normal sinus rhythm          Data Review:    Labs:    Recent Results (from the past 24 hour(s))   GLUCOSE, POC    Collection Time: 08/29/17  4:23 PM   Result Value Ref Range    Glucose (POC) 174 (H) 65 - 100 mg/dL    Performed by Vale Arevalo    GLUCOSE, POC    Collection Time: 08/29/17  9:27 PM   Result Value Ref Range    Glucose (POC) 148 (H) 65 - 100 mg/dL    Performed by Vale Arevalo    CBC W/O DIFF    Collection Time: 08/30/17  4:20 AM   Result Value Ref Range    WBC 9.5 3.6 - 11.0 K/uL    RBC 4.02 3.80 - 5.20 M/uL    HGB 10.4 (L) 11.5 - 16.0 g/dL    HCT 31.8 (L) 35.0 - 47.0 %    MCV 79.1 (L) 80.0 - 99.0 FL    MCH 25.9 (L) 26.0 - 34.0 PG    MCHC 32.7 30.0 - 36.5 g/dL    RDW 16.1 (H) 11.5 - 14.5 %    PLATELET 326 512 - 454 K/uL   METABOLIC PANEL, BASIC    Collection Time: 08/30/17  4:20 AM   Result Value Ref Range    Sodium 130 (L) 136 - 145 mmol/L    Potassium 4.2 3.5 - 5.1 mmol/L    Chloride 93 (L) 97 - 108 mmol/L    CO2 27 21 - 32 mmol/L    Anion gap 10 5 - 15 mmol/L    Glucose 90 65 - 100 mg/dL    BUN 35 (H) 6 - 20 MG/DL    Creatinine 1.65 (H) 0.55 - 1.02 MG/DL    BUN/Creatinine ratio 21 (H) 12 - 20      GFR est AA 36 (L) >60 ml/min/1.73m2    GFR est non-AA 30 (L) >60 ml/min/1.73m2    Calcium 8.7 8.5 - 10.1 MG/DL   GLUCOSE, POC    Collection Time: 08/30/17  6:19 AM   Result Value Ref Range    Glucose (POC) 132 (H) 65 - 100 mg/dL    Performed by Earley Dakins    GLUCOSE, POC    Collection Time: 08/30/17 11:55 AM   Result Value Ref Range    Glucose (POC) 258 (H) 65 - 100 mg/dL    Performed by Juliane Kayser      Lab Results   Component Value Date/Time    Sodium 130 08/30/2017 04:20 AM    Potassium 4.2 08/30/2017 04:20 AM Chloride 93 08/30/2017 04:20 AM    CO2 27 08/30/2017 04:20 AM    Anion gap 10 08/30/2017 04:20 AM    Glucose 90 08/30/2017 04:20 AM    BUN 35 08/30/2017 04:20 AM    Creatinine 1.65 08/30/2017 04:20 AM    BUN/Creatinine ratio 21 08/30/2017 04:20 AM    GFR est AA 36 08/30/2017 04:20 AM    GFR est non-AA 30 08/30/2017 04:20 AM    Calcium 8.7 08/30/2017 04:20 AM          Radiology:        Current Facility-Administered Medications   Medication Dose Route Frequency    meclizine (ANTIVERT) tablet 25 mg  25 mg Oral TID PRN    acetaminophen (TYLENOL) tablet 650 mg  650 mg Oral Q6H PRN    ondansetron (ZOFRAN) injection 4 mg  4 mg IntraVENous Q6H PRN    amLODIPine (NORVASC) tablet 5 mg  5 mg Oral DAILY    hydrALAZINE (APRESOLINE) tablet 100 mg  100 mg Oral TID    bumetanide (BUMEX) tablet 1 mg  1 mg Oral DAILY    apixaban (ELIQUIS) tablet 2.5 mg  2.5 mg Oral Q12H    insulin lispro (HUMALOG) injection   SubCUTAneous AC&HS    glucose chewable tablet 16 g  4 Tab Oral PRN    dextrose (D50W) injection syrg 12.5-25 g  12.5-25 g IntraVENous PRN    glucagon (GLUCAGEN) injection 1 mg  1 mg IntraMUSCular PRN    albuterol-ipratropium (DUO-NEB) 2.5 MG-0.5 MG/3 ML  3 mL Nebulization Q6H PRN    LORazepam (ATIVAN) tablet 0.5 mg  0.5 mg Oral Q6H PRN    sodium chloride (NS) flush 5-10 mL  5-10 mL IntraVENous Q8H    sodium chloride (NS) flush 5-10 mL  5-10 mL IntraVENous PRN    atorvastatin (LIPITOR) tablet 40 mg  40 mg Oral QHS    glipiZIDE (GLUCOTROL) tablet 10 mg  10 mg Oral ACB    azelastine (ASTELIN) 137mcg/spray nasal spray  1 Spray Both Nostrils BID    metoprolol tartrate (LOPRESSOR) tablet 100 mg  100 mg Oral BID    aspirin chewable tablet 81 mg  81 mg Oral DAILY    insulin glargine (LANTUS) injection 10 Units  10 Units SubCUTAneous DAILY          Gal Cabrera MD     [facility] [address] [address]   [place]   [phone]

## 2017-08-30 NOTE — PROGRESS NOTES
Problem: Falls - Risk of  Goal: *Absence of Falls  Document Iain Fall Risk and appropriate interventions in the flowsheet.    Outcome: Progressing Towards Goal  Fall Risk Interventions:  Mobility Interventions: Communicate number of staff needed for ambulation/transfer     Mentation Interventions: Adequate sleep, hydration, pain control, Door open when patient unattended, Increase mobility, More frequent rounding     Medication Interventions: Evaluate medications/consider consulting pharmacy     Elimination Interventions: Bed/chair exit alarm, Call light in reach, Elevated toilet seat, Patient to call for help with toileting needs, Toileting schedule/hourly rounds

## 2017-08-30 NOTE — PROGRESS NOTES
Problem: Mobility Impaired (Adult and Pediatric)  Goal: *Acute Goals and Plan of Care (Insert Text)  Physical Therapy Goals  Initiated 8/29/2017  1. Patient will move from supine to sit and sit to supine , scoot up and down and roll side to side in bed with modified independence within 7 day(s). 2. Patient will transfer from bed to chair and chair to bed with modified independence using the least restrictive device within 7 day(s). 3. Patient will perform sit to stand with modified independence within 7 day(s). 4. Patient will ambulate with modified independence for 250 feet with the least restrictive device within 7 day(s). 5. Patient will ascend/descend 3 stairs with 2 handrail(s) with modified independence within 7 day(s). PHYSICAL THERAPY TREATMENT  Patient: Jose Alfredo Cooley (71 y.o. female)  Date: 8/30/2017  Diagnosis: SOB (shortness of breath) <principal problem not specified>  Precautions: Fall      ASSESSMENT:  Rolling walker trial conducted today. Noted profoundly improved gait steadiness with constant UE support on rolling walker. Overall, pt required contact guard assist (initially for ~20 ft), but was then able to progress to close supervision. Prior to gait training, instructed pt on distance to maintain between her trunk and rolling walker; observed excellent carryover following. Pt was able to progress her gait tolerance to 150 ft (no rest break). All vital signs stable, and pt denied dyspnea and shortness of breath. Continue to recommend HHPT following hospital discharge. However, at this time, pt is safe to return home with her daughter's assistance, with constant use of her rolling walker (pt acknowledged), and with a HHPT follow-up.       Progression toward goals:  [X]    Improving appropriately and progressing toward goals  [ ]    Improving slowly and progressing toward goals  [ ]    Not making progress toward goals and plan of care will be adjusted       PLAN:  Patient continues to benefit from skilled intervention to address the above impairments. Continue treatment per established plan of care. Discharge Recommendations:  Home Health  Further Equipment Recommendations for Discharge:  Pt owns a rolling walker and understands to use it at home instead of her cane. SUBJECTIVE:   Patient stated I'm just ready to get home.       OBJECTIVE DATA SUMMARY:   Critical Behavior:  Neurologic State: Alert  Orientation Level: Oriented X4  Cognition: Appropriate decision making, Appropriate for age attention/concentration, Appropriate safety awareness, Follows commands     Functional Mobility Training:  Transfers:  Sit to Stand: Supervision  Stand to Sit: Supervision  Balance:  Sitting: Intact  Standing: Intact; With support (RW)  Ambulation/Gait Training:  Distance (ft): 150 Feet (ft)  Assistive Device: Gait belt;Walker, rolling  Ambulation - Level of Assistance: Contact guard assistance;Supervision (CGA >> Supervision)  Base of Support: Widened;Shift to left  Speed/Teri: Slow;Shuffled  Step Length: Right shortened;Left shortened     Pain:  Pain Scale 1: Numeric (0 - 10)  Pain Intensity 1: 0     Activity Tolerance:   Please refer to the flowsheet for vital signs taken during this treatment.   After treatment:   [X]    Patient left in no apparent distress sitting up in chair  [ ]    Patient left in no apparent distress in bed  [X]    Call bell left within reach  [X]    Nursing notified  [ ]    Caregiver present  [ ]    Bed alarm activated      COMMUNICATION/COLLABORATION:   The patients plan of care was discussed with: Registered Nurse and      Jay Jay Argueta PT, DPT   Time Calculation: 13 mins

## 2017-08-30 NOTE — PROGRESS NOTES
Consult for Home health safety eval noted. CM met with patient to discuss choice and patient has chosen [facility]. Referral made through [facility]. [place] of choice letter signed.  Sony Sandy RN CRM

## 2017-08-31 VITALS
WEIGHT: 177 LBS | BODY MASS INDEX: 31.36 KG/M2 | TEMPERATURE: 97.6 F | DIASTOLIC BLOOD PRESSURE: 32 MMHG | HEART RATE: 57 BPM | RESPIRATION RATE: 18 BRPM | OXYGEN SATURATION: 100 % | HEIGHT: 63 IN | SYSTOLIC BLOOD PRESSURE: 135 MMHG

## 2017-08-31 LAB
GLUCOSE BLD STRIP.AUTO-MCNC: 112 MG/DL (ref 65–100)
GLUCOSE BLD STRIP.AUTO-MCNC: 289 MG/DL (ref 65–100)
SERVICE CMNT-IMP: ABNORMAL
SERVICE CMNT-IMP: ABNORMAL

## 2017-08-31 PROCEDURE — 74011636637 HC RX REV CODE- 636/637: Performed by: PHYSICIAN ASSISTANT

## 2017-08-31 PROCEDURE — 74011250637 HC RX REV CODE- 250/637: Performed by: PHYSICIAN ASSISTANT

## 2017-08-31 PROCEDURE — 74011250636 HC RX REV CODE- 250/636: Performed by: PHYSICIAN ASSISTANT

## 2017-08-31 PROCEDURE — 74011250637 HC RX REV CODE- 250/637: Performed by: INTERNAL MEDICINE

## 2017-08-31 PROCEDURE — 74011250637 HC RX REV CODE- 250/637: Performed by: SPECIALIST

## 2017-08-31 PROCEDURE — 82962 GLUCOSE BLOOD TEST: CPT

## 2017-08-31 RX ORDER — BUMETANIDE 1 MG/1
1 TABLET ORAL DAILY
Qty: 30 TAB | Refills: 3 | Status: SHIPPED | OUTPATIENT
Start: 2017-08-31 | End: 2017-09-13 | Stop reason: SDUPTHER

## 2017-08-31 RX ORDER — AMLODIPINE BESYLATE 5 MG/1
5 TABLET ORAL DAILY
Qty: 30 TAB | Refills: 3 | Status: SHIPPED | OUTPATIENT
Start: 2017-08-31 | End: 2017-11-03 | Stop reason: SDUPTHER

## 2017-08-31 RX ADMIN — MECLIZINE 25 MG: 12.5 TABLET ORAL at 10:37

## 2017-08-31 RX ADMIN — AZELASTINE HYDROCHLORIDE 1 SPRAY: 137 SPRAY, METERED NASAL at 10:39

## 2017-08-31 RX ADMIN — GLIPIZIDE 10 MG: 5 TABLET ORAL at 09:36

## 2017-08-31 RX ADMIN — APIXABAN 2.5 MG: 2.5 TABLET, FILM COATED ORAL at 09:36

## 2017-08-31 RX ADMIN — ASPIRIN 81 MG 81 MG: 81 TABLET ORAL at 09:36

## 2017-08-31 RX ADMIN — HYDRALAZINE HYDROCHLORIDE 100 MG: 50 TABLET ORAL at 09:35

## 2017-08-31 RX ADMIN — BUMETANIDE 1 MG: 1 TABLET ORAL at 09:36

## 2017-08-31 RX ADMIN — INSULIN GLARGINE 10 UNITS: 100 INJECTION, SOLUTION SUBCUTANEOUS at 09:37

## 2017-08-31 RX ADMIN — Medication 10 ML: at 06:00

## 2017-08-31 RX ADMIN — METOPROLOL TARTRATE 100 MG: 50 TABLET ORAL at 09:36

## 2017-08-31 RX ADMIN — AMLODIPINE BESYLATE 5 MG: 5 TABLET ORAL at 09:37

## 2017-08-31 NOTE — PROGRESS NOTES
Nephrology Progress Note  Margo Samson  Date of Admission : 8/23/2017    CC: Follow up for DESTINI and hyponatremia       Assessment and Plan     Mild DESTINI on CKD:  - Cr stabilized, now 1.6  - ok to d/c on current dose of bumex  - will follow up in 2-4 weeks post d/c    CKD III:  - baseline Cr around 1.2 to 1.3  - likely from chronic HTN    Hyponatremia:  - likely SIADH + volume overload  - no further thiazides should be given in the future    Hypervolemia:  - edema and pleural effusions  - preserved EF  - cont bumex    HTN:  - cont current BP meds    DM2:  - on insulin    COPD     PAF:  - on eliquis       Interval History:  Seen and examined. Feeling better. For d/c later today. No cp or sob, n/v/d or dizziness. Current Medications: all current  Medications have been eviewed in EPIC  Review of Systems: Pertinent items are noted in HPI. Objective:  Vitals:    Vitals:    08/30/17 2307 08/31/17 0311 08/31/17 0615 08/31/17 0812   BP: (!) 137/39 145/44  190/49   Pulse: (!) 55 (!) 55  (!) 55   Resp: 18 16  18   Temp: 97.7 °F (36.5 °C) 98.2 °F (36.8 °C)  98 °F (36.7 °C)   SpO2: 92% 97%  95%   Weight:   80.3 kg (177 lb)    Height:         Intake and Output:     08/29 1901 - 08/31 0700  In: 940 [P.O.:940]  Out: 1850 [Urine:1850]    Physical Examination:  General: NAD,Conversant   Neck:  Supple, no mass  Resp:  Lungs CTA B/L, no wheezing , normal respiratory effort  CV:  RRR,  no murmur or rub, trace LE edema  GI:  Soft, NT, + Bowel sounds, no hepatosplenomegaly  Neurologic:  Non focal  Psych:             AAO x 3 appropriate affect   Skin:  No Rash      []    High complexity decision making was performed  []    Patient is at high-risk of decompensation with multiple organ involvement    Lab Data Personally Reviewed: I have reviewed all the pertinent labs, microbiology data and radiology studies during assessment.     Recent Labs      08/30/17   0420  08/29/17   1015  08/28/17   1225   NA  130*  130*  127*   K  4.2 4.3   --    CL  93*  93*   --    CO2  27  32   --    GLU  90  197*   --    BUN  35*  36*   --    CREA  1.65*  1.78*   --    CA  8.7  8.4*   --    PHOS   --   3.1   --    ALB   --   3.2*   --      Recent Labs      08/30/17   0420   WBC  9.5   HGB  10.4*   HCT  31.8*   PLT  292     No results found for: SDES  No results found for: CULT  Recent Results (from the past 24 hour(s))   GLUCOSE, POC    Collection Time: 08/30/17 11:55 AM   Result Value Ref Range    Glucose (POC) 258 (H) 65 - 100 mg/dL    Performed by [id number], POC    Collection Time: 08/30/17  3:57 PM   Result Value Ref Range    Glucose (POC) 186 (H) 65 - 100 mg/dL    Performed by Pernell Malik    GLUCOSE, POC    Collection Time: 08/30/17  9:20 PM   Result Value Ref Range    Glucose (POC) 203 (H) 65 - 100 mg/dL    Performed by Isai Magallanes    GLUCOSE, POC    Collection Time: 08/31/17  8:11 AM   Result Value Ref Range    Glucose (POC) 112 (H) 65 - 100 mg/dL    Performed by Aaron Higginbotham  [facility]                Renata Vale MD  [address]  Phone - [phone]   Fax - [phone]  [email or web address]

## 2017-08-31 NOTE — PROGRESS NOTES
1330: I have reviewed discharge instructions with the patient. The patient verbalized understanding. Tele box and IV discontinued. DISCHARGE SUMMARY from Nurse    The following personal items are in your possession at time of discharge:    Dental Appliances: None  Visual Aid: Glasses, With patient     Home Medications: None  Jewelry: None  Clothing: Footwear, Shirt, Pants, Undergarments  Other Valuables: Purse  Personal Items Sent to Safe: none          PATIENT INSTRUCTIONS:    After general anesthesia or intravenous sedation, for 24 hours or while taking prescription Narcotics:  · Limit your activities  · Do not drive and operate hazardous machinery  · Do not make important personal or business decisions  · Do  not drink alcoholic beverages  · If you have not urinated within 8 hours after discharge, please contact your surgeon on call. Report the following to your surgeon:  · Excessive pain, swelling, redness or odor of or around the surgical area  · Temperature over 100.5  · Nausea and vomiting lasting longer than 4 hours or if unable to take medications  · Any signs of decreased circulation or nerve impairment to extremity: change in color, persistent  numbness, tingling, coldness or increase pain  · Any questions        What to do at Home:  Recommended activity: Activity as tolerated, see discharge instructions    If you experience any of the following symptoms (see discharge instructions), please follow up with (see discharge instructions). *  Please give a list of your current medications to your Primary Care Provider. *  Please update this list whenever your medications are discontinued, doses are      changed, or new medications (including over-the-counter products) are added. *  Please carry medication information at all times in case of emergency situations.           These are general instructions for a healthy lifestyle:    No smoking/ No tobacco products/ Avoid exposure to second hand smoke    Surgeon General's Warning:  Quitting smoking now greatly reduces serious risk to your health. Obesity, smoking, and sedentary lifestyle greatly increases your risk for illness    A healthy diet, regular physical exercise & weight monitoring are important for maintaining a healthy lifestyle    You may be retaining fluid if you have a history of heart failure or if you experience any of the following symptoms:  Weight gain of 3 pounds or more overnight or 5 pounds in a week, increased swelling in our hands or feet or shortness of breath while lying flat in bed. Please call your doctor as soon as you notice any of these symptoms; do not wait until your next office visit. Recognize signs and symptoms of STROKE:    F-face looks uneven    A-arms unable to move or move unevenly    S-speech slurred or non-existent    T-time-call 911 as soon as signs and symptoms begin-DO NOT go       Back to bed or wait to see if you get better-TIME IS BRAIN. Warning Signs of HEART ATTACK     Call 911 if you have these symptoms:   Chest discomfort. Most heart attacks involve discomfort in the center of the chest that lasts more than a few minutes, or that goes away and comes back. It can feel like uncomfortable pressure, squeezing, fullness, or pain.  Discomfort in other areas of the upper body. Symptoms can include pain or discomfort in one or both arms, the back, neck, jaw, or stomach.  Shortness of breath with or without chest discomfort.  Other signs may include breaking out in a cold sweat, nausea, or lightheadedness. Don't wait more than five minutes to call [address]! Fast action can save your life. Calling 911 is almost always the fastest way to get lifesaving treatment. Emergency Medical Services staff can begin treatment when they arrive  up to an hour sooner than if someone gets to the hospital by car. The discharge information has been reviewed with the patient.   The patient verbalized understanding. Discharge medications reviewed with the patient and appropriate educational materials and side effects teaching were provided.

## 2017-08-31 NOTE — DISCHARGE SUMMARY
Cardiology Discharge Summary     Patient ID:  Willow Benítez  [id number]  32 y.o.  1935    Admit Date: 8/23/2017    Discharge Date: 08/31/17    Admitting Physician: Sandra Hinojosa MD     Discharge Physician: same    Admission Diagnoses:   SOB (shortness of breath)    Discharge Diagnoses: Active Problems:    SOB (shortness of breath) (8/23/2017)      Discharge Condition: Good    Cardiology Procedures this Admission:  none    Consults: Pulmonary/Intensive care and Nephrology    Hospital Course:   Admitted with SOB. Amiodarone stopped with concerns for toxicity. Also noted to hyponatremic. Nephrology consulted and felt low Na related to HCTZ and Bactrim use - both stopped indefinitely   - Bumex 1 mg PO daily started and continued through discharge   - Na improve to 130 before discharge   - Patient to follow up with Nephrology as needed. Pulmonary consulted and felt no Amiodarone toxicity, SOB related to COPD and volume   - SOB improved as Na improved and Bumex was used. CTA of chest without toxicity changes or PTE. Patient was stable and improved for discharge. I have seen and examined the patient and agree with PA. Assessment. Sob at baseline, continue off amiodarone  She remains in NSR  No cp and dizziness improved  BP elevated this am but was acceptable up to the last reading  Re check before dc and close monitoring norvasc just re started (hopefully no significant le edema)  Dc home today with close follow up         Visit Vitals    /49 (BP 1 Location: Right arm, BP Patient Position: At rest)    Pulse (!) 55    Temp 98 °F (36.7 °C)    Resp 18    Ht 5' 3\" (1.6 m)    Wt 80.3 kg (177 lb)    SpO2 95%    Breastfeeding No    BMI 31.35 kg/m2       Physical Exam  Abdomen: soft, non-tender.  Bowel sounds normal. No masses,  no organomegaly  Extremities: no LE edema, + PP bilaterally   Heart: regular rate and rhythm, S1, S2 normal, no murmurs, clicks, rubs or gallops  Lungs: clear to auscultation bilaterally  Neck: supple, symmetrical, trachea midline, no adenopathy, no JVD, no carotid bruits  Neurologic: Grossly normal  Pulses: 2+ and symmetrical    Labs:   Recent Labs      08/30/17   0420   WBC  9.5   HGB  10.4*   HCT  31.8*   PLT  292     Recent Labs      08/30/17   0420  08/29/17   1015  08/28/17   1225   NA  130*  130*  127*   K  4.2  4.3   --    CL  93*  93*   --    CO2  27  32   --    GLU  90  197*   --    BUN  35*  36*   --    CREA  1.65*  1.78*   --    CA  8.7  8.4*   --    PHOS   --   3.1   --    ALB   --   3.2*   --        No results for input(s): TROIQ, CPK, CKMB in the last 72 hours. Disposition: home    Patient Instructions:   Current Discharge Medication List      START taking these medications    Details   amLODIPine (NORVASC) 5 mg tablet Take 1 Tab by mouth daily. Qty: 30 Tab, Refills: 3      bumetanide (BUMEX) 1 mg tablet Take 1 Tab by mouth daily. Qty: 30 Tab, Refills: 3         CONTINUE these medications which have NOT CHANGED    Details   insulin detemir (LEVEMIR FLEXPEN) 100 unit/mL (3 mL) inpn 10 Units by SubCUTAneous route daily. trimethoprim-sulfamethoxazole (BACTRIM DS) 160-800 mg per tablet Take 1 Tab by mouth two (2) times a day. !! glipiZIDE (GLUCOTROL) 5 mg tablet Take 10 mg by mouth daily. Glipizide IR 10 mg before, 5 mg before dinner      !! glipiZIDE (GLUCOTROL) 5 mg tablet Take 5 mg by mouth Daily (before dinner). Glipizide IR 10 mg before, 5 mg before dinner      atorvastatin (LIPITOR) 40 mg tablet Take 40 mg by mouth nightly. cloNIDine HCl (CATAPRES) 0.1 mg tablet Take 1 Tab by mouth two (2) times a day. Qty: 60 Tab, Refills: 12      azelastine (ASTELIN) 137 mcg (0.1 %) nasal spray 1 [place] by Both Nostrils route two (2) times a day. Use in each nostril as directed  Qty: 1 Bottle, Refills: 12      hydrALAZINE (APRESOLINE) 50 mg tablet Take 1 Tab by mouth three (3) times daily.   Qty: 270 Tab, Refills: 2      metoprolol tartrate (LOPRESSOR) 50 mg tablet Take 1 Tab by mouth two (2) times a day. Qty: 180 Tab, Refills: 3      enalapril (VASOTEC) 20 mg tablet TAKE 1 TABLET TWICE A DAY  Qty: 180 Tab, Refills: 3    Associated Diagnoses: Essential hypertension, benign; Encounter for long-term (current) drug use; Pure hypercholesterolemia      apixaban (ELIQUIS) 5 mg tablet Take 1 Tab by mouth two (2) times a day. Indications: PREVENT THROMBOEMBOLISM IN CHRONIC ATRIAL FIBRILLATION  Qty: 180 Tab, Refills: 3      nitroglycerin (NITROSTAT) 0.4 mg SL tablet 1 Tab by SubLINGual route every five (5) minutes as needed for Chest Pain. Qty: 25 Tab, Refills: 3      omeprazole (PRILOSEC) 20 mg capsule Take 1 Cap by mouth daily. Qty: 90 Cap, Refills: 3    Associated Diagnoses: Essential hypertension, benign; Type 2 diabetes mellitus with complication, unspecified long term insulin use status ([facility] Encounter for long-term (current) drug use; Pure hypercholesterolemia      aspirin 81 mg chewable tablet Take 1 Tab by mouth daily. Qty: 30 Tab, Refills: 3      isosorbide mononitrate ER (IMDUR) 60 mg CR tablet Take 1 Tab by mouth two (2) times a day. Qty: 180 Tab, Refills: 3    Associated Diagnoses: Essential hypertension, benign; Atherosclerosis of native coronary artery of native heart without angina pectoris; Coronary artery disease involving native coronary artery of native heart without angina pectoris; Hyperlipidemia, unspecified hyperlipidemia type; Acute, but ill-defined, cerebrovascular disease      cholecalciferol, vitamin d3, (VITAMIN D) 1,000 unit tablet Take 2,000 Units by mouth daily. Associated Diagnoses: Essential hypertension, benign; Type 2 diabetes mellitus with complication ([facility] Encounter for long-term (current) drug use; Pure hypercholesterolemia       !! - Potential duplicate medications found. Please discuss with provider.       STOP taking these medications       hydroCHLOROthiazide (HYDRODIURIL) 12.5 mg tablet Comments:   Reason for Stopping: meclizine (ANTIVERT) 25 mg tablet Comments:   Reason for Stopping:         amiodarone (PACERONE) 100 mg tablet Comments:   Reason for Stopping:         hydroCHLOROthiazide (HYDRODIURIL) 25 mg tablet Comments:   Reason for Stopping:               Reference discharge instructions provided by nursing for diet and activity.     Follow-up with   Future Appointments  Date Time Provider Carly Mcgrath   9/13/2017 9:00 AM Dany Ivy  [address]   10/17/2017 11:40 AM Dany Ivy  [address]   10/18/2017 10:30 AM Robbin Esquivel IV, MD [facility] [place] SCHED       Signed:  Dany Ivy MD

## 2017-08-31 NOTE — PROGRESS NOTES
2330: Bedside shift change report given to [address] [place] [address] (oncoming nurse) by Sheri Cruz RN (offgoing nurse). Report included the following information SBAR, Kardex, Intake/Output, MAR, Accordion, Recent Results and Med Rec Status. Uneventful shift. 0730: Bedside shift change report given to Radha SALDAÑA (oncoming nurse) by [address] [place] [address] (offgoing nurse). Report included the following information SBAR, Kardex, Intake/Output, MAR, Accordion, Recent Results and Med Rec Status. Problem: Falls - Risk of  Goal: *Absence of Falls  Document Iain Fall Risk and appropriate interventions in the flowsheet.    Outcome: Progressing Towards Goal  Fall Risk Interventions:  Mobility Interventions: Communicate number of staff needed for ambulation/transfer, Patient to call before getting OOB     Mentation Interventions: Adequate sleep, hydration, pain control, Door open when patient unattended, Update white board     Medication Interventions: Patient to call before getting OOB, Teach patient to arise slowly, Evaluate medications/consider consulting pharmacy     Elimination Interventions: Call light in reach, Patient to call for help with toileting needs                 Problem: Pressure Injury - Risk of  Goal: *Prevention of pressure ulcer  Outcome: Progressing Towards Goal  No pressure injuries noted, positional changes

## 2017-08-31 NOTE — ROUTINE PROCESS
1935: Bedside shift change report given to [address] [facility] Romie, RN (oncoming nurse) by Shadia Chan RN (offgoing nurse). Report included the following information SBAR, Recent Results and Med Rec Status. 2148: Patient requested meclizine for dizziness. 2335: Bedside shift change report given to Emma [facility] 7715, RN (oncoming nurse) by [address] [facility] Romie, RN (offgoing nurse). Report included the following information SBAR, Recent Results and Med Rec Status.

## 2017-09-01 ENCOUNTER — HOME CARE VISIT (OUTPATIENT)
Dept: SCHEDULING | Facility: HOME HEALTH | Age: 82
End: 2017-09-01
Payer: MEDICARE

## 2017-09-01 PROCEDURE — G0151 HHCP-SERV OF PT,EA 15 MIN: HCPCS

## 2017-09-01 PROCEDURE — 3331090002 HH PPS REVENUE DEBIT

## 2017-09-01 PROCEDURE — 3331090001 HH PPS REVENUE CREDIT

## 2017-09-01 PROCEDURE — 400013 HH SOC

## 2017-09-02 PROCEDURE — 3331090002 HH PPS REVENUE DEBIT

## 2017-09-02 PROCEDURE — 3331090001 HH PPS REVENUE CREDIT

## 2017-09-03 PROCEDURE — 3331090002 HH PPS REVENUE DEBIT

## 2017-09-03 PROCEDURE — 3331090001 HH PPS REVENUE CREDIT

## 2017-09-04 PROCEDURE — 3331090001 HH PPS REVENUE CREDIT

## 2017-09-04 PROCEDURE — 3331090002 HH PPS REVENUE DEBIT

## 2017-09-05 ENCOUNTER — HOME CARE VISIT (OUTPATIENT)
Dept: SCHEDULING | Facility: HOME HEALTH | Age: 82
End: 2017-09-05
Payer: MEDICARE

## 2017-09-05 VITALS
DIASTOLIC BLOOD PRESSURE: 78 MMHG | OXYGEN SATURATION: 96 % | HEART RATE: 81 BPM | TEMPERATURE: 98 F | RESPIRATION RATE: 17 BRPM | SYSTOLIC BLOOD PRESSURE: 139 MMHG

## 2017-09-05 VITALS
SYSTOLIC BLOOD PRESSURE: 157 MMHG | OXYGEN SATURATION: 98 % | HEART RATE: 60 BPM | TEMPERATURE: 98 F | RESPIRATION RATE: 17 BRPM | DIASTOLIC BLOOD PRESSURE: 76 MMHG

## 2017-09-05 PROCEDURE — 3331090002 HH PPS REVENUE DEBIT

## 2017-09-05 PROCEDURE — 3331090001 HH PPS REVENUE CREDIT

## 2017-09-05 PROCEDURE — G0151 HHCP-SERV OF PT,EA 15 MIN: HCPCS

## 2017-09-06 PROCEDURE — 3331090001 HH PPS REVENUE CREDIT

## 2017-09-06 PROCEDURE — 3331090002 HH PPS REVENUE DEBIT

## 2017-09-07 ENCOUNTER — HOME CARE VISIT (OUTPATIENT)
Dept: SCHEDULING | Facility: HOME HEALTH | Age: 82
End: 2017-09-07
Payer: MEDICARE

## 2017-09-07 PROCEDURE — 3331090001 HH PPS REVENUE CREDIT

## 2017-09-07 PROCEDURE — 3331090002 HH PPS REVENUE DEBIT

## 2017-09-07 PROCEDURE — G0151 HHCP-SERV OF PT,EA 15 MIN: HCPCS

## 2017-09-08 VITALS
TEMPERATURE: 98 F | DIASTOLIC BLOOD PRESSURE: 69 MMHG | OXYGEN SATURATION: 97 % | HEART RATE: 61 BPM | RESPIRATION RATE: 16 BRPM | SYSTOLIC BLOOD PRESSURE: 157 MMHG

## 2017-09-08 PROCEDURE — 3331090002 HH PPS REVENUE DEBIT

## 2017-09-08 PROCEDURE — 3331090001 HH PPS REVENUE CREDIT

## 2017-09-09 PROCEDURE — 3331090001 HH PPS REVENUE CREDIT

## 2017-09-09 PROCEDURE — 3331090002 HH PPS REVENUE DEBIT

## 2017-09-10 PROCEDURE — 3331090001 HH PPS REVENUE CREDIT

## 2017-09-10 PROCEDURE — 3331090002 HH PPS REVENUE DEBIT

## 2017-09-11 PROCEDURE — 3331090002 HH PPS REVENUE DEBIT

## 2017-09-11 PROCEDURE — 3331090001 HH PPS REVENUE CREDIT

## 2017-09-12 ENCOUNTER — HOME CARE VISIT (OUTPATIENT)
Dept: SCHEDULING | Facility: HOME HEALTH | Age: 82
End: 2017-09-12
Payer: MEDICARE

## 2017-09-12 PROCEDURE — 3331090002 HH PPS REVENUE DEBIT

## 2017-09-12 PROCEDURE — 3331090001 HH PPS REVENUE CREDIT

## 2017-09-12 PROCEDURE — G0151 HHCP-SERV OF PT,EA 15 MIN: HCPCS

## 2017-09-13 ENCOUNTER — CLINICAL SUPPORT (OUTPATIENT)
Dept: CARDIOLOGY CLINIC | Age: 82
End: 2017-09-13

## 2017-09-13 ENCOUNTER — OFFICE VISIT (OUTPATIENT)
Dept: CARDIOLOGY CLINIC | Age: 82
End: 2017-09-13

## 2017-09-13 VITALS
BODY MASS INDEX: 30.48 KG/M2 | WEIGHT: 172 LBS | RESPIRATION RATE: 16 BRPM | HEART RATE: 62 BPM | HEIGHT: 63 IN | DIASTOLIC BLOOD PRESSURE: 80 MMHG | SYSTOLIC BLOOD PRESSURE: 120 MMHG

## 2017-09-13 VITALS
RESPIRATION RATE: 17 BRPM | DIASTOLIC BLOOD PRESSURE: 62 MMHG | HEART RATE: 62 BPM | OXYGEN SATURATION: 96 % | TEMPERATURE: 98 F | SYSTOLIC BLOOD PRESSURE: 156 MMHG

## 2017-09-13 DIAGNOSIS — I25.10 ATHEROSCLEROSIS OF NATIVE CORONARY ARTERY OF NATIVE HEART WITHOUT ANGINA PECTORIS: ICD-10-CM

## 2017-09-13 DIAGNOSIS — R60.9 EDEMA, UNSPECIFIED TYPE: ICD-10-CM

## 2017-09-13 DIAGNOSIS — R60.9 EDEMA, UNSPECIFIED TYPE: Primary | ICD-10-CM

## 2017-09-13 DIAGNOSIS — I48.91 ATRIAL FIBRILLATION, UNSPECIFIED TYPE (HCC): ICD-10-CM

## 2017-09-13 PROCEDURE — 3331090001 HH PPS REVENUE CREDIT

## 2017-09-13 PROCEDURE — 3331090002 HH PPS REVENUE DEBIT

## 2017-09-13 RX ORDER — GLIPIZIDE 5 MG/1
10 TABLET ORAL 2 TIMES DAILY
COMMUNITY
Start: 2016-11-17 | End: 2018-11-08 | Stop reason: SDUPTHER

## 2017-09-13 RX ORDER — BUMETANIDE 1 MG/1
1 TABLET ORAL DAILY
Qty: 90 TAB | Refills: 3 | Status: SHIPPED | OUTPATIENT
Start: 2017-09-13 | End: 2017-12-12 | Stop reason: SDUPTHER

## 2017-09-13 RX ORDER — CHOLECALCIFEROL (VITAMIN D3) 125 MCG
1 CAPSULE ORAL DAILY
COMMUNITY
End: 2018-04-13

## 2017-09-13 NOTE — PROGRESS NOTES
HISTORY OF PRESENT ILLNESS  Amelia Schmidt is a 81 y.o. female. She has h/o HTN, HLD, AODM and CAD  And PAF (diagnosed on 4/17 for which she underwent successful cardioversion on oac and amiodarone)here for follow up  Stress test normal on 11/10    Doppler /US renal NO MANDO b/l    PTCA and stent of right coronary artery in 1996 July 26, 2007. The cardiac catheterization revealed mild 10% tapering of the ostium. The left anterior descending artery had mild intimal disease throughout. There was a 30% stenosis in the proximal portion of the LAD between the first and second septal . The remainder of the LAD had only mild intimal disease. The circumflex artery had a 30%-40% stenosis in the proximal mid segment prior to take off of obtuse marginal branch. The right coronary artery was a large dominant artery with 80% proximal stenosis followed by 90% proximal to mid stenosis, followed by 30% residual stenosis. Ejection fraction was estimated at 50%-55%. 2/14: nuclear stress test: mild ischemia mid distal kailey lateral wall EF 59%    3/14: cardiac catheterization:Left main: The vessel was normal sized. Angiography  showed minor luminal irregularities. LAD: The vessel was normal sized. Angiography showed mild atherosclerosis. There was a 30 % stenosis in the  middle third of the vessel segment. 1st diagonal: The vessel was small  sized. Angiography showed minor luminal irregularities. 2nd diagonal: The  vessel was normal sized. Angiography showed mild atherosclerosis. Circumflex: The vessel was normal sized. Angiography showed mild  atherosclerosis. There was a 30 % stenosis. RCA: The vessel was large  sized (dominant). Angiography showed mild atherosclerosis and 3 patent  prior stents.  There was a tubular 30 % stenosis in the distal third of the  vessel segment.    MEDICAL RX ONLY at that time   Stopped niacin in 2014 for \"sick \" feeling    Seen in [facility] for HTN on 2/15 aldactone increased to 25 mg at that time  Renal US on 3/15 no MANDO  ON 4/17 admitted to [facility] for sob and found to be in AF  Nuclear stress test on 4/25/17:No ischemia demonstrated. No infarct visible. LVEF 61%.      Chest ct on 4/17:1. Atherosclerotic aorta with coronary artery calcification. 2. Small bilateral pleural effusions with right pleural calcifications. 3. Status post cholecystectomy. Valentin/Cardioversion  on 4/28/17:VALENTIN with normal EF mild mr and TR no evidence for gross intracardiac thrombi  Cardioversion: 150 joules were delivered in a synch fashion with restoration of NSR  Amiodarone started lopressor decreased  Head ct on 8/18/18:Severe chronic microvascular ischemic change.      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 Amiodarone stopped with concerns for toxicity. Also noted to hyponatremic. Nephrology consulted and felt low Na related to HCTZ and Bactrim use - both stopped indefinitely                        - Bumex 1 mg PO daily started and continued through discharge                        - Na improve to 130 before discharge                        - Patient to follow up with Nephrology as needed. Pulmonary consulted and felt no Amiodarone toxicity, SOB related to COPD and volume                        - SOB improved as Na improved and Bumex was used.     ECHO on 8/17:Left ventricle: Systolic function was normal. Ejection fraction was  estimated in the range of 55 % to 60 %. Suboptimal endocardial  visualization limits wall motion analysis. Wall thickness was mildly  increased. Left atrium: The atrium was mildly dilated. Mitral valve: There was mild regurgitation. Aortic valve: Leaflets exhibited lower normal cuspal separation and  sclerosis. Tricuspid valve: There was mild regurgitation. CTA  8/17 of chest without toxicity changes or PTE: .  There is a mild to moderate-sized right pleural effusion and a mild sized  left pleural effusion. The heart is enlarged there is no pericardial effusion. There is no mediastinal masses or adenopathy. There is no evidence for pulmonary  emboli. There is no shift or pneumothorax. There are mild congestive changes. Mild emphysema. No focal consolidation or mass.     IMPRESSION  IMPRESSION: Bilateral effusions and cardiomegaly. No pulmonary emboli. Past Medical History:   Diagnosis Date    Atrial fibrillation ([address]     CAD (coronary artery disease)     Cerebrovascular accident stroke, other, unspec 11/18/2010    Coronary atherosclerosis of native coronary artery 11/18/2010    Essential hypertension     Essential hypertension, benign 11/18/2010    HLD (hyperlipidemia) 11/18/2010    PVD (peripheral vascular disease) ([address] 2017    Type II or unspecified type diabetes mellitus without mention of complication, not stated as uncontrolled 11/18/2010     Past Surgical History:   Procedure Laterality Date    HX CORONARY STENT PLACEMENT      HX HEART CATHETERIZATION      HX HYSTERECTOMY  1972       HPI  Doing much better, dizziness greatly improved  No cp  Sob now back to baseline and able to proceed with all chores with no issiues  Review of Systems   Respiratory: Negative. Cardiovascular: Positive for leg swelling. Negative for chest pain, palpitations, orthopnea, claudication and PND. Visit Vitals    /80 (BP 1 Location: Left arm, BP Patient Position: Sitting)    Pulse 62    Resp 16    Ht 5' 3\" (1.6 m)    Wt 78 kg (172 lb)    BMI 30.47 kg/m2       Physical Exam   Neck: No JVD present. Carotid bruit is not present. Cardiovascular: Normal rate and regular rhythm. Murmur heard. Systolic murmur is present with a grade of 1/6   Pulmonary/Chest: Effort normal and breath sounds normal.   Abdominal: Soft. Musculoskeletal: She exhibits edema. 1-2 + rle   Psychiatric: She has a normal mood and affect.      Lab Results   Component Value Date/Time    Sodium 130 08/30/2017 04:20 AM Potassium 4.2 08/30/2017 04:20 AM    Chloride 93 08/30/2017 04:20 AM    CO2 27 08/30/2017 04:20 AM    Anion gap 10 08/30/2017 04:20 AM    Glucose 90 08/30/2017 04:20 AM    BUN 35 08/30/2017 04:20 AM    Creatinine 1.65 08/30/2017 04:20 AM    BUN/Creatinine ratio 21 08/30/2017 04:20 AM    GFR est AA 36 08/30/2017 04:20 AM    GFR est non-AA 30 08/30/2017 04:20 AM    Calcium 8.7 08/30/2017 04:20 AM     Current Outpatient Prescriptions on File Prior to Visit   Medication Sig Dispense Refill    amLODIPine (NORVASC) 5 mg tablet Take 1 Tab by mouth daily. 30 Tab 3    bumetanide (BUMEX) 1 mg tablet Take 1 Tab by mouth daily. 30 Tab 3    insulin detemir (LEVEMIR FLEXPEN) 100 unit/mL (3 mL) inpn 10 Units by SubCUTAneous route daily.  glipiZIDE (GLUCOTROL) 5 mg tablet Take 10 mg by mouth daily. Glipizide IR 10 mg before, 5 mg before dinner      atorvastatin (LIPITOR) 40 mg tablet Take 40 mg by mouth nightly.  azelastine (ASTELIN) 137 mcg (0.1 %) nasal spray 1 [place] by Both Nostrils route two (2) times a day. Use in each nostril as directed 1 Bottle 12    hydrALAZINE (APRESOLINE) 50 mg tablet Take 1 Tab by mouth three (3) times daily. 270 Tab 2    metoprolol tartrate (LOPRESSOR) 50 mg tablet Take 1 Tab by mouth two (2) times a day. 180 Tab 3    enalapril (VASOTEC) 20 mg tablet TAKE 1 TABLET TWICE A  Tab 3    apixaban (ELIQUIS) 5 mg tablet Take 1 Tab by mouth two (2) times a day. Indications: PREVENT THROMBOEMBOLISM IN CHRONIC ATRIAL FIBRILLATION 180 Tab 3    nitroglycerin (NITROSTAT) 0.4 mg SL tablet 1 Tab by SubLINGual route every five (5) minutes as needed for Chest Pain. 25 Tab 3    omeprazole (PRILOSEC) 20 mg capsule Take 1 Cap by mouth daily. 90 Cap 3    aspirin 81 mg chewable tablet Take 1 Tab by mouth daily. 30 Tab 3    isosorbide mononitrate ER (IMDUR) 60 mg CR tablet Take 1 Tab by mouth two (2) times a day.  180 Tab 3    trimethoprim-sulfamethoxazole (BACTRIM DS) 160-800 mg per tablet Take 1 Tab by mouth two (2) times a day.  glipiZIDE (GLUCOTROL) 5 mg tablet Take 5 mg by mouth Daily (before dinner). Glipizide IR 10 mg before, 5 mg before dinner      cloNIDine HCl (CATAPRES) 0.1 mg tablet Take 1 Tab by mouth two (2) times a day. 60 Tab 12    cholecalciferol, vitamin d3, (VITAMIN D) 1,000 unit tablet Take 2,000 Units by mouth daily. No current facility-administered medications on file prior to visit. Lab Results   Component Value Date/Time    ALT (SGPT) 61 08/23/2017 02:29 PM    AST (SGOT) 32 08/23/2017 02:29 PM    Alk. phosphatase 88 08/23/2017 02:29 PM    Bilirubin, direct 0.18 02/12/2014 09:42 AM    Bilirubin, total 0.6 08/23/2017 02:29 PM     Lab Results   Component Value Date/Time    Cholesterol, total 110 06/13/2017 09:31 AM    Cholesterol (POC) 100 08/17/2016 11:37 AM    HDL Cholesterol 55 06/13/2017 09:31 AM    HDL Cholesterol (POC) 46 08/17/2016 11:37 AM    LDL Cholesterol (POC) n/a 08/17/2016 11:37 AM    LDL, calculated 36 06/13/2017 09:31 AM    VLDL, calculated 19 06/13/2017 09:31 AM    Triglyceride 93 06/13/2017 09:31 AM    Triglycerides (POC) 112 08/17/2016 11:37 AM    CHOL/HDL Ratio 2.0 04/25/2017 03:53 AM     Lab Results   Component Value Date/Time    WBC 9.5 08/30/2017 04:20 AM    HGB (POC) 10.2 07/14/2017 11:18 AM    HGB 10.4 08/30/2017 04:20 AM    HCT (POC) 31.4 07/14/2017 11:18 AM    HCT 31.8 08/30/2017 04:20 AM    PLATELET 296 95/63/1112 04:20 AM    MCV 79.1 08/30/2017 04:20 AM       ASSESSMENT and PLAN    HTN:now controlled on enalapril lopressor hydralazine and norvasc,( no worsening le edema for now ) off clonidine for ha and drowsiness and made her feel horrible she is not taking clonidine any longer  PAF:she remains  in NSR after cardioversion her ECG today shows NSR and minor nstt. She is now off amiodarone since her intolerance to it   Continue eliquis no untoward effects thus far. Aware that AF may be recurring off amiodarone.  Continue same dose of eliquis for now ( she has not changed it as per hospital dc) and wait for bmp today , if creatinine still >1.5 then decrease eliquis to 2,5 mg bid  CAD: seemingly completely asymptomatic.  Her ECG showed with NSR and no significant st t changes and pacs continue toprol imdur asa.  normal stress test on 4/25/17  HLD:closely followed by her PCP and well controlled  AODM: also closely followed by her PCP  Hyponatremia:off hctz and follow by nephrology  Right leg cellulitis: resolved but still swollen rle , unlikely dvt while on oac but will obtain US rle  Otherwise if all ok I will see her back in 3 months

## 2017-09-13 NOTE — MR AVS SNAPSHOT
Visit Information Date & Time Provider Department Dept. Phone Encounter #  
 9/13/2017  9:00 AM Elo Kee MD [facility] [phone] [id number] Follow-up Instructions Return in about 3 months (around 12/13/2017). Follow-up and Disposition History Your Appointments 10/17/2017 11:40 AM  
ESTABLISHED PATIENT with Elo Kee MD  
[facility] ([facility]) Appt Note: 2mo fu  
 [address] [place]  
[address] [facility] [address]  
  
    
 10/18/2017 10:30 AM  
ESTABLISHED PATIENT with MD Jenise Carmen TURNER, [address] ([facility]) Appt Note: 3m  
 [address] [facility] [address] [address]  
[phone]  
  
   
 [address] [facility] [address] [address] Upcoming Health Maintenance Date Due  
 EYE EXAM RETINAL OR DILATED Q1 7/4/1946 DTaP/Tdap/Td series (1 - Tdap) 7/4/1957 ZOSTER VACCINE AGE 60> 5/4/1996 GLAUCOMA SCREENING Q2Y 7/4/2001 OSTEOPOROSIS SCREENING (DEXA) 7/4/2001 MEDICARE YEARLY EXAM 7/4/2001 Pneumococcal 65+ Low/Medium Risk (2 of 2 - PPSV23) 10/7/2016 INFLUENZA AGE 9 TO ADULT 8/1/2017 HEMOGLOBIN A1C Q6M 12/29/2017 LIPID PANEL Q1 6/13/2018 FOOT EXAM Q1 6/29/2018 MICROALBUMIN Q1 6/29/2018 Allergies as of 9/13/2017  Review Complete On: 9/13/2017 By: Elo Kee MD  
  
 Severity Noted Reaction Type Reactions Clonidine  08/23/2017    Shortness of Breath, Swelling Lightheaded, Codeine  11/18/2010    Unknown (comments) Keflin  04/18/2011    Itching Pcn [Penicillins]  11/18/2010    Unknown (comments) Tramadol  04/14/2016    Other (comments) Makes her head feel swishy Current Immunizations  Reviewed on 7/28/2017 No immunizations on file. Not reviewed this visit You Were Diagnosed With   
  
 Codes Comments Edema, unspecified type    -  Primary ICD-10-CM: R60.9 ICD-9-CM: 598. 3 Atrial [facility], unspecified type ([facility]     ICD-10-CM: I48.91 
ICD-9-CM: 427.31 Atherosclerosis of native coronary artery of native heart without angina pectoris     ICD-10-CM: I25.10 ICD-9-CM: 414.01 Vitals BP Pulse Resp Height(growth percentile) Weight(growth percentile) BMI  
 120/80 (BP 1 Location: Left arm, BP Patient Position: Sitting) 62 16 5' 3\" (1.6 m) 172 lb (78 kg) 30.47 kg/m2 OB Status Smoking Status Hysterectomy Former Smoker Vitals History BMI and BSA Data Body Mass Index Body Surface Area  
 30.47 kg/m 2 1.86 m 2 Preferred Pharmacy Pharmacy Name Phone [phone] [place] [address] [phone] Your Updated Medication List  
  
   
This list is accurate as of: 9/13/17 10:22 AM.  Always use your most recent med list. amLODIPine 5 mg tablet Commonly known as:  Unknown Steen Take 1 Tab by mouth daily. apixaban 5 mg tablet Commonly known as:  Audrey Pillow Take 1 Tab by mouth two (2) times a day. Indications: PREVENT THROMBOEMBOLISM IN CHRONIC ATRIAL FIBRILLATION  
  
 aspirin 81 mg chewable tablet Take 1 Tab by mouth daily. atorvastatin 40 mg tablet Commonly known as:  LIPITOR Take 40 mg by mouth nightly. azelastine 137 mcg (0.1 %) nasal spray Commonly known as:  ASTELIN  
1 Spray by Both Nostrils route two (2) times a day. Use in each nostril as directed BACTRIM -800 mg per tablet Generic drug:  trimethoprim-sulfamethoxazole Take 1 Tab by mouth two (2) times a day. bumetanide 1 mg tablet Commonly known as:  Wanda Bora Take 1 Tab by mouth daily. cloNIDine HCl 0.1 mg tablet Commonly known as:  CATAPRES Take 1 Tab by mouth two (2) times a day. enalapril 20 mg tablet Commonly known as:  Luis Stevenson  
 TAKE 1 TABLET TWICE A DAY  
  
 * glipiZIDE 5 mg tablet Commonly known as:  Lucrecia Fuchs Take 10 mg by mouth daily. Glipizide IR 10 mg before, 5 mg before dinner * glipiZIDE 5 mg tablet Commonly known as:  Lucrecia Fuchs Take 5 mg by mouth Daily (before dinner). Glipizide IR 10 mg before, 5 mg before dinner * glipiZIDE 5 mg tablet Commonly known as:  Lucrecia Fuchs Take 10 mg by mouth two (2) times a day. hydrALAZINE 50 mg tablet Commonly known as:  APRESOLINE Take 1 Tab by mouth three (3) times daily. isosorbide mononitrate ER 60 mg CR tablet Commonly known as:  IMDUR Take 1 Tab by mouth two (2) times a day. LEVEMIR FLEXPEN 100 unit/mL (3 mL) Inpn Generic drug:  insulin detemir 10 Units by SubCUTAneous route daily. metoprolol tartrate 50 mg tablet Commonly known as:  LOPRESSOR Take 1 Tab by mouth two (2) times a day. nitroglycerin 0.4 mg SL tablet Commonly known as:  NITROSTAT  
1 Tab by SubLINGual route every five (5) minutes as needed for Chest Pain. omeprazole 20 mg capsule Commonly known as:  PriLOSEC Take 1 Cap by mouth daily. * VITAMIN D3 2,000 unit Tab Generic drug:  cholecalciferol (vitamin D3) Take 1 Tab by mouth daily. * VITAMIN D3 1,000 unit tablet Generic drug:  cholecalciferol Take 2,000 Units by mouth daily. * Notice: This list has 5 medication(s) that are the same as other medications prescribed for you. Read the directions carefully, and ask your doctor or other care provider to review them with you. We Performed the Following AMB POC EKG ROUTINE W/ 12 LEADS, INTER & REP [37675 CPT(R)] Follow-up Instructions Return in about 3 months (around 12/13/2017). To-Do List   
 09/13/2017 Imaging:  DUPLEX LOWER EXT VENOUS RIGHT   
  
 09/14/2017 To Be Determined   Appointment with Robby Bridges PT at [place] [address]  
  
 09/19/2017 To Be Determined Appointment with Cora Fletcher PT at [place] [address]  
  
 09/21/2017 To Be Determined Appointment with Cora Fletcher PT at [place] [address]  
  
 09/26/2017 To Be Determined Appointment with Cora Fletcher PT at [place] [address]  
  
 09/28/2017 To Be Determined Appointment with Cora Fletcher PT at [place] [address] Patient Instructions Follow up with Mary Ba in 3 months Introducing [facility]! [employer] introduces [employer] patient portal. Now you can access parts of your medical record, email your doctor's office, and request medication refills online. 1. In your internet browser, go to [email or web address] [employer] 2. Click on the First Time User? Click Here link in the Sign In box. You will see the New Member Sign Up page. 3. Enter your [employer] Access Code exactly as it appears below. You will not need to use this code after youve completed the sign-up process. If you do not sign up before the expiration date, you must request a new code. · [employer] Access Code: [email or web address] Expires: 11/21/2017  7:15 AM 
 
4. Enter the last four digits of your Social Security Number (xxxx) and Date of Birth (mm/dd/yyyy) as indicated and click Submit. You will be taken to the next sign-up page. 5. Create a [employer] ID. This will be your [employer] login ID and cannot be changed, so think of one that is secure and easy to remember. 6. Create a [employer] password. You can change your password at any time. 7. Enter your Password Reset Question and Answer. This can be used at a later time if you forget your password. 8. Enter your e-mail address. You will receive e-mail notification when new information is available in [address] 9. Click Sign Up. You can now view and download portions of your medical record. 10. Click the Download Summary menu link to download a portable copy of your medical information. If you have questions, please visit the Frequently Asked Questions section of the [employer] website. Remember, [employer] is NOT to be used for urgent needs. For medical emergencies, dial 911. Now available from your iPhone and Android! Please provide this summary of care documentation to your next provider. Your primary care clinician is listed as [id number] [address] IV. If you have any questions after today's visit, please call [phone].

## 2017-09-13 NOTE — PROCEDURES
[facility]  *** FINAL REPORT ***    Name: Bernardino Patterson  MRN: [id number]       Outpatient  : 1935  HIS Order #: [id number]  [address] [facility] Visit #: [id number]  Date: 13 Sep 2017    TYPE OF TEST: Peripheral Venous Testing    REASON FOR TEST  Limb swelling    Right Leg:-  Deep venous thrombosis:           No  Superficial venous thrombosis:    Not examined  Deep venous insufficiency:        Not examined  Superficial venous insufficiency: Not examined      INTERPRETATION/FINDINGS  PROCEDURE:  RIGHT LOWER EXTREMITY  VENOUS DUPLEX. Evaluation of lower  extremity veins with ultrasound (B-mode imaging, pulsed Doppler, color   Doppler). Includes the common femoral, deep femoral, femoral,  popliteal, posterior tibial, peroneal, and great saphenous veins. Other veins, for example the gastrocnemius and soleal veins, may also  be visualized. FINDINGS: Foreign Dimitri scale and color flow duplex images of the veins in the  right lower extremity demonstrate normal compressibility, spontaneous  and augmented flow profiles, and absence of filling defects throughout   the deep veins. CONCLUSION: Right lower extremity venous duplex negative for deep  venous thrombosis. Left common femoral vein is thrombus free. ADDITIONAL COMMENTS    I have personally reviewed the data relevant to the interpretation of  this  study.     TECHNOLOGIST: Jolly Cevallos RVT, RDMS, RDCS  Signed: 2017 10:33 AM    PHYSICIAN: Meghan Rodriguez MD  Signed: 09/15/2017 01:52 PM

## 2017-09-14 ENCOUNTER — HOME CARE VISIT (OUTPATIENT)
Dept: SCHEDULING | Facility: HOME HEALTH | Age: 82
End: 2017-09-14
Payer: MEDICARE

## 2017-09-14 PROCEDURE — G0151 HHCP-SERV OF PT,EA 15 MIN: HCPCS

## 2017-09-14 PROCEDURE — 3331090002 HH PPS REVENUE DEBIT

## 2017-09-14 PROCEDURE — 3331090001 HH PPS REVENUE CREDIT

## 2017-09-15 VITALS
OXYGEN SATURATION: 96 % | SYSTOLIC BLOOD PRESSURE: 153 MMHG | DIASTOLIC BLOOD PRESSURE: 62 MMHG | HEART RATE: 67 BPM | TEMPERATURE: 98 F | RESPIRATION RATE: 17 BRPM

## 2017-09-15 PROCEDURE — 3331090002 HH PPS REVENUE DEBIT

## 2017-09-15 PROCEDURE — 3331090001 HH PPS REVENUE CREDIT

## 2017-09-16 PROCEDURE — 3331090002 HH PPS REVENUE DEBIT

## 2017-09-16 PROCEDURE — 3331090001 HH PPS REVENUE CREDIT

## 2017-09-17 PROCEDURE — 3331090002 HH PPS REVENUE DEBIT

## 2017-09-17 PROCEDURE — 3331090001 HH PPS REVENUE CREDIT

## 2017-09-18 PROCEDURE — 3331090002 HH PPS REVENUE DEBIT

## 2017-09-18 PROCEDURE — 3331090001 HH PPS REVENUE CREDIT

## 2017-09-19 PROCEDURE — 3331090001 HH PPS REVENUE CREDIT

## 2017-09-19 PROCEDURE — 3331090002 HH PPS REVENUE DEBIT

## 2017-09-20 PROCEDURE — 3331090002 HH PPS REVENUE DEBIT

## 2017-09-20 PROCEDURE — 3331090001 HH PPS REVENUE CREDIT

## 2017-09-21 PROCEDURE — 3331090002 HH PPS REVENUE DEBIT

## 2017-09-21 PROCEDURE — 3331090001 HH PPS REVENUE CREDIT

## 2017-09-22 ENCOUNTER — HOME CARE VISIT (OUTPATIENT)
Dept: SCHEDULING | Facility: HOME HEALTH | Age: 82
End: 2017-09-22
Payer: MEDICARE

## 2017-09-22 PROCEDURE — G0151 HHCP-SERV OF PT,EA 15 MIN: HCPCS

## 2017-09-22 PROCEDURE — 3331090003 HH PPS REVENUE ADJ

## 2017-09-22 PROCEDURE — 3331090001 HH PPS REVENUE CREDIT

## 2017-09-22 PROCEDURE — 3331090002 HH PPS REVENUE DEBIT

## 2017-09-23 PROCEDURE — 3331090002 HH PPS REVENUE DEBIT

## 2017-09-23 PROCEDURE — 3331090001 HH PPS REVENUE CREDIT

## 2017-09-24 PROCEDURE — 3331090001 HH PPS REVENUE CREDIT

## 2017-09-24 PROCEDURE — 3331090002 HH PPS REVENUE DEBIT

## 2017-09-25 VITALS
HEART RATE: 76 BPM | DIASTOLIC BLOOD PRESSURE: 78 MMHG | SYSTOLIC BLOOD PRESSURE: 153 MMHG | RESPIRATION RATE: 17 BRPM | OXYGEN SATURATION: 98 % | TEMPERATURE: 98 F

## 2017-09-25 PROCEDURE — 3331090001 HH PPS REVENUE CREDIT

## 2017-09-25 PROCEDURE — 3331090002 HH PPS REVENUE DEBIT

## 2017-09-26 PROCEDURE — 3331090002 HH PPS REVENUE DEBIT

## 2017-09-26 PROCEDURE — 3331090001 HH PPS REVENUE CREDIT

## 2017-09-27 PROCEDURE — 3331090001 HH PPS REVENUE CREDIT

## 2017-09-27 PROCEDURE — 3331090002 HH PPS REVENUE DEBIT

## 2017-09-28 PROCEDURE — 3331090001 HH PPS REVENUE CREDIT

## 2017-09-28 PROCEDURE — 3331090002 HH PPS REVENUE DEBIT

## 2017-09-29 PROCEDURE — 3331090001 HH PPS REVENUE CREDIT

## 2017-09-29 PROCEDURE — 3331090002 HH PPS REVENUE DEBIT

## 2017-09-30 PROCEDURE — 3331090002 HH PPS REVENUE DEBIT

## 2017-09-30 PROCEDURE — 3331090001 HH PPS REVENUE CREDIT

## 2017-10-11 ENCOUNTER — TELEPHONE (OUTPATIENT)
Dept: CARDIOLOGY CLINIC | Age: 82
End: 2017-10-11

## 2017-11-03 RX ORDER — AMLODIPINE BESYLATE 5 MG/1
5 TABLET ORAL DAILY
Qty: 90 TAB | Refills: 0 | Status: SHIPPED | OUTPATIENT
Start: 2017-11-03 | End: 2017-12-12 | Stop reason: SDUPTHER

## 2017-11-03 NOTE — TELEPHONE ENCOUNTER
Requested Prescriptions     Pending Prescriptions Disp Refills    amLODIPine (NORVASC) 5 mg tablet 30 Tab 3     Sig: Take 1 Tab by mouth daily.      Last OV 9/13/17   Next OV 12/15/17    Pharmacy verified  90 day supply    Thank you, AP

## 2017-12-12 ENCOUNTER — OFFICE VISIT (OUTPATIENT)
Dept: CARDIOLOGY CLINIC | Age: 82
End: 2017-12-12

## 2017-12-12 VITALS
OXYGEN SATURATION: 97 % | BODY MASS INDEX: 34.04 KG/M2 | DIASTOLIC BLOOD PRESSURE: 60 MMHG | SYSTOLIC BLOOD PRESSURE: 120 MMHG | HEART RATE: 68 BPM | WEIGHT: 173.4 LBS | RESPIRATION RATE: 16 BRPM | HEIGHT: 60 IN

## 2017-12-12 DIAGNOSIS — E78.5 HYPERLIPIDEMIA, UNSPECIFIED HYPERLIPIDEMIA TYPE: ICD-10-CM

## 2017-12-12 DIAGNOSIS — I25.10 CORONARY ARTERY DISEASE INVOLVING NATIVE CORONARY ARTERY OF NATIVE HEART WITHOUT ANGINA PECTORIS: ICD-10-CM

## 2017-12-12 DIAGNOSIS — I25.10 ATHEROSCLEROSIS OF NATIVE CORONARY ARTERY OF NATIVE HEART WITHOUT ANGINA PECTORIS: Primary | ICD-10-CM

## 2017-12-12 DIAGNOSIS — E78.00 PURE HYPERCHOLESTEROLEMIA: ICD-10-CM

## 2017-12-12 DIAGNOSIS — Z79.899 ENCOUNTER FOR LONG-TERM (CURRENT) DRUG USE: ICD-10-CM

## 2017-12-12 DIAGNOSIS — I67.89 ACUTE, BUT ILL-DEFINED, CEREBROVASCULAR DISEASE: ICD-10-CM

## 2017-12-12 DIAGNOSIS — I10 ESSENTIAL HYPERTENSION, BENIGN: ICD-10-CM

## 2017-12-12 DIAGNOSIS — I25.10 ATHEROSCLEROSIS OF NATIVE CORONARY ARTERY OF NATIVE HEART WITHOUT ANGINA PECTORIS: ICD-10-CM

## 2017-12-12 RX ORDER — AMLODIPINE BESYLATE 5 MG/1
5 TABLET ORAL DAILY
Qty: 90 TAB | Refills: 3 | Status: SHIPPED | OUTPATIENT
Start: 2017-12-12 | End: 2018-05-08 | Stop reason: DRUGHIGH

## 2017-12-12 RX ORDER — BUMETANIDE 1 MG/1
1 TABLET ORAL DAILY
Qty: 90 TAB | Refills: 3 | Status: SHIPPED | OUTPATIENT
Start: 2017-12-12 | End: 2018-09-21 | Stop reason: SDUPTHER

## 2017-12-12 RX ORDER — ISOSORBIDE MONONITRATE 60 MG/1
60 TABLET, EXTENDED RELEASE ORAL 2 TIMES DAILY
Qty: 180 TAB | Refills: 3 | Status: SHIPPED | OUTPATIENT
Start: 2017-12-12 | End: 2018-04-10 | Stop reason: SDUPTHER

## 2017-12-12 RX ORDER — ENALAPRIL MALEATE 20 MG/1
TABLET ORAL
Qty: 180 TAB | Refills: 3 | Status: SHIPPED | OUTPATIENT
Start: 2017-12-12 | End: 2018-09-21 | Stop reason: SDUPTHER

## 2017-12-12 NOTE — MR AVS SNAPSHOT
Visit Information Date & Time Provider Department Dept. Phone Encounter #  
 12/12/2017 11:40 AM Seda Acosta MD [facility] [phone] [id number] Follow-up Instructions Return in about 4 months (around 4/12/2018). Follow-up and Disposition History Upcoming Health Maintenance Date Due  
 EYE EXAM RETINAL OR DILATED Q1 7/4/1946 DTaP/Tdap/Td series (1 - Tdap) 7/4/1957 ZOSTER VACCINE AGE 60> 5/4/1996 GLAUCOMA SCREENING Q2Y 7/4/2001 OSTEOPOROSIS SCREENING (DEXA) 7/4/2001 MEDICARE YEARLY EXAM 7/4/2001 Pneumococcal 65+ Low/Medium Risk (2 of 2 - PPSV23) 10/7/2016 Influenza Age 5 to Adult 8/1/2017 HEMOGLOBIN A1C Q6M 4/18/2018 LIPID PANEL Q1 6/13/2018 MICROALBUMIN Q1 6/29/2018 FOOT EXAM Q1 10/18/2018 Allergies as of 12/12/2017  Review Complete On: 12/12/2017 By: Seda Acosta MD  
  
 Severity Noted Reaction Type Reactions Clonidine  08/23/2017    Shortness of Breath, Swelling Lightheaded, Codeine  11/18/2010    Unknown (comments) Keflin  04/18/2011    Itching Pcn [Penicillins]  11/18/2010    Unknown (comments) Tramadol  04/14/2016    Other (comments) Makes her head feel swishy Current Immunizations  Reviewed on 7/28/2017 No immunizations on file. Not reviewed this visit You Were Diagnosed With   
  
 Codes Comments Atherosclerosis of native coronary artery of native heart without angina pectoris    -  Primary ICD-10-CM: I25.10 ICD-9-CM: 414.01 Vitals BP Pulse Resp Height(growth percentile) Weight(growth percentile) SpO2  
 120/60 (BP 1 Location: Left arm, BP Patient Position: Sitting) 68 16 5' (1.524 m) 173 lb 6.4 oz (78.7 kg) 97% BMI OB Status Smoking Status 33.86 kg/m2 Hysterectomy Former Smoker Vitals History BMI and BSA Data Body Mass Index Body Surface Area  
 33.86 kg/m 2 1.83 m 2 Preferred Pharmacy Pharmacy Name Phone [facility] [address] [phone] Your Updated Medication List  
  
   
This list is accurate as of: 12/12/17 12:45 PM.  Always use your most recent med list. amLODIPine 5 mg tablet Commonly known as:  Wandra Carly Take 1 Tab by mouth daily. aspirin 81 mg chewable tablet Take 1 Tab by mouth daily. atorvastatin 40 mg tablet Commonly known as:  LIPITOR Take 20 mg by mouth nightly. azelastine 137 mcg (0.1 %) nasal spray Commonly known as:  ASTELIN  
1 Spray by Both Nostrils route two (2) times a day. Use in each nostril as directed  
  
 bumetanide 1 mg tablet Commonly known as:  Barnie Pulse Take 1 Tab by mouth daily. ELIQUIS 2.5 mg tablet Generic drug:  apixaban Take 2.5 mg by mouth two (2) times a day. enalapril 20 mg tablet Commonly known as:  VASOTEC  
TAKE 1 TABLET TWICE A DAY  
  
 glipiZIDE 5 mg tablet Commonly known as:  Brooksie Fillers Take 10 mg by mouth two (2) times a day. hydrALAZINE 50 mg tablet Commonly known as:  APRESOLINE Take 1 Tab by mouth three (3) times daily. insulin aspart 100 unit/mL Inpn Commonly known as:  Cristhian Poke Inject 7 units before each meal ( tid ac meals) or as directed  
  
 isosorbide mononitrate ER 60 mg CR tablet Commonly known as:  IMDUR Take 1 Tab by mouth two (2) times a day. LEVEMIR FLEXPEN 100 unit/mL (3 mL) Inpn Generic drug:  insulin detemir 15 Units by SubCUTAneous route daily. In the morning  
  
 levoFLOXacin 250 mg tablet Commonly known as:  [place] Nadir Take 1 Tab by mouth daily. metoprolol tartrate 50 mg tablet Commonly known as:  LOPRESSOR Take 1 Tab by mouth two (2) times a day. mupirocin 2 % ointment Commonly known as:  [employer] Apply  to affected area daily. nitroglycerin 0.4 mg SL tablet Commonly known as:  NITROSTAT  
1 Tab by SubLINGual route every five (5) minutes as needed for Chest Pain. omeprazole 20 mg capsule Commonly known as:  PriLOSEC Take 1 Cap by mouth daily. * VITAMIN D3 2,000 unit Tab Generic drug:  cholecalciferol (vitamin D3) Take 1 Tab by mouth daily. * VITAMIN D3 2,000 unit Tab Generic drug:  cholecalciferol (vitamin D3) Take  by mouth. * Notice: This list has 2 medication(s) that are the same as other medications prescribed for you. Read the directions carefully, and ask your doctor or other care provider to review them with you. We Performed the Following AMB POC EKG ROUTINE W/ 12 LEADS, INTER & REP [73073 CPT(R)] Follow-up Instructions Return in about 4 months (around 4/12/2018). Patient Instructions Decrease Eliquis 2.5 mg twice a day Increase Hydralazine 50 mg three times a day Follow up with Gregory Berg in 4 months Introducing [facility]! Lisa Vega introduces [employer] patient portal. Now you can access parts of your medical record, email your doctor's office, and request medication refills online. 1. In your internet browser, go to [email or web address] 2. Click on the First Time User? Click Here link in the Sign In box. You will see the New Member Sign Up page. 3. Enter your [employer] Access Code exactly as it appears below. You will not need to use this code after youve completed the sign-up process. If you do not sign up before the expiration date, you must request a new code. · [employer] Access Code: [email or web address] Expires: 3/12/2018 12:20 PM 
 
4. Enter the last four digits of your Social Security Number (xxxx) and Date of Birth (mm/dd/yyyy) as indicated and click Submit. You will be taken to the next sign-up page. 5. Create a [employer] ID. This will be your [employer] login ID and cannot be changed, so think of one that is secure and easy to remember. 6. Create a [employer] password. You can change your password at any time. 7. Enter your Password Reset Question and Answer. This can be used at a later time if you forget your password. 8. Enter your e-mail address. You will receive e-mail notification when new information is available in [address] 9. Click Sign Up. You can now view and download portions of your medical record. 10. Click the Download Summary menu link to download a portable copy of your medical information. If you have questions, please visit the Frequently Asked Questions section of the [employer] website. Remember, [employer] is NOT to be used for urgent needs. For medical emergencies, dial 911. Now available from your iPhone and Android! Please provide this summary of care documentation to your next provider. Your primary care clinician is listed as [phone] [address] [facility] IV. If you have any questions after today's visit, please call [phone].

## 2017-12-12 NOTE — PROGRESS NOTES
HISTORY OF PRESENT ILLNESS  Laly Lucio is a 81 y.o. female. She has h/o HTN, HLD, AODM and CAD  And PAF (diagnosed on 4/17 for which she underwent successful cardioversion on oac and amiodarone)here for follow up  Stress test normal on 11/10    Doppler /US renal NO MANDO b/l    PTCA and stent of right coronary artery in 1996 July 26, 2007. The cardiac catheterization revealed mild 10% tapering of the ostium. The left anterior descending artery had mild intimal disease throughout. There was a 30% stenosis in the proximal portion of the LAD between the first and second septal . The remainder of the LAD had only mild intimal disease. The circumflex artery had a 30%-40% stenosis in the proximal mid segment prior to take off of obtuse marginal branch. The right coronary artery was a large dominant artery with 80% proximal stenosis followed by 90% proximal to mid stenosis, followed by 30% residual stenosis. Ejection fraction was estimated at 50%-55%. 2/14: nuclear stress test: mild ischemia mid distal kailey lateral wall EF 59%    3/14: cardiac catheterization:Left main: The vessel was normal sized. Angiography  showed minor luminal irregularities. LAD: The vessel was normal sized. Angiography showed mild atherosclerosis. There was a 30 % stenosis in the  middle third of the vessel segment. 1st diagonal: The vessel was small  sized. Angiography showed minor luminal irregularities. 2nd diagonal: The  vessel was normal sized. Angiography showed mild atherosclerosis. Circumflex: The vessel was normal sized. Angiography showed mild  atherosclerosis. There was a 30 % stenosis. RCA: The vessel was large  sized (dominant). Angiography showed mild atherosclerosis and 3 patent  prior stents.  There was a tubular 30 % stenosis in the distal third of the  vessel segment.    MEDICAL RX ONLY at that time   Stopped niacin in 2014 for \"sick \" feeling    Seen in [facility] for HTN on 2/15 aldactone increased to 25 mg at that time  Renal US on 3/15 no MANDO  ON 4/17 admitted to [facility] for sob and found to be in AF  Nuclear stress test on 4/25/17:No ischemia demonstrated. No infarct visible. LVEF 61%.      Chest ct on 4/17:1. Atherosclerotic aorta with coronary artery calcification. 2. Small bilateral pleural effusions with right pleural calcifications. 3. Status post cholecystectomy. Valentin/Cardioversion  on 4/28/17:VALENTIN with normal EF mild mr and TR no evidence for gross intracardiac thrombi  Cardioversion: 150 joules were delivered in a synch fashion with restoration of NSR  Amiodarone started lopressor decreased  Head ct on 8/18/18:Severe chronic microvascular ischemic change.      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  Amiodarone stopped with concerns for toxicity.  Also noted to hyponatremic.    Nephrology consulted and felt low Na related to HCTZ and Bactrim use - both stopped indefinitely                        - Bumex 1 mg PO daily started and continued through discharge                        - Na improve to 130 before discharge                        - Patient to follow up with Nephrology as needed. Pulmonary consulted and felt no Amiodarone toxicity, SOB related to COPD and volume                        - SOB improved as Na improved and Bumex was used.      ECHO on 8/17:Left ventricle: Systolic function was normal. Ejection fraction was  estimated in the range of 55 % to 60 %. Suboptimal endocardial  visualization limits wall motion analysis. Wall thickness was mildly  increased. Left atrium: The atrium was mildly dilated. Mitral valve: There was mild regurgitation. Aortic valve: Leaflets exhibited lower normal cuspal separation and  sclerosis. Tricuspid valve: There was mild regurgitation.   CTA  8/17 of chest without toxicity changes or PTE: . There is a mild to moderate-sized right pleural effusion and a mild sized  left pleural effusion. The heart is enlarged there is no pericardial effusion. There is no mediastinal masses or adenopathy. There is no evidence for pulmonary  emboli. There is no shift or pneumothorax. There are mild congestive changes. Mild emphysema. No focal consolidation or mass.      IMPRESSION  IMPRESSION: Bilateral effusions and cardiomegaly. No pulmonary emboli. Past Medical History:   Diagnosis Date    Atrial fibrillation ([address]      CAD (coronary artery disease)      Cerebrovascular accident stroke, other, unspec 11/18/2010    Coronary atherosclerosis of native coronary artery 11/18/2010    Essential hypertension      Essential hypertension, benign 11/18/2010    HLD (hyperlipidemia) 11/18/2010    PVD (peripheral vascular disease) ([address] 2017    Type II or unspecified type diabetes mellitus without mention of complication, not stated as uncontrolled 11/18/2010            Past Surgical History:   Procedure Laterality Date    HX CORONARY STENT PLACEMENT        HX HEART CATHETERIZATION        HX HYSTERECTOMY   1972       HPI  No complaints of cp and sob at baseline  rle edema unchanged  Review of Systems   Constitutional: Negative. Respiratory: Negative. Cardiovascular: Positive for leg swelling. Negative for chest pain, palpitations, orthopnea, claudication and PND. Visit Vitals    /60 (BP 1 Location: Left arm, BP Patient Position: Sitting)    Pulse 68    Resp 16    Ht 5' (1.524 m)    Wt 78.7 kg (173 lb 6.4 oz)    SpO2 97%    BMI 33.86 kg/m2       Physical Exam   Neck: No JVD present. Carotid bruit is not present. Cardiovascular: Normal rate and regular rhythm. Pulmonary/Chest: Effort normal and breath sounds normal.   Abdominal: Soft. Musculoskeletal: She exhibits edema. rle 2+   Psychiatric: She has a normal mood and affect.      Current Outpatient Prescriptions on File Prior to Visit   Medication Sig Dispense Refill    insulin aspart (NOVOLOG FLEXPEN) 100 unit/mL inpn Inject 7 units before each meal ( tid ac meals) or as directed 15 Pen 3    levoFLOXacin (LEVAQUIN) 250 mg tablet Take 1 Tab by mouth daily. 10 Tab 0    cholecalciferol, vitamin D3, (VITAMIN D3) 2,000 unit tab Take  by mouth.  mupirocin (BACTROBAN) 2 % ointment Apply  to affected area daily. 15 g 0    glipiZIDE (GLUCOTROL) 5 mg tablet Take 10 mg by mouth two (2) times a day.  cholecalciferol, vitamin D3, (VITAMIN D3) 2,000 unit tab Take 1 Tab by mouth daily.  insulin detemir (LEVEMIR FLEXPEN) 100 unit/mL (3 mL) inpn 15 Units by SubCUTAneous route daily. In the morning      atorvastatin (LIPITOR) 40 mg tablet Take 20 mg by mouth nightly.  azelastine (ASTELIN) 137 mcg (0.1 %) nasal spray 1 Stanley by Both Nostrils route two (2) times a day. Use in each nostril as directed 1 Bottle 12    hydrALAZINE (APRESOLINE) 50 mg tablet Take 1 Tab by mouth three (3) times daily. 270 Tab 2    metoprolol tartrate (LOPRESSOR) 50 mg tablet Take 1 Tab by mouth two (2) times a day. 180 Tab 3    apixaban (ELIQUIS) 5 mg tablet Take 1 Tab by mouth two (2) times a day. Indications: PREVENT THROMBOEMBOLISM IN CHRONIC ATRIAL FIBRILLATION 180 Tab 3    nitroglycerin (NITROSTAT) 0.4 mg SL tablet 1 Tab by SubLINGual route every five (5) minutes as needed for Chest Pain. 25 Tab 3    omeprazole (PRILOSEC) 20 mg capsule Take 1 Cap by mouth daily. 90 Cap 3    aspirin 81 mg chewable tablet Take 1 Tab by mouth daily. 30 Tab 3     No current facility-administered medications on file prior to visit.       Lab Results   Component Value Date/Time    Sodium 130 08/30/2017 04:20 AM    Potassium 4.2 08/30/2017 04:20 AM    Chloride 93 08/30/2017 04:20 AM    CO2 27 08/30/2017 04:20 AM    Anion gap 10 08/30/2017 04:20 AM    Glucose 90 08/30/2017 04:20 AM    BUN 35 08/30/2017 04:20 AM    Creatinine 1.65 08/30/2017 04:20 AM    BUN/Creatinine ratio 21 08/30/2017 04:20 AM    GFR est AA 36 08/30/2017 04:20 AM    GFR est non-AA 30 08/30/2017 04:20 AM    Calcium 8.7 08/30/2017 04:20 AM         ASSESSMENT and PLAN  HTN:not quite controlled, increased hydralazine to 50 mg tid  she is not taking clonidine any longer  PAF:she remains  in NSR after cardioversion her ECG today shows NSR and minor nstt. She is now off amiodarone since her intolerance to it  Continue eliquis no untoward effects thus far. Aware that AF may be recurring off amiodarone. Continue same dose of eliquis for now,decrease eliquis to 2,5 mg bid  CAD: seemingly completely asymptomatic.  Her ECG showed with NSR and no significant st t changes continue toprol imdur asa.  normal stress test on 4/25/17  HLD:closely followed by her PCP and well controlled  AODM: also closely followed by her PCP  Hyponatremia:off hctz and follow by nephrology  Right leg cellulitis: resolved but still swollen rle , closely followed by her pcp  Otherwise if all ok I will see her back in 4 months

## 2018-01-30 RX ORDER — GUAIFENESIN 100 MG/5ML
81 LIQUID (ML) ORAL DAILY
Qty: 90 TAB | Refills: 3 | Status: SHIPPED | OUTPATIENT
Start: 2018-01-30 | End: 2020-01-01 | Stop reason: DRUGHIGH

## 2018-01-30 NOTE — TELEPHONE ENCOUNTER
Requested Prescriptions     Signed Prescriptions Disp Refills    aspirin 81 mg chewable tablet 90 Tab 3     Sig: Take 1 Tab by mouth daily. Authorizing Provider: Radha Bobo     Ordering User: Oralia Cunningham     Verbal order per Dr. Mukul Hale.

## 2018-02-12 PROBLEM — E11.21 TYPE 2 DIABETES WITH NEPHROPATHY (HCC): Status: ACTIVE | Noted: 2018-02-12

## 2018-04-10 DIAGNOSIS — I25.10 CORONARY ARTERY DISEASE INVOLVING NATIVE CORONARY ARTERY OF NATIVE HEART WITHOUT ANGINA PECTORIS: ICD-10-CM

## 2018-04-10 DIAGNOSIS — E78.5 HYPERLIPIDEMIA, UNSPECIFIED HYPERLIPIDEMIA TYPE: ICD-10-CM

## 2018-04-10 DIAGNOSIS — I25.10 ATHEROSCLEROSIS OF NATIVE CORONARY ARTERY OF NATIVE HEART WITHOUT ANGINA PECTORIS: ICD-10-CM

## 2018-04-10 DIAGNOSIS — I67.89 ACUTE, BUT ILL-DEFINED, CEREBROVASCULAR DISEASE: ICD-10-CM

## 2018-04-10 DIAGNOSIS — I10 ESSENTIAL HYPERTENSION, BENIGN: ICD-10-CM

## 2018-04-11 RX ORDER — ISOSORBIDE MONONITRATE 60 MG/1
TABLET, EXTENDED RELEASE ORAL
Qty: 180 TAB | Refills: 2 | Status: SHIPPED | OUTPATIENT
Start: 2018-04-11 | End: 2018-06-28 | Stop reason: SDUPTHER

## 2018-04-13 ENCOUNTER — OFFICE VISIT (OUTPATIENT)
Dept: CARDIOLOGY CLINIC | Age: 83
End: 2018-04-13

## 2018-04-13 VITALS
WEIGHT: 179 LBS | RESPIRATION RATE: 16 BRPM | HEIGHT: 60 IN | OXYGEN SATURATION: 97 % | BODY MASS INDEX: 35.14 KG/M2 | DIASTOLIC BLOOD PRESSURE: 70 MMHG | SYSTOLIC BLOOD PRESSURE: 150 MMHG | HEART RATE: 70 BPM

## 2018-04-13 DIAGNOSIS — R06.02 SOB (SHORTNESS OF BREATH): ICD-10-CM

## 2018-04-13 DIAGNOSIS — I15.2 HYPERTENSION COMPLICATING DIABETES (HCC): ICD-10-CM

## 2018-04-13 DIAGNOSIS — I48.91 ATRIAL FIBRILLATION, UNSPECIFIED TYPE (HCC): Primary | ICD-10-CM

## 2018-04-13 DIAGNOSIS — E11.59 HYPERTENSION COMPLICATING DIABETES (HCC): ICD-10-CM

## 2018-04-13 DIAGNOSIS — R60.9 EDEMA, UNSPECIFIED TYPE: ICD-10-CM

## 2018-04-13 RX ORDER — HYDRALAZINE HYDROCHLORIDE 50 MG/1
75 TABLET, FILM COATED ORAL 3 TIMES DAILY
Qty: 405 TAB | Refills: 1 | Status: SHIPPED | OUTPATIENT
Start: 2018-04-13 | End: 2018-05-01 | Stop reason: ALTCHOICE

## 2018-04-13 NOTE — PROGRESS NOTES
Discussed insulin with upcoming lexiscan. Informed her that she would be unable to eat or drink for 2 hours prior to test.    Patient takes long acting and short acting insulin. States that she would feel more comfortable holding short acting insulin prior to procedure. Encouraged her to call office with any questions or concerns.

## 2018-04-13 NOTE — PATIENT INSTRUCTIONS
Schedule lexiscan nuclear study. Schedule venous doppler study of right lower extremity. Increase hydralazine to 75 mg three times per day. To follow up with Dr. Bambi Rodriguez after the above completed.

## 2018-04-13 NOTE — MR AVS SNAPSHOT
[address] [facility] [address] [facility] 
[phone] Patient: Missy Potts MRN: [id number] IJP:[id number] Visit Information Date & Time Provider Department Dept. Phone Encounter #  
 4/13/2018 11:40 AM Omid Fletcher MD [facility] [place]  [phone] [id number] Follow-up Instructions Return in about 2 weeks (around 4/27/2018). Follow-up and Disposition History Your Appointments 5/14/2018 11:00 AM  
ESTABLISHED PATIENT with MD Cherry Hernandez TURNER, [address] [place] [address] Avenue ([address]) Appt Note: 3m  
 [address] [facility] [phone]  
[phone]  
  
   
 [address] [facility] [address] Upcoming Health Maintenance Date Due  
 EYE EXAM RETINAL OR DILATED Q1 7/4/1946 DTaP/Tdap/Td series (1 - Tdap) 7/4/1957 ZOSTER VACCINE AGE 60> 5/4/1996 GLAUCOMA SCREENING Q2Y 7/4/2001 Bone Densitometry (Dexa) Screening 7/4/2001 Pneumococcal 65+ Low/Medium Risk (2 of 2 - PPSV23) 10/7/2016 Influenza Age 5 to Adult 8/1/2017 MEDICARE YEARLY EXAM 3/14/2018 LIPID PANEL Q1 6/13/2018 MICROALBUMIN Q1 6/29/2018 HEMOGLOBIN A1C Q6M 8/12/2018 FOOT EXAM Q1 10/18/2018 Allergies as of 4/13/2018  Review Complete On: 4/13/2018 By: Omid Fletcher MD  
  
 Severity Noted Reaction Type Reactions Clonidine  08/23/2017    Shortness of Breath, Swelling Lightheaded, Codeine  11/18/2010    Unknown (comments) Keflin  04/18/2011    Itching Pcn [Penicillins]  11/18/2010    Unknown (comments) Tramadol  04/14/2016    Other (comments) Makes her head feel swishy Current Immunizations  Reviewed on 7/28/2017 No immunizations on file. Not reviewed this visit You Were Diagnosed With   
  
 Codes Comments  [facility] unspecified [facility]    -  Primary ICD-10-CM: I48.91 
ICD-9-CM: 427.31   
 SOB (shortness of breath)     ICD-10-CM: R06.02 
ICD-9-CM: 786.05   
 Edema, unspecified type     ICD-10-CM: R60.9 ICD-9-CM: 623. 3 Hypertension complicating diabetes ([facility]     ICD-10-CM: E11.59, I10 
ICD-9-CM: 250.80, 401.9 Vitals BP Pulse Resp Height(growth percentile) Weight(growth percentile) SpO2  
 150/70 (BP 1 Location: Left arm, BP Patient Position: Sitting) 70 16 5' (1.524 m) 179 lb (81.2 kg) 97% BMI OB Status Smoking Status 34.96 kg/m2 Hysterectomy Former Smoker Vitals History BMI and BSA Data Body Mass Index Body Surface Area 34.96 kg/m 2 1.85 m 2 Preferred Pharmacy Pharmacy Name Phone  [address] [phone] Your Updated Medication List  
  
   
This list is accurate as of 4/13/18 12:27 PM.  Always use your most recent med list.  
  
  
  
  
 acyclovir 5 % ointment Commonly known as:  ZOVIRAX Apply  to affected area every three (3) hours. amLODIPine 5 mg tablet Commonly known as:  Sina Railing Take 1 Tab by mouth daily. aspirin 81 mg chewable tablet Take 1 Tab by mouth daily. atorvastatin 40 mg tablet Commonly known as:  LIPITOR Take 20 mg by mouth nightly. azelastine 137 mcg (0.1 %) nasal spray Commonly known as:  ASTELIN  
1 Spray by Both Nostrils route two (2) times a day. Use in each nostril as directed  
  
 bumetanide 1 mg tablet Commonly known as:  Dyllan Patron Take 1 Tab by mouth daily. doxycycline 100 mg capsule Commonly known as:  VIBRAMYCIN Take 1 Cap by mouth two (2) times a day. ELIQUIS 2.5 mg tablet Generic drug:  apixaban Take 2.5 mg by mouth two (2) times a day. enalapril 20 mg tablet Commonly known as:  VASOTEC  
TAKE 1 TABLET TWICE A DAY  
  
 glipiZIDE 5 mg tablet Commonly known as:  Lucrecia Fuchs Take 10 mg by mouth two (2) times a day. glucose blood VI test strips strip Commonly known as:  blood glucose test  
 Check blood sugar 4 times a day  
  
 hydrALAZINE 50 mg tablet Commonly known as:  APRESOLINE Take 1.5 Tabs by mouth three (3) times daily. insulin aspart U-100 100 unit/mL Inpn Commonly known as:  NovoLOG Flexpen U-100 Insulin Inject 7 units before each meal ( tid ac meals) or as directed  
  
 insulin detemir U-100 100 unit/mL (3 mL) Inpn Commonly known as:  LEVEMIR FLEXPEN  
15 Units by SubCUTAneous route daily. In the morning  
  
 isosorbide mononitrate ER 60 mg CR tablet Commonly known as:  IMDUR  
TAKE 1 TABLET TWICE A DAY  
  
 levoFLOXacin 250 mg tablet Commonly known as:  Ivana Beck Take 1 Tab by mouth daily. metoprolol tartrate 50 mg tablet Commonly known as:  LOPRESSOR Take 1 Tab by mouth two (2) times a day. mupirocin 2 % ointment Commonly known as:  BACTROBAN  
APPLY TO THE AFFECTED AREA EVERY DAY  
  
 mupirocin calcium 2 % topical cream  
Commonly known as:  [employer] Apply  to affected area two (2) times a day. nitroglycerin 0.4 mg SL tablet Commonly known as:  NITROSTAT  
1 Tab by SubLINGual route every five (5) minutes as needed for Chest Pain. * omeprazole 20 mg capsule Commonly known as:  PriLOSEC Take 1 Cap by mouth daily. * omeprazole 20 mg capsule Commonly known as:  PRILOSEC  
TAKE 1 CAPSULE DAILY  
  
 VITAMIN D3 2,000 unit Tab Generic drug:  cholecalciferol (vitamin D3) Take  by mouth. * Notice: This list has 2 medication(s) that are the same as other medications prescribed for you. Read the directions carefully, and ask your doctor or other care provider to review them with you. Prescriptions Sent to Pharmacy Refills  
 hydrALAZINE (APRESOLINE) 50 mg tablet 1 Sig: Take 1.5 Tabs by mouth three (3) times daily. Class: Normal  
 Pharmacy: [facility] [address] Ph #: [phone] Route: Oral  
  
We Performed the Following AMB POC EKG ROUTINE W/ 12 LEADS, INTER & REP [31912 CPT(R)] Follow-up Instructions Return in about 2 weeks (around 4/27/2018). To-Do List   
 04/13/2018 Imaging:  DUPLEX LOWER EXT VENOUS RIGHT   
  
 04/13/2018 ECG:  STRESS TEST LEXISCAN/CARDIOLITE Patient Instructions Schedule lexiscan nuclear study. Schedule venous doppler study of right lower extremity. Increase hydralazine to 75 mg three times per day. Introducing [facility]! Lucrecia Hatfield introduces [employer] patient portal. Now you can access parts of your medical record, email your doctor's office, and request medication refills online. 1. In your internet browser, go to [email or web address] 2. Click on the First Time User? Click Here link in the Sign In box. You will see the New Member Sign Up page. 3. Enter your [employer] Access Code exactly as it appears below. You will not need to use this code after youve completed the sign-up process. If you do not sign up before the expiration date, you must request a new code. · [employer] Access Code: [email or web address] Expires: 6/18/2018  7:07 AM 
 
4. Enter the last four digits of your Social Security Number (xxxx) and Date of Birth (mm/dd/yyyy) as indicated and click Submit. You will be taken to the next sign-up page. 5. Create a [employer] ID. This will be your [employer] login ID and cannot be changed, so think of one that is secure and easy to remember. 6. Create a [employer] password. You can change your password at any time. 7. Enter your Password Reset Question and Answer. This can be used at a later time if you forget your password. 8. Enter your e-mail address. You will receive e-mail notification when new information is available in [address] 9. Click Sign Up. You can now view and download portions of your medical record. 10. Click the Download Summary menu link to download a portable copy of your medical information. If you have questions, please visit the Frequently Asked Questions section of the [employer] website. Remember, [employer] is NOT to be used for urgent needs. For medical emergencies, dial 911. Now available from your iPhone and Android! Please provide this summary of care documentation to your next provider. Your primary care clinician is listed as [phone] IV. If you have any questions after today's visit, please call [phone].

## 2018-04-23 ENCOUNTER — CLINICAL SUPPORT (OUTPATIENT)
Dept: CARDIOLOGY CLINIC | Age: 83
End: 2018-04-23

## 2018-04-23 DIAGNOSIS — R06.02 SOB (SHORTNESS OF BREATH): ICD-10-CM

## 2018-04-23 DIAGNOSIS — R06.02 SHORTNESS OF BREATH: ICD-10-CM

## 2018-04-23 DIAGNOSIS — Z98.61 HISTORY OF PTCA: ICD-10-CM

## 2018-04-23 DIAGNOSIS — T79.2XXA TRAUMATIC SEROMA OF RIGHT THIGH, INITIAL ENCOUNTER (HCC): Primary | ICD-10-CM

## 2018-04-23 DIAGNOSIS — R60.9 EDEMA, UNSPECIFIED TYPE: ICD-10-CM

## 2018-04-23 NOTE — PROGRESS NOTES
See scanned document. Ordering Doctor:Dr. Judith Noyola  Reading Doctor:Dr. Judith Noyola    Patient was given Aminoph.  75 mg IVP for SOB and HA with relief

## 2018-04-23 NOTE — PROCEDURES
[facility]  *** FINAL REPORT ***    Name: Justino Fernandez  MRN: [id number]       Outpatient  : 1935  HIS Order #: [id number]  [address] [facility] Visit #: [id number]  Date: 2018    TYPE OF TEST: Peripheral Venous Testing    REASON FOR TEST  Limb swelling    Right Leg:-  Deep venous thrombosis:           No  Superficial venous thrombosis:    Not examined  Deep venous insufficiency:        Not examined  Superficial venous insufficiency: Not examined      INTERPRETATION/FINDINGS  PROCEDURE:  RIGHT LOWER EXTREMITY  VENOUS DUPLEX. Evaluation of lower  extremity veins with ultrasound (B-mode imaging, pulsed Doppler, color   Doppler). Includes the common femoral, deep femoral, femoral,  popliteal, posterior tibial, peroneal, and great saphenous veins. Other veins, for example the gastrocnemius and soleal veins, may also  be visualized. FINDINGS: Emogene Richer scale and color flow duplex images demonstrate normal  compressibility, spontaneous and augmented flow profiles, and absence  of filling defects throughout the deep veins in the right lower  extremity. A vein bypass graft is visualized in the distal right  thigh. Direct duplex imaging over a palpable lump at an incision site   in the distal thigh reveals a 2.5 cm anechoic mass. There is no flow   within the mass. CONCLUSION:  1)  No evidence of deep venous thrombosis within the right lower  extremity. 2)  Avascular mass measuring 2.5 cm in the distal right thigh is most  consistent with a seroma. ADDITIONAL COMMENTS    I have personally reviewed the data relevant to the interpretation of  this  study.     TECHNOLOGIST: Jane Zamudio RVT, RDMS, RDCS  Signed: 2018 11:46 AM    PHYSICIAN: Amena Houser MD  Signed: 2018 02:06 PM

## 2018-04-24 NOTE — PROGRESS NOTES
Patient has pending appointment on May 1,2018. Wilhemina Siemens will discuss Nuc result on this visit

## 2018-05-01 ENCOUNTER — OFFICE VISIT (OUTPATIENT)
Dept: CARDIOLOGY CLINIC | Age: 83
End: 2018-05-01

## 2018-05-01 VITALS
BODY MASS INDEX: 35.53 KG/M2 | WEIGHT: 181 LBS | HEART RATE: 72 BPM | RESPIRATION RATE: 16 BRPM | DIASTOLIC BLOOD PRESSURE: 90 MMHG | OXYGEN SATURATION: 96 % | HEIGHT: 60 IN | SYSTOLIC BLOOD PRESSURE: 140 MMHG

## 2018-05-01 DIAGNOSIS — I25.10 CORONARY ARTERY DISEASE INVOLVING NATIVE CORONARY ARTERY OF NATIVE HEART WITHOUT ANGINA PECTORIS: ICD-10-CM

## 2018-05-01 DIAGNOSIS — E78.5 HYPERLIPIDEMIA, UNSPECIFIED HYPERLIPIDEMIA TYPE: ICD-10-CM

## 2018-05-01 DIAGNOSIS — R06.02 SOB (SHORTNESS OF BREATH): Primary | ICD-10-CM

## 2018-05-01 DIAGNOSIS — I48.91 ATRIAL FIBRILLATION, UNSPECIFIED TYPE (HCC): ICD-10-CM

## 2018-05-01 DIAGNOSIS — I10 ESSENTIAL HYPERTENSION, BENIGN: ICD-10-CM

## 2018-05-01 PROBLEM — E66.01 SEVERE OBESITY (BMI 35.0-39.9) WITH COMORBIDITY (HCC): Status: ACTIVE | Noted: 2018-05-01

## 2018-05-01 RX ORDER — METOPROLOL TARTRATE 100 MG/1
100 TABLET ORAL 2 TIMES DAILY
COMMUNITY
End: 2019-03-22 | Stop reason: SDUPTHER

## 2018-05-01 NOTE — PATIENT INSTRUCTIONS
Stop Hydralazine    Increase Metoprolol 100 mg twice a day    BP check in 1 week     Follow up with Blanche Jackson in 4 months

## 2018-05-01 NOTE — PROGRESS NOTES
HISTORY OF PRESENT ILLNESS  Thom Perez is a 81 y.o. female. She has h/o HTN, HLD, AODM and CAD  And PAF (diagnosed on 4/17 for which she underwent successful cardioversion on oac and amiodarone)here for follow up  Stress test normal on 11/10    Doppler /US renal NO MANDO b/l    PTCA and stent of right coronary artery in 1996 July 26, 2007. The cardiac catheterization revealed mild 10% tapering of the ostium. The left anterior descending artery had mild intimal disease throughout. There was a 30% stenosis in the proximal portion of the LAD between the first and second septal . The remainder of the LAD had only mild intimal disease. The circumflex artery had a 30%-40% stenosis in the proximal mid segment prior to take off of obtuse marginal branch. The right coronary artery was a large dominant artery with 80% proximal stenosis followed by 90% proximal to mid stenosis, followed by 30% residual stenosis. Ejection fraction was estimated at 50%-55%. 2/14: nuclear stress test: mild ischemia mid distal kailey lateral wall EF 59%    3/14: cardiac catheterization:Left main: The vessel was normal sized. Angiography  showed minor luminal irregularities. LAD: The vessel was normal sized. Angiography showed mild atherosclerosis. There was a 30 % stenosis in the  middle third of the vessel segment. 1st diagonal: The vessel was small  sized. Angiography showed minor luminal irregularities. 2nd diagonal: The  vessel was normal sized. Angiography showed mild atherosclerosis. Circumflex: The vessel was normal sized. Angiography showed mild  atherosclerosis. There was a 30 % stenosis. RCA: The vessel was large  sized (dominant). Angiography showed mild atherosclerosis and 3 patent  prior stents.  There was a tubular 30 % stenosis in the distal third of the  vessel segment.    MEDICAL RX ONLY at that time   Stopped niacin in 2014 for \"sick \" feeling    Seen in [facility] for HTN on 2/15 aldactone increased to 25 mg at that time  Renal US on 3/15 no MANDO  ON 4/17 admitted to [facility] for sob and found to be in AF  Nuclear stress test on 4/25/17:No ischemia demonstrated. No infarct visible. LVEF 61%.      Chest ct on 4/17:1. Atherosclerotic aorta with coronary artery calcification. 2. Small bilateral pleural effusions with right pleural calcifications. 3. Status post cholecystectomy. Valentin/Cardioversion  on 4/28/17:VALENTIN with normal EF mild mr and TR no evidence for gross intracardiac thrombi  Cardioversion: 150 joules were delivered in a synch fashion with restoration of NSR  Amiodarone started lopressor decreased  Head ct on 8/18/18:Severe chronic microvascular ischemic change.      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  Amiodarone stopped with concerns for toxicity.  Also noted to hyponatremic.    Nephrology consulted and felt low Na related to HCTZ and Bactrim use - both stopped indefinitely                        - Bumex 1 mg PO daily started and continued through discharge                        - Na improve to 130 before discharge                        - Patient to follow up with Nephrology as needed. Pulmonary consulted and felt no Amiodarone toxicity, SOB related to COPD and volume                        - SOB improved as Na improved and Bumex was used.      ECHO on 8/17:Left ventricle: Systolic function was normal. Ejection fraction was  estimated in the range of 55 % to 60 %. Suboptimal endocardial  visualization limits wall motion analysis. Wall thickness was mildly  increased. Left atrium: The atrium was mildly dilated. Mitral valve: There was mild regurgitation. Aortic valve: Leaflets exhibited lower normal cuspal separation and  sclerosis. Tricuspid valve: There was mild regurgitation.   CTA  8/17 of chest without toxicity changes or PTE: . There is a mild to moderate-sized right pleural effusion and a mild sized  left pleural effusion. The heart is enlarged there is no pericardial effusion. There is no mediastinal masses or adenopathy. There is no evidence for pulmonary  emboli. There is no shift or pneumothorax. There are mild congestive changes. Mild emphysema. No focal consolidation or mass.      IMPRESSION  IMPRESSION: Bilateral effusions and cardiomegaly. No pulmonary emboli. Nuclear stress test on 4/23/18:SPECT images demonstrate a medium, fixed abnormality of mild degree in the anterior region on the stress and rest images. Gated SPECT images reveals normal myocardial thickening and wall motion. The left ventricular ejection fraction was calculated to be 63 %. Impression:   Myocardial perfusion imaging is normal. No gross ischemia or mi  Overall left ventricular systolic function was normal.       These test results indicate low likelihood for the presence of angiographically significant coronary artery disease. Vascular us le : no dvt seroma on right thigh          Past Medical History:   Diagnosis Date    Atrial fibrillation ([address]       CAD (coronary artery disease)       Cerebrovascular accident stroke, other, unspec 11/18/2010    Coronary atherosclerosis of native coronary artery 11/18/2010    Essential hypertension       Essential hypertension, benign 11/18/2010    HLD (hyperlipidemia) 11/18/2010    PVD (peripheral vascular disease) ([address] 2017    Type II or unspecified type diabetes mellitus without mention of complication, not stated as uncontrolled 11/18/2010                 Past Surgical History:   Procedure Laterality Date    HX CORONARY STENT PLACEMENT          HX HEART CATHETERIZATION          HX HYSTERECTOMY    1972       HPI  Really complaining of feeling funny in her head every time she takes hydralazine. This is significant enough that she is not able to drive when that happens. She has stopped the hydralazine and she has felt better in the last 24 hours. No chest pain reported. Shortness of breath is not consistent and she has good days and bad days since she tells me. Review of Systems   Respiratory: Positive for shortness of breath. Cardiovascular: Negative. Visit Vitals    /90 (BP 1 Location: Left arm, BP Patient Position: Sitting)    Pulse 72    Resp 16    Ht 5' (1.524 m)    Wt 82.1 kg (181 lb)    SpO2 96%    BMI 35.35 kg/m2       Physical Exam   Neck: No JVD present. Carotid bruit is not present. Cardiovascular: Normal rate and regular rhythm. Pulmonary/Chest: Effort normal and breath sounds normal.   Abdominal: Soft. Musculoskeletal: She exhibits edema. Psychiatric: She has a normal mood and affect. Current Outpatient Prescriptions on File Prior to Visit   Medication Sig Dispense Refill    hydrALAZINE (APRESOLINE) 50 mg tablet Take 1.5 Tabs by mouth three (3) times daily. 405 Tab 1    isosorbide mononitrate ER (IMDUR) 60 mg CR tablet TAKE 1 TABLET TWICE A  Tab 2    glucose blood VI test strips (BLOOD GLUCOSE TEST) strip Check blood sugar 4 times a day 200 Strip 11    omeprazole (PRILOSEC) 20 mg capsule TAKE 1 CAPSULE DAILY 90 Cap 3    doxycycline (VIBRAMYCIN) 100 mg capsule Take 1 Cap by mouth two (2) times a day. 28 Cap 0    acyclovir (ZOVIRAX) 5 % ointment Apply  to affected area every three (3) hours. 45 g 0    mupirocin calcium (BACTROBAN) 2 % topical cream Apply  to affected area two (2) times a day. 45 g 0    aspirin 81 mg chewable tablet Take 1 Tab by mouth daily. 90 Tab 3    insulin detemir (LEVEMIR FLEXPEN) 100 unit/mL (3 mL) inpn 15 Units by SubCUTAneous route daily. In the morning 5 Pen 4    mupirocin (BACTROBAN) 2 % ointment APPLY TO THE AFFECTED AREA EVERY DAY 22 g 0    amLODIPine (NORVASC) 5 mg tablet Take 1 Tab by mouth daily. 90 Tab 3    enalapril (VASOTEC) 20 mg tablet TAKE 1 TABLET TWICE A  Tab 3    bumetanide (BUMEX) 1 mg tablet Take 1 Tab by mouth daily.  90 Tab 3    apixaban (ELIQUIS) 2.5 mg tablet Take 2.5 mg by mouth two (2) times a day.  insulin aspart (NOVOLOG FLEXPEN) 100 unit/mL inpn Inject 7 units before each meal ( tid ac meals) or as directed 15 Pen 3    levoFLOXacin (LEVAQUIN) 250 mg tablet Take 1 Tab by mouth daily. 10 Tab 0    cholecalciferol, vitamin D3, (VITAMIN D3) 2,000 unit tab Take  by mouth.  glipiZIDE (GLUCOTROL) 5 mg tablet Take 10 mg by mouth two (2) times a day.  atorvastatin (LIPITOR) 40 mg tablet Take 20 mg by mouth nightly.  azelastine (ASTELIN) 137 mcg (0.1 %) nasal spray 1 [place] by Both Nostrils route two (2) times a day. Use in each nostril as directed 1 Bottle 12    metoprolol tartrate (LOPRESSOR) 50 mg tablet Take 1 Tab by mouth two (2) times a day. 180 Tab 3    nitroglycerin (NITROSTAT) 0.4 mg SL tablet 1 Tab by SubLINGual route every five (5) minutes as needed for Chest Pain. 25 Tab 3     No current facility-administered medications on file prior to visit. ASSESSMENT and PLAN  HTN: Apparently unable to take hydralazine. Discussed option. She is willing to try again higher dose of metoprolol. Hydralazine will be stopped. She will be started on increased dose of metoprolol 100 mg twice daily. Patient with history of present medications. I will see her back in 1 week for blood pressure check only. PAF:she remains  in NSR after cardioversion her ECG showed NSR and minor nstt. She is now off amiodarone since her intolerance to it  Continue eliquis no untoward effects thus far. Aware that AF may be recurring off amiodarone. Continue same dose of eliquis for now,decrease eliquis to 2,5 mg bid, Lopressor being increased. CAD:  discussed results of the nuclear stress test.  this failed to reveal any gross ischemia. No additional interventions for now. Her shortness of breath may be multifactorial.  Paroxysmal atrial fibrillation may be playing a role at this time. Again continue with metoprolol imdur and asa.     HLD:closely followed by her PCP and well controlled    AODM: also closely followed by her PCP    Hyponatremia:off hctz and follow by nephrology    Right leg cellulitis: resolved Doppler ultrasound of the lower extremities in April 2018 failed to reveal any dvt. on vascular structure noted inner aspect of the right thigh likely representing a seroma. I have asked her to follow-up with her vascular surgeon as well.     Otherwise if all ok I will see her back in 4 months

## 2018-05-01 NOTE — MR AVS SNAPSHOT
[address] [facility] [address] [place] [address] 
[phone] Patient: Gwyn Mcqueen MRN: [id number] AXO:[birth year] Visit Information Date & Time Provider Department Dept. Phone Encounter #  
 5/1/2018 11:20 AM Michel Rodriguez MD [facility] Osvaldo John [phone] [id number] Follow-up Instructions Return in about 4 months (around 9/1/2018). Follow-up and Disposition History Your Appointments 5/14/2018 11:00 AM  
ESTABLISHED PATIENT with MD Fletcher Abbasi TURNER, [address] [place] [address] ([facility]) Appt Note:   
 [address] [facility] [address]  
[phone]  
  
   
 [address] [facility] 7 [address] Upcoming Health Maintenance Date Due  
 EYE EXAM RETINAL OR DILATED Q1 7/4/1945 DTaP/Tdap/Td series (1 - Tdap) 7/4/1957 ZOSTER VACCINE AGE 60> 5/4/1996 GLAUCOMA SCREENING Q2Y 7/4/2001 Bone Densitometry (Dexa) Screening 7/4/2001 Pneumococcal 65+ Low/Medium Risk (2 of 2 - PPSV23) 10/7/2016 MEDICARE YEARLY EXAM 3/14/2018 LIPID PANEL Q1 6/13/2018 MICROALBUMIN Q1 6/29/2018 Influenza Age 5 to Adult 8/1/2018 HEMOGLOBIN A1C Q6M 8/12/2018 FOOT EXAM Q1 10/18/2018 Allergies as of 5/1/2018  Review Complete On: 5/1/2018 By: Michel Rodriguez MD  
  
 Severity Noted Reaction Type Reactions Clonidine  08/23/2017    Shortness of Breath, Swelling Lightheaded, Codeine  11/18/2010    Unknown (comments) Keflin  04/18/2011    Itching Pcn [Penicillins]  11/18/2010    Unknown (comments) Tramadol  04/14/2016    Other (comments) Makes her head feel swishy Current Immunizations  Reviewed on 7/28/2017 No immunizations on file. Not reviewed this visit You Were Diagnosed With   
  
 Codes Comments  SOB (shortness of breath)    -  Primary ICD-10-CM: R06.02 
ICD-9-CM: 786.05   
 Atrial fibrillation, unspecified type ([facility]     ICD-10-CM: I48.91 
ICD-9-CM: 427.31 Essential hypertension, benign     ICD-10-CM: I10 
ICD-9-CM: 401.1 Coronary artery disease involving native coronary artery of native heart without angina pectoris     ICD-10-CM: I25.10 ICD-9-CM: 414.01 Hyperlipidemia, unspecified hyperlipidemia type     ICD-10-CM: E78.5 ICD-9-CM: 272.4 Vitals BP Pulse Resp Height(growth percentile) Weight(growth percentile) SpO2  
 140/90 (BP 1 Location: Left arm, BP Patient Position: Sitting) 72 16 5' (1.524 m) 181 lb (82.1 kg) 96% BMI OB Status Smoking Status 35.35 kg/m2 Hysterectomy Former Smoker Vitals History BMI and BSA Data Body Mass Index Body Surface Area  
 35.35 kg/m 2 1.86 m 2 Preferred Pharmacy Pharmacy Name Phone  [address] [phone] Your Updated Medication List  
  
   
This list is accurate as of 5/1/18 11:59 AM.  Always use your most recent med list.  
  
  
  
  
 acyclovir 5 % ointment Commonly known as:  ZOVIRAX Apply  to affected area every three (3) hours. amLODIPine 5 mg tablet Commonly known as:  Orsammi Scott Take 1 Tab by mouth daily. aspirin 81 mg chewable tablet Take 1 Tab by mouth daily. atorvastatin 40 mg tablet Commonly known as:  LIPITOR Take 20 mg by mouth nightly. azelastine 137 mcg (0.1 %) nasal spray Commonly known as:  ASTELIN  
1 Spray by Both Nostrils route two (2) times a day. Use in each nostril as directed  
  
 bumetanide 1 mg tablet Commonly known as:  Tura Alex Take 1 Tab by mouth daily. doxycycline 100 mg capsule Commonly known as:  VIBRAMYCIN Take 1 Cap by mouth two (2) times a day. ELIQUIS 2.5 mg tablet Generic drug:  apixaban Take 2.5 mg by mouth two (2) times a day. enalapril 20 mg tablet Commonly known as:  VASOTEC  
TAKE 1 TABLET TWICE A DAY  
  
 glipiZIDE 5 mg tablet Commonly known as:  Pilar Craw Take 10 mg by mouth two (2) times a day. glucose blood VI test strips strip Commonly known as:  blood glucose test  
Check blood sugar 4 times a day  
  
 insulin aspart U-100 100 unit/mL Inpn Commonly known as:  NovoLOG Flexpen U-100 Insulin Inject 7 units before each meal ( tid ac meals) or as directed  
  
 insulin detemir U-100 100 unit/mL (3 mL) Inpn Commonly known as:  LEVEMIR FLEXPEN  
15 Units by SubCUTAneous route daily. In the morning  
  
 isosorbide mononitrate ER 60 mg CR tablet Commonly known as:  IMDUR  
TAKE 1 TABLET TWICE A DAY  
  
 levoFLOXacin 250 mg tablet Commonly known as:  Jason Gores Take 1 Tab by mouth daily. LOPRESSOR 100 mg IR tablet Generic drug:  metoprolol tartrate Take 100 mg by mouth two (2) times a day. mupirocin 2 % ointment Commonly known as:  BACTROBAN  
APPLY TO THE AFFECTED AREA EVERY DAY  
  
 mupirocin calcium 2 % topical cream  
Commonly known as:  [employer] Apply  to affected area two (2) times a day. nitroglycerin 0.4 mg SL tablet Commonly known as:  NITROSTAT  
1 Tab by SubLINGual route every five (5) minutes as needed for Chest Pain. omeprazole 20 mg capsule Commonly known as:  PRILOSEC  
TAKE 1 CAPSULE DAILY  
  
 VITAMIN D3 2,000 unit Tab Generic drug:  cholecalciferol (vitamin D3) Take  by mouth. Follow-up Instructions Return in about 4 months (around 9/1/2018). Patient Instructions Stop Hydralazine Increase Metoprolol 100 mg twice a day BP check in 1 week Follow up with Bam Mcdowell in 4 months Introducing [facility]! [employer] introduces textPlus patient portal. Now you can access parts of your medical record, email your doctor's office, and request medication refills online. 1. In your internet browser, go to [email or web address] 2. Click on the First Time User? Click Here link in the Sign In box.  You will see the New Member Sign Up page. 3. Enter your Jirafe Access Code exactly as it appears below. You will not need to use this code after youve completed the sign-up process. If you do not sign up before the expiration date, you must request a new code. · Jirafe Access Code: [email or web address] Expires: 6/18/2018  7:07 AM 
 
4. Enter the last four digits of your Social Security Number (xxxx) and Date of Birth (mm/dd/yyyy) as indicated and click Submit. You will be taken to the next sign-up page. 5. Create a Jirafe ID. This will be your Jirafe login ID and cannot be changed, so think of one that is secure and easy to remember. 6. Create a Jirafe password. You can change your password at any time. 7. Enter your Password Reset Question and Answer. This can be used at a later time if you forget your password. 8. Enter your e-mail address. You will receive e-mail notification when new information is available in [address]. 9. Click Sign Up. You can now view and download portions of your medical record. 10. Click the Download Summary menu link to download a portable copy of your medical information. If you have questions, please visit the Frequently Asked Questions section of the Jirafe website. Remember, Jirafe is NOT to be used for urgent needs. For medical emergencies, dial 911. Now available from your iPhone and Android! Please provide this summary of care documentation to your next provider. Your primary care clinician is listed as [phone] [address]s ellie IV. If you have any questions after today's visit, please call [phone].

## 2018-05-08 ENCOUNTER — CLINICAL SUPPORT (OUTPATIENT)
Dept: CARDIOLOGY CLINIC | Age: 83
End: 2018-05-08

## 2018-05-08 VITALS — SYSTOLIC BLOOD PRESSURE: 170 MMHG | DIASTOLIC BLOOD PRESSURE: 70 MMHG

## 2018-05-08 DIAGNOSIS — I10 ESSENTIAL HYPERTENSION, BENIGN: Primary | ICD-10-CM

## 2018-05-08 RX ORDER — AMLODIPINE BESYLATE 5 MG/1
5 TABLET ORAL DAILY
COMMUNITY
End: 2018-09-21 | Stop reason: SDUPTHER

## 2018-05-08 NOTE — PROGRESS NOTES
Per Dr Anselmo Springer VO discontinue all medication not taken. Discussed with Elena Chavez about her BP and he said to increase Amlodipine 5 mg bid and check BP in 2 weeks. Per patient she is feeling well since Hydralazine was discontinued. I called patient back and advised the changes.

## 2018-05-08 NOTE — MR AVS SNAPSHOT
[address] [facility] [address] [place] 
[phone] Patient: Skip De Guzman MRN: [id number] Visit Information Date & Time Provider Department Dept. Phone Encounter #  
 5/8/2018  9:00 AM Christina Puckett MD [facility] Neftaly Drake [phone] [id number] Your Appointments 5/8/2018  9:00 AM  
BLOOD PRESSURE with Christina Puckett MD  
[facility] ([facility] SCHEDULING) Appt Note: 1 week bp check [address] [place]  
[phone]  
  
   
 [address]  
  
    
 5/14/2018 11:00 AM  
ESTABLISHED PATIENT with MD Camryn Powell TURNER, [address] [facility] ([address]) Appt Note:   
 [address] [facility] [place]  
[phone]  
  
   
 [address] [address]  
  
    
 9/4/2018 11:20 AM  
ESTABLISHED PATIENT with Christina Puckett MD  
[facility] ([address]) Appt Note: 4 month f/u  
 [address] [place]  
[address] [facility] 7 [address] Upcoming Health Maintenance Date Due  
 EYE EXAM RETINAL OR DILATED Q1 7/4/1946 DTaP/Tdap/Td series (1 - Tdap) 7/4/1957 ZOSTER VACCINE AGE 60> 5/4/1996 GLAUCOMA SCREENING Q2Y 7/4/2001 Bone Densitometry (Dexa) Screening 7/4/2001 Pneumococcal 65+ Low/Medium Risk (2 of 2 - PPSV23) 10/7/2016 MEDICARE YEARLY EXAM 3/14/2018 LIPID PANEL Q1 6/13/2018 MICROALBUMIN Q1 6/29/2018 Influenza Age 5 to Adult 8/1/2018 HEMOGLOBIN A1C Q6M 8/12/2018 FOOT EXAM Q1 10/18/2018 Allergies as of 5/8/2018  Review Complete On: 5/1/2018 By: Christina Puckett MD  
  
 Severity Noted Reaction Type Reactions Clonidine  08/23/2017    Shortness of Breath, Swelling Lightheaded, Codeine  11/18/2010    Unknown (comments) Jonathanin  04/18/2011    Itching Pcn [Penicillins]  11/18/2010    Unknown (comments) Tramadol  04/14/2016    Other (comments) Makes her head feel swishy Current Immunizations  Reviewed on 7/28/2017 No immunizations on file. Not reviewed this visit Vitals OB Status Smoking Status Hysterectomy Former Smoker Your Updated Medication List  
  
   
This list is accurate as of 5/8/18  6:49 AM.  Always use your most recent med list.  
  
  
  
  
 acyclovir 5 % ointment Commonly known as:  ZOVIRAX Apply  to affected area every three (3) hours. amLODIPine 5 mg tablet Commonly known as:  Filipe Lute Take 1 Tab by mouth daily. aspirin 81 mg chewable tablet Take 1 Tab by mouth daily. atorvastatin 40 mg tablet Commonly known as:  LIPITOR Take 20 mg by mouth nightly. azelastine 137 mcg (0.1 %) nasal spray Commonly known as:  ASTELIN  
1 Spray by Both Nostrils route two (2) times a day. Use in each nostril as directed  
  
 bumetanide 1 mg tablet Commonly known as:  Bernell Rumble Take 1 Tab by mouth daily. doxycycline 100 mg capsule Commonly known as:  VIBRAMYCIN Take 1 Cap by mouth two (2) times a day. ELIQUIS 2.5 mg tablet Generic drug:  apixaban Take 2.5 mg by mouth two (2) times a day. enalapril 20 mg tablet Commonly known as:  VASOTEC  
TAKE 1 TABLET TWICE A DAY  
  
 glipiZIDE 5 mg tablet Commonly known as:  Dalia Lands Take 10 mg by mouth two (2) times a day. glucose blood VI test strips strip Commonly known as:  blood glucose test  
Check blood sugar 4 times a day  
  
 insulin aspart U-100 100 unit/mL Inpn Commonly known as:  NovoLOG Flexpen U-100 Insulin Inject 7 units before each meal ( tid ac meals) or as directed  
  
 insulin detemir U-100 100 unit/mL (3 mL) Inpn Commonly known as:  LEVEMIR FLEXPEN  
15 Units by SubCUTAneous route daily. In the morning isosorbide mononitrate ER 60 mg CR tablet Commonly known as:  IMDUR  
TAKE 1 TABLET TWICE A DAY  
  
 levoFLOXacin 250 mg tablet Commonly known as:  Ivana Irizarry Take 1 Tab by mouth daily. LOPRESSOR 100 mg IR tablet Generic drug:  metoprolol tartrate Take 100 mg by mouth two (2) times a day. mupirocin 2 % ointment Commonly known as:  BACTROBAN  
APPLY TO THE AFFECTED AREA EVERY DAY  
  
 mupirocin calcium 2 % topical cream  
Commonly known as:  [employer] Apply  to affected area two (2) times a day. nitroglycerin 0.4 mg SL tablet Commonly known as:  NITROSTAT  
1 Tab by SubLINGual route every five (5) minutes as needed for Chest Pain. omeprazole 20 mg capsule Commonly known as:  PRILOSEC  
TAKE 1 CAPSULE DAILY  
  
 VITAMIN D3 2,000 unit Tab Generic drug:  cholecalciferol (vitamin D3) Take  by mouth. Introducing [facility]! Gege Jones introduces [employer] patient portal. Now you can access parts of your medical record, email your doctor's office, and request medication refills online. 1. In your internet browser, go to [email or web address] 2. Click on the First Time User? Click Here link in the Sign In box. You will see the New Member Sign Up page. 3. Enter your [employer] Access Code exactly as it appears below. You will not need to use this code after youve completed the sign-up process. If you do not sign up before the expiration date, you must request a new code. · [employer] Access Code: [email or web address] Expires: 6/18/2018  7:07 AM 
 
4. Enter the last four digits of your Social Security Number (xxxx) and Date of Birth (mm/dd/yyyy) as indicated and click Submit. You will be taken to the next sign-up page. 5. Create a DNaget ID. This will be your [employer] login ID and cannot be changed, so think of one that is secure and easy to remember. 6. Create a [employer] password. You can change your password at any time. 7. Enter your Password Reset Question and Answer. This can be used at a later time if you forget your password. 8. Enter your e-mail address. You will receive e-mail notification when new information is available in [address] 9. Click Sign Up. You can now view and download portions of your medical record. 10. Click the Download Summary menu link to download a portable copy of your medical information. If you have questions, please visit the Frequently Asked Questions section of the [employer] website. Remember, [employer] is NOT to be used for urgent needs. For medical emergencies, dial 911. Now available from your iPhone and Android! Please provide this summary of care documentation to your next provider. Your primary care clinician is listed as [id number] [address] [facility] IV. If you have any questions after today's visit, please call [phone].

## 2018-05-24 ENCOUNTER — TELEPHONE (OUTPATIENT)
Dept: CARDIOLOGY CLINIC | Age: 83
End: 2018-05-24

## 2018-05-24 NOTE — TELEPHONE ENCOUNTER
Pt called to see if she needs to come in for a blood pressure check next week and she also wanted to know if she needs to see Dr. Korin Burroughs. I told the pt that she was scheduled with Dr. Korin Burroughs in September but she said her PCP said something about seeing Dr. Korin Burroughs. Pt can be reached @ [phone]. Thanks!

## 2018-05-25 NOTE — TELEPHONE ENCOUNTER
Verified patient with two patient identifiers. Spoke with patient,she said that  decreased her Norvasc to 5 mg due to cellulitis. She will come by here next Tuesday for BP check

## 2018-06-28 DIAGNOSIS — I25.10 CORONARY ARTERY DISEASE INVOLVING NATIVE CORONARY ARTERY OF NATIVE HEART WITHOUT ANGINA PECTORIS: ICD-10-CM

## 2018-06-28 DIAGNOSIS — I10 ESSENTIAL HYPERTENSION, BENIGN: ICD-10-CM

## 2018-06-28 DIAGNOSIS — I67.89 ACUTE, BUT ILL-DEFINED, CEREBROVASCULAR DISEASE: ICD-10-CM

## 2018-06-28 DIAGNOSIS — I25.10 ATHEROSCLEROSIS OF NATIVE CORONARY ARTERY OF NATIVE HEART WITHOUT ANGINA PECTORIS: ICD-10-CM

## 2018-06-28 DIAGNOSIS — E78.5 HYPERLIPIDEMIA, UNSPECIFIED HYPERLIPIDEMIA TYPE: ICD-10-CM

## 2018-06-28 RX ORDER — ISOSORBIDE MONONITRATE 60 MG/1
TABLET, EXTENDED RELEASE ORAL
Qty: 180 TAB | Refills: 2 | Status: SHIPPED | OUTPATIENT
Start: 2018-06-28 | End: 2018-09-21 | Stop reason: SDUPTHER

## 2018-09-21 ENCOUNTER — OFFICE VISIT (OUTPATIENT)
Dept: CARDIOLOGY CLINIC | Age: 83
End: 2018-09-21

## 2018-09-21 VITALS
DIASTOLIC BLOOD PRESSURE: 80 MMHG | HEART RATE: 71 BPM | OXYGEN SATURATION: 94 % | HEIGHT: 60 IN | BODY MASS INDEX: 35.14 KG/M2 | SYSTOLIC BLOOD PRESSURE: 150 MMHG | RESPIRATION RATE: 16 BRPM | WEIGHT: 179 LBS

## 2018-09-21 DIAGNOSIS — I67.89 ACUTE, BUT ILL-DEFINED, CEREBROVASCULAR DISEASE: ICD-10-CM

## 2018-09-21 DIAGNOSIS — I10 ESSENTIAL HYPERTENSION, BENIGN: ICD-10-CM

## 2018-09-21 DIAGNOSIS — I48.91 ATRIAL FIBRILLATION, UNSPECIFIED TYPE (HCC): ICD-10-CM

## 2018-09-21 DIAGNOSIS — E78.5 HYPERLIPIDEMIA, UNSPECIFIED HYPERLIPIDEMIA TYPE: ICD-10-CM

## 2018-09-21 DIAGNOSIS — Z79.899 ENCOUNTER FOR LONG-TERM (CURRENT) DRUG USE: ICD-10-CM

## 2018-09-21 DIAGNOSIS — E78.00 PURE HYPERCHOLESTEROLEMIA: ICD-10-CM

## 2018-09-21 DIAGNOSIS — I25.10 ATHEROSCLEROSIS OF NATIVE CORONARY ARTERY OF NATIVE HEART WITHOUT ANGINA PECTORIS: Primary | ICD-10-CM

## 2018-09-21 DIAGNOSIS — I25.10 ATHEROSCLEROSIS OF NATIVE CORONARY ARTERY OF NATIVE HEART WITHOUT ANGINA PECTORIS: ICD-10-CM

## 2018-09-21 DIAGNOSIS — I25.10 CORONARY ARTERY DISEASE INVOLVING NATIVE CORONARY ARTERY OF NATIVE HEART WITHOUT ANGINA PECTORIS: ICD-10-CM

## 2018-09-21 RX ORDER — BUMETANIDE 2 MG/1
2 TABLET ORAL EVERY OTHER DAY
COMMUNITY
End: 2018-09-21 | Stop reason: SDUPTHER

## 2018-09-21 RX ORDER — ENALAPRIL MALEATE 20 MG/1
TABLET ORAL
Qty: 180 TAB | Refills: 3 | Status: SHIPPED | OUTPATIENT
Start: 2018-09-21 | End: 2018-10-14 | Stop reason: SDUPTHER

## 2018-09-21 RX ORDER — AMLODIPINE BESYLATE 5 MG/1
5 TABLET ORAL DAILY
Qty: 90 TAB | Refills: 3 | Status: SHIPPED | OUTPATIENT
Start: 2018-09-21 | End: 2019-03-22 | Stop reason: ALTCHOICE

## 2018-09-21 RX ORDER — BUMETANIDE 2 MG/1
2 TABLET ORAL EVERY OTHER DAY
Qty: 90 TAB | Refills: 3 | Status: SHIPPED | OUTPATIENT
Start: 2018-09-21 | End: 2019-03-22

## 2018-09-21 RX ORDER — ISOSORBIDE MONONITRATE 60 MG/1
TABLET, EXTENDED RELEASE ORAL
Qty: 180 TAB | Refills: 3 | Status: SHIPPED | OUTPATIENT
Start: 2018-09-21 | End: 2020-01-01

## 2018-09-21 RX ORDER — BUMETANIDE 1 MG/1
1 TABLET ORAL DAILY
Qty: 90 TAB | Refills: 3 | Status: SHIPPED | OUTPATIENT
Start: 2018-09-21 | End: 2019-04-23 | Stop reason: SDUPTHER

## 2018-09-21 NOTE — PATIENT INSTRUCTIONS
Bumex 1 mg alternate 2 mg daily    Have BP check in 10 days    Have blood work drawn in 1 week    Follow up with Avril Irene in 6 months

## 2018-09-21 NOTE — MR AVS SNAPSHOT
[address] [facility] [address] [place] [address] 
[phone] Patient: Gay Perez MRN: [id number] [id number] Visit Information Date & Time Provider Department Dept. Phone Encounter #  
 9/21/2018  1:40 PM Ramon Rizvi MD [facility] Erika Parish [phone] [id number] Follow-up Instructions Return in about 6 months (around 3/21/2019). Follow-up and Disposition History Your Appointments 11/28/2018 10:30 AM  
ESTABLISHED PATIENT with MD Lori Pineda TURNER, [address] ([address]) Appt Note:   
 [address] [facility] 7 [address]  
[phone]  
  
   
 [address] [facility] 7 [address] Upcoming Health Maintenance Date Due  
 EYE EXAM RETINAL OR DILATED Q1 7/4/1946 DTaP/Tdap/Td series (1 - Tdap) 7/4/1957 ZOSTER VACCINE AGE 60> 5/4/1996 GLAUCOMA SCREENING Q2Y 7/4/2001 Bone Densitometry (Dexa) Screening 7/4/2001 Pneumococcal 65+ Low/Medium Risk (2 of 2 - PPSV23) 10/7/2016 MEDICARE YEARLY EXAM 3/14/2018 LIPID PANEL Q1 6/13/2018 MICROALBUMIN Q1 6/29/2018 Influenza Age 5 to Adult 8/1/2018 FOOT EXAM Q1 10/18/2018 HEMOGLOBIN A1C Q6M 2/27/2019 Allergies as of 9/21/2018  Review Complete On: 9/21/2018 By: Ramon Rizvi MD  
  
 Severity Noted Reaction Type Reactions Clonidine  08/23/2017    Shortness of Breath, Swelling Lightheaded, Codeine  11/18/2010    Unknown (comments) Keflin  04/18/2011    Itching Pcn [Penicillins]  11/18/2010    Unknown (comments) Tramadol  04/14/2016    Other (comments) Makes her head feel swishy Current Immunizations  Reviewed on 7/28/2017 No immunizations on file. Not reviewed this visit You Were Diagnosed With   
  
 Codes Comments Atherosclerosis of native coronary artery of native heart without angina pectoris    -  Primary ICD-10-CM: I25.10 ICD-9-CM: 414.01 Atrial fibrillation, unspecified type ([facility]     ICD-10-CM: I48.91 
ICD-9-CM: 427.31 Vitals BP Pulse Resp Height(growth percentile) Weight(growth percentile) SpO2  
 150/80 (BP 1 Location: Left arm, BP Patient Position: Sitting) 71 16 5' (1.524 m) 179 lb (81.2 kg) 94% BMI OB Status Smoking Status 34.96 kg/m2 Hysterectomy Former Smoker Vitals History BMI and BSA Data Body Mass Index Body Surface Area 34.96 kg/m 2 1.85 m 2 Preferred Pharmacy Pharmacy Name Phone  [address] [phone] Your Updated Medication List  
  
   
This list is accurate as of 9/21/18  2:22 PM.  Always use your most recent med list.  
  
  
  
  
 acyclovir 5 % ointment Commonly known as:  ZOVIRAX Apply  to affected area every three (3) hours. amLODIPine 5 mg tablet Commonly known as:  Ruth Spruce Take 5 mg by mouth daily. aspirin 81 mg chewable tablet Take 1 Tab by mouth daily. atorvastatin 40 mg tablet Commonly known as:  LIPITOR Take 20 mg by mouth nightly. azelastine 137 mcg (0.1 %) nasal spray Commonly known as:  ASTELIN  
USE 1 SPRAY IN EACH NOSTRIL TWICE DAILY AS DIRECTED * bumetanide 2 mg tablet Commonly known as:  Wilhelmenia Montane Take 2 mg by mouth every other day. * bumetanide 1 mg tablet Commonly known as:  Wilhelmenia Montane Take 1 Tab by mouth daily. desonide 0.05 % topical lotion Commonly known as:  Lori Carte Apply  to affected area two (2) times daily as needed for Skin Irritation. Leg rash. No more than 5 days in a row  
  
 doxazosin 1 mg tablet Commonly known as:  CARDURA Take 1 Tab by mouth nightly. doxycycline 100 mg capsule Commonly known as:  VIBRAMYCIN Take 1 Cap by mouth two (2) times a day. ELIQUIS 2.5 mg tablet Generic drug:  apixaban Take 2.5 mg by mouth two (2) times a day. enalapril 20 mg tablet Commonly known as:  VASOTEC  
TAKE 1 TABLET TWICE A DAY  
  
 glipiZIDE 5 mg tablet Commonly known as:  Atlee Crystal Take 10 mg by mouth two (2) times a day. glucose blood VI test strips strip Commonly known as:  blood glucose test  
Check blood sugar 4 times a day  
  
 insulin aspart U-100 100 unit/mL Inpn Commonly known as:  NovoLOG Flexpen U-100 Insulin Inject 7 units before each meal ( tid ac meals) or as directed  
  
 insulin detemir U-100 100 unit/mL (3 mL) Inpn Commonly known as:  LEVEMIR FLEXPEN  
15 Units by SubCUTAneous route daily. In the morning  
  
 isosorbide mononitrate ER 60 mg CR tablet Commonly known as:  IMDUR  
TAKE 1 TABLET TWICE A DAY  
  
 LOPRESSOR 100 mg IR tablet Generic drug:  metoprolol tartrate Take 100 mg by mouth two (2) times a day. mupirocin calcium 2 % topical cream  
Commonly known as:  [employer] Apply  to affected area two (2) times a day. nitroglycerin 0.4 mg SL tablet Commonly known as:  NITROSTAT  
1 Tab by SubLINGual route every five (5) minutes as needed for Chest Pain. omeprazole 20 mg capsule Commonly known as:  PRILOSEC  
TAKE 1 CAPSULE DAILY  
  
 VITAMIN D3 2,000 unit Tab Generic drug:  cholecalciferol (vitamin D3) Take  by mouth. * Notice: This list has 2 medication(s) that are the same as other medications prescribed for you. Read the directions carefully, and ask your doctor or other care provider to review them with you. We Performed the Following AMB POC EKG ROUTINE W/ 12 LEADS, INTER & REP [06636 CPT(R)] METABOLIC PANEL, BASIC [09332 CPT(R)] Follow-up Instructions Return in about 6 months (around 3/21/2019). Patient Instructions Bumex 1 mg alternate 2 mg daily Have BP check in 10 days Have blood work drawn in 1 week Follow up with Jayda Pass in 10 days Introducing [facility]!    
 [facility] [employer] introduces [employer] patient portal. Now you can access parts of your medical record, email your doctor's office, and request medication refills online. 1. In your internet browser, go to [email or web address] 2. Click on the First Time User? Click Here link in the Sign In box. You will see the New Member Sign Up page. 3. Enter your [employer] Access Code exactly as it appears below. You will not need to use this code after youve completed the sign-up process. If you do not sign up before the expiration date, you must request a new code. · [employer] Access Code: [email or web address] Expires: 10/18/2018 11:58 AM 
 
4. Enter the last four digits of your Social Security Number (xxxx) and Date of Birth (mm/dd/yyyy) as indicated and click Submit. You will be taken to the next sign-up page. 5. Create a [employer] ID. This will be your [employer] login ID and cannot be changed, so think of one that is secure and easy to remember. 6. Create a [employer] password. You can change your password at any time. 7. Enter your Password Reset Question and Answer. This can be used at a later time if you forget your password. 8. Enter your e-mail address. You will receive e-mail notification when new information is available in [address] 9. Click Sign Up. You can now view and download portions of your medical record. 10. Click the Download Summary menu link to download a portable copy of your medical information. If you have questions, please visit the Frequently Asked Questions section of the [employer] website. Remember, [employer] is NOT to be used for urgent needs. For medical emergencies, dial 911. Now available from your iPhone and Android! Please provide this summary of care documentation to your next provider. Your primary care clinician is listed as [id number] IV. If you have any questions after today's visit, please call [phone].

## 2018-09-21 NOTE — PROGRESS NOTES
HISTORY OF PRESENT ILLNESS  Amada Mcdonald is a 81 y.o. female. She has h/o HTN, HLD, AODM and CAD  And PAF (diagnosed on 4/17 for which she underwent successful cardioversion on oac and amiodarone)here for follow up  Stress test normal on 11/10    Doppler /US renal NO MANDO b/l    PTCA and stent of right coronary artery in 1996 July 26, 2007. The cardiac catheterization revealed mild 10% tapering of the ostium. The left anterior descending artery had mild intimal disease throughout. There was a 30% stenosis in the proximal portion of the LAD between the first and second septal . The remainder of the LAD had only mild intimal disease. The circumflex artery had a 30%-40% stenosis in the proximal mid segment prior to take off of obtuse marginal branch. The right coronary artery was a large dominant artery with 80% proximal stenosis followed by 90% proximal to mid stenosis, followed by 30% residual stenosis. Ejection fraction was estimated at 50%-55%. 2/14: nuclear stress test: mild ischemia mid distal kailey lateral wall EF 59%    3/14: cardiac catheterization:Left main: The vessel was normal sized. Angiography  showed minor luminal irregularities. LAD: The vessel was normal sized. Angiography showed mild atherosclerosis. There was a 30 % stenosis in the  middle third of the vessel segment. 1st diagonal: The vessel was small  sized. Angiography showed minor luminal irregularities. 2nd diagonal: The  vessel was normal sized. Angiography showed mild atherosclerosis. Circumflex: The vessel was normal sized. Angiography showed mild  atherosclerosis. There was a 30 % stenosis. RCA: The vessel was large  sized (dominant). Angiography showed mild atherosclerosis and 3 patent  prior stents.  There was a tubular 30 % stenosis in the distal third of the  vessel segment.    MEDICAL RX ONLY at that time   Stopped niacin in 2014 for \"sick \" feeling    Seen in [facility] for HTN on 2/15 aldactone increased to 25 mg at that time  Renal US on 3/15 no MANDO  ON 4/17 admitted to [facility] for sob and found to be in AF  Nuclear stress test on 4/25/17:No ischemia demonstrated. No infarct visible. LVEF 61%.      Chest ct on 4/17:1. Atherosclerotic aorta with coronary artery calcification. 2. Small bilateral pleural effusions with right pleural calcifications. 3. Status post cholecystectomy. Valentin/Cardioversion  on 4/28/17:VALENTIN with normal EF mild mr and TR no evidence for gross intracardiac thrombi  Cardioversion: 150 joules were delivered in a synch fashion with restoration of NSR  Amiodarone started lopressor decreased  Head ct on 8/18/18:Severe chronic microvascular ischemic change.      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  Amiodarone stopped with concerns for toxicity.  Also noted to hyponatremic.    Nephrology consulted and felt low Na related to HCTZ and Bactrim use - both stopped indefinitely                        - Bumex 1 mg PO daily started and continued through discharge                        - Na improve to 130 before discharge                        - Patient to follow up with Nephrology as needed. Pulmonary consulted and felt no Amiodarone toxicity, SOB related to COPD and volume                        - SOB improved as Na improved and Bumex was used.      ECHO on 8/17:Left ventricle: Systolic function was normal. Ejection fraction was  estimated in the range of 55 % to 60 %. Suboptimal endocardial  visualization limits wall motion analysis. Wall thickness was mildly  increased. Left atrium: The atrium was mildly dilated. Mitral valve: There was mild regurgitation. Aortic valve: Leaflets exhibited lower normal cuspal separation and  sclerosis. Tricuspid valve: There was mild regurgitation.   CTA  8/17 of chest without toxicity changes or PTE: . There is a mild to moderate-sized right pleural effusion and a mild sized  left pleural effusion. The heart is enlarged there is no pericardial effusion. There is no mediastinal masses or adenopathy. There is no evidence for pulmonary  emboli. There is no shift or pneumothorax. There are mild congestive changes. Mild emphysema. No focal consolidation or mass.      IMPRESSION  IMPRESSION: Bilateral effusions and cardiomegaly. No pulmonary emboli. Nuclear stress test on 4/23/18:SPECT images demonstrate a medium, fixed abnormality of mild degree in the anterior region on the stress and rest images. Gated SPECT images reveals normal myocardial thickening and wall motion. The left ventricular ejection fraction was calculated to be 63 %. Impression:   Myocardial perfusion imaging is normal. No gross ischemia or mi  Overall left ventricular systolic function was normal.       These test results indicate low likelihood for the presence of angiographically significant coronary artery disease.      Vascular us le : no dvt seroma on right thigh             Past Medical History:   Diagnosis Date    Atrial fibrillation ([facility] [address]       CAD (coronary artery disease)       Cerebrovascular accident stroke, other, unspec 11/18/2010    Coronary atherosclerosis of native coronary artery 11/18/2010    Essential hypertension       Essential hypertension, benign 11/18/2010    HLD (hyperlipidemia) 11/18/2010    PVD (peripheral vascular disease) ([address] 2017    Type II or unspecified type diabetes mellitus without mention of complication, not stated as uncontrolled 11/18/2010                     Past Surgical History:   Procedure Laterality Date    HX CORONARY STENT PLACEMENT          HX HEART CATHETERIZATION          HX HYSTERECTOMY    1972       HPI  No cp occasional le edema and occasional sob  Review of Systems   Respiratory: Positive for shortness of breath. Cardiovascular: Positive for leg swelling. Negative for chest pain, palpitations, orthopnea, claudication and PND.      Visit Vitals    BP 150/80 (BP 1 Location: Left arm, BP Patient Position: Sitting)    Pulse 71    Resp 16    Ht 5' (1.524 m)    Wt 81.2 kg (179 lb)    SpO2 94%    BMI 34.96 kg/m2       Physical Exam   Neck: No JVD present. Carotid bruit is not present. Cardiovascular: Normal rate and regular rhythm. Occasional extrasystoles are present. Murmur heard. Systolic murmur is present with a grade of 1/6   Pulmonary/Chest: Effort normal and breath sounds normal.   Abdominal: Soft. Musculoskeletal: She exhibits edema. 1-2+ R>L   Psychiatric: She has a normal mood and affect. Current Outpatient Prescriptions on File Prior to Visit   Medication Sig Dispense Refill    omeprazole (PRILOSEC) 20 mg capsule TAKE 1 CAPSULE DAILY 90 Cap 3    desonide (TRIDESILON) 0.05 % topical lotion Apply  to affected area two (2) times daily as needed for Skin Irritation. Leg rash. No more than 5 days in a row 59 mL 0    azelastine (ASTELIN) 137 mcg (0.1 %) nasal spray USE 1 SPRAY IN EACH NOSTRIL TWICE DAILY AS DIRECTED 1 Bottle 11    isosorbide mononitrate ER (IMDUR) 60 mg CR tablet TAKE 1 TABLET TWICE A  Tab 2    amLODIPine (NORVASC) 5 mg tablet Take 5 mg by mouth daily.  metoprolol tartrate (LOPRESSOR) 100 mg IR tablet Take 100 mg by mouth two (2) times a day.  acyclovir (ZOVIRAX) 5 % ointment Apply  to affected area every three (3) hours. 45 g 0    mupirocin calcium (BACTROBAN) 2 % topical cream Apply  to affected area two (2) times a day. 45 g 0    aspirin 81 mg chewable tablet Take 1 Tab by mouth daily. 90 Tab 3    insulin detemir (LEVEMIR FLEXPEN) 100 unit/mL (3 mL) inpn 15 Units by SubCUTAneous route daily. In the morning 5 Pen 4    enalapril (VASOTEC) 20 mg tablet TAKE 1 TABLET TWICE A  Tab 3    bumetanide (BUMEX) 1 mg tablet Take 1 Tab by mouth daily. 90 Tab 3    apixaban (ELIQUIS) 2.5 mg tablet Take 2.5 mg by mouth two (2) times a day.       insulin aspart (NOVOLOG FLEXPEN) 100 unit/mL inpn Inject 7 units before each meal ( tid ac meals) or as directed 15 Pen 3    cholecalciferol, vitamin D3, (VITAMIN D3) 2,000 unit tab Take  by mouth.  glipiZIDE (GLUCOTROL) 5 mg tablet Take 10 mg by mouth two (2) times a day.  atorvastatin (LIPITOR) 40 mg tablet Take 20 mg by mouth nightly.  nitroglycerin (NITROSTAT) 0.4 mg SL tablet 1 Tab by SubLINGual route every five (5) minutes as needed for Chest Pain. 25 Tab 3    glucose blood VI test strips (BLOOD GLUCOSE TEST) strip Check blood sugar 4 times a day 200 Strip 11    doxycycline (VIBRAMYCIN) 100 mg capsule Take 1 Cap by mouth two (2) times a day. 14 Cap 1    doxazosin (CARDURA) 1 mg tablet Take 1 Tab by mouth nightly. 30 Tab 11     No current facility-administered medications on file prior to visit. Lab Results   Component Value Date/Time    Cholesterol, total 110 06/13/2017 09:31 AM    Cholesterol (POC) 100 08/17/2016 11:37 AM    HDL Cholesterol 55 06/13/2017 09:31 AM    HDL Cholesterol (POC) 46 08/17/2016 11:37 AM    LDL Cholesterol (POC) n/a 08/17/2016 11:37 AM    LDL, calculated 36 06/13/2017 09:31 AM    VLDL, calculated 19 06/13/2017 09:31 AM    Triglyceride 93 06/13/2017 09:31 AM    Triglycerides (POC) 112 08/17/2016 11:37 AM    CHOL/HDL Ratio 2.0 04/25/2017 03:53 AM         ASSESSMENT and PLAN  HTN:still a little elevated  Apparently unable to take hydralazine. Discussed option. Increase bumex to 1 mg alternating to 2 mg daily if ok also with nephrology  This may help with BP, LE edema and sob  BP check in 1 week and BMP in 1 week unless done first by nephrology    PAF:she remains  in NSR after cardioversion her ECG showed NSR and pacs and nstt. She is now off amiodarone since her intolerance to it  Continue eliquis no untoward effects thus far. Aware that AF may be recurring off amiodarone.  Continue same dose of eliquis for now 2.5 bid for renal function and age      CAD:  discussed results of previous nuclear stress test.  this failed to reveal any gross ischemia. No additional interventions for now. Her shortness of breath may be multifactorial.  Paroxysmal atrial fibrillation may be playing a role at this time. Again continue with metoprolol imdur and asa.     HLD:closely followed by her PCP and well controlled     AODM: also closely followed by her PCP     Hyponatremia:off hctz and follow by nephrology     Right leg cellulitis: resolved Doppler ultrasound of the lower extremities in April 2018 failed to reveal any dvt. on vascular structure noted inner aspect of the right thigh likely representing a seroma.   I have asked her to follow-up with her vascular surgeon as well.     Otherwise if all ok I will see her back in 6 months

## 2018-10-02 ENCOUNTER — HOSPITAL ENCOUNTER (OUTPATIENT)
Dept: LAB | Age: 83
Discharge: HOME | End: 2018-10-02
Payer: MEDICARE

## 2018-10-02 PROCEDURE — 80048 BASIC METABOLIC PNL TOTAL CA: CPT

## 2018-10-02 PROCEDURE — 36415 COLL VENOUS BLD VENIPUNCTURE: CPT

## 2018-10-04 LAB
BUN SERPL-MCNC: 27 MG/DL (ref 8–27)
BUN/CREAT SERPL: 23 (ref 12–28)
CALCIUM SERPL-MCNC: 8.8 MG/DL (ref 8.7–10.3)
CHLORIDE SERPL-SCNC: 100 MMOL/L (ref 96–106)
CO2 SERPL-SCNC: 21 MMOL/L (ref 20–29)
CREAT SERPL-MCNC: 1.19 MG/DL (ref 0.57–1)
GLUCOSE SERPL-MCNC: 119 MG/DL (ref 65–99)
INTERPRETATION: NORMAL
POTASSIUM SERPL-SCNC: 4 MMOL/L (ref 3.5–5.2)
SODIUM SERPL-SCNC: 141 MMOL/L (ref 134–144)

## 2019-01-01 ENCOUNTER — OFFICE VISIT (OUTPATIENT)
Dept: CARDIOLOGY CLINIC | Age: 84
End: 2019-01-01

## 2019-01-01 ENCOUNTER — CLINICAL SUPPORT (OUTPATIENT)
Dept: CARDIOLOGY CLINIC | Age: 84
End: 2019-01-01

## 2019-01-01 VITALS
SYSTOLIC BLOOD PRESSURE: 138 MMHG | RESPIRATION RATE: 16 BRPM | DIASTOLIC BLOOD PRESSURE: 86 MMHG | HEIGHT: 60 IN | OXYGEN SATURATION: 97 % | WEIGHT: 179 LBS | HEART RATE: 86 BPM | BODY MASS INDEX: 35.14 KG/M2

## 2019-01-01 VITALS
HEIGHT: 60 IN | HEART RATE: 114 BPM | DIASTOLIC BLOOD PRESSURE: 80 MMHG | OXYGEN SATURATION: 96 % | SYSTOLIC BLOOD PRESSURE: 120 MMHG | WEIGHT: 182 LBS | RESPIRATION RATE: 16 BRPM | BODY MASS INDEX: 35.73 KG/M2

## 2019-01-01 VITALS
RESPIRATION RATE: 16 BRPM | DIASTOLIC BLOOD PRESSURE: 80 MMHG | WEIGHT: 182 LBS | OXYGEN SATURATION: 94 % | HEIGHT: 60 IN | SYSTOLIC BLOOD PRESSURE: 112 MMHG | HEART RATE: 98 BPM | BODY MASS INDEX: 35.73 KG/M2

## 2019-01-01 VITALS
SYSTOLIC BLOOD PRESSURE: 148 MMHG | DIASTOLIC BLOOD PRESSURE: 84 MMHG | WEIGHT: 179 LBS | HEART RATE: 63 BPM | HEIGHT: 60 IN | OXYGEN SATURATION: 91 % | BODY MASS INDEX: 35.14 KG/M2 | RESPIRATION RATE: 14 BRPM

## 2019-01-01 DIAGNOSIS — I48.19 OTHER PERSISTENT ATRIAL FIBRILLATION (HCC): Primary | ICD-10-CM

## 2019-01-01 DIAGNOSIS — R42 DIZZINESS: ICD-10-CM

## 2019-01-01 DIAGNOSIS — E11.21 TYPE 2 DIABETES WITH NEPHROPATHY (HCC): ICD-10-CM

## 2019-01-01 DIAGNOSIS — I67.89 ACUTE, BUT ILL-DEFINED, CEREBROVASCULAR DISEASE: ICD-10-CM

## 2019-01-01 DIAGNOSIS — I48.91 ATRIAL FIBRILLATION, UNSPECIFIED TYPE (HCC): Primary | ICD-10-CM

## 2019-01-01 DIAGNOSIS — I25.10 CORONARY ARTERY DISEASE INVOLVING NATIVE CORONARY ARTERY OF NATIVE HEART WITHOUT ANGINA PECTORIS: ICD-10-CM

## 2019-01-01 DIAGNOSIS — I48.91 ATRIAL FIBRILLATION, UNSPECIFIED TYPE (HCC): ICD-10-CM

## 2019-01-01 DIAGNOSIS — E66.01 SEVERE OBESITY (BMI 35.0-39.9) WITH COMORBIDITY (HCC): ICD-10-CM

## 2019-01-01 DIAGNOSIS — I48.19 ATRIAL FIBRILLATION, PERSISTENT (HCC): Primary | ICD-10-CM

## 2019-01-01 DIAGNOSIS — Z78.9 MEDICATION INTOLERANCE: ICD-10-CM

## 2019-01-01 DIAGNOSIS — Z01.812 PRE-PROCEDURE LAB EXAM: ICD-10-CM

## 2019-01-01 DIAGNOSIS — E11.8 TYPE 2 DIABETES MELLITUS WITH COMPLICATION, WITHOUT LONG-TERM CURRENT USE OF INSULIN (HCC): ICD-10-CM

## 2019-01-01 DIAGNOSIS — I10 ESSENTIAL HYPERTENSION, BENIGN: ICD-10-CM

## 2019-01-01 RX ORDER — DILTIAZEM HYDROCHLORIDE 30 MG/1
TABLET, FILM COATED ORAL
Qty: 270 TAB | Refills: 2 | Status: SHIPPED | OUTPATIENT
Start: 2019-01-01 | End: 2019-01-01 | Stop reason: SDUPTHER

## 2019-01-01 RX ORDER — DILTIAZEM HYDROCHLORIDE 60 MG/1
60 TABLET, FILM COATED ORAL 3 TIMES DAILY
COMMUNITY
End: 2019-01-01 | Stop reason: SDUPTHER

## 2019-01-01 RX ORDER — BUMETANIDE 1 MG/1
1 TABLET ORAL DAILY
Qty: 90 TAB | Refills: 2 | Status: SHIPPED | OUTPATIENT
Start: 2019-01-01 | End: 2020-01-01 | Stop reason: SDUPTHER

## 2019-01-01 RX ORDER — DILTIAZEM HYDROCHLORIDE 60 MG/1
TABLET, FILM COATED ORAL
Qty: 225 TAB | Refills: 2 | Status: SHIPPED | OUTPATIENT
Start: 2019-01-01 | End: 2019-01-01

## 2019-01-01 RX ORDER — ALLOPURINOL 100 MG/1
100 TABLET ORAL DAILY
COMMUNITY
Start: 2019-01-01

## 2019-01-01 RX ORDER — AMLODIPINE BESYLATE 5 MG/1
TABLET ORAL
Qty: 90 TAB | Refills: 3 | Status: SHIPPED | OUTPATIENT
Start: 2019-01-01 | End: 2019-01-01

## 2019-01-01 RX ORDER — DILTIAZEM HYDROCHLORIDE 30 MG/1
30 TABLET, FILM COATED ORAL 3 TIMES DAILY
Qty: 270 TAB | Refills: 2 | Status: SHIPPED | OUTPATIENT
Start: 2019-01-01 | End: 2019-01-01 | Stop reason: SDUPTHER

## 2019-01-01 RX ORDER — OMEPRAZOLE 20 MG/1
CAPSULE, DELAYED RELEASE ORAL
Qty: 90 CAP | Refills: 2 | Status: SHIPPED | OUTPATIENT
Start: 2019-01-01

## 2019-01-01 RX ORDER — DILTIAZEM HYDROCHLORIDE 60 MG/1
TABLET, FILM COATED ORAL
Qty: 225 TAB | Refills: 2 | Status: SHIPPED | OUTPATIENT
Start: 2019-01-01 | End: 2020-01-01

## 2019-03-22 ENCOUNTER — OFFICE VISIT (OUTPATIENT)
Dept: CARDIOLOGY CLINIC | Age: 84
End: 2019-03-22

## 2019-03-22 VITALS
HEIGHT: 60 IN | SYSTOLIC BLOOD PRESSURE: 130 MMHG | DIASTOLIC BLOOD PRESSURE: 80 MMHG | BODY MASS INDEX: 33.96 KG/M2 | OXYGEN SATURATION: 97 % | HEART RATE: 109 BPM | WEIGHT: 173 LBS | RESPIRATION RATE: 16 BRPM

## 2019-03-22 DIAGNOSIS — I10 ESSENTIAL HYPERTENSION, BENIGN: ICD-10-CM

## 2019-03-22 DIAGNOSIS — Z79.899 ENCOUNTER FOR LONG-TERM (CURRENT) DRUG USE: ICD-10-CM

## 2019-03-22 DIAGNOSIS — I48.91 ATRIAL FIBRILLATION, UNSPECIFIED TYPE (HCC): Primary | ICD-10-CM

## 2019-03-22 DIAGNOSIS — I25.10 ATHEROSCLEROSIS OF NATIVE CORONARY ARTERY OF NATIVE HEART WITHOUT ANGINA PECTORIS: Primary | ICD-10-CM

## 2019-03-22 DIAGNOSIS — R07.9 CHEST PAIN, UNSPECIFIED TYPE: ICD-10-CM

## 2019-03-22 DIAGNOSIS — I48.91 ATRIAL FIBRILLATION, UNSPECIFIED TYPE (HCC): ICD-10-CM

## 2019-03-22 DIAGNOSIS — E78.00 PURE HYPERCHOLESTEROLEMIA: ICD-10-CM

## 2019-03-22 RX ORDER — DILTIAZEM HYDROCHLORIDE 60 MG/1
60 TABLET, FILM COATED ORAL 2 TIMES DAILY
COMMUNITY
End: 2019-03-22 | Stop reason: SDUPTHER

## 2019-03-22 RX ORDER — METOPROLOL TARTRATE 100 MG/1
100 TABLET ORAL 2 TIMES DAILY
Qty: 180 TAB | Refills: 2 | Status: SHIPPED | OUTPATIENT
Start: 2019-03-22 | End: 2019-04-23 | Stop reason: SDUPTHER

## 2019-03-22 RX ORDER — ENALAPRIL MALEATE 20 MG/1
TABLET ORAL
Qty: 180 TAB | Refills: 2 | Status: SHIPPED | OUTPATIENT
Start: 2019-03-22 | End: 2019-04-23 | Stop reason: SDUPTHER

## 2019-03-22 RX ORDER — OMEPRAZOLE 20 MG/1
CAPSULE, DELAYED RELEASE ORAL
Qty: 90 CAP | Refills: 2 | Status: SHIPPED | OUTPATIENT
Start: 2019-03-22 | End: 2019-04-23 | Stop reason: SDUPTHER

## 2019-03-22 RX ORDER — DILTIAZEM HYDROCHLORIDE 60 MG/1
60 TABLET, FILM COATED ORAL 2 TIMES DAILY
Qty: 180 TAB | Refills: 2 | Status: SHIPPED | OUTPATIENT
Start: 2019-03-22 | End: 2019-04-23 | Stop reason: DRUGHIGH

## 2019-03-22 NOTE — PATIENT INSTRUCTIONS
Stop Norvasc or Amlodipine    Start Cardizem 60 mg twice a day    Lexiscan and Echo now    Follow up with Mikhail Pollack after tests

## 2019-03-22 NOTE — PROGRESS NOTES
Visit Vitals  /80 (BP 1 Location: Left arm, BP Patient Position: Sitting)   Pulse (!) 109   Resp 16   Ht 5' (1.524 m)   Wt 173 lb (78.5 kg)   SpO2 97%   BMI 33.79 kg/m²

## 2019-03-22 NOTE — PROGRESS NOTES
HISTORY OF PRESENT ILLNESS  Kevin Long is a 81 y.o. female. She has h/o HTN, HLD, AODM and CAD  And PAF (diagnosed on 4/17 for which she underwent successful cardioversion on oac and amiodarone)here for follow up  Stress test normal on 11/10    Doppler /US renal NO MANDO b/l    PTCA and stent of right coronary artery in 1996 July 26, 2007. The cardiac catheterization revealed mild 10% tapering of the ostium. The left anterior descending artery had mild intimal disease throughout. There was a 30% stenosis in the proximal portion of the LAD between the first and second septal . The remainder of the LAD had only mild intimal disease. The circumflex artery had a 30%-40% stenosis in the proximal mid segment prior to take off of obtuse marginal branch. The right coronary artery was a large dominant artery with 80% proximal stenosis followed by 90% proximal to mid stenosis, followed by 30% residual stenosis. Ejection fraction was estimated at 50%-55%. 2/14: nuclear stress test: mild ischemia mid distal kailey lateral wall EF 59%    3/14: cardiac catheterization:Left main: The vessel was normal sized. Angiography  showed minor luminal irregularities. LAD: The vessel was normal sized. Angiography showed mild atherosclerosis. There was a 30 % stenosis in the  middle third of the vessel segment. 1st diagonal: The vessel was small  sized. Angiography showed minor luminal irregularities. 2nd diagonal: The  vessel was normal sized. Angiography showed mild atherosclerosis. Circumflex: The vessel was normal sized. Angiography showed mild  atherosclerosis. There was a 30 % stenosis. RCA: The vessel was large  sized (dominant). Angiography showed mild atherosclerosis and 3 patent  prior stents.  There was a tubular 30 % stenosis in the distal third of the  vessel segment.    MEDICAL RX ONLY at that time   Stopped niacin in 2014 for \"sick \" feeling    Seen in [facility] for HTN on 2/15 aldactone increased to 25 mg at that time  Renal US on 3/15 no MANDO  ON 4/17 admitted to [facility] for sob and found to be in AF  Nuclear stress test on 4/25/17:No ischemia demonstrated. No infarct visible. LVEF 61%.      Chest ct on 4/17:1. Atherosclerotic aorta with coronary artery calcification. 2. Small bilateral pleural effusions with right pleural calcifications. 3. Status post cholecystectomy. Valentin/Cardioversion  on 4/28/17:VALENTIN with normal EF mild mr and TR no evidence for gross intracardiac thrombi  Cardioversion: 150 joules were delivered in a synch fashion with restoration of NSR  Amiodarone started lopressor decreased  Head ct on 8/18/18:Severe chronic microvascular ischemic change.      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  Amiodarone stopped with concerns for toxicity.  Also noted to hyponatremic.    Nephrology consulted and felt low Na related to HCTZ and Bactrim use - both stopped indefinitely                        - Bumex 1 mg PO daily started and continued through discharge                        - Na improve to 130 before discharge                        - Patient to follow up with Nephrology as needed. Pulmonary consulted and felt no Amiodarone toxicity, SOB related to COPD and volume                        - SOB improved as Na improved and Bumex was used.      ECHO on 8/17:Left ventricle: Systolic function was normal. Ejection fraction was  estimated in the range of 55 % to 60 %. Suboptimal endocardial  visualization limits wall motion analysis. Wall thickness was mildly  increased. Left atrium: The atrium was mildly dilated. Mitral valve: There was mild regurgitation. Aortic valve: Leaflets exhibited lower normal cuspal separation and  sclerosis. Tricuspid valve: There was mild regurgitation.   CTA  8/17 of chest without toxicity changes or PTE: . There is a mild to moderate-sized right pleural effusion and a mild sized  left pleural effusion. The heart is enlarged there is no pericardial effusion. There is no mediastinal masses or adenopathy. There is no evidence for pulmonary  emboli. There is no shift or pneumothorax. There are mild congestive changes. Mild emphysema. No focal consolidation or mass.      IMPRESSION  IMPRESSION: Bilateral effusions and cardiomegaly. No pulmonary emboli. Nuclear stress test on 4/23/18:SPECT images demonstrate a medium, fixed abnormality of mild degree in the anterior region on the stress and rest images. Gated SPECT images reveals normal myocardial thickening and wall motion. The left ventricular ejection fraction was calculated to be 63 %.     Impression:   Myocardial perfusion imaging is normal. No gross ischemia or mi  Overall left ventricular systolic function was normal.       These test results indicate low likelihood for the presence of angiographically significant coronary artery disease.      Vascular us le : no dvt seroma on right thigh             Past Medical History:   Diagnosis Date    Atrial fibrillation ([facility] [address]       CAD (coronary artery disease)       Cerebrovascular accident stroke, other, unspec 11/18/2010    Coronary atherosclerosis of native coronary artery 11/18/2010    Essential hypertension       Essential hypertension, benign 11/18/2010    HLD (hyperlipidemia) 11/18/2010    PVD (peripheral vascular disease) ([facility] [address] 2017    Type II or unspecified type diabetes mellitus without mention of complication, not stated as uncontrolled 11/18/2010                     Past Surgical History:   Procedure Laterality Date    HX CORONARY STENT PLACEMENT          HX HEART CATHETERIZATION          HX HYSTERECTOMY    1972       HPI  C/o some cp similar to prior to last stent she has had 2 episodes lasting 2-3 minutes  Visit Vitals  /80 (BP 1 Location: Left arm, BP Patient Position: Sitting)   Pulse (!) 109   Resp 16   Ht 5' (1.524 m)   Wt 173 lb (78.5 kg)   SpO2 97%   BMI 33.79 kg/m²       Physical Exam   Neck: No JVD present. Cardiovascular: An irregularly irregular rhythm present. Tachycardia present. Pulmonary/Chest: Effort normal and breath sounds normal.   Abdominal: Soft. Musculoskeletal: She exhibits no edema. Psychiatric: She has a normal mood and affect. Current Outpatient Medications on File Prior to Visit   Medication Sig Dispense Refill    budesonide-formoterol (SYMBICORT) 160-4.5 mcg/actuation HFAA Take 2 Puffs by inhalation two (2) times a day. After each use, rinse mouth with water 3 Inhaler 3    glucose blood VI test strips (ACCU-CHEK GUIDE) strip Check blood sugar 4 times a day E11.9 372 Strip 3    glucose blood VI test strips (BLOOD GLUCOSE TEST) strip Check blood sugar 3 times a day  DX:E11.9 400 Strip 4    glipiZIDE (GLUCOTROL) 5 mg tablet TAKE 2 TABLETS TWICE A  Tab 3    lancets (ACCU-CHEK FASTCLIX LANCET DRUM) misc Check blood sugar 3 times a day   DX:E11.9 270 Each 4    spironolactone (ALDACTONE) 25 mg tablet Take  by mouth daily.  nystatin (MYCOSTATIN) topical cream Apply  to affected area two (2) times a day. X 10 days 30 g 0    insulin aspart U-100 (NOVOLOG FLEXPEN U-100 INSULIN) 100 unit/mL inpn INJECT 7 UNITS BEFORE EACH MEAL 3 TIMES A DAY OR AS   DIRECTED 45 mL 3    clotrimazole-betamethasone (LOTRISONE) topical cream Apply thin layer to affected area twice daily 15 g 0    nystatin (MYCOSTATIN) powder Apply  to affected area two (2) times a day. 60 g 2    enalapril (VASOTEC) 20 mg tablet TAKE 1 TABLET TWICE A  Tab 3    amLODIPine (NORVASC) 5 mg tablet Take 1 Tab by mouth daily. 90 Tab 3    bumetanide (BUMEX) 1 mg tablet Take 1 Tab by mouth daily. 90 Tab 3    isosorbide mononitrate ER (IMDUR) 60 mg CR tablet TAKE 1 TABLET TWICE A  Tab 3    apixaban (ELIQUIS) 2.5 mg tablet Take 1 Tab by mouth two (2) times a day.  180 Tab 3    omeprazole (PRILOSEC) 20 mg capsule TAKE 1 CAPSULE DAILY 90 Cap 3    azelastine (ASTELIN) 137 mcg (0.1 %) nasal spray USE 1 SPRAY IN EACH NOSTRIL TWICE DAILY AS DIRECTED 1 Bottle 11    metoprolol tartrate (LOPRESSOR) 100 mg IR tablet Take 100 mg by mouth two (2) times a day.  acyclovir (ZOVIRAX) 5 % ointment Apply  to affected area every three (3) hours. 45 g 0    mupirocin calcium (BACTROBAN) 2 % topical cream Apply  to affected area two (2) times a day. 45 g 0    aspirin 81 mg chewable tablet Take 1 Tab by mouth daily. 90 Tab 3    insulin detemir (LEVEMIR FLEXPEN) 100 unit/mL (3 mL) inpn 15 Units by SubCUTAneous route daily. In the morning 5 Pen 4    cholecalciferol, vitamin D3, (VITAMIN D3) 2,000 unit tab Take  by mouth.  atorvastatin (LIPITOR) 40 mg tablet Take 20 mg by mouth nightly.  nitroglycerin (NITROSTAT) 0.4 mg SL tablet 1 Tab by SubLINGual route every five (5) minutes as needed for Chest Pain. 25 Tab 3    doxazosin (CARDURA) 1 mg tablet Take 1 Tab by mouth nightly. 30 Tab 11     No current facility-administered medications on file prior to visit. Lab Results   Component Value Date/Time    Sodium 141 03/06/2019 11:20 AM    Potassium 4.0 03/06/2019 11:20 AM    Chloride 99 03/06/2019 11:20 AM    CO2 25 03/06/2019 11:20 AM    Anion gap 10 08/30/2017 04:20 AM    Glucose 57 (L) 03/06/2019 11:20 AM    BUN 25 03/06/2019 11:20 AM    Creatinine 1.39 (H) 03/06/2019 11:20 AM    BUN/Creatinine ratio 18 03/06/2019 11:20 AM    GFR est AA 41 (L) 03/06/2019 11:20 AM    GFR est non-AA 35 (L) 03/06/2019 11:20 AM    Calcium 9.2 03/06/2019 11:20 AM         ASSESSMENT and PLAN  HTN:controlled Apparently unable to take hydralazine.      PAF:back in AF with rvr   discussed options   stop norvasc and start cardizem 60 mg bid in addition to lopressor  her ECG shows AF RVR and nstt  Rate 109  She is now off amiodarone since her intolerance to it  Continue eliquis no untoward effects thus far.    Obtain echo la size as well in addition to ef     Continue same dose of eliquis for now 2.5 bid for renal function and age      CAD:  recurrent cp proceed with nuclear stress test and to er if recurrent cp discussed     HLD:closely followed by her PCP and well controlled     AODM: also closely followed by her PCP     Hyponatremia:off hctz and follow by nephrology     Right leg cellulitis: resolved     See her back after tests

## 2019-04-23 ENCOUNTER — OFFICE VISIT (OUTPATIENT)
Dept: CARDIOLOGY CLINIC | Age: 84
End: 2019-04-23

## 2019-04-23 VITALS
BODY MASS INDEX: 34.55 KG/M2 | DIASTOLIC BLOOD PRESSURE: 86 MMHG | HEART RATE: 73 BPM | HEIGHT: 60 IN | SYSTOLIC BLOOD PRESSURE: 138 MMHG | RESPIRATION RATE: 14 BRPM | WEIGHT: 176 LBS | OXYGEN SATURATION: 96 %

## 2019-04-23 DIAGNOSIS — E78.00 PURE HYPERCHOLESTEROLEMIA: ICD-10-CM

## 2019-04-23 DIAGNOSIS — I25.10 ATHEROSCLEROSIS OF NATIVE CORONARY ARTERY OF NATIVE HEART WITHOUT ANGINA PECTORIS: Primary | ICD-10-CM

## 2019-04-23 DIAGNOSIS — I48.91 ATRIAL FIBRILLATION, UNSPECIFIED TYPE (HCC): ICD-10-CM

## 2019-04-23 DIAGNOSIS — Z79.899 ENCOUNTER FOR LONG-TERM (CURRENT) DRUG USE: ICD-10-CM

## 2019-04-23 DIAGNOSIS — I10 ESSENTIAL HYPERTENSION, BENIGN: ICD-10-CM

## 2019-04-23 DIAGNOSIS — I25.10 CORONARY ARTERY DISEASE INVOLVING NATIVE CORONARY ARTERY OF NATIVE HEART WITHOUT ANGINA PECTORIS: ICD-10-CM

## 2019-04-23 RX ORDER — ENALAPRIL MALEATE 20 MG/1
TABLET ORAL
Qty: 180 TAB | Refills: 2 | Status: SHIPPED | OUTPATIENT
Start: 2019-04-23 | End: 2020-01-01

## 2019-04-23 RX ORDER — BUMETANIDE 1 MG/1
1 TABLET ORAL DAILY
Qty: 90 TAB | Refills: 2 | Status: SHIPPED | OUTPATIENT
Start: 2019-04-23 | End: 2019-01-01 | Stop reason: SDUPTHER

## 2019-04-23 RX ORDER — DILTIAZEM HYDROCHLORIDE 30 MG/1
30 TABLET, FILM COATED ORAL DAILY
COMMUNITY
End: 2019-06-24 | Stop reason: DRUGHIGH

## 2019-04-23 RX ORDER — METOPROLOL TARTRATE 100 MG/1
100 TABLET ORAL 2 TIMES DAILY
Qty: 180 TAB | Refills: 2 | Status: SHIPPED | OUTPATIENT
Start: 2019-04-23 | End: 2020-01-01

## 2019-04-23 RX ORDER — OMEPRAZOLE 20 MG/1
CAPSULE, DELAYED RELEASE ORAL
Qty: 90 CAP | Refills: 2 | Status: SHIPPED | OUTPATIENT
Start: 2019-04-23 | End: 2019-01-01 | Stop reason: SDUPTHER

## 2019-04-23 RX ORDER — BUMETANIDE 1 MG/1
1 TABLET ORAL DAILY
Qty: 90 TAB | Refills: 2 | Status: SHIPPED | OUTPATIENT
Start: 2019-04-23 | End: 2019-04-23 | Stop reason: SDUPTHER

## 2019-04-23 NOTE — PROGRESS NOTES
HISTORY OF PRESENT ILLNESS  Saravanan Merino is a 81 y.o. female. She has h/o HTN, HLD, AODM and CAD  And PAF (diagnosed on 4/17 for which she underwent successful cardioversion on oac and amiodarone)here for follow up  Stress test normal on 11/10    Doppler /US renal NO MANDO b/l    PTCA and stent of right coronary artery in 1996 July 26, 2007. The cardiac catheterization revealed mild 10% tapering of the ostium. The left anterior descending artery had mild intimal disease throughout. There was a 30% stenosis in the proximal portion of the LAD between the first and second septal . The remainder of the LAD had only mild intimal disease. The circumflex artery had a 30%-40% stenosis in the proximal mid segment prior to take off of obtuse marginal branch. The right coronary artery was a large dominant artery with 80% proximal stenosis followed by 90% proximal to mid stenosis, followed by 30% residual stenosis. Ejection fraction was estimated at 50%-55%. 2/14: nuclear stress test: mild ischemia mid distal kailey lateral wall EF 59%    3/14: cardiac catheterization:Left main: The vessel was normal sized. Angiography  showed minor luminal irregularities. LAD: The vessel was normal sized. Angiography showed mild atherosclerosis. There was a 30 % stenosis in the  middle third of the vessel segment. 1st diagonal: The vessel was small  sized. Angiography showed minor luminal irregularities. 2nd diagonal: The  vessel was normal sized. Angiography showed mild atherosclerosis. Circumflex: The vessel was normal sized. Angiography showed mild  atherosclerosis. There was a 30 % stenosis. RCA: The vessel was large  sized (dominant). Angiography showed mild atherosclerosis and 3 patent  prior stents.  There was a tubular 30 % stenosis in the distal third of the  vessel segment.    MEDICAL RX ONLY at that time   Stopped niacin in 2014 for \"sick \" feeling    Seen in [facility] for HTN on 2/15 aldactone increased to 25 mg at that time  Renal US on 3/15 no MANDO  ON 4/17 admitted to [facility] for sob and found to be in AF  Nuclear stress test on 4/25/17:No ischemia demonstrated. No infarct visible. LVEF 61%.      Chest ct on 4/17:1. Atherosclerotic aorta with coronary artery calcification. 2. Small bilateral pleural effusions with right pleural calcifications. 3. Status post cholecystectomy. Valentin/Cardioversion  on 4/28/17:VALENTIN with normal EF mild mr and TR no evidence for gross intracardiac thrombi  Cardioversion: 150 joules were delivered in a synch fashion with restoration of NSR  Amiodarone started lopressor decreased  Head ct on 8/18/18:Severe chronic microvascular ischemic change.      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  Amiodarone stopped with concerns for toxicity.  Also noted to hyponatremic.    Nephrology consulted and felt low Na related to HCTZ and Bactrim use - both stopped indefinitely                        - Bumex 1 mg PO daily started and continued through discharge                        - Na improve to 130 before discharge                        - Patient to follow up with Nephrology as needed. Pulmonary consulted and felt no Amiodarone toxicity, SOB related to COPD and volume                        - SOB improved as Na improved and Bumex was used.      ECHO on 8/17:Left ventricle: Systolic function was normal. Ejection fraction was  estimated in the range of 55 % to 60 %. Suboptimal endocardial  visualization limits wall motion analysis. Wall thickness was mildly  increased. Left atrium: The atrium was mildly dilated. Mitral valve: There was mild regurgitation. Aortic valve: Leaflets exhibited lower normal cuspal separation and  sclerosis. Tricuspid valve: There was mild regurgitation.   CTA  8/17 of chest without toxicity changes or PTE: . There is a mild to moderate-sized right pleural effusion and a mild sized  left pleural effusion. The heart is enlarged there is no pericardial effusion. There is no mediastinal masses or adenopathy. There is no evidence for pulmonary  emboli. There is no shift or pneumothorax. There are mild congestive changes. Mild emphysema. No focal consolidation or mass.      IMPRESSION  IMPRESSION: Bilateral effusions and cardiomegaly. No pulmonary emboli. Nuclear stress test on 4/23/18:SPECT images demonstrate a medium, fixed abnormality of mild degree in the anterior region on the stress and rest images. Gated SPECT images reveals normal myocardial thickening and wall motion. The left ventricular ejection fraction was calculated to be 63 %. Impression:   Myocardial perfusion imaging is normal. No gross ischemia or mi  Overall left ventricular systolic function was normal.       These test results indicate low likelihood for the presence of angiographically significant coronary artery disease.      Vascular us le : no dvt seroma on right thigh  04/17/19   ECHO ADULT COMPLETE 04/20/2019 4/20/2019    Narrative · Left ventricular low normal systolic function. Calculated left   ventricular ejection fraction is 53%. Biplane method used to measure   ejection fraction. · Left atrial cavity size is moderately dilated. · Right atrial cavity size is mildly dilated. · Mild aortic valve sclerosis with no significant stenosis. · Mitral valve thickening. Mild mitral valve regurgitation. · Mild pulmonary hypertension is present. Signed by: Gladis Cisneros MD   · Nuclear stress test on 4/19:Baseline ECG: Atrial fibrillation, non-specific ST-T wave abnormalities. · Gated SPECT: Left ventricular function post-stress was abnormal. Calculated ejection fraction is 45%. There is no evidence of transient ischemic dilation (TID). The TID ratio is 0.97.   · Left ventricular perfusion is abnormal.  · Myocardial perfusion imaging defect 1: There is a defect that is moderate in size with a moderate reduction in uptake present in the apical to basal anterior location(s) that is non-reversible. There is abnormal wall motion in the defect area. Viability in the area is good. caused by breast attenuation. The possibility of artifact cannot be excluded. Perfusion defect was visually and quantitatively present. · Myocardial perfusion imaging defect 1: caused by breast attenuation. Abnormal myocardial perfusion imaging. Fixed defect consistent with prior myocardial infarction. Myocardial perfusion imaging supports an intermediate risk stress test.               Past Medical History:   Diagnosis Date    Atrial fibrillation ([address]       CAD (coronary artery disease)       Cerebrovascular accident stroke, other, unspec 11/18/2010    Coronary atherosclerosis of native coronary artery 11/18/2010    Essential hypertension       Essential hypertension, benign 11/18/2010    HLD (hyperlipidemia) 11/18/2010    PVD (peripheral vascular disease) ([address] 2017    Type II or unspecified type diabetes mellitus without mention of complication, not stated as uncontrolled 11/18/2010                     Past Surgical History:   Procedure Laterality Date    HX CORONARY STENT PLACEMENT          HX HEART CATHETERIZATION          HX HYSTERECTOMY    1972       HPI  No recurrent cp   Feels dizzy wih 60 bid of cardizem reduced to 30 bid but still with her head feeling funny  Review of Systems   Respiratory: Negative. Cardiovascular: Negative. Physical Exam  Visit Vitals  /86 (BP 1 Location: Left arm, BP Patient Position: Sitting)   Pulse 73   Resp 14   Ht 5' (1.524 m)   Wt 79.8 kg (176 lb)   SpO2 96%   BMI 34.37 kg/m²       ASSESSMENT and PLAN  HTN:controlled Apparently unable to take hydralazine.    Now off norvasc and on a small dose of cardizem     PAF:she remains in AF rate is now controlled, decrease cardizem to 30 mg every day gicven still some head heaviness with am cardizem   continue lopressor    She is now off amiodarone since her intolerance to it  Continue eliquis no untoward effects thus far.    LAE moderate   She is relatively asymptomatic I would recommend rate control and anticoagulation only for now        Continue same dose of eliquis for now 2.5 bid for renal function and age      CAD:  no recurrent cp, discussed fixed inferior defect  continue meds only   If recurrent cp again proceed wth cath     HLD:closely followed by her PCP and well controlled     AODM: also closely followed by her PCP     Hyponatremia:off hctz and follow by nephrology     Right leg cellulitis: resolved      See her back in 2 months

## 2019-04-23 NOTE — PROGRESS NOTES
Visit Vitals  /86 (BP 1 Location: Left arm, BP Patient Position: Sitting)   Pulse 73   Resp 14   Ht 5' (1.524 m)   Wt 176 lb (79.8 kg)   SpO2 96%   BMI 34.37 kg/m²

## 2019-06-14 DIAGNOSIS — E78.00 PURE HYPERCHOLESTEROLEMIA: ICD-10-CM

## 2019-06-14 DIAGNOSIS — Z79.899 ENCOUNTER FOR LONG-TERM (CURRENT) DRUG USE: ICD-10-CM

## 2019-06-14 DIAGNOSIS — I10 ESSENTIAL HYPERTENSION, BENIGN: ICD-10-CM

## 2019-06-14 RX ORDER — ENALAPRIL MALEATE 20 MG/1
TABLET ORAL
Qty: 180 TAB | Refills: 3 | Status: SHIPPED | OUTPATIENT
Start: 2019-06-14 | End: 2019-01-01 | Stop reason: SDUPTHER

## 2019-06-24 ENCOUNTER — OFFICE VISIT (OUTPATIENT)
Dept: CARDIOLOGY CLINIC | Age: 84
End: 2019-06-24

## 2019-06-24 VITALS
RESPIRATION RATE: 16 BRPM | SYSTOLIC BLOOD PRESSURE: 120 MMHG | DIASTOLIC BLOOD PRESSURE: 76 MMHG | WEIGHT: 175.2 LBS | BODY MASS INDEX: 34.4 KG/M2 | OXYGEN SATURATION: 97 % | HEART RATE: 108 BPM | HEIGHT: 60 IN

## 2019-06-24 DIAGNOSIS — E78.00 PURE HYPERCHOLESTEROLEMIA: ICD-10-CM

## 2019-06-24 DIAGNOSIS — I48.91 ATRIAL FIBRILLATION, UNSPECIFIED TYPE (HCC): ICD-10-CM

## 2019-06-24 DIAGNOSIS — E11.8 TYPE 2 DIABETES MELLITUS WITH COMPLICATION, WITHOUT LONG-TERM CURRENT USE OF INSULIN (HCC): ICD-10-CM

## 2019-06-24 DIAGNOSIS — I67.89 ACUTE, BUT ILL-DEFINED, CEREBROVASCULAR DISEASE: ICD-10-CM

## 2019-06-24 DIAGNOSIS — I10 ESSENTIAL HYPERTENSION, BENIGN: Primary | ICD-10-CM

## 2019-06-24 DIAGNOSIS — I25.10 ATHEROSCLEROSIS OF NATIVE CORONARY ARTERY OF NATIVE HEART WITHOUT ANGINA PECTORIS: ICD-10-CM

## 2019-06-24 RX ORDER — DILTIAZEM HYDROCHLORIDE 30 MG/1
30 TABLET, FILM COATED ORAL 3 TIMES DAILY
COMMUNITY
End: 2019-01-01 | Stop reason: SDUPTHER

## 2019-06-24 NOTE — PROGRESS NOTES
HISTORY OF PRESENT ILLNESS Joe Browning is a 81 y.o. female. She has h/o HTN, HLD, AODM and CAD  And PAF (diagnosed on 4/17 for which she underwent successful cardioversion on oac and amiodarone)here for follow up Stress test normal on 11/10   
Doppler /US renal NO MANDO b/l   
PTCA and stent of right coronary artery in 1996 July 26, 2007. The cardiac catheterization revealed mild 10% tapering of the ostium. The left anterior descending artery had mild intimal disease throughout. There was a 30% stenosis in the proximal portion of the LAD between the first and second septal . The remainder of the LAD had only mild intimal disease. The circumflex artery had a 30%-40% stenosis in the proximal mid segment prior to take off of obtuse marginal branch. The right coronary artery was a large dominant artery with 80% proximal stenosis followed by 90% proximal to mid stenosis, followed by 30% residual stenosis. Ejection fraction was estimated at 50%-55%. 2/14: nuclear stress test: mild ischemia mid distal kaiely lateral wall EF 59%   
3/14: cardiac catheterization:Left main: The vessel was normal sized. Angiography 
showed minor luminal irregularities. LAD: The vessel was normal sized. Angiography showed mild atherosclerosis. There was a 30 % stenosis in the 
middle third of the vessel segment. 1st diagonal: The vessel was small 
sized. Angiography showed minor luminal irregularities. 2nd diagonal: The 
vessel was normal sized. Angiography showed mild atherosclerosis. Circumflex: The vessel was normal sized. Angiography showed mild 
atherosclerosis. There was a 30 % stenosis. RCA: The vessel was large 
sized (dominant). Angiography showed mild atherosclerosis and 3 patent 
prior stents.  There was a tubular 30 % stenosis in the distal third of the 
vessel segment.   
MEDICAL RX ONLY at that time  
Stopped niacin in 2014 for \"sick \" feeling   
 Seen in [facility] for HTN on 2/15 aldactone increased to 25 mg at that time Renal US on 3/15 no MANDO 
ON 4/17 admitted to [facility] for sob and found to be in AF Nuclear stress test on 4/25/17:No ischemia demonstrated. No infarct visible. LVEF 61%. 
   
Chest ct on 4/17:1. Atherosclerotic aorta with coronary artery calcification. 2. Small bilateral pleural effusions with right pleural calcifications. 3. Status post cholecystectomy. Valentin/Cardioversion  on 4/28/17:VALENTIN with normal EF mild mr and TR no evidence for gross intracardiac thrombi Cardioversion: 150 joules were delivered in a synch fashion with restoration of NSR Amiodarone started lopressor decreased Head ct on 8/18/18:Severe chronic microvascular ischemic change. 
   
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  
   
Admitted to [facility] on 8/17:Admitted with SOB.  Amiodarone stopped with concerns for toxicity.  Also noted to hyponatremic.   
Nephrology consulted and felt low Na related to HCTZ and Bactrim use - both stopped indefinitely                       - Bumex 1 mg PO daily started and continued through discharge 
                      - Na improve to 130 before discharge 
                      - Patient to follow up with Nephrology as needed. Pulmonary consulted and felt no Amiodarone toxicity, SOB related to COPD and volume 
                      - SOB improved as Na improved and Bumex was used. 
   
ECHO on 8/17:Left ventricle: Systolic function was normal. Ejection fraction was 
estimated in the range of 55 % to 60 %. Suboptimal endocardial 
visualization limits wall motion analysis. Wall thickness was mildly 
increased. Left atrium: The atrium was mildly dilated. Mitral valve: There was mild regurgitation. Aortic valve: Leaflets exhibited lower normal cuspal separation and 
sclerosis. Tricuspid valve: There was mild regurgitation.  
CTA  8/17 of chest without toxicity changes or PTE: . There is a mild to moderate-sized right pleural effusion and a mild sized 
left pleural effusion. The heart is enlarged there is no pericardial effusion. There is no mediastinal masses or adenopathy. There is no evidence for pulmonary 
emboli. There is no shift or pneumothorax. There are mild congestive changes. Mild emphysema. No focal consolidation or mass. 
   
IMPRESSION IMPRESSION: Bilateral effusions and cardiomegaly. No pulmonary emboli. Nuclear stress test on 4/23/18:SPECT images demonstrate a medium, fixed abnormality of mild degree in the anterior region on the stress and rest images. Gated SPECT images reveals normal myocardial thickening and wall motion. The left ventricular ejection fraction was calculated to be 63 %. Impression:  
Myocardial perfusion imaging is normal. No gross ischemia or mi Overall left ventricular systolic function was normal.  
 
 
These test results indicate low likelihood for the presence of angiographically significant coronary artery disease.  
  
Vascular us le : no dvt seroma on right thigh 
    
04/17/19 ECHO ADULT COMPLETE 04/20/2019 4/20/2019  
  Narrative · Left ventricular low normal systolic function. Calculated left  
ventricular ejection fraction is 53%. Biplane method used to measure  
ejection fraction. · Left atrial cavity size is moderately dilated. · Right atrial cavity size is mildly dilated. · Mild aortic valve sclerosis with no significant stenosis. · Mitral valve thickening. Mild mitral valve regurgitation. · Mild pulmonary hypertension is present. 
   
    Signed by: Jose Joe MD  
· Nuclear stress test on 4/19:Baseline ECG: Atrial fibrillation, non-specific ST-T wave abnormalities. · Gated SPECT: Left ventricular function post-stress was abnormal. Calculated ejection fraction is 45%. There is no evidence of transient ischemic dilation (TID). The TID ratio is 0.97.  
· Left ventricular perfusion is abnormal. 
 · Myocardial perfusion imaging defect 1: There is a defect that is moderate in size with a moderate reduction in uptake present in the apical to basal anterior location(s) that is non-reversible. There is abnormal wall motion in the defect area. Viability in the area is good. caused by breast attenuation. The possibility of artifact cannot be excluded. Perfusion defect was visually and quantitatively present. · Myocardial perfusion imaging defect 1: caused by breast attenuation. Abnormal myocardial perfusion imaging. Fixed defect consistent with prior myocardial infarction. Myocardial perfusion imaging supports an intermediate risk stress test. 
  
          
Past Medical History:  
Diagnosis Date  Atrial fibrillation ([address]     
 CAD (coronary artery disease)     
 Cerebrovascular accident stroke, other, unspec 11/18/2010  Coronary atherosclerosis of native coronary artery 11/18/2010  Essential hypertension     
 Essential hypertension, benign 11/18/2010  HLD (hyperlipidemia) 11/18/2010  PVD (peripheral vascular disease) ([address] 2017  Type II or unspecified type diabetes mellitus without mention of complication, not stated as uncontrolled 11/18/2010  
   
             
Past Surgical History:  
Procedure Laterality Date  HX CORONARY STENT PLACEMENT        
 HX HEART CATHETERIZATION        
 HX HYSTERECTOMY    1972  
  
 
HPI Doing well she tells me no cp or sob reported Review of Systems Respiratory: Negative. Cardiovascular: Negative. Visit Vitals /76 (BP 1 Location: Left arm, BP Patient Position: Sitting) Pulse (!) 108 Resp 16 Ht 5' (1.524 m) Wt 175 lb 3.2 oz (79.5 kg) SpO2 97% BMI 34.22 kg/m² Physical Exam  
Neck: No JVD present. Carotid bruit is not present. Cardiovascular: Normal rate. An irregularly irregular rhythm present. Pulmonary/Chest: Effort normal and breath sounds normal.  
Abdominal: Soft. Musculoskeletal: She exhibits no edema. Psychiatric: She has a normal mood and affect. Lab Results Component Value Date/Time Sodium 141 03/06/2019 11:20 AM  
 Potassium 4.0 03/06/2019 11:20 AM  
 Chloride 99 03/06/2019 11:20 AM  
 CO2 25 03/06/2019 11:20 AM  
 Anion gap 10 08/30/2017 04:20 AM  
 Glucose 57 (L) 03/06/2019 11:20 AM  
 BUN 25 03/06/2019 11:20 AM  
 Creatinine 1.39 (H) 03/06/2019 11:20 AM  
 BUN/Creatinine ratio 18 03/06/2019 11:20 AM  
 GFR est AA 41 (L) 03/06/2019 11:20 AM  
 GFR est non-AA 35 (L) 03/06/2019 11:20 AM  
 Calcium 9.2 03/06/2019 11:20 AM  
 
Current Outpatient Medications on File Prior to Visit Medication Sig Dispense Refill  enalapril (VASOTEC) 20 mg tablet TAKE 1 TABLET TWICE A  Tab 3  
 acyclovir (ZOVIRAX) 5 % ointment Apply  to affected area every three (3) hours. 45 g 0  
 apixaban (ELIQUIS) 2.5 mg tablet Take 1 Tab by mouth two (2) times a day. 180 Tab 2  
 dilTIAZem (CARDIZEM) 30 mg tablet Take 30 mg by mouth daily.  metoprolol tartrate (LOPRESSOR) 100 mg IR tablet Take 1 Tab by mouth two (2) times a day. 180 Tab 2  
 omeprazole (PRILOSEC) 20 mg capsule TAKE 1 CAPSULE DAILY 90 Cap 2  
 enalapril (VASOTEC) 20 mg tablet TAKE 1 TABLET TWICE A  Tab 2  
 bumetanide (BUMEX) 1 mg tablet Take 1 Tab by mouth daily. 90 Tab 2  
 budesonide-formoterol (SYMBICORT) 160-4.5 mcg/actuation HFAA Take 2 Puffs by inhalation two (2) times a day. After each use, rinse mouth with water 3 Inhaler 3  
 glucose blood VI test strips (ACCU-CHEK GUIDE) strip Check blood sugar 4 times a day E11.9 372 Strip 3  
 glucose blood VI test strips (BLOOD GLUCOSE TEST) strip Check blood sugar 3 times a day DX:E11.9 400 Strip 4  
 glipiZIDE (GLUCOTROL) 5 mg tablet TAKE 2 TABLETS TWICE A  Tab 3  
 lancets (ACCU-CHEK FASTCLIX LANCET DRUM) misc Check blood sugar 3 times a day DX:E11.9 270 Each 4  
 spironolactone (ALDACTONE) 25 mg tablet Take  by mouth daily.  nystatin (MYCOSTATIN) topical cream Apply  to affected area two (2) times a day. X 10 days 30 g 0  
 insulin aspart U-100 (NOVOLOG FLEXPEN U-100 INSULIN) 100 unit/mL inpn INJECT 7 UNITS BEFORE EACH MEAL 3 TIMES A DAY OR AS   DIRECTED 45 mL 3  
 clotrimazole-betamethasone (LOTRISONE) topical cream Apply thin layer to affected area twice daily 15 g 0  
 nystatin (MYCOSTATIN) powder Apply  to affected area two (2) times a day. 60 g 2  
 isosorbide mononitrate ER (IMDUR) 60 mg CR tablet TAKE 1 TABLET TWICE A  Tab 3  
 azelastine (ASTELIN) 137 mcg (0.1 %) nasal spray USE 1 SPRAY IN EACH NOSTRIL TWICE DAILY AS DIRECTED 1 Bottle 11  
 doxazosin (CARDURA) 1 mg tablet Take 1 Tab by mouth nightly. 30 Tab 11  
 mupirocin calcium (BACTROBAN) 2 % topical cream Apply  to affected area two (2) times a day. 45 g 0  
 aspirin 81 mg chewable tablet Take 1 Tab by mouth daily. 90 Tab 3  
 insulin detemir (LEVEMIR FLEXPEN) 100 unit/mL (3 mL) inpn 15 Units by SubCUTAneous route daily. In the morning 5 Pen 4  cholecalciferol, vitamin D3, (VITAMIN D3) 2,000 unit tab Take  by mouth.  atorvastatin (LIPITOR) 40 mg tablet Take 20 mg by mouth nightly.  nitroglycerin (NITROSTAT) 0.4 mg SL tablet 1 Tab by SubLINGual route every five (5) minutes as needed for Chest Pain. 25 Tab 3 No current facility-administered medications on file prior to visit. ASSESSMENT and PLAN 
HTN:controlled  
unable to take hydralazine. Now off norvasc and on a small dose of cardizem 
  
PAF:she remains in AF rate is relatively controlled, needs better control continue lopressor and increase cardizem from to 30 mg in am and half tab at lunch and in pm 
  She is now off amiodarone since her intolerance to it Continue eliquis no untoward effects thus far.  
LAE moderate She is relatively asymptomatic I would recommend rate control and anticoagulation only for now 
  
  
  Continue same dose of eliquis for now 2.5 bid for renal function and age  
  
CAD:  no recurrent cp, discussed fixed inferior defect for medical rx only unless of symptoms occurrence 
  
HLD:closely followed by her PCP and well controlled 
  
AODM: also closely followed by her PCP 
  
Hyponatremia:resolved off hctz and follow by nephrology 
  
Right leg cellulitis: resolved  
  
See her back in 4 months or sooner if any issues

## 2019-06-24 NOTE — PROGRESS NOTES
Chief Complaint Patient presents with  Irregular Heart Beat 1. Have you been to the ER, urgent care clinic since your last visit? Hospitalized since your last visit? No 
 
2. Have you seen or consulted any other health care providers outside of the [address] [place] [address] since your last visit? Include any pap smears or colon screening. No 
 
3) Do you have an Advance Directive on file? No 
 
Visit Vitals /76 (BP 1 Location: Left arm, BP Patient Position: Sitting) Pulse (!) 108 Resp 16 Ht 5' (1.524 m) Wt 175 lb 3.2 oz (79.5 kg) SpO2 97% BMI 34.22 kg/m²

## 2019-07-12 RX ORDER — METOPROLOL TARTRATE 50 MG/1
TABLET ORAL
Qty: 180 TAB | Refills: 3 | OUTPATIENT
Start: 2019-07-12

## 2019-07-23 NOTE — PATIENT INSTRUCTIONS
Increase Cardizem 30 mg in morning,1/2 tablet at lunch and 1 tablet dinner Follow up with DR. Mesa in 4 months
[FreeTextEntry1] : Patient with systemic inflammatory disease with symptoms of generalized lower extremity aches and heaviness with balance impairment and brain fog with a history of BPPV, Hashimoto's and CREST syndrome, with an abnormal EMG consistent with neuropathy showing mild sensorimotor polyneuropathy, and a MRI brain showing small vessel disease which can be acceleration by patients systemic inflammatory disease with serology findings of elevated TNF alpha: 149 (<22), ESR: 44 (0-20), KRISTIN: 1:2560 and TPO: 121 (0-34) and a history of hypertension. \par \par Doing well, showing improvement in cognitive functioning. Continues to report fatigue that is related to autoimmune disorder. At this time we recommend the following: \par \par 1. Change IVIG to 1g/kg/month. Infuse over 7-8 hours with Tylenol/Benadryl and 1L NS. \par 2. Follow up in 4 months

## 2019-10-29 NOTE — PROGRESS NOTES
HISTORY OF PRESENT ILLNESS  Lela Bar is a 81 y.o. female. She has h/o HTN, HLD, AODM and CAD  And PAF (diagnosed on 4/17 for which she underwent successful cardioversion on oac and amiodarone)here for follow up  Stress test normal on 11/10    Doppler /US renal NO MANDO b/l    PTCA and stent of right coronary artery in 1996 July 26, 2007. The cardiac catheterization revealed mild 10% tapering of the ostium. The left anterior descending artery had mild intimal disease throughout. There was a 30% stenosis in the proximal portion of the LAD between the first and second septal . The remainder of the LAD had only mild intimal disease. The circumflex artery had a 30%-40% stenosis in the proximal mid segment prior to take off of obtuse marginal branch. The right coronary artery was a large dominant artery with 80% proximal stenosis followed by 90% proximal to mid stenosis, followed by 30% residual stenosis. Ejection fraction was estimated at 50%-55%. 2/14: nuclear stress test: mild ischemia mid distal kailey lateral wall EF 59%    3/14: cardiac catheterization:Left main: The vessel was normal sized. Angiography  showed minor luminal irregularities. LAD: The vessel was normal sized. Angiography showed mild atherosclerosis. There was a 30 % stenosis in the  middle third of the vessel segment. 1st diagonal: The vessel was small  sized. Angiography showed minor luminal irregularities. 2nd diagonal: The  vessel was normal sized. Angiography showed mild atherosclerosis. Circumflex: The vessel was normal sized. Angiography showed mild  atherosclerosis. There was a 30 % stenosis. RCA: The vessel was large  sized (dominant). Angiography showed mild atherosclerosis and 3 patent  prior stents.  There was a tubular 30 % stenosis in the distal third of the  vessel segment.    MEDICAL RX ONLY at that time   Stopped niacin in 2014 for \"sick \" feeling    Seen in [facility] for HTN on 2/15 aldactone increased to 25 mg at that time  Renal US on 3/15 no MANDO  ON 4/17 admitted to [facility] for sob and found to be in AF  Nuclear stress test on 4/25/17:No ischemia demonstrated. No infarct visible. LVEF 61%.      Chest ct on 4/17:1. Atherosclerotic aorta with coronary artery calcification. 2. Small bilateral pleural effusions with right pleural calcifications. 3. Status post cholecystectomy. Valentin/Cardioversion  on 4/28/17:VALENTIN with normal EF mild mr and TR no evidence for gross intracardiac thrombi  Cardioversion: 150 joules were delivered in a synch fashion with restoration of NSR  Amiodarone started lopressor decreased  Head ct on 8/18/18:Severe chronic microvascular ischemic change.      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  Amiodarone stopped with concerns for toxicity.  Also noted to hyponatremic.    Nephrology consulted and felt low Na related to HCTZ and Bactrim use - both stopped indefinitely                        - Bumex 1 mg PO daily started and continued through discharge                        - Na improve to 130 before discharge                        - Patient to follow up with Nephrology as needed. Pulmonary consulted and felt no Amiodarone toxicity, SOB related to COPD and volume                        - SOB improved as Na improved and Bumex was used.      ECHO on 8/17:Left ventricle: Systolic function was normal. Ejection fraction was  estimated in the range of 55 % to 60 %. Suboptimal endocardial  visualization limits wall motion analysis. Wall thickness was mildly  increased. Left atrium: The atrium was mildly dilated. Mitral valve: There was mild regurgitation. Aortic valve: Leaflets exhibited lower normal cuspal separation and  sclerosis. Tricuspid valve: There was mild regurgitation.   CTA  8/17 of chest without toxicity changes or PTE: . There is a mild to moderate-sized right pleural effusion and a mild sized  left pleural effusion. The heart is enlarged there is no pericardial effusion. There is no mediastinal masses or adenopathy. There is no evidence for pulmonary  emboli. There is no shift or pneumothorax. There are mild congestive changes. Mild emphysema. No focal consolidation or mass.      IMPRESSION  IMPRESSION: Bilateral effusions and cardiomegaly. No pulmonary emboli. Nuclear stress test on 4/23/18:SPECT images demonstrate a medium, fixed abnormality of mild degree in the anterior region on the stress and rest images. Gated SPECT images reveals normal myocardial thickening and wall motion. The left ventricular ejection fraction was calculated to be 63 %. Impression:   Myocardial perfusion imaging is normal. No gross ischemia or mi  Overall left ventricular systolic function was normal.       These test results indicate low likelihood for the presence of angiographically significant coronary artery disease.      Vascular us le : no dvt seroma on right thigh          04/17/19   ECHO ADULT COMPLETE 04/20/2019 4/20/2019     Narrative · Left ventricular low normal systolic function. Calculated left   ventricular ejection fraction is 53%. Biplane method used to measure   ejection fraction. · Left atrial cavity size is moderately dilated. · Right atrial cavity size is mildly dilated. · Mild aortic valve sclerosis with no significant stenosis. · Mitral valve thickening. Mild mitral valve regurgitation. · Mild pulmonary hypertension is present.          Signed by: David Sargent MD   · Nuclear stress test on 4/19:Baseline ECG: Atrial fibrillation, non-specific ST-T wave abnormalities. · Gated SPECT: Left ventricular function post-stress was abnormal. Calculated ejection fraction is 45%. There is no evidence of transient ischemic dilation (TID). The TID ratio is 0.97.   · Left ventricular perfusion is abnormal.  · Myocardial perfusion imaging defect 1: There is a defect that is moderate in size with a moderate reduction in uptake present in the apical to basal anterior location(s) that is non-reversible. There is abnormal wall motion in the defect area. Viability in the area is good. caused by breast attenuation. The possibility of artifact cannot be excluded. Perfusion defect was visually and quantitatively present. · Myocardial perfusion imaging defect 1: caused by breast attenuation. Abnormal myocardial perfusion imaging. Fixed defect consistent with prior myocardial infarction. Myocardial perfusion imaging supports an intermediate risk stress test.                Past Medical History:   Diagnosis Date    Atrial fibrillation ([address]       CAD (coronary artery disease)       Cerebrovascular accident stroke, other, unspec 11/18/2010    Coronary atherosclerosis of native coronary artery 11/18/2010    Essential hypertension       Essential hypertension, benign 11/18/2010    HLD (hyperlipidemia) 11/18/2010    PVD (peripheral vascular disease) ([address] 2017    Type II or unspecified type diabetes mellitus without mention of complication, not stated as uncontrolled 11/18/2010                     Past Surgical History:   Procedure Laterality Date    HX CORONARY STENT PLACEMENT          HX HEART CATHETERIZATION          HX HYSTERECTOMY    1972       HPI  She feels a little woozy after he medications in am  No cp but valdez report some sob  Review of Systems   Constitutional: Positive for malaise/fatigue. Respiratory: Positive for shortness of breath. Cardiovascular: Negative. Visit Vitals  /80 (BP 1 Location: Left arm, BP Patient Position: Sitting)   Pulse (!) 114   Resp 16   Ht 5' (1.524 m)   Wt 182 lb (82.6 kg)   SpO2 96%   BMI 35.54 kg/m²         Physical Exam   Neck: No JVD present. Carotid bruit is not present. Cardiovascular: An irregularly irregular rhythm present. Tachycardia present. Pulmonary/Chest: Effort normal and breath sounds normal.   Abdominal: Soft.    Musculoskeletal: She exhibits no edema. Psychiatric: She has a normal mood and affect. Current Outpatient Medications on File Prior to Visit   Medication Sig Dispense Refill    allopurinol (ZYLOPRIM) 100 mg tablet       insulin detemir U-100 (LEVEMIR FLEXPEN) 100 unit/mL (3 mL) inpn 18 units each morning and 10 units each night time or as directed 5 Pen 4    insulin aspart U-100 (NOVOLOG FLEXPEN U-100 INSULIN) 100 unit/mL (3 mL) inpn INJECT 7 UNITS BEFORE EACH MEAL 3 TIMES A DAY OR AS   DIRECTED 45 mL 3    dilTIAZem (CARDIZEM) 30 mg tablet Take 30 mg by mouth three (3) times daily. 1 tablet in morning  1/2 tablet at lunch  1 tablet at dinner      acyclovir (ZOVIRAX) 5 % ointment Apply  to affected area every three (3) hours. 45 g 0    apixaban (ELIQUIS) 2.5 mg tablet Take 1 Tab by mouth two (2) times a day. 180 Tab 2    metoprolol tartrate (LOPRESSOR) 100 mg IR tablet Take 1 Tab by mouth two (2) times a day. 180 Tab 2    omeprazole (PRILOSEC) 20 mg capsule TAKE 1 CAPSULE DAILY 90 Cap 2    enalapril (VASOTEC) 20 mg tablet TAKE 1 TABLET TWICE A  Tab 2    bumetanide (BUMEX) 1 mg tablet Take 1 Tab by mouth daily. 90 Tab 2    budesonide-formoterol (SYMBICORT) 160-4.5 mcg/actuation HFAA Take 2 Puffs by inhalation two (2) times a day. After each use, rinse mouth with water 3 Inhaler 3    glucose blood VI test strips (BLOOD GLUCOSE TEST) strip Check blood sugar 3 times a day  DX:E11.9 400 Strip 4    glipiZIDE (GLUCOTROL) 5 mg tablet TAKE 2 TABLETS TWICE A  Tab 3    lancets (ACCU-CHEK FASTCLIX LANCET DRUM) misc Check blood sugar 3 times a day   DX:E11.9 270 Each 4    nystatin (MYCOSTATIN) topical cream Apply  to affected area two (2) times a day.  X 10 days 30 g 0    clotrimazole-betamethasone (LOTRISONE) topical cream Apply thin layer to affected area twice daily 15 g 0    isosorbide mononitrate ER (IMDUR) 60 mg CR tablet TAKE 1 TABLET TWICE A  Tab 3    azelastine (ASTELIN) 137 mcg (0.1 %) nasal spray USE 1 SPRAY IN EACH NOSTRIL TWICE DAILY AS DIRECTED 1 Bottle 11    mupirocin calcium (BACTROBAN) 2 % topical cream Apply  to affected area two (2) times a day. 45 g 0    aspirin 81 mg chewable tablet Take 1 Tab by mouth daily. 90 Tab 3    cholecalciferol, vitamin D3, (VITAMIN D3) 2,000 unit tab Take  by mouth.  atorvastatin (LIPITOR) 40 mg tablet Take 20 mg by mouth nightly.  nitroglycerin (NITROSTAT) 0.4 mg SL tablet 1 Tab by SubLINGual route every five (5) minutes as needed for Chest Pain. 25 Tab 3    amLODIPine (NORVASC) 5 mg tablet TAKE 1 TABLET DAILY 90 Tab 3    spironolactone (ALDACTONE) 25 mg tablet Take  by mouth daily.  nystatin (MYCOSTATIN) powder Apply  to affected area two (2) times a day. 60 g 2    doxazosin (CARDURA) 1 mg tablet Take 1 Tab by mouth nightly. 30 Tab 11     No current facility-administered medications on file prior to visit.         ASSESSMENT and PLAN  HTN:controlled   unable to take hydralazine.   Now off norvasc and on a small dose of cardizem     AF:she remains in AF rate is higher than expected  ecg with af rvr at 114 and nstt  Discussed options   before changing meds proceed with holter  Discussed potential for ppm and avn ablation since I suspect some of the dizzy feeling are related to medications   She is now off amiodarone since her intolerance to it  Continue eliquis no untoward effects thus far.   LAE moderate   Continue rate control and anticoagulation for now         Continue same dose of eliquis for now 2.5 bid for renal function and age      CAD:  no recurrent cp, discussed fixed inferior defect for medical rx only unless of symptoms occurrence     HLD:closely followed by her PCP and well controlled     AODM: also closely followed by her PCP     Hyponatremia:resolved off hctz and follow by nephrology     Right leg cellulitis: resolved      See her back after holter

## 2019-10-29 NOTE — PROGRESS NOTES
Visit Vitals  /80 (BP 1 Location: Left arm, BP Patient Position: Sitting)   Pulse (!) 114   Resp 16   Ht 5' (1.524 m)   Wt 182 lb (82.6 kg)   SpO2 96%   BMI 35.54 kg/m²

## 2019-11-12 NOTE — PROGRESS NOTES
HISTORY OF PRESENT ILLNESS  Andrez Luis is a 81 y.o. female. She has h/o HTN, HLD, AODM and CAD  And PAF (diagnosed on 4/17 for which she underwent successful cardioversion on oac and amiodarone)here for follow up  Stress test normal on 11/10    Doppler /US renal NO MANDO b/l    PTCA and stent of right coronary artery in 1996 July 26, 2007. The cardiac catheterization revealed mild 10% tapering of the ostium. The left anterior descending artery had mild intimal disease throughout. There was a 30% stenosis in the proximal portion of the LAD between the first and second septal . The remainder of the LAD had only mild intimal disease. The circumflex artery had a 30%-40% stenosis in the proximal mid segment prior to take off of obtuse marginal branch. The right coronary artery was a large dominant artery with 80% proximal stenosis followed by 90% proximal to mid stenosis, followed by 30% residual stenosis. Ejection fraction was estimated at 50%-55%. 2/14: nuclear stress test: mild ischemia mid distal kailey lateral wall EF 59%    3/14: cardiac catheterization:Left main: The vessel was normal sized. Angiography  showed minor luminal irregularities. LAD: The vessel was normal sized. Angiography showed mild atherosclerosis. There was a 30 % stenosis in the  middle third of the vessel segment. 1st diagonal: The vessel was small  sized. Angiography showed minor luminal irregularities. 2nd diagonal: The  vessel was normal sized. Angiography showed mild atherosclerosis. Circumflex: The vessel was normal sized. Angiography showed mild  atherosclerosis. There was a 30 % stenosis. RCA: The vessel was large  sized (dominant). Angiography showed mild atherosclerosis and 3 patent  prior stents.  There was a tubular 30 % stenosis in the distal third of the  vessel segment.    MEDICAL RX ONLY at that time   Stopped niacin in 2014 for \"sick \" feeling    Seen in [facility] for HTN on 2/15 aldactone increased to 25 mg at that time  Renal US on 3/15 no MANDO  ON 4/17 admitted to [facility] for sob and found to be in AF  Nuclear stress test on 4/25/17:No ischemia demonstrated. No infarct visible. LVEF 61%.      Chest ct on 4/17:1. Atherosclerotic aorta with coronary artery calcification. 2. Small bilateral pleural effusions with right pleural calcifications. 3. Status post cholecystectomy. Valentin/Cardioversion  on 4/28/17:VALENTIN with normal EF mild mr and TR no evidence for gross intracardiac thrombi  Cardioversion: 150 joules were delivered in a synch fashion with restoration of NSR  Amiodarone started lopressor decreased  Head ct on 8/18/18:Severe chronic microvascular ischemic change.      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  Amiodarone stopped with concerns for toxicity.  Also noted to hyponatremic.    Nephrology consulted and felt low Na related to HCTZ and Bactrim use - both stopped indefinitely                        - Bumex 1 mg PO daily started and continued through discharge                        - Na improve to 130 before discharge                        - Patient to follow up with Nephrology as needed. Pulmonary consulted and felt no Amiodarone toxicity, SOB related to COPD and volume                        - SOB improved as Na improved and Bumex was used.      ECHO on 8/17:Left ventricle: Systolic function was normal. Ejection fraction was  estimated in the range of 55 % to 60 %. Suboptimal endocardial  visualization limits wall motion analysis. Wall thickness was mildly  increased. Left atrium: The atrium was mildly dilated. Mitral valve: There was mild regurgitation. Aortic valve: Leaflets exhibited lower normal cuspal separation and  sclerosis. Tricuspid valve: There was mild regurgitation.   CTA  8/17 of chest without toxicity changes or PTE: . There is a mild to moderate-sized right pleural effusion and a mild sized  left pleural effusion. The heart is enlarged there is no pericardial effusion. There is no mediastinal masses or adenopathy. There is no evidence for pulmonary  emboli. There is no shift or pneumothorax. There are mild congestive changes. Mild emphysema. No focal consolidation or mass.      IMPRESSION  IMPRESSION: Bilateral effusions and cardiomegaly. No pulmonary emboli. Nuclear stress test on 4/23/18:SPECT images demonstrate a medium, fixed abnormality of mild degree in the anterior region on the stress and rest images. Gated SPECT images reveals normal myocardial thickening and wall motion. The left ventricular ejection fraction was calculated to be 63 %. Impression:   Myocardial perfusion imaging is normal. No gross ischemia or mi  Overall left ventricular systolic function was normal.       These test results indicate low likelihood for the presence of angiographically significant coronary artery disease.      Vascular us le : no dvt seroma on right thigh          04/17/19   ECHO ADULT COMPLETE 04/20/2019 4/20/2019     Narrative · Left ventricular low normal systolic function. Calculated left   ventricular ejection fraction is 53%. Biplane method used to measure   ejection fraction. · Left atrial cavity size is moderately dilated. · Right atrial cavity size is mildly dilated. · Mild aortic valve sclerosis with no significant stenosis. · Mitral valve thickening. Mild mitral valve regurgitation. · Mild pulmonary hypertension is present.          Signed by: Sherrie Cee MD   · Nuclear stress test on 4/19:Baseline ECG: Atrial fibrillation, non-specific ST-T wave abnormalities. · Gated SPECT: Left ventricular function post-stress was abnormal. Calculated ejection fraction is 45%. There is no evidence of transient ischemic dilation (TID). The TID ratio is 0.97.   · Left ventricular perfusion is abnormal.  · Myocardial perfusion imaging defect 1: There is a defect that is moderate in size with a moderate reduction in uptake present in the apical to basal anterior location(s) that is non-reversible. There is abnormal wall motion in the defect area. Viability in the area is good. caused by breast attenuation. The possibility of artifact cannot be excluded. Perfusion defect was visually and quantitatively present. · Myocardial perfusion imaging defect 1: caused by breast attenuation. Abnormal myocardial perfusion imaging. Fixed defect consistent with prior myocardial infarction. Myocardial perfusion imaging supports an intermediate risk stress test.                Past Medical History:   Diagnosis Date    Atrial fibrillation ([address]       CAD (coronary artery disease)       Cerebrovascular accident stroke, other, unspec 11/18/2010    Coronary atherosclerosis of native coronary artery 11/18/2010    Essential hypertension       Essential hypertension, benign 11/18/2010    HLD (hyperlipidemia) 11/18/2010    PVD (peripheral vascular disease) ([address] 2017    Type II or unspecified type diabetes mellitus without mention of complication, not stated as uncontrolled 11/18/2010                     Past Surgical History:   Procedure Laterality Date    HX CORONARY STENT PLACEMENT          HX HEART CATHETERIZATION          HX HYSTERECTOMY    1972        HPI  Still with dizziness palpitations fatigue  Review of Systems   Constitutional: Positive for malaise/fatigue. Respiratory: Positive for shortness of breath. Cardiovascular: Positive for palpitations. Visit Vitals  /80 (BP 1 Location: Left arm, BP Patient Position: Sitting)   Pulse 98   Resp 16   Ht 5' (1.524 m)   Wt 182 lb (82.6 kg)   SpO2 94%   BMI 35.54 kg/m²       Physical Exam   Neck: No JVD present. Carotid bruit is not present. Cardiovascular: Normal rate. An irregularly irregular rhythm present. Pulmonary/Chest: Effort normal and breath sounds normal.   Abdominal: Soft. Musculoskeletal: She exhibits no edema.    Psychiatric: She has a normal mood and affect. Current Outpatient Medications on File Prior to Visit   Medication Sig Dispense Refill    allopurinol (ZYLOPRIM) 100 mg tablet       apixaban (ELIQUIS) 2.5 mg tablet Take 1 Tab by mouth two (2) times a day. 180 Tab 2    omeprazole (PRILOSEC) 20 mg capsule TAKE 1 CAPSULE DAILY 90 Cap 2    bumetanide (BUMEX) 1 mg tablet Take 1 Tab by mouth daily. 90 Tab 2    dilTIAZem (CARDIZEM) 30 mg tablet 1 tablet in morning 1/2 tablet at lunch 1 tablet at dinner 270 Tab 2    insulin detemir U-100 (LEVEMIR FLEXPEN) 100 unit/mL (3 mL) inpn 18 units each morning and 10 units each night time or as directed 5 Pen 4    insulin aspart U-100 (NOVOLOG FLEXPEN U-100 INSULIN) 100 unit/mL (3 mL) inpn INJECT 7 UNITS BEFORE EACH MEAL 3 TIMES A DAY OR AS   DIRECTED 45 mL 3    acyclovir (ZOVIRAX) 5 % ointment Apply  to affected area every three (3) hours. 45 g 0    metoprolol tartrate (LOPRESSOR) 100 mg IR tablet Take 1 Tab by mouth two (2) times a day. 180 Tab 2    enalapril (VASOTEC) 20 mg tablet TAKE 1 TABLET TWICE A  Tab 2    budesonide-formoterol (SYMBICORT) 160-4.5 mcg/actuation HFAA Take 2 Puffs by inhalation two (2) times a day. After each use, rinse mouth with water 3 Inhaler 3    glucose blood VI test strips (BLOOD GLUCOSE TEST) strip Check blood sugar 3 times a day  DX:E11.9 400 Strip 4    glipiZIDE (GLUCOTROL) 5 mg tablet TAKE 2 TABLETS TWICE A  Tab 3    lancets (ACCU-CHEK FASTCLIX LANCET DRUM) misc Check blood sugar 3 times a day   DX:E11.9 270 Each 4    nystatin (MYCOSTATIN) topical cream Apply  to affected area two (2) times a day.  X 10 days 30 g 0    clotrimazole-betamethasone (LOTRISONE) topical cream Apply thin layer to affected area twice daily 15 g 0    isosorbide mononitrate ER (IMDUR) 60 mg CR tablet TAKE 1 TABLET TWICE A  Tab 3    azelastine (ASTELIN) 137 mcg (0.1 %) nasal spray USE 1 SPRAY IN EACH NOSTRIL TWICE DAILY AS DIRECTED 1 Bottle 11    mupirocin calcium (BACTROBAN) 2 % topical cream Apply  to affected area two (2) times a day. 45 g 0    aspirin 81 mg chewable tablet Take 1 Tab by mouth daily. 90 Tab 3    cholecalciferol, vitamin D3, (VITAMIN D3) 2,000 unit tab Take  by mouth.  atorvastatin (LIPITOR) 40 mg tablet Take 20 mg by mouth nightly.  nitroglycerin (NITROSTAT) 0.4 mg SL tablet 1 Tab by SubLINGual route every five (5) minutes as needed for Chest Pain. 25 Tab 3    amLODIPine (NORVASC) 5 mg tablet TAKE 1 TABLET DAILY 90 Tab 3    spironolactone (ALDACTONE) 25 mg tablet Take  by mouth daily.  nystatin (MYCOSTATIN) powder Apply  to affected area two (2) times a day. 60 g 2    doxazosin (CARDURA) 1 mg tablet Take 1 Tab by mouth nightly. 30 Tab 11     No current facility-administered medications on file prior to visit. ASSESSMENT and PLAN  1. Atrial fibrillation[de-identified] This is chronic. I suspect some of her symptoms are related to atrial relation rapid ventricular response but also to significant amount of medication is she is taking. Her Holter monitor recently revealed heart rate between 63 and 139 bpm with an average of a 94 bpm.  Continue Eliquis. No untoward effects thus far    Discussed options. In my opinion we need to consider the possibility of the AV jazlyn ablation and pacemaker placement. This will allow better heart rate control and hopefully will allow us to decrease some of her medication particularly the metoprolol. Refer to EP for further evaluation    Hypertension: Controlled today. She is now off Norvasc and on Cardizem instead. Chronic kidney disease: Closely followed by nephrology. Now off Bactrim. Hyperlipidemia: Closely followed by her primary care physician. CAD: She remains completely asymptomatic at this time. Continue medical therapy only at this point. Diabetes: Closely followed by her primary care physician. Hyponatremia: Followed by nephrology and resolved for now.     See her after EP evaluation.

## 2019-11-12 NOTE — PATIENT INSTRUCTIONS
Refer to Dr. Hemalatha Galvan for possible AV node ablation/pacemaker. Follow up with Dr. Idalmis Alonzo after Dr. Hemalatha Galvan appointment.

## 2019-11-12 NOTE — PROGRESS NOTES
Visit Vitals  /80 (BP 1 Location: Left arm, BP Patient Position: Sitting)   Pulse 98   Resp 16   Ht 5' (1.524 m)   Wt 182 lb (82.6 kg)   SpO2 94%   BMI 35.54 kg/m²

## 2019-12-05 NOTE — PROGRESS NOTES
Cardiac Electrophysiology OFFICE Consultation Note Subjective:  
  
Emigdio Murillo is a 81 y.o. patient who is seen for evaluation of Atrial fibrillation The patient is here with her daughter. She is kindly referred from Dr. Roc Brandt. The patient has been treated for persistent atrial fibrillation and she has had rapid ventricular rate. Medications that she was given for ventricular rate control have caused her to be fatigued and she has had dizzy spells. She does not think that she can tolerate them anymore. She had a stroke in the past and she thought was related to cardiac catheterization so she is afraid of procedure (mild CAD in 2014) 
 
holter 11/7/2019: AFIB  bpm 
 
Nuclear stress test 4/17/2019: fixed anterior defect as in 2018 and LVEF 45% POP CV 4/2017 04/17/19 ECHO ADULT COMPLETE 04/20/2019 4/20/2019 Narrative · Left ventricular low normal systolic function. Calculated left  
ventricular ejection fraction is 53%. Biplane method used to measure  
ejection fraction. · Left atrial cavity size is moderately dilated. · Right atrial cavity size is mildly dilated. · Mild aortic valve sclerosis with no significant stenosis. · Mitral valve thickening. Mild mitral valve regurgitation. · Mild pulmonary hypertension is present. Signed by: Ad Gonzalez MD  
 
Problem List  Date Reviewed: 12/5/2019 Codes Class Noted Severe obesity (BMI 35.0-39. 9) with comorbidity ([facility] ICD-10-CM: E66.01 
ICD-9-CM: 278.01  5/1/2018 Type 2 diabetes with nephropathy ([facility] ICD-10-CM: E11.21 
ICD-9-CM: 250.40, 583.81  2/12/2018 SOB (shortness of breath) ICD-10-CM: R06.02 
ICD-9-CM: 786.05  8/23/2017 Atrial fibrillation ([facility] ICD-10-CM: I48.91 
ICD-9-CM: 427.31  4/24/2017 PVD (peripheral vascular disease) with claudication (HCC) ICD-10-CM: I73.9 ICD-9-CM: 443.9  2/20/2017  Hypertension complicating diabetes ([facility] ICD-10-CM: E11.59, I10 
 ICD-9-CM: 250.80, 401.9  11/17/2016 Coronary atherosclerosis of native coronary artery ICD-10-CM: I25.10 ICD-9-CM: 414.01  11/18/2010 HLD (hyperlipidemia) ICD-10-CM: E78.5 ICD-9-CM: 272.4  11/18/2010 Cerebrovascular accident stroke, other, unspec ICD-10-CM: I67.89 ICD-9-CM: 693  11/18/2010 Current Outpatient Medications Medication Sig Dispense Refill  allopurinol (ZYLOPRIM) 100 mg tablet  apixaban (ELIQUIS) 2.5 mg tablet Take 1 Tab by mouth two (2) times a day. 180 Tab 2  
 omeprazole (PRILOSEC) 20 mg capsule TAKE 1 CAPSULE DAILY 90 Cap 2  
 bumetanide (BUMEX) 1 mg tablet Take 1 Tab by mouth daily. 90 Tab 2  
 dilTIAZem (CARDIZEM) 30 mg tablet 1 tablet in morning 1/2 tablet at lunch 1 tablet at dinner (Patient taking differently: 60 mg. 1 tablet in morning 1/2 tablet at lunch 1 tablet at dinner ) 270 Tab 2  
 insulin detemir U-100 (LEVEMIR FLEXPEN) 100 unit/mL (3 mL) inpn 18 units each morning and 10 units each night time or as directed 5 Pen 4  
 insulin aspart U-100 (NOVOLOG FLEXPEN U-100 INSULIN) 100 unit/mL (3 mL) inpn INJECT 7 UNITS BEFORE EACH MEAL 3 TIMES A DAY OR AS   DIRECTED 45 mL 3  
 acyclovir (ZOVIRAX) 5 % ointment Apply  to affected area every three (3) hours. 45 g 0  
 metoprolol tartrate (LOPRESSOR) 100 mg IR tablet Take 1 Tab by mouth two (2) times a day. 180 Tab 2  
 enalapril (VASOTEC) 20 mg tablet TAKE 1 TABLET TWICE A  Tab 2  
 budesonide-formoterol (SYMBICORT) 160-4.5 mcg/actuation HFAA Take 2 Puffs by inhalation two (2) times a day. After each use, rinse mouth with water 3 Inhaler 3  
 glucose blood VI test strips (BLOOD GLUCOSE TEST) strip Check blood sugar 3 times a day DX:E11.9 400 Strip 4  
 glipiZIDE (GLUCOTROL) 5 mg tablet TAKE 2 TABLETS TWICE A  Tab 3  
 lancets (ACCU-CHEK FASTCLIX LANCET DRUM) misc Check blood sugar 3 times a day DX:E11.9 270 Each 4  
  nystatin (MYCOSTATIN) topical cream Apply  to affected area two (2) times a day. X 10 days 30 g 0  
 clotrimazole-betamethasone (LOTRISONE) topical cream Apply thin layer to affected area twice daily 15 g 0  
 isosorbide mononitrate ER (IMDUR) 60 mg CR tablet TAKE 1 TABLET TWICE A  Tab 3  
 azelastine (ASTELIN) 137 mcg (0.1 %) nasal spray USE 1 SPRAY IN EACH NOSTRIL TWICE DAILY AS DIRECTED 1 Bottle 11  
 mupirocin calcium (BACTROBAN) 2 % topical cream Apply  to affected area two (2) times a day. 45 g 0  
 aspirin 81 mg chewable tablet Take 1 Tab by mouth daily. 90 Tab 3  cholecalciferol, vitamin D3, (VITAMIN D3) 2,000 unit tab Take  by mouth.  atorvastatin (LIPITOR) 40 mg tablet Take 20 mg by mouth nightly.  nitroglycerin (NITROSTAT) 0.4 mg SL tablet 1 Tab by SubLINGual route every five (5) minutes as needed for Chest Pain. 25 Tab 3 Allergies Allergen Reactions  Clonidine Shortness of Breath and Swelling Lightheaded,   
 Codeine Unknown (comments)  Keflin Itching  Pcn [Penicillins] Unknown (comments)  Tramadol Other (comments) Makes her head feel swishy Past Medical History:  
Diagnosis Date  Atrial fibrillation ([facility]  CAD (coronary artery disease)  Cerebrovascular accident stroke, other, unspec 11/18/2010  Coronary atherosclerosis of native coronary artery 11/18/2010  Essential hypertension  Essential hypertension, benign 11/18/2010  HLD (hyperlipidemia) 11/18/2010  PVD (peripheral vascular disease) ([facility] 2017  Type II or unspecified type diabetes mellitus without mention of complication, not stated as uncontrolled 11/18/2010  Zoster Past Surgical History:  
Procedure Laterality Date  HX CORONARY STENT PLACEMENT    
 HX HEART CATHETERIZATION    
 [address] No pacemaker in family history. Social History Tobacco Use  Smoking status: Former Smoker Packs/day: 0.80 Years: 20.00 Pack years: 16.00 Types: Cigarettes Last attempt to quit: 1986 Years since quittin.6  Smokeless tobacco: Never Used Substance Use Topics  Alcohol use: No  
  
 
Review of Systems:  
Constitutional: Negative for fever, chills, weight loss, + malaise/fatigue. HEENT: Negative for nosebleeds, vision changes. Respiratory: Negative for cough, hemoptysis Cardiovascular: Negative for chest pain, +palpitations, no orthopnea, claudication, leg swelling, syncope, and PND. + dizziness Gastrointestinal: Negative for nausea, vomiting, diarrhea, blood in stool and melena. Genitourinary: Negative for dysuria, and hematuria. Musculoskeletal: Negative for myalgias, arthralgia. Skin: Negative for rash. Heme: Does not bleed or bruise easily. Neurological: Negative for speech change and focal weakness Objective:  
 
Visit Vitals /84 (BP 1 Location: Left arm, BP Patient Position: Sitting) Pulse 63 Resp 14 Ht 5' (1.524 m) Wt 179 lb (81.2 kg) SpO2 91% BMI 34.96 kg/m² Physical Exam:  
Constitutional: well-developed and well-nourished. No respiratory distress. Head: Normocephalic and atraumatic. Eyes: Pupils are equal, round ENT: hearing normal 
Neck: supple. No JVD present. Cardiovascular: irregular rhythm. Exam reveals no gallop and no friction rub. No murmur heard. Pulmonary/Chest: Effort normal and breath sounds normal. No wheezes. Abdominal: Soft, + moderate obesity Musculoskeletal: no edema. Neurological: alert,oriented. Skin: Skin is warm and dry Psychiatric: normal mood and affect. Behavior is normal. Judgment and thought content normal.   
 
Assessment/Plan: ICD-10-CM ICD-9-CM 1. Other persistent atrial fibrillation I48.19 427.31   
2. Dizziness R42 780.4 3. Essential hypertension, benign I10 401.1 4. Type 2 diabetes mellitus with complication, without long-term current use of insulin (HCC) E11.8 250.90 5. Cerebrovascular accident stroke, other, unspec I67.89 436 6. Medication intolerance Z78.9 995.27   
7. Severe obesity (BMI 35.0-39. 9) with comorbidity ([facility] E66.01 278.01   
8. Type 2 diabetes with nephropathy (HCC) E11.21 250.40   
  583.81   
 
reviewed diet, exercise and weight control She cannot tolerate cardizem, metoprolol long term She and her daughter agreed with leadless pacer and av node ablation. Any other procedures that can increase risk of bleeding and stroke she does not want (ie atrial fibrillation ablation) She does not want transvenous pacer due to concern about electrocautery. Her dermatologist said she needs frequent biopsy and I told her it will be adjusted and ok but somehow she thinks she cannot have pacemaker Hold eliquis 2 days before Her daughter and she finally agreed to proceed Thank you for involving me in this patient's care and please call with further concerns or questions. Itz Marroquin M.D. Electrophysiology/Cardiology [address] [place] [address] [facility] [place] [address]                            
[phone]

## 2019-12-05 NOTE — PATIENT INSTRUCTIONS
Your AV Node and Leadless Pacemaker procedure has been scheduled for 1/27/20 at 10:45 am, at [facility]. 
 
Please report to Admitting Department by 8:45 am, or 2 hours prior to your scheduled procedure. Please bring a list of your current medications and medication bottles, if able, to the hospital on this day. You will be unable to drive after your procedure so please make sure to bring someone with you to your procedure. You will need to have nothing to eat or drink after midnight, the night prior to your procedure. You may have small sips of water, if needed, to take with your medication. You will need labs drawn prior to your procedure. Please go to [facility] to have this done no sooner than 12/27/19 and no later than 1/20/20. You will also need to see Dr. Tianna Flores Nurse Practitioner, Isis Smith, in office prior to your procedure. An appointment has been scheduled for 1/10/20 at 11:40 am. 
 
You should stop your medication, Eliquis, 2 days prior to your scheduled procedure. After your procedure, you will need to follow up with Dr. Jorge De Dios.  Your follow-up appointment has been scheduled for 2/25/20 at 1:15 pm.

## 2019-12-13 PROBLEM — I25.118 CORONARY ARTERY DISEASE WITH STABLE ANGINA PECTORIS (HCC): Status: ACTIVE | Noted: 2019-01-01

## 2019-12-13 NOTE — PROGRESS NOTES
Visit Vitals /86 (BP 1 Location: Left arm, BP Patient Position: Sitting) Pulse 86 Resp 16 Ht 5' (1.524 m) Wt 179 lb (81.2 kg) SpO2 97% BMI 34.96 kg/m²

## 2019-12-13 NOTE — PROGRESS NOTES
HISTORY OF PRESENT ILLNESS Bunny Santacruz is a 81 y.o. female. She has h/o HTN, HLD, AODM and CAD  And PAF (diagnosed on 4/17 for which she underwent successful cardioversion on oac and amiodarone)here for follow up Stress test normal on 11/10   
Doppler /US renal NO MANDO b/l   
PTCA and stent of right coronary artery in 1996 July 26, 2007. The cardiac catheterization revealed mild 10% tapering of the ostium. The left anterior descending artery had mild intimal disease throughout. There was a 30% stenosis in the proximal portion of the LAD between the first and second septal . The remainder of the LAD had only mild intimal disease. The circumflex artery had a 30%-40% stenosis in the proximal mid segment prior to take off of obtuse marginal branch. The right coronary artery was a large dominant artery with 80% proximal stenosis followed by 90% proximal to mid stenosis, followed by 30% residual stenosis. Ejection fraction was estimated at 50%-55%. 2/14: nuclear stress test: mild ischemia mid distal kailey lateral wall EF 59%   
3/14: cardiac catheterization:Left main: The vessel was normal sized. Angiography 
showed minor luminal irregularities. LAD: The vessel was normal sized. Angiography showed mild atherosclerosis. There was a 30 % stenosis in the 
middle third of the vessel segment. 1st diagonal: The vessel was small 
sized. Angiography showed minor luminal irregularities. 2nd diagonal: The 
vessel was normal sized. Angiography showed mild atherosclerosis. Circumflex: The vessel was normal sized. Angiography showed mild 
atherosclerosis. There was a 30 % stenosis. RCA: The vessel was large 
sized (dominant). Angiography showed mild atherosclerosis and 3 patent 
prior stents.  There was a tubular 30 % stenosis in the distal third of the 
vessel segment.   
MEDICAL RX ONLY at that time  
Stopped niacin in 2014 for \"sick \" feeling   
 Seen in [facility] for HTN on 2/15 aldactone increased to 25 mg at that time Renal US on 3/15 no MANDO 
ON 4/17 admitted to [facility] for sob and found to be in AF Nuclear stress test on 4/25/17:No ischemia demonstrated. No infarct visible. LVEF 61%. 
   
Chest ct on 4/17:1. Atherosclerotic aorta with coronary artery calcification. 2. Small bilateral pleural effusions with right pleural calcifications. 3. Status post cholecystectomy. Valentin/Cardioversion  on 4/28/17:VALENTIN with normal EF mild mr and TR no evidence for gross intracardiac thrombi Cardioversion: 150 joules were delivered in a synch fashion with restoration of NSR Amiodarone started lopressor decreased Head ct on 8/18/18:Severe chronic microvascular ischemic change. 
   
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  
   
Admitted to [facility] on 8/17:Admitted with SOB.  Amiodarone stopped with concerns for toxicity.  Also noted to hyponatremic.   
Nephrology consulted and felt low Na related to HCTZ and Bactrim use - both stopped indefinitely                       - Bumex 1 mg PO daily started and continued through discharge 
                      - Na improve to 130 before discharge 
                      - Patient to follow up with Nephrology as needed. Pulmonary consulted and felt no Amiodarone toxicity, SOB related to COPD and volume 
                      - SOB improved as Na improved and Bumex was used. 
   
ECHO on 8/17:Left ventricle: Systolic function was normal. Ejection fraction was 
estimated in the range of 55 % to 60 %. Suboptimal endocardial 
visualization limits wall motion analysis. Wall thickness was mildly 
increased. Left atrium: The atrium was mildly dilated. Mitral valve: There was mild regurgitation. Aortic valve: Leaflets exhibited lower normal cuspal separation and 
sclerosis. Tricuspid valve: There was mild regurgitation.  
CTA  8/17 of chest without toxicity changes or PTE: . There is a mild to moderate-sized right pleural effusion and a mild sized 
left pleural effusion. The heart is enlarged there is no pericardial effusion. There is no mediastinal masses or adenopathy. There is no evidence for pulmonary 
emboli. There is no shift or pneumothorax. There are mild congestive changes. Mild emphysema. No focal consolidation or mass. 
   
IMPRESSION IMPRESSION: Bilateral effusions and cardiomegaly. No pulmonary emboli. Nuclear stress test on 4/23/18:SPECT images demonstrate a medium, fixed abnormality of mild degree in the anterior region on the stress and rest images. Gated SPECT images reveals normal myocardial thickening and wall motion. The left ventricular ejection fraction was calculated to be 63 %. Impression:  
Myocardial perfusion imaging is normal. No gross ischemia or mi Overall left ventricular systolic function was normal.  
 
 
These test results indicate low likelihood for the presence of angiographically significant coronary artery disease.  
  
Vascular us le : no dvt seroma on right thigh 
       
04/17/19 ECHO ADULT COMPLETE 04/20/2019 4/20/2019  
  Narrative · Left ventricular low normal systolic function. Calculated left  
ventricular ejection fraction is 53%. Biplane method used to measure  
ejection fraction. · Left atrial cavity size is moderately dilated. · Right atrial cavity size is mildly dilated. · Mild aortic valve sclerosis with no significant stenosis. · Mitral valve thickening. Mild mitral valve regurgitation. · Mild pulmonary hypertension is present. 
   
    Signed by: Shruti Rubi MD  
· Nuclear stress test on 4/19:Baseline ECG: Atrial fibrillation, non-specific ST-T wave abnormalities. · Gated SPECT: Left ventricular function post-stress was abnormal. Calculated ejection fraction is 45%. There is no evidence of transient ischemic dilation (TID). The TID ratio is 0.97.  
· Left ventricular perfusion is abnormal. 
 · Myocardial perfusion imaging defect 1: There is a defect that is moderate in size with a moderate reduction in uptake present in the apical to basal anterior location(s) that is non-reversible. There is abnormal wall motion in the defect area. Viability in the area is good. caused by breast attenuation. The possibility of artifact cannot be excluded. Perfusion defect was visually and quantitatively present. · Myocardial perfusion imaging defect 1: caused by breast attenuation. Abnormal myocardial perfusion imaging. Fixed defect consistent with prior myocardial infarction. Myocardial perfusion imaging supports an intermediate risk stress test. 
  
          
Past Medical History:  
Diagnosis Date  Atrial fibrillation ([address]     
 CAD (coronary artery disease)     
 Cerebrovascular accident stroke, other, unspec 11/18/2010  Coronary atherosclerosis of native coronary artery 11/18/2010  Essential hypertension     
 Essential hypertension, benign 11/18/2010  HLD (hyperlipidemia) 11/18/2010  PVD (peripheral vascular disease) ([facility] 2017  Type II or unspecified type diabetes mellitus without mention of complication, not stated as uncontrolled 11/18/2010  
   
             
Past Surgical History:  
Procedure Laterality Date  HX CORONARY STENT PLACEMENT        
 HX HEART CATHETERIZATION        
 HX HYSTERECTOMY    1972  
  
 
HPI Still with palpitations  And sob and fuzzy head 
 no cp re[ported Review of Systems Respiratory: Positive for shortness of breath. Cardiovascular: Positive for palpitations. Physical Exam 
Visit Vitals /86 (BP 1 Location: Left arm, BP Patient Position: Sitting) Pulse 86 Resp 16 Ht 5' (1.524 m) Wt 81.2 kg (179 lb) SpO2 97% BMI 34.96 kg/m² ASSESSMENT and PLAN 1. Atrial fibrillation[de-identified] This is chronic.   I suspect some of her symptoms are related to atrial relation rapid ventricular response but also to significant amount of medication is she is taking. Her Holter monitor recently revealed heart rate between 63 and 139 bpm with an average of a 94 bpm.  Continue Eliquis. No untoward effects thus far 
  
Now scheduled for PPM(leadless) and av jazlyn ablation 
  
Hypertension: Controlled today. She is now off Norvasc and on Cardizem instead. 
  
Chronic kidney disease: Closely followed by nephrology. Now off Bactrim. 
  
Hyperlipidemia: Closely followed by her primary care physician. 
  
CAD: She remains completely asymptomatic at this time. Continue medical therapy only at this point. Stress test to be considered if no improvement in symptoms after ppm ablation 
  
Diabetes: Closely followed by her primary care physician. 
  
Hyponatremia: Followed by nephrology and resolved for now. 
  
See her after ppm on 2/19

## 2020-01-01 ENCOUNTER — APPOINTMENT (OUTPATIENT)
Dept: CT IMAGING | Age: 85
DRG: 067 | End: 2020-01-01
Attending: EMERGENCY MEDICINE
Payer: MEDICARE

## 2020-01-01 ENCOUNTER — HOSPITAL ENCOUNTER (INPATIENT)
Age: 85
LOS: 6 days | Discharge: HOME | DRG: 291 | End: 2020-03-05
Attending: EMERGENCY MEDICINE | Admitting: INTERNAL MEDICINE
Payer: MEDICARE

## 2020-01-01 ENCOUNTER — TELEPHONE (OUTPATIENT)
Dept: CARDIOLOGY CLINIC | Age: 85
End: 2020-01-01

## 2020-01-01 ENCOUNTER — APPOINTMENT (OUTPATIENT)
Dept: GENERAL RADIOLOGY | Age: 85
DRG: 291 | End: 2020-01-01
Attending: EMERGENCY MEDICINE
Payer: MEDICARE

## 2020-01-01 ENCOUNTER — APPOINTMENT (OUTPATIENT)
Dept: GENERAL RADIOLOGY | Age: 85
DRG: 291 | End: 2020-01-01
Attending: SPECIALIST
Payer: MEDICARE

## 2020-01-01 ENCOUNTER — APPOINTMENT (OUTPATIENT)
Dept: GENERAL RADIOLOGY | Age: 85
DRG: 982 | End: 2020-01-01
Attending: INTERNAL MEDICINE
Payer: MEDICARE

## 2020-01-01 ENCOUNTER — APPOINTMENT (OUTPATIENT)
Dept: NON INVASIVE DIAGNOSTICS | Age: 85
DRG: 982 | End: 2020-01-01
Attending: SPECIALIST
Payer: MEDICARE

## 2020-01-01 ENCOUNTER — PATIENT OUTREACH (OUTPATIENT)
Dept: CARDIOLOGY CLINIC | Age: 85
End: 2020-01-01

## 2020-01-01 ENCOUNTER — VIRTUAL VISIT (OUTPATIENT)
Dept: CARDIOLOGY CLINIC | Age: 85
End: 2020-01-01

## 2020-01-01 ENCOUNTER — TELEPHONE (OUTPATIENT)
Dept: INTERNAL MEDICINE CLINIC | Age: 85
End: 2020-01-01

## 2020-01-01 ENCOUNTER — APPOINTMENT (OUTPATIENT)
Dept: GENERAL RADIOLOGY | Age: 85
End: 2020-01-01
Attending: EMERGENCY MEDICINE
Payer: MEDICARE

## 2020-01-01 ENCOUNTER — APPOINTMENT (OUTPATIENT)
Dept: GENERAL RADIOLOGY | Age: 85
DRG: 067 | End: 2020-01-01
Attending: INTERNAL MEDICINE
Payer: MEDICARE

## 2020-01-01 ENCOUNTER — APPOINTMENT (OUTPATIENT)
Dept: MRI IMAGING | Age: 85
DRG: 067 | End: 2020-01-01
Attending: FAMILY MEDICINE
Payer: MEDICARE

## 2020-01-01 ENCOUNTER — APPOINTMENT (OUTPATIENT)
Dept: NUCLEAR MEDICINE | Age: 85
DRG: 291 | End: 2020-01-01
Attending: SPECIALIST
Payer: MEDICARE

## 2020-01-01 ENCOUNTER — CLINICAL SUPPORT (OUTPATIENT)
Dept: CARDIOLOGY CLINIC | Age: 85
End: 2020-01-01

## 2020-01-01 ENCOUNTER — OFFICE VISIT (OUTPATIENT)
Dept: CARDIOLOGY CLINIC | Age: 85
End: 2020-01-01

## 2020-01-01 ENCOUNTER — ANESTHESIA EVENT (OUTPATIENT)
Dept: CARDIAC CATH/INVASIVE PROCEDURES | Age: 85
DRG: 982 | End: 2020-01-01
Payer: MEDICARE

## 2020-01-01 ENCOUNTER — APPOINTMENT (OUTPATIENT)
Dept: VASCULAR SURGERY | Age: 85
DRG: 067 | End: 2020-01-01
Attending: NURSE PRACTITIONER
Payer: MEDICARE

## 2020-01-01 ENCOUNTER — APPOINTMENT (OUTPATIENT)
Dept: GENERAL RADIOLOGY | Age: 85
DRG: 982 | End: 2020-01-01
Attending: EMERGENCY MEDICINE
Payer: MEDICARE

## 2020-01-01 ENCOUNTER — APPOINTMENT (OUTPATIENT)
Dept: NON INVASIVE DIAGNOSTICS | Age: 85
DRG: 067 | End: 2020-01-01
Attending: FAMILY MEDICINE
Payer: MEDICARE

## 2020-01-01 ENCOUNTER — HOSPITAL ENCOUNTER (INPATIENT)
Age: 85
LOS: 7 days | Discharge: HOME | DRG: 067 | End: 2020-06-15
Attending: EMERGENCY MEDICINE | Admitting: FAMILY MEDICINE
Payer: MEDICARE

## 2020-01-01 ENCOUNTER — APPOINTMENT (OUTPATIENT)
Dept: NON INVASIVE DIAGNOSTICS | Age: 85
DRG: 291 | End: 2020-01-01
Attending: SPECIALIST
Payer: MEDICARE

## 2020-01-01 ENCOUNTER — HOSPITAL ENCOUNTER (EMERGENCY)
Age: 85
Discharge: HOME | End: 2020-07-13
Attending: EMERGENCY MEDICINE
Payer: MEDICARE

## 2020-01-01 ENCOUNTER — HOSPITAL ENCOUNTER (INPATIENT)
Age: 85
LOS: 7 days | Discharge: HOME | DRG: 982 | End: 2020-02-07
Attending: EMERGENCY MEDICINE | Admitting: INTERNAL MEDICINE
Payer: MEDICARE

## 2020-01-01 ENCOUNTER — ANESTHESIA (OUTPATIENT)
Dept: CARDIAC CATH/INVASIVE PROCEDURES | Age: 85
DRG: 982 | End: 2020-01-01
Payer: MEDICARE

## 2020-01-01 ENCOUNTER — HOSPITAL ENCOUNTER (OUTPATIENT)
Dept: GENERAL RADIOLOGY | Age: 85
Discharge: HOME | End: 2020-01-23
Attending: INTERNAL MEDICINE
Payer: MEDICARE

## 2020-01-01 ENCOUNTER — TELEPHONE (OUTPATIENT)
Dept: CASE MANAGEMENT | Age: 85
End: 2020-01-01

## 2020-01-01 ENCOUNTER — APPOINTMENT (OUTPATIENT)
Dept: CT IMAGING | Age: 85
DRG: 291 | End: 2020-01-01
Attending: INTERNAL MEDICINE
Payer: MEDICARE

## 2020-01-01 ENCOUNTER — APPOINTMENT (OUTPATIENT)
Dept: GENERAL RADIOLOGY | Age: 85
DRG: 291 | End: 2020-01-01
Attending: INTERNAL MEDICINE
Payer: MEDICARE

## 2020-01-01 VITALS
BODY MASS INDEX: 32.58 KG/M2 | HEART RATE: 80 BPM | OXYGEN SATURATION: 100 % | WEIGHT: 166.8 LBS | SYSTOLIC BLOOD PRESSURE: 138 MMHG | RESPIRATION RATE: 18 BRPM | DIASTOLIC BLOOD PRESSURE: 61 MMHG | TEMPERATURE: 97.8 F

## 2020-01-01 VITALS
RESPIRATION RATE: 18 BRPM | HEART RATE: 81 BPM | OXYGEN SATURATION: 97 % | WEIGHT: 170.64 LBS | SYSTOLIC BLOOD PRESSURE: 165 MMHG | BODY MASS INDEX: 33.33 KG/M2 | TEMPERATURE: 98.1 F | DIASTOLIC BLOOD PRESSURE: 70 MMHG

## 2020-01-01 VITALS
HEIGHT: 60 IN | BODY MASS INDEX: 33.85 KG/M2 | HEART RATE: 60 BPM | WEIGHT: 172.4 LBS | OXYGEN SATURATION: 95 % | DIASTOLIC BLOOD PRESSURE: 54 MMHG | SYSTOLIC BLOOD PRESSURE: 134 MMHG

## 2020-01-01 VITALS
DIASTOLIC BLOOD PRESSURE: 54 MMHG | OXYGEN SATURATION: 95 % | HEART RATE: 60 BPM | TEMPERATURE: 97.8 F | HEIGHT: 60 IN | WEIGHT: 169.1 LBS | BODY MASS INDEX: 33.2 KG/M2 | RESPIRATION RATE: 20 BRPM | SYSTOLIC BLOOD PRESSURE: 122 MMHG

## 2020-01-01 VITALS
SYSTOLIC BLOOD PRESSURE: 161 MMHG | TEMPERATURE: 99.5 F | OXYGEN SATURATION: 95 % | HEART RATE: 60 BPM | RESPIRATION RATE: 18 BRPM | DIASTOLIC BLOOD PRESSURE: 82 MMHG

## 2020-01-01 VITALS
HEIGHT: 60 IN | BODY MASS INDEX: 33.89 KG/M2 | HEART RATE: 76 BPM | WEIGHT: 172.6 LBS | SYSTOLIC BLOOD PRESSURE: 140 MMHG | DIASTOLIC BLOOD PRESSURE: 76 MMHG | RESPIRATION RATE: 16 BRPM | OXYGEN SATURATION: 94 %

## 2020-01-01 VITALS
BODY MASS INDEX: 35.34 KG/M2 | WEIGHT: 180 LBS | OXYGEN SATURATION: 98 % | HEART RATE: 81 BPM | DIASTOLIC BLOOD PRESSURE: 74 MMHG | SYSTOLIC BLOOD PRESSURE: 132 MMHG | HEIGHT: 60 IN | RESPIRATION RATE: 14 BRPM

## 2020-01-01 VITALS
BODY MASS INDEX: 33.18 KG/M2 | SYSTOLIC BLOOD PRESSURE: 130 MMHG | HEART RATE: 82 BPM | OXYGEN SATURATION: 96 % | HEIGHT: 60 IN | DIASTOLIC BLOOD PRESSURE: 78 MMHG | RESPIRATION RATE: 20 BRPM | WEIGHT: 169 LBS

## 2020-01-01 DIAGNOSIS — R06.02 SOB (SHORTNESS OF BREATH): ICD-10-CM

## 2020-01-01 DIAGNOSIS — R42 DIZZINESS: ICD-10-CM

## 2020-01-01 DIAGNOSIS — R06.02 SHORTNESS OF BREATH: ICD-10-CM

## 2020-01-01 DIAGNOSIS — J44.1 COPD EXACERBATION (HCC): ICD-10-CM

## 2020-01-01 DIAGNOSIS — I25.10 CORONARY ARTERY DISEASE INVOLVING NATIVE CORONARY ARTERY OF NATIVE HEART WITHOUT ANGINA PECTORIS: ICD-10-CM

## 2020-01-01 DIAGNOSIS — I48.19 ATRIAL FIBRILLATION, PERSISTENT (HCC): Primary | ICD-10-CM

## 2020-01-01 DIAGNOSIS — Z79.01 ANTICOAGULATED: ICD-10-CM

## 2020-01-01 DIAGNOSIS — I67.89 ACUTE, BUT ILL-DEFINED, CEREBROVASCULAR DISEASE: ICD-10-CM

## 2020-01-01 DIAGNOSIS — I10 ESSENTIAL HYPERTENSION, BENIGN: ICD-10-CM

## 2020-01-01 DIAGNOSIS — Z01.812 PRE-PROCEDURE LAB EXAM: ICD-10-CM

## 2020-01-01 DIAGNOSIS — R42 DIZZINESS: Primary | ICD-10-CM

## 2020-01-01 DIAGNOSIS — Z95.0 CARDIAC PACEMAKER IN SITU: Primary | ICD-10-CM

## 2020-01-01 DIAGNOSIS — E78.00 PURE HYPERCHOLESTEROLEMIA: ICD-10-CM

## 2020-01-01 DIAGNOSIS — I65.23 BILATERAL CAROTID ARTERY STENOSIS: ICD-10-CM

## 2020-01-01 DIAGNOSIS — Z98.890 HX OF ATRIOVENTRICULAR NODE ABLATION: ICD-10-CM

## 2020-01-01 DIAGNOSIS — I48.19 ATRIAL FIBRILLATION, PERSISTENT (HCC): ICD-10-CM

## 2020-01-01 DIAGNOSIS — I42.8 NICM (NONISCHEMIC CARDIOMYOPATHY) (HCC): Primary | ICD-10-CM

## 2020-01-01 DIAGNOSIS — E78.5 HYPERLIPIDEMIA, UNSPECIFIED HYPERLIPIDEMIA TYPE: ICD-10-CM

## 2020-01-01 DIAGNOSIS — R42 LIGHT HEADEDNESS: ICD-10-CM

## 2020-01-01 DIAGNOSIS — I50.21 ACUTE SYSTOLIC CONGESTIVE HEART FAILURE (HCC): Primary | ICD-10-CM

## 2020-01-01 DIAGNOSIS — I50.22 CHF NYHA CLASS III (SYMPTOMS WITH MILDLY STRENUOUS ACTIVITIES), CHRONIC, SYSTOLIC (HCC): ICD-10-CM

## 2020-01-01 DIAGNOSIS — Z79.01 ANTICOAGULANT LONG-TERM USE: ICD-10-CM

## 2020-01-01 DIAGNOSIS — R06.02 SOB (SHORTNESS OF BREATH): Primary | ICD-10-CM

## 2020-01-01 DIAGNOSIS — I65.03 VERTEBRAL ARTERY STENOSIS, BILATERAL: ICD-10-CM

## 2020-01-01 DIAGNOSIS — R05.9 COUGH: ICD-10-CM

## 2020-01-01 DIAGNOSIS — J06.9 UPPER RESPIRATORY TRACT INFECTION, UNSPECIFIED TYPE: Primary | ICD-10-CM

## 2020-01-01 DIAGNOSIS — Z79.899 ENCOUNTER FOR LONG-TERM (CURRENT) DRUG USE: ICD-10-CM

## 2020-01-01 DIAGNOSIS — I25.10 ATHEROSCLEROSIS OF NATIVE CORONARY ARTERY OF NATIVE HEART WITHOUT ANGINA PECTORIS: ICD-10-CM

## 2020-01-01 DIAGNOSIS — I42.9 CARDIOMYOPATHY, UNSPECIFIED TYPE (HCC): ICD-10-CM

## 2020-01-01 DIAGNOSIS — I25.10 CORONARY ARTERY DISEASE INVOLVING NATIVE HEART WITHOUT ANGINA PECTORIS, UNSPECIFIED VESSEL OR LESION TYPE: ICD-10-CM

## 2020-01-01 DIAGNOSIS — Z95.0 CARDIAC PACEMAKER IN SITU: ICD-10-CM

## 2020-01-01 DIAGNOSIS — I25.10 CORONARY ARTERY DISEASE INVOLVING NATIVE CORONARY ARTERY OF NATIVE HEART WITHOUT ANGINA PECTORIS: Primary | ICD-10-CM

## 2020-01-01 DIAGNOSIS — I65.03 STENOSIS OF BOTH VERTEBRAL ARTERIES: ICD-10-CM

## 2020-01-01 DIAGNOSIS — I10 ESSENTIAL HYPERTENSION, BENIGN: Primary | ICD-10-CM

## 2020-01-01 LAB
ALBUMIN SERPL-MCNC: 2.3 G/DL (ref 3.5–5)
ALBUMIN SERPL-MCNC: 3 G/DL (ref 3.5–5)
ALBUMIN SERPL-MCNC: 3.3 G/DL (ref 3.5–5)
ALBUMIN SERPL-MCNC: 3.5 G/DL (ref 3.5–5)
ALBUMIN/GLOB SERPL: 0.6 {RATIO} (ref 1.1–2.2)
ALBUMIN/GLOB SERPL: 0.8 {RATIO} (ref 1.1–2.2)
ALBUMIN/GLOB SERPL: 0.9 {RATIO} (ref 1.1–2.2)
ALBUMIN/GLOB SERPL: 0.9 {RATIO} (ref 1.1–2.2)
ALP SERPL-CCNC: 100 U/L (ref 45–117)
ALP SERPL-CCNC: 100 U/L (ref 45–117)
ALP SERPL-CCNC: 111 U/L (ref 45–117)
ALP SERPL-CCNC: 115 U/L (ref 45–117)
ALT SERPL-CCNC: 20 U/L (ref 12–78)
ALT SERPL-CCNC: 27 U/L (ref 12–78)
ANION GAP SERPL CALC-SCNC: 10 MMOL/L (ref 5–15)
ANION GAP SERPL CALC-SCNC: 5 MMOL/L (ref 5–15)
ANION GAP SERPL CALC-SCNC: 6 MMOL/L (ref 5–15)
ANION GAP SERPL CALC-SCNC: 7 MMOL/L (ref 5–15)
ANION GAP SERPL CALC-SCNC: 8 MMOL/L (ref 5–15)
ANION GAP SERPL CALC-SCNC: 9 MMOL/L (ref 5–15)
APPEARANCE UR: CLEAR
APPEARANCE UR: CLEAR
ASPIRIN TEST, ASPIRN: 571 ARU
AST SERPL-CCNC: 20 U/L (ref 15–37)
AST SERPL-CCNC: 21 U/L (ref 15–37)
AST SERPL-CCNC: 22 U/L (ref 15–37)
AST SERPL-CCNC: 27 U/L (ref 15–37)
ATRIAL RATE: 202 BPM
ATRIAL RATE: 300 BPM
ATRIAL RATE: 64 BPM
ATRIAL RATE: 91 BPM
AV VELOCITY RATIO: 0.71
AV VELOCITY RATIO: 0.76
BACTERIA URNS QL MICRO: ABNORMAL /HPF
BACTERIA URNS QL MICRO: NEGATIVE /HPF
BASOPHILS # BLD AUTO: 0.1 X10E3/UL (ref 0–0.2)
BASOPHILS # BLD: 0 K/UL (ref 0–0.1)
BASOPHILS # BLD: 0.1 K/UL (ref 0–0.1)
BASOPHILS # BLD: 0.2 K/UL (ref 0–0.1)
BASOPHILS NFR BLD AUTO: 1 %
BASOPHILS NFR BLD: 0 % (ref 0–1)
BASOPHILS NFR BLD: 1 % (ref 0–1)
BILIRUB SERPL-MCNC: 0.6 MG/DL (ref 0.2–1)
BILIRUB SERPL-MCNC: 0.7 MG/DL (ref 0.2–1)
BILIRUB SERPL-MCNC: 0.9 MG/DL (ref 0.2–1)
BILIRUB SERPL-MCNC: 1.2 MG/DL (ref 0.2–1)
BILIRUB UR QL: NEGATIVE
BILIRUB UR QL: NEGATIVE
BNP SERPL-MCNC: 1128 PG/ML
BNP SERPL-MCNC: 2482 PG/ML
BNP SERPL-MCNC: 4145 PG/ML
BUN SERPL-MCNC: 17 MG/DL (ref 8–27)
BUN SERPL-MCNC: 18 MG/DL (ref 6–20)
BUN SERPL-MCNC: 18 MG/DL (ref 6–20)
BUN SERPL-MCNC: 19 MG/DL (ref 6–20)
BUN SERPL-MCNC: 20 MG/DL (ref 6–20)
BUN SERPL-MCNC: 20 MG/DL (ref 6–20)
BUN SERPL-MCNC: 21 MG/DL (ref 6–20)
BUN SERPL-MCNC: 23 MG/DL (ref 6–20)
BUN SERPL-MCNC: 24 MG/DL (ref 6–20)
BUN SERPL-MCNC: 26 MG/DL (ref 6–20)
BUN SERPL-MCNC: 27 MG/DL (ref 6–20)
BUN SERPL-MCNC: 28 MG/DL (ref 6–20)
BUN SERPL-MCNC: 29 MG/DL (ref 6–20)
BUN SERPL-MCNC: 30 MG/DL (ref 6–20)
BUN SERPL-MCNC: 33 MG/DL (ref 6–20)
BUN SERPL-MCNC: 38 MG/DL (ref 6–20)
BUN SERPL-MCNC: 40 MG/DL (ref 6–20)
BUN SERPL-MCNC: 42 MG/DL (ref 6–20)
BUN SERPL-MCNC: 43 MG/DL (ref 6–20)
BUN SERPL-MCNC: 47 MG/DL (ref 6–20)
BUN/CREAT SERPL: 13 (ref 12–20)
BUN/CREAT SERPL: 13 (ref 12–28)
BUN/CREAT SERPL: 14 (ref 12–20)
BUN/CREAT SERPL: 17 (ref 12–20)
BUN/CREAT SERPL: 18 (ref 12–20)
BUN/CREAT SERPL: 19 (ref 12–20)
BUN/CREAT SERPL: 23 (ref 12–20)
BUN/CREAT SERPL: 24 (ref 12–20)
BUN/CREAT SERPL: 28 (ref 12–20)
BUN/CREAT SERPL: 30 (ref 12–20)
BUN/CREAT SERPL: 30 (ref 12–20)
BUN/CREAT SERPL: 31 (ref 12–20)
CALCIUM SERPL-MCNC: 7.9 MG/DL (ref 8.5–10.1)
CALCIUM SERPL-MCNC: 8.2 MG/DL (ref 8.5–10.1)
CALCIUM SERPL-MCNC: 8.3 MG/DL (ref 8.5–10.1)
CALCIUM SERPL-MCNC: 8.4 MG/DL (ref 8.5–10.1)
CALCIUM SERPL-MCNC: 8.5 MG/DL (ref 8.5–10.1)
CALCIUM SERPL-MCNC: 8.5 MG/DL (ref 8.5–10.1)
CALCIUM SERPL-MCNC: 8.6 MG/DL (ref 8.5–10.1)
CALCIUM SERPL-MCNC: 8.7 MG/DL (ref 8.5–10.1)
CALCIUM SERPL-MCNC: 8.8 MG/DL (ref 8.5–10.1)
CALCIUM SERPL-MCNC: 8.8 MG/DL (ref 8.5–10.1)
CALCIUM SERPL-MCNC: 8.9 MG/DL (ref 8.5–10.1)
CALCIUM SERPL-MCNC: 8.9 MG/DL (ref 8.5–10.1)
CALCIUM SERPL-MCNC: 9.1 MG/DL (ref 8.5–10.1)
CALCIUM SERPL-MCNC: 9.1 MG/DL (ref 8.5–10.1)
CALCIUM SERPL-MCNC: 9.3 MG/DL (ref 8.7–10.3)
CALCULATED R AXIS, ECG10: -31 DEGREES
CALCULATED R AXIS, ECG10: -39 DEGREES
CALCULATED R AXIS, ECG10: -46 DEGREES
CALCULATED R AXIS, ECG10: 27 DEGREES
CALCULATED T AXIS, ECG11: 131 DEGREES
CALCULATED T AXIS, ECG11: 159 DEGREES
CALCULATED T AXIS, ECG11: 38 DEGREES
CALCULATED T AXIS, ECG11: 98 DEGREES
CHLORIDE SERPL-SCNC: 100 MMOL/L (ref 97–108)
CHLORIDE SERPL-SCNC: 101 MMOL/L (ref 97–108)
CHLORIDE SERPL-SCNC: 102 MMOL/L (ref 97–108)
CHLORIDE SERPL-SCNC: 102 MMOL/L (ref 97–108)
CHLORIDE SERPL-SCNC: 103 MMOL/L (ref 97–108)
CHLORIDE SERPL-SCNC: 104 MMOL/L (ref 97–108)
CHLORIDE SERPL-SCNC: 104 MMOL/L (ref 97–108)
CHLORIDE SERPL-SCNC: 105 MMOL/L (ref 97–108)
CHLORIDE SERPL-SCNC: 106 MMOL/L (ref 97–108)
CHLORIDE SERPL-SCNC: 95 MMOL/L (ref 96–106)
CHLORIDE SERPL-SCNC: 98 MMOL/L (ref 97–108)
CHLORIDE SERPL-SCNC: 99 MMOL/L (ref 97–108)
CHOLEST SERPL-MCNC: 93 MG/DL
CO2 SERPL-SCNC: 22 MMOL/L (ref 21–32)
CO2 SERPL-SCNC: 23 MMOL/L (ref 21–32)
CO2 SERPL-SCNC: 23 MMOL/L (ref 21–32)
CO2 SERPL-SCNC: 24 MMOL/L (ref 21–32)
CO2 SERPL-SCNC: 25 MMOL/L (ref 20–29)
CO2 SERPL-SCNC: 25 MMOL/L (ref 21–32)
CO2 SERPL-SCNC: 26 MMOL/L (ref 21–32)
CO2 SERPL-SCNC: 28 MMOL/L (ref 21–32)
CO2 SERPL-SCNC: 29 MMOL/L (ref 21–32)
CO2 SERPL-SCNC: 30 MMOL/L (ref 21–32)
CO2 SERPL-SCNC: 31 MMOL/L (ref 21–32)
COLOR UR: ABNORMAL
COLOR UR: NORMAL
COMMENT, HOLDF: NORMAL
CREAT SERPL-MCNC: 1.26 MG/DL (ref 0.57–1)
CREAT SERPL-MCNC: 1.34 MG/DL (ref 0.55–1.02)
CREAT SERPL-MCNC: 1.35 MG/DL (ref 0.55–1.02)
CREAT SERPL-MCNC: 1.39 MG/DL (ref 0.55–1.02)
CREAT SERPL-MCNC: 1.42 MG/DL (ref 0.55–1.02)
CREAT SERPL-MCNC: 1.43 MG/DL (ref 0.55–1.02)
CREAT SERPL-MCNC: 1.44 MG/DL (ref 0.55–1.02)
CREAT SERPL-MCNC: 1.44 MG/DL (ref 0.55–1.02)
CREAT SERPL-MCNC: 1.45 MG/DL (ref 0.55–1.02)
CREAT SERPL-MCNC: 1.45 MG/DL (ref 0.55–1.02)
CREAT SERPL-MCNC: 1.49 MG/DL (ref 0.55–1.02)
CREAT SERPL-MCNC: 1.5 MG/DL (ref 0.55–1.02)
CREAT SERPL-MCNC: 1.5 MG/DL (ref 0.55–1.02)
CREAT SERPL-MCNC: 1.51 MG/DL (ref 0.55–1.02)
CREAT SERPL-MCNC: 1.51 MG/DL (ref 0.55–1.02)
CREAT SERPL-MCNC: 1.52 MG/DL (ref 0.55–1.02)
CREAT SERPL-MCNC: 1.54 MG/DL (ref 0.55–1.02)
CREAT SERPL-MCNC: 1.56 MG/DL (ref 0.55–1.02)
CREAT SERPL-MCNC: 1.58 MG/DL (ref 0.55–1.02)
CREAT SERPL-MCNC: 1.61 MG/DL (ref 0.55–1.02)
CREAT SERPL-MCNC: 1.65 MG/DL (ref 0.55–1.02)
CREAT SERPL-MCNC: 1.73 MG/DL (ref 0.55–1.02)
CREAT SERPL-MCNC: 1.79 MG/DL (ref 0.55–1.02)
DIAGNOSIS, 93000: NORMAL
DIFFERENTIAL METHOD BLD: ABNORMAL
ECHO AO ROOT DIAM: 3.53 CM
ECHO AV AREA PEAK VELOCITY: 2.3 CM2
ECHO AV PEAK GRADIENT: 3.8 MMHG
ECHO AV PEAK GRADIENT: 5.5 MMHG
ECHO AV PEAK VELOCITY: 117.67 CM/S
ECHO AV PEAK VELOCITY: 97.23 CM/S
ECHO EST RA PRESSURE: 5 MMHG
ECHO LA AREA 4C: 22.8 CM2
ECHO LA MAJOR AXIS: 3.68 CM
ECHO LA MAJOR AXIS: 4.06 CM
ECHO LA TO AORTIC ROOT RATIO: 1.04
ECHO LA VOL 2C: 50.97 ML (ref 22–52)
ECHO LA VOL 4C: 61.71 ML (ref 22–52)
ECHO LA VOL BP: 60.59 ML (ref 22–52)
ECHO LA VOL/BSA BIPLANE: 34.56 ML/M2 (ref 16–28)
ECHO LA VOLUME INDEX A2C: 29.07 ML/M2 (ref 16–28)
ECHO LA VOLUME INDEX A4C: 35.2 ML/M2 (ref 16–28)
ECHO LV E' LATERAL VELOCITY: 7.98 CM/S
ECHO LV E' SEPTAL VELOCITY: 5.03 CM/S
ECHO LV INTERNAL DIMENSION DIASTOLIC: 3.86 CM (ref 3.9–5.3)
ECHO LV INTERNAL DIMENSION DIASTOLIC: 4.2 CM (ref 3.9–5.3)
ECHO LV INTERNAL DIMENSION SYSTOLIC: 1.89 CM
ECHO LV INTERNAL DIMENSION SYSTOLIC: 3.12 CM
ECHO LV IVSD: 1.17 CM (ref 0.6–0.9)
ECHO LV IVSD: 1.2 CM (ref 0.6–0.9)
ECHO LV MASS 2D: 148.1 G (ref 67–162)
ECHO LV MASS 2D: 189.1 G (ref 67–162)
ECHO LV MASS INDEX 2D: 107.9 G/M2 (ref 43–95)
ECHO LV POSTERIOR WALL DIASTOLIC: 0.91 CM (ref 0.6–0.9)
ECHO LV POSTERIOR WALL DIASTOLIC: 1.09 CM (ref 0.6–0.9)
ECHO LVOT DIAM: 2.03 CM
ECHO LVOT PEAK GRADIENT: 1.9 MMHG
ECHO LVOT PEAK GRADIENT: 3.2 MMHG
ECHO LVOT PEAK VELOCITY: 68.88 CM/S
ECHO LVOT PEAK VELOCITY: 89.49 CM/S
ECHO MV A VELOCITY: 31.26 CM/S
ECHO MV AREA PHT: 5 CM2
ECHO MV E DECELERATION TIME (DT): 151.1 MS
ECHO MV E VELOCITY: 96.18 CM/S
ECHO MV E/A RATIO: 3.08
ECHO MV E/E' LATERAL: 12.05
ECHO MV E/E' RATIO (AVERAGED): 15.59
ECHO MV E/E' SEPTAL: 19.12
ECHO MV PRESSURE HALF TIME (PHT): 43.8 MS
ECHO MV REGURGITANT PEAK GRADIENT: 75.3 MMHG
ECHO MV REGURGITANT PEAK VELOCITY: 433.74 CM/S
ECHO PULMONARY ARTERY SYSTOLIC PRESSURE (PASP): 32.4 MMHG
ECHO PV MAX VELOCITY: 67.61 CM/S
ECHO PV PEAK GRADIENT: 1.8 MMHG
ECHO RIGHT VENTRICULAR SYSTOLIC PRESSURE (RVSP): 32.4 MMHG
ECHO RV INTERNAL DIMENSION: 3.26 CM
ECHO RV INTERNAL DIMENSION: 5.31 CM
ECHO RV TAPSE: 1.03 CM (ref 1.5–2)
ECHO RV TAPSE: 1.75 CM (ref 1.5–2)
ECHO TV REGURGITANT MAX VELOCITY: 261.91 CM/S
ECHO TV REGURGITANT MAX VELOCITY: 411.93 CM/S
ECHO TV REGURGITANT PEAK GRADIENT: 27.4 MMHG
ECHO TV REGURGITANT PEAK GRADIENT: 67.9 MMHG
EOSINOPHIL # BLD AUTO: 0.3 X10E3/UL (ref 0–0.4)
EOSINOPHIL # BLD: 0 K/UL (ref 0–0.4)
EOSINOPHIL # BLD: 0.3 K/UL (ref 0–0.4)
EOSINOPHIL # BLD: 0.3 K/UL (ref 0–0.4)
EOSINOPHIL # BLD: 0.4 K/UL (ref 0–0.4)
EOSINOPHIL # BLD: 0.4 K/UL (ref 0–0.4)
EOSINOPHIL # BLD: 0.5 K/UL (ref 0–0.4)
EOSINOPHIL NFR BLD AUTO: 3 %
EOSINOPHIL NFR BLD: 0 % (ref 0–7)
EOSINOPHIL NFR BLD: 2 % (ref 0–7)
EOSINOPHIL NFR BLD: 3 % (ref 0–7)
EOSINOPHIL NFR BLD: 5 % (ref 0–7)
EPITH CASTS URNS QL MICRO: ABNORMAL /LPF
EPITH CASTS URNS QL MICRO: NORMAL /LPF
ERYTHROCYTE [DISTWIDTH] IN BLOOD BY AUTOMATED COUNT: 13.7 % (ref 11.7–15.4)
ERYTHROCYTE [DISTWIDTH] IN BLOOD BY AUTOMATED COUNT: 14.2 % (ref 11.5–14.5)
ERYTHROCYTE [DISTWIDTH] IN BLOOD BY AUTOMATED COUNT: 14.2 % (ref 11.5–14.5)
ERYTHROCYTE [DISTWIDTH] IN BLOOD BY AUTOMATED COUNT: 14.4 % (ref 11.5–14.5)
ERYTHROCYTE [DISTWIDTH] IN BLOOD BY AUTOMATED COUNT: 14.5 % (ref 11.5–14.5)
ERYTHROCYTE [DISTWIDTH] IN BLOOD BY AUTOMATED COUNT: 14.6 % (ref 11.5–14.5)
ERYTHROCYTE [DISTWIDTH] IN BLOOD BY AUTOMATED COUNT: 14.8 % (ref 11.5–14.5)
ERYTHROCYTE [DISTWIDTH] IN BLOOD BY AUTOMATED COUNT: 15.5 % (ref 11.5–14.5)
ERYTHROCYTE [DISTWIDTH] IN BLOOD BY AUTOMATED COUNT: 15.6 % (ref 11.5–14.5)
ERYTHROCYTE [DISTWIDTH] IN BLOOD BY AUTOMATED COUNT: 15.7 % (ref 11.5–14.5)
EST. AVERAGE GLUCOSE BLD GHB EST-MCNC: 197 MG/DL
EST. AVERAGE GLUCOSE BLD GHB EST-MCNC: 203 MG/DL
GLOBULIN SER CALC-MCNC: 3.6 G/DL (ref 2–4)
GLOBULIN SER CALC-MCNC: 3.8 G/DL (ref 2–4)
GLOBULIN SER CALC-MCNC: 4.1 G/DL (ref 2–4)
GLOBULIN SER CALC-MCNC: 4.1 G/DL (ref 2–4)
GLUCOSE BLD STRIP.AUTO-MCNC: 102 MG/DL (ref 65–100)
GLUCOSE BLD STRIP.AUTO-MCNC: 111 MG/DL (ref 65–100)
GLUCOSE BLD STRIP.AUTO-MCNC: 128 MG/DL (ref 65–100)
GLUCOSE BLD STRIP.AUTO-MCNC: 133 MG/DL (ref 65–100)
GLUCOSE BLD STRIP.AUTO-MCNC: 139 MG/DL (ref 65–100)
GLUCOSE BLD STRIP.AUTO-MCNC: 142 MG/DL (ref 65–100)
GLUCOSE BLD STRIP.AUTO-MCNC: 146 MG/DL (ref 65–100)
GLUCOSE BLD STRIP.AUTO-MCNC: 148 MG/DL (ref 65–100)
GLUCOSE BLD STRIP.AUTO-MCNC: 151 MG/DL (ref 65–100)
GLUCOSE BLD STRIP.AUTO-MCNC: 157 MG/DL (ref 65–100)
GLUCOSE BLD STRIP.AUTO-MCNC: 174 MG/DL (ref 65–100)
GLUCOSE BLD STRIP.AUTO-MCNC: 176 MG/DL (ref 65–100)
GLUCOSE BLD STRIP.AUTO-MCNC: 178 MG/DL (ref 65–100)
GLUCOSE BLD STRIP.AUTO-MCNC: 180 MG/DL (ref 65–100)
GLUCOSE BLD STRIP.AUTO-MCNC: 181 MG/DL (ref 65–100)
GLUCOSE BLD STRIP.AUTO-MCNC: 190 MG/DL (ref 65–100)
GLUCOSE BLD STRIP.AUTO-MCNC: 195 MG/DL (ref 65–100)
GLUCOSE BLD STRIP.AUTO-MCNC: 199 MG/DL (ref 65–100)
GLUCOSE BLD STRIP.AUTO-MCNC: 201 MG/DL (ref 65–100)
GLUCOSE BLD STRIP.AUTO-MCNC: 201 MG/DL (ref 65–100)
GLUCOSE BLD STRIP.AUTO-MCNC: 202 MG/DL (ref 65–100)
GLUCOSE BLD STRIP.AUTO-MCNC: 203 MG/DL (ref 65–100)
GLUCOSE BLD STRIP.AUTO-MCNC: 207 MG/DL (ref 65–100)
GLUCOSE BLD STRIP.AUTO-MCNC: 215 MG/DL (ref 65–100)
GLUCOSE BLD STRIP.AUTO-MCNC: 217 MG/DL (ref 65–100)
GLUCOSE BLD STRIP.AUTO-MCNC: 221 MG/DL (ref 65–100)
GLUCOSE BLD STRIP.AUTO-MCNC: 222 MG/DL (ref 65–100)
GLUCOSE BLD STRIP.AUTO-MCNC: 226 MG/DL (ref 65–100)
GLUCOSE BLD STRIP.AUTO-MCNC: 226 MG/DL (ref 65–100)
GLUCOSE BLD STRIP.AUTO-MCNC: 227 MG/DL (ref 65–100)
GLUCOSE BLD STRIP.AUTO-MCNC: 228 MG/DL (ref 65–100)
GLUCOSE BLD STRIP.AUTO-MCNC: 235 MG/DL (ref 65–100)
GLUCOSE BLD STRIP.AUTO-MCNC: 235 MG/DL (ref 65–100)
GLUCOSE BLD STRIP.AUTO-MCNC: 237 MG/DL (ref 65–100)
GLUCOSE BLD STRIP.AUTO-MCNC: 237 MG/DL (ref 65–100)
GLUCOSE BLD STRIP.AUTO-MCNC: 239 MG/DL (ref 65–100)
GLUCOSE BLD STRIP.AUTO-MCNC: 241 MG/DL (ref 65–100)
GLUCOSE BLD STRIP.AUTO-MCNC: 243 MG/DL (ref 65–100)
GLUCOSE BLD STRIP.AUTO-MCNC: 247 MG/DL (ref 65–100)
GLUCOSE BLD STRIP.AUTO-MCNC: 257 MG/DL (ref 65–100)
GLUCOSE BLD STRIP.AUTO-MCNC: 270 MG/DL (ref 65–100)
GLUCOSE BLD STRIP.AUTO-MCNC: 274 MG/DL (ref 65–100)
GLUCOSE BLD STRIP.AUTO-MCNC: 275 MG/DL (ref 65–100)
GLUCOSE BLD STRIP.AUTO-MCNC: 276 MG/DL (ref 65–100)
GLUCOSE BLD STRIP.AUTO-MCNC: 281 MG/DL (ref 65–100)
GLUCOSE BLD STRIP.AUTO-MCNC: 286 MG/DL (ref 65–100)
GLUCOSE BLD STRIP.AUTO-MCNC: 290 MG/DL (ref 65–100)
GLUCOSE BLD STRIP.AUTO-MCNC: 291 MG/DL (ref 65–100)
GLUCOSE BLD STRIP.AUTO-MCNC: 291 MG/DL (ref 65–100)
GLUCOSE BLD STRIP.AUTO-MCNC: 296 MG/DL (ref 65–100)
GLUCOSE BLD STRIP.AUTO-MCNC: 297 MG/DL (ref 65–100)
GLUCOSE BLD STRIP.AUTO-MCNC: 306 MG/DL (ref 65–100)
GLUCOSE BLD STRIP.AUTO-MCNC: 312 MG/DL (ref 65–100)
GLUCOSE BLD STRIP.AUTO-MCNC: 316 MG/DL (ref 65–100)
GLUCOSE BLD STRIP.AUTO-MCNC: 322 MG/DL (ref 65–100)
GLUCOSE BLD STRIP.AUTO-MCNC: 329 MG/DL (ref 65–100)
GLUCOSE BLD STRIP.AUTO-MCNC: 330 MG/DL (ref 65–100)
GLUCOSE BLD STRIP.AUTO-MCNC: 333 MG/DL (ref 65–100)
GLUCOSE BLD STRIP.AUTO-MCNC: 342 MG/DL (ref 65–100)
GLUCOSE BLD STRIP.AUTO-MCNC: 345 MG/DL (ref 65–100)
GLUCOSE BLD STRIP.AUTO-MCNC: 346 MG/DL (ref 65–100)
GLUCOSE BLD STRIP.AUTO-MCNC: 350 MG/DL (ref 65–100)
GLUCOSE BLD STRIP.AUTO-MCNC: 356 MG/DL (ref 65–100)
GLUCOSE BLD STRIP.AUTO-MCNC: 359 MG/DL (ref 65–100)
GLUCOSE BLD STRIP.AUTO-MCNC: 361 MG/DL (ref 65–100)
GLUCOSE BLD STRIP.AUTO-MCNC: 361 MG/DL (ref 65–100)
GLUCOSE BLD STRIP.AUTO-MCNC: 365 MG/DL (ref 65–100)
GLUCOSE BLD STRIP.AUTO-MCNC: 366 MG/DL (ref 65–100)
GLUCOSE BLD STRIP.AUTO-MCNC: 369 MG/DL (ref 65–100)
GLUCOSE BLD STRIP.AUTO-MCNC: 369 MG/DL (ref 65–100)
GLUCOSE BLD STRIP.AUTO-MCNC: 374 MG/DL (ref 65–100)
GLUCOSE BLD STRIP.AUTO-MCNC: 377 MG/DL (ref 65–100)
GLUCOSE BLD STRIP.AUTO-MCNC: 402 MG/DL (ref 65–100)
GLUCOSE BLD STRIP.AUTO-MCNC: 423 MG/DL (ref 65–100)
GLUCOSE BLD STRIP.AUTO-MCNC: 426 MG/DL (ref 65–100)
GLUCOSE BLD STRIP.AUTO-MCNC: 432 MG/DL (ref 65–100)
GLUCOSE BLD STRIP.AUTO-MCNC: 440 MG/DL (ref 65–100)
GLUCOSE BLD STRIP.AUTO-MCNC: 59 MG/DL (ref 65–100)
GLUCOSE BLD STRIP.AUTO-MCNC: 72 MG/DL (ref 65–100)
GLUCOSE BLD STRIP.AUTO-MCNC: 83 MG/DL (ref 65–100)
GLUCOSE BLD STRIP.AUTO-MCNC: 97 MG/DL (ref 65–100)
GLUCOSE SERPL-MCNC: 101 MG/DL (ref 65–100)
GLUCOSE SERPL-MCNC: 118 MG/DL (ref 65–100)
GLUCOSE SERPL-MCNC: 132 MG/DL (ref 65–100)
GLUCOSE SERPL-MCNC: 140 MG/DL (ref 65–100)
GLUCOSE SERPL-MCNC: 143 MG/DL (ref 65–100)
GLUCOSE SERPL-MCNC: 169 MG/DL (ref 65–100)
GLUCOSE SERPL-MCNC: 182 MG/DL (ref 65–100)
GLUCOSE SERPL-MCNC: 195 MG/DL (ref 65–100)
GLUCOSE SERPL-MCNC: 198 MG/DL (ref 65–99)
GLUCOSE SERPL-MCNC: 200 MG/DL (ref 65–100)
GLUCOSE SERPL-MCNC: 219 MG/DL (ref 65–100)
GLUCOSE SERPL-MCNC: 220 MG/DL (ref 65–100)
GLUCOSE SERPL-MCNC: 227 MG/DL (ref 65–100)
GLUCOSE SERPL-MCNC: 242 MG/DL (ref 65–100)
GLUCOSE SERPL-MCNC: 248 MG/DL (ref 65–100)
GLUCOSE SERPL-MCNC: 271 MG/DL (ref 65–100)
GLUCOSE SERPL-MCNC: 274 MG/DL (ref 65–100)
GLUCOSE SERPL-MCNC: 283 MG/DL (ref 65–100)
GLUCOSE SERPL-MCNC: 289 MG/DL (ref 65–100)
GLUCOSE SERPL-MCNC: 296 MG/DL (ref 65–100)
GLUCOSE SERPL-MCNC: 301 MG/DL (ref 65–100)
GLUCOSE SERPL-MCNC: 321 MG/DL (ref 65–100)
GLUCOSE SERPL-MCNC: 57 MG/DL (ref 65–100)
GLUCOSE UR STRIP.AUTO-MCNC: NEGATIVE MG/DL
GLUCOSE UR STRIP.AUTO-MCNC: NEGATIVE MG/DL
HBA1C MFR BLD: 8.5 % (ref 4–5.6)
HBA1C MFR BLD: 8.7 % (ref 4–5.6)
HCT VFR BLD AUTO: 35.2 % (ref 35–47)
HCT VFR BLD AUTO: 36.3 % (ref 35–47)
HCT VFR BLD AUTO: 36.3 % (ref 35–47)
HCT VFR BLD AUTO: 36.4 % (ref 35–47)
HCT VFR BLD AUTO: 38.1 % (ref 35–47)
HCT VFR BLD AUTO: 38.6 % (ref 35–47)
HCT VFR BLD AUTO: 39.9 % (ref 35–47)
HCT VFR BLD AUTO: 41.5 % (ref 35–47)
HCT VFR BLD AUTO: 42 % (ref 34–46.6)
HCT VFR BLD AUTO: 42 % (ref 35–47)
HCT VFR BLD AUTO: 42.6 % (ref 35–47)
HCT VFR BLD AUTO: 42.8 % (ref 35–47)
HCT VFR BLD AUTO: 43.2 % (ref 35–47)
HCT VFR BLD AUTO: 43.6 % (ref 35–47)
HDLC SERPL-MCNC: 37 MG/DL
HDLC SERPL: 2.5 {RATIO} (ref 0–5)
HGB BLD-MCNC: 11.4 G/DL (ref 11.5–16)
HGB BLD-MCNC: 11.6 G/DL (ref 11.5–16)
HGB BLD-MCNC: 12.4 G/DL (ref 11.5–16)
HGB BLD-MCNC: 12.7 G/DL (ref 11.5–16)
HGB BLD-MCNC: 13.2 G/DL (ref 11.5–16)
HGB BLD-MCNC: 13.7 G/DL (ref 11.5–16)
HGB BLD-MCNC: 13.8 G/DL (ref 11.5–16)
HGB BLD-MCNC: 14.1 G/DL (ref 11.1–15.9)
HGB BLD-MCNC: 14.1 G/DL (ref 11.5–16)
HGB BLD-MCNC: 14.2 G/DL (ref 11.5–16)
HGB BLD-MCNC: 14.4 G/DL (ref 11.5–16)
HGB BLD-MCNC: 14.5 G/DL (ref 11.5–16)
HGB UR QL STRIP: ABNORMAL
HGB UR QL STRIP: NEGATIVE
HYALINE CASTS URNS QL MICRO: NORMAL /LPF (ref 0–5)
IMM GRANULOCYTES # BLD AUTO: 0 K/UL
IMM GRANULOCYTES # BLD AUTO: 0.1 K/UL (ref 0–0.04)
IMM GRANULOCYTES # BLD AUTO: 0.1 K/UL (ref 0–0.04)
IMM GRANULOCYTES # BLD AUTO: 0.1 X10E3/UL (ref 0–0.1)
IMM GRANULOCYTES # BLD AUTO: 0.2 K/UL (ref 0–0.04)
IMM GRANULOCYTES # BLD AUTO: 0.2 K/UL (ref 0–0.04)
IMM GRANULOCYTES # BLD AUTO: 0.7 K/UL (ref 0–0.04)
IMM GRANULOCYTES # BLD AUTO: 0.8 K/UL (ref 0–0.04)
IMM GRANULOCYTES NFR BLD AUTO: 0 %
IMM GRANULOCYTES NFR BLD AUTO: 1 %
IMM GRANULOCYTES NFR BLD AUTO: 1 % (ref 0–0.5)
IMM GRANULOCYTES NFR BLD AUTO: 2 % (ref 0–0.5)
IMM GRANULOCYTES NFR BLD AUTO: 3 % (ref 0–0.5)
IMM GRANULOCYTES NFR BLD AUTO: 4 % (ref 0–0.5)
INTERPRETATION: NORMAL
KETONES UR QL STRIP.AUTO: NEGATIVE MG/DL
KETONES UR QL STRIP.AUTO: NEGATIVE MG/DL
LDLC SERPL CALC-MCNC: 30.4 MG/DL (ref 0–100)
LEFT CCA DIST DIAS: 10.7 CM/S
LEFT CCA DIST SYS: 45.6 CM/S
LEFT CCA PROX DIAS: 7.8 CM/S
LEFT CCA PROX SYS: 39.6 CM/S
LEFT ECA DIAS: 0 CM/S
LEFT ECA SYS: 505.1 CM/S
LEFT ICA DIST DIAS: 9 CM/S
LEFT ICA DIST SYS: 58.1 CM/S
LEFT ICA MID DIAS: 11.8 CM/S
LEFT ICA MID SYS: 100.6 CM/S
LEFT ICA PROX DIAS: 15 CM/S
LEFT ICA PROX SYS: 90.9 CM/S
LEFT ICA/CCA SYS: 2.21
LEFT VERTEBRAL DIAS: 5.91 CM/S
LEFT VERTEBRAL SYS: 38.7 CM/S
LEUKOCYTE ESTERASE UR QL STRIP.AUTO: NEGATIVE
LEUKOCYTE ESTERASE UR QL STRIP.AUTO: NEGATIVE
LIPID PROFILE,FLP: NORMAL
LVFS 2D: 25.53 %
LVFS 2D: 51.02 %
LYMPHOCYTES # BLD AUTO: 2 X10E3/UL (ref 0.7–3.1)
LYMPHOCYTES # BLD: 0.7 K/UL (ref 0.8–3.5)
LYMPHOCYTES # BLD: 0.7 K/UL (ref 0.8–3.5)
LYMPHOCYTES # BLD: 0.9 K/UL (ref 0.8–3.5)
LYMPHOCYTES # BLD: 1 K/UL (ref 0.8–3.5)
LYMPHOCYTES # BLD: 1.3 K/UL (ref 0.8–3.5)
LYMPHOCYTES # BLD: 1.3 K/UL (ref 0.8–3.5)
LYMPHOCYTES # BLD: 1.9 K/UL (ref 0.8–3.5)
LYMPHOCYTES # BLD: 2.5 K/UL (ref 0.8–3.5)
LYMPHOCYTES # BLD: 2.6 K/UL (ref 0.8–3.5)
LYMPHOCYTES # BLD: 2.6 K/UL (ref 0.8–3.5)
LYMPHOCYTES # BLD: 3.7 K/UL (ref 0.8–3.5)
LYMPHOCYTES NFR BLD AUTO: 18 %
LYMPHOCYTES NFR BLD: 10 % (ref 12–49)
LYMPHOCYTES NFR BLD: 15 % (ref 12–49)
LYMPHOCYTES NFR BLD: 17 % (ref 12–49)
LYMPHOCYTES NFR BLD: 21 % (ref 12–49)
LYMPHOCYTES NFR BLD: 21 % (ref 12–49)
LYMPHOCYTES NFR BLD: 22 % (ref 12–49)
LYMPHOCYTES NFR BLD: 4 % (ref 12–49)
LYMPHOCYTES NFR BLD: 6 % (ref 12–49)
LYMPHOCYTES NFR BLD: 6 % (ref 12–49)
LYMPHOCYTES NFR BLD: 7 % (ref 12–49)
LYMPHOCYTES NFR BLD: 9 % (ref 12–49)
Lab: NORMAL
MAGNESIUM SERPL-MCNC: 1.7 MG/DL (ref 1.6–2.4)
MCH RBC QN AUTO: 26 PG (ref 26–34)
MCH RBC QN AUTO: 26 PG (ref 26–34)
MCH RBC QN AUTO: 26.3 PG (ref 26–34)
MCH RBC QN AUTO: 26.4 PG (ref 26–34)
MCH RBC QN AUTO: 26.4 PG (ref 26–34)
MCH RBC QN AUTO: 26.5 PG (ref 26–34)
MCH RBC QN AUTO: 26.5 PG (ref 26–34)
MCH RBC QN AUTO: 26.6 PG (ref 26–34)
MCH RBC QN AUTO: 26.9 PG (ref 26.6–33)
MCH RBC QN AUTO: 26.9 PG (ref 26–34)
MCH RBC QN AUTO: 26.9 PG (ref 26–34)
MCH RBC QN AUTO: 27.1 PG (ref 26–34)
MCH RBC QN AUTO: 27.4 PG (ref 26–34)
MCH RBC QN AUTO: 27.5 PG (ref 26–34)
MCHC RBC AUTO-ENTMCNC: 31.9 G/DL (ref 30–36.5)
MCHC RBC AUTO-ENTMCNC: 32 G/DL (ref 30–36.5)
MCHC RBC AUTO-ENTMCNC: 32 G/DL (ref 30–36.5)
MCHC RBC AUTO-ENTMCNC: 32.4 G/DL (ref 30–36.5)
MCHC RBC AUTO-ENTMCNC: 32.5 G/DL (ref 30–36.5)
MCHC RBC AUTO-ENTMCNC: 32.6 G/DL (ref 30–36.5)
MCHC RBC AUTO-ENTMCNC: 32.6 G/DL (ref 30–36.5)
MCHC RBC AUTO-ENTMCNC: 32.9 G/DL (ref 30–36.5)
MCHC RBC AUTO-ENTMCNC: 33 G/DL (ref 30–36.5)
MCHC RBC AUTO-ENTMCNC: 33.1 G/DL (ref 30–36.5)
MCHC RBC AUTO-ENTMCNC: 33.3 G/DL (ref 30–36.5)
MCHC RBC AUTO-ENTMCNC: 33.3 G/DL (ref 30–36.5)
MCHC RBC AUTO-ENTMCNC: 33.6 G/DL (ref 31.5–35.7)
MCHC RBC AUTO-ENTMCNC: 33.9 G/DL (ref 30–36.5)
MCV RBC AUTO: 79.6 FL (ref 80–99)
MCV RBC AUTO: 80 FL (ref 79–97)
MCV RBC AUTO: 80 FL (ref 80–99)
MCV RBC AUTO: 80.6 FL (ref 80–99)
MCV RBC AUTO: 80.8 FL (ref 80–99)
MCV RBC AUTO: 80.9 FL (ref 80–99)
MCV RBC AUTO: 81.1 FL (ref 80–99)
MCV RBC AUTO: 81.2 FL (ref 80–99)
MCV RBC AUTO: 81.4 FL (ref 80–99)
MCV RBC AUTO: 81.5 FL (ref 80–99)
MCV RBC AUTO: 81.6 FL (ref 80–99)
MCV RBC AUTO: 83 FL (ref 80–99)
MCV RBC AUTO: 83.5 FL (ref 80–99)
MCV RBC AUTO: 84.2 FL (ref 80–99)
METAMYELOCYTES NFR BLD MANUAL: 1 %
METAMYELOCYTES NFR BLD MANUAL: 1 %
MONOCYTES # BLD AUTO: 0.8 X10E3/UL (ref 0.1–0.9)
MONOCYTES # BLD: 0.1 K/UL (ref 0–1)
MONOCYTES # BLD: 0.2 K/UL (ref 0–1)
MONOCYTES # BLD: 0.2 K/UL (ref 0–1)
MONOCYTES # BLD: 0.6 K/UL (ref 0–1)
MONOCYTES # BLD: 0.6 K/UL (ref 0–1)
MONOCYTES # BLD: 0.7 K/UL (ref 0–1)
MONOCYTES # BLD: 0.8 K/UL (ref 0–1)
MONOCYTES # BLD: 0.9 K/UL (ref 0–1)
MONOCYTES # BLD: 1.1 K/UL (ref 0–1)
MONOCYTES # BLD: 1.3 K/UL (ref 0–1)
MONOCYTES # BLD: 2 K/UL (ref 0–1)
MONOCYTES NFR BLD AUTO: 7 %
MONOCYTES NFR BLD: 1 % (ref 5–13)
MONOCYTES NFR BLD: 1 % (ref 5–13)
MONOCYTES NFR BLD: 10 % (ref 5–13)
MONOCYTES NFR BLD: 2 % (ref 5–13)
MONOCYTES NFR BLD: 3 % (ref 5–13)
MONOCYTES NFR BLD: 5 % (ref 5–13)
MONOCYTES NFR BLD: 6 % (ref 5–13)
MONOCYTES NFR BLD: 6 % (ref 5–13)
MONOCYTES NFR BLD: 7 % (ref 5–13)
MONOCYTES NFR BLD: 7 % (ref 5–13)
MONOCYTES NFR BLD: 9 % (ref 5–13)
MV DEC SLOPE: 6.36
MYELOCYTES NFR BLD MANUAL: 1 %
MYELOCYTES NFR BLD MANUAL: 1 %
MYELOCYTES NFR BLD MANUAL: 3 %
MYELOCYTES NFR BLD MANUAL: 3 %
NEUTROPHILS # BLD AUTO: 8.2 X10E3/UL (ref 1.4–7)
NEUTROPHILS NFR BLD AUTO: 70 %
NEUTS BAND NFR BLD MANUAL: 7 % (ref 0–6)
NEUTS SEG # BLD: 11.5 K/UL (ref 1.8–8)
NEUTS SEG # BLD: 15.2 K/UL (ref 1.8–8)
NEUTS SEG # BLD: 16.3 K/UL (ref 1.8–8)
NEUTS SEG # BLD: 16.9 K/UL (ref 1.8–8)
NEUTS SEG # BLD: 18 K/UL (ref 1.8–8)
NEUTS SEG # BLD: 7.4 K/UL (ref 1.8–8)
NEUTS SEG # BLD: 7.9 K/UL (ref 1.8–8)
NEUTS SEG # BLD: 7.9 K/UL (ref 1.8–8)
NEUTS SEG # BLD: 8.4 K/UL (ref 1.8–8)
NEUTS SEG # BLD: 8.7 K/UL (ref 1.8–8)
NEUTS SEG # BLD: 9.4 K/UL (ref 1.8–8)
NEUTS SEG NFR BLD: 56 % (ref 32–75)
NEUTS SEG NFR BLD: 67 % (ref 32–75)
NEUTS SEG NFR BLD: 69 % (ref 32–75)
NEUTS SEG NFR BLD: 70 % (ref 32–75)
NEUTS SEG NFR BLD: 74 % (ref 32–75)
NEUTS SEG NFR BLD: 78 % (ref 32–75)
NEUTS SEG NFR BLD: 82 % (ref 32–75)
NEUTS SEG NFR BLD: 85 % (ref 32–75)
NEUTS SEG NFR BLD: 91 % (ref 32–75)
NEUTS SEG NFR BLD: 92 % (ref 32–75)
NEUTS SEG NFR BLD: 92 % (ref 32–75)
NITRITE UR QL STRIP.AUTO: NEGATIVE
NITRITE UR QL STRIP.AUTO: NEGATIVE
NRBC # BLD: 0 K/UL (ref 0–0.01)
NRBC BLD-RTO: 0 PER 100 WBC
PH UR STRIP: 7 [PH] (ref 5–8)
PH UR STRIP: 7.5 [PH] (ref 5–8)
PLATELET # BLD AUTO: 204 K/UL (ref 150–400)
PLATELET # BLD AUTO: 218 K/UL (ref 150–400)
PLATELET # BLD AUTO: 222 K/UL (ref 150–400)
PLATELET # BLD AUTO: 231 K/UL (ref 150–400)
PLATELET # BLD AUTO: 247 K/UL (ref 150–400)
PLATELET # BLD AUTO: 254 X10E3/UL (ref 150–450)
PLATELET # BLD AUTO: 256 K/UL (ref 150–400)
PLATELET # BLD AUTO: 260 K/UL (ref 150–400)
PLATELET # BLD AUTO: 262 K/UL (ref 150–400)
PLATELET # BLD AUTO: 271 K/UL (ref 150–400)
PLATELET # BLD AUTO: 272 K/UL (ref 150–400)
PLATELET # BLD AUTO: 405 K/UL (ref 150–400)
PLATELET # BLD AUTO: 434 K/UL (ref 150–400)
PLATELET # BLD AUTO: 439 K/UL (ref 150–400)
PLATELET COMMENTS,PCOM: ABNORMAL
PMV BLD AUTO: 8.7 FL (ref 8.9–12.9)
PMV BLD AUTO: 8.8 FL (ref 8.9–12.9)
PMV BLD AUTO: 8.9 FL (ref 8.9–12.9)
PMV BLD AUTO: 9 FL (ref 8.9–12.9)
PMV BLD AUTO: 9 FL (ref 8.9–12.9)
PMV BLD AUTO: 9.2 FL (ref 8.9–12.9)
PMV BLD AUTO: 9.3 FL (ref 8.9–12.9)
PMV BLD AUTO: 9.3 FL (ref 8.9–12.9)
PMV BLD AUTO: 9.4 FL (ref 8.9–12.9)
POTASSIUM SERPL-SCNC: 3.4 MMOL/L (ref 3.5–5.1)
POTASSIUM SERPL-SCNC: 3.4 MMOL/L (ref 3.5–5.1)
POTASSIUM SERPL-SCNC: 3.5 MMOL/L (ref 3.5–5.1)
POTASSIUM SERPL-SCNC: 3.6 MMOL/L (ref 3.5–5.1)
POTASSIUM SERPL-SCNC: 3.7 MMOL/L (ref 3.5–5.1)
POTASSIUM SERPL-SCNC: 3.7 MMOL/L (ref 3.5–5.1)
POTASSIUM SERPL-SCNC: 3.8 MMOL/L (ref 3.5–5.1)
POTASSIUM SERPL-SCNC: 3.9 MMOL/L (ref 3.5–5.1)
POTASSIUM SERPL-SCNC: 4 MMOL/L (ref 3.5–5.1)
POTASSIUM SERPL-SCNC: 4 MMOL/L (ref 3.5–5.2)
POTASSIUM SERPL-SCNC: 4.1 MMOL/L (ref 3.5–5.1)
POTASSIUM SERPL-SCNC: 4.1 MMOL/L (ref 3.5–5.1)
POTASSIUM SERPL-SCNC: 4.2 MMOL/L (ref 3.5–5.1)
POTASSIUM SERPL-SCNC: 4.3 MMOL/L (ref 3.5–5.1)
POTASSIUM SERPL-SCNC: 4.3 MMOL/L (ref 3.5–5.1)
POTASSIUM SERPL-SCNC: 4.4 MMOL/L (ref 3.5–5.1)
PROT SERPL-MCNC: 6.4 G/DL (ref 6.4–8.2)
PROT SERPL-MCNC: 6.6 G/DL (ref 6.4–8.2)
PROT SERPL-MCNC: 7.1 G/DL (ref 6.4–8.2)
PROT SERPL-MCNC: 7.6 G/DL (ref 6.4–8.2)
PROT UR STRIP-MCNC: NEGATIVE MG/DL
PROT UR STRIP-MCNC: NEGATIVE MG/DL
PULMONARY ARTERY END DIASTOLIC PRESSURE: 7.8 MMHG
PULMONARY ARTERY MEAN PRESURE: 16 MMHG
PV END DIASTOLIC VELOCITY: 0.8 MMHG
Q-T INTERVAL, ECG07: 370 MS
Q-T INTERVAL, ECG07: 410 MS
Q-T INTERVAL, ECG07: 474 MS
Q-T INTERVAL, ECG07: 582 MS
QRS DURATION, ECG06: 122 MS
QRS DURATION, ECG06: 144 MS
QRS DURATION, ECG06: 146 MS
QRS DURATION, ECG06: 86 MS
QTC CALCULATION (BEZET), ECG08: 477 MS
QTC CALCULATION (BEZET), ECG08: 479 MS
QTC CALCULATION (BEZET), ECG08: 509 MS
QTC CALCULATION (BEZET), ECG08: 582 MS
RBC # BLD AUTO: 4.31 M/UL (ref 3.8–5.2)
RBC # BLD AUTO: 4.32 M/UL (ref 3.8–5.2)
RBC # BLD AUTO: 4.46 M/UL (ref 3.8–5.2)
RBC # BLD AUTO: 4.46 M/UL (ref 3.8–5.2)
RBC # BLD AUTO: 4.62 M/UL (ref 3.8–5.2)
RBC # BLD AUTO: 4.7 M/UL (ref 3.8–5.2)
RBC # BLD AUTO: 4.81 M/UL (ref 3.8–5.2)
RBC # BLD AUTO: 5.13 M/UL (ref 3.8–5.2)
RBC # BLD AUTO: 5.2 M/UL (ref 3.8–5.2)
RBC # BLD AUTO: 5.25 X10E6/UL (ref 3.77–5.28)
RBC # BLD AUTO: 5.32 M/UL (ref 3.8–5.2)
RBC # BLD AUTO: 5.35 M/UL (ref 3.8–5.2)
RBC # BLD AUTO: 5.35 M/UL (ref 3.8–5.2)
RBC # BLD AUTO: 5.41 M/UL (ref 3.8–5.2)
RBC #/AREA URNS HPF: ABNORMAL /HPF (ref 0–5)
RBC #/AREA URNS HPF: NORMAL /HPF (ref 0–5)
RBC MORPH BLD: ABNORMAL
RIGHT CCA DIST DIAS: 12.3 CM/S
RIGHT CCA DIST SYS: 98.3 CM/S
RIGHT CCA PROX DIAS: 9.9 CM/S
RIGHT CCA PROX SYS: 67.4 CM/S
RIGHT ECA DIAS: 0 CM/S
RIGHT ECA SYS: 81.1 CM/S
RIGHT ICA DIST DIAS: 11.2 CM/S
RIGHT ICA DIST SYS: 55.2 CM/S
RIGHT ICA MID DIAS: 10.7 CM/S
RIGHT ICA MID SYS: 100.1 CM/S
RIGHT ICA PROX DIAS: 30.9 CM/S
RIGHT ICA PROX SYS: 221.4 CM/S
RIGHT ICA/CCA SYS: 2.3
RIGHT VERTEBRAL DIAS: 12.48 CM/S
RIGHT VERTEBRAL SYS: 57 CM/S
SAMPLES BEING HELD,HOLD: NORMAL
SARS-COV-2, COV2NT: DETECTED
SERVICE CMNT-IMP: ABNORMAL
SERVICE CMNT-IMP: NORMAL
SODIUM SERPL-SCNC: 131 MMOL/L (ref 136–145)
SODIUM SERPL-SCNC: 133 MMOL/L (ref 136–145)
SODIUM SERPL-SCNC: 134 MMOL/L (ref 136–145)
SODIUM SERPL-SCNC: 135 MMOL/L (ref 136–145)
SODIUM SERPL-SCNC: 136 MMOL/L (ref 136–145)
SODIUM SERPL-SCNC: 137 MMOL/L (ref 134–144)
SODIUM SERPL-SCNC: 137 MMOL/L (ref 136–145)
SODIUM SERPL-SCNC: 139 MMOL/L (ref 136–145)
SOURCE, COVRS: ABNORMAL
SP GR UR REFRACTOMETRY: 1 (ref 1–1.03)
SP GR UR REFRACTOMETRY: 1.01 (ref 1–1.03)
SPECIMEN SOURCE, FCOV2M: ABNORMAL
STRESS BASELINE DIAS BP: 70 MMHG
STRESS BASELINE HR: 82 BPM
STRESS BASELINE SYS BP: 123 MMHG
STRESS ESTIMATED WORKLOAD: 1 METS
STRESS EXERCISE DUR MIN: NORMAL
STRESS PEAK DIAS BP: 55 MMHG
STRESS PEAK SYS BP: 146 MMHG
STRESS PERCENT HR ACHIEVED: 68 %
STRESS POST PEAK HR: 93 BPM
STRESS RATE PRESSURE PRODUCT: NORMAL BPM*MMHG
STRESS ST DEPRESSION: 0 MM
STRESS ST ELEVATION: 0 MM
STRESS TARGET HR: 137 BPM
TRIGL SERPL-MCNC: 128 MG/DL (ref ?–150)
TROPONIN I SERPL-MCNC: <0.05 NG/ML
UR CULT HOLD, URHOLD: NORMAL
UROBILINOGEN UR QL STRIP.AUTO: 0.2 EU/DL (ref 0.2–1)
UROBILINOGEN UR QL STRIP.AUTO: 0.2 EU/DL (ref 0.2–1)
VENTRICULAR RATE, ECG03: 114 BPM
VENTRICULAR RATE, ECG03: 60 BPM
VENTRICULAR RATE, ECG03: 61 BPM
VENTRICULAR RATE, ECG03: 82 BPM
VLDLC SERPL CALC-MCNC: 25.6 MG/DL
WBC # BLD AUTO: 10.7 K/UL (ref 3.6–11)
WBC # BLD AUTO: 11.4 K/UL (ref 3.6–11)
WBC # BLD AUTO: 11.6 X10E3/UL (ref 3.4–10.8)
WBC # BLD AUTO: 12.3 K/UL (ref 3.6–11)
WBC # BLD AUTO: 12.3 K/UL (ref 3.6–11)
WBC # BLD AUTO: 12.4 K/UL (ref 3.6–11)
WBC # BLD AUTO: 12.4 K/UL (ref 3.6–11)
WBC # BLD AUTO: 12.5 K/UL (ref 3.6–11)
WBC # BLD AUTO: 12.7 K/UL (ref 3.6–11)
WBC # BLD AUTO: 17.9 K/UL (ref 3.6–11)
WBC # BLD AUTO: 18.4 K/UL (ref 3.6–11)
WBC # BLD AUTO: 21.2 K/UL (ref 3.6–11)
WBC # BLD AUTO: 21.8 K/UL (ref 3.6–11)
WBC # BLD AUTO: 9.5 K/UL (ref 3.6–11)
WBC MORPH BLD: ABNORMAL
WBC MORPH BLD: ABNORMAL
WBC URNS QL MICRO: ABNORMAL /HPF (ref 0–4)
WBC URNS QL MICRO: NORMAL /HPF (ref 0–4)

## 2020-01-01 PROCEDURE — 71046 X-RAY EXAM CHEST 2 VIEWS: CPT

## 2020-01-01 PROCEDURE — 36415 COLL VENOUS BLD VENIPUNCTURE: CPT

## 2020-01-01 PROCEDURE — 02HK3NZ INSERTION OF INTRACARDIAC PACEMAKER INTO RIGHT VENTRICLE, PERCUTANEOUS APPROACH: ICD-10-PCS | Performed by: INTERNAL MEDICINE

## 2020-01-01 PROCEDURE — 65660000000 HC RM CCU STEPDOWN

## 2020-01-01 PROCEDURE — 94640 AIRWAY INHALATION TREATMENT: CPT

## 2020-01-01 PROCEDURE — 02K83ZZ MAP CONDUCTION MECHANISM, PERCUTANEOUS APPROACH: ICD-10-PCS | Performed by: INTERNAL MEDICINE

## 2020-01-01 PROCEDURE — 74011250637 HC RX REV CODE- 250/637: Performed by: INTERNAL MEDICINE

## 2020-01-01 PROCEDURE — 82962 GLUCOSE BLOOD TEST: CPT

## 2020-01-01 PROCEDURE — 74011250636 HC RX REV CODE- 250/636: Performed by: INTERNAL MEDICINE

## 2020-01-01 PROCEDURE — 85025 COMPLETE CBC W/AUTO DIFF WBC: CPT

## 2020-01-01 PROCEDURE — 74011000250 HC RX REV CODE- 250: Performed by: INTERNAL MEDICINE

## 2020-01-01 PROCEDURE — 74011250637 HC RX REV CODE- 250/637: Performed by: NURSE PRACTITIONER

## 2020-01-01 PROCEDURE — 77030019569 HC BND COMPR RAD TERU -B: Performed by: INTERNAL MEDICINE

## 2020-01-01 PROCEDURE — 65660000001 HC RM ICU INTERMED STEPDOWN

## 2020-01-01 PROCEDURE — 80048 BASIC METABOLIC PNL TOTAL CA: CPT

## 2020-01-01 PROCEDURE — 71045 X-RAY EXAM CHEST 1 VIEW: CPT

## 2020-01-01 PROCEDURE — 74011636320 HC RX REV CODE- 636/320: Performed by: RADIOLOGY

## 2020-01-01 PROCEDURE — A9270 NON-COVERED ITEM OR SERVICE: HCPCS | Performed by: EMERGENCY MEDICINE

## 2020-01-01 PROCEDURE — 85027 COMPLETE CBC AUTOMATED: CPT

## 2020-01-01 PROCEDURE — 94762 N-INVAS EAR/PLS OXIMTRY CONT: CPT

## 2020-01-01 PROCEDURE — 99284 EMERGENCY DEPT VISIT MOD MDM: CPT

## 2020-01-01 PROCEDURE — 74011000250 HC RX REV CODE- 250: Performed by: EMERGENCY MEDICINE

## 2020-01-01 PROCEDURE — 97535 SELF CARE MNGMENT TRAINING: CPT

## 2020-01-01 PROCEDURE — 74011636637 HC RX REV CODE- 636/637: Performed by: INTERNAL MEDICINE

## 2020-01-01 PROCEDURE — 93460 R&L HRT ART/VENTRICLE ANGIO: CPT | Performed by: INTERNAL MEDICINE

## 2020-01-01 PROCEDURE — C1733 CATH, EP, OTHR THAN COOL-TIP: HCPCS | Performed by: INTERNAL MEDICINE

## 2020-01-01 PROCEDURE — 97530 THERAPEUTIC ACTIVITIES: CPT

## 2020-01-01 PROCEDURE — 4A0234Z MEASUREMENT OF CARDIAC ELECTRICAL ACTIVITY, PERCUTANEOUS APPROACH: ICD-10-PCS | Performed by: INTERNAL MEDICINE

## 2020-01-01 PROCEDURE — 74011250636 HC RX REV CODE- 250/636: Performed by: NURSE ANESTHETIST, CERTIFIED REGISTERED

## 2020-01-01 PROCEDURE — C1769 GUIDE WIRE: HCPCS | Performed by: INTERNAL MEDICINE

## 2020-01-01 PROCEDURE — 74011250637 HC RX REV CODE- 250/637: Performed by: SPECIALIST

## 2020-01-01 PROCEDURE — 99152 MOD SED SAME PHYS/QHP 5/>YRS: CPT | Performed by: INTERNAL MEDICINE

## 2020-01-01 PROCEDURE — 74011250637 HC RX REV CODE- 250/637: Performed by: FAMILY MEDICINE

## 2020-01-01 PROCEDURE — 93005 ELECTROCARDIOGRAM TRACING: CPT

## 2020-01-01 PROCEDURE — 73502 X-RAY EXAM HIP UNI 2-3 VIEWS: CPT

## 2020-01-01 PROCEDURE — 85576 BLOOD PLATELET AGGREGATION: CPT

## 2020-01-01 PROCEDURE — 83036 HEMOGLOBIN GLYCOSYLATED A1C: CPT

## 2020-01-01 PROCEDURE — 87635 SARS-COV-2 COVID-19 AMP PRB: CPT

## 2020-01-01 PROCEDURE — C1751 CATH, INF, PER/CENT/MIDLINE: HCPCS | Performed by: INTERNAL MEDICINE

## 2020-01-01 PROCEDURE — 97116 GAIT TRAINING THERAPY: CPT

## 2020-01-01 PROCEDURE — 77030002986 HC SUT PROL J&J -A: Performed by: INTERNAL MEDICINE

## 2020-01-01 PROCEDURE — 70450 CT HEAD/BRAIN W/O DYE: CPT

## 2020-01-01 PROCEDURE — 94727 GAS DIL/WSHOT DETER LNG VOL: CPT

## 2020-01-01 PROCEDURE — 77030013744: Performed by: INTERNAL MEDICINE

## 2020-01-01 PROCEDURE — 84484 ASSAY OF TROPONIN QUANT: CPT

## 2020-01-01 PROCEDURE — 94760 N-INVAS EAR/PLS OXIMETRY 1: CPT

## 2020-01-01 PROCEDURE — 94664 DEMO&/EVAL PT USE INHALER: CPT

## 2020-01-01 PROCEDURE — 74011000250 HC RX REV CODE- 250: Performed by: PHYSICIAN ASSISTANT

## 2020-01-01 PROCEDURE — 0042T CT CODE NEURO PERF W CBF: CPT

## 2020-01-01 PROCEDURE — B2111ZZ FLUOROSCOPY OF MULTIPLE CORONARY ARTERIES USING LOW OSMOLAR CONTRAST: ICD-10-PCS | Performed by: INTERNAL MEDICINE

## 2020-01-01 PROCEDURE — 93880 EXTRACRANIAL BILAT STUDY: CPT

## 2020-01-01 PROCEDURE — 4A023N8 MEASUREMENT OF CARDIAC SAMPLING AND PRESSURE, BILATERAL, PERCUTANEOUS APPROACH: ICD-10-PCS | Performed by: INTERNAL MEDICINE

## 2020-01-01 PROCEDURE — C8923 2D TTE W OR W/O FOL W/CON,CO: HCPCS

## 2020-01-01 PROCEDURE — 94010 BREATHING CAPACITY TEST: CPT

## 2020-01-01 PROCEDURE — 94621 CARDIOPULM EXERCISE TESTING: CPT

## 2020-01-01 PROCEDURE — C1894 INTRO/SHEATH, NON-LASER: HCPCS | Performed by: INTERNAL MEDICINE

## 2020-01-01 PROCEDURE — 77030029684 HC NEB SM VOL KT MONA -A

## 2020-01-01 PROCEDURE — 33274 TCAT INSJ/RPL PERM LDLS PM: CPT | Performed by: INTERNAL MEDICINE

## 2020-01-01 PROCEDURE — 74011000250 HC RX REV CODE- 250: Performed by: SPECIALIST

## 2020-01-01 PROCEDURE — 74011250637 HC RX REV CODE- 250/637: Performed by: EMERGENCY MEDICINE

## 2020-01-01 PROCEDURE — 77030012929 HC DIL URET COOK -C: Performed by: INTERNAL MEDICINE

## 2020-01-01 PROCEDURE — 74011636320 HC RX REV CODE- 636/320: Performed by: INTERNAL MEDICINE

## 2020-01-01 PROCEDURE — 80053 COMPREHEN METABOLIC PANEL: CPT

## 2020-01-01 PROCEDURE — B2151ZZ FLUOROSCOPY OF LEFT HEART USING LOW OSMOLAR CONTRAST: ICD-10-PCS | Performed by: INTERNAL MEDICINE

## 2020-01-01 PROCEDURE — 77010033678 HC OXYGEN DAILY

## 2020-01-01 PROCEDURE — 94729 DIFFUSING CAPACITY: CPT

## 2020-01-01 PROCEDURE — 74011636637 HC RX REV CODE- 636/637: Performed by: EMERGENCY MEDICINE

## 2020-01-01 PROCEDURE — 93306 TTE W/DOPPLER COMPLETE: CPT

## 2020-01-01 PROCEDURE — 99153 MOD SED SAME PHYS/QHP EA: CPT | Performed by: INTERNAL MEDICINE

## 2020-01-01 PROCEDURE — 83880 ASSAY OF NATRIURETIC PEPTIDE: CPT

## 2020-01-01 PROCEDURE — 76060000033 HC ANESTHESIA 1 TO 1.5 HR: Performed by: INTERNAL MEDICINE

## 2020-01-01 PROCEDURE — 99218 HC RM OBSERVATION: CPT

## 2020-01-01 PROCEDURE — 02583ZZ DESTRUCTION OF CONDUCTION MECHANISM, PERCUTANEOUS APPROACH: ICD-10-PCS | Performed by: INTERNAL MEDICINE

## 2020-01-01 PROCEDURE — 70551 MRI BRAIN STEM W/O DYE: CPT

## 2020-01-01 PROCEDURE — 70496 CT ANGIOGRAPHY HEAD: CPT

## 2020-01-01 PROCEDURE — 81001 URINALYSIS AUTO W/SCOPE: CPT

## 2020-01-01 PROCEDURE — 97161 PT EVAL LOW COMPLEX 20 MIN: CPT

## 2020-01-01 PROCEDURE — 77030003390 HC NDL ANGI MRTM -A: Performed by: INTERNAL MEDICINE

## 2020-01-01 PROCEDURE — 97165 OT EVAL LOW COMPLEX 30 MIN: CPT

## 2020-01-01 PROCEDURE — 74011250636 HC RX REV CODE- 250/636: Performed by: SPECIALIST

## 2020-01-01 PROCEDURE — 74011250636 HC RX REV CODE- 250/636: Performed by: FAMILY MEDICINE

## 2020-01-01 PROCEDURE — 77030039046 HC PAD DEFIB RADIOTRNSPNT CNMD -B: Performed by: INTERNAL MEDICINE

## 2020-01-01 PROCEDURE — 77030013797 HC KT TRNSDUC PRSSR EDWD -A: Performed by: INTERNAL MEDICINE

## 2020-01-01 PROCEDURE — 99283 EMERGENCY DEPT VISIT LOW MDM: CPT

## 2020-01-01 PROCEDURE — 83735 ASSAY OF MAGNESIUM: CPT

## 2020-01-01 PROCEDURE — 77030010221 HC SPLNT WR POS TELE -B: Performed by: INTERNAL MEDICINE

## 2020-01-01 PROCEDURE — C1786 PMKR, SINGLE, RATE-RESP: HCPCS | Performed by: INTERNAL MEDICINE

## 2020-01-01 PROCEDURE — 80061 LIPID PANEL: CPT

## 2020-01-01 PROCEDURE — 74011000258 HC RX REV CODE- 258: Performed by: RADIOLOGY

## 2020-01-01 PROCEDURE — 74011000250 HC RX REV CODE- 250: Performed by: NURSE PRACTITIONER

## 2020-01-01 PROCEDURE — 4A023FZ MEASUREMENT OF CARDIAC RHYTHM, PERCUTANEOUS APPROACH: ICD-10-PCS | Performed by: INTERNAL MEDICINE

## 2020-01-01 PROCEDURE — 78452 HT MUSCLE IMAGE SPECT MULT: CPT

## 2020-01-01 PROCEDURE — 77030029065 HC DRSG HEMO QCLOT ZMED -B: Performed by: INTERNAL MEDICINE

## 2020-01-01 PROCEDURE — 74011636637 HC RX REV CODE- 636/637: Performed by: FAMILY MEDICINE

## 2020-01-01 PROCEDURE — 96374 THER/PROPH/DIAG INJ IV PUSH: CPT

## 2020-01-01 PROCEDURE — 93650 ICAR CATH ABLTJ AV NODE FUNC: CPT | Performed by: INTERNAL MEDICINE

## 2020-01-01 PROCEDURE — 99285 EMERGENCY DEPT VISIT HI MDM: CPT

## 2020-01-01 PROCEDURE — 77030004532 HC CATH ANGI DX IMP BSC -A: Performed by: INTERNAL MEDICINE

## 2020-01-01 PROCEDURE — 71250 CT THORAX DX C-: CPT

## 2020-01-01 PROCEDURE — 93613 INTRACARDIAC EPHYS 3D MAPG: CPT

## 2020-01-01 PROCEDURE — A9500 TC99M SESTAMIBI: HCPCS

## 2020-01-01 DEVICE — TRANSCATHETER PACING SYS MICRA --: Type: IMPLANTABLE DEVICE | Status: FUNCTIONAL

## 2020-01-01 RX ORDER — PANTOPRAZOLE SODIUM 40 MG/1
40 TABLET, DELAYED RELEASE ORAL
Status: DISCONTINUED | OUTPATIENT
Start: 2020-01-01 | End: 2020-01-01 | Stop reason: HOSPADM

## 2020-01-01 RX ORDER — ARFORMOTEROL TARTRATE 15 UG/2ML
15 SOLUTION RESPIRATORY (INHALATION)
Status: DISCONTINUED | OUTPATIENT
Start: 2020-01-01 | End: 2020-01-01 | Stop reason: HOSPADM

## 2020-01-01 RX ORDER — PREDNISONE 10 MG/1
TABLET ORAL
Qty: 17 TAB | Refills: 0 | Status: SHIPPED | OUTPATIENT
Start: 2020-01-01 | End: 2020-01-01

## 2020-01-01 RX ORDER — ALBUTEROL SULFATE 0.83 MG/ML
2.5 SOLUTION RESPIRATORY (INHALATION)
Status: COMPLETED | OUTPATIENT
Start: 2020-01-01 | End: 2020-01-01

## 2020-01-01 RX ORDER — BUMETANIDE 1 MG/1
2 TABLET ORAL DAILY
Status: DISCONTINUED | OUTPATIENT
Start: 2020-01-01 | End: 2020-01-01

## 2020-01-01 RX ORDER — BUDESONIDE 0.5 MG/2ML
500 INHALANT ORAL
Status: DISCONTINUED | OUTPATIENT
Start: 2020-01-01 | End: 2020-01-01 | Stop reason: HOSPADM

## 2020-01-01 RX ORDER — DILTIAZEM HYDROCHLORIDE 60 MG/1
60 TABLET, FILM COATED ORAL EVERY 12 HOURS
Status: DISCONTINUED | OUTPATIENT
Start: 2020-01-01 | End: 2020-01-01

## 2020-01-01 RX ORDER — GUAIFENESIN 100 MG/5ML
81 LIQUID (ML) ORAL
COMMUNITY

## 2020-01-01 RX ORDER — LOSARTAN POTASSIUM 50 MG/1
100 TABLET ORAL EVERY 12 HOURS
Status: DISCONTINUED | OUTPATIENT
Start: 2020-01-01 | End: 2020-01-01 | Stop reason: DRUGHIGH

## 2020-01-01 RX ORDER — INSULIN GLARGINE 100 [IU]/ML
22 INJECTION, SOLUTION SUBCUTANEOUS DAILY
Status: DISCONTINUED | OUTPATIENT
Start: 2020-01-01 | End: 2020-01-01

## 2020-01-01 RX ORDER — SPIRONOLACTONE 25 MG/1
25 TABLET ORAL DAILY
Status: DISCONTINUED | OUTPATIENT
Start: 2020-01-01 | End: 2020-01-01

## 2020-01-01 RX ORDER — POTASSIUM CHLORIDE 1500 MG/1
20 TABLET, FILM COATED, EXTENDED RELEASE ORAL DAILY
Qty: 30 TAB | Refills: 11 | Status: SHIPPED | OUTPATIENT
Start: 2020-01-01 | End: 2020-01-01

## 2020-01-01 RX ORDER — HYDRALAZINE HYDROCHLORIDE 20 MG/ML
20 INJECTION INTRAMUSCULAR; INTRAVENOUS
Status: DISCONTINUED | OUTPATIENT
Start: 2020-01-01 | End: 2020-01-01 | Stop reason: HOSPADM

## 2020-01-01 RX ORDER — LEVOFLOXACIN 250 MG/1
250 TABLET ORAL
Status: COMPLETED | OUTPATIENT
Start: 2020-01-01 | End: 2020-01-01

## 2020-01-01 RX ORDER — INSULIN LISPRO 100 [IU]/ML
INJECTION, SOLUTION INTRAVENOUS; SUBCUTANEOUS
Status: DISCONTINUED | OUTPATIENT
Start: 2020-01-01 | End: 2020-01-01 | Stop reason: HOSPADM

## 2020-01-01 RX ORDER — ONDANSETRON 2 MG/ML
4 INJECTION INTRAMUSCULAR; INTRAVENOUS
Status: DISCONTINUED | OUTPATIENT
Start: 2020-01-01 | End: 2020-01-01 | Stop reason: HOSPADM

## 2020-01-01 RX ORDER — CARVEDILOL 6.25 MG/1
6.25 TABLET ORAL 2 TIMES DAILY WITH MEALS
Qty: 60 TAB | Refills: 11 | Status: SHIPPED | OUTPATIENT
Start: 2020-01-01

## 2020-01-01 RX ORDER — SODIUM CHLORIDE 0.9 % (FLUSH) 0.9 %
5-40 SYRINGE (ML) INJECTION EVERY 8 HOURS
Status: DISCONTINUED | OUTPATIENT
Start: 2020-01-01 | End: 2020-01-01 | Stop reason: HOSPADM

## 2020-01-01 RX ORDER — NITROGLYCERIN 0.4 MG/1
0.4 TABLET SUBLINGUAL
Status: DISCONTINUED | OUTPATIENT
Start: 2020-01-01 | End: 2020-01-01 | Stop reason: HOSPADM

## 2020-01-01 RX ORDER — BUMETANIDE 1 MG/1
1 TABLET ORAL DAILY
Status: DISCONTINUED | OUTPATIENT
Start: 2020-01-01 | End: 2020-01-01 | Stop reason: HOSPADM

## 2020-01-01 RX ORDER — NYSTATIN 100000 [USP'U]/G
POWDER TOPICAL
COMMUNITY
End: 2020-01-01

## 2020-01-01 RX ORDER — BUMETANIDE 0.25 MG/ML
2 INJECTION INTRAMUSCULAR; INTRAVENOUS EVERY 12 HOURS
Status: DISCONTINUED | OUTPATIENT
Start: 2020-01-01 | End: 2020-01-01

## 2020-01-01 RX ORDER — DILTIAZEM HYDROCHLORIDE 60 MG/1
60 TABLET, FILM COATED ORAL EVERY 8 HOURS
Status: DISCONTINUED | OUTPATIENT
Start: 2020-01-01 | End: 2020-01-01

## 2020-01-01 RX ORDER — CLOTRIMAZOLE AND BETAMETHASONE DIPROPIONATE 10; .64 MG/G; MG/G
CREAM TOPICAL
COMMUNITY
End: 2020-01-01

## 2020-01-01 RX ORDER — MUPIROCIN CALCIUM 20 MG/G
CREAM TOPICAL
COMMUNITY

## 2020-01-01 RX ORDER — ACETAMINOPHEN 325 MG/1
650 TABLET ORAL
Status: DISCONTINUED | OUTPATIENT
Start: 2020-01-01 | End: 2020-01-01 | Stop reason: HOSPADM

## 2020-01-01 RX ORDER — LORATADINE 10 MG/1
10 TABLET ORAL DAILY
Qty: 30 TAB | Refills: 1 | Status: SHIPPED | OUTPATIENT
Start: 2020-01-01

## 2020-01-01 RX ORDER — NYSTATIN 100000 U/G
CREAM TOPICAL 2 TIMES DAILY
Status: DISCONTINUED | OUTPATIENT
Start: 2020-01-01 | End: 2020-01-01 | Stop reason: HOSPADM

## 2020-01-01 RX ORDER — HEPARIN SODIUM 1000 [USP'U]/ML
INJECTION, SOLUTION INTRAVENOUS; SUBCUTANEOUS AS NEEDED
Status: DISCONTINUED | OUTPATIENT
Start: 2020-01-01 | End: 2020-01-01 | Stop reason: HOSPADM

## 2020-01-01 RX ORDER — INSULIN GLARGINE 100 [IU]/ML
10 INJECTION, SOLUTION SUBCUTANEOUS
Status: DISCONTINUED | OUTPATIENT
Start: 2020-01-01 | End: 2020-01-01

## 2020-01-01 RX ORDER — METOPROLOL TARTRATE 50 MG/1
100 TABLET ORAL 2 TIMES DAILY
Status: DISCONTINUED | OUTPATIENT
Start: 2020-01-01 | End: 2020-01-01

## 2020-01-01 RX ORDER — LIDOCAINE HYDROCHLORIDE 10 MG/ML
INJECTION INFILTRATION; PERINEURAL AS NEEDED
Status: DISCONTINUED | OUTPATIENT
Start: 2020-01-01 | End: 2020-01-01 | Stop reason: HOSPADM

## 2020-01-01 RX ORDER — NYSTATIN 100000 U/G
CREAM TOPICAL 2 TIMES DAILY
Qty: 30 G | Refills: 0 | Status: SHIPPED | OUTPATIENT
Start: 2020-01-01 | End: 2020-01-01

## 2020-01-01 RX ORDER — BUMETANIDE 1 MG/1
1 TABLET ORAL 2 TIMES DAILY
Qty: 60 TAB | Refills: 11 | Status: SHIPPED | OUTPATIENT
Start: 2020-01-01

## 2020-01-01 RX ORDER — POTASSIUM CHLORIDE 750 MG/1
30 TABLET, FILM COATED, EXTENDED RELEASE ORAL
Status: COMPLETED | OUTPATIENT
Start: 2020-01-01 | End: 2020-01-01

## 2020-01-01 RX ORDER — SODIUM CHLORIDE 9 MG/ML
500 INJECTION, SOLUTION INTRAVENOUS CONTINUOUS
Status: DISCONTINUED | OUTPATIENT
Start: 2020-01-01 | End: 2020-01-01

## 2020-01-01 RX ORDER — MIDAZOLAM HYDROCHLORIDE 1 MG/ML
INJECTION, SOLUTION INTRAMUSCULAR; INTRAVENOUS AS NEEDED
Status: DISCONTINUED | OUTPATIENT
Start: 2020-01-01 | End: 2020-01-01 | Stop reason: HOSPADM

## 2020-01-01 RX ORDER — BUMETANIDE 2 MG/1
2 TABLET ORAL DAILY
Qty: 30 TAB | Refills: 3 | Status: SHIPPED | OUTPATIENT
Start: 2020-01-01 | End: 2020-01-01

## 2020-01-01 RX ORDER — LEVOFLOXACIN 250 MG/1
250 TABLET ORAL DAILY
Qty: 5 TAB | Refills: 0 | Status: SHIPPED | OUTPATIENT
Start: 2020-01-01 | End: 2020-01-01

## 2020-01-01 RX ORDER — LEVOFLOXACIN 5 MG/ML
250 INJECTION, SOLUTION INTRAVENOUS EVERY 24 HOURS
Status: DISCONTINUED | OUTPATIENT
Start: 2020-01-01 | End: 2020-01-01 | Stop reason: HOSPADM

## 2020-01-01 RX ORDER — INSULIN LISPRO 100 [IU]/ML
6 INJECTION, SOLUTION INTRAVENOUS; SUBCUTANEOUS
Status: DISCONTINUED | OUTPATIENT
Start: 2020-01-01 | End: 2020-01-01 | Stop reason: HOSPADM

## 2020-01-01 RX ORDER — PREDNISONE 20 MG/1
20 TABLET ORAL
Status: COMPLETED | OUTPATIENT
Start: 2020-01-01 | End: 2020-01-01

## 2020-01-01 RX ORDER — INSULIN GLARGINE 100 [IU]/ML
20 INJECTION, SOLUTION SUBCUTANEOUS DAILY
Status: DISCONTINUED | OUTPATIENT
Start: 2020-01-01 | End: 2020-01-01 | Stop reason: HOSPADM

## 2020-01-01 RX ORDER — METOPROLOL SUCCINATE 50 MG/1
75 TABLET, EXTENDED RELEASE ORAL
Qty: 135 TAB | Refills: 3 | Status: SHIPPED | OUTPATIENT
Start: 2020-01-01 | End: 2020-01-01

## 2020-01-01 RX ORDER — POTASSIUM CHLORIDE 750 MG/1
10 TABLET, FILM COATED, EXTENDED RELEASE ORAL DAILY
Status: DISCONTINUED | OUTPATIENT
Start: 2020-01-01 | End: 2020-01-01 | Stop reason: HOSPADM

## 2020-01-01 RX ORDER — ALLOPURINOL 100 MG/1
100 TABLET ORAL DAILY
Status: DISCONTINUED | OUTPATIENT
Start: 2020-01-01 | End: 2020-01-01 | Stop reason: HOSPADM

## 2020-01-01 RX ORDER — HYDRALAZINE HYDROCHLORIDE 10 MG/1
10 TABLET, FILM COATED ORAL 3 TIMES DAILY
Status: DISCONTINUED | OUTPATIENT
Start: 2020-01-01 | End: 2020-01-01 | Stop reason: HOSPADM

## 2020-01-01 RX ORDER — BUMETANIDE 0.25 MG/ML
1 INJECTION INTRAMUSCULAR; INTRAVENOUS
Status: COMPLETED | OUTPATIENT
Start: 2020-01-01 | End: 2020-01-01

## 2020-01-01 RX ORDER — INSULIN LISPRO 100 [IU]/ML
10 INJECTION, SOLUTION INTRAVENOUS; SUBCUTANEOUS ONCE
Status: COMPLETED | OUTPATIENT
Start: 2020-01-01 | End: 2020-01-01

## 2020-01-01 RX ORDER — GUAIFENESIN 100 MG/5ML
81 LIQUID (ML) ORAL
Status: DISCONTINUED | OUTPATIENT
Start: 2020-01-01 | End: 2020-01-01

## 2020-01-01 RX ORDER — PROMETHAZINE HYDROCHLORIDE AND DEXTROMETHORPHAN HYDROBROMIDE 6.25; 15 MG/5ML; MG/5ML
5 SYRUP ORAL
Status: DISCONTINUED | OUTPATIENT
Start: 2020-01-01 | End: 2020-01-01 | Stop reason: HOSPADM

## 2020-01-01 RX ORDER — LOSARTAN POTASSIUM 100 MG/1
100 TABLET ORAL DAILY
Qty: 90 TAB | Refills: 3 | Status: SHIPPED | OUTPATIENT
Start: 2020-01-01 | End: 2020-01-01

## 2020-01-01 RX ORDER — LOSARTAN POTASSIUM 50 MG/1
25 TABLET ORAL DAILY
Status: DISCONTINUED | OUTPATIENT
Start: 2020-01-01 | End: 2020-01-01

## 2020-01-01 RX ORDER — HYDRALAZINE HYDROCHLORIDE 10 MG/1
10 TABLET, FILM COATED ORAL 3 TIMES DAILY
Qty: 90 TAB | Refills: 11 | Status: SHIPPED | OUTPATIENT
Start: 2020-01-01

## 2020-01-01 RX ORDER — SODIUM CHLORIDE 0.9 % (FLUSH) 0.9 %
5-40 SYRINGE (ML) INJECTION AS NEEDED
Status: DISCONTINUED | OUTPATIENT
Start: 2020-01-01 | End: 2020-01-01 | Stop reason: HOSPADM

## 2020-01-01 RX ORDER — POTASSIUM CHLORIDE 750 MG/1
20 TABLET, FILM COATED, EXTENDED RELEASE ORAL DAILY
Status: DISCONTINUED | OUTPATIENT
Start: 2020-01-01 | End: 2020-01-01 | Stop reason: HOSPADM

## 2020-01-01 RX ORDER — SODIUM CHLORIDE 9 MG/ML
50 INJECTION, SOLUTION INTRAVENOUS CONTINUOUS
Status: DISCONTINUED | OUTPATIENT
Start: 2020-01-01 | End: 2020-01-01

## 2020-01-01 RX ORDER — HYDRALAZINE HYDROCHLORIDE 20 MG/ML
20 INJECTION INTRAMUSCULAR; INTRAVENOUS
Status: DISCONTINUED | OUTPATIENT
Start: 2020-01-01 | End: 2020-01-01

## 2020-01-01 RX ORDER — BUMETANIDE 1 MG/1
1 TABLET ORAL 2 TIMES DAILY
Status: COMPLETED | OUTPATIENT
Start: 2020-01-01 | End: 2020-01-01

## 2020-01-01 RX ORDER — LOSARTAN POTASSIUM 50 MG/1
100 TABLET ORAL DAILY
Status: DISCONTINUED | OUTPATIENT
Start: 2020-01-01 | End: 2020-01-01 | Stop reason: HOSPADM

## 2020-01-01 RX ORDER — PANTOPRAZOLE SODIUM 20 MG/1
20 TABLET, DELAYED RELEASE ORAL
Status: DISCONTINUED | OUTPATIENT
Start: 2020-01-01 | End: 2020-01-01 | Stop reason: HOSPADM

## 2020-01-01 RX ORDER — METOPROLOL SUCCINATE 50 MG/1
50 TABLET, EXTENDED RELEASE ORAL
Status: DISCONTINUED | OUTPATIENT
Start: 2020-01-01 | End: 2020-01-01 | Stop reason: HOSPADM

## 2020-01-01 RX ORDER — GUAIFENESIN 100 MG/5ML
81 LIQUID (ML) ORAL DAILY
Status: DISCONTINUED | OUTPATIENT
Start: 2020-01-01 | End: 2020-01-01 | Stop reason: HOSPADM

## 2020-01-01 RX ORDER — BUMETANIDE 0.25 MG/ML
0.5 INJECTION INTRAMUSCULAR; INTRAVENOUS ONCE
Status: COMPLETED | OUTPATIENT
Start: 2020-01-01 | End: 2020-01-01

## 2020-01-01 RX ORDER — ACETAMINOPHEN 325 MG/1
650 TABLET ORAL
Qty: 20 TAB | Refills: 1 | Status: SHIPPED
Start: 2020-01-01

## 2020-01-01 RX ORDER — OMEPRAZOLE 20 MG/1
CAPSULE, DELAYED RELEASE ORAL
Qty: 90 CAP | Refills: 2 | OUTPATIENT
Start: 2020-01-01

## 2020-01-01 RX ORDER — NYSTATIN 100000 U/G
CREAM TOPICAL 2 TIMES DAILY
Qty: 15 G | Refills: 0 | Status: SHIPPED | OUTPATIENT
Start: 2020-01-01

## 2020-01-01 RX ORDER — BUMETANIDE 1 MG/1
2 TABLET ORAL DAILY
Status: DISCONTINUED | OUTPATIENT
Start: 2020-01-01 | End: 2020-01-01 | Stop reason: HOSPADM

## 2020-01-01 RX ORDER — ACETAMINOPHEN 650 MG/1
650 SUPPOSITORY RECTAL
Status: DISCONTINUED | OUTPATIENT
Start: 2020-01-01 | End: 2020-01-01 | Stop reason: HOSPADM

## 2020-01-01 RX ORDER — METOPROLOL TARTRATE 100 MG/1
TABLET ORAL
Qty: 180 TAB | Refills: 2 | OUTPATIENT
Start: 2020-01-01

## 2020-01-01 RX ORDER — DEXTROSE MONOHYDRATE 100 MG/ML
0-250 INJECTION, SOLUTION INTRAVENOUS AS NEEDED
Status: DISCONTINUED | OUTPATIENT
Start: 2020-01-01 | End: 2020-01-01 | Stop reason: HOSPADM

## 2020-01-01 RX ORDER — ISOSORBIDE MONONITRATE 60 MG/1
60 TABLET, EXTENDED RELEASE ORAL DAILY
Status: DISCONTINUED | OUTPATIENT
Start: 2020-01-01 | End: 2020-01-01 | Stop reason: HOSPADM

## 2020-01-01 RX ORDER — SODIUM CHLORIDE, SODIUM LACTATE, POTASSIUM CHLORIDE, CALCIUM CHLORIDE 600; 310; 30; 20 MG/100ML; MG/100ML; MG/100ML; MG/100ML
INJECTION, SOLUTION INTRAVENOUS
Status: DISCONTINUED | OUTPATIENT
Start: 2020-01-01 | End: 2020-01-01 | Stop reason: HOSPADM

## 2020-01-01 RX ORDER — AZELASTINE 1 MG/ML
1 SPRAY, METERED NASAL
Status: DISCONTINUED | OUTPATIENT
Start: 2020-01-01 | End: 2020-01-01 | Stop reason: HOSPADM

## 2020-01-01 RX ORDER — LORAZEPAM 0.5 MG/1
0.5 TABLET ORAL
Status: COMPLETED | OUTPATIENT
Start: 2020-01-01 | End: 2020-01-01

## 2020-01-01 RX ORDER — BUMETANIDE 1 MG/1
1 TABLET ORAL DAILY
Qty: 90 TAB | Refills: 3 | Status: SHIPPED | OUTPATIENT
Start: 2020-01-01 | End: 2020-01-01 | Stop reason: DRUGHIGH

## 2020-01-01 RX ORDER — PREDNISONE 10 MG/1
40 TABLET ORAL
Status: DISCONTINUED | OUTPATIENT
Start: 2020-01-01 | End: 2020-01-01

## 2020-01-01 RX ORDER — MELATONIN
2000 DAILY
Status: DISCONTINUED | OUTPATIENT
Start: 2020-01-01 | End: 2020-01-01 | Stop reason: HOSPADM

## 2020-01-01 RX ORDER — LORAZEPAM 0.5 MG/1
0.5 TABLET ORAL
Status: ACTIVE | OUTPATIENT
Start: 2020-01-01 | End: 2020-01-01

## 2020-01-01 RX ORDER — LOSARTAN POTASSIUM 50 MG/1
50 TABLET ORAL DAILY
Status: DISCONTINUED | OUTPATIENT
Start: 2020-01-01 | End: 2020-01-01

## 2020-01-01 RX ORDER — INSULIN GLARGINE 100 [IU]/ML
5 INJECTION, SOLUTION SUBCUTANEOUS DAILY
Status: DISCONTINUED | OUTPATIENT
Start: 2020-01-01 | End: 2020-01-01

## 2020-01-01 RX ORDER — ISOSORBIDE MONONITRATE 60 MG/1
60 TABLET, EXTENDED RELEASE ORAL 2 TIMES DAILY
Status: DISCONTINUED | OUTPATIENT
Start: 2020-01-01 | End: 2020-01-01 | Stop reason: HOSPADM

## 2020-01-01 RX ORDER — SODIUM CHLORIDE 0.9 % (FLUSH) 0.9 %
10 SYRINGE (ML) INJECTION
Status: COMPLETED | OUTPATIENT
Start: 2020-01-01 | End: 2020-01-01

## 2020-01-01 RX ORDER — ISOSORBIDE MONONITRATE 60 MG/1
TABLET, EXTENDED RELEASE ORAL
Qty: 180 TAB | Refills: 1 | Status: SHIPPED | OUTPATIENT
Start: 2020-01-01

## 2020-01-01 RX ORDER — BUMETANIDE 1 MG/1
1 TABLET ORAL DAILY
COMMUNITY
End: 2020-01-01

## 2020-01-01 RX ORDER — AMLODIPINE BESYLATE 10 MG/1
10 TABLET ORAL DAILY
Qty: 30 TAB | Refills: 11 | Status: SHIPPED | OUTPATIENT
Start: 2020-01-01 | End: 2020-01-01

## 2020-01-01 RX ORDER — AMLODIPINE BESYLATE 5 MG/1
10 TABLET ORAL DAILY
Status: DISCONTINUED | OUTPATIENT
Start: 2020-01-01 | End: 2020-01-01 | Stop reason: HOSPADM

## 2020-01-01 RX ORDER — ALBUTEROL SULFATE 0.83 MG/ML
2.5 SOLUTION RESPIRATORY (INHALATION)
Status: DISCONTINUED | OUTPATIENT
Start: 2020-01-01 | End: 2020-01-01 | Stop reason: HOSPADM

## 2020-01-01 RX ORDER — SODIUM CHLORIDE 0.9 % (FLUSH) 0.9 %
20 SYRINGE (ML) INJECTION
Status: COMPLETED | OUTPATIENT
Start: 2020-01-01 | End: 2020-01-01

## 2020-01-01 RX ORDER — METOPROLOL SUCCINATE 50 MG/1
50 TABLET, EXTENDED RELEASE ORAL
COMMUNITY
End: 2020-01-01

## 2020-01-01 RX ORDER — LISINOPRIL 10 MG/1
20 TABLET ORAL 2 TIMES DAILY
Status: DISCONTINUED | OUTPATIENT
Start: 2020-01-01 | End: 2020-01-01

## 2020-01-01 RX ORDER — ATORVASTATIN CALCIUM 20 MG/1
20 TABLET, FILM COATED ORAL
Status: DISCONTINUED | OUTPATIENT
Start: 2020-01-01 | End: 2020-01-01 | Stop reason: HOSPADM

## 2020-01-01 RX ORDER — PREDNISONE 20 MG/1
40 TABLET ORAL
Status: DISCONTINUED | OUTPATIENT
Start: 2020-01-01 | End: 2020-01-01 | Stop reason: HOSPADM

## 2020-01-01 RX ORDER — HEPARIN SODIUM 200 [USP'U]/100ML
INJECTION, SOLUTION INTRAVENOUS
Status: COMPLETED | OUTPATIENT
Start: 2020-01-01 | End: 2020-01-01

## 2020-01-01 RX ORDER — ALBUTEROL SULFATE 90 UG/1
2 AEROSOL, METERED RESPIRATORY (INHALATION)
Qty: 1 INHALER | Refills: 1 | Status: SHIPPED | OUTPATIENT
Start: 2020-01-01

## 2020-01-01 RX ORDER — AMLODIPINE BESYLATE 5 MG/1
5 TABLET ORAL DAILY
Status: DISCONTINUED | OUTPATIENT
Start: 2020-01-01 | End: 2020-01-01 | Stop reason: HOSPADM

## 2020-01-01 RX ORDER — ACYCLOVIR 50 MG/G
OINTMENT TOPICAL
COMMUNITY
End: 2020-01-01 | Stop reason: SDUPTHER

## 2020-01-01 RX ORDER — LOSARTAN POTASSIUM 100 MG/1
100 TABLET ORAL DAILY
Qty: 30 TAB | Refills: 11 | Status: SHIPPED | OUTPATIENT
Start: 2020-01-01 | End: 2020-01-01 | Stop reason: SDUPTHER

## 2020-01-01 RX ORDER — CARVEDILOL 3.12 MG/1
6.25 TABLET ORAL 2 TIMES DAILY WITH MEALS
Status: DISCONTINUED | OUTPATIENT
Start: 2020-01-01 | End: 2020-01-01 | Stop reason: HOSPADM

## 2020-01-01 RX ORDER — MAGNESIUM SULFATE 100 %
4 CRYSTALS MISCELLANEOUS AS NEEDED
Status: DISCONTINUED | OUTPATIENT
Start: 2020-01-01 | End: 2020-01-01 | Stop reason: HOSPADM

## 2020-01-01 RX ORDER — METOPROLOL SUCCINATE 50 MG/1
50 TABLET, EXTENDED RELEASE ORAL
Qty: 30 TAB | Refills: 11 | Status: SHIPPED | OUTPATIENT
Start: 2020-01-01 | End: 2020-01-01

## 2020-01-01 RX ORDER — LEVOFLOXACIN 250 MG/1
250 TABLET ORAL DAILY
Qty: 9 TAB | Refills: 0 | Status: SHIPPED | OUTPATIENT
Start: 2020-01-01

## 2020-01-01 RX ORDER — ACETYLCYSTEINE 200 MG/ML
600 SOLUTION ORAL; RESPIRATORY (INHALATION)
Status: DISCONTINUED | OUTPATIENT
Start: 2020-01-01 | End: 2020-01-01 | Stop reason: HOSPADM

## 2020-01-01 RX ORDER — ENALAPRIL MALEATE 20 MG/1
TABLET ORAL
Qty: 180 TAB | Refills: 3 | OUTPATIENT
Start: 2020-01-01

## 2020-01-01 RX ORDER — BUMETANIDE 1 MG/1
0.5 TABLET ORAL DAILY
Status: DISCONTINUED | OUTPATIENT
Start: 2020-01-01 | End: 2020-01-01

## 2020-01-01 RX ORDER — DILTIAZEM HYDROCHLORIDE 30 MG/1
30 TABLET, FILM COATED ORAL EVERY 24 HOURS
Status: DISCONTINUED | OUTPATIENT
Start: 2020-01-01 | End: 2020-01-01

## 2020-01-01 RX ORDER — ACETYLCYSTEINE 200 MG/ML
600 SOLUTION ORAL; RESPIRATORY (INHALATION) 2 TIMES DAILY
Status: DISCONTINUED | OUTPATIENT
Start: 2020-01-01 | End: 2020-01-01

## 2020-01-01 RX ORDER — GLIPIZIDE 5 MG/1
10 TABLET ORAL 2 TIMES DAILY
Status: DISCONTINUED | OUTPATIENT
Start: 2020-01-01 | End: 2020-01-01 | Stop reason: HOSPADM

## 2020-01-01 RX ORDER — LORATADINE 10 MG/1
10 TABLET ORAL DAILY
Status: DISCONTINUED | OUTPATIENT
Start: 2020-01-01 | End: 2020-01-01 | Stop reason: HOSPADM

## 2020-01-01 RX ORDER — INSULIN GLARGINE 100 [IU]/ML
15 INJECTION, SOLUTION SUBCUTANEOUS DAILY
Status: DISCONTINUED | OUTPATIENT
Start: 2020-01-01 | End: 2020-01-01

## 2020-01-01 RX ORDER — SODIUM CHLORIDE 9 MG/ML
75 INJECTION, SOLUTION INTRAVENOUS CONTINUOUS
Status: DISPENSED | OUTPATIENT
Start: 2020-01-01 | End: 2020-01-01

## 2020-01-01 RX ORDER — PROPOFOL 10 MG/ML
INJECTION, EMULSION INTRAVENOUS
Status: DISCONTINUED | OUTPATIENT
Start: 2020-01-01 | End: 2020-01-01 | Stop reason: HOSPADM

## 2020-01-01 RX ORDER — INSULIN GLARGINE 100 [IU]/ML
18 INJECTION, SOLUTION SUBCUTANEOUS DAILY
Status: DISCONTINUED | OUTPATIENT
Start: 2020-01-01 | End: 2020-01-01

## 2020-01-01 RX ORDER — GUAIFENESIN 100 MG/5ML
81 LIQUID (ML) ORAL
Status: DISCONTINUED | OUTPATIENT
Start: 2020-01-01 | End: 2020-01-01 | Stop reason: HOSPADM

## 2020-01-01 RX ORDER — NALOXONE HYDROCHLORIDE 0.4 MG/ML
0.4 INJECTION, SOLUTION INTRAMUSCULAR; INTRAVENOUS; SUBCUTANEOUS AS NEEDED
Status: DISCONTINUED | OUTPATIENT
Start: 2020-01-01 | End: 2020-01-01 | Stop reason: HOSPADM

## 2020-01-01 RX ORDER — INSULIN LISPRO 100 [IU]/ML
INJECTION, SOLUTION INTRAVENOUS; SUBCUTANEOUS EVERY 6 HOURS
Status: DISCONTINUED | OUTPATIENT
Start: 2020-01-01 | End: 2020-01-01

## 2020-01-01 RX ORDER — IPRATROPIUM BROMIDE AND ALBUTEROL SULFATE 2.5; .5 MG/3ML; MG/3ML
3 SOLUTION RESPIRATORY (INHALATION)
Status: DISCONTINUED | OUTPATIENT
Start: 2020-01-01 | End: 2020-01-01

## 2020-01-01 RX ORDER — GUAIFENESIN 100 MG/5ML
325 LIQUID (ML) ORAL
Status: COMPLETED | OUTPATIENT
Start: 2020-01-01 | End: 2020-01-01

## 2020-01-01 RX ORDER — BUDESONIDE 0.5 MG/2ML
500 INHALANT ORAL
Status: DISCONTINUED | OUTPATIENT
Start: 2020-01-01 | End: 2020-01-01

## 2020-01-01 RX ORDER — FENTANYL CITRATE 50 UG/ML
INJECTION, SOLUTION INTRAMUSCULAR; INTRAVENOUS AS NEEDED
Status: DISCONTINUED | OUTPATIENT
Start: 2020-01-01 | End: 2020-01-01 | Stop reason: HOSPADM

## 2020-01-01 RX ORDER — POTASSIUM CHLORIDE 750 MG/1
10 TABLET, EXTENDED RELEASE ORAL DAILY
Qty: 30 TAB | Refills: 11 | Status: SHIPPED | OUTPATIENT
Start: 2020-01-01

## 2020-01-01 RX ORDER — AZELASTINE HCL 205.5 UG/1
1 SPRAY NASAL
COMMUNITY

## 2020-01-01 RX ORDER — ACETAMINOPHEN 325 MG/1
650 TABLET ORAL
Qty: 25 TAB | Refills: 0 | Status: CANCELLED
Start: 2020-01-01

## 2020-01-01 RX ORDER — PROPOFOL 10 MG/ML
INJECTION, EMULSION INTRAVENOUS AS NEEDED
Status: DISCONTINUED | OUTPATIENT
Start: 2020-01-01 | End: 2020-01-01 | Stop reason: HOSPADM

## 2020-01-01 RX ORDER — GLIPIZIDE 5 MG/1
10 TABLET ORAL
Status: DISCONTINUED | OUTPATIENT
Start: 2020-01-01 | End: 2020-01-01

## 2020-01-01 RX ORDER — ACYCLOVIR 50 MG/G
OINTMENT TOPICAL DAILY PRN
Status: DISCONTINUED | OUTPATIENT
Start: 2020-01-01 | End: 2020-01-01

## 2020-01-01 RX ORDER — DILTIAZEM HYDROCHLORIDE 30 MG/1
30 TABLET, FILM COATED ORAL DAILY
Status: DISCONTINUED | OUTPATIENT
Start: 2020-01-01 | End: 2020-01-01 | Stop reason: ALTCHOICE

## 2020-01-01 RX ORDER — LISINOPRIL 20 MG/1
20 TABLET ORAL 2 TIMES DAILY
Status: DISCONTINUED | OUTPATIENT
Start: 2020-01-01 | End: 2020-01-01 | Stop reason: HOSPADM

## 2020-01-01 RX ORDER — ATORVASTATIN CALCIUM 20 MG/1
20 TABLET, FILM COATED ORAL
COMMUNITY

## 2020-01-01 RX ORDER — PANTOPRAZOLE SODIUM 20 MG/1
20 TABLET, DELAYED RELEASE ORAL
Status: DISCONTINUED | OUTPATIENT
Start: 2020-01-01 | End: 2020-01-01

## 2020-01-01 RX ORDER — BUMETANIDE 0.25 MG/ML
2 INJECTION INTRAMUSCULAR; INTRAVENOUS DAILY
Status: DISCONTINUED | OUTPATIENT
Start: 2020-01-01 | End: 2020-01-01

## 2020-01-01 RX ORDER — ATORVASTATIN CALCIUM 20 MG/1
80 TABLET, FILM COATED ORAL
Status: CANCELLED | OUTPATIENT
Start: 2020-01-01

## 2020-01-01 RX ADMIN — LEVOFLOXACIN 250 MG: 5 INJECTION, SOLUTION INTRAVENOUS at 21:03

## 2020-01-01 RX ADMIN — BUMETANIDE 2 MG: 1 TABLET ORAL at 09:01

## 2020-01-01 RX ADMIN — ALLOPURINOL 100 MG: 100 TABLET ORAL at 09:01

## 2020-01-01 RX ADMIN — BUDESONIDE 500 MCG: 0.5 INHALANT RESPIRATORY (INHALATION) at 20:13

## 2020-01-01 RX ADMIN — MELATONIN 2 TABLET: at 09:22

## 2020-01-01 RX ADMIN — Medication 10 ML: at 09:16

## 2020-01-01 RX ADMIN — ARFORMOTEROL TARTRATE 15 MCG: 15 SOLUTION RESPIRATORY (INHALATION) at 20:25

## 2020-01-01 RX ADMIN — BUDESONIDE 500 MCG: 0.5 INHALANT RESPIRATORY (INHALATION) at 19:18

## 2020-01-01 RX ADMIN — BUMETANIDE 2 MG: 0.25 INJECTION INTRAMUSCULAR; INTRAVENOUS at 09:22

## 2020-01-01 RX ADMIN — BUMETANIDE 2 MG: 0.25 INJECTION INTRAMUSCULAR; INTRAVENOUS at 14:07

## 2020-01-01 RX ADMIN — ISOSORBIDE MONONITRATE 60 MG: 60 TABLET, EXTENDED RELEASE ORAL at 17:55

## 2020-01-01 RX ADMIN — BUDESONIDE 500 MCG: 0.5 INHALANT RESPIRATORY (INHALATION) at 08:48

## 2020-01-01 RX ADMIN — ATORVASTATIN CALCIUM 20 MG: 20 TABLET, FILM COATED ORAL at 21:37

## 2020-01-01 RX ADMIN — LISINOPRIL 20 MG: 20 TABLET ORAL at 09:03

## 2020-01-01 RX ADMIN — POTASSIUM CHLORIDE 20 MEQ: 750 TABLET, FILM COATED, EXTENDED RELEASE ORAL at 09:22

## 2020-01-01 RX ADMIN — LEVOFLOXACIN 250 MG: 250 TABLET, FILM COATED ORAL at 14:03

## 2020-01-01 RX ADMIN — INSULIN GLARGINE 20 UNITS: 100 INJECTION, SOLUTION SUBCUTANEOUS at 08:33

## 2020-01-01 RX ADMIN — PROPOFOL 75 MCG/KG/MIN: 10 INJECTION, EMULSION INTRAVENOUS at 16:54

## 2020-01-01 RX ADMIN — ASPIRIN 81 MG 81 MG: 81 TABLET ORAL at 22:04

## 2020-01-01 RX ADMIN — INSULIN LISPRO 9 UNITS: 100 INJECTION, SOLUTION INTRAVENOUS; SUBCUTANEOUS at 07:48

## 2020-01-01 RX ADMIN — INSULIN LISPRO 3 UNITS: 100 INJECTION, SOLUTION INTRAVENOUS; SUBCUTANEOUS at 12:34

## 2020-01-01 RX ADMIN — MELATONIN 2 TABLET: at 08:21

## 2020-01-01 RX ADMIN — Medication 10 ML: at 06:26

## 2020-01-01 RX ADMIN — BUMETANIDE 1 MG: 1 TABLET ORAL at 09:06

## 2020-01-01 RX ADMIN — PREDNISONE 40 MG: 10 TABLET ORAL at 08:44

## 2020-01-01 RX ADMIN — AZELASTINE HYDROCHLORIDE 1 SPRAY: 137 SPRAY, METERED NASAL at 21:40

## 2020-01-01 RX ADMIN — ARFORMOTEROL TARTRATE 15 MCG: 15 SOLUTION RESPIRATORY (INHALATION) at 07:36

## 2020-01-01 RX ADMIN — LISINOPRIL 20 MG: 10 TABLET ORAL at 17:22

## 2020-01-01 RX ADMIN — GLIPIZIDE 10 MG: 5 TABLET ORAL at 07:15

## 2020-01-01 RX ADMIN — METHYLPREDNISOLONE SODIUM SUCCINATE 40 MG: 40 INJECTION, POWDER, FOR SOLUTION INTRAMUSCULAR; INTRAVENOUS at 22:21

## 2020-01-01 RX ADMIN — APIXABAN 2.5 MG: 2.5 TABLET, FILM COATED ORAL at 17:03

## 2020-01-01 RX ADMIN — POTASSIUM CHLORIDE 20 MEQ: 750 TABLET, FILM COATED, EXTENDED RELEASE ORAL at 08:31

## 2020-01-01 RX ADMIN — BUDESONIDE INHALATION 500 MCG: 0.5 SUSPENSION RESPIRATORY (INHALATION) at 19:41

## 2020-01-01 RX ADMIN — BUMETANIDE 1 MG: 1 TABLET ORAL at 09:54

## 2020-01-01 RX ADMIN — LISINOPRIL 20 MG: 10 TABLET ORAL at 17:11

## 2020-01-01 RX ADMIN — GLIPIZIDE 10 MG: 5 TABLET ORAL at 08:55

## 2020-01-01 RX ADMIN — APIXABAN 2.5 MG: 2.5 TABLET, FILM COATED ORAL at 21:03

## 2020-01-01 RX ADMIN — ISOSORBIDE MONONITRATE 60 MG: 60 TABLET, EXTENDED RELEASE ORAL at 08:03

## 2020-01-01 RX ADMIN — ALLOPURINOL 100 MG: 100 TABLET ORAL at 08:23

## 2020-01-01 RX ADMIN — INSULIN LISPRO 10 UNITS: 100 INJECTION, SOLUTION INTRAVENOUS; SUBCUTANEOUS at 12:02

## 2020-01-01 RX ADMIN — BUDESONIDE 500 MCG: 0.5 INHALANT RESPIRATORY (INHALATION) at 21:52

## 2020-01-01 RX ADMIN — INSULIN LISPRO 6 UNITS: 100 INJECTION, SOLUTION INTRAVENOUS; SUBCUTANEOUS at 13:08

## 2020-01-01 RX ADMIN — METOPROLOL SUCCINATE 50 MG: 50 TABLET, EXTENDED RELEASE ORAL at 21:24

## 2020-01-01 RX ADMIN — HUMAN INSULIN 25 UNITS: 100 INJECTION, SUSPENSION SUBCUTANEOUS at 17:26

## 2020-01-01 RX ADMIN — APIXABAN 2.5 MG: 2.5 TABLET, FILM COATED ORAL at 08:23

## 2020-01-01 RX ADMIN — ISOSORBIDE MONONITRATE 60 MG: 60 TABLET, EXTENDED RELEASE ORAL at 08:31

## 2020-01-01 RX ADMIN — POTASSIUM CHLORIDE 10 MEQ: 750 TABLET, FILM COATED, EXTENDED RELEASE ORAL at 09:09

## 2020-01-01 RX ADMIN — GLIPIZIDE 10 MG: 5 TABLET ORAL at 17:10

## 2020-01-01 RX ADMIN — APIXABAN 2.5 MG: 2.5 TABLET, FILM COATED ORAL at 09:06

## 2020-01-01 RX ADMIN — LISINOPRIL 20 MG: 10 TABLET ORAL at 08:22

## 2020-01-01 RX ADMIN — BUDESONIDE INHALATION 500 MCG: 0.5 SUSPENSION RESPIRATORY (INHALATION) at 19:57

## 2020-01-01 RX ADMIN — CARVEDILOL 6.25 MG: 3.12 TABLET, FILM COATED ORAL at 09:09

## 2020-01-01 RX ADMIN — AZELASTINE HYDROCHLORIDE 1 SPRAY: 137 SPRAY, METERED NASAL at 22:19

## 2020-01-01 RX ADMIN — ATORVASTATIN CALCIUM 20 MG: 20 TABLET, FILM COATED ORAL at 21:36

## 2020-01-01 RX ADMIN — BUDESONIDE INHALATION 500 MCG: 0.5 SUSPENSION RESPIRATORY (INHALATION) at 08:11

## 2020-01-01 RX ADMIN — ARFORMOTEROL TARTRATE 15 MCG: 15 SOLUTION RESPIRATORY (INHALATION) at 08:11

## 2020-01-01 RX ADMIN — CARVEDILOL 6.25 MG: 3.12 TABLET, FILM COATED ORAL at 18:00

## 2020-01-01 RX ADMIN — INSULIN LISPRO 7 UNITS: 100 INJECTION, SOLUTION INTRAVENOUS; SUBCUTANEOUS at 12:06

## 2020-01-01 RX ADMIN — MELATONIN 2 TABLET: at 09:00

## 2020-01-01 RX ADMIN — PANTOPRAZOLE SODIUM 40 MG: 40 TABLET, DELAYED RELEASE ORAL at 06:57

## 2020-01-01 RX ADMIN — GLIPIZIDE 10 MG: 5 TABLET ORAL at 07:43

## 2020-01-01 RX ADMIN — INSULIN LISPRO 10 UNITS: 100 INJECTION, SOLUTION INTRAVENOUS; SUBCUTANEOUS at 12:11

## 2020-01-01 RX ADMIN — PANTOPRAZOLE SODIUM 40 MG: 40 TABLET, DELAYED RELEASE ORAL at 07:30

## 2020-01-01 RX ADMIN — BUDESONIDE 500 MCG: 0.5 INHALANT RESPIRATORY (INHALATION) at 07:36

## 2020-01-01 RX ADMIN — ISOSORBIDE MONONITRATE 60 MG: 30 TABLET ORAL at 17:25

## 2020-01-01 RX ADMIN — PANTOPRAZOLE SODIUM 20 MG: 20 TABLET, DELAYED RELEASE ORAL at 06:30

## 2020-01-01 RX ADMIN — ARFORMOTEROL TARTRATE 15 MCG: 15 SOLUTION RESPIRATORY (INHALATION) at 21:52

## 2020-01-01 RX ADMIN — LORATADINE 10 MG: 10 TABLET ORAL at 09:37

## 2020-01-01 RX ADMIN — SODIUM CHLORIDE 100 ML: 900 INJECTION, SOLUTION INTRAVENOUS at 15:44

## 2020-01-01 RX ADMIN — PANTOPRAZOLE SODIUM 40 MG: 40 TABLET, DELAYED RELEASE ORAL at 08:44

## 2020-01-01 RX ADMIN — ARFORMOTEROL TARTRATE 15 MCG: 15 SOLUTION RESPIRATORY (INHALATION) at 19:56

## 2020-01-01 RX ADMIN — MELATONIN 2 TABLET: at 08:31

## 2020-01-01 RX ADMIN — METOPROLOL TARTRATE 100 MG: 50 TABLET, FILM COATED ORAL at 21:03

## 2020-01-01 RX ADMIN — LISINOPRIL 20 MG: 20 TABLET ORAL at 18:29

## 2020-01-01 RX ADMIN — INSULIN LISPRO 2 UNITS: 100 INJECTION, SOLUTION INTRAVENOUS; SUBCUTANEOUS at 13:16

## 2020-01-01 RX ADMIN — HUMAN INSULIN 25 UNITS: 100 INJECTION, SUSPENSION SUBCUTANEOUS at 09:00

## 2020-01-01 RX ADMIN — PANTOPRAZOLE SODIUM 40 MG: 40 TABLET, DELAYED RELEASE ORAL at 07:54

## 2020-01-01 RX ADMIN — BUMETANIDE 2 MG: 0.25 INJECTION INTRAMUSCULAR; INTRAVENOUS at 12:24

## 2020-01-01 RX ADMIN — APIXABAN 2.5 MG: 2.5 TABLET, FILM COATED ORAL at 22:04

## 2020-01-01 RX ADMIN — LISINOPRIL 20 MG: 10 TABLET ORAL at 17:20

## 2020-01-01 RX ADMIN — BUMETANIDE 2 MG: 0.25 INJECTION INTRAMUSCULAR; INTRAVENOUS at 08:04

## 2020-01-01 RX ADMIN — PANTOPRAZOLE SODIUM 20 MG: 20 TABLET, DELAYED RELEASE ORAL at 09:09

## 2020-01-01 RX ADMIN — METHYLPREDNISOLONE SODIUM SUCCINATE 40 MG: 40 INJECTION, POWDER, FOR SOLUTION INTRAMUSCULAR; INTRAVENOUS at 00:45

## 2020-01-01 RX ADMIN — LOSARTAN POTASSIUM 50 MG: 50 TABLET, FILM COATED ORAL at 20:43

## 2020-01-01 RX ADMIN — INSULIN LISPRO 10 UNITS: 100 INJECTION, SOLUTION INTRAVENOUS; SUBCUTANEOUS at 12:15

## 2020-01-01 RX ADMIN — Medication 10 ML: at 00:48

## 2020-01-01 RX ADMIN — ARFORMOTEROL TARTRATE 15 MCG: 15 SOLUTION RESPIRATORY (INHALATION) at 09:05

## 2020-01-01 RX ADMIN — BUDESONIDE 500 MCG: 0.5 INHALANT RESPIRATORY (INHALATION) at 19:29

## 2020-01-01 RX ADMIN — INSULIN LISPRO 15 UNITS: 100 INJECTION, SOLUTION INTRAVENOUS; SUBCUTANEOUS at 12:13

## 2020-01-01 RX ADMIN — ISOSORBIDE MONONITRATE 60 MG: 30 TABLET ORAL at 18:28

## 2020-01-01 RX ADMIN — INSULIN LISPRO 2 UNITS: 100 INJECTION, SOLUTION INTRAVENOUS; SUBCUTANEOUS at 08:24

## 2020-01-01 RX ADMIN — ALBUTEROL SULFATE 2.5 MG: 2.5 SOLUTION RESPIRATORY (INHALATION) at 08:00

## 2020-01-01 RX ADMIN — BUDESONIDE 500 MCG: 0.5 INHALANT RESPIRATORY (INHALATION) at 07:17

## 2020-01-01 RX ADMIN — INSULIN LISPRO 6 UNITS: 100 INJECTION, SOLUTION INTRAVENOUS; SUBCUTANEOUS at 08:03

## 2020-01-01 RX ADMIN — INSULIN GLARGINE 20 UNITS: 100 INJECTION, SOLUTION SUBCUTANEOUS at 08:30

## 2020-01-01 RX ADMIN — ATORVASTATIN CALCIUM 20 MG: 20 TABLET, FILM COATED ORAL at 22:37

## 2020-01-01 RX ADMIN — DILTIAZEM HYDROCHLORIDE 60 MG: 30 TABLET, FILM COATED ORAL at 22:20

## 2020-01-01 RX ADMIN — ARFORMOTEROL TARTRATE 15 MCG: 15 SOLUTION RESPIRATORY (INHALATION) at 08:59

## 2020-01-01 RX ADMIN — GLIPIZIDE 10 MG: 5 TABLET ORAL at 09:03

## 2020-01-01 RX ADMIN — BUMETANIDE 1 MG: 1 TABLET ORAL at 09:09

## 2020-01-01 RX ADMIN — GLIPIZIDE 10 MG: 5 TABLET ORAL at 17:51

## 2020-01-01 RX ADMIN — LISINOPRIL 20 MG: 20 TABLET ORAL at 09:54

## 2020-01-01 RX ADMIN — POTASSIUM CHLORIDE 30 MEQ: 750 TABLET, FILM COATED, EXTENDED RELEASE ORAL at 12:11

## 2020-01-01 RX ADMIN — PERFLUTREN 2 ML: 6.52 INJECTION, SUSPENSION INTRAVENOUS at 13:33

## 2020-01-01 RX ADMIN — PANTOPRAZOLE SODIUM 20 MG: 20 TABLET, DELAYED RELEASE ORAL at 06:15

## 2020-01-01 RX ADMIN — Medication 10 ML: at 22:07

## 2020-01-01 RX ADMIN — INSULIN LISPRO 6 UNITS: 100 INJECTION, SOLUTION INTRAVENOUS; SUBCUTANEOUS at 11:30

## 2020-01-01 RX ADMIN — PREDNISONE 40 MG: 20 TABLET ORAL at 09:53

## 2020-01-01 RX ADMIN — Medication 10 ML: at 08:58

## 2020-01-01 RX ADMIN — AMLODIPINE BESYLATE 10 MG: 5 TABLET ORAL at 09:54

## 2020-01-01 RX ADMIN — INSULIN GLARGINE 10 UNITS: 100 INJECTION, SOLUTION SUBCUTANEOUS at 21:05

## 2020-01-01 RX ADMIN — BUDESONIDE 500 MCG: 0.5 INHALANT RESPIRATORY (INHALATION) at 08:00

## 2020-01-01 RX ADMIN — DILTIAZEM HYDROCHLORIDE 60 MG: 30 TABLET, FILM COATED ORAL at 16:49

## 2020-01-01 RX ADMIN — BUMETANIDE 2 MG: 1 TABLET ORAL at 08:31

## 2020-01-01 RX ADMIN — APIXABAN 2.5 MG: 2.5 TABLET, FILM COATED ORAL at 08:56

## 2020-01-01 RX ADMIN — ARFORMOTEROL TARTRATE 15 MCG: 15 SOLUTION RESPIRATORY (INHALATION) at 07:14

## 2020-01-01 RX ADMIN — ATORVASTATIN CALCIUM 20 MG: 20 TABLET, FILM COATED ORAL at 22:24

## 2020-01-01 RX ADMIN — ISOSORBIDE MONONITRATE 60 MG: 60 TABLET, EXTENDED RELEASE ORAL at 08:21

## 2020-01-01 RX ADMIN — ARFORMOTEROL TARTRATE 15 MCG: 15 SOLUTION RESPIRATORY (INHALATION) at 19:05

## 2020-01-01 RX ADMIN — CARVEDILOL 6.25 MG: 3.12 TABLET, FILM COATED ORAL at 08:04

## 2020-01-01 RX ADMIN — INSULIN LISPRO 6 UNITS: 100 INJECTION, SOLUTION INTRAVENOUS; SUBCUTANEOUS at 12:45

## 2020-01-01 RX ADMIN — ALLOPURINOL 100 MG: 100 TABLET ORAL at 08:00

## 2020-01-01 RX ADMIN — ARFORMOTEROL TARTRATE 15 MCG: 15 SOLUTION RESPIRATORY (INHALATION) at 19:41

## 2020-01-01 RX ADMIN — ISOSORBIDE MONONITRATE 60 MG: 60 TABLET, EXTENDED RELEASE ORAL at 09:03

## 2020-01-01 RX ADMIN — METOPROLOL TARTRATE 100 MG: 50 TABLET, FILM COATED ORAL at 19:26

## 2020-01-01 RX ADMIN — DILTIAZEM HYDROCHLORIDE 60 MG: 60 TABLET, FILM COATED ORAL at 21:05

## 2020-01-01 RX ADMIN — ASPIRIN 81 MG 81 MG: 81 TABLET ORAL at 08:09

## 2020-01-01 RX ADMIN — INSULIN LISPRO 4 UNITS: 100 INJECTION, SOLUTION INTRAVENOUS; SUBCUTANEOUS at 08:08

## 2020-01-01 RX ADMIN — LISINOPRIL 20 MG: 10 TABLET ORAL at 17:56

## 2020-01-01 RX ADMIN — Medication 10 ML: at 21:07

## 2020-01-01 RX ADMIN — ALLOPURINOL 100 MG: 100 TABLET ORAL at 09:37

## 2020-01-01 RX ADMIN — SODIUM CHLORIDE 75 ML/HR: 900 INJECTION, SOLUTION INTRAVENOUS at 19:41

## 2020-01-01 RX ADMIN — GLIPIZIDE 10 MG: 5 TABLET ORAL at 08:19

## 2020-01-01 RX ADMIN — INSULIN GLARGINE 20 UNITS: 100 INJECTION, SOLUTION SUBCUTANEOUS at 09:04

## 2020-01-01 RX ADMIN — Medication 10 ML: at 20:43

## 2020-01-01 RX ADMIN — ARFORMOTEROL TARTRATE 15 MCG: 15 SOLUTION RESPIRATORY (INHALATION) at 22:17

## 2020-01-01 RX ADMIN — BUMETANIDE 1 MG: 1 TABLET ORAL at 08:36

## 2020-01-01 RX ADMIN — Medication 10 ML: at 06:15

## 2020-01-01 RX ADMIN — GLIPIZIDE 10 MG: 5 TABLET ORAL at 08:44

## 2020-01-01 RX ADMIN — ISOSORBIDE MONONITRATE 60 MG: 60 TABLET, EXTENDED RELEASE ORAL at 09:22

## 2020-01-01 RX ADMIN — BUDESONIDE 500 MCG: 0.5 INHALANT RESPIRATORY (INHALATION) at 07:43

## 2020-01-01 RX ADMIN — ASPIRIN 81 MG 81 MG: 81 TABLET ORAL at 22:03

## 2020-01-01 RX ADMIN — METOPROLOL SUCCINATE 50 MG: 50 TABLET, EXTENDED RELEASE ORAL at 22:00

## 2020-01-01 RX ADMIN — BUMETANIDE 0.5 MG: 1 TABLET ORAL at 08:09

## 2020-01-01 RX ADMIN — BUDESONIDE 500 MCG: 0.5 INHALANT RESPIRATORY (INHALATION) at 07:38

## 2020-01-01 RX ADMIN — BUDESONIDE INHALATION 500 MCG: 0.5 SUSPENSION RESPIRATORY (INHALATION) at 19:05

## 2020-01-01 RX ADMIN — LEVOFLOXACIN 250 MG: 5 INJECTION, SOLUTION INTRAVENOUS at 21:24

## 2020-01-01 RX ADMIN — PANTOPRAZOLE SODIUM 40 MG: 40 TABLET, DELAYED RELEASE ORAL at 09:21

## 2020-01-01 RX ADMIN — ATORVASTATIN CALCIUM 20 MG: 20 TABLET, FILM COATED ORAL at 22:30

## 2020-01-01 RX ADMIN — ASPIRIN 81 MG 81 MG: 81 TABLET ORAL at 21:05

## 2020-01-01 RX ADMIN — INSULIN GLARGINE 22 UNITS: 100 INJECTION, SOLUTION SUBCUTANEOUS at 09:12

## 2020-01-01 RX ADMIN — APIXABAN 2.5 MG: 2.5 TABLET, FILM COATED ORAL at 08:03

## 2020-01-01 RX ADMIN — INSULIN LISPRO 3 UNITS: 100 INJECTION, SOLUTION INTRAVENOUS; SUBCUTANEOUS at 16:45

## 2020-01-01 RX ADMIN — METOPROLOL TARTRATE 100 MG: 50 TABLET, FILM COATED ORAL at 09:54

## 2020-01-01 RX ADMIN — Medication 10 ML: at 07:25

## 2020-01-01 RX ADMIN — BUDESONIDE 500 MCG: 0.5 INHALANT RESPIRATORY (INHALATION) at 20:49

## 2020-01-01 RX ADMIN — Medication 10 ML: at 21:26

## 2020-01-01 RX ADMIN — HYDRALAZINE HYDROCHLORIDE 10 MG: 10 TABLET, FILM COATED ORAL at 09:08

## 2020-01-01 RX ADMIN — INSULIN LISPRO 3 UNITS: 100 INJECTION, SOLUTION INTRAVENOUS; SUBCUTANEOUS at 08:22

## 2020-01-01 RX ADMIN — Medication 10 ML: at 06:49

## 2020-01-01 RX ADMIN — AMLODIPINE BESYLATE 5 MG: 5 TABLET ORAL at 09:22

## 2020-01-01 RX ADMIN — INSULIN GLARGINE 10 UNITS: 100 INJECTION, SOLUTION SUBCUTANEOUS at 22:07

## 2020-01-01 RX ADMIN — METHYLPREDNISOLONE SODIUM SUCCINATE 40 MG: 40 INJECTION, POWDER, FOR SOLUTION INTRAMUSCULAR; INTRAVENOUS at 21:05

## 2020-01-01 RX ADMIN — REGADENOSON 0.4 MG: 0.08 INJECTION, SOLUTION INTRAVENOUS at 10:36

## 2020-01-01 RX ADMIN — INSULIN GLARGINE 22 UNITS: 100 INJECTION, SOLUTION SUBCUTANEOUS at 09:55

## 2020-01-01 RX ADMIN — PANTOPRAZOLE SODIUM 20 MG: 20 TABLET, DELAYED RELEASE ORAL at 06:39

## 2020-01-01 RX ADMIN — AMLODIPINE BESYLATE 10 MG: 5 TABLET ORAL at 09:01

## 2020-01-01 RX ADMIN — ATORVASTATIN CALCIUM 20 MG: 20 TABLET, FILM COATED ORAL at 22:06

## 2020-01-01 RX ADMIN — BUDESONIDE 500 MCG: 0.5 INHALANT RESPIRATORY (INHALATION) at 22:17

## 2020-01-01 RX ADMIN — LEVOFLOXACIN 250 MG: 5 INJECTION, SOLUTION INTRAVENOUS at 21:10

## 2020-01-01 RX ADMIN — INSULIN LISPRO 2 UNITS: 100 INJECTION, SOLUTION INTRAVENOUS; SUBCUTANEOUS at 12:35

## 2020-01-01 RX ADMIN — APIXABAN 2.5 MG: 2.5 TABLET, FILM COATED ORAL at 17:26

## 2020-01-01 RX ADMIN — INSULIN LISPRO 6 UNITS: 100 INJECTION, SOLUTION INTRAVENOUS; SUBCUTANEOUS at 12:38

## 2020-01-01 RX ADMIN — APIXABAN 2.5 MG: 2.5 TABLET, FILM COATED ORAL at 09:54

## 2020-01-01 RX ADMIN — INSULIN LISPRO 9 UNITS: 100 INJECTION, SOLUTION INTRAVENOUS; SUBCUTANEOUS at 08:43

## 2020-01-01 RX ADMIN — BUMETANIDE 2 MG: 1 TABLET ORAL at 08:22

## 2020-01-01 RX ADMIN — ATORVASTATIN CALCIUM 20 MG: 20 TABLET, FILM COATED ORAL at 21:40

## 2020-01-01 RX ADMIN — BUDESONIDE 500 MCG: 0.5 INHALANT RESPIRATORY (INHALATION) at 07:13

## 2020-01-01 RX ADMIN — BUDESONIDE 500 MCG: 0.5 INHALANT RESPIRATORY (INHALATION) at 07:35

## 2020-01-01 RX ADMIN — BUDESONIDE 500 MCG: 0.5 INHALANT RESPIRATORY (INHALATION) at 19:38

## 2020-01-01 RX ADMIN — ASPIRIN 81 MG 81 MG: 81 TABLET ORAL at 22:20

## 2020-01-01 RX ADMIN — NYSTATIN: 100000 CREAM TOPICAL at 09:09

## 2020-01-01 RX ADMIN — INSULIN GLARGINE 10 UNITS: 100 INJECTION, SOLUTION SUBCUTANEOUS at 22:37

## 2020-01-01 RX ADMIN — AZELASTINE HYDROCHLORIDE 1 SPRAY: 137 SPRAY, METERED NASAL at 00:00

## 2020-01-01 RX ADMIN — PANTOPRAZOLE SODIUM 40 MG: 40 TABLET, DELAYED RELEASE ORAL at 06:51

## 2020-01-01 RX ADMIN — INSULIN LISPRO 4 UNITS: 100 INJECTION, SOLUTION INTRAVENOUS; SUBCUTANEOUS at 17:03

## 2020-01-01 RX ADMIN — INSULIN LISPRO 2 UNITS: 100 INJECTION, SOLUTION INTRAVENOUS; SUBCUTANEOUS at 06:39

## 2020-01-01 RX ADMIN — ISOSORBIDE MONONITRATE 60 MG: 60 TABLET, EXTENDED RELEASE ORAL at 17:11

## 2020-01-01 RX ADMIN — MELATONIN 2 TABLET: at 09:54

## 2020-01-01 RX ADMIN — INSULIN LISPRO 15 UNITS: 100 INJECTION, SOLUTION INTRAVENOUS; SUBCUTANEOUS at 17:51

## 2020-01-01 RX ADMIN — ARFORMOTEROL TARTRATE 15 MCG: 15 SOLUTION RESPIRATORY (INHALATION) at 19:29

## 2020-01-01 RX ADMIN — PANTOPRAZOLE SODIUM 20 MG: 20 TABLET, DELAYED RELEASE ORAL at 07:05

## 2020-01-01 RX ADMIN — METOPROLOL TARTRATE 100 MG: 50 TABLET, FILM COATED ORAL at 17:50

## 2020-01-01 RX ADMIN — PANTOPRAZOLE SODIUM 40 MG: 40 TABLET, DELAYED RELEASE ORAL at 07:25

## 2020-01-01 RX ADMIN — INSULIN GLARGINE 10 UNITS: 100 INJECTION, SOLUTION SUBCUTANEOUS at 00:45

## 2020-01-01 RX ADMIN — ATORVASTATIN CALCIUM 20 MG: 20 TABLET, FILM COATED ORAL at 21:03

## 2020-01-01 RX ADMIN — ASPIRIN 81 MG 81 MG: 81 TABLET ORAL at 21:36

## 2020-01-01 RX ADMIN — BUMETANIDE 2 MG: 0.25 INJECTION INTRAMUSCULAR; INTRAVENOUS at 21:40

## 2020-01-01 RX ADMIN — LORAZEPAM 0.5 MG: 0.5 TABLET ORAL at 11:26

## 2020-01-01 RX ADMIN — LISINOPRIL 20 MG: 20 TABLET ORAL at 08:45

## 2020-01-01 RX ADMIN — VANCOMYCIN HYDROCHLORIDE 1000 MG: 1 INJECTION, POWDER, LYOPHILIZED, FOR SOLUTION INTRAVENOUS at 16:57

## 2020-01-01 RX ADMIN — Medication 10 ML: at 11:59

## 2020-01-01 RX ADMIN — DILTIAZEM HYDROCHLORIDE 60 MG: 60 TABLET, FILM COATED ORAL at 08:46

## 2020-01-01 RX ADMIN — ASPIRIN 81 MG 81 MG: 81 TABLET ORAL at 08:00

## 2020-01-01 RX ADMIN — APIXABAN 2.5 MG: 2.5 TABLET, FILM COATED ORAL at 08:46

## 2020-01-01 RX ADMIN — ASPIRIN 81 MG 81 MG: 81 TABLET ORAL at 09:09

## 2020-01-01 RX ADMIN — ISOSORBIDE MONONITRATE 60 MG: 60 TABLET, EXTENDED RELEASE ORAL at 08:32

## 2020-01-01 RX ADMIN — DILTIAZEM HYDROCHLORIDE 60 MG: 30 TABLET, FILM COATED ORAL at 01:33

## 2020-01-01 RX ADMIN — METOPROLOL TARTRATE 100 MG: 50 TABLET, FILM COATED ORAL at 17:08

## 2020-01-01 RX ADMIN — BUDESONIDE 500 MCG: 0.5 INHALANT RESPIRATORY (INHALATION) at 07:52

## 2020-01-01 RX ADMIN — ARFORMOTEROL TARTRATE 15 MCG: 15 SOLUTION RESPIRATORY (INHALATION) at 19:37

## 2020-01-01 RX ADMIN — ARFORMOTEROL TARTRATE 15 MCG: 15 SOLUTION RESPIRATORY (INHALATION) at 09:26

## 2020-01-01 RX ADMIN — ISOSORBIDE MONONITRATE 60 MG: 60 TABLET, EXTENDED RELEASE ORAL at 17:20

## 2020-01-01 RX ADMIN — DILTIAZEM HYDROCHLORIDE 60 MG: 60 TABLET, FILM COATED ORAL at 21:46

## 2020-01-01 RX ADMIN — APIXABAN 2.5 MG: 2.5 TABLET, FILM COATED ORAL at 17:55

## 2020-01-01 RX ADMIN — ALLOPURINOL 100 MG: 100 TABLET ORAL at 08:32

## 2020-01-01 RX ADMIN — BUDESONIDE INHALATION 500 MCG: 0.5 SUSPENSION RESPIRATORY (INHALATION) at 21:17

## 2020-01-01 RX ADMIN — ASPIRIN 81 MG 81 MG: 81 TABLET ORAL at 21:37

## 2020-01-01 RX ADMIN — ISOSORBIDE MONONITRATE 60 MG: 30 TABLET ORAL at 17:51

## 2020-01-01 RX ADMIN — APIXABAN 2.5 MG: 2.5 TABLET, FILM COATED ORAL at 18:09

## 2020-01-01 RX ADMIN — AZELASTINE HYDROCHLORIDE 1 SPRAY: 137 SPRAY, METERED NASAL at 00:05

## 2020-01-01 RX ADMIN — ISOSORBIDE MONONITRATE 60 MG: 60 TABLET, EXTENDED RELEASE ORAL at 09:09

## 2020-01-01 RX ADMIN — LISINOPRIL 20 MG: 20 TABLET ORAL at 19:25

## 2020-01-01 RX ADMIN — DILTIAZEM HYDROCHLORIDE 30 MG: 30 TABLET, FILM COATED ORAL at 13:25

## 2020-01-01 RX ADMIN — CARVEDILOL 6.25 MG: 3.12 TABLET, FILM COATED ORAL at 17:00

## 2020-01-01 RX ADMIN — ARFORMOTEROL TARTRATE 15 MCG: 15 SOLUTION RESPIRATORY (INHALATION) at 07:46

## 2020-01-01 RX ADMIN — Medication 10 ML: at 22:31

## 2020-01-01 RX ADMIN — ISOSORBIDE MONONITRATE 60 MG: 30 TABLET ORAL at 08:58

## 2020-01-01 RX ADMIN — INSULIN LISPRO 10 UNITS: 100 INJECTION, SOLUTION INTRAVENOUS; SUBCUTANEOUS at 12:24

## 2020-01-01 RX ADMIN — PROPOFOL 30 MG: 10 INJECTION, EMULSION INTRAVENOUS at 16:58

## 2020-01-01 RX ADMIN — AZELASTINE HYDROCHLORIDE 1 SPRAY: 137 SPRAY, METERED NASAL at 23:06

## 2020-01-01 RX ADMIN — INSULIN LISPRO 4 UNITS: 100 INJECTION, SOLUTION INTRAVENOUS; SUBCUTANEOUS at 00:16

## 2020-01-01 RX ADMIN — INSULIN LISPRO 2 UNITS: 100 INJECTION, SOLUTION INTRAVENOUS; SUBCUTANEOUS at 22:20

## 2020-01-01 RX ADMIN — SPIRONOLACTONE 25 MG: 25 TABLET ORAL at 10:04

## 2020-01-01 RX ADMIN — DILTIAZEM HYDROCHLORIDE 60 MG: 30 TABLET, FILM COATED ORAL at 14:13

## 2020-01-01 RX ADMIN — LEVOFLOXACIN 250 MG: 5 INJECTION, SOLUTION INTRAVENOUS at 22:20

## 2020-01-01 RX ADMIN — LISINOPRIL 20 MG: 10 TABLET ORAL at 17:26

## 2020-01-01 RX ADMIN — ISOSORBIDE MONONITRATE 60 MG: 60 TABLET, EXTENDED RELEASE ORAL at 17:26

## 2020-01-01 RX ADMIN — GLIPIZIDE 10 MG: 5 TABLET ORAL at 16:41

## 2020-01-01 RX ADMIN — ISOSORBIDE MONONITRATE 60 MG: 60 TABLET, EXTENDED RELEASE ORAL at 08:23

## 2020-01-01 RX ADMIN — ARFORMOTEROL TARTRATE 15 MCG: 15 SOLUTION RESPIRATORY (INHALATION) at 21:17

## 2020-01-01 RX ADMIN — CARVEDILOL 6.25 MG: 3.12 TABLET, FILM COATED ORAL at 08:09

## 2020-01-01 RX ADMIN — CARVEDILOL 6.25 MG: 3.12 TABLET, FILM COATED ORAL at 08:00

## 2020-01-01 RX ADMIN — Medication 10 ML: at 21:04

## 2020-01-01 RX ADMIN — AZELASTINE HYDROCHLORIDE 1 SPRAY: 137 SPRAY, METERED NASAL at 22:33

## 2020-01-01 RX ADMIN — ISOSORBIDE MONONITRATE 60 MG: 30 TABLET ORAL at 08:36

## 2020-01-01 RX ADMIN — HUMAN INSULIN 15 UNITS: 100 INJECTION, SUSPENSION SUBCUTANEOUS at 23:37

## 2020-01-01 RX ADMIN — APIXABAN 2.5 MG: 2.5 TABLET, FILM COATED ORAL at 17:11

## 2020-01-01 RX ADMIN — LISINOPRIL 20 MG: 10 TABLET ORAL at 09:21

## 2020-01-01 RX ADMIN — INSULIN LISPRO 2 UNITS: 100 INJECTION, SOLUTION INTRAVENOUS; SUBCUTANEOUS at 09:07

## 2020-01-01 RX ADMIN — AZELASTINE HYDROCHLORIDE 1 SPRAY: 137 SPRAY, METERED NASAL at 21:39

## 2020-01-01 RX ADMIN — ATORVASTATIN CALCIUM 20 MG: 20 TABLET, FILM COATED ORAL at 20:41

## 2020-01-01 RX ADMIN — APIXABAN 2.5 MG: 2.5 TABLET, FILM COATED ORAL at 08:36

## 2020-01-01 RX ADMIN — APIXABAN 2.5 MG: 2.5 TABLET, FILM COATED ORAL at 22:06

## 2020-01-01 RX ADMIN — ASPIRIN 81 MG 81 MG: 81 TABLET ORAL at 22:40

## 2020-01-01 RX ADMIN — ASPIRIN 81 MG 81 MG: 81 TABLET ORAL at 21:24

## 2020-01-01 RX ADMIN — ACETYLCYSTEINE 600 MG: 200 SOLUTION ORAL; RESPIRATORY (INHALATION) at 09:30

## 2020-01-01 RX ADMIN — POTASSIUM CHLORIDE 20 MEQ: 750 TABLET, FILM COATED, EXTENDED RELEASE ORAL at 12:24

## 2020-01-01 RX ADMIN — AMLODIPINE BESYLATE 5 MG: 5 TABLET ORAL at 08:31

## 2020-01-01 RX ADMIN — APIXABAN 2.5 MG: 2.5 TABLET, FILM COATED ORAL at 20:41

## 2020-01-01 RX ADMIN — DILTIAZEM HYDROCHLORIDE 60 MG: 30 TABLET, FILM COATED ORAL at 00:57

## 2020-01-01 RX ADMIN — METOPROLOL TARTRATE 100 MG: 50 TABLET, FILM COATED ORAL at 18:09

## 2020-01-01 RX ADMIN — ISOSORBIDE MONONITRATE 60 MG: 30 TABLET ORAL at 21:03

## 2020-01-01 RX ADMIN — MELATONIN 2 TABLET: at 09:06

## 2020-01-01 RX ADMIN — ATORVASTATIN CALCIUM 20 MG: 20 TABLET, FILM COATED ORAL at 21:24

## 2020-01-01 RX ADMIN — APIXABAN 2.5 MG: 2.5 TABLET, FILM COATED ORAL at 09:01

## 2020-01-01 RX ADMIN — AZELASTINE HYDROCHLORIDE 1 SPRAY: 137 SPRAY, METERED NASAL at 22:51

## 2020-01-01 RX ADMIN — BUMETANIDE 1 MG: 0.25 INJECTION INTRAMUSCULAR; INTRAVENOUS at 17:59

## 2020-01-01 RX ADMIN — LORATADINE 10 MG: 10 TABLET ORAL at 08:09

## 2020-01-01 RX ADMIN — BUDESONIDE 500 MCG: 0.5 INHALANT RESPIRATORY (INHALATION) at 09:26

## 2020-01-01 RX ADMIN — INSULIN LISPRO 2 UNITS: 100 INJECTION, SOLUTION INTRAVENOUS; SUBCUTANEOUS at 19:49

## 2020-01-01 RX ADMIN — APIXABAN 2.5 MG: 2.5 TABLET, FILM COATED ORAL at 08:31

## 2020-01-01 RX ADMIN — PROPOFOL 50 MG: 10 INJECTION, EMULSION INTRAVENOUS at 16:54

## 2020-01-01 RX ADMIN — ARFORMOTEROL TARTRATE 15 MCG: 15 SOLUTION RESPIRATORY (INHALATION) at 19:50

## 2020-01-01 RX ADMIN — ATORVASTATIN CALCIUM 20 MG: 20 TABLET, FILM COATED ORAL at 20:27

## 2020-01-01 RX ADMIN — ALLOPURINOL 100 MG: 100 TABLET ORAL at 08:47

## 2020-01-01 RX ADMIN — ARFORMOTEROL TARTRATE 15 MCG: 15 SOLUTION RESPIRATORY (INHALATION) at 07:43

## 2020-01-01 RX ADMIN — ALLOPURINOL 100 MG: 100 TABLET ORAL at 09:21

## 2020-01-01 RX ADMIN — ALLOPURINOL 100 MG: 100 TABLET ORAL at 09:06

## 2020-01-01 RX ADMIN — ASPIRIN 81 MG 81 MG: 81 TABLET ORAL at 21:20

## 2020-01-01 RX ADMIN — DILTIAZEM HYDROCHLORIDE 60 MG: 30 TABLET, FILM COATED ORAL at 09:55

## 2020-01-01 RX ADMIN — LORATADINE 10 MG: 10 TABLET ORAL at 08:00

## 2020-01-01 RX ADMIN — HUMAN INSULIN 25 UNITS: 100 INJECTION, SUSPENSION SUBCUTANEOUS at 09:54

## 2020-01-01 RX ADMIN — INSULIN LISPRO 3 UNITS: 100 INJECTION, SOLUTION INTRAVENOUS; SUBCUTANEOUS at 07:34

## 2020-01-01 RX ADMIN — IPRATROPIUM BROMIDE AND ALBUTEROL SULFATE 3 ML: .5; 3 SOLUTION RESPIRATORY (INHALATION) at 20:49

## 2020-01-01 RX ADMIN — INSULIN LISPRO 2 UNITS: 100 INJECTION, SOLUTION INTRAVENOUS; SUBCUTANEOUS at 09:04

## 2020-01-01 RX ADMIN — AMLODIPINE BESYLATE 5 MG: 5 TABLET ORAL at 08:21

## 2020-01-01 RX ADMIN — LISINOPRIL 20 MG: 20 TABLET ORAL at 08:59

## 2020-01-01 RX ADMIN — METOPROLOL SUCCINATE 50 MG: 50 TABLET, EXTENDED RELEASE ORAL at 21:36

## 2020-01-01 RX ADMIN — PREDNISONE 20 MG: 20 TABLET ORAL at 16:44

## 2020-01-01 RX ADMIN — GLIPIZIDE 10 MG: 5 TABLET ORAL at 06:51

## 2020-01-01 RX ADMIN — PANTOPRAZOLE SODIUM 40 MG: 40 TABLET, DELAYED RELEASE ORAL at 08:55

## 2020-01-01 RX ADMIN — Medication 10 ML: at 01:33

## 2020-01-01 RX ADMIN — ISOSORBIDE MONONITRATE 60 MG: 30 TABLET ORAL at 20:01

## 2020-01-01 RX ADMIN — Medication 10 ML: at 07:16

## 2020-01-01 RX ADMIN — INSULIN GLARGINE 20 UNITS: 100 INJECTION, SOLUTION SUBCUTANEOUS at 08:23

## 2020-01-01 RX ADMIN — ALLOPURINOL 100 MG: 100 TABLET ORAL at 08:09

## 2020-01-01 RX ADMIN — Medication 10 ML: at 17:08

## 2020-01-01 RX ADMIN — GLIPIZIDE 10 MG: 5 TABLET ORAL at 17:19

## 2020-01-01 RX ADMIN — METHYLPREDNISOLONE SODIUM SUCCINATE 40 MG: 40 INJECTION, POWDER, FOR SOLUTION INTRAMUSCULAR; INTRAVENOUS at 09:54

## 2020-01-01 RX ADMIN — INSULIN LISPRO 2 UNITS: 100 INJECTION, SOLUTION INTRAVENOUS; SUBCUTANEOUS at 22:30

## 2020-01-01 RX ADMIN — ATORVASTATIN CALCIUM 20 MG: 20 TABLET, FILM COATED ORAL at 21:20

## 2020-01-01 RX ADMIN — Medication 10 ML: at 09:00

## 2020-01-01 RX ADMIN — MELATONIN 2 TABLET: at 08:59

## 2020-01-01 RX ADMIN — CARVEDILOL 6.25 MG: 3.12 TABLET, FILM COATED ORAL at 16:46

## 2020-01-01 RX ADMIN — ARFORMOTEROL TARTRATE 15 MCG: 15 SOLUTION RESPIRATORY (INHALATION) at 07:35

## 2020-01-01 RX ADMIN — BUDESONIDE 500 MCG: 0.5 INHALANT RESPIRATORY (INHALATION) at 20:43

## 2020-01-01 RX ADMIN — BUDESONIDE 500 MCG: 0.5 INHALANT RESPIRATORY (INHALATION) at 19:50

## 2020-01-01 RX ADMIN — ARFORMOTEROL TARTRATE 15 MCG: 15 SOLUTION RESPIRATORY (INHALATION) at 20:12

## 2020-01-01 RX ADMIN — INSULIN LISPRO 15 UNITS: 100 INJECTION, SOLUTION INTRAVENOUS; SUBCUTANEOUS at 12:39

## 2020-01-01 RX ADMIN — BUMETANIDE 0.5 MG: 0.25 INJECTION INTRAMUSCULAR; INTRAVENOUS at 17:22

## 2020-01-01 RX ADMIN — Medication 10 ML: at 15:45

## 2020-01-01 RX ADMIN — ALBUTEROL SULFATE 2.5 MG: 2.5 SOLUTION RESPIRATORY (INHALATION) at 16:44

## 2020-01-01 RX ADMIN — LISINOPRIL 20 MG: 20 TABLET ORAL at 09:06

## 2020-01-01 RX ADMIN — ALLOPURINOL 100 MG: 100 TABLET ORAL at 08:31

## 2020-01-01 RX ADMIN — INSULIN LISPRO 1 UNITS: 100 INJECTION, SOLUTION INTRAVENOUS; SUBCUTANEOUS at 22:01

## 2020-01-01 RX ADMIN — APIXABAN 2.5 MG: 2.5 TABLET, FILM COATED ORAL at 08:39

## 2020-01-01 RX ADMIN — PROPOFOL 30 MG: 10 INJECTION, EMULSION INTRAVENOUS at 17:05

## 2020-01-01 RX ADMIN — ARFORMOTEROL TARTRATE 15 MCG: 15 SOLUTION RESPIRATORY (INHALATION) at 08:14

## 2020-01-01 RX ADMIN — ISOSORBIDE MONONITRATE 60 MG: 60 TABLET, EXTENDED RELEASE ORAL at 22:04

## 2020-01-01 RX ADMIN — ASPIRIN 81 MG 81 MG: 81 TABLET ORAL at 08:03

## 2020-01-01 RX ADMIN — BUMETANIDE 1 MG: 1 TABLET ORAL at 08:46

## 2020-01-01 RX ADMIN — INSULIN LISPRO 2 UNITS: 100 INJECTION, SOLUTION INTRAVENOUS; SUBCUTANEOUS at 12:22

## 2020-01-01 RX ADMIN — APIXABAN 2.5 MG: 2.5 TABLET, FILM COATED ORAL at 08:32

## 2020-01-01 RX ADMIN — PANTOPRAZOLE SODIUM 40 MG: 40 TABLET, DELAYED RELEASE ORAL at 07:00

## 2020-01-01 RX ADMIN — APIXABAN 2.5 MG: 2.5 TABLET, FILM COATED ORAL at 18:12

## 2020-01-01 RX ADMIN — METOPROLOL TARTRATE 100 MG: 50 TABLET, FILM COATED ORAL at 18:28

## 2020-01-01 RX ADMIN — ISOSORBIDE MONONITRATE 60 MG: 30 TABLET ORAL at 09:55

## 2020-01-01 RX ADMIN — PANTOPRAZOLE SODIUM 40 MG: 40 TABLET, DELAYED RELEASE ORAL at 07:17

## 2020-01-01 RX ADMIN — INSULIN LISPRO 9 UNITS: 100 INJECTION, SOLUTION INTRAVENOUS; SUBCUTANEOUS at 12:09

## 2020-01-01 RX ADMIN — BUMETANIDE 2 MG: 1 TABLET ORAL at 08:56

## 2020-01-01 RX ADMIN — SODIUM CHLORIDE 500 ML: 900 INJECTION, SOLUTION INTRAVENOUS at 17:33

## 2020-01-01 RX ADMIN — INSULIN LISPRO 9 UNITS: 100 INJECTION, SOLUTION INTRAVENOUS; SUBCUTANEOUS at 07:15

## 2020-01-01 RX ADMIN — INSULIN LISPRO 13 UNITS: 100 INJECTION, SOLUTION INTRAVENOUS; SUBCUTANEOUS at 11:36

## 2020-01-01 RX ADMIN — BUDESONIDE INHALATION 500 MCG: 0.5 SUSPENSION RESPIRATORY (INHALATION) at 08:59

## 2020-01-01 RX ADMIN — METOPROLOL SUCCINATE 50 MG: 50 TABLET, EXTENDED RELEASE ORAL at 21:20

## 2020-01-01 RX ADMIN — APIXABAN 2.5 MG: 2.5 TABLET, FILM COATED ORAL at 09:21

## 2020-01-01 RX ADMIN — PANTOPRAZOLE SODIUM 20 MG: 20 TABLET, DELAYED RELEASE ORAL at 05:27

## 2020-01-01 RX ADMIN — PHENYLEPHRINE HYDROCHLORIDE 40 MCG/MIN: 10 INJECTION INTRAVENOUS at 17:07

## 2020-01-01 RX ADMIN — Medication 20 ML: at 10:36

## 2020-01-01 RX ADMIN — ARFORMOTEROL TARTRATE 15 MCG: 15 SOLUTION RESPIRATORY (INHALATION) at 07:52

## 2020-01-01 RX ADMIN — ISOSORBIDE MONONITRATE 60 MG: 60 TABLET, EXTENDED RELEASE ORAL at 08:00

## 2020-01-01 RX ADMIN — GLIPIZIDE 10 MG: 5 TABLET ORAL at 17:11

## 2020-01-01 RX ADMIN — INSULIN LISPRO 3 UNITS: 100 INJECTION, SOLUTION INTRAVENOUS; SUBCUTANEOUS at 17:20

## 2020-01-01 RX ADMIN — DILTIAZEM HYDROCHLORIDE 60 MG: 60 TABLET, FILM COATED ORAL at 08:36

## 2020-01-01 RX ADMIN — METHYLPREDNISOLONE SODIUM SUCCINATE 40 MG: 40 INJECTION, POWDER, FOR SOLUTION INTRAMUSCULAR; INTRAVENOUS at 09:02

## 2020-01-01 RX ADMIN — GLIPIZIDE 10 MG: 5 TABLET ORAL at 17:26

## 2020-01-01 RX ADMIN — Medication 10 ML: at 16:41

## 2020-01-01 RX ADMIN — ASPIRIN 81 MG 324 MG: 81 TABLET ORAL at 19:44

## 2020-01-01 RX ADMIN — BUDESONIDE INHALATION 500 MCG: 0.5 SUSPENSION RESPIRATORY (INHALATION) at 20:25

## 2020-01-01 RX ADMIN — Medication 10 ML: at 12:03

## 2020-01-01 RX ADMIN — Medication 10 ML: at 23:37

## 2020-01-01 RX ADMIN — INSULIN LISPRO 6 UNITS: 100 INJECTION, SOLUTION INTRAVENOUS; SUBCUTANEOUS at 17:26

## 2020-01-01 RX ADMIN — SODIUM CHLORIDE 75 ML/HR: 900 INJECTION, SOLUTION INTRAVENOUS at 12:36

## 2020-01-01 RX ADMIN — PANTOPRAZOLE SODIUM 40 MG: 40 TABLET, DELAYED RELEASE ORAL at 07:34

## 2020-01-01 RX ADMIN — INSULIN GLARGINE 18 UNITS: 100 INJECTION, SOLUTION SUBCUTANEOUS at 09:06

## 2020-01-01 RX ADMIN — AMLODIPINE BESYLATE 5 MG: 5 TABLET ORAL at 09:03

## 2020-01-01 RX ADMIN — Medication 10 ML: at 13:27

## 2020-01-01 RX ADMIN — GLIPIZIDE 10 MG: 5 TABLET ORAL at 08:31

## 2020-01-01 RX ADMIN — ISOSORBIDE MONONITRATE 60 MG: 30 TABLET ORAL at 17:07

## 2020-01-01 RX ADMIN — MELATONIN 2 TABLET: at 09:03

## 2020-01-01 RX ADMIN — INSULIN LISPRO 3 UNITS: 100 INJECTION, SOLUTION INTRAVENOUS; SUBCUTANEOUS at 22:07

## 2020-01-01 RX ADMIN — INSULIN GLARGINE 5 UNITS: 100 INJECTION, SOLUTION SUBCUTANEOUS at 09:00

## 2020-01-01 RX ADMIN — DILTIAZEM HYDROCHLORIDE 60 MG: 30 TABLET, FILM COATED ORAL at 04:08

## 2020-01-01 RX ADMIN — METOPROLOL TARTRATE 100 MG: 50 TABLET, FILM COATED ORAL at 09:06

## 2020-01-01 RX ADMIN — LISINOPRIL 20 MG: 20 TABLET ORAL at 17:51

## 2020-01-01 RX ADMIN — BUDESONIDE 500 MCG: 0.5 INHALANT RESPIRATORY (INHALATION) at 19:37

## 2020-01-01 RX ADMIN — PANTOPRAZOLE SODIUM 40 MG: 40 TABLET, DELAYED RELEASE ORAL at 07:15

## 2020-01-01 RX ADMIN — APIXABAN 2.5 MG: 2.5 TABLET, FILM COATED ORAL at 09:09

## 2020-01-01 RX ADMIN — GLIPIZIDE 10 MG: 5 TABLET ORAL at 09:22

## 2020-01-01 RX ADMIN — BENZOCAINE AND MENTHOL 1 LOZENGE: 15; 3.6 LOZENGE ORAL at 22:18

## 2020-01-01 RX ADMIN — NYSTATIN: 100000 CREAM TOPICAL at 10:53

## 2020-01-01 RX ADMIN — ALLOPURINOL 100 MG: 100 TABLET ORAL at 08:55

## 2020-01-01 RX ADMIN — BUMETANIDE 2 MG: 1 TABLET ORAL at 13:01

## 2020-01-01 RX ADMIN — ASPIRIN 81 MG 81 MG: 81 TABLET ORAL at 21:03

## 2020-01-01 RX ADMIN — METOPROLOL TARTRATE 100 MG: 50 TABLET, FILM COATED ORAL at 09:03

## 2020-01-01 RX ADMIN — ALLOPURINOL 100 MG: 100 TABLET ORAL at 08:36

## 2020-01-01 RX ADMIN — ISOSORBIDE MONONITRATE 60 MG: 60 TABLET, EXTENDED RELEASE ORAL at 09:59

## 2020-01-01 RX ADMIN — BUDESONIDE INHALATION 500 MCG: 0.5 SUSPENSION RESPIRATORY (INHALATION) at 07:46

## 2020-01-01 RX ADMIN — DILTIAZEM HYDROCHLORIDE 60 MG: 30 TABLET, FILM COATED ORAL at 17:07

## 2020-01-01 RX ADMIN — METHYLPREDNISOLONE SODIUM SUCCINATE 20 MG: 40 INJECTION, POWDER, FOR SOLUTION INTRAMUSCULAR; INTRAVENOUS at 09:14

## 2020-01-01 RX ADMIN — ATORVASTATIN CALCIUM 20 MG: 20 TABLET, FILM COATED ORAL at 22:17

## 2020-01-01 RX ADMIN — METHYLPREDNISOLONE SODIUM SUCCINATE 40 MG: 40 INJECTION, POWDER, FOR SOLUTION INTRAMUSCULAR; INTRAVENOUS at 08:37

## 2020-01-01 RX ADMIN — INSULIN LISPRO 2 UNITS: 100 INJECTION, SOLUTION INTRAVENOUS; SUBCUTANEOUS at 16:30

## 2020-01-01 RX ADMIN — APIXABAN 2.5 MG: 2.5 TABLET, FILM COATED ORAL at 20:26

## 2020-01-01 RX ADMIN — HYDRALAZINE HYDROCHLORIDE 10 MG: 10 TABLET, FILM COATED ORAL at 11:57

## 2020-01-01 RX ADMIN — APIXABAN 2.5 MG: 2.5 TABLET, FILM COATED ORAL at 21:01

## 2020-01-01 RX ADMIN — INSULIN GLARGINE 15 UNITS: 100 INJECTION, SOLUTION SUBCUTANEOUS at 08:03

## 2020-01-01 RX ADMIN — LISINOPRIL 20 MG: 20 TABLET ORAL at 18:09

## 2020-01-01 RX ADMIN — BUDESONIDE 500 MCG: 0.5 INHALANT RESPIRATORY (INHALATION) at 19:56

## 2020-01-01 RX ADMIN — ARFORMOTEROL TARTRATE 15 MCG: 15 SOLUTION RESPIRATORY (INHALATION) at 19:19

## 2020-01-01 RX ADMIN — INSULIN GLARGINE 20 UNITS: 100 INJECTION, SOLUTION SUBCUTANEOUS at 09:07

## 2020-01-01 RX ADMIN — DILTIAZEM HYDROCHLORIDE 60 MG: 30 TABLET, FILM COATED ORAL at 19:26

## 2020-01-01 RX ADMIN — INSULIN LISPRO 2 UNITS: 100 INJECTION, SOLUTION INTRAVENOUS; SUBCUTANEOUS at 08:03

## 2020-01-01 RX ADMIN — INSULIN LISPRO 2 UNITS: 100 INJECTION, SOLUTION INTRAVENOUS; SUBCUTANEOUS at 16:59

## 2020-01-01 RX ADMIN — ATORVASTATIN CALCIUM 20 MG: 20 TABLET, FILM COATED ORAL at 21:01

## 2020-01-01 RX ADMIN — PANTOPRAZOLE SODIUM 20 MG: 20 TABLET, DELAYED RELEASE ORAL at 06:47

## 2020-01-01 RX ADMIN — GLIPIZIDE 10 MG: 5 TABLET ORAL at 17:55

## 2020-01-01 RX ADMIN — ACETYLCYSTEINE 600 MG: 200 SOLUTION ORAL; RESPIRATORY (INHALATION) at 20:57

## 2020-01-01 RX ADMIN — INSULIN GLARGINE 15 UNITS: 100 INJECTION, SOLUTION SUBCUTANEOUS at 08:08

## 2020-01-01 RX ADMIN — INSULIN GLARGINE 10 UNITS: 100 INJECTION, SOLUTION SUBCUTANEOUS at 22:36

## 2020-01-01 RX ADMIN — ARFORMOTEROL TARTRATE 15 MCG: 15 SOLUTION RESPIRATORY (INHALATION) at 20:56

## 2020-01-01 RX ADMIN — ACETYLCYSTEINE 600 MG: 200 SOLUTION ORAL; RESPIRATORY (INHALATION) at 08:00

## 2020-01-01 RX ADMIN — INSULIN LISPRO 12 UNITS: 100 INJECTION, SOLUTION INTRAVENOUS; SUBCUTANEOUS at 18:01

## 2020-01-01 RX ADMIN — INSULIN LISPRO 3 UNITS: 100 INJECTION, SOLUTION INTRAVENOUS; SUBCUTANEOUS at 17:19

## 2020-01-01 RX ADMIN — INSULIN LISPRO 2 UNITS: 100 INJECTION, SOLUTION INTRAVENOUS; SUBCUTANEOUS at 22:16

## 2020-01-01 RX ADMIN — SODIUM CHLORIDE, POTASSIUM CHLORIDE, SODIUM LACTATE AND CALCIUM CHLORIDE: 600; 310; 30; 20 INJECTION, SOLUTION INTRAVENOUS at 16:34

## 2020-01-01 RX ADMIN — AZELASTINE HYDROCHLORIDE 1 SPRAY: 137 SPRAY, METERED NASAL at 22:18

## 2020-01-01 RX ADMIN — POTASSIUM CHLORIDE 20 MEQ: 750 TABLET, FILM COATED, EXTENDED RELEASE ORAL at 08:20

## 2020-01-01 RX ADMIN — MELATONIN 2 TABLET: at 08:47

## 2020-01-01 RX ADMIN — APIXABAN 2.5 MG: 2.5 TABLET, FILM COATED ORAL at 17:19

## 2020-01-01 RX ADMIN — ISOSORBIDE MONONITRATE 60 MG: 60 TABLET, EXTENDED RELEASE ORAL at 08:55

## 2020-01-01 RX ADMIN — BUMETANIDE 1 MG: 1 TABLET ORAL at 08:00

## 2020-01-01 RX ADMIN — INSULIN LISPRO 3 UNITS: 100 INJECTION, SOLUTION INTRAVENOUS; SUBCUTANEOUS at 22:03

## 2020-01-01 RX ADMIN — ARFORMOTEROL TARTRATE 15 MCG: 15 SOLUTION RESPIRATORY (INHALATION) at 09:43

## 2020-01-01 RX ADMIN — Medication 10 ML: at 07:49

## 2020-01-01 RX ADMIN — LORATADINE 10 MG: 10 TABLET ORAL at 08:03

## 2020-01-01 RX ADMIN — APIXABAN 2.5 MG: 2.5 TABLET, FILM COATED ORAL at 17:22

## 2020-01-01 RX ADMIN — BUDESONIDE INHALATION 500 MCG: 0.5 SUSPENSION RESPIRATORY (INHALATION) at 09:05

## 2020-01-01 RX ADMIN — INSULIN LISPRO 15 UNITS: 100 INJECTION, SOLUTION INTRAVENOUS; SUBCUTANEOUS at 11:49

## 2020-01-01 RX ADMIN — ARFORMOTEROL TARTRATE 15 MCG: 15 SOLUTION RESPIRATORY (INHALATION) at 19:38

## 2020-01-01 RX ADMIN — METHYLPREDNISOLONE SODIUM SUCCINATE 125 MG: 40 INJECTION, POWDER, FOR SOLUTION INTRAMUSCULAR; INTRAVENOUS at 19:44

## 2020-01-01 RX ADMIN — HEPARIN SODIUM 5000 UNITS: 1000 INJECTION INTRAVENOUS; SUBCUTANEOUS at 17:02

## 2020-01-01 RX ADMIN — PANTOPRAZOLE SODIUM 40 MG: 40 TABLET, DELAYED RELEASE ORAL at 07:04

## 2020-01-01 RX ADMIN — ATORVASTATIN CALCIUM 20 MG: 20 TABLET, FILM COATED ORAL at 22:20

## 2020-01-01 RX ADMIN — BUMETANIDE 1 MG: 1 TABLET ORAL at 17:00

## 2020-01-01 RX ADMIN — ALLOPURINOL 100 MG: 100 TABLET ORAL at 09:03

## 2020-01-01 RX ADMIN — INSULIN LISPRO 18 UNITS: 100 INJECTION, SOLUTION INTRAVENOUS; SUBCUTANEOUS at 13:25

## 2020-01-01 RX ADMIN — APIXABAN 2.5 MG: 2.5 TABLET, FILM COATED ORAL at 17:10

## 2020-01-01 RX ADMIN — BUDESONIDE INHALATION 500 MCG: 0.5 SUSPENSION RESPIRATORY (INHALATION) at 08:14

## 2020-01-01 RX ADMIN — INSULIN LISPRO 4 UNITS: 100 INJECTION, SOLUTION INTRAVENOUS; SUBCUTANEOUS at 08:30

## 2020-01-01 RX ADMIN — ATORVASTATIN CALCIUM 20 MG: 20 TABLET, FILM COATED ORAL at 22:03

## 2020-01-01 RX ADMIN — ARFORMOTEROL TARTRATE 15 MCG: 15 SOLUTION RESPIRATORY (INHALATION) at 19:57

## 2020-01-01 RX ADMIN — INSULIN LISPRO 12 UNITS: 100 INJECTION, SOLUTION INTRAVENOUS; SUBCUTANEOUS at 17:08

## 2020-01-01 RX ADMIN — LEVOFLOXACIN 250 MG: 5 INJECTION, SOLUTION INTRAVENOUS at 20:02

## 2020-01-01 RX ADMIN — BUDESONIDE 500 MCG: 0.5 INHALANT RESPIRATORY (INHALATION) at 15:15

## 2020-01-01 RX ADMIN — INSULIN GLARGINE 20 UNITS: 100 INJECTION, SOLUTION SUBCUTANEOUS at 08:55

## 2020-01-01 RX ADMIN — Medication 10 ML: at 21:02

## 2020-01-01 RX ADMIN — AZELASTINE HYDROCHLORIDE 1 SPRAY: 137 SPRAY, METERED NASAL at 21:15

## 2020-01-01 RX ADMIN — INSULIN LISPRO 6 UNITS: 100 INJECTION, SOLUTION INTRAVENOUS; SUBCUTANEOUS at 07:49

## 2020-01-01 RX ADMIN — ISOSORBIDE MONONITRATE 60 MG: 30 TABLET ORAL at 09:03

## 2020-01-01 RX ADMIN — ARFORMOTEROL TARTRATE 15 MCG: 15 SOLUTION RESPIRATORY (INHALATION) at 07:17

## 2020-01-01 RX ADMIN — METHYLPREDNISOLONE SODIUM SUCCINATE 40 MG: 40 INJECTION, POWDER, FOR SOLUTION INTRAMUSCULAR; INTRAVENOUS at 07:43

## 2020-01-01 RX ADMIN — AZELASTINE HYDROCHLORIDE 1 SPRAY: 137 SPRAY, METERED NASAL at 22:06

## 2020-01-01 RX ADMIN — INSULIN LISPRO 1 UNITS: 100 INJECTION, SOLUTION INTRAVENOUS; SUBCUTANEOUS at 22:06

## 2020-01-01 RX ADMIN — ASPIRIN 81 MG 81 MG: 81 TABLET ORAL at 21:40

## 2020-01-01 RX ADMIN — Medication 10 ML: at 17:00

## 2020-01-01 RX ADMIN — ALLOPURINOL 100 MG: 100 TABLET ORAL at 08:04

## 2020-01-01 RX ADMIN — MELATONIN 2 TABLET: at 08:36

## 2020-01-01 RX ADMIN — CARVEDILOL 6.25 MG: 3.12 TABLET, FILM COATED ORAL at 16:55

## 2020-01-01 RX ADMIN — METOPROLOL SUCCINATE 50 MG: 50 TABLET, EXTENDED RELEASE ORAL at 22:05

## 2020-01-01 RX ADMIN — INSULIN LISPRO 5 UNITS: 100 INJECTION, SOLUTION INTRAVENOUS; SUBCUTANEOUS at 17:20

## 2020-01-01 RX ADMIN — GLIPIZIDE 10 MG: 5 TABLET ORAL at 17:21

## 2020-01-01 RX ADMIN — LORATADINE 10 MG: 10 TABLET ORAL at 09:09

## 2020-01-01 RX ADMIN — INSULIN GLARGINE 20 UNITS: 100 INJECTION, SOLUTION SUBCUTANEOUS at 09:33

## 2020-01-01 RX ADMIN — METOPROLOL SUCCINATE 50 MG: 50 TABLET, EXTENDED RELEASE ORAL at 21:37

## 2020-01-01 RX ADMIN — DILTIAZEM HYDROCHLORIDE 60 MG: 30 TABLET, FILM COATED ORAL at 08:57

## 2020-01-01 RX ADMIN — ISOSORBIDE MONONITRATE 60 MG: 60 TABLET, EXTENDED RELEASE ORAL at 17:22

## 2020-01-01 RX ADMIN — ISOSORBIDE MONONITRATE 60 MG: 30 TABLET ORAL at 09:54

## 2020-01-01 RX ADMIN — LOSARTAN POTASSIUM 25 MG: 50 TABLET ORAL at 22:30

## 2020-01-01 RX ADMIN — BUMETANIDE 1 MG: 1 TABLET ORAL at 08:59

## 2020-01-01 RX ADMIN — ALLOPURINOL 100 MG: 100 TABLET ORAL at 09:55

## 2020-01-01 RX ADMIN — METHYLPREDNISOLONE SODIUM SUCCINATE 40 MG: 40 INJECTION, POWDER, FOR SOLUTION INTRAMUSCULAR; INTRAVENOUS at 22:32

## 2020-01-01 RX ADMIN — ATORVASTATIN CALCIUM 20 MG: 20 TABLET, FILM COATED ORAL at 22:32

## 2020-01-01 RX ADMIN — LISINOPRIL 20 MG: 20 TABLET ORAL at 17:08

## 2020-01-01 RX ADMIN — INSULIN LISPRO 2 UNITS: 100 INJECTION, SOLUTION INTRAVENOUS; SUBCUTANEOUS at 08:33

## 2020-01-01 RX ADMIN — METOPROLOL TARTRATE 100 MG: 50 TABLET, FILM COATED ORAL at 08:36

## 2020-01-01 RX ADMIN — LEVOFLOXACIN 250 MG: 5 INJECTION, SOLUTION INTRAVENOUS at 20:32

## 2020-01-01 RX ADMIN — INSULIN LISPRO 5 UNITS: 100 INJECTION, SOLUTION INTRAVENOUS; SUBCUTANEOUS at 12:06

## 2020-01-01 RX ADMIN — METHYLPREDNISOLONE SODIUM SUCCINATE 20 MG: 40 INJECTION, POWDER, FOR SOLUTION INTRAMUSCULAR; INTRAVENOUS at 21:24

## 2020-01-01 RX ADMIN — Medication 10 ML: at 06:00

## 2020-01-01 RX ADMIN — Medication 10 ML: at 12:12

## 2020-01-01 RX ADMIN — LEVOFLOXACIN 250 MG: 5 INJECTION, SOLUTION INTRAVENOUS at 19:33

## 2020-01-01 RX ADMIN — ISOSORBIDE MONONITRATE 60 MG: 60 TABLET, EXTENDED RELEASE ORAL at 09:37

## 2020-01-01 RX ADMIN — ARFORMOTEROL TARTRATE 15 MCG: 15 SOLUTION RESPIRATORY (INHALATION) at 15:15

## 2020-01-01 RX ADMIN — LISINOPRIL 20 MG: 10 TABLET ORAL at 08:31

## 2020-01-01 RX ADMIN — LISINOPRIL 20 MG: 10 TABLET ORAL at 08:56

## 2020-01-01 RX ADMIN — ATORVASTATIN CALCIUM 20 MG: 20 TABLET, FILM COATED ORAL at 21:06

## 2020-01-01 RX ADMIN — AMLODIPINE BESYLATE 5 MG: 5 TABLET ORAL at 08:56

## 2020-01-01 RX ADMIN — IPRATROPIUM BROMIDE AND ALBUTEROL SULFATE 3 ML: .5; 3 SOLUTION RESPIRATORY (INHALATION) at 07:38

## 2020-01-01 RX ADMIN — BUDESONIDE 500 MCG: 0.5 INHALANT RESPIRATORY (INHALATION) at 09:43

## 2020-01-01 RX ADMIN — ISOSORBIDE MONONITRATE 60 MG: 60 TABLET, EXTENDED RELEASE ORAL at 17:10

## 2020-01-01 RX ADMIN — ACETYLCYSTEINE 600 MG: 200 SOLUTION ORAL; RESPIRATORY (INHALATION) at 07:44

## 2020-01-01 RX ADMIN — Medication 10 ML: at 23:08

## 2020-01-01 RX ADMIN — AZELASTINE HYDROCHLORIDE 1 SPRAY: 137 SPRAY, METERED NASAL at 22:17

## 2020-01-01 RX ADMIN — LOSARTAN POTASSIUM 25 MG: 50 TABLET ORAL at 22:19

## 2020-01-01 RX ADMIN — AZELASTINE HYDROCHLORIDE 1 SPRAY: 137 SPRAY, METERED NASAL at 21:41

## 2020-01-01 RX ADMIN — POTASSIUM CHLORIDE 20 MEQ: 750 TABLET, FILM COATED, EXTENDED RELEASE ORAL at 09:02

## 2020-01-01 RX ADMIN — BUMETANIDE 1 MG: 1 TABLET ORAL at 09:03

## 2020-01-01 RX ADMIN — BUDESONIDE 500 MCG: 0.5 INHALANT RESPIRATORY (INHALATION) at 20:56

## 2020-01-01 RX ADMIN — METOPROLOL TARTRATE 100 MG: 50 TABLET, FILM COATED ORAL at 08:44

## 2020-01-01 RX ADMIN — GLIPIZIDE 10 MG: 5 TABLET ORAL at 16:49

## 2020-01-01 RX ADMIN — APIXABAN 2.5 MG: 2.5 TABLET, FILM COATED ORAL at 08:00

## 2020-01-01 RX ADMIN — DILTIAZEM HYDROCHLORIDE 60 MG: 30 TABLET, FILM COATED ORAL at 09:02

## 2020-01-01 RX ADMIN — ISOSORBIDE MONONITRATE 60 MG: 30 TABLET ORAL at 08:45

## 2020-01-01 RX ADMIN — CARVEDILOL 6.25 MG: 3.12 TABLET, FILM COATED ORAL at 09:37

## 2020-01-01 RX ADMIN — INSULIN LISPRO 2 UNITS: 100 INJECTION, SOLUTION INTRAVENOUS; SUBCUTANEOUS at 17:54

## 2020-01-01 RX ADMIN — ACETYLCYSTEINE 600 MG: 200 SOLUTION ORAL; RESPIRATORY (INHALATION) at 20:12

## 2020-01-01 RX ADMIN — AZELASTINE HYDROCHLORIDE 1 SPRAY: 137 SPRAY, METERED NASAL at 21:27

## 2020-01-01 RX ADMIN — INSULIN LISPRO 4 UNITS: 100 INJECTION, SOLUTION INTRAVENOUS; SUBCUTANEOUS at 16:55

## 2020-01-01 RX ADMIN — APIXABAN 2.5 MG: 2.5 TABLET, FILM COATED ORAL at 17:50

## 2020-01-01 RX ADMIN — INSULIN LISPRO 6 UNITS: 100 INJECTION, SOLUTION INTRAVENOUS; SUBCUTANEOUS at 20:01

## 2020-01-01 RX ADMIN — LOSARTAN POTASSIUM 100 MG: 50 TABLET, FILM COATED ORAL at 09:02

## 2020-01-01 RX ADMIN — ALLOPURINOL 100 MG: 100 TABLET ORAL at 08:59

## 2020-01-01 RX ADMIN — ISOSORBIDE MONONITRATE 60 MG: 30 TABLET ORAL at 18:10

## 2020-01-01 RX ADMIN — ARFORMOTEROL TARTRATE 15 MCG: 15 SOLUTION RESPIRATORY (INHALATION) at 07:21

## 2020-01-01 RX ADMIN — INSULIN LISPRO 6 UNITS: 100 INJECTION, SOLUTION INTRAVENOUS; SUBCUTANEOUS at 16:50

## 2020-01-01 RX ADMIN — HYDRALAZINE HYDROCHLORIDE 10 MG: 10 TABLET, FILM COATED ORAL at 22:06

## 2020-01-01 RX ADMIN — LISINOPRIL 20 MG: 20 TABLET ORAL at 08:36

## 2020-01-01 RX ADMIN — INSULIN GLARGINE 18 UNITS: 100 INJECTION, SOLUTION SUBCUTANEOUS at 08:50

## 2020-01-01 RX ADMIN — Medication 10 ML: at 22:00

## 2020-01-01 RX ADMIN — BUMETANIDE 2 MG: 0.25 INJECTION INTRAMUSCULAR; INTRAVENOUS at 21:20

## 2020-01-01 RX ADMIN — INSULIN LISPRO 10 UNITS: 100 INJECTION, SOLUTION INTRAVENOUS; SUBCUTANEOUS at 00:45

## 2020-01-01 RX ADMIN — GLIPIZIDE 10 MG: 5 TABLET ORAL at 22:04

## 2020-01-01 RX ADMIN — IOPAMIDOL 120 ML: 755 INJECTION, SOLUTION INTRAVENOUS at 15:45

## 2020-01-01 RX ADMIN — GLIPIZIDE 10 MG: 5 TABLET ORAL at 17:07

## 2020-01-01 RX ADMIN — ARFORMOTEROL TARTRATE 15 MCG: 15 SOLUTION RESPIRATORY (INHALATION) at 08:48

## 2020-01-01 RX ADMIN — INSULIN GLARGINE 15 UNITS: 100 INJECTION, SOLUTION SUBCUTANEOUS at 08:00

## 2020-01-01 RX ADMIN — LISINOPRIL 20 MG: 20 TABLET ORAL at 21:24

## 2020-01-01 RX ADMIN — ALLOPURINOL 100 MG: 100 TABLET ORAL at 09:54

## 2020-01-01 RX ADMIN — INSULIN GLARGINE 18 UNITS: 100 INJECTION, SOLUTION SUBCUTANEOUS at 08:37

## 2020-01-01 RX ADMIN — ARFORMOTEROL TARTRATE 15 MCG: 15 SOLUTION RESPIRATORY (INHALATION) at 20:43

## 2020-01-01 RX ADMIN — INSULIN LISPRO 3 UNITS: 100 INJECTION, SOLUTION INTRAVENOUS; SUBCUTANEOUS at 17:12

## 2020-01-01 RX ADMIN — ALLOPURINOL 100 MG: 100 TABLET ORAL at 09:09

## 2020-01-01 RX ADMIN — LOSARTAN POTASSIUM 25 MG: 50 TABLET ORAL at 21:00

## 2020-01-01 RX ADMIN — HYDRALAZINE HYDROCHLORIDE 10 MG: 10 TABLET, FILM COATED ORAL at 16:55

## 2020-01-01 RX ADMIN — ISOSORBIDE MONONITRATE 60 MG: 30 TABLET ORAL at 09:06

## 2020-01-01 RX ADMIN — MELATONIN 2 TABLET: at 08:56

## 2020-01-02 NOTE — TELEPHONE ENCOUNTER
Request for Bumex 1mg daily. Last office visit 12-13-19, next office visit 2-17-20. Refill per VO by Dr. Heather Saini as Dr. Jamesetta Ahumada is not in the office today.

## 2020-01-10 NOTE — PROGRESS NOTES
Cardiac Electrophysiology OFFICE Note     Subjective:      Zurdo Hassan is a 81 y.o. patient who is seen for H&P update prior to upcoming AV node ablation & leadless pacemaker (scheduled for 01/27/2020). She was last seen in clinic on 12/05/2019, denies significant changes in medical history or hospitalizations since then. She has been undergoing treatment for URI, was on antibiotic & steroids, but no fever. She has been treated for persistent AF, has had frequent intermittent episodes of RVR. Medications for ventricular rate control have caused fatigue & she has had dizziness. She notes CALDERA. BP well controlled. Anticoagulated with low dose Eliquis (age, renal function), denies bleeding issues. Previous:  Holter (11/07/2019): AF  bpm.    Echo (04/17/2019): LVEF 53%. LA mod dilated. RA mildly dilated. Mild MR. Mild aortic valve sclerosis without significant stenosis. Mild PH. Nuclear stress (04/17/2019): Fixed anterior defect as in 2018, LVEF 45%. S/p POP/CV 04/2017. Previous CVA that she believes was r/t cardiac cath (mild CAD in 2014). Primary cardiologist is Dr. Ketty Estrella. Problem List  Date Reviewed: 12/13/2019          Codes Class Noted    Coronary artery disease with stable angina pectoris [facility]) ICD-10-CM: I25.118  ICD-9-CM: 414.00, 413.9  12/13/2019        Severe obesity (BMI 35.0-39. 9) with comorbidity ([facility] ICD-10-CM: E66.01  ICD-9-CM: 278.01  5/1/2018        Type 2 diabetes with nephropathy ([facility] ICD-10-CM: E11.21  ICD-9-CM: 250.40, 583.81  2/12/2018        SOB (shortness of breath) ICD-10-CM: R06.02  ICD-9-CM: 786.05  8/23/2017        Atrial fibrillation (HCC) ICD-10-CM: I48.91  ICD-9-CM: 427.31  4/24/2017        PVD (peripheral vascular disease) with claudication (HCC) ICD-10-CM: I73.9  ICD-9-CM: 443.9  2/20/2017        Hypertension complicating diabetes ([facility] ICD-10-CM: E11.59, I10  ICD-9-CM: 250.80, 401.9  11/17/2016        Coronary atherosclerosis of native coronary artery ICD-10-CM: I25.10  ICD-9-CM: 414.01  11/18/2010        HLD (hyperlipidemia) ICD-10-CM: E78.5  ICD-9-CM: 272.4  11/18/2010        Cerebrovascular accident stroke, other, unspec ICD-10-CM: I67.89  ICD-9-CM: 436  11/18/2010                Current Outpatient Medications   Medication Sig Dispense Refill    bumetanide (BUMEX) 1 mg tablet Take 1 Tab by mouth daily. 90 Tab 3    clotrimazole-betamethasone (LOTRISONE) topical cream Apply thin layer to affected area twice daily 15 g 1    nystatin (MYCOSTATIN) powder Apply to affected area twice daily 1 Bottle 1    Insulin Needles, Disposable, (BD ULTRA-FINE SHORT PEN NEEDLE) 31 gauge x 5/16\" ndle Use for insulin injection twice daily. DXE11.9 100 Package 3    dilTIAZem (CARDIZEM) 60 mg tablet 1 tablet in morning,1/2 tablet at lunch and 1 tablet at night 225 Tab 2    allopurinol (ZYLOPRIM) 100 mg tablet Take 100 mg by mouth daily.  apixaban (ELIQUIS) 2.5 mg tablet Take 1 Tab by mouth two (2) times a day. 180 Tab 2    omeprazole (PRILOSEC) 20 mg capsule TAKE 1 CAPSULE DAILY 90 Cap 2    insulin detemir U-100 (LEVEMIR FLEXPEN) 100 unit/mL (3 mL) inpn 18 units each morning and 10 units each night time or as directed (Patient taking differently: 20 units each morning and 12 units each night time or as directed) 5 Pen 4    acyclovir (ZOVIRAX) 5 % ointment Apply  to affected area every three (3) hours. 45 g 0    metoprolol tartrate (LOPRESSOR) 100 mg IR tablet Take 1 Tab by mouth two (2) times a day. 180 Tab 2    enalapril (VASOTEC) 20 mg tablet TAKE 1 TABLET TWICE A  Tab 2    budesonide-formoterol (SYMBICORT) 160-4.5 mcg/actuation HFAA Take 2 Puffs by inhalation two (2) times a day.  After each use, rinse mouth with water 3 Inhaler 3    glucose blood VI test strips (BLOOD GLUCOSE TEST) strip Check blood sugar 3 times a day  DX:E11.9 400 Strip 4    glipiZIDE (GLUCOTROL) 5 mg tablet TAKE 2 TABLETS TWICE A  Tab 3    lancets (ACCU-CHEK FASTCLIX LANCET DRUM) misc Check blood sugar 3 times a day   DX:E11.9 270 Each 4    nystatin (MYCOSTATIN) topical cream Apply  to affected area two (2) times a day. X 10 days 30 g 0    isosorbide mononitrate ER (IMDUR) 60 mg CR tablet TAKE 1 TABLET TWICE A  Tab 3    azelastine (ASTELIN) 137 mcg (0.1 %) nasal spray USE 1 SPRAY IN EACH NOSTRIL TWICE DAILY AS DIRECTED 1 Bottle 11    mupirocin calcium (BACTROBAN) 2 % topical cream Apply  to affected area two (2) times a day. 45 g 0    aspirin 81 mg chewable tablet Take 1 Tab by mouth daily. 90 Tab 3    cholecalciferol, vitamin D3, (VITAMIN D3) 2,000 unit tab Take  by mouth.  atorvastatin (LIPITOR) 40 mg tablet Take 20 mg by mouth nightly.  nitroglycerin (NITROSTAT) 0.4 mg SL tablet 1 Tab by SubLINGual route every five (5) minutes as needed for Chest Pain. 25 Tab 3    predniSONE (DELTASONE) 10 mg tablet Daily taper 4 -4-2-2-1-1-0.  Start today 12/27/19 14 Tab 0    insulin aspart U-100 (NOVOLOG FLEXPEN U-100 INSULIN) 100 unit/mL (3 mL) inpn INJECT 7 UNITS BEFORE EACH MEAL 3 TIMES A DAY OR AS   DIRECTED 45 mL 3     Allergies   Allergen Reactions    Bactrim [Sulfamethoprim] Other (comments)     Affects her kidney funtion    Clonidine Shortness of Breath and Swelling     Lightheaded,     Codeine Unknown (comments)    Keflin Itching    Pcn [Penicillins] Unknown (comments)    Tramadol Other (comments)     Makes her head feel swishy     Past Medical History:   Diagnosis Date    Atrial fibrillation ([facility]     CAD (coronary artery disease)     Cerebrovascular accident stroke, other, unspec 11/18/2010    Coronary atherosclerosis of native coronary artery 11/18/2010    Essential hypertension     Essential hypertension, benign 11/18/2010    HLD (hyperlipidemia) 11/18/2010    PVD (peripheral vascular disease) ([facility] 2017    Type II or unspecified type diabetes mellitus without mention of complication, not stated as uncontrolled 2010    Zoster      Past Surgical History:   Procedure Laterality Date    HX CORONARY STENT PLACEMENT      HX HEART CATHETERIZATION      HX HYSTERECTOMY  1972     No pacemaker in family history. Social History     Tobacco Use    Smoking status: Former Smoker     Packs/day: 0.80     Years: 20.00     Pack years: 16.00     Types: Cigarettes     Last attempt to quit: 1986     Years since quittin.7    Smokeless tobacco: Never Used   Substance Use Topics    Alcohol use: No        Review of Systems:   Constitutional: Negative for fever, chills, weight loss, + malaise/fatigue. HEENT: Negative for nosebleeds, vision changes. Respiratory: + recent cough, no hemoptysis  Cardiovascular: Negative for chest pain, +palpitations, no orthopnea, claudication, + chronic right lower extremity leg swelling, syncope, and PND. + dizziness  Gastrointestinal: Negative for nausea, vomiting, diarrhea, blood in stool and melena. Genitourinary: Negative for dysuria, and hematuria. Musculoskeletal: Negative for myalgias, arthralgia. + foot pain  Skin: Negative for rash. Heme: Does not bleed or bruise easily. Neurological: Negative for speech change and focal weakness     Objective:     Visit Vitals  /74 (BP 1 Location: Left arm, BP Patient Position: Sitting)   Pulse 81   Resp 14   Ht 5' (1.524 m)   Wt 180 lb (81.6 kg)   SpO2 98%   BMI 35.15 kg/m²      Physical Exam:   Constitutional: Well-developed and well-nourished. No respiratory distress. Head: Normocephalic and atraumatic. Eyes: Pupils are equal, round  ENT: Hearing normal  Neck: supple. No JVD present. Cardiovascular: Regular rate, irregular rhythm. Exam reveals no gallop and no friction rub. No murmur heard. Pulmonary/Chest: Effort normal.  No rales or wheezes. Coarse bilaterally. Abdominal: Soft, + moderate obesity  Musculoskeletal: Trace right lower extremity edema, wearing compression stocking. Neurological: alert,oriented.    Skin: Skin is warm and dry. Psychiatric: normal mood and affect. Behavior is normal. Judgment and thought content normal.      ECG (11/12/2019): AF 98 bpm.  QRSd 100 ms. Assessment/Plan:       ICD-10-CM ICD-9-CM    1. Atrial fibrillation, persistent I48.19 427.31    2. Essential hypertension, benign I10 401.1    3. Coronary artery disease involving native coronary artery of native heart without angina pectoris I25.10 414.01    4. SOB (shortness of breath) R06.02 786.05    5. Anticoagulant long-term use Z79.01 V58.61         Ms. Nidia Jade has persistent AF with periods of RVR despite medication for rate control. Unable to tolerate diltiazem, metoprolol long term. Fatigued & dizzy, has CALDERA & palpitations. She does not want AF ablation due to increased risk of bleeding & CVA. She does not want transvenous pacemaker due to concerns regarding electrocautery. Dermatologist has stated she needs frequent biopsies. Dr. Lety Narayanan had informed her that pacemaker could be adjusted, but she prefers leadless pacemaker. Risks/benefits of leadless pacemaker & AV node ablation for long term rate control reviewed, & she agrees to proceed as scheduled. Per Dr. Libby Raya last note, she should hold Eliquis x 2 days prior to procedure. Lab slip given. Future Appointments   Date Time Provider Carly Mckayi   1/27/2020 10:45 AM Saskia Hayden  [address]   2/17/2020 11:20 AM Greg Urias  [address]   2/25/2020  1:15 PM [address] [place] [address], [id number] [facility]   2/25/2020  1:20 PM Saskia Hayden  [address]   3/19/2020 11:15 AM Park Hand  [facility]       Thank you for involving me in this patient's care and please call with further concerns or questions.     MICHELLE Malagon  [facility]  01/10/20

## 2020-01-10 NOTE — PATIENT INSTRUCTIONS
Your AV Node and Leadless Pacemaker procedure has been scheduled for 1/27/20 at 10:45 am, at [facility].     Please report to Admitting Department by 8:45 am, or 2 hours prior to your scheduled procedure. Please bring a list of your current medications and medication bottles, if able, to the hospital on this day. You will be unable to drive after your procedure so please make sure to bring someone with you to your procedure.     You will need to have nothing to eat or drink after midnight, the night prior to your procedure. You may have small sips of water, if needed, to take with your medication.     You will need labs drawn prior to your procedure. Please go to [facility] to have this done no sooner than 12/27/19 and no later than 1/20/20.       You should stop your medication, Eliquis, 2 days prior to your scheduled procedure.     After your procedure, you will need to follow up with Dr. Harish Hudson.  Your follow-up appointment has been scheduled for 2/25/20 at 1:15 pm.

## 2020-01-23 NOTE — TELEPHONE ENCOUNTER
Pt called stating her PCP told her to cancel upcoming procedure. Pt stated she has had a cough since Serge and has not gotten any better. Notified pt that as long as the pt doesn't have a fever Dr. Christiana Schwartz will do the procedure. Pt stated she would like to double check with Dr. Christiana Schwartz to be safe and see what he thinks.  Will Ask MD/NP

## 2020-01-23 NOTE — LETTER
1/24/2020 2:06 PM 
 
Ms. Gaston Barbour [address] [id number] Your AV Node and Leadless Pacemaker procedure has been scheduled for 3/2/20 at 10:45 am, at [facility]. 
  
Please report to Admitting Department by 8:45 am, or 2 hours prior to your scheduled procedure. Please bring a list of your current medications and medication bottles, if able, to the hospital on this day.  You will be unable to drive after your procedure so please make sure to bring someone with you to your procedure. 
  
You will need to have nothing to eat or drink after midnight, the night prior to your procedure. You may have small sips of water, if needed, to take with your medication. 
  
You will need labs drawn prior to your procedure. Please go to [facility] to have this done no sooner than 2/2/20 and no later than 2/23/20.   
  
You should stop your medication, Eliquis, 2 days prior to your scheduled procedure. You will also need to see Dr. Kaya Quick Nurse Practitioner, Pippa Cornelius, in office prior to your procedure. An appointment has been scheduled for 2/19/20 at 10:20 am 
  
After your procedure, you will need to follow up with Dr. Angela Martel.  Your follow-up appointment has been scheduled for 3/31/20 at 2:15 pm.  
  
 
 
 
 
Sincerely, 
 
 
Sabrina Spring MD

## 2020-01-24 NOTE — TELEPHONE ENCOUNTER
As long as patient does not have a fever & is able to lay flat for procedure, she does not need to cancel due to current illness. AV node ablation & leadless pacemaker both have very low risk for infection.

## 2020-01-24 NOTE — TELEPHONE ENCOUNTER
Verified patient with two types of identifiers. Patient states that she would like to reschedule her procedure on 1/27/20. Patient states that she cannot lay flat and her PCP said that one of her levels is elevated. Patient rescheduled to 3/2/20 at 10:45 am. Notified patient I will send letter and lab slip with updated dates and times of procedure. Patient verbalized understanding and will call with any other questions.

## 2020-01-24 NOTE — TELEPHONE ENCOUNTER
Patient would like to let Dr Tesha Hawley know that whenever she lays flat she starts coughing and has been sleeping in a chair.      Phone: [phone]

## 2020-01-31 PROBLEM — J45.909 ACUTE ASTHMATIC BRONCHITIS: Status: ACTIVE | Noted: 2020-01-01

## 2020-01-31 NOTE — H&P
H and P dict 1. Acute Asthmatic Bronchitis in past month despite Doxycycline x 2  , PO Levaquin, brief Pred taper and Symbicort. P: IV Solumedrol, IV Levaquin, Krystina, Performomist  
Duoneb 2. Type 2 DM . On Levemir bid and Humalog ac at home P :Lantus bid , Humalog ac 3. CKD stage 3 
 
4. Chronic afib. 5. Dizziness intermittently 6. CAD w/o angina 7. Full code but does not wish to linger if no chance for meaningful recovery

## 2020-01-31 NOTE — PROGRESS NOTES
Admission Medication Reconciliation: 
 
Information obtained from:  Patient RxQuery data available¹:  YES Comments/Recommendations: Spoke with patient regarding use of PTA medications including prescription/OTC, vitamins/supplements, inhaled, topical, nasal, injectable, otic and ophthalmic medications. Updated PTA meds/reviewed patient's allergies. 1)  Of note, patient was recently started on levofloxacin 250 mg daily x10 days for bronchitis/on-going cough. Filled on 1/23. Patient reports she had two days left. She did not take a dose today. Patient also reports she recently had her dose of bumetanide increased from 1 mg daily to 1-2 mg daily (takes on alternating days). Reports she took 2 mg this morning. Patient is on apixaban for A fib. She took a dose this morning. Patient also reports she only uses her Novolog when needed, however uses Levemir daily. States she always takes the morning dose, however sometimes does not take the evening dose depending on her blood sugar. 2)  Medication changes (since last review): Added 
- None Adjusted - Acyclovir (from q3h to daily prn) - Aspirin (from daily to QHS) - Azelastine (from BID to QHS) - Bumetanide (from 1 mg daily to 1-2 mg daily on alternating days) - Clotrimazole-betamethasone cream (from BID to BID prn) - Levemir (from 18 units QAM and 10 units QPM/as directed to 20 units QAM and 8-10 units PM/as directed) - Mupirocin cream (from BID to BID prn) - Nystatin powder (from BID to BID prn) Removed - Nystatin cream 
- Prednisone - Promethazine-DM cough syrup - Blood glucose test strips, lancets, and insulin needles ¹RxQuery pharmacy benefit data reflects medications filled and processed through the patient's insurance, however  
this data does NOT capture whether the medication was picked up or is currently being taken by the patient. Allergies:  Bactrim [sulfamethoprim]; Clonidine; Codeine;  Keflin; Pcn [penicillins]; and Tramadol Significant PMH/Disease States:  
Past Medical History:  
Diagnosis Date Atrial fibrillation ([facility] CAD (coronary artery disease) Cerebrovascular accident stroke, other, unspec 2010 Coronary atherosclerosis of native coronary artery 2010 Essential hypertension Essential hypertension, benign 2010 HLD (hyperlipidemia) 2010 PVD (peripheral vascular disease) ([facility] 2017 Type II or unspecified type diabetes mellitus without mention of complication, not stated as uncontrolled 2010 Zoster Chief Complaint for this Admission: Chief Complaint Patient presents with Shortness of Breath Leg Swelling Prior to Admission Medications:  
Prior to Admission Medications Prescriptions Last Dose Informant Taking?  
acyclovir (ZOVIRAX) 5 % ointment   Yes Sig: Apply  to affected area daily as needed. allopurinol (ZYLOPRIM) 100 mg tablet 2020 at AM  Yes Sig: Take 100 mg by mouth daily. apixaban (ELIQUIS) 2.5 mg tablet 2020 at AM  Yes Sig: Take 1 Tab by mouth two (2) times a day. aspirin 81 mg chewable tablet 2020 at PM  Yes Sig: Take 81 mg by mouth nightly. atorvastatin (LIPITOR) 20 mg tablet 2020 at PM  Yes Sig: Take 20 mg by mouth nightly. azelastine (ASTEPRO) 0.15 % (205.5 mcg) 2020 at PM  Yes Si [place] by Both Nostrils route nightly. budesonide-formoterol (SYMBICORT) 160-4.5 mcg/actuation HFAA 2020 at AM  Yes Sig: Take 2 Puffs by inhalation two (2) times a day. After each use, rinse mouth with water  
bumetanide (BUMEX) 1 mg tablet 2020 at AM  Yes Sig: Take 1-2 mg by mouth daily. Takes 1 mg or 2 mg daily on alternating days  
cholecalciferol, vitamin D3, (VITAMIN D3) 2,000 unit tab 2020 at AM  Yes Sig: Take 2,000 Units by mouth daily. clotrimazole-betamethasone (LOTRISONE) topical cream   Yes Sig: Apply  to affected area two (2) times daily as needed for Skin Irritation or Other (Rash). dilTIAZem (CARDIZEM) 60 mg tablet 2020 at AM  Yes Si tablet in morning,1/2 tablet at lunch and 1 tablet at night  
enalapril (VASOTEC) 20 mg tablet 2020 at AM  Yes Sig: TAKE 1 TABLET TWICE A DAY  
glipiZIDE (GLUCOTROL) 5 mg tablet 2020 at AM  Yes Sig: TAKE 2 TABLETS TWICE A DAY  
insulin aspart U-100 (NOVOLOG FLEXPEN U-100 INSULIN) 100 unit/mL (3 mL) inpn   Yes Sig: INJECT 7 UNITS BEFORE EACH MEAL 3 TIMES A DAY OR AS   DIRECTED  
insulin detemir U-100 (LEVEMIR U-100 INSULIN) 100 unit/mL injection 2020 at AM  Yes Sig: by SubCUTAneous route two (2) times a day. Takes 20 units every morning, and takes 8-10 units every evening as needed/as directed based on blood sugar  
isosorbide mononitrate ER (IMDUR) 60 mg CR tablet 2020 at AM  Yes Sig: TAKE 1 TABLET TWICE A DAY  
levoFLOXacin (LEVAQUIN) 250 mg tablet 2020 at PM  Yes Sig: Take 1 Tab by mouth daily for 10 days. metoprolol tartrate (LOPRESSOR) 100 mg IR tablet 2020 at AM  Yes Sig: Take 1 Tab by mouth two (2) times a day. mupirocin calcium (BACTROBAN) 2 % topical cream   Yes Sig: Apply  to affected area two (2) times daily as needed. nitroglycerin (NITROSTAT) 0.4 mg SL tablet   Yes Si Tab by SubLINGual route every five (5) minutes as needed for Chest Pain. nystatin (MYCOSTATIN) powder   Yes Sig: Apply  to affected area two (2) times daily as needed for Other (Rash). omeprazole (PRILOSEC) 20 mg capsule   Yes Sig: TAKE 1 CAPSULE DAILY Facility-Administered Medications: None Please contact the main inpatient pharmacy with any questions or concerns at [phone] and we will direct you to the clinical pharmacist covering this patient's care while in-house.   
EMORY Deluna

## 2020-01-31 NOTE — ED PROVIDER NOTES
81 y.o. female with past medical history significant for coronary atherosclerosis of native coronary artery, type II DM, HLD, essential HTN, cerebrovascular accident stroke, CAD, PVD, a-fib, and zoster who presents from home via personal vehicle with chief complaint of SOB. Patient reports she had had upper respiratory infections since christmas. Patient reports symptoms of SOB and cough have been on and off with antibiotic and steroid use. Patient reports she has been on Doxycycline twice, levofloxacin once, and prednisone once. Patient reports she will have a brief period of relief, and then her symptoms will return. Patient does not use an inhaler or nebulizer. Patient notes since onset of symptoms, she has also noticed increased leg swelling (R leg > L leg). Patient affirms productive cough, SOB, wheezing, and leg swelling. Patient denies fever. There are no other acute medical concerns at this time. Social hx: Former smoker PCP: Sia Barba MD 
 
Note written by Rodolfo Meyer, as dictated by Yudi Sánchez MD 4:11 PM  
 
 
The history is provided by the patient and a relative. No  was used. Past Medical History:  
Diagnosis Date  Atrial fibrillation ([facility]  CAD (coronary artery disease)  Cerebrovascular accident stroke, other, unspec 11/18/2010  Coronary atherosclerosis of native coronary artery 11/18/2010  Essential hypertension  Essential hypertension, benign 11/18/2010  HLD (hyperlipidemia) 11/18/2010  PVD (peripheral vascular disease) ([facility] 2017  Type II or unspecified type diabetes mellitus without mention of complication, not stated as uncontrolled 11/18/2010  Zoster Past Surgical History:  
Procedure Laterality Date  HX CORONARY STENT PLACEMENT    
 HX HEART CATHETERIZATION    
 [address] History reviewed. No pertinent family history. Social History Socioeconomic History  Marital status:  Spouse name: Not on file  Number of children: Not on file  Years of education: Not on file  Highest education level: Not on file Occupational History  Not on file Social Needs  Financial resource strain: Not on file  Food insecurity:  
  Worry: Not on file Inability: Not on file  Transportation needs:  
  Medical: Not on file Non-medical: Not on file Tobacco Use  Smoking status: Former Smoker Packs/day: 0.80 Years: 20.00 Pack years: 16.00 Types: Cigarettes Last attempt to quit: 1986 Years since quittin.8  Smokeless tobacco: Never Used Substance and Sexual Activity  Alcohol use: No  
 Drug use: No  
 Sexual activity: Not on file Lifestyle  Physical activity:  
  Days per week: Not on file Minutes per session: Not on file  Stress: Not on file Relationships  Social connections:  
  Talks on phone: Not on file Gets together: Not on file Attends Religion service: Not on file Active member of club or organization: Not on file Attends meetings of clubs or organizations: Not on file Relationship status: Not on file  Intimate partner violence:  
  Fear of current or ex partner: Not on file Emotionally abused: Not on file Physically abused: Not on file Forced sexual activity: Not on file Other Topics Concern  Not on file Social History Narrative  Not on file ALLERGIES: Bactrim [sulfamethoprim]; Clonidine; Codeine; Keflin; Pcn [penicillins]; and Tramadol Review of Systems Constitutional: Negative for appetite change, chills and fever. HENT: Negative for rhinorrhea, sore throat and trouble swallowing. Eyes: Negative for photophobia. Respiratory: Positive for cough, shortness of breath and wheezing. Cardiovascular: Positive for leg swelling. Negative for chest pain and palpitations. Gastrointestinal: Negative for abdominal pain, nausea and vomiting. Genitourinary: Negative for dysuria, frequency and hematuria. Musculoskeletal: Negative for arthralgias. Neurological: Negative for dizziness, syncope and weakness. Psychiatric/Behavioral: Negative for behavioral problems. The patient is not nervous/anxious. All other systems reviewed and are negative. Vitals:  
 01/31/20 1220 BP: 168/84 Pulse: (!) 101 Resp: 20 Temp: 97.6 °F (36.4 °C) SpO2: 92% Physical Exam 
Vitals signs and nursing note reviewed. Constitutional:   
   Appearance: She is well-developed. HENT:  
   Head: Normocephalic and atraumatic. Eyes:  
   Pupils: Pupils are equal, round, and reactive to light. Neck: Musculoskeletal: Normal range of motion and neck supple. Cardiovascular:  
   Rate and Rhythm: Tachycardia present. Rhythm irregular. Heart sounds: Normal heart sounds. No murmur. No friction rub. No gallop. Comments: Rate 105. Pulmonary:  
   Effort: Pulmonary effort is normal. No respiratory distress. Breath sounds: Wheezing (\"Scattered\") present. No rales. Abdominal:  
   Palpations: Abdomen is soft. Tenderness: There is no abdominal tenderness. There is no rebound. Musculoskeletal: Normal range of motion. General: No tenderness. Comments: 2/4 pitting edema bilateral to lower extremities, R>L. Well healed arterial bypass scar of left medial leg and thigh. Skin: 
   Findings: No erythema. Neurological:  
   Mental Status: She is alert. Cranial Nerves: No cranial nerve deficit. Comments: Motor; symmetric Psychiatric:     
   Behavior: Behavior normal.  
 
 Note written by Rodolfo Dykes, as dictated by Angelika Tamayo MD 4:11 PM  
 
 
[facility] Procedures CONSULT NOTE: 
4:31 PM Angelika Tamayo MD spoke with Dr. Gurinder Mejia for Medicine. Discussed available diagnostic tests and clinical findings.   Dr. Micah Donohue recommends admitting the patient. ED EKG interpretation: 
Rhythm: atrial fib; and irregular. Rate (approx.): 115; Axis: normal; P wave: ; QRS interval: normal ; ST/T wave: non-specific changes; in  Lead: ; Other findings: . This EKG was interpreted by Jr Herbert MD,ED Provider.  4:41 PM

## 2020-02-01 NOTE — PHYSICIAN ADVISORY
[facility] Physician Advisor Recommendation The information in this document is a recommendation to be used for utilization review and utilization management purposes only. This recommendation is not an order. The recommendation is made based on the information reviewed at the time of the referral, is pursuant to the [facility] Conditions of Participation (42 CFR Part 482), and is neither a judgment nor an assessment with regard to the appropriateness or quality of clinical care. Nothing in this document may be used to limit, alter, or affect clinical services provided to the patient named below. The provider of services is ultimately responsible for the submission of a claim that has met all requirements for correct coding, billing, and reimbursement. Letter of Status Determination: Current Status INPATIENT is Appropriate Pt Name:  Holly Garrett MR#  [id number] [facility]#   [id number] [facility]  400/02  @ [facility]  
Hospitalization date  1/31/2020  3:35 PM  
Current Attending Physician  Lloyd Mckeon IV,* Principal diagnosis  Asthmatic bronchitis Clinicals  81 y.o. female hospitalized with above Assessment:  
  
    
Patient Active Problem List  
Diagnosis Code  Coronary atherosclerosis of native coronary artery I25.10  
 HLD (hyperlipidemia) E78.5  Cerebrovascular accident stroke, other, unspec I67.89  
 Hypertension complicating diabetes ([address] E11.59, I10  
 PVD (peripheral vascular disease) with claudication (HCC) I73.9  Atrial fibrillation (HCC) I48.91  
 SOB (shortness of breath) R06.02  
 Type 2 diabetes with nephropathy (HCC) E11.21  
 Severe obesity (BMI 35.0-39. 9) with comorbidity ([address] E66.01  
 Coronary artery disease with stable angina pectoris ([address] I25.118  Acute asthmatic bronchitis J45.909  
  
  
   
Plan:  
  
             
                                                  
  
 1. Asthmatic bronchitis- taper steroids 2. Ckd/ na- monitor 3. Diabetes- continue lantus and ssi [place] MCG criteria Does  NOT apply On the basis of above clinical data, this patient's care in acute hospital care setting is expected to exceed two midnights. STATUS DETERMINATION  On the basis of clinical data, available documentation, we believe that the current status of this patient as INPATIENT is Appropriate Additional comments Insurance  Payor: [place] MEDICARE / Plan: VA MEDICARE PART A & B / Product Type: Medicare /   
 
  
3:41 PM 2/1/2020 Quinten Sommer M.D., [facility] Physician [address] [place] [address] C: [phone]

## 2020-02-01 NOTE — PROGRESS NOTES
TRANSFER - IN REPORT: 
 
Verbal report received from Terri Underwood RN (name) on Loretta De La Torre  being received from ED (unit) for routine progression of care Report consisted of patients Situation, Background, Assessment and  
Recommendations(SBAR). Information from the following report(s) SBAR, Kardex, ED Summary, Procedure Summary, Intake/Output, MAR, Recent Results, and Cardiac Rhythm Afib was reviewed with the receiving nurse. Opportunity for questions and clarification was provided. Assessment completed upon patients arrival to unit and care assumed. Primary Nurse Charity Mckinnon and Rocky Santiago RN performed a dual skin assessment on this patient and the following was noted: 
Yeast under bilateral breasts, baby powder applied No pressure injury noted Problem: Falls - Risk of 
Goal: *Absence of Falls Description Document [place] Fall Risk and appropriate interventions in the flowsheet. Outcome: Progressing Towards Goal 
Note:  
Fall Risk Interventions: 
Mobility Interventions: Assess mobility with egress test, Communicate number of staff needed for ambulation/transfer, Mechanical lift, PT Consult for mobility concerns, OT consult for ADLs, Patient to call before getting OOB, PT Consult for assist device competence, Strengthening exercises (ROM-active/passive), Utilize walker, cane, or other assistive device Medication Interventions: Assess postural VS orthostatic hypotension, Evaluate medications/consider consulting pharmacy, Patient to call before getting OOB, Teach patient to arise slowly Bedside shift change report given to Marianne Dudley RN (oncoming nurse) by Ally Berumen RN (offgoing nurse). Report included the following information SBAR, Kardex, ED Summary, Procedure Summary, Intake/Output, MAR, Recent Results, and Cardiac Rhythm Afib .

## 2020-02-01 NOTE — H&P
[address] HISTORY AND PHYSICAL Name:  Nilda Kim 
MR#:  [id number] :  1935 ACCOUNT #:  [de-identified] ADMIT DATE:  2020 CHIEF COMPLAINT:  An 26-year-old white female admitted with dyspnea and cough. HISTORY OF PRESENT ILLNESS:  She has a 1-month history of probable asthmatic bronchitis which has not responded to two rounds of doxycycline, a brief prednisone taper and Levaquin. She presented to my office with cough and wheezing and was referred to the ER where chest x-ray showed no acute infiltrate. She is admitted now for further evaluation and treatment after incomplete response to DuoNeb and prednisone 20 mg in the ER. PAST MEDICAL HISTORY: 
1. Coronary artery disease. 2.  Chronic atrial fibrillation. Dizziness . She is being considered for a pacemaker. 3.  Hypertension complicating type 2 diabetes mellitus. 4.  Type 2 diabetes mellitus. 5.  Zoster. 6.  Peripheral vascular disease. 7.  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 8.  Hyperlipidemia. PAST SURGICAL HISTORY:  Cardiac catheterization, coronary stent placement, hysterectomy. Rt leg revascularization by Dr. Chisholm Balloon MEDICATIONS: 
1. Acyclovir 5% ointment t.i.d. every 3 hours. 2.  Allopurinol 100 mg daily. 3.  Eliquis 2.5 mg b.i.d. 4.  ASA 81 mg daily. 5.  Atorvastatin 20 mg nightly. 6.  Astelin once spray each nostril b.i.d. 
7.  Symbicort 160/4.5 two puffs b.i.d. 8.  Bumex 1 mg usually daily, recently 1 mg alternating one day with 2 mg the next day. 9.  Vitamin D3, 2000 units daily. 10.  Lotrisone b.i.d. p.r.n. 11.  Diltiazem 60 mg b.i.d. morning and evening, and 30 mg in the middle of a day at lunchtime. 12.  Vasotec 20 mg b.i.d. 13.  Glipizide 10 mg b.i.d. 14.  NovoLog 7 units a.c. 
15.  Levemir 18 units a.m., 10 units at bedtime. 16.  Imdur 60 mg b.i.d. 17.  Levaquin 250 mg daily. 18.  Metoprolol tartrate 100 mg b.i.d. 19.  Bactroban p.r.n. 20.  NTG 0.4 SL q.5 minutes p.r.n. chest pain. 21.  Nystatin powder b.i.d. p.r.n. 
22.  Nystatin cream b.i.d. p.r.n. 23.  Omeprazole 20 mg daily. 24.  Promethazine DM cough syrup p.r.n. ALLERGIES:  BACTRIM AFFECTS RENAL FUNCTION. CLONIDINE, LIGHTHEADEDNESS. CODEINE, UNKNOWN.  KEFLIN, ITCHING. PENICILLIN, UNKNOWN. TRAMADOL, DIZZINESS. FAMILY HISTORY:  Noncontributory. SOCIAL HISTORY:  Former smoker, quit long ago. Denies EtOH. Supportive family, daughter here. The patient does not wish to linger  if there is no chance for meaningful recovery. REVIEW OF SYSTEMS:  She denies fever or chills. She has had cough productive of white sputum. She denies sore throat. Denies palpitations or chest pain. Bowels regular without hematochezia. She occasionally has decreased urinary output. She has chronic right hand tremor and chronic mild right hemiparesis, status post previous CVA. She denies headache. PHYSICAL EXAMINATION: 
VITAL SIGNS:  Temperature 97.6, pulse 101, /84, RR 20, O2 sat 90% on room air. GENERAL:  Elderly white female appearing stated age, appears mildly ill. HEENT:  Negative. JVD flat at 45 degrees. LUNGS:  Mild expiratory wheezes. HEART:  Irregular S1 and S2 without murmur, gallop or rub. ABDOMEN:  Negative. EXTREMITIES:  Pretibial edema, 3+ on the right, 2+ on the left. She has chronically more edema on the right than the left lower leg, status post revascularization of right lower leg. Her distal feet had no edema. 2+ DP pulses. Light touch sensation intact in her feet. NEURO:  Awake, alert and oriented x3. Mild right hemipareses. Intermittent right hand tremor. Otherwise nonfocal. 
 
RADIOLOGICAL AND LABORATORY DATA:  Chest x-ray, mild bibasilar atelectasis and interstitial prominence, stable cardiomegaly compared to chest x-ray 01/23/2020. Troponin I negative. EKG, A-Fib, nonspecific ST-T wave abnormalities. Sodium 135, glucose 143, BUN 18, creatinine 1.34 which is baseline. WBC 12,300, normal differential, hemoglobin 14.1, platelet count 390. Urinalysis pending. IMPRESSION: 
1. Acute asthmatic bronchitis. Plan:  IV Solu-Medrol, IV Levaquin, Brovana, Perforomist, DuoNeb p.r.n., albuterol jet nebs. 2.  Type 2 diabetes mellitus. We will place on Lantus b.i.d., Humalog before meals. 3.  Chronic kidney disease stage III. Stable. 4.  Chronic atrial fibrillation. 5. Intermittent dizziness, which the patient was previously being considered for a pacemaker. 6.  Coronary artery disease without angina. 7.  Full code, but does not wish to linger if no chance for meaningful recovery. 8.  Status post cerebrovascular accident in the past with chronic right mild hemipareses. We will ask Pulmonary and Cardiology to see. I spoke with the patient's daughter who is here. Berlin Madsen MD 
 
 
[id number] 
D:  01/31/2020 18:02 
T:  01/31/2020 19:29 
JOB #:  [id number]

## 2020-02-01 NOTE — ROUTINE PROCESS
TRANSFER - OUT REPORT: 
 
Verbal report given to RN(name) on Reyna Favor  being transferred to [facility]) for routine progression of care Report consisted of patients Situation, Background, Assessment and  
Recommendations(SBAR). Information from the following report(s) SBAR, ED Summary, MAR, Recent Results and Cardiac Rhythm A-fib was reviewed with the receiving nurse. Lines:  
Peripheral IV 01/31/20 Left Antecubital (Active) Site Assessment Clean, dry, & intact 1/31/2020  3:56 PM  
Phlebitis Assessment 0 1/31/2020  3:56 PM  
Infiltration Assessment 0 1/31/2020  3:56 PM  
Dressing Status Clean, dry, & intact 1/31/2020  3:56 PM  
   
Peripheral IV 01/31/20 Left Antecubital (Active) Site Assessment Clean, dry, & intact 1/31/2020  6:49 PM  
Phlebitis Assessment 0 1/31/2020  6:49 PM  
Infiltration Assessment 0 1/31/2020  6:49 PM  
Dressing Status Clean, dry, & intact 1/31/2020  6:49 PM  
  
 
Opportunity for questions and clarification was provided. Patient transported with: 
 Monitor Registered Nurse Ricarda Ruiz RN

## 2020-02-01 NOTE — PROGRESS NOTES
Medical Progress Note NAME: Vicente Cruz :  1935 MRM:  [id number] Date/Time: 2020  10:05 AM 
 
 
  
Problem List:  
 
Active Problems: 
  Coronary atherosclerosis of native coronary artery (2010) Hypertension complicating diabetes ([facility] (2016) Atrial fibrillation ([facility] (2017) Type 2 diabetes with nephropathy ([facility] (2018) Acute asthmatic bronchitis (2020) Subjective:  
 
Patient denies pain. Still has cough. Past Medical History:  
Diagnosis Date  Atrial fibrillation ([facility]  CAD (coronary artery disease)  Cerebrovascular accident stroke, other, unspec 2010  Coronary atherosclerosis of native coronary artery 2010  Essential hypertension  Essential hypertension, benign 2010  HLD (hyperlipidemia) 2010  PVD [facility]peripheral [facility] disease) [facility] 2017  Type II or unspecified type diabetes mellitus without mention of complication, not stated as uncontrolled 2010  Zoster ROS:  General: negative for fever, chills, sweats, weakness Respiratory:  positive for cough Cardiology:  negative for chest pain, palpitations, orthopnea, PND, edema, syncope Objective:  
 
 
Vitals:  
 
 
  
Last 24hrs VS reviewed since prior progress note. Most recent are: 
 
Visit Vitals BP (!) 175/94 (BP 1 Location: Left arm, BP Patient Position: At rest) Pulse (!) 115 Temp 97.5 °F (36.4 °C) Resp 13 Wt 172 lb (78 kg) SpO2 98% BMI 33.59 kg/m² SpO2 Readings from Last 6 Encounters:  
20 98% 20 91% 20 95% 01/10/20 98% 19 91% 19 97% O2 Flow Rate (L/min): 4 l/min No intake or output data in the 24 hours ending 20 1005 Exam:  
 
General   Morbidly obese 80 yo wf 
Respiratory   Clear To Auscultation bilaterally - no wheezes, rales, rhonchi, or crackles Cardiology  regular Abdominal  Soft, non-tender, non-distended, positive bowel sounds, no hepatosplenomegaly Extremities  No clubbing, cyanosis, or edema. Pulses intact. Lab Data Reviewed: (see below) Medications Reviewed: (see below) 
 
______________________________________________________________________ Medications:  
 
Current Facility-Administered Medications Medication Dose Route Frequency  . PHARMACY TO SUBSTITUTE PER PROTOCOL (Reordered from: acyclovir (ZOVIRAX) 5 % ointment)    Per Protocol  allopurinoL (ZYLOPRIM) tablet 100 mg  100 mg Oral DAILY  apixaban (ELIQUIS) tablet 2.5 mg  2.5 mg Oral BID  aspirin chewable tablet 81 mg  81 mg Oral DAILY  atorvastatin (LIPITOR) tablet 20 mg  20 mg Oral QHS  azelastine (ASTELIN) 137mcg/spray nasal spray  1 Spray Both Nostrils QHS  bumetanide (BUMEX) tablet 1 mg  1 mg Oral DAILY  cholecalciferol (VITAMIN D3) (1000 Units /25 mcg) tablet 2 Tab  2,000 Units Oral DAILY  dilTIAZem (CARDIZEM) IR tablet 60 mg  60 mg Oral Q12H  
 dilTIAZem (CARDIZEM) IR tablet 30 mg  30 mg Oral DAILY  lisinopril (PRINIVIL, ZESTRIL) tablet 20 mg  20 mg Oral BID  
 glipiZIDE (GLUCOTROL) tablet 10 mg  10 mg Oral ACB&D  
 isosorbide mononitrate ER (IMDUR) tablet 60 mg  60 mg Oral BID  metoprolol tartrate (LOPRESSOR) tablet 100 mg  100 mg Oral BID  nitroglycerin (NITROSTAT) tablet 0.4 mg  0.4 mg SubLINGual Q5MIN PRN  pantoprazole (PROTONIX) tablet 40 mg  40 mg Oral ACB  promethazine-dextromethorphan (PROMETHAZINE-DM) 6.25-15 mg/5 mL syrup 5 mL  5 mL Oral TID PRN  
 insulin lispro (HUMALOG) injection   SubCUTAneous TIDAC  insulin glargine (LANTUS) injection 18 Units  18 Units SubCUTAneous DAILY  insulin glargine (LANTUS) injection 10 Units  10 Units SubCUTAneous QHS  arformoteroL (BROVANA) neb solution 15 mcg  15 mcg Nebulization BID RT  And  
 budesonide (PULMICORT) 500 mcg/2 ml nebulizer suspension  500 mcg Nebulization BID RT  
  albuterol (PROVENTIL VENTOLIN) nebulizer solution 2.5 mg  2.5 mg Nebulization Q4H PRN  
 levoFLOXacin (LEVAQUIN) 250 mg in D5W IVPB  250 mg IntraVENous Q24H  
 sodium chloride (NS) flush 5-40 mL  5-40 mL IntraVENous Q8H  
 sodium chloride (NS) flush 5-40 mL  5-40 mL IntraVENous PRN  
 acetaminophen (TYLENOL) tablet 650 mg  650 mg Oral Q4H PRN  
 methylPREDNISolone (PF) (SOLU-MEDROL) injection 40 mg  40 mg IntraVENous Q6H  
 dilTIAZem (CARDIZEM) IR tablet 30 mg  30 mg Oral Q24H Lab Review:  
 
Recent Labs 02/01/20 
0259 01/31/20 
1555 WBC 12.4* 12.3* HGB 14.2 14.1 HCT 42.6 43.2  231 Recent Labs 02/01/20 
0259 01/31/20 
1555 * 135* K 3.9 3.7 CL 99 98  
CO2 25 31 * 143* BUN 23* 18  
CREA 1.39* 1.34* CA 8.8 9.1 ALB  --  3.3* TBILI  --  0.6 SGOT  --  20 ALT  --  20 Lab Results Component Value Date/Time Glucose (POC) 297 (H) 02/01/2020 07:33 AM  
 Glucose (POC) 374 (H) 01/31/2020 11:36 PM  
 Glucose (POC) 289 (H) 08/31/2017 11:06 AM  
 Glucose (POC) 112 (H) 08/31/2017 08:11 AM  
 Glucose (POC) 203 (H) 08/30/2017 09:20 PM  
 
No results for input(s): PH, PCO2, PO2, HCO3, FIO2 in the last 72 hours. No results for input(s): INR, INREXT in the last 72 hours. Other pertinent lab: NA Assessment:  
 
Patient Active Problem List  
Diagnosis Code  Coronary atherosclerosis of native coronary artery I25.10  
 HLD (hyperlipidemia) E78.5  Cerebrovascular accident stroke, other, unspec I67.89  
 Hypertension complicating diabetes ([facility] E11.59, I10  
 PVD (peripheral vascular disease) with claudication (HCC) I73.9  Atrial fibrillation (HCC) I48.91  
 SOB (shortness of breath) R06.02  
 Type 2 diabetes with nephropathy ([facility]) E11.21  
 Severe obesity (BMI 35.0-39. 9) with comorbidity ([facility] E66.01  
 Coronary artery disease with stable angina pectoris ([facility] I25.118  Acute asthmatic bronchitis J45.909 Plan: 1. Asthmatic bronchitis- taper steroids 2. Ckd/ na- monitor 3. Diabetes- continue lantus and ssi    
 
        
___________________________________________________ Attending Physician: Austin Swanson MD

## 2020-02-01 NOTE — PROGRESS NOTES
Reason for Admission:   Dyspnea and cough RUR Score:     17 Plan for utilizing home health:      None Current Advanced Directive/Advance Care Plan: Has no advance directive or MPOA Transition of Care Plan:  Patient verified demographics, NOK and PCP. Patient requested her son, Flory Dey to [facility] contact list. Patient stated she is independent at home and uses a cane when she leaves the house. Patient lives with her daughter, Ines Ding who provides transport to/from medical appointments. Patient plans to return home with PCP F/U. Care Management Interventions PCP Verified by CM: Yes Discharge Durable Medical Equipment: No(owns a cane) Physical Therapy Consult: No 
Occupational Therapy Consult: No 
Speech Therapy Consult: No 
Current Support Network: Relative's Home(Lives with daughter Wayne Van) Confirm Follow Up Transport: Family [address]

## 2020-02-01 NOTE — CONSULTS
Date of  Admission: 1/31/2020  3:35 PM 
  
Daniel Valentine is a 81 y.o. female admitted for Acute asthmatic bronchitis [J45.909] Subjective:  Asked to see for afib. Pt admitted with cough and wheezing unresponsive to outpt therapy with 3 courses antibiotics and oral prednisone. Known cad and chronic afib. Met with dr mcdonough 12/19 because of poorly controlled afib inspite of medications and plan was for pacer and av node ablation in January, however this was delayed because of her current pulmonary problems. At base line she has mild valdez, activity limited by PAD with nerve damage right leg. She has recently developed some right leg edema. Stress test normal on 11/10 Doppler /US renal NO MANDO b/l PTCA and stent of right coronary artery in 1996 July 26, 2007. The cardiac catheterization revealed mild 10% tapering of the ostium. The left anterior descending artery had mild intimal disease throughout. There was a 30% stenosis in the proximal portion of the LAD between the first and second septal . The remainder of the LAD had only mild intimal disease. The circumflex artery had a 30%-40% stenosis in the proximal mid segment prior to take off of obtuse marginal branch. The right coronary artery was a large dominant artery with 80% proximal stenosis followed by 90% proximal to mid stenosis, followed by 30% residual stenosis. Ejection fraction was estimated at 50%-55%. 4/19 lexiscan with fixed anterior defect and normal wall motion, probably artifact 04/17/19  echo lvef 53%, crow, sclerotic non stenotic av, mild mr, mild pul htn 
 
 
denies chest pain, palpitations, syncope, paroxysmal nocturnal dyspnea, exertional chest pressure/discomfort, claudication.  
Cardiac risk factors: smoking/ tobacco exposure, dyslipidemia, diabetes mellitus, hypertension, post-menopausal. 
 
Assessment/Plan:  Her afib rate is a little higher than ideal/usual for her likely from her concurrent illness. She is on a good medical regimen. Could consider increasing cardiazem some, would not change beta blocker. Continue eliquis. Needs no specific cardiac testing at this time. Will have dr blela/mcdonough follow up regarding timing of pacemaker. Patient Active Problem List  
 Diagnosis Date Noted  Acute asthmatic bronchitis 01/31/2020  Coronary artery disease with stable angina pectoris ([address] 12/13/2019  Severe obesity (BMI 35.0-39. 9) with comorbidity ([address] 05/01/2018  Type 2 diabetes with nephropathy ([address] 02/12/2018  SOB (shortness of breath) 08/23/2017  Atrial fibrillation ([address] 04/24/2017  PVD (peripheral vascular disease) with claudication ([address] 02/20/2017  Hypertension complicating diabetes ([address] 11/17/2016  Coronary atherosclerosis of native coronary artery 11/18/2010  HLD (hyperlipidemia) 11/18/2010  Cerebrovascular accident stroke, other, unspec 11/18/2010 Kary West MD 
Past Medical History:  
Diagnosis Date  Atrial fibrillation ([address]  CAD (coronary artery disease)  Cerebrovascular accident stroke, other, unspec 11/18/2010  Coronary atherosclerosis of native coronary artery 11/18/2010  Essential hypertension  Essential hypertension, benign 11/18/2010  HLD (hyperlipidemia) 11/18/2010  PVD (peripheral vascular disease) ([address] 2017  Type II or unspecified type diabetes mellitus without mention of complication, not stated as uncontrolled 11/18/2010  Zoster Past Surgical History:  
Procedure Laterality Date  HX CORONARY STENT PLACEMENT    
 HX HEART CATHETERIZATION    
 [address] Allergies Allergen Reactions  Bactrim [Sulfamethoprim] Other (comments) Affects her kidney funtion  Clonidine Shortness of Breath and Swelling Lightheaded,   
 Codeine Unknown (comments)  Keflin Itching  Pcn [Penicillins] Unknown (comments)  Tramadol Other (comments) Makes her head feel swishy History reviewed. No pertinent family history. Current Facility-Administered Medications Medication Dose Route Frequency  methylPREDNISolone (PF) (SOLU-MEDROL) injection 40 mg  40 mg IntraVENous Q12H  
 . PHARMACY TO SUBSTITUTE PER PROTOCOL (Reordered from: acyclovir (ZOVIRAX) 5 % ointment)    Per Protocol  allopurinoL (ZYLOPRIM) tablet 100 mg  100 mg Oral DAILY  apixaban (ELIQUIS) tablet 2.5 mg  2.5 mg Oral BID  aspirin chewable tablet 81 mg  81 mg Oral DAILY  atorvastatin (LIPITOR) tablet 20 mg  20 mg Oral QHS  azelastine (ASTELIN) 137mcg/spray nasal spray  1 Spray Both Nostrils QHS  bumetanide (BUMEX) tablet 1 mg  1 mg Oral DAILY  cholecalciferol (VITAMIN D3) (1000 Units /25 mcg) tablet 2 Tab  2,000 Units Oral DAILY  dilTIAZem (CARDIZEM) IR tablet 60 mg  60 mg Oral Q12H  
 lisinopril (PRINIVIL, ZESTRIL) tablet 20 mg  20 mg Oral BID  
 glipiZIDE (GLUCOTROL) tablet 10 mg  10 mg Oral ACB&D  
 isosorbide mononitrate ER (IMDUR) tablet 60 mg  60 mg Oral BID  metoprolol tartrate (LOPRESSOR) tablet 100 mg  100 mg Oral BID  nitroglycerin (NITROSTAT) tablet 0.4 mg  0.4 mg SubLINGual Q5MIN PRN  pantoprazole (PROTONIX) tablet 40 mg  40 mg Oral ACB  promethazine-dextromethorphan (PROMETHAZINE-DM) 6.25-15 mg/5 mL syrup 5 mL  5 mL Oral TID PRN  
 insulin lispro (HUMALOG) injection   SubCUTAneous TIDAC  insulin glargine (LANTUS) injection 18 Units  18 Units SubCUTAneous DAILY  insulin glargine (LANTUS) injection 10 Units  10 Units SubCUTAneous QHS  arformoteroL (BROVANA) neb solution 15 mcg  15 mcg Nebulization BID RT And  
 budesonide (PULMICORT) 500 mcg/2 ml nebulizer suspension  500 mcg Nebulization BID RT  
 albuterol (PROVENTIL VENTOLIN) nebulizer solution 2.5 mg  2.5 mg Nebulization Q4H PRN  
 levoFLOXacin (LEVAQUIN) 250 mg in D5W IVPB  250 mg IntraVENous Q24H  sodium chloride (NS) flush 5-40 mL  5-40 mL IntraVENous Q8H  
 sodium chloride (NS) flush 5-40 mL  5-40 mL IntraVENous PRN  
 acetaminophen (TYLENOL) tablet 650 mg  650 mg Oral Q4H PRN  
 dilTIAZem (CARDIZEM) IR tablet 30 mg  30 mg Oral Q24H Review of Symptoms: A comprehensive review of systems was negative except for that written in the HPI. Physical Exam 
 
Visit Vitals /74 (BP 1 Location: Right arm, BP Patient Position: Sitting) Pulse (!) 114 Temp 97.4 °F (36.3 °C) Resp 16 Wt 172 lb (78 kg) SpO2 93% BMI 33.59 kg/m² Neck: supple, symmetrical, trachea midline, no adenopathy, no carotid bruit and no JVD Heart: irregularly irregular rhythm, S1, S2 normal, no S3 or S4 Lungs: few scattered wheezes and dry cough Abdomen: soft, non-tender. Bowel sounds normal. No masses,  no organomegaly Extremities: edema 1+ right Pulses: cannot palpate pt/dp Neurologic: Grossly normal 
 
Cardiographics Telemetry: AFIB 
ECG: atrial fibrillation, rate 114bpm, non specific st t changes Echocardiogram: Not done Recent radiology, intake/output and wt reviewed Labs:  
Recent Results (from the past 24 hour(s)) CBC WITH AUTOMATED DIFF Collection Time: 01/31/20  3:55 PM  
Result Value Ref Range WBC 12.3 (H) 3.6 - 11.0 K/uL  
 RBC 5.32 (H) 3.80 - 5.20 M/uL  
 HGB 14.1 11.5 - 16.0 g/dL HCT 43.2 35.0 - 47.0 % MCV 81.2 80.0 - 99.0 FL  
 MCH 26.5 26.0 - 34.0 PG  
 MCHC 32.6 30.0 - 36.5 g/dL  
 RDW 14.5 11.5 - 14.5 % PLATELET 782 523 - 418 K/uL MPV 9.2 8.9 - 12.9 FL  
 NRBC 0.0 0  WBC ABSOLUTE NRBC 0.00 0.00 - 0.01 K/uL NEUTROPHILS 67 32 - 75 % LYMPHOCYTES 21 12 - 49 % MONOCYTES 6 5 - 13 % EOSINOPHILS 3 0 - 7 % BASOPHILS 1 0 - 1 % IMMATURE GRANULOCYTES 2 (H) 0.0 - 0.5 % ABS. NEUTROPHILS 8.4 (H) 1.8 - 8.0 K/UL  
 ABS. LYMPHOCYTES 2.6 0.8 - 3.5 K/UL  
 ABS. MONOCYTES 0.7 0.0 - 1.0 K/UL  
 ABS. EOSINOPHILS 0.4 0.0 - 0.4 K/UL ABS. BASOPHILS 0.1 0.0 - 0.1 K/UL  
 ABS. IMM. GRANS. 0.2 (H) 0.00 - 0.04 K/UL  
 DF AUTOMATED METABOLIC PANEL, COMPREHENSIVE Collection Time: 01/31/20  3:55 PM  
Result Value Ref Range Sodium 135 (L) 136 - 145 mmol/L Potassium 3.7 3.5 - 5.1 mmol/L Chloride 98 97 - 108 mmol/L  
 CO2 31 21 - 32 mmol/L Anion gap 6 5 - 15 mmol/L Glucose 143 (H) 65 - 100 mg/dL BUN 18 6 - 20 MG/DL Creatinine 1.34 (H) 0.55 - 1.02 MG/DL  
 BUN/Creatinine ratio 13 12 - 20 GFR est AA 46 (L) >60 ml/min/1.73m2 GFR est non-AA 38 (L) >60 ml/min/1.73m2 Calcium 9.1 8.5 - 10.1 MG/DL Bilirubin, total 0.6 0.2 - 1.0 MG/DL  
 ALT (SGPT) 20 12 - 78 U/L  
 AST (SGOT) 20 15 - 37 U/L Alk. phosphatase 111 45 - 117 U/L Protein, total 7.1 6.4 - 8.2 g/dL Albumin 3.3 (L) 3.5 - 5.0 g/dL Globulin 3.8 2.0 - 4.0 g/dL A-G Ratio 0.9 (L) 1.1 - 2.2 SAMPLES BEING HELD Collection Time: 01/31/20  3:55 PM  
Result Value Ref Range SAMPLES BEING HELD  1 red, 1 blue, i set of bc COMMENT Add-on orders for these samples will be processed based on acceptable specimen integrity and analyte stability, which may vary by analyte. TROPONIN I Collection Time: 01/31/20  3:55 PM  
Result Value Ref Range Troponin-I, Qt. <0.05 <0.05 ng/mL EKG, 12 LEAD, INITIAL Collection Time: 01/31/20  4:19 PM  
Result Value Ref Range Ventricular Rate 114 BPM  
 Atrial Rate 91 BPM  
 QRS Duration 86 ms  
 Q-T Interval 370 ms QTC Calculation (Bezet) 509 ms Calculated R Axis 27 degrees Calculated T Axis 38 degrees Diagnosis Atrial fibrillation Nonspecific ST and T wave abnormality When compared with ECG of 23-AUG-2017 14:30, Atrial fibrillation has replaced Sinus rhythm Vent. rate has increased BY  54 BPM 
ST now depressed in Lateral leads Nonspecific T wave abnormality, worse in Inferior leads Nonspecific T wave abnormality now evident in Anterolateral leads Confirmed by Laura Garcia MD, Flaco Miguel ([id number]) on 1/31/2020 5:53:51 PM 
  
URINALYSIS W/MICROSCOPIC Collection Time: 01/31/20  6:39 PM  
Result Value Ref Range Color YELLOW/STRAW Appearance CLEAR CLEAR Specific gravity 1.005 1.003 - 1.030    
 pH (UA) 7.5 5.0 - 8.0 Protein NEGATIVE  NEG mg/dL Glucose NEGATIVE  NEG mg/dL Ketone NEGATIVE  NEG mg/dL Bilirubin NEGATIVE  NEG Blood NEGATIVE  NEG Urobilinogen 0.2 0.2 - 1.0 EU/dL Nitrites NEGATIVE  NEG Leukocyte Esterase NEGATIVE  NEG    
 WBC 0-4 0 - 4 /hpf  
 RBC 0-5 0 - 5 /hpf Epithelial cells FEW FEW /lpf Bacteria NEGATIVE  NEG /hpf Hyaline cast 0-2 0 - 5 /lpf URINE CULTURE HOLD SAMPLE Collection Time: 01/31/20  6:39 PM  
Result Value Ref Range Urine culture hold URINE ON HOLD IN MICROBIOLOGY DEPT FOR 3 DAYS. IF UNPRESERVED URINE IS SUBMITTED, IT CANNOT BE USED FOR ADDITIONAL TESTING AFTER 24 HRS, RECOLLECTION WILL BE REQUIRED. GLUCOSE, POC Collection Time: 01/31/20 11:36 PM  
Result Value Ref Range Glucose (POC) 374 (H) 65 - 100 mg/dL Performed by Catrina onefinestay METABOLIC PANEL, BASIC Collection Time: 02/01/20  2:59 AM  
Result Value Ref Range Sodium 131 (L) 136 - 145 mmol/L Potassium 3.9 3.5 - 5.1 mmol/L Chloride 99 97 - 108 mmol/L  
 CO2 25 21 - 32 mmol/L Anion gap 7 5 - 15 mmol/L Glucose 321 (H) 65 - 100 mg/dL BUN 23 (H) 6 - 20 MG/DL Creatinine 1.39 (H) 0.55 - 1.02 MG/DL  
 BUN/Creatinine ratio 17 12 - 20 GFR est AA 44 (L) >60 ml/min/1.73m2 GFR est non-AA 36 (L) >60 ml/min/1.73m2 Calcium 8.8 8.5 - 10.1 MG/DL  
CBC WITH AUTOMATED DIFF Collection Time: 02/01/20  2:59 AM  
Result Value Ref Range WBC 12.4 (H) 3.6 - 11.0 K/uL  
 RBC 5.35 (H) 3.80 - 5.20 M/uL  
 HGB 14.2 11.5 - 16.0 g/dL HCT 42.6 35.0 - 47.0 % MCV 79.6 (L) 80.0 - 99.0 FL  
 MCH 26.5 26.0 - 34.0 PG  
 MCHC 33.3 30.0 - 36.5 g/dL  
 RDW 14.4 11.5 - 14.5 % PLATELET 930 234 - 791 K/uL MPV 9.3 8.9 - 12.9 FL  
 NRBC 0.0 0  WBC ABSOLUTE NRBC 0.00 0.00 - 0.01 K/uL NEUTROPHILS 92 (H) 32 - 75 % LYMPHOCYTES 6 (L) 12 - 49 % MONOCYTES 1 (L) 5 - 13 % EOSINOPHILS 0 0 - 7 % BASOPHILS 0 0 - 1 % IMMATURE GRANULOCYTES 1 (H) 0.0 - 0.5 % ABS. NEUTROPHILS 11.5 (H) 1.8 - 8.0 K/UL  
 ABS. LYMPHOCYTES 0.7 (L) 0.8 - 3.5 K/UL  
 ABS. MONOCYTES 0.1 0.0 - 1.0 K/UL  
 ABS. EOSINOPHILS 0.0 0.0 - 0.4 K/UL  
 ABS. BASOPHILS 0.0 0.0 - 0.1 K/UL  
 ABS. IMM. GRANS. 0.1 (H) 0.00 - 0.04 K/UL  
 DF SMEAR SCANNED    
 PLATELET COMMENTS Large Platelets RBC COMMENTS MICROCYTOSIS 1+ 
    
 RBC COMMENTS OVALOCYTES PRESENT 
    
GLUCOSE, POC Collection Time: 02/01/20  7:33 AM  
Result Value Ref Range Glucose (POC) 297 (H) 65 - 100 mg/dL Performed by Carolina Helton RN   
GLUCOSE, POC Collection Time: 02/01/20 11:58 AM  
Result Value Ref Range Glucose (POC) 423 (H) 65 - 100 mg/dL Performed by Morgan Segovia

## 2020-02-02 NOTE — PROGRESS NOTES
Problem: Falls - Risk of 
Goal: *Absence of Falls Description Document Willis Bethea Fall Risk and appropriate interventions in the flowsheet. Outcome: Progressing Towards Goal 
Note: Fall Risk Interventions: 
Mobility Interventions: Communicate number of staff needed for ambulation/transfer, Patient to call before getting OOB, Utilize walker, cane, or other assistive device Discussed with patient importance of calling before getting OOB and using assistive device when ambulating to bathroom (cane). Medication Interventions: Assess postural VS orthostatic hypotension, Evaluate medications/consider consulting pharmacy, Patient to call before getting OOB, Teach patient to arise slowly Discussed with patient side effects of medications affecting BP - patient taught to rise slowly and report dizziness when ambulating within room. Goal: Medications Outcome: Progressing Towards Goal 
Discussed with patient the side effects and outcomes of her anticoagulant, Eliquis, and cardizem to keep her afib controlled while in the hospital. Cardiology saw patient today. Problem: Diabetes Maintenance:Discharge Outcomes Goal: *Aware of nutrition guidelines Outcome: Progressing Towards Goal 
 
Patient verbalized understanding of diabetic consistent carb diet, and can expect to receive meal trays that follow this diet. Goal: *Verbalizes information about medication Description Verbalizes name, dosage, time, side effects, and number of days to 
continue medications. Outcome: Progressing Towards Goal 
 
Patient verbalized understanding of insulin dosages while in the hospital.  
  
Problem: Breathing Pattern - Ineffective Goal: *Absence of hypoxia Outcome: Progressing Towards Goal 
  
Patient was able to wean off of 4L NC at start of this shift, to RA with O2 sats 90 and above.

## 2020-02-02 NOTE — PROGRESS NOTES
2/2/2020 Admit Date: 1/31/2020 Admit Diagnosis: Acute asthmatic bronchitis [J45.909] Active Problems: 
  Coronary atherosclerosis of native coronary artery (11/18/2010) Hypertension complicating diabetes ([facility] (11/17/2016) Atrial fibrillation ([facility] (4/24/2017) Type 2 diabetes with nephropathy ([facility] (2/12/2018) Acute asthmatic bronchitis (1/31/2020) Subjective: 
Still with some wheezing and paroxysmal coughing. Cant lie flat. Heart rate up a bit more [place] Huger denies chest pain, dyspnea, palpitations, syncope. Assessment/Plan: 
She is willing to try a little more cardiazem so I have increased to 60 tid. Dr Larry Ricks to see Monday, will see about timing pacer and av node ablation Xavi Duarte MD 
 
Objective:  
 
Visit Vitals /70 Pulse (!) 115 Temp 97.6 °F (36.4 °C) Resp 19 Wt 172 lb 9.6 oz (78.3 kg) SpO2 95% BMI 33.71 kg/m² Physical Exam: 
Neck: supple, symmetrical, trachea midline, no adenopathy, no carotid bruit and no JVD Heart: irregularly irregular rhythm, S1, S2 normal, no S3 or S4 Lungs: few scattered wheezes and cough with deep inspiration Abdomen: soft, non-tender. Bowel sounds normal. No masses,  no organomegaly Extremities: edema tr right leg Labs:   
Recent Labs  
  01/31/20 
1555 TROIQ <0.05 Recent Labs 02/01/20 
0259 *  
K 3.9 CL 99 BUN 23* CREA 1.39* * CA 8.8 Recent Labs 02/01/20 
0259 WBC 12.4* HGB 14.2 HCT 42.6  No results for input(s): CHOL, LDLC in the last 72 hours. No lab exists for component: TGL, HDLC,  HBA1C Telemetry: AFIB Current Facility-Administered Medications Medication Dose Route Frequency  dilTIAZem (CARDIZEM) IR tablet 60 mg  60 mg Oral Q8H  
 methylPREDNISolone (PF) (SOLU-MEDROL) injection 40 mg  40 mg IntraVENous Q12H  
 . PHARMACY TO SUBSTITUTE PER PROTOCOL (Reordered from: acyclovir (ZOVIRAX) 5 % ointment)    Per Protocol  allopurinoL (ZYLOPRIM) tablet 100 mg  100 mg Oral DAILY  apixaban (ELIQUIS) tablet 2.5 mg  2.5 mg Oral BID  aspirin chewable tablet 81 mg  81 mg Oral DAILY  atorvastatin (LIPITOR) tablet 20 mg  20 mg Oral QHS  azelastine (ASTELIN) 137mcg/spray nasal spray  1 Spray Both Nostrils QHS  bumetanide (BUMEX) tablet 1 mg  1 mg Oral DAILY  cholecalciferol (VITAMIN D3) (1000 Units /25 mcg) tablet 2 Tab  2,000 Units Oral DAILY  lisinopril (PRINIVIL, ZESTRIL) tablet 20 mg  20 mg Oral BID  
 glipiZIDE (GLUCOTROL) tablet 10 mg  10 mg Oral ACB&D  
 isosorbide mononitrate ER (IMDUR) tablet 60 mg  60 mg Oral BID  metoprolol tartrate (LOPRESSOR) tablet 100 mg  100 mg Oral BID  nitroglycerin (NITROSTAT) tablet 0.4 mg  0.4 mg SubLINGual Q5MIN PRN  pantoprazole (PROTONIX) tablet 40 mg  40 mg Oral ACB  promethazine-dextromethorphan (PROMETHAZINE-DM) 6.25-15 mg/5 mL syrup 5 mL  5 mL Oral TID PRN  
 insulin lispro (HUMALOG) injection   SubCUTAneous TIDAC  insulin glargine (LANTUS) injection 18 Units  18 Units SubCUTAneous DAILY  insulin glargine (LANTUS) injection 10 Units  10 Units SubCUTAneous QHS  arformoteroL (BROVANA) neb solution 15 mcg  15 mcg Nebulization BID RT And  
 budesonide (PULMICORT) 500 mcg/2 ml nebulizer suspension  500 mcg Nebulization BID RT  
 albuterol (PROVENTIL VENTOLIN) nebulizer solution 2.5 mg  2.5 mg Nebulization Q4H PRN  
 levoFLOXacin (LEVAQUIN) 250 mg in D5W IVPB  250 mg IntraVENous Q24H  
 sodium chloride (NS) flush 5-40 mL  5-40 mL IntraVENous Q8H  
 sodium chloride (NS) flush 5-40 mL  5-40 mL IntraVENous PRN  
 acetaminophen (TYLENOL) tablet 650 mg  650 mg Oral Q4H PRN Data Review: current meds, labs,recent radiology, intake/output/weight and problem list reviewed

## 2020-02-02 NOTE — PROGRESS NOTES
TRANSFER - IN REPORT: 
 
Verbal report received from Willie Tracy RN(name) on Rosemary Ardon  being received from [facility]) for routine progression of care Report consisted of patients Situation, Background, Assessment and  
Recommendations(SBAR). Information from the following report(s) SBAR, Kardex and MAR was reviewed with the receiving nurse. Opportunity for questions and clarification was provided. Assessment completed upon patients arrival to unit and care assumed.

## 2020-02-02 NOTE — PROGRESS NOTES
Problem: Falls - Risk of 
Goal: *Absence of Falls Description Document Francis Campos Fall Risk and appropriate interventions in the flowsheet. Outcome: Progressing Towards Goal 
Note: Fall Risk Interventions: 
Mobility Interventions: Communicate number of staff needed for ambulation/transfer, Patient to call before getting OOB, PT Consult for mobility concerns Medication Interventions: Evaluate medications/consider consulting pharmacy, Patient to call before getting OOB, Teach patient to arise slowly Elimination Interventions: Call light in reach, Stay With Me (per policy), Toilet paper/wipes in reach, Toileting schedule/hourly rounds Problem: Afib Pathway: Day 3 Goal: Medications Outcome: Progressing Towards Goal 
Goal: Respiratory Outcome: Progressing Towards Goal 
 Pt tolerating RA throughout the shift. Continuing to monitor. Pt A fib, -110s. Cardizem frequency increased per cardiology this shift. 1200: TRANSFER - OUT REPORT: 
 
Verbal report given to Carry, RN(name) on Onita Qualia  being transferred to (unit) for routine progression of care Report consisted of patients Situation, Background, Assessment and  
Recommendations(SBAR). Information from the following report(s) SBAR, ED Summary, Intake/Output, MAR, Recent Results, Med Rec Status and Cardiac Rhythm a fib was reviewed with the receiving nurse. Lines:  
Peripheral IV 01/31/20 Left Antecubital (Active) Site Assessment Clean, dry, & intact 2/2/2020 12:00 PM  
Phlebitis Assessment 0 2/2/2020 12:00 PM  
Infiltration Assessment 0 2/2/2020 12:00 PM  
Dressing Status Clean, dry, & intact 2/2/2020 12:00 PM  
Dressing Type Tape;Transparent 2/2/2020 12:00 PM  
Hub Color/Line Status Pink;Capped 2/2/2020 12:00 PM  
Action Taken Open ports on tubing capped 2/2/2020 12:00 PM  
Alcohol Cap Used Yes 2/2/2020 12:00 PM  
  
 
Opportunity for questions and clarification was provided. Patient transported with: 
Pt chart Pt belongings

## 2020-02-02 NOTE — PROGRESS NOTES
1930: Bedside shift change report given to Kelle Shaw (oncoming nurse) by Brenda Hutson RN (offgoing nurse). Report included the following information SBAR, ED Summary, Intake/Output, MAR, Recent Results, Med Rec Status and Cardiac Rhythm A fib.

## 2020-02-02 NOTE — PROGRESS NOTES
Medical Progress Note NAME: Kimberlee Calvert :  1935 MRM:  [id number] Date/Time: 2020  10:44 AM 
 
 
  
Problem List:  
 
Active Problems: 
  Coronary atherosclerosis of native coronary artery (2010) Hypertension complicating diabetes ([facility] (2016) Atrial fibrillation ([facility] (2017) Type 2 diabetes with nephropathy ([facility] (2018) Acute asthmatic bronchitis (2020) Subjective:  
 
Patient c/o dizziness Past Medical History:  
Diagnosis Date  Atrial fibrillation ([facility]  CAD (coronary artery disease)  Cerebrovascular accident stroke, other, unspec 2010  Coronary atherosclerosis of native coronary artery 2010  Essential hypertension  Essential hypertension, benign 2010  HLD (hyperlipidemia) 2010  PVD (peripheral vascular disease) ([facility]   Type II or unspecified type diabetes mellitus without mention of complication, not stated as uncontrolled 2010  Zoster ROS:  General: postive for weakness Respiratory:  positive for cough Cardiology:  positive for palpitations Gastrointestinal: negative for abdominal pain, N/V, dysphagia, change in bowel habits, bleeding Objective:  
 
 
Vitals:  
 
 
  
Last 24hrs VS reviewed since prior progress note. Most recent are: 
 
Visit Vitals /70 Pulse (!) 115 Temp 97.6 °F (36.4 °C) Resp 19 Wt 172 lb 9.6 oz (78.3 kg) SpO2 95% BMI 33.71 kg/m² SpO2 Readings from Last 6 Encounters:  
20 95% 20 91% 20 95% 01/10/20 98% 19 91% 19 97% O2 Flow Rate (L/min): 4 l/min Intake/Output Summary (Last 24 hours) at 2020 1044 Last data filed at 2020 0000 Gross per 24 hour Intake 1320 ml Output  Net 1320 ml Exam:  
 
General   Morbidly obese 82 yo wf 
Respiratory   clear Cardiology  irreg Abdominal  Soft, non-tender, non-distended, positive bowel sounds, no hepatosplenomegaly Extremities  Decreasing edema Lab Data Reviewed: (see below) Medications Reviewed: (see below) 
 
______________________________________________________________________ Medications:  
 
Current Facility-Administered Medications Medication Dose Route Frequency  dilTIAZem (CARDIZEM) IR tablet 60 mg  60 mg Oral Q8H  
 methylPREDNISolone (PF) (SOLU-MEDROL) injection 40 mg  40 mg IntraVENous Q12H  
 . PHARMACY TO SUBSTITUTE PER PROTOCOL (Reordered from: acyclovir (ZOVIRAX) 5 % ointment)    Per Protocol  allopurinoL (ZYLOPRIM) tablet 100 mg  100 mg Oral DAILY  apixaban (ELIQUIS) tablet 2.5 mg  2.5 mg Oral BID  aspirin chewable tablet 81 mg  81 mg Oral DAILY  atorvastatin (LIPITOR) tablet 20 mg  20 mg Oral QHS  azelastine (ASTELIN) 137mcg/spray nasal spray  1 Spray Both Nostrils QHS  bumetanide (BUMEX) tablet 1 mg  1 mg Oral DAILY  cholecalciferol (VITAMIN D3) (1000 Units /25 mcg) tablet 2 Tab  2,000 Units Oral DAILY  lisinopril (PRINIVIL, ZESTRIL) tablet 20 mg  20 mg Oral BID  
 glipiZIDE (GLUCOTROL) tablet 10 mg  10 mg Oral ACB&D  
 isosorbide mononitrate ER (IMDUR) tablet 60 mg  60 mg Oral BID  metoprolol tartrate (LOPRESSOR) tablet 100 mg  100 mg Oral BID  nitroglycerin (NITROSTAT) tablet 0.4 mg  0.4 mg SubLINGual Q5MIN PRN  pantoprazole (PROTONIX) tablet 40 mg  40 mg Oral ACB  promethazine-dextromethorphan (PROMETHAZINE-DM) 6.25-15 mg/5 mL syrup 5 mL  5 mL Oral TID PRN  
 insulin lispro (HUMALOG) injection   SubCUTAneous TIDAC  insulin glargine (LANTUS) injection 18 Units  18 Units SubCUTAneous DAILY  insulin glargine (LANTUS) injection 10 Units  10 Units SubCUTAneous QHS  arformoteroL (BROVANA) neb solution 15 mcg  15 mcg Nebulization BID RT  And  
 budesonide (PULMICORT) 500 mcg/2 ml nebulizer suspension  500 mcg Nebulization BID RT  
 albuterol (PROVENTIL VENTOLIN) nebulizer solution 2.5 mg  2.5 mg Nebulization Q4H PRN  
  levoFLOXacin (LEVAQUIN) 250 mg in D5W IVPB  250 mg IntraVENous Q24H  
 sodium chloride (NS) flush 5-40 mL  5-40 mL IntraVENous Q8H  
 sodium chloride (NS) flush 5-40 mL  5-40 mL IntraVENous PRN  
 acetaminophen (TYLENOL) tablet 650 mg  650 mg Oral Q4H PRN Lab Review:  
 
Recent Labs 02/01/20 
0259 01/31/20 
1555 WBC 12.4* 12.3* HGB 14.2 14.1 HCT 42.6 43.2  231 Recent Labs 02/01/20 
0259 01/31/20 
1555 * 135* K 3.9 3.7 CL 99 98  
CO2 25 31 * 143* BUN 23* 18  
CREA 1.39* 1.34* CA 8.8 9.1 ALB  --  3.3* TBILI  --  0.6 SGOT  --  20 ALT  --  20 Lab Results Component Value Date/Time Glucose (POC) 270 (H) 02/02/2020 06:45 AM  
 Glucose (POC) 228 (H) 02/01/2020 08:59 PM  
 Glucose (POC) 366 (H) 02/01/2020 04:25 PM  
 Glucose (POC) 423 (H) 02/01/2020 11:58 AM  
 Glucose (POC) 297 (H) 02/01/2020 07:33 AM  
 
No results for input(s): PH, PCO2, PO2, HCO3, FIO2 in the last 72 hours. No results for input(s): INR, INREXT in the last 72 hours. Other pertinent lab: NA Assessment:  
 
Patient Active Problem List  
Diagnosis Code  Coronary atherosclerosis of native coronary artery I25.10  
 HLD (hyperlipidemia) E78.5  Cerebrovascular accident stroke, other, unspec I67.89  
 Hypertension complicating diabetes ([facility] E11.59, I10  
 PVD (peripheral vascular disease) with claudication (HCC) I73.9  Atrial fibrillation (HCC) I48.91  
 SOB (shortness of breath) R06.02  
 Type 2 diabetes with nephropathy (HCC) E11.21  
 Severe obesity (BMI 35.0-39. 9) with comorbidity ([address] E66.01  
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 Asthmatic bronchitis- change to po pred 2. A fib- per Dr. Ravi Espitia will increase dilt  . On eliquis 3. Diabetes- should improve on dec steroids 4. ckd- monitor ___________________________________________________ Attending Physician: Mark Winn MD

## 2020-02-02 NOTE — PROGRESS NOTES
Problem: Falls - Risk of 
Goal: *Absence of Falls Description Document Seymour Rizo Fall Risk and appropriate interventions in the flowsheet. Outcome: Progressing Towards Goal 
Note:  
Fall Risk Interventions: 
Mobility Interventions: Communicate number of staff needed for ambulation/transfer, Patient to call before getting OOB, Utilize walker, cane, or other assistive device Medication Interventions: Assess postural VS orthostatic hypotension, Evaluate medications/consider consulting pharmacy, Patient to call before getting OOB, Teach patient to arise slowly Problem: Afib Pathway: Day 2 Goal: Respiratory Outcome: Progressing Towards Goal 
Note: Pt weaned to RA during day shift. Pt remains on continuous oxygen monitoring overnight to ensure adequate oxygen saturation. Pt's oxygen saturations remained above 90% during shift. Will continue to monitor. Problem: Diabetes Maintenance:Ongoing Goal: Medications Outcome: Progressing Towards Goal 
  
Bedside shift change report given to Keya Wills RN (oncoming nurse) by Adan Chamorro RN (offgoing nurse). Report included the following information SBAR, Kardex, ED Summary, Procedure Summary, Intake/Output, MAR, Recent Results and Cardiac Rhythm Afib.

## 2020-02-03 NOTE — CONSULTS
Cardiac Electrophysiology Hospital Consultation Note Subjective:  
  
Kimberlee Calvert is a 81 y.o. patient who is seen for consideration for AV node ablation & leadless pacemaker. She had been scheduled for procedure recently, but postponed due to cough. She was admitted on 01/31/2020 with acute asthmatic bronchitis, had been treated PTA with doxycycline x 2, Levaquin po, brief prednisone taper, & Symbicort. During admission, treated with solumedrol IV, Levaquin IV, Brovanna, & Performomist duoneb. CXR showed mild bibasilar atelectasis & interstitial prominence. Afebrile. AF with intermittent RVR noted. Admission ECG showed AF with RVR (rate 114 bpm). BP stable. States she feels better today compared to date of admission, still coughing with deep breaths. She has been treated for persistent AF, has had frequent intermittent episodes of RVR. Medications for ventricular rate control have caused fatigue & she has had dizziness. She notes chronic CALDERA. Anticoagulated with low dose Eliquis (age, renal function), denies bleeding issues. Previous: 
Holter (11/07/2019): AF  bpm. 
  
Echo (04/17/2019): LVEF 53%. LA mod dilated. RA mildly dilated. Mild MR. Mild aortic valve sclerosis without significant stenosis. Mild PH. 
  
Nuclear stress (04/17/2019): Fixed anterior defect as in 2018, LVEF 45%. 
  
S/p POP/CV 04/2017. 
  
Previous CVA that she believes was r/t cardiac cath (mild CAD in 2014).   
Primary cardiologist is Dr. Elvia Smith. 
  
 
 
 
 
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 
ICD-9-CM: 414.00, 413.9  12/13/2019 Severe obesity (BMI 35.0-39. 9) with comorbidity ([facility] ICD-10-CM: E66.01 
ICD-9-CM: 278.01  5/1/2018  Type 2 diabetes with nephropathy (HCC) ICD-10-CM: E11.21 
 ICD-9-CM: 250.40, 583.81  2/12/2018 SOB (shortness of breath) ICD-10-CM: R06.02 
ICD-9-CM: 786.05  8/23/2017 Atrial fibrillation ([facility] ICD-10-CM: I48.91 
ICD-9-CM: 427.31  4/24/2017 PVD (peripheral vascular disease) with claudication (HCC) ICD-10-CM: I73.9 ICD-9-CM: 443.9  2/20/2017 Hypertension complicating diabetes ([facility] ICD-10-CM: E11.59, I10 
ICD-9-CM: 250.80, 401.9  11/17/2016 Coronary atherosclerosis of native coronary artery ICD-10-CM: I25.10 ICD-9-CM: 414.01  11/18/2010 HLD (hyperlipidemia) ICD-10-CM: E78.5 ICD-9-CM: 272.4  11/18/2010 Cerebrovascular accident stroke, other, unspec ICD-10-CM: I67.89 ICD-9-CM: 501  11/18/2010 Current Facility-Administered Medications Medication Dose Route Frequency Provider Last Rate Last Dose  dilTIAZem (CARDIZEM) IR tablet 60 mg  60 mg Oral Q8H Juan Ramon Vegas III, MD   60 mg at 02/03/20 7825  predniSONE (DELTASONE) tablet 40 mg  40 mg Oral DAILY WITH BREAKFAST Jamal Drew MD   40 mg at 02/03/20 5864  . PHARMACY TO SUBSTITUTE PER PROTOCOL (Reordered from: acyclovir (ZOVIRAX) 5 % ointment)    Per Protocol Lucy Spray MD IZAIAH      
 allopurinoL (ZYLOPRIM) tablet 100 mg  100 mg Oral DAILY Lucy Spray MD IZAIAH   100 mg at 02/03/20 6181  apixaban (ELIQUIS) tablet 2.5 mg  2.5 mg Oral BID Lucy Spray MD IZAIAH   2.5 mg at 02/03/20 5820  aspirin chewable tablet 81 mg  81 mg Oral DAILY Lucy Spray IV, MD   81 mg at 02/02/20 2204  atorvastatin (LIPITOR) tablet 20 mg  20 mg Oral QHS Lucy Spray IV, MD   20 mg at 02/02/20 2224  azelastine (ASTELIN) 137mcg/spray nasal spray  1 Spray Both Nostrils QHS Lucy Spray IV, MD   1 Jud at 02/02/20 2306  bumetanide (BUMEX) tablet 1 mg  1 mg Oral DAILY Lucy Spray IV, MD   1 mg at 02/03/20 6553  cholecalciferol (VITAMIN D3) (1000 Units /25 mcg) tablet 2 Tab  2,000 Units Oral DAILY Moncho Tan IV, MD   2 Tab at 02/03/20 2863  lisinopril (PRINIVIL, ZESTRIL) tablet 20 mg  20 mg Oral BID Moncho Tan IV, MD   20 mg at 02/03/20 7978  
 glipiZIDE (GLUCOTROL) tablet 10 mg  10 mg Oral ACB&D Moncho Tan IV, MD   10 mg at 02/03/20 0844  
 isosorbide mononitrate ER (IMDUR) tablet 60 mg  60 mg Oral BID Kathycia Britney BLISS MD   60 mg at 02/03/20 7681  metoprolol tartrate (LOPRESSOR) tablet 100 mg  100 mg Oral BID Moncho Tan IV, MD   100 mg at 02/03/20 1718  
 nitroglycerin (NITROSTAT) tablet 0.4 mg  0.4 mg SubLINGual Q5MIN PRN Crystala Britney BLISS MD      
 pantoprazole (PROTONIX) tablet 40 mg  40 mg Oral ACB Moncho Tan IV, MD   40 mg at 02/03/20 5506  promethazine-dextromethorphan (PROMETHAZINE-DM) 6.25-15 mg/5 mL syrup 5 mL  5 mL Oral TID PRN Moncho Tan IV, MD      
 insulin lispro (HUMALOG) injection   SubCUTAneous Daryl Tan IV, MD   9 Units at 02/03/20 0843  
 insulin glargine (LANTUS) injection 18 Units  18 Units SubCUTAneous DAILY Moncho Tan IV, MD   18 Units at 02/03/20 8938  insulin glargine (LANTUS) injection 10 Units  10 Units SubCUTAneous QHS Moncho Tan IV, MD   10 Units at 02/02/20 2207  arformoteroL (BROVANA) neb solution 15 mcg  15 mcg Nebulization BID RT Kathycia Britney BLISS MD   15 mcg at 02/03/20 1128 And  budesonide (PULMICORT) 500 mcg/2 ml nebulizer suspension  500 mcg Nebulization BID RT Kathycia Britney BLISS MD   500 mcg at 02/03/20 2042  albuterol (PROVENTIL VENTOLIN) nebulizer solution 2.5 mg  2.5 mg Nebulization Q4H PRN Moncho Tan IV, MD      
 levoFLOXacin [facility]) 250 mg in D5W IVPB  250 mg IntraVENous Q24H Paticia Britney BLISS MD 50 mL/hr at 02/02/20 2032 250 mg at 02/02/20 2032  sodium chloride (NS) flush 5-40 mL  5-40 mL IntraVENous Q8H Kathycia Britney BLISS MD   10 mL at 02/03/20 0417  sodium chloride (NS) flush 5-40 mL  5-40 mL IntraVENous PRN Vivek Martin IV, MD   10 mL at 20 6221  acetaminophen (TYLENOL) tablet 650 mg  650 mg Oral Q4H PRN Julia Valverde MD      
 
Allergies Allergen Reactions  Bactrim [Sulfamethoprim] Other (comments) Affects her kidney funtion  Clonidine Shortness of Breath and Swelling Lightheaded,   
 Codeine Unknown (comments)  Keflin Itching  Pcn [Penicillins] Unknown (comments)  Tramadol Other (comments) Makes her head feel swishy Past Medical History:  
Diagnosis Date  Atrial fibrillation ([facility]  CAD (coronary artery disease)  Cerebrovascular accident stroke, other, unspec 2010  Coronary atherosclerosis of native coronary artery 2010  Essential hypertension  Essential hypertension, benign 2010  HLD (hyperlipidemia) 2010  PVD (peripheral vascular disease) ([facility] 2017  Type II or unspecified type diabetes mellitus without mention of complication, not stated as uncontrolled 2010  Zoster Past Surgical History:  
Procedure Laterality Date  HX CORONARY STENT PLACEMENT    
 HX HEART CATHETERIZATION    
 [address] History reviewed. No pertinent family history. Social History Tobacco Use  Smoking status: Former Smoker Packs/day: 0.80 Years: 20.00 Pack years: 16.00 Types: Cigarettes Last attempt to quit: 1986 Years since quittin.8  Smokeless tobacco: Never Used Substance Use Topics  Alcohol use: No  
  
 
Review of Systems:  
Constitutional: Negative for fever, chills, weight loss, malaise/fatigue. HEENT: Negative for nosebleeds, vision changes. Respiratory: + cough productive of white sputum, no hemoptysis Cardiovascular: Negative for chest pain, palpitations, orthopnea, claudication, leg swelling, syncope, and PND. + chronic CALDERA, intermittent dizziness Gastrointestinal: Negative for nausea, vomiting, diarrhea, blood in stool and melena. Genitourinary: Negative for dysuria, and hematuria. Musculoskeletal: Negative for myalgias, arthralgia. Skin: Negative for rash. Heme: Does not bleed or bruise easily. Neurological: Negative for speech change and focal weakness Objective:  
 
Visit Vitals /83 (BP Patient Position: Sitting) Pulse 98 Temp 98.3 °F (36.8 °C) Resp 18 Wt 172 lb 9.6 oz (78.3 kg) SpO2 92% BMI 33.71 kg/m² Physical Exam:  
Constitutional: Well-developed and well-nourished. No respiratory distress. Head: Normocephalic and atraumatic. Eyes: Pupils are equal, round ENT: Hearing grossly normal 
Neck: Supple. No JVD present. Cardiovascular: Tachycardic rate, irregular rhythm. Exam reveals no gallop and no friction rub. No murmur heard. Pulmonary/Chest: Effort normal and faint wheezing. Abdominal: Soft, no tenderness. + moderate obesity. Musculoskeletal: + bilat lower extremity edema (R>L). Neurological: Alert,oriented. Skin: Skin is warm and dry Psychiatric: Normal mood and affect. Behavior is normal. Judgment and thought content normal.   
 
 
Assessment/Plan: Ms. Nidia Jade was admitted with acute asthmatic bronchitis, had been scheduled for AV node ablation & leadless pacemaker recently, but had postponed due to cough, inability to lay flat. Currently scheduled for 03/02/2020. Persistent AF with periods of RVR despite medication for rate control. Unable to tolerate diltiazem, metoprolol long term. Fatigued & dizzy, has CALDERA & palpitations. She does not want to undergo AF ablation due to increased risk of bleeding & CVA. She does not want transvenous pacemaker due to concerns regarding electrocautery (has frequent derm biopsies).  
 
Given asthmatic bronchitis & likelihood of recurrence with increasing environmental allergens in the near future, advise that she have AV node ablation & leadless pacemaker done at the end of current admission once she has received further steroids. Risks/benefits discussed, & she agrees to trying to have procedure at the end of current admission once her condition has improved. Eliquis will need to be held (preferably x 2 days) prior to leadless pacemaker procedure due to large sheath diameter, bleeding risk. Received usual dose this morning. MICHELLE Boss 9 [facility] 02/03/20 Addendum from EP attending: 
 I have seen, examined patient, and discussed with nurse practitioner, registered nurse, reviewed, updated note and agree with the assessment and plan I have talked to her this am. She is feeling that she is better She moved leadless pacemaker and av node ablation from 1/27 to march 2 because of the cough but now she is here and is getting better so she wants to proceed with it while she is here Vital signs as above Exam shows irregular rhythm fast rate Chest wall with exp wheezes 
obese Assessment and Plan: 
I will hold eliquis and plan for leadless pacer implant with av node ablation on Wednesday 230 pm 
She agrees to the plan Thank you for involving me in this patient's care and please call with further concerns or questions. Raul Chávez M.D. Electrophysiology/Cardiology [facility] [place] [address], [address] [place] [address] 
[phone]                                        [phone]

## 2020-02-03 NOTE — PROGRESS NOTES
Medical Progress Note NAME: Cathy Mosley :  1935 MRM:  [id number] Date/Time: 2/3/2020 Problem List:  
 
Active Problems: 
  Coronary atherosclerosis of native coronary artery (2010) Hypertension complicating diabetes ([address] (2016) Atrial fibrillation ([address] (2017) Type 2 diabetes with nephropathy ([address] (2018) Acute asthmatic bronchitis (2020) Subjective:  
 
Cough improved. Breathing fine . Denies cp, palpitations, dizziness Past Medical History:  
Diagnosis Date  Atrial fibrillation ([address]  CAD (coronary artery disease)  Cerebrovascular accident stroke, other, unspec 2010  Coronary atherosclerosis of native coronary artery 2010  Essential hypertension  Essential hypertension, benign 2010  HLD (hyperlipidemia) 2010  PVD (peripheral vascular disease) ([facility]   Type II or unspecified type diabetes mellitus without mention of complication, not stated as uncontrolled 2010  Zoster Objective:  
 
 
 
Vitals:  
  
Last 24hrs VS reviewed since prior progress note. Most recent are: 
 
Visit Vitals /81 (BP 1 Location: Right arm, BP Patient Position: Post activity; Sitting) Pulse 98 Temp 97.8 °F (36.6 °C) Resp 16 Wt 172 lb 9.6 oz (78.3 kg) SpO2 94% BMI 33.71 kg/m² SpO2 Readings from Last 6 Encounters:  
20 94% 20 91% 20 95% 01/10/20 98% 19 91% 19 97% O2 Flow Rate (L/min): 4 l/min Intake/Output Summary (Last 24 hours) at 2/3/2020 3621 Last data filed at 2020 1801 Gross per 24 hour Intake 118 ml Output  Net 118 ml Exam:  
   General:  Alert, cooperative, no distress, appears stated age. Lungs:   Rare I and E wheezes Heart:  Irregular rate and rhythm, S1, S2 normal, no murmur, click, rub or gallop. Abdomen:   Soft, non-tender.  Bowel sounds normal. No masses,  No organomegaly. Extremities: Rt 2+ pretib edema No left pedaledema. Lab Data Reviewed: (see below) Recent Results (from the past 24 hour(s)) GLUCOSE, POC Collection Time: 02/02/20 11:36 AM  
Result Value Ref Range Glucose (POC) 361 (H) 65 - 100 mg/dL Performed by Shashi Pablo GLUCOSE, POC Collection Time: 02/02/20  4:31 PM  
Result Value Ref Range Glucose (POC) 312 (H) 65 - 100 mg/dL Performed by [facility] METABOLIC PANEL, BASIC Collection Time: 02/03/20  3:21 AM  
Result Value Ref Range Sodium 133 (L) 136 - 145 mmol/L Potassium 3.9 3.5 - 5.1 mmol/L Chloride 99 97 - 108 mmol/L  
 CO2 26 21 - 32 mmol/L Anion gap 8 5 - 15 mmol/L Glucose 274 (H) 65 - 100 mg/dL BUN 38 (H) 6 - 20 MG/DL Creatinine 1.56 (H) 0.55 - 1.02 MG/DL  
 BUN/Creatinine ratio 24 (H) 12 - 20 GFR est AA 38 (L) >60 ml/min/1.73m2 GFR est non-AA 32 (L) >60 ml/min/1.73m2 Calcium 8.6 8.5 - 10.1 MG/DL Medications Reviewed: (see below) 
 
______________________________________________________________________ Medications:  
 
Current Facility-Administered Medications Medication Dose Route Frequency  dilTIAZem (CARDIZEM) IR tablet 60 mg  60 mg Oral Q8H  predniSONE (DELTASONE) tablet 40 mg  40 mg Oral DAILY WITH BREAKFAST  
 . PHARMACY TO SUBSTITUTE PER PROTOCOL (Reordered from: acyclovir (ZOVIRAX) 5 % ointment)    Per Protocol  allopurinoL (ZYLOPRIM) tablet 100 mg  100 mg Oral DAILY  apixaban (ELIQUIS) tablet 2.5 mg  2.5 mg Oral BID  aspirin chewable tablet 81 mg  81 mg Oral DAILY  atorvastatin (LIPITOR) tablet 20 mg  20 mg Oral QHS  azelastine (ASTELIN) 137mcg/spray nasal spray  1 Spray Both Nostrils QHS  bumetanide (BUMEX) tablet 1 mg  1 mg Oral DAILY  cholecalciferol (VITAMIN D3) (1000 Units /25 mcg) tablet 2 Tab  2,000 Units Oral DAILY  lisinopril (PRINIVIL, ZESTRIL) tablet 20 mg  20 mg Oral BID  
  glipiZIDE (GLUCOTROL) tablet 10 mg  10 mg Oral ACB&D  
 isosorbide mononitrate ER (IMDUR) tablet 60 mg  60 mg Oral BID  metoprolol tartrate (LOPRESSOR) tablet 100 mg  100 mg Oral BID  nitroglycerin (NITROSTAT) tablet 0.4 mg  0.4 mg SubLINGual Q5MIN PRN  pantoprazole (PROTONIX) tablet 40 mg  40 mg Oral ACB  promethazine-dextromethorphan (PROMETHAZINE-DM) 6.25-15 mg/5 mL syrup 5 mL  5 mL Oral TID PRN  
 insulin lispro (HUMALOG) injection   SubCUTAneous TIDAC  insulin glargine (LANTUS) injection 18 Units  18 Units SubCUTAneous DAILY  insulin glargine (LANTUS) injection 10 Units  10 Units SubCUTAneous QHS  arformoteroL (BROVANA) neb solution 15 mcg  15 mcg Nebulization BID RT And  
 budesonide (PULMICORT) 500 mcg/2 ml nebulizer suspension  500 mcg Nebulization BID RT  
 albuterol (PROVENTIL VENTOLIN) nebulizer solution 2.5 mg  2.5 mg Nebulization Q4H PRN  
 levoFLOXacin (LEVAQUIN) 250 mg in D5W IVPB  250 mg IntraVENous Q24H  
 sodium chloride (NS) flush 5-40 mL  5-40 mL IntraVENous Q8H  
 sodium chloride (NS) flush 5-40 mL  5-40 mL IntraVENous PRN  
 acetaminophen (TYLENOL) tablet 650 mg  650 mg Oral Q4H PRN Assessment:  
 
Acute Asthmatic Bronchitis improving. Pred starting today after Solumedrol earlier this stay. Continue nebs, abx Type 2 DM affected by steroids. Continue Lantus and Humalog SS  Insulin Afib. Diltiazem dose has been increased . Tosha continues Cardiology to advise about timing of pacer placement Patient Active Problem List  
Diagnosis Code  Coronary atherosclerosis of native coronary artery I25.10  
 HLD (hyperlipidemia) E78.5  Cerebrovascular accident stroke, other, unspec I67.89  
 Hypertension complicating diabetes ([facility] E11.59, I10  
 PVD (peripheral vascular disease) with claudication (HCC) I73.9  Atrial fibrillation (HCC) I48.91  
 SOB (shortness of breath) R06.02  
  Type 2 diabetes with nephropathy (HCC) E11.21  
 Severe obesity (BMI 35.0-39. 9) with comorbidity ([facility] E66.01  
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  
 
Continue care. I spoke w/ dtr on phone.  
      
 
  
 
 
  
              
 
 
 
 
 
 
      
___________________________________________________ Attending Physician: Aj Castillo MD

## 2020-02-03 NOTE — PROGRESS NOTES
[facility] [place] Division of Cardiology [phone] Progress note David Alcantara M.D., F.A.C.C. NAME:  Leslie Lu :   1935 MRN:   [id number] ICD-10-CM ICD-9-CM 1. SOB (shortness of breath) R06.02 786.05   
2. COPD exacerbation ([address] J44.1 491.21 Assessment/Plan: sob better asthmatic bronchitis Persistent AF with increased hr 
Continue BB and ca blockers 
 proceed with ppm and ablation when deemed appropriate by Dr. Lety Narayanan  
 
19 ECHO ADULT COMPLETE 2019 Narrative · Left ventricular low normal systolic function. Calculated left  
ventricular ejection fraction is 53%. Biplane method used to measure  
ejection fraction. · Left atrial cavity size is moderately dilated. · Right atrial cavity size is mildly dilated. · Mild aortic valve sclerosis with no significant stenosis. · Mitral valve thickening. Mild mitral valve regurgitation. · Mild pulmonary hypertension is present. Signed by: Greg Urias MD  
 
19 NUCLEAR CARDIAC STRESS TEST 2019 Narrative · Baseline ECG: Atrial fibrillation, non-specific ST-T wave abnormalities. · Gated SPECT: Left ventricular function post-stress was abnormal.  
Calculated ejection fraction is 45%. There is no evidence of transient  
ischemic dilation (TID). The TID ratio is 0.97. · Left ventricular perfusion is abnormal. 
· Myocardial perfusion imaging defect 1: There is a defect that is  
moderate in size with a moderate reduction in uptake present in the apical  
to basal anterior location(s) that is non-reversible. There is abnormal  
wall motion in the defect area. Viability in the area is good. caused by  
breast attenuation. The possibility of artifact cannot be excluded. Perfusion defect was visually and quantitatively present. · Myocardial perfusion imaging defect 1: caused by breast attenuation. · Abnormal myocardial perfusion imaging. Fixed defect consistent with  
prior myocardial infarction. Myocardial perfusion imaging supports an  
intermediate risk stress test. 
   
  Signed by: Evaristo Chavez MD  
 
   
EKG Results Procedure 720 Value Units Date/Time EKG 12 LEAD INITIAL [[id number]] Collected:  01/31/20 1619 Order Status:  Completed Updated:  01/31/20 1753 Ventricular Rate 114 BPM   
  Atrial Rate 91 BPM   
  QRS Duration 86 ms   
  Q-T Interval 370 ms QTC Calculation (Bezet) 509 ms Calculated R Axis 27 degrees Calculated T Axis 38 degrees Diagnosis -- Atrial fibrillation Nonspecific ST and T wave abnormality When compared with ECG of 23-AUG-2017 14:30, Atrial fibrillation has replaced Sinus rhythm Vent. rate has increased BY  54 BPM 
ST now depressed in Lateral leads Nonspecific T wave abnormality, worse in Inferior leads Nonspecific T wave abnormality now evident in Anterolateral leads Confirmed by Celine Stuart MD, Rocio Bull ([id number]) on 1/31/2020 5:53:51 PM 
  
  
 
 
 
Visit Vitals /83 (BP Patient Position: Sitting) Pulse 98 Temp 98.3 °F (36.8 °C) Resp 18 Wt 172 lb 9.6 oz (78.3 kg) SpO2 92% BMI 33.71 kg/m² Wt Readings from Last 3 Encounters:  
02/02/20 172 lb 9.6 oz (78.3 kg) 01/31/20 176 lb (79.8 kg) 01/23/20 175 lb (79.4 kg) Review of Systems:  
Pertinent items are noted in the History of Present Illness. Objective: No intake/output data recorded. 02/01 1901 - 02/03 0700 In: 218 [P.O.:118; I.V.:100] Out: - Telemetry: AFIB Physical Exam: 
 
Neck: no JVD Heart: irregularly irregular rhythm Lungs: diminished breath sounds R base, L base Abdomen: soft, non-tender. Bowel sounds normal. No masses,  no organomegaly Extremities: no edema Current Facility-Administered Medications Medication Dose Route Frequency  dilTIAZem (CARDIZEM) IR tablet 60 mg  60 mg Oral Q8H  predniSONE (DELTASONE) tablet 40 mg  40 mg Oral DAILY WITH BREAKFAST  
 . PHARMACY TO SUBSTITUTE PER PROTOCOL (Reordered from: acyclovir (ZOVIRAX) 5 % ointment)    Per Protocol  allopurinoL (ZYLOPRIM) tablet 100 mg  100 mg Oral DAILY  aspirin chewable tablet 81 mg  81 mg Oral DAILY  atorvastatin (LIPITOR) tablet 20 mg  20 mg Oral QHS  azelastine (ASTELIN) 137mcg/spray nasal spray  1 Spray Both Nostrils QHS  bumetanide (BUMEX) tablet 1 mg  1 mg Oral DAILY  cholecalciferol (VITAMIN D3) (1000 Units /25 mcg) tablet 2 Tab  2,000 Units Oral DAILY  lisinopril (PRINIVIL, ZESTRIL) tablet 20 mg  20 mg Oral BID  
 glipiZIDE (GLUCOTROL) tablet 10 mg  10 mg Oral ACB&D  
 isosorbide mononitrate ER (IMDUR) tablet 60 mg  60 mg Oral BID  metoprolol tartrate (LOPRESSOR) tablet 100 mg  100 mg Oral BID  nitroglycerin (NITROSTAT) tablet 0.4 mg  0.4 mg SubLINGual Q5MIN PRN  pantoprazole (PROTONIX) tablet 40 mg  40 mg Oral ACB  promethazine-dextromethorphan (PROMETHAZINE-DM) 6.25-15 mg/5 mL syrup 5 mL  5 mL Oral TID PRN  
 insulin lispro (HUMALOG) injection   SubCUTAneous TIDAC  insulin glargine (LANTUS) injection 18 Units  18 Units SubCUTAneous DAILY  insulin glargine (LANTUS) injection 10 Units  10 Units SubCUTAneous QHS  arformoteroL (BROVANA) neb solution 15 mcg  15 mcg Nebulization BID RT And  
 budesonide (PULMICORT) 500 mcg/2 ml nebulizer suspension  500 mcg Nebulization BID RT  
 albuterol (PROVENTIL VENTOLIN) nebulizer solution 2.5 mg  2.5 mg Nebulization Q4H PRN  
 levoFLOXacin (LEVAQUIN) 250 mg in D5W IVPB  250 mg IntraVENous Q24H  
 sodium chloride (NS) flush 5-40 mL  5-40 mL IntraVENous Q8H  
 sodium chloride (NS) flush 5-40 mL  5-40 mL IntraVENous PRN  
 acetaminophen (TYLENOL) tablet 650 mg  650 mg Oral Q4H PRN Lab Results Component Value Date/Time  WBC 12.4 (H) 02/01/2020 02:59 AM  
 HGB (POC) 14.6 01/23/2020 02:50 PM  
 HGB 14.2 02/01/2020 02:59 AM  
 HCT (POC) 45.5 01/23/2020 02:50 PM  
 HCT 42.6 02/01/2020 02:59 AM  
 PLATELET 652 01/69/5771 02:59 AM  
 MCV 79.6 (L) 02/01/2020 02:59 AM  
 
Lab Results Component Value Date/Time Sodium 133 (L) 02/03/2020 03:21 AM  
 Potassium 3.9 02/03/2020 03:21 AM  
 Chloride 99 02/03/2020 03:21 AM  
 CO2 26 02/03/2020 03:21 AM  
 Anion gap 8 02/03/2020 03:21 AM  
 Glucose 274 (H) 02/03/2020 03:21 AM  
 BUN 38 (H) 02/03/2020 03:21 AM  
 Creatinine 1.56 (H) 02/03/2020 03:21 AM  
 BUN/Creatinine ratio 24 (H) 02/03/2020 03:21 AM  
 GFR est AA 38 (L) 02/03/2020 03:21 AM  
 GFR est non-AA 32 (L) 02/03/2020 03:21 AM  
 Calcium 8.6 02/03/2020 03:21 AM  
 
Lab Results Component Value Date/Time Cholesterol, total 110 06/13/2017 09:31 AM  
 Cholesterol (POC) <100 12/18/2019 11:53 AM  
 HDL Cholesterol 55 06/13/2017 09:31 AM  
 HDL Cholesterol (POC) 41 12/18/2019 11:53 AM  
 LDL Cholesterol (POC) n/a 12/18/2019 11:53 AM  
 LDL, calculated 36 06/13/2017 09:31 AM  
 VLDL, calculated 19 06/13/2017 09:31 AM  
 Triglyceride 93 06/13/2017 09:31 AM  
 Triglycerides (POC) 82 12/18/2019 11:53 AM  
 CHOL/HDL Ratio 2.0 04/25/2017 03:53 AM  
 
Lab Results Component Value Date/Time  02/04/2015 07:47 PM  
 CK - MB 4.7 (H) 02/04/2015 07:47 PM  
 CK-MB Index 4.6 (H) 02/04/2015 07:47 PM  
 Troponin-I, Qt. <0.05 01/31/2020 03:55 PM  
  (H) 08/25/2017 04:40 AM  
 
Lab Results Component Value Date/Time ALT (SGPT) 20 01/31/2020 03:55 PM  
 AST (SGOT) 20 01/31/2020 03:55 PM  
 Alk. phosphatase 111 01/31/2020 03:55 PM  
 Bilirubin, direct 0.18 02/12/2014 09:42 AM  
 Bilirubin, total 0.6 01/31/2020 03:55 PM  
 
Lab Results Component Value Date/Time   (H) 08/25/2017 04:40 AM  
 NT pro-BNP 1,293 (H) 08/26/2017 03:25 AM  
 NT pro-BNP 1,671 (H) 08/23/2017 02:29 PM

## 2020-02-03 NOTE — PROGRESS NOTES
Bedside and Verbal shift change report given to Chelita Thacker RN (oncoming nurse) by Elijah Gonzalez RN (offgoing nurse). Report included the following information SBAR, Kardex and MAR.

## 2020-02-03 NOTE — CONSULTS
Initial Pulmonary / Critical Care Consultation Assessment / Plan:   
Acute asthmatic bronchitis who failed outpatient therapy - better with IV abx and steroids. Also on nebs 
 
afib with RVR 
 
--not wheezing on exam today 
--continue solumedrol, levaquin, pulmicort and brovana-- recommend complete levaquin course, prednisone taper and prn combivent on discharge 
-- Outpatient follow up for screening PFTs 
--on bumex po daily --cardiology following 
-- We will be available to see her again as needed History / Subjective: 
2/3 No complaints, on RA, ambulating comfortably. 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  female who  has a past medical history of [facility] CAD (coronary artery disease), Cerebrovascular accident stroke, other, unspec (11/18/2010), Coronary atherosclerosis of native coronary artery (11/18/2010), Essential hypertension, Essential hypertension, benign (11/18/2010), HLD (hyperlipidemia) (11/18/2010), PVD (peripheral vascular disease) ([facility] (2017), Type II or unspecified type diabetes mellitus without mention of complication, not stated as uncontrolled (11/18/2010), and Zoster. admitted 1/31/2020 with cough and shortness of breath Former smoker 1 month of cough and wheezing treated with 2 courses of abx and steroids as outpatient without resolution and has been admitted No asdz on cxr Given nebs, iv steroids and started on levaquin and is feeling better today Was on NC O2 and now weaning to room air Has h/o afib and HR in 130's  
Cardiology has been consulted Allergies Allergen Reactions  Bactrim [Sulfamethoprim] Other (comments) Affects her kidney funtion  Clonidine Shortness of Breath and Swelling Lightheaded,   
 Codeine Unknown (comments)  Keflin Itching  Pcn [Penicillins] Unknown (comments)  Tramadol Other (comments) Makes her head feel swishy Past Medical History:  
Diagnosis Date  Atrial fibrillation ([facility]  CAD (coronary artery disease)  Cerebrovascular accident stroke, other, unspec 11/18/2010  Coronary atherosclerosis of native coronary artery 11/18/2010  Essential hypertension  Essential hypertension, benign 11/18/2010  HLD (hyperlipidemia) 11/18/2010  PVD (peripheral vascular disease) ([facility] 2017  Type II or unspecified type diabetes mellitus without mention of complication, not stated as uncontrolled 11/18/2010  Zoster Past Surgical History:  
Procedure Laterality Date  HX CORONARY STENT PLACEMENT    
 HX HEART CATHETERIZATION    
 [address] Prior to Admission medications Medication Sig Start Date End Date Taking? Authorizing Provider  
acyclovir (ZOVIRAX) 5 % ointment Apply  to affected area daily as needed. Yes Provider, Historical  
aspirin 81 mg chewable tablet Take 81 mg by mouth nightly. Yes Provider, Historical  
atorvastatin (LIPITOR) 20 mg tablet Take 20 mg by mouth nightly. Yes Provider, Historical  
azelastine (ASTEPRO) 0.15 % (205.5 mcg) 1 Spray by Both Nostrils route nightly. Yes Provider, Historical  
bumetanide (BUMEX) 1 mg tablet Take 1-2 mg by mouth daily. Takes 1 mg or 2 mg daily on alternating days   Yes Provider, Historical  
clotrimazole-betamethasone (LOTRISONE) topical cream Apply  to affected area two (2) times daily as needed for Skin Irritation or Other (Rash). Yes Provider, Historical  
insulin detemir U-100 (LEVEMIR U-100 INSULIN) 100 unit/mL injection by SubCUTAneous route two (2) times a day. Takes 20 units every morning, and takes 8-10 units every evening as needed/as directed based on blood sugar   Yes Provider, Historical  
mupirocin calcium (BACTROBAN) 2 % topical cream Apply  to affected area two (2) times daily as needed.    Yes Provider, Historical  
nystatin (MYCOSTATIN) powder Apply  to affected area two (2) times daily as needed for Other (Rash). Yes Provider, Historical  
glipiZIDE (GLUCOTROL) 5 mg tablet TAKE 2 TABLETS TWICE A DAY 1/28/20  Yes Sindy Orellana IV, MD  
levoFLOXacin (LEVAQUIN) 250 mg tablet Take 1 Tab by mouth daily for 10 days. 1/23/20 2/2/20 Yes Sindy Orellana IV, MD  
dilTIAZem (CARDIZEM) 60 mg tablet 1 tablet in morning,1/2 tablet at lunch and 1 tablet at night 12/6/19  Yes Greg Urias MD  
allopurinol (ZYLOPRIM) 100 mg tablet Take 100 mg by mouth daily. 10/9/19  Yes Provider, Historical  
apixaban (ELIQUIS) 2.5 mg tablet Take 1 Tab by mouth two (2) times a day. 10/29/19  Yes Greg Urias MD  
omeprazole (PRILOSEC) 20 mg capsule TAKE 1 CAPSULE DAILY 10/29/19  Yes Greg Urias MD  
insulin aspart U-100 (NOVOLOG FLEXPEN U-100 INSULIN) 100 unit/mL (3 mL) inpn INJECT 7 UNITS BEFORE EACH MEAL 3 TIMES A DAY OR AS   DIRECTED 8/2/19  Yes Sindy Orellana IV, MD  
metoprolol tartrate (LOPRESSOR) 100 mg IR tablet Take 1 Tab by mouth two (2) times a day. 4/23/19  Yes Greg Urias MD  
enalapril (VASOTEC) 20 mg tablet TAKE 1 TABLET TWICE A DAY 4/23/19  Yes Greg Urias MD  
budesonide-formoterol (SYMBICORT) 160-4.5 mcg/actuation HFAA Take 2 Puffs by inhalation two (2) times a day. After each use, rinse mouth with water 3/8/19  Yes Sindy Orellana IV, MD  
isosorbide mononitrate ER (IMDUR) 60 mg CR tablet TAKE 1 TABLET TWICE A DAY 9/21/18  Yes Greg Urias MD  
cholecalciferol, vitamin D3, (VITAMIN D3) 2,000 unit tab Take 2,000 Units by mouth daily. Yes Provider, Historical  
nitroglycerin (NITROSTAT) 0.4 mg SL tablet 1 Tab by SubLINGual route every five (5) minutes as needed for Chest Pain. 7/17/17  Yes Greg Urias MD  
  
History reviewed. No pertinent family history. Social History Tobacco Use  Smoking status: Former Smoker Packs/day: 0.80 Years: 20.00 Pack years: 16.00 Types: Cigarettes Last attempt to quit: 4/18/1986 Years since quittin.8  Smokeless tobacco: Never Used Substance Use Topics  Alcohol use: No  
  
ROS:  A comprehensive review of systems was negative except for that written in the HPI. Objective: 
Patient Vitals for the past 4 hrs: 
 BP Temp Pulse Resp SpO2  
20 0848     92 % 20 0827 154/83 98.3 °F (36.8 °C) 98 18 91 % Temp (24hrs), Av.7 °F (36.5 °C), Min:97.3 °F (36.3 °C), Max:98.3 °F (36.8 °C) CVP:       
 
Intake/Output Summary (Last 24 hours) at 2/3/2020 1006 Last data filed at 2020 1801 Gross per 24 hour Intake 118 ml Output  Net 118 ml Blood Sugar: 
Glucose Date Value Ref Range Status 2020 274 (H) 65 - 100 mg/dL Final  
2020 321 (H) 65 - 100 mg/dL Final  
2020 143 (H) 65 - 100 mg/dL Final  
2020 198 (H) 65 - 99 mg/dL Final  
2019 167 (H) 65 - 99 mg/dL Final  
 
Exam: 
Heavy Up in chair No resp distress NC AT 
MMM No accessory use Anicteric No wheezes or rales Tachy regular Protuberant soft Warm and dry No c/c/e Lab data was reviewed. Radiology images were independently viewed and available reports were reviewed. CXR - basilar atx Lab: 
Recent Labs 20 
0321 20 
0259 20 
1555 WBC  --  12.4* 12.3* HGB  --  14.2 14.1 PLT  --  222 231 * 131* 135* K 3.9 3.9 3.7 CL 99 99 98  
CO2 26 25 31 BUN 38* 23* 18  
CREA 1.56* 1.39* 1.34* * 321* 143* CA 8.6 8.8 9.1 TROIQ  --   --  <0.05 TBILI  --   --  0.6 SGOT  --   --  20  
 
ABG: 
No results for input(s): PHI, PCO2I, PO2I, HCO3I, SO2I, FIO2I in the last 72 hours. Results Procedure Component Value Units Date/Time URINE CULTURE HOLD SAMPLE [[id number]] Collected:  20 Order Status:  Completed Specimen:  Urine from Serum Updated:  20 Urine culture hold URINE ON HOLD IN MICROBIOLOGY DEPT FOR 3 DAYS.  IF UNPRESERVED URINE IS SUBMITTED, IT CANNOT BE USED FOR ADDITIONAL TESTING AFTER 24 HRS, RECOLLECTION WILL BE REQUIRED.   
     
  
 
 
 
Cleveland Xiong MD

## 2020-02-03 NOTE — PROGRESS NOTES
Primary Nurse Kathren Boxer, RN and Azra Nguyen RN performed a dual skin assessment on this patient No impairment noted Urperto score is 22

## 2020-02-04 NOTE — DIABETES MGMT
[address] Presentation Brenda Duckworth is a 81 y.o. female admitted with acute asthmatic bronchitis. Current clinical course has been complicated by steroid induced hyperglycemia. Patient has known diabetes Consulted by Nursing for advanced diabetes nursing assessment and care, specifically related to  
 
[x] Inpatient management strategy [x] Home management assessment Diabetes-related medical history Acute complications: Hyperglycemia Neurological complications Peripheral neuropathy Microvascular disease Nephropathy Macrovascular disease Levimir BID Diabetes medication history Drug class Currently in use Discontinued Never used Biguanide DDP-4 inhibitor Sulfonylurea Thiazolidinedione GLP-1 RA SGLT-2 inhibitors Basal insulin Levemir 18 units am 10 units pm    
Bolus insulin Humalog SS Subjective Ms Clementina Villanueva is an 81year old woman who was admitted for acute asthmatic bronchitis unresolved through outpatient therapy. Has been started on IV steroids and is experiencing steroid induced hyperglycemia. Her diabetes is well managed on an outpatient basis with an admitting A1C of 7.6% Objective Physical exam 
General Alert, oriented and in no acute distress. Conversant and cooperative Vital Signs Visit Vitals /72 (BP 1 Location: Right arm, BP Patient Position: Sitting) Pulse (!) 104 Temp 97.6 °F (36.4 °C) Resp 16 Wt 78.3 kg (172 lb 9.6 oz) SpO2 90% BMI 33.71 kg/m² Munroe Sanchez Orthostatic BP measurement not indicated Skin  Warm and dry. No Acanthosis noted along neckline. No lipohypertrophy or lipoatrophy noted at injection sites Neck   Thyroid smooth and non-tender Heart   Regular rate and rhythm. No murmurs, rubs or gallops Lungs  Clear without rales or rhonchi Extremities Diabetic foot exam: 
  Left Foot Visual Exam: normal  
 Pulse DP: 2+ (normal) Filament test: reduced sensation Vibratory sensation: diminished Right Foot Visual Exam: normal  
 Pulse DP: 2+ (normal) Filament test: reduced sensation Vibratory sensation: diminished DP & PT pulses +2. Laboratory Lab Results Component Value Date/Time Hemoglobin A1c 8.1 (H) 06/09/2011 08:56 AM  
 Hemoglobin A1c (POC) 7.6 (A) 12/18/2019 11:53 AM  
 
Lab Results Component Value Date/Time LDL, calculated 36 06/13/2017 09:31 AM  
 
Lab Results Component Value Date/Time Creatinine 1.45 (H) 02/04/2020 07:19 AM  
 
Lab Results Component Value Date/Time Sodium 136 02/04/2020 07:19 AM  
 Potassium 3.6 02/04/2020 07:19 AM  
 Chloride 100 02/04/2020 07:19 AM  
 CO2 29 02/04/2020 07:19 AM  
 Anion gap 7 02/04/2020 07:19 AM  
 Glucose 132 (H) 02/04/2020 07:19 AM  
 BUN 43 (H) 02/04/2020 07:19 AM  
 Creatinine 1.45 (H) 02/04/2020 07:19 AM  
 BUN/Creatinine ratio 30 (H) 02/04/2020 07:19 AM  
 GFR est AA 42 (L) 02/04/2020 07:19 AM  
 GFR est non-AA 34 (L) 02/04/2020 07:19 AM  
 Calcium 8.9 02/04/2020 07:19 AM  
 Bilirubin, total 0.6 01/31/2020 03:55 PM  
 AST (SGOT) 20 01/31/2020 03:55 PM  
 Alk. phosphatase 111 01/31/2020 03:55 PM  
 Protein, total 7.1 01/31/2020 03:55 PM  
 Albumin 3.3 (L) 01/31/2020 03:55 PM  
 Globulin 3.8 01/31/2020 03:55 PM  
 A-G Ratio 0.9 (L) 01/31/2020 03:55 PM  
 ALT (SGPT) 20 01/31/2020 03:55 PM  
 
Lab Results Component Value Date/Time ALT (SGPT) 20 01/31/2020 03:55 PM  
 
 
Blood glucose pattern Assessment and Plan Nursing Diagnosis Risk for unstable blood glucose pattern Nursing Intervention Domain 8247 Decision-making Support Nursing Interventions Examined current inpatient diabetes control Explored factors facilitating and impeding inpatient management Identified self-management practices impeding diabetes control Explored corrective strategies with patient and responsible inpatient provider Informed patient of rational for insulin strategy while hospitalized Evaluation This woman, with Type 2 diabetes, hasn't achieved inpatient blood glucose target of 100-180mg/dl. Inpatient blood glucose management has been impacted by 
[x] Glucocorticoid use Basal insulin is in use. Bolus insulin isn't in use. Patient is/isn't eating meals Corrective insulin is in use. Recommendations Recommend: 
1. Insulin adjustment is required for steroid induced hyperglycemia. Each 40 mg of methyprednisolone dose is equalivent to 0.4 units/kg NPH Therefore, Adjust Basal insulin 35 units NPH BID (7 units basal needs and 30 units for steroid) 8 mg of methyprednisolone dose is equalivent to 0.1 units/kg NPH 
16 mg of methyprednisolone dose is equalivent to 0.2 units/kg NPH 
24 mg of methyprednisolone dose is equalivent to 0.3 units/kg NPH 
36+mg of methyprednisolone dose is equalivent to 0.4 units/kg NPH 
 
2. Add Bolus insulin Normal sensitivity: 6 units Humalog/meal 
 
3. Corrective insulin 
[] Normal sensitivity Correctional Scale for Normal Sensitivity 200-249: 2 units Humalog 250-299: 4 units Humalog 300-349: 6 units Humalog 350-399: 8 units Humalog Over 400: 10 units Humalog Do NOT hold for NPO; give in addition to meal time insulin dose. If patient does not eat, give correction dose only. 4. D/C Glipizide while in-patient Billing Code(s) [x] 86534 IP subsequent hospital care - 45 minutes Fabienne Hashimoto, RN  [facility] 
[phone]

## 2020-02-04 NOTE — ROUTINE PROCESS
Bedside shift change report given to Yolanda Mathur (oncoming nurse) by Jose Yates (offgoing nurse). Report included the following information SBAR.

## 2020-02-04 NOTE — PROGRESS NOTES
[facility],4Th Floor Heart and Vascular [place] Division of Cardiology [phone] Progress note Smooth Coleman M.D., DARRELL. NAME:  Fabián Pedro :   1935 MRN:   [id number] ICD-10-CM ICD-9-CM 1. SOB (shortness of breath) R06.02 786.05   
2. COPD exacerbation ([address] J44.1 491.21 Assessment/Plan: 1. Asthmatic bronchitis - a bit more SOB last night. Better this am 
     - per Dr. Nancy Yoon 2. Persistent AF with increased hr 
     - Continue BB and Ca blockers 
     - leadless ppm and ablation by Dr. Brisa Puckett tomorrow. 3. HTN 
     - Fairly controlled on current meds 4. DM II 
     - SSI Some increased SOB over night. Better this am.  Continue current pulm regimen. Still planning on AVN Abl and PPM for tomorrow. I have seen and examined the patient and agree with PA assessment. Check limited echo 19 ECHO ADULT COMPLETE 2019 Narrative · Left ventricular low normal systolic function. Calculated left  
ventricular ejection fraction is 53%. Biplane method used to measure  
ejection fraction. · Left atrial cavity size is moderately dilated. · Right atrial cavity size is mildly dilated. · Mild aortic valve sclerosis with no significant stenosis. · Mitral valve thickening. Mild mitral valve regurgitation. · Mild pulmonary hypertension is present. Signed by: Maddy Hull MD  
 
19 NUCLEAR CARDIAC STRESS TEST 2019 Narrative · Baseline ECG: Atrial fibrillation, non-specific ST-T wave abnormalities. · Gated SPECT: Left ventricular function post-stress was abnormal.  
Calculated ejection fraction is 45%. There is no evidence of transient  
ischemic dilation (TID). The TID ratio is 0.97. · Left ventricular perfusion is abnormal. 
· Myocardial perfusion imaging defect 1: There is a defect that is moderate in size with a moderate reduction in uptake present in the apical  
to basal anterior location(s) that is non-reversible. There is abnormal  
wall motion in the defect area. Viability in the area is good. caused by  
breast attenuation. The possibility of artifact cannot be excluded. Perfusion defect was visually and quantitatively present. · Myocardial perfusion imaging defect 1: caused by breast attenuation. · Abnormal myocardial perfusion imaging. Fixed defect consistent with  
prior myocardial infarction. Myocardial perfusion imaging supports an  
intermediate risk stress test. 
   
  Signed by: Delroy Zavala MD  
 
   
EKG Results Procedure 720 Value Units Date/Time EKG 12 LEAD INITIAL [[id number]] Collected:  01/31/20 1619 Order Status:  Completed Updated:  01/31/20 1753 Ventricular Rate 114 BPM   
  Atrial Rate 91 BPM   
  QRS Duration 86 ms   
  Q-T Interval 370 ms QTC Calculation (Bezet) 509 ms Calculated R Axis 27 degrees Calculated T Axis 38 degrees Diagnosis -- Atrial fibrillation Nonspecific ST and T wave abnormality When compared with ECG of 23-AUG-2017 14:30, Atrial fibrillation has replaced Sinus rhythm Vent. rate has increased BY  54 BPM 
ST now depressed in Lateral leads Nonspecific T wave abnormality, worse in Inferior leads Nonspecific T wave abnormality now evident in Anterolateral leads Confirmed by Rodger Escobar MD, Elease Sor ([id number]) on 1/31/2020 5:53:51 PM 
  
  
 
 
 
Visit Vitals /73 (BP 1 Location: Right arm, BP Patient Position: Sitting) Pulse 92 Temp 97.7 °F (36.5 °C) Resp 16 Wt 172 lb 9.6 oz (78.3 kg) SpO2 93% BMI 33.71 kg/m² Wt Readings from Last 3 Encounters:  
02/02/20 172 lb 9.6 oz (78.3 kg) 01/31/20 176 lb (79.8 kg) 01/23/20 175 lb (79.4 kg) Review of Systems:  
Pertinent items are noted in the History of Present Illness. Objective: No intake/output data recorded. No intake/output data recorded. Telemetry: AFIB Physical Exam: 
 
Neck: no JVD Heart: irregularly irregular rhythm Lungs: diminished breath sounds R base, L base - Very EEW Abdomen: soft, non-tender. Bowel sounds normal. No masses,  no organomegaly Extremities: no edema Current Facility-Administered Medications Medication Dose Route Frequency  methylPREDNISolone (PF) (SOLU-MEDROL) injection 40 mg  40 mg IntraVENous Q12H  
 insulin glargine (LANTUS) injection 22 Units  22 Units SubCUTAneous DAILY  acetylcysteine (MUCOMYST) 200 mg/mL (20 %) solution 600 mg  600 mg Nebulization BID  dilTIAZem (CARDIZEM) IR tablet 60 mg  60 mg Oral Q8H  
 . PHARMACY TO SUBSTITUTE PER PROTOCOL (Reordered from: acyclovir (ZOVIRAX) 5 % ointment)    Per Protocol  allopurinoL (ZYLOPRIM) tablet 100 mg  100 mg Oral DAILY  aspirin chewable tablet 81 mg  81 mg Oral DAILY  atorvastatin (LIPITOR) tablet 20 mg  20 mg Oral QHS  azelastine (ASTELIN) 137mcg/spray nasal spray  1 Spray Both Nostrils QHS  bumetanide (BUMEX) tablet 1 mg  1 mg Oral DAILY  cholecalciferol (VITAMIN D3) (1000 Units /25 mcg) tablet 2 Tab  2,000 Units Oral DAILY  lisinopril (PRINIVIL, ZESTRIL) tablet 20 mg  20 mg Oral BID  
 glipiZIDE (GLUCOTROL) tablet 10 mg  10 mg Oral ACB&D  
 isosorbide mononitrate ER (IMDUR) tablet 60 mg  60 mg Oral BID  metoprolol tartrate (LOPRESSOR) tablet 100 mg  100 mg Oral BID  nitroglycerin (NITROSTAT) tablet 0.4 mg  0.4 mg SubLINGual Q5MIN PRN  pantoprazole (PROTONIX) tablet 40 mg  40 mg Oral ACB  promethazine-dextromethorphan (PROMETHAZINE-DM) 6.25-15 mg/5 mL syrup 5 mL  5 mL Oral TID PRN  
 insulin lispro (HUMALOG) injection   SubCUTAneous TIDAC  insulin glargine (LANTUS) injection 10 Units  10 Units SubCUTAneous QHS  arformoteroL (BROVANA) neb solution 15 mcg  15 mcg Nebulization BID RT  And  
 budesonide (PULMICORT) 500 mcg/2 ml nebulizer suspension  500 mcg Nebulization BID RT  
 albuterol (PROVENTIL VENTOLIN) nebulizer solution 2.5 mg  2.5 mg Nebulization Q4H PRN  
 levoFLOXacin (LEVAQUIN) 250 mg in D5W IVPB  250 mg IntraVENous Q24H  
 sodium chloride (NS) flush 5-40 mL  5-40 mL IntraVENous Q8H  
 sodium chloride (NS) flush 5-40 mL  5-40 mL IntraVENous PRN  
 acetaminophen (TYLENOL) tablet 650 mg  650 mg Oral Q4H PRN Lab Results Component Value Date/Time WBC 21.8 (H) 02/04/2020 07:19 AM  
 HGB (POC) 14.6 01/23/2020 02:50 PM  
 HGB 14.4 02/04/2020 07:19 AM  
 HCT (POC) 45.5 01/23/2020 02:50 PM  
 HCT 43.6 02/04/2020 07:19 AM  
 PLATELET 011 39/45/1587 07:19 AM  
 MCV 80.6 02/04/2020 07:19 AM  
 
Lab Results Component Value Date/Time Sodium 136 02/04/2020 07:19 AM  
 Potassium 3.6 02/04/2020 07:19 AM  
 Chloride 100 02/04/2020 07:19 AM  
 CO2 29 02/04/2020 07:19 AM  
 Anion gap 7 02/04/2020 07:19 AM  
 Glucose 132 (H) 02/04/2020 07:19 AM  
 BUN 43 (H) 02/04/2020 07:19 AM  
 Creatinine 1.45 (H) 02/04/2020 07:19 AM  
 BUN/Creatinine ratio 30 (H) 02/04/2020 07:19 AM  
 GFR est AA 42 (L) 02/04/2020 07:19 AM  
 GFR est non-AA 34 (L) 02/04/2020 07:19 AM  
 Calcium 8.9 02/04/2020 07:19 AM  
 
Lab Results Component Value Date/Time Cholesterol, total 110 06/13/2017 09:31 AM  
 Cholesterol (POC) <100 12/18/2019 11:53 AM  
 HDL Cholesterol 55 06/13/2017 09:31 AM  
 HDL Cholesterol (POC) 41 12/18/2019 11:53 AM  
 LDL Cholesterol (POC) n/a 12/18/2019 11:53 AM  
 LDL, calculated 36 06/13/2017 09:31 AM  
 VLDL, calculated 19 06/13/2017 09:31 AM  
 Triglyceride 93 06/13/2017 09:31 AM  
 Triglycerides (POC) 82 12/18/2019 11:53 AM  
 CHOL/HDL Ratio 2.0 04/25/2017 03:53 AM  
 
Lab Results Component Value Date/Time  02/04/2015 07:47 PM  
 CK - MB 4.7 (H) 02/04/2015 07:47 PM  
 CK-MB Index 4.6 (H) 02/04/2015 07:47 PM  
 Troponin-I, Qt. <0.05 01/31/2020 03:55 PM  
  (H) 08/25/2017 04:40 AM  
 
Lab Results Component Value Date/Time ALT (SGPT) 20 01/31/2020 03:55 PM  
 AST (SGOT) 20 01/31/2020 03:55 PM  
 Alk. phosphatase 111 01/31/2020 03:55 PM  
 Bilirubin, direct 0.18 02/12/2014 09:42 AM  
 Bilirubin, total 0.6 01/31/2020 03:55 PM  
 
Lab Results Component Value Date/Time  (H) 08/25/2017 04:40 AM  
 NT pro-BNP 1,293 (H) 08/26/2017 03:25 AM  
 NT pro-BNP 1,671 (H) 08/23/2017 02:29 PM  
 
REYMUNDO Santoro MD

## 2020-02-04 NOTE — PROGRESS NOTES
Initial Pulmonary / Critical Care Consultation Assessment / Plan:   
Acute asthmatic bronchitis who failed outpatient therapy - better with IV abx and steroids. Also on nebs 
 
afib with RVR 
 
ACE- I therapy 
 
--not wheezing on exam today 
--continue solumedrol, levaquin, pulmicort and brovana-- recommend complete levaquin course, prednisone taper and prn combivent on discharge 
-- Outpatient follow up for screening PFTs 
--on bumex po daily 
-- If cough persists, consider discontinuation of ACE-I 
--cardiology following 
-- We will be available to see her again as needed History / Subjective: 
2/4 On room air. Noted increase in cough last night. No bronchospasm on exam. Afebrile. CXR clear. On steroids and bronchodilators 2/3 No complaints, on RA, ambulating comfortably. Chest clear 2/1 Reason:  Asthmatic bronchitis Requesting Provider:  Dr Yazan Garvey Dioni Powell is a 81 y.o.  female who  has a past medical history of Atrial fibrillation ([facility] [address] CAD (coronary artery disease), Cerebrovascular accident stroke, other, unspec (11/18/2010), Coronary atherosclerosis of native coronary artery (11/18/2010), Essential hypertension, Essential hypertension, benign (11/18/2010), HLD (hyperlipidemia) (11/18/2010), PVD (peripheral vascular disease) ([facility] [address] (2017), Type II or unspecified type diabetes mellitus without mention of complication, not stated as uncontrolled (11/18/2010), and Zoster. admitted 1/31/2020 with cough and shortness of breath Former smoker 1 month of cough and wheezing treated with 2 courses of abx and steroids as outpatient without resolution and has been admitted No asdz on cxr Given nebs, iv steroids and started on levaquin and is feeling better today Was on NC O2 and now weaning to room air Has h/o afib and HR in 130's  
Cardiology has been consulted Allergies Allergen Reactions  Bactrim [Sulfamethoprim] Other (comments) Affects her kidney funtion  Clonidine Shortness of Breath and Swelling Lightheaded,   
 Codeine Unknown (comments)  Keflin Itching  Pcn [Penicillins] Unknown (comments)  Tramadol Other (comments) Makes her head feel swishy Past Medical History:  
Diagnosis Date  [facility] fibrillation ([facility]  CAD (coronary artery disease)  Cerebrovascular accident stroke, other, unspec 11/18/2010  Coronary atherosclerosis of native coronary artery 11/18/2010  Essential hypertension  Essential hypertension, benign 11/18/2010  HLD (hyperlipidemia) 11/18/2010  PVD (peripheral vascular disease) ([facility] [address] 2017  Type II or unspecified type diabetes mellitus without mention of complication, not stated as uncontrolled 11/18/2010  Zoster Past Surgical History:  
Procedure Laterality Date  HX CORONARY STENT PLACEMENT    
 HX HEART CATHETERIZATION    
 [address] Prior to Admission medications Medication Sig Start Date End Date Taking? Authorizing Provider  
acyclovir (ZOVIRAX) 5 % ointment Apply  to affected area daily as needed. Yes Provider, Historical  
aspirin 81 mg chewable tablet Take 81 mg by mouth nightly. Yes Provider, Historical  
atorvastatin (LIPITOR) 20 mg tablet Take 20 mg by mouth nightly. Yes Provider, Historical  
azelastine (ASTEPRO) 0.15 % (205.5 mcg) 1 Spray by Both Nostrils route nightly. Yes Provider, Historical  
bumetanide (BUMEX) 1 mg tablet Take 1-2 mg by mouth daily. Takes 1 mg or 2 mg daily on alternating days   Yes Provider, Historical  
clotrimazole-betamethasone (LOTRISONE) topical cream Apply  to affected area two (2) times daily as needed for Skin Irritation or Other (Rash). Yes Provider, Historical  
insulin detemir U-100 (LEVEMIR U-100 INSULIN) 100 unit/mL injection by SubCUTAneous route two (2) times a day.  Takes 20 units every morning, and takes 8-10 units every evening as needed/as directed based on blood sugar   Yes Provider, Historical  
 mupirocin calcium (BACTROBAN) 2 % topical cream Apply  to affected area two (2) times daily as needed. Yes Provider, Historical  
nystatin (MYCOSTATIN) powder Apply  to affected area two (2) times daily as needed for Other (Rash). Yes Provider, Historical  
glipiZIDE (GLUCOTROL) 5 mg tablet TAKE 2 TABLETS TWICE A DAY 1/28/20  Yes Tamy Blakely IV, MD  
dilTIAZem (CARDIZEM) 60 mg tablet 1 tablet in morning,1/2 tablet at lunch and 1 tablet at night 12/6/19  Yes Maritza Payne MD  
allopurinol (ZYLOPRIM) 100 mg tablet Take 100 mg by mouth daily. 10/9/19  Yes Provider, Historical  
apixaban (ELIQUIS) 2.5 mg tablet Take 1 Tab by mouth two (2) times a day. 10/29/19  Yes Maritza Payne MD  
omeprazole (PRILOSEC) 20 mg capsule TAKE 1 CAPSULE DAILY 10/29/19  Yes Maritza Payne MD  
insulin aspart U-100 (NOVOLOG FLEXPEN U-100 INSULIN) 100 unit/mL (3 mL) inpn INJECT 7 UNITS BEFORE EACH MEAL 3 TIMES A DAY OR AS   DIRECTED 8/2/19  Yes Tamy Blakely IV, MD  
metoprolol tartrate (LOPRESSOR) 100 mg IR tablet Take 1 Tab by mouth two (2) times a day. 4/23/19  Yes Maritza Payne MD  
enalapril (VASOTEC) 20 mg tablet TAKE 1 TABLET TWICE A DAY 4/23/19  Yes Maritza Payne MD  
budesonide-formoterol (SYMBICORT) 160-4.5 mcg/actuation HFAA Take 2 Puffs by inhalation two (2) times a day. After each use, rinse mouth with water 3/8/19  Yes Tamy Blakely IV, MD  
isosorbide mononitrate ER (IMDUR) 60 mg CR tablet TAKE 1 TABLET TWICE A DAY 9/21/18  Yes Maritza Payne MD  
cholecalciferol, vitamin D3, (VITAMIN D3) 2,000 unit tab Take 2,000 Units by mouth daily. Yes Provider, Historical  
nitroglycerin (NITROSTAT) 0.4 mg SL tablet 1 Tab by SubLINGual route every five (5) minutes as needed for Chest Pain. 7/17/17  Yes Maritza Payne MD  
  
History reviewed. No pertinent family history. Social History Tobacco Use  Smoking status: Former Smoker Packs/day: 0.80 Years: 20.00 Pack years: 16.00 Types: Cigarettes Last attempt to quit: 1986 Years since quittin.8  Smokeless tobacco: Never Used Substance Use Topics  Alcohol use: No  
  
ROS:  A comprehensive review of systems was negative except for that written in the HPI. Objective: 
Patient Vitals for the past 4 hrs: 
 BP Temp Pulse Resp SpO2  
20 1535 127/72 97.6 °F (36.4 °C) (!) 104 16 90 % Temp (24hrs), Av.6 °F (36.4 °C), Min:97.4 °F (36.3 °C), Max:97.7 °F (36.5 °C) CVP:       
No intake or output data in the 24 hours ending 20 1608 Blood Sugar: 
Glucose Date Value Ref Range Status 2020 132 (H) 65 - 100 mg/dL Final  
2020 274 (H) 65 - 100 mg/dL Final  
2020 321 (H) 65 - 100 mg/dL Final  
2020 143 (H) 65 - 100 mg/dL Final  
2020 198 (H) 65 - 99 mg/dL Final  
 
Exam: 
Heavy Up in chair No resp distress NC AT 
MMM No accessory use Anicteric No wheezes or rales Tachy regular Protuberant soft Warm and dry No c/c/e Lab data was reviewed. Radiology images were independently viewed and available reports were reviewed. CXR - basilar atx Lab: 
Recent Labs 20 
0719 20 
0321 WBC 21.8*  --   
HGB 14.4  --   
  --   
 133* K 3.6 3.9  99 CO2 29 26 BUN 43* 38* CREA 1.45* 1.56* * 274* CA 8.9 8.6 ABG: 
No results for input(s): PHI, PCO2I, PO2I, HCO3I, SO2I, FIO2I in the last 72 hours. Results Procedure Component Value Units Date/Time URINE CULTURE HOLD SAMPLE [[id number]] Collected:  20 6790 Order Status:  Completed Specimen:  Urine from Serum Updated:  20 580 Urine culture hold URINE ON HOLD IN MICROBIOLOGY DEPT FOR 3 DAYS. IF UNPRESERVED URINE IS SUBMITTED, IT CANNOT BE USED FOR ADDITIONAL TESTING AFTER 24 HRS, RECOLLECTION WILL BE REQUIRED.   
     
  
 
 
 
Francis Reeves MD

## 2020-02-04 NOTE — PROGRESS NOTES
Medical Progress Note NAME: Steph Schroeder :  1935 MRM:  [id number] Date/Time: 2020 Problem List:  
 
Active Problems: 
  Coronary atherosclerosis of native coronary artery (2010) Hypertension complicating diabetes ([facility] [address] (2016) Atrial fibrillation ([address] (2017) Type 2 diabetes with nephropathy ([address] (2018) Acute asthmatic bronchitis (2020) Subjective:  
 
yesterday brief palpitations. Overnight increased sob and non productive cough , better now ; denies cp , dizziness Past Medical History:  
Diagnosis Date  Atrial fibrillation ([facility] [address]  CAD (coronary artery disease)  Cerebrovascular accident stroke, other, unspec 2010  Coronary atherosclerosis of native coronary artery 2010  Essential hypertension  Essential hypertension, benign 2010  HLD (hyperlipidemia) 2010  PVD (peripheral vascular disease) ([facility] [address]   Type II or unspecified type diabetes mellitus without mention of complication, not stated as uncontrolled 2010  Zoster Objective:  
 
 
 
Vitals:  
  
Last 24hrs VS reviewed since prior progress note. Most recent are: 
 
Visit Vitals BP (!) 135/91 (BP 1 Location: Right arm, BP Patient Position: At rest) Pulse 84 Temp 97.5 °F (36.4 °C) Resp 15 Wt 172 lb 9.6 oz (78.3 kg) SpO2 94% BMI 33.71 kg/m² SpO2 Readings from Last 6 Encounters:  
20 94% 20 91% 20 95% 01/10/20 98% 19 91% 19 97% O2 Flow Rate (L/min): 4 l/min No intake or output data in the 24 hours ending 20 0703 Exam:  
   General:  Alert, cooperative, no distress, appears stated age. Lungs:   B/l exp wheezes Heart:  Irregular rate and rhythm, S1, S2 normal, no murmur, click, rub or gallop. Abdomen:   Soft, non-tender. Bowel sounds normal. No masses,  No organomegaly. Extremities: r 2+ pretib edema. stable Left no pedal edema Lab Data Reviewed: (see below) Recent Results (from the past 24 hour(s)) GLUCOSE, POC Collection Time: 02/03/20  8:30 AM  
Result Value Ref Range Glucose (POC) 257 (H) 65 - 100 mg/dL Performed by [id number], POC Collection Time: 02/03/20 12:24 PM  
Result Value Ref Range Glucose (POC) 235 (H) 65 - 100 mg/dL Performed by Windy Mendez GLUCOSE, POC Collection Time: 02/03/20  4:33 PM  
Result Value Ref Range Glucose (POC) 342 (H) 65 - 100 mg/dL Performed by Windy Mendez GLUCOSE, POC Collection Time: 02/03/20  8:55 PM  
Result Value Ref Range Glucose (POC) 369 (H) 65 - 100 mg/dL Performed by Tae Garvey Medications Reviewed: (see below) 
 
______________________________________________________________________ Medications:  
 
Current Facility-Administered Medications Medication Dose Route Frequency  methylPREDNISolone (PF) (SOLU-MEDROL) injection 40 mg  40 mg IntraVENous Q12H  
 insulin glargine (LANTUS) injection 22 Units  22 Units SubCUTAneous DAILY  dilTIAZem (CARDIZEM) IR tablet 60 mg  60 mg Oral Q8H  
 . PHARMACY TO SUBSTITUTE PER PROTOCOL (Reordered from: acyclovir (ZOVIRAX) 5 % ointment)    Per Protocol  allopurinoL (ZYLOPRIM) tablet 100 mg  100 mg Oral DAILY  aspirin chewable tablet 81 mg  81 mg Oral DAILY  atorvastatin (LIPITOR) tablet 20 mg  20 mg Oral QHS  azelastine (ASTELIN) 137mcg/spray nasal spray  1 Spray Both Nostrils QHS  bumetanide (BUMEX) tablet 1 mg  1 mg Oral DAILY  cholecalciferol (VITAMIN D3) (1000 Units /25 mcg) tablet 2 Tab  2,000 Units Oral DAILY  lisinopril (PRINIVIL, ZESTRIL) tablet 20 mg  20 mg Oral BID  
 glipiZIDE (GLUCOTROL) tablet 10 mg  10 mg Oral ACB&D  
 isosorbide mononitrate ER (IMDUR) tablet 60 mg  60 mg Oral BID  metoprolol tartrate (LOPRESSOR) tablet 100 mg  100 mg Oral BID  
  nitroglycerin (NITROSTAT) tablet 0.4 mg  0.4 mg SubLINGual Q5MIN PRN  pantoprazole (PROTONIX) tablet 40 mg  40 mg Oral ACB  promethazine-dextromethorphan (PROMETHAZINE-DM) 6.25-15 mg/5 mL syrup 5 mL  5 mL Oral TID PRN  
 insulin lispro (HUMALOG) injection   SubCUTAneous TIDAC  insulin glargine (LANTUS) injection 10 Units  10 Units SubCUTAneous QHS  arformoteroL (BROVANA) neb solution 15 mcg  15 mcg Nebulization BID RT And  
 budesonide (PULMICORT) 500 mcg/2 ml nebulizer suspension  500 mcg Nebulization BID RT  
 albuterol (PROVENTIL VENTOLIN) nebulizer solution 2.5 mg  2.5 mg Nebulization Q4H PRN  
 levoFLOXacin (LEVAQUIN) 250 mg in D5W IVPB  250 mg IntraVENous Q24H  
 sodium chloride (NS) flush 5-40 mL  5-40 mL IntraVENous Q8H  
 sodium chloride (NS) flush 5-40 mL  5-40 mL IntraVENous PRN  
 acetaminophen (TYLENOL) tablet 650 mg  650 mg Oral Q4H PRN Assessment:  
Probable Worsening acute asthmatic bronchitis. P  Rpt CXR, labs  
incr steroids back to Solumedrol. Continue abx. Pulm reconsult May need to defer pacer -pulm and cards to advise. DM. incr Lantus Patient Active Problem List  
Diagnosis Code  Coronary atherosclerosis of native coronary artery I25.10  
 HLD (hyperlipidemia) E78.5  Cerebrovascular accident stroke, other, unspec I67.89  
 Hypertension complicating diabetes ([facility] [address] E11.59, I10  
 PVD (peripheral vascular disease) with claudication (HCC) I73.9  Atrial fibrillation (HCC) I48.91  
 SOB (shortness of breath) R06.02  
 Type 2 diabetes with nephropathy (HCC) E11.21  
 Severe obesity (BMI 35.0-39. 9) with comorbidity ([facility] [address] E66.01  
 Coronary artery disease with stable angina pectoris ([facility] [address] I25.118  Acute asthmatic bronchitis J45.909 Plan: As above 
      
 
  
 
 
  
              
 
 
 
 
 
 
      
___________________________________________________ Attending Physician: Nelly Whittaker MD

## 2020-02-04 NOTE — PROGRESS NOTES
Bedside and Verbal shift change report given to Chavez Camejo (oncoming nurse) by Flakito Gaston (offgoing nurse). Report included the following information SBAR, Kardex, Intake/Output and MAR.

## 2020-02-04 NOTE — PROGRESS NOTES
Problem: Falls - Risk of 
Goal: *Absence of Falls Description Document [place] Fall Risk and appropriate interventions in the flowsheet. Outcome: Progressing Towards Goal 
Note: Fall Risk Interventions: 
Mobility Interventions: Patient to call before getting OOB Medication Interventions: Patient to call before getting OOB Elimination Interventions: Call light in reach, Patient to call for help with toileting needs

## 2020-02-04 NOTE — PROGRESS NOTES
Problem: Falls - Risk of 
Goal: *Absence of Falls Description Document [place] Camp Fall Risk and appropriate interventions in the flowsheet. Outcome: Progressing Towards Goal 
Note: Fall Risk Interventions: 
Mobility Interventions: Patient to call before getting OOB Medication Interventions: Patient to call before getting OOB Elimination Interventions: Call light in reach

## 2020-02-04 NOTE — PROGRESS NOTES
Bedside and Verbal shift change report given to [facility] (oncoming nurse) by Phoebe Snell (offgoing nurse). Report included the following information SBAR, Kardex, OR Summary, Intake/Output and MAR.

## 2020-02-05 PROBLEM — I97.190 COMPLETE AV BLOCK DUE TO AV NODAL ABLATION (HCC): Status: ACTIVE | Noted: 2020-01-01

## 2020-02-05 PROBLEM — I44.2 COMPLETE AV BLOCK DUE TO AV NODAL ABLATION (HCC): Status: ACTIVE | Noted: 2020-01-01

## 2020-02-05 PROBLEM — Z95.0 PACEMAKER: Status: ACTIVE | Noted: 2020-01-01

## 2020-02-05 NOTE — PROGRESS NOTES
Contacted Dr. Colin Rojas to confirm changes in patient's medication prior to pacemaker placement. Per Dr. Colin Rojas, hold glipizide and administer lantus.

## 2020-02-05 NOTE — PROGRESS NOTES
A Spiritual Care Partner Volunteer visited patient in  552 on 2/05/2020. Documented by: 
Chaplain Chandler MDiv, MS, [place] [id number] PRA ([id number])

## 2020-02-05 NOTE — PROGRESS NOTES
TRANSFER - IN REPORT: 
 
Verbal report received from Shelly Pringle on Roddy De Paz  being received from procedure area for routine progression of care. Report consisted of patients Situation, Background, Assessment and Recommendations(SBAR). Information from the following report(s) SBAR, Procedure Summary, MAR, Recent Results and Cardiac Rhythm Paced was reviewed with the receiving clinician. Opportunity for questions and clarification was provided. Assessment completed upon patients arrival to [address] [place] [address] and care assumed. Cardiac Cath Lab Recovery Arrival Note: 
 
Roddy De Paz arrived to [facility] recovery area. Patient procedure= Leadless pacemaker. Patient on cardiac monitor, non-invasive blood pressure, SPO2 monitor. On O2 @ 2 lpm via NC.  IV  of NS on pump at 25 ml/hr. Patient status doing well without problems. Patient is A&Ox 4. Patient reports No pain. PROCEDURE SITE CHECK: 
 
Procedure site:without any bleeding and No Hematoma, No pain/discomfort reported at procedure site. No change in patient status. Continue to monitor patient and status.

## 2020-02-05 NOTE — PROGRESS NOTES
Medical Progress Note NAME: Vidya Fatima :  1935 MRM:  [id number] Date/Time: 2020 Problem List:  
 
Active Problems: 
  Coronary atherosclerosis of native coronary artery (2010) Hypertension complicating diabetes ([address] (2016) Atrial fibrillation ([address] (2017) Type 2 diabetes with nephropathy ([address] (2018) Acute asthmatic bronchitis (2020) Subjective:  
 
Breathing, cough better. Denies cp, dizzy, palpitations Past Medical History:  
Diagnosis Date  Atrial fibrillation ([address]  CAD (coronary artery disease)  Cerebrovascular accident stroke, other, unspec 2010  Coronary atherosclerosis of native coronary artery 2010  Essential hypertension  Essential hypertension, benign 2010  HLD (hyperlipidemia) 2010  PVD (peripheral vascular disease) ([address]   Type II or unspecified type diabetes mellitus without mention of complication, not stated as uncontrolled 2010  Z[place] Objective:  
 
 
 
Vitals:  
  
Last 24hrs VS reviewed since prior progress note. Most recent are: 
 
Visit Vitals /88 (BP Patient Position: At rest) Pulse 87 Temp 98.5 °F (36.9 °C) Resp 16 Wt 172 lb 9.6 oz (78.3 kg) SpO2 92% BMI 33.71 kg/m² SpO2 Readings from Last 6 Encounters:  
20 92% 20 91% 20 95% 01/10/20 98% 19 91% 19 97% O2 Flow Rate (L/min): 4 l/min No intake or output data in the 24 hours ending 20 0753 Exam:  
   General:  Alert, cooperative, no distress, appears stated age. Lungs:   Clearer ; few exp wheezes Heart:  Irregular rate and rhythm, S1, S2 normal, no murmur, click, rub or gallop. Abdomen:   Soft, non-tender. Bowel sounds normal. No masses,  No organomegaly. Extremities: Stable 2+ rt pretib edema and no left pedal  edema. Lab Data Reviewed: (see below) Recent Results (from the past 24 hour(s)) GLUCOSE, POC Collection Time: 02/04/20 11:01 AM  
Result Value Ref Range Glucose (POC) 257 (H) 65 - 100 mg/dL Performed by Ángel Clifton GLUCOSE, POC Collection Time: 02/04/20  4:01 PM  
Result Value Ref Range Glucose (POC) 201 (H) 65 - 100 mg/dL Performed by [facility] GLUCOSE, POC Collection Time: 02/04/20  9:07 PM  
Result Value Ref Range Glucose (POC) 291 (H) 65 - 100 mg/dL Performed by [facility] METABOLIC PANEL, BASIC Collection Time: 02/05/20  3:53 AM  
Result Value Ref Range Sodium 133 (L) 136 - 145 mmol/L Potassium 4.0 3.5 - 5.1 mmol/L Chloride 99 97 - 108 mmol/L  
 CO2 26 21 - 32 mmol/L Anion gap 8 5 - 15 mmol/L Glucose 289 (H) 65 - 100 mg/dL BUN 42 (H) 6 - 20 MG/DL Creatinine 1.50 (H) 0.55 - 1.02 MG/DL  
 BUN/Creatinine ratio 28 (H) 12 - 20 GFR est AA 40 (L) >60 ml/min/1.73m2 GFR est non-AA 33 (L) >60 ml/min/1.73m2 Calcium 8.3 (L) 8.5 - 10.1 MG/DL  
CBC WITH AUTOMATED DIFF Collection Time: 02/05/20  3:53 AM  
Result Value Ref Range WBC 17.9 (H) 3.6 - 11.0 K/uL  
 RBC 5.35 (H) 3.80 - 5.20 M/uL  
 HGB 14.5 11.5 - 16.0 g/dL HCT 42.8 35.0 - 47.0 % MCV 80.0 80.0 - 99.0 FL  
 MCH 27.1 26.0 - 34.0 PG  
 MCHC 33.9 30.0 - 36.5 g/dL  
 RDW 14.5 11.5 - 14.5 % PLATELET 406 575 - 244 K/uL MPV 9.2 8.9 - 12.9 FL  
 NRBC 0.0 0  WBC ABSOLUTE NRBC 0.00 0.00 - 0.01 K/uL NEUTROPHILS 91 (H) 32 - 75 % LYMPHOCYTES 7 (L) 12 - 49 % MONOCYTES 1 (L) 5 - 13 % EOSINOPHILS 0 0 - 7 % BASOPHILS 0 0 - 1 % METAMYELOCYTES 1 (H) 0 % IMMATURE GRANULOCYTES 0 %  
 ABS. NEUTROPHILS 16.3 (H) 1.8 - 8.0 K/UL  
 ABS. LYMPHOCYTES 1.3 0.8 - 3.5 K/UL  
 ABS. MONOCYTES 0.2 0.0 - 1.0 K/UL  
 ABS. EOSINOPHILS 0.0 0.0 - 0.4 K/UL  
 ABS. BASOPHILS 0.0 0.0 - 0.1 K/UL  
 ABS. IMM. GRANS. 0.0 K/UL  
 DF MANUAL    
 RBC COMMENTS ANISOCYTOSIS 1+ 
    
 RBC COMMENTS OVALOCYTES PRESENT 
 GLUCOSE, POC Collection Time: 02/05/20  6:24 AM  
Result Value Ref Range Glucose (POC) 276 (H) 65 - 100 mg/dL Performed by Frank Johnson Medications Reviewed: (see below) 
 
______________________________________________________________________ Medications:  
 
Current Facility-Administered Medications Medication Dose Route Frequency  methylPREDNISolone (PF) (SOLU-MEDROL) injection 40 mg  40 mg IntraVENous Q12H  
 insulin glargine (LANTUS) injection 22 Units  22 Units SubCUTAneous DAILY  acetylcysteine (MUCOMYST) 200 mg/mL (20 %) solution 600 mg  600 mg Nebulization BID RT  
 dilTIAZem (CARDIZEM) IR tablet 60 mg  60 mg Oral Q8H  
 . PHARMACY TO SUBSTITUTE PER PROTOCOL (Reordered from: acyclovir (ZOVIRAX) 5 % ointment)    Per Protocol  allopurinoL (ZYLOPRIM) tablet 100 mg  100 mg Oral DAILY  aspirin chewable tablet 81 mg  81 mg Oral DAILY  atorvastatin (LIPITOR) tablet 20 mg  20 mg Oral QHS  azelastine (ASTELIN) 137mcg/spray nasal spray  1 Spray Both Nostrils QHS  bumetanide (BUMEX) tablet 1 mg  1 mg Oral DAILY  cholecalciferol (VITAMIN D3) (1000 Units /25 mcg) tablet 2 Tab  2,000 Units Oral DAILY  lisinopril (PRINIVIL, ZESTRIL) tablet 20 mg  20 mg Oral BID  
 glipiZIDE (GLUCOTROL) tablet 10 mg  10 mg Oral ACB&D  
 isosorbide mononitrate ER (IMDUR) tablet 60 mg  60 mg Oral BID  metoprolol tartrate (LOPRESSOR) tablet 100 mg  100 mg Oral BID  nitroglycerin (NITROSTAT) tablet 0.4 mg  0.4 mg SubLINGual Q5MIN PRN  pantoprazole (PROTONIX) tablet 40 mg  40 mg Oral ACB  promethazine-dextromethorphan (PROMETHAZINE-DM) 6.25-15 mg/5 mL syrup 5 mL  5 mL Oral TID PRN  
 insulin lispro (HUMALOG) injection   SubCUTAneous TIDAC  insulin glargine (LANTUS) injection 10 Units  10 Units SubCUTAneous QHS  arformoteroL (BROVANA) neb solution 15 mcg  15 mcg Nebulization BID RT  And  
 budesonide (PULMICORT) 500 mcg/2 ml nebulizer suspension  500 mcg Nebulization BID RT  
 albuterol (PROVENTIL VENTOLIN) nebulizer solution 2.5 mg  2.5 mg Nebulization Q4H PRN  
 levoFLOXacin (LEVAQUIN) 250 mg in D5W IVPB  250 mg IntraVENous Q24H  
 sodium chloride (NS) flush 5-40 mL  5-40 mL IntraVENous Q8H  
 sodium chloride (NS) flush 5-40 mL  5-40 mL IntraVENous PRN  
 acetaminophen (TYLENOL) tablet 650 mg  650 mg Oral Q4H PRN Assessment:  
 
Acute Asthmatic Bronchitis -better . Taper steroids DM affected by steroids. Continue to tx  
 
CAF. For Pacer today Patient Active Problem List  
Diagnosis Code  Coronary atherosclerosis of native coronary artery I25.10  
 HLD (hyperlipidemia) E78.5  Cerebrovascular accident stroke, other, unspec I67.89  
 Hypertension complicating diabetes ([address] E11.59, I10  
 PVD (peripheral vascular disease) with claudication (HCC) I73.9  Atrial fibrillation (HCC) I48.91  
 SOB (shortness of breath) R06.02  
 Type 2 diabetes with nephropathy (HCC) E11.21  
 Severe obesity (BMI 35.0-39. 9) with comorbidity ([address] E66.01  
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  
      
 
  
 
 
  
              
 
 
 
 
 
 
      
___________________________________________________ Attending Physician: Delia Snow MD

## 2020-02-05 NOTE — PROGRESS NOTES
TRANSFER - IN REPORT: 
 
Verbal report received from Parris Silva RN(name) on Mainor Sandy  being received from Room 552(unit) for routine progression of care Report consisted of patients Situation, Background, Assessment and  
Recommendations(SBAR). Information from the following report(s) SBAR, Procedure Summary, MAR, Recent Results and Cardiac Rhythm Afib was reviewed with the receiving nurse. Opportunity for questions and clarification was provided. Assessment completed upon patients arrival to unit and care assumed.

## 2020-02-05 NOTE — PROGRESS NOTES
Bedside and Verbal shift change report given to Letitia Schilling RN (oncoming nurse) by Ubaldo Pulido RN (offgoing nurse). Report included the following information SBAR, Kardex and MAR.

## 2020-02-05 NOTE — PROGRESS NOTES
Bedside and Verbal shift change report given to PIPER Cuello (oncoming nurse) by Mayi Portillo RN (offgoing nurse). Report included the following information SBAR, Kardex, Procedure Summary, Intake/Output, MAR and Recent Results.

## 2020-02-05 NOTE — PROGRESS NOTES
Paged Dr. Magaly Paula and talked to the doctor on call. She said I hold aspirin because the patient has surgery tomorrow.

## 2020-02-05 NOTE — PROGRESS NOTES
TRANSFER - OUT REPORT: 
 
Verbal report given to Jayjay Moffett RN on Worley Holstein being transferred to cath lab recovery for routine progression of care Report consisted of patients Situation, Background, Assessment and  
Recommendations(SBAR). Information from the following report(s) Procedure Summary was reviewed with the receiving nurse. Opportunity for questions and clarification was provided. Cardiac Cath Lab Procedure Area Arrival Note: 
 
Worley Holstein arrived to Cardiac Cath Lab, Procedure Area. Patient identifiers verified with NAME and DATE OF BIRTH. Procedure verified with patient. Consent forms verified. Allergies verified. Patient informed of procedure and plan of care. Questions answered with review. Patient voiced understanding of procedure and plan of care. Patient on cardiac monitor, non-invasive blood pressure, SPO2 monitor. Airway mangement and monitoring along with all vital signs to be performed by CRNA. Patient status doing well without problems. Patient is A&Ox 4. Patient reports no pain. Patient medicated during procedure with orders obtained and verified by Dr. Richard Strong. Refer to patients Cardiac Cath Lab PROCEDURE REPORT for vital signs, assessment, status, and response during procedure, printed at end of case. Printed report on chart or scanned into chart.

## 2020-02-05 NOTE — PROGRESS NOTES
Contacted Dr. Genia Jackson regarding patient's blood sugar (247) and administration of lispro (6 units per sliding scale). Per Dr. Genia Jackson, administer 2 units of lispro.

## 2020-02-05 NOTE — PROGRESS NOTES
[address] [facility],4Th Floor Heart and Vascular [place] Division of Cardiology [phone] Progress note Oswaldo Mahoney M.D., F.A.CRenettaC. NAME:  Fredi Granda :   1935 MRN:   [id number] ICD-10-CM ICD-9-CM 1. SOB (shortness of breath) R06.02 786.05   
2. COPD exacerbation ([address] J44.1 491.21 Assessment/Plan: 1. Asthmatic bronchitis 
     - SOB absent. Breathing much better. - per Dr. Klarissa Dillon 2. Persistent AF with increased hr 
     - Continue BB and Ca blockers 
     - leadless ppm and ablation by Dr. Sera White today. Posted for 2:30 
3. HTN 
     - Fairly controlled on current meds 4. DM II 
     - SSI 
 
SOB improved. Breathing better. Limited echo pending. Posted with Dr. Sera White today at 2:30 pm. 
I have seen and examined the patient and agree with PA assessment. Sob much better For AVN ablation and leadless ppm 
Hopefully we will be able to stop /decrease toprol and cardizem after this 19 ECHO ADULT COMPLETE 2019 Narrative · Left ventricular low normal systolic function. Calculated left  
ventricular ejection fraction is 53%. Biplane method used to measure  
ejection fraction. · Left atrial cavity size is moderately dilated. · Right atrial cavity size is mildly dilated. · Mild aortic valve sclerosis with no significant stenosis. · Mitral valve thickening. Mild mitral valve regurgitation. · Mild pulmonary hypertension is present. Signed by: Corbett Schilder, MD  
 
19 NUCLEAR CARDIAC STRESS TEST 2019 Narrative · Baseline ECG: Atrial fibrillation, non-specific ST-T wave abnormalities. · Gated SPECT: Left ventricular function post-stress was abnormal.  
Calculated ejection fraction is 45%. There is no evidence of transient  
ischemic dilation (TID). The TID ratio is 0.97. · Left ventricular perfusion is abnormal. 
· Myocardial perfusion imaging defect 1: There is a defect that is  
moderate in size with a moderate reduction in uptake present in the apical  
to basal anterior location(s) that is non-reversible. There is abnormal  
wall motion in the defect area. Viability in the area is good. caused by  
breast attenuation. The possibility of artifact cannot be excluded. Perfusion defect was visually and quantitatively present. · Myocardial perfusion imaging defect 1: caused by breast attenuation. · Abnormal myocardial perfusion imaging. Fixed defect consistent with  
prior myocardial infarction. Myocardial perfusion imaging supports an  
intermediate risk stress test. 
   
  Signed by: Tamiko Murguia MD  
 
   
EKG Results Procedure 720 Value Units Date/Time EKG 12 LEAD INITIAL [[id number]] Collected:  01/31/20 1619 Order Status:  Completed Updated:  01/31/20 1753 Ventricular Rate 114 BPM   
  Atrial Rate 91 BPM   
  QRS Duration 86 ms   
  Q-T Interval 370 ms QTC Calculation (Bezet) 509 ms Calculated R Axis 27 degrees Calculated T Axis 38 degrees Diagnosis -- Atrial fibrillation Nonspecific ST and T wave abnormality When compared with ECG of 23-AUG-2017 14:30, Atrial fibrillation has replaced Sinus rhythm Vent. rate has increased BY  54 BPM 
ST now depressed in Lateral leads Nonspecific T wave abnormality, worse in Inferior leads Nonspecific T wave abnormality now evident in Anterolateral leads Confirmed by Caryn Walton MD, Javi Briceno ([id number]) on 1/31/2020 5:53:51 PM 
  
  
 
 
 
Visit Vitals /76 (BP 1 Location: Right arm, BP Patient Position: Sitting; At rest) Pulse 76 Temp 97.4 °F (36.3 °C) Resp 16 Wt 172 lb 9.6 oz (78.3 kg) SpO2 95% BMI 33.71 kg/m² Wt Readings from Last 3 Encounters:  
02/02/20 172 lb 9.6 oz (78.3 kg) 01/31/20 176 lb (79.8 kg) 01/23/20 175 lb (79.4 kg) Review of Systems: Pertinent items are noted in the History of Present Illness. Objective: No intake/output data recorded. No intake/output data recorded. Telemetry: AFIB Physical Exam: 
 
Neck: no JVD Heart: irregularly irregular rhythm Lungs: clear breath sounds R base, L base - Abdomen: soft, non-tender. Bowel sounds normal. No masses,  no organomegaly Extremities: no edema Current Facility-Administered Medications Medication Dose Route Frequency  methylPREDNISolone (PF) (SOLU-MEDROL) injection 40 mg  40 mg IntraVENous Q12H  
 insulin glargine (LANTUS) injection 22 Units  22 Units SubCUTAneous DAILY  acetylcysteine (MUCOMYST) 200 mg/mL (20 %) solution 600 mg  600 mg Nebulization BID RT  
 dilTIAZem (CARDIZEM) IR tablet 60 mg  60 mg Oral Q8H  
 . PHARMACY TO SUBSTITUTE PER PROTOCOL (Reordered from: acyclovir (ZOVIRAX) 5 % ointment)    Per Protocol  allopurinoL (ZYLOPRIM) tablet 100 mg  100 mg Oral DAILY  aspirin chewable tablet 81 mg  81 mg Oral DAILY  atorvastatin (LIPITOR) tablet 20 mg  20 mg Oral QHS  azelastine (ASTELIN) 137mcg/spray nasal spray  1 Spray Both Nostrils QHS  bumetanide (BUMEX) tablet 1 mg  1 mg Oral DAILY  cholecalciferol (VITAMIN D3) (1000 Units /25 mcg) tablet 2 Tab  2,000 Units Oral DAILY  lisinopril (PRINIVIL, ZESTRIL) tablet 20 mg  20 mg Oral BID  
 glipiZIDE (GLUCOTROL) tablet 10 mg  10 mg Oral ACB&D  
 isosorbide mononitrate ER (IMDUR) tablet 60 mg  60 mg Oral BID  metoprolol tartrate (LOPRESSOR) tablet 100 mg  100 mg Oral BID  nitroglycerin (NITROSTAT) tablet 0.4 mg  0.4 mg SubLINGual Q5MIN PRN  pantoprazole (PROTONIX) tablet 40 mg  40 mg Oral ACB  promethazine-dextromethorphan (PROMETHAZINE-DM) 6.25-15 mg/5 mL syrup 5 mL  5 mL Oral TID PRN  
 insulin lispro (HUMALOG) injection   SubCUTAneous TIDAC  insulin glargine (LANTUS) injection 10 Units  10 Units SubCUTAneous QHS  arformoteroL (BROVANA) neb solution 15 mcg  15 mcg Nebulization BID RT And  
 budesonide (PULMICORT) 500 mcg/2 ml nebulizer suspension  500 mcg Nebulization BID RT  
 albuterol (PROVENTIL VENTOLIN) nebulizer solution 2.5 mg  2.5 mg Nebulization Q4H PRN  
 levoFLOXacin (LEVAQUIN) 250 mg in D5W IVPB  250 mg IntraVENous Q24H  
 sodium chloride (NS) flush 5-40 mL  5-40 mL IntraVENous Q8H  
 sodium chloride (NS) flush 5-40 mL  5-40 mL IntraVENous PRN  
 acetaminophen (TYLENOL) tablet 650 mg  650 mg Oral Q4H PRN Lab Results Component Value Date/Time WBC 17.9 (H) 02/05/2020 03:53 AM  
 HGB (POC) 14.6 01/23/2020 02:50 PM  
 HGB 14.5 02/05/2020 03:53 AM  
 HCT (POC) 45.5 01/23/2020 02:50 PM  
 HCT 42.8 02/05/2020 03:53 AM  
 PLATELET 449 88/11/3103 03:53 AM  
 MCV 80.0 02/05/2020 03:53 AM  
 
Lab Results Component Value Date/Time Sodium 133 (L) 02/05/2020 03:53 AM  
 Potassium 4.0 02/05/2020 03:53 AM  
 Chloride 99 02/05/2020 03:53 AM  
 CO2 26 02/05/2020 03:53 AM  
 Anion gap 8 02/05/2020 03:53 AM  
 Glucose 289 (H) 02/05/2020 03:53 AM  
 BUN 42 (H) 02/05/2020 03:53 AM  
 Creatinine 1.50 (H) 02/05/2020 03:53 AM  
 BUN/Creatinine ratio 28 (H) 02/05/2020 03:53 AM  
 GFR est AA 40 (L) 02/05/2020 03:53 AM  
 GFR est non-AA 33 (L) 02/05/2020 03:53 AM  
 Calcium 8.3 (L) 02/05/2020 03:53 AM  
 
Lab Results Component Value Date/Time Cholesterol, total 110 06/13/2017 09:31 AM  
 Cholesterol (POC) <100 12/18/2019 11:53 AM  
 HDL Cholesterol 55 06/13/2017 09:31 AM  
 HDL Cholesterol (POC) 41 12/18/2019 11:53 AM  
 LDL Cholesterol (POC) n/a 12/18/2019 11:53 AM  
 LDL, calculated 36 06/13/2017 09:31 AM  
 VLDL, calculated 19 06/13/2017 09:31 AM  
 Triglyceride 93 06/13/2017 09:31 AM  
 Triglycerides (POC) 82 12/18/2019 11:53 AM  
 CHOL/HDL Ratio 2.0 04/25/2017 03:53 AM  
 
Lab Results Component Value Date/Time   02/04/2015 07:47 PM  
 CK - MB 4.7 (H) 02/04/2015 07:47 PM  
 CK-MB Index 4.6 (H) 02/04/2015 07:47 PM  
 Troponin-I, Qt. <0.05 01/31/2020 03:55 PM  
  (H) 08/25/2017 04:40 AM  
 
Lab Results Component Value Date/Time ALT (SGPT) 20 01/31/2020 03:55 PM  
 AST (SGOT) 20 01/31/2020 03:55 PM  
 Alk. phosphatase 111 01/31/2020 03:55 PM  
 Bilirubin, direct 0.18 02/12/2014 09:42 AM  
 Bilirubin, total 0.6 01/31/2020 03:55 PM  
 
Lab Results Component Value Date/Time  (H) 08/25/2017 04:40 AM  
 NT pro-BNP 1,293 (H) 08/26/2017 03:25 AM  
 NT pro-BNP 1,671 (H) 08/23/2017 02:29 PM  
 
REYMUNDO Woodward MD

## 2020-02-05 NOTE — PROGRESS NOTES
Contacted Dr. Mckeon Em office, spoke with Alisia Vuong who had spoken directly nurse working with Dr. Kinsey Trujillo. Per nurse, only medication needed to be held was the Eliquis two days prior to pacemaker placement procedure.

## 2020-02-05 NOTE — PROCEDURES
Cardiac Procedure Note Patient: Enrico Burns  MRN: [id number]  SSN: [id number] YOB: 1935 Age: 81 y.o. Sex: female Date of Procedure: 2/5/2020 Pre-procedure Diagnosis: persistent atrial fibrillation with RVR, obesity, bronchitis Post-procedure Diagnosis: same Procedure: 1. Implant leadless pacemaker 2. Ablation of av node :  Dr. Bobby Man MD 
 
Assistant(s):  None Anesthesia: Moderate Sedation by anesthesia Estimated Blood Loss: Less than 10 mL Specimens Removed: None Findings: AV node ablation to 37 bpm junctional bradycardia with complete av block Complications: None Implants: leadless pacemaker Signed by:  Bobby Man MD  2/5/2020  5:45 PM

## 2020-02-05 NOTE — ANESTHESIA POSTPROCEDURE EVALUATION
Procedure(s): 
INSERT OR REPLACE TRANSCATH PPM LEADLESS ABLATION AV NODE. MAC Anesthesia Post Evaluation Patient location during evaluation: PACU Patient participation: complete - patient participated Level of consciousness: awake Pain management: adequate Airway patency: patent Anesthetic complications: no 
Cardiovascular status: hemodynamically stable Respiratory status: acceptable Hydration status: acceptable Comments: I have seen and evaluated the patient. The patient is ready for PACU discharge. [address], DO Vitals Value Taken Time /77 2/5/2020  6:54 PM  
Temp Pulse 81 2/5/2020  6:58 PM  
Resp SpO2 96 % 2/5/2020  6:58 PM  
Vitals shown include unvalidated device data.

## 2020-02-05 NOTE — PROGRESS NOTES
Talked to Dr. Gulshan Nichols about patient's diabetic medications because she is NPO 
 said I hold Glipizide and give the patient 2 units of insulin lispro instead of the 9 units.

## 2020-02-05 NOTE — DIABETES MGMT
One [facility] Followup Progress Note Presentation Ana Maria Merritt is a 81 y.o. female admitted with acute asthmatic bronchitis and consulted by Provider for advanced specialty nursing care related to inpatient diabetes management. Hyperglycemia management orders are in place. Subjective Ms Maria Del Carmen Romero remains in stable condition in the hospital.  Pacemaker placement today. Respiratory status improved today. Remains hyperglycemic with steroids. Objective Physical exam 
 
General Alert, oriented and in no acute distress. Conversant and cooperative Vital Signs Visit Vitals /76 (BP 1 Location: Right arm, BP Patient Position: Sitting; At rest) Pulse 76 Temp 97.4 °F (36.3 °C) Resp 16 Wt 78.3 kg (172 lb 9.6 oz) SpO2 95% BMI 33.71 kg/m² Skin  Warm and dry Heart   Regular rate and rhythm. No murmurs, rubs or gallops Lungs  Clear to auscultation without rales or rhonchi Extremities No foot wounds Laboratory CBC WITH AUTOMATED DIFF Collection Time: 02/05/20  3:53 AM  
Result Value Ref Range WBC 17.9 (H) 3.6 - 11.0 K/uL  
 RBC 5.35 (H) 3.80 - 5.20 M/uL  
 HGB 14.5 11.5 - 16.0 g/dL HCT 42.8 35.0 - 47.0 % MCV 80.0 80.0 - 99.0 FL  
 MCH 27.1 26.0 - 34.0 PG  
 MCHC 33.9 30.0 - 36.5 g/dL  
 RDW 14.5 11.5 - 14.5 % PLATELET 468 124 - 084 K/uL MPV 9.2 8.9 - 12.9 FL  
 NRBC 0.0 0  WBC ABSOLUTE NRBC 0.00 0.00 - 0.01 K/uL NEUTROPHILS 91 (H) 32 - 75 % LYMPHOCYTES 7 (L) 12 - 49 % MONOCYTES 1 (L) 5 - 13 % EOSINOPHILS 0 0 - 7 % BASOPHILS 0 0 - 1 % METAMYELOCYTES 1 (H) 0 % IMMATURE GRANULOCYTES 0 %  
 ABS. NEUTROPHILS 16.3 (H) 1.8 - 8.0 K/UL  
 ABS. LYMPHOCYTES 1.3 0.8 - 3.5 K/UL  
 ABS. MONOCYTES 0.2 0.0 - 1.0 K/UL  
 ABS. EOSINOPHILS 0.0 0.0 - 0.4 K/UL  
 ABS. BASOPHILS 0.0 0.0 - 0.1 K/UL  
 ABS. IMM. GRANS. 0.0 K/UL  
 DF MANUAL    
 RBC COMMENTS ANISOCYTOSIS 1+ 
    
 RBC COMMENTS OVALOCYTES PRESENT 
    
 
BMP:  
 Lab Results Component Value Date/Time  (L) 02/05/2020 03:53 AM  
 K 4.0 02/05/2020 03:53 AM  
 CL 99 02/05/2020 03:53 AM  
 CO2 26 02/05/2020 03:53 AM  
 AGAP 8 02/05/2020 03:53 AM  
  (H) 02/05/2020 03:53 AM  
 BUN 42 (H) 02/05/2020 03:53 AM  
 CREA 1.50 (H) 02/05/2020 03:53 AM  
 GFRAA 40 (L) 02/05/2020 03:53 AM  
 GFRNA 33 (L) 02/05/2020 03:53 AM  
  
 
 
Blood glucose pattern Assessment and Plan Nursing Diagnosis Risk for unstable blood glucose pattern Nursing Intervention Domain 5255 Decision-making Support Nursing Interventions Examined current inpatient diabetes control Explored factors facilitating and impeding inpatient management Identified self-management practices impeding diabetes control Explored corrective strategies with patient and responsible inpatient provider Informed patient of rational for basal bolus insulin strategy while hospitalized Evaluation Basal insulin dosing has not stabilized blood glucose levels in the past 24 hours Bolus insulin dosing is indicated as patient is eating meals Blood glucose management has been impacted by O Erratic meal consumption O Use of glucocorticoids Recommendations Recommend: 
 
Discussed with Dr Aris Mccauley- On Call MD for Dr Loli Javier 1. Insulin adjustment is required for steroid induced hyperglycemia. Each 40 mg of methyprednisolone dose is equalivent to 0.4 units/kg NPH 
  
Therefore, Adjust Basal insulin with steroid dose 30 units NPH BID  
  
8 mg of methyprednisolone dose is equalivent to 0.1 units/kg NPH 
16 mg of methyprednisolone dose is equalivent to 0.2 units/kg NPH 
24 mg of methyprednisolone dose is equalivent to 0.3 units/kg NPH 
36+mg of methyprednisolone dose is equalivent to 0.4 units/kg NPH 
  
2. Add Bolus insulin Normal sensitivity: 6 units Humalog/meal 
  
3. Corrective insulin 
[]? Normal sensitivity Correctional Scale for Normal Sensitivity 
  
 200-249: 2 units Humalog 250-299: 4 units Humalog 300-349: 6 units Humalog 350-399: 8 units Humalog Over 400: 10 units Humalog 
  
Do NOT hold for NPO; give in addition to meal time insulin dose. If patient does not eat, give correction dose only. 
  
  
4. D/C Glipizide while in-patient due to side effects with erratic meal consumptions Billing Code(s)  
[id number] Jameel Parada RN CNS 
[phone]

## 2020-02-05 NOTE — ANESTHESIA PREPROCEDURE EVALUATION
Relevant Problems No relevant active problems Anesthetic History No history of anesthetic complications Review of Systems / Medical History Patient summary reviewed, nursing notes reviewed and pertinent labs reviewed Pulmonary Within defined limits Asthma Neuro/Psych Within defined limits CVA Cardiovascular Within defined limits Hypertension Dysrhythmias : atrial fibrillation CAD 
 
 
  
GI/Hepatic/Renal 
Within defined limits Endo/Other Within defined limits Diabetes Morbid obesity Other Findings Physical Exam 
 
Airway Mallampati: II 
TM Distance: > 6 cm Neck ROM: normal range of motion Mouth opening: Normal 
 
 Cardiovascular Regular rate and rhythm,  S1 and S2 normal,  no murmur, click, rub, or gallop Dental 
No notable dental hx Pulmonary Breath sounds clear to auscultation Abdominal 
GI exam deferred Other Findings Anesthetic Plan ASA: 3 Anesthesia type: MAC Anesthetic plan and risks discussed with: Patient

## 2020-02-06 NOTE — PROGRESS NOTES
0730: Bedside and Verbal shift change report given to Jason Tucker (oncoming nurse) by Keiko Callahan (offgoing nurse). Report included the following information SBAR, Kardex, Procedure Summary, Intake/Output, MAR, Recent Results and Cardiac Rhythm Paced. Problem: Falls - Risk of 
Goal: *Absence of Falls Description Document Bruno Sheridan Fall Risk and appropriate interventions in the flowsheet. Outcome: Progressing Towards Goal 
Note:  
Fall Risk Interventions: 
Mobility Interventions: Patient to call before getting OOB, Strengthening exercises (ROM-active/passive), Utilize walker, cane, or other assistive device Medication Interventions: Patient to call before getting OOB, Teach patient to arise slowly Elimination Interventions: Call light in reach, Patient to call for help with toileting needs, Stay With Me (per policy) Up with 1 person assistance and cane. Call bell and personal effects within reach. Bed locked and in low position. Non skid footwear in place. Problem: Diabetes Maintenance:Discharge Outcomes Goal: *Aware of nutrition guidelines Outcome: Progressing Towards Goal 
Goal: *Verbalizes information about medication Description Verbalizes name, dosage, time, side effects, and number of days to 
continue medications. Outcome: Progressing Towards Goal 
  
Problem: Breathing Pattern - Ineffective Goal: *Absence of hypoxia Outcome: Progressing Towards Goal 
Note:  
SpO2 WDL on room air Problem: Cath Lab Procedures: Post-Cath Day 1 Goal: Diagnostic Test/Procedures Outcome: Progressing Towards Goal 
Note:  
Labs drawn as ordered and monitored for results. Goal: Discharge Planning Outcome: Progressing Towards Goal 
Goal: Medications Outcome: Progressing Towards Goal 
Goal: Respiratory Note:  
Lungs are diminished to auscultation Goal: Psychosocial 
Outcome: Progressing Towards Goal 
Note:  
Reassurance and emotional support

## 2020-02-06 NOTE — PROGRESS NOTES
Medical Progress Note NAME: Tiana Coronado :  1935 MRM:  [id number] Date/Time: 2020 Problem List:  
 
Active Problems: 
  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 
 
 
 
 
  
Subjective:  
 
Breathing and cough better . Denies palpitations, chest pain Past Medical History:  
Diagnosis Date  Atrial fibrillation ([facility] [address]  CAD (coronary artery disease)  Cerebrovascular accident stroke, other, unspec 2010  Coronary atherosclerosis of native coronary artery 2010  Essential hypertension  Essential hypertension, benign 2010  HLD (hyperlipidemia) 2010  PVD (peripheral vascular disease) ([facility] [address] 2017  Type II or unspecified type diabetes mellitus without mention of complication, not stated as uncontrolled 2010  [place]oster Objective:  
 
 
 
Vitals:  
  
Last 24hrs VS reviewed since prior progress note. Most recent are: 
 
Visit Vitals /66 (BP 1 Location: Right arm, BP Patient Position: At rest) Pulse 80 Temp 97.7 °F (36.5 °C) Resp 17 Wt 171 lb 4.8 oz (77.7 kg) SpO2 92% BMI 33.45 kg/m² SpO2 Readings from Last 6 Encounters:  
20 92% 20 91% 20 95% 01/10/20 98% 19 91% 19 97% O2 Flow Rate (L/min): 2 l/min Intake/Output Summary (Last 24 hours) at 2020 3123 Last data filed at 2020 2338 Gross per 24 hour Intake 760 ml Output  Net 760 ml Exam:  
   General:  Alert, cooperative, no distress, appears stated age. Lungs:   Clear to auscultation bilaterally. Heart:  Regular rate and rhythm, S1, S2 normal, no murmur, click, rub or gallop. Abdomen:   Soft, non-tender. Bowel sounds normal.   
Extremities: Rt pretib stable 2+ edema. Left no pedal edema Lab Data Reviewed: (see below) Recent Results (from the past 24 hour(s)) GLUCOSE, POC Collection Time: 02/05/20 11:26 AM  
Result Value Ref Range Glucose (POC) 247 (H) 65 - 100 mg/dL Performed by [facility] ECHO ADULT FOLLOW-UP OR LIMITED Collection Time: 02/05/20  1:31 PM  
Result Value Ref Range LA Volume 60.59 22 - 52 mL  
 LV E' Lateral Velocity 7.98 cm/s LV E' Septal Velocity 5.03 cm/s Tapse 1.75 1.5 - 2.0 cm Ao Root D 3.53 cm Aortic Valve Systolic Peak Velocity 41.41 cm/s Aortic Valve Area by Continuity of Peak Velocity 2.3 cm2 AoV PG 3.8 mmHg LVIDd 4.20 3.9 - 5.3 cm  
 LVPWd 1.09 (A) 0.6 - 0.9 cm LVIDs 3.12 cm IVSd 1.17 (A) 0.6 - 0.9 cm  
 LVOT d 2.03 cm  
 LVOT Peak Velocity 68.88 cm/s LVOT Peak Gradient 1.9 mmHg MVA (PHT) 5.0 cm2  
 MV A [place] 31.26 cm/s  
 MV E [place] 96.18 cm/s  
 MV E/A 3.08 Left Atrium to Aortic Root Ratio 1.04   
 RVIDd 5.31 cm  
 LA Vol 4C 61.71 (A) 22 - 52 mL  
 LA Vol 2C 50.97 22 - 52 mL  
 LA Area 4C 22.8 cm2 LV Mass .1 (A) 67 - 162 g  
 LV Mass AL Index 107.9 43 - 95 g/m2 E/E' lateral 12.05   
 E/E' septal 19.12 RVSP 32.4 mmHg E/E' ratio (averaged) 15.59 Est. RA Pressure 5.0 mmHg Mitral Regurgitant Peak Velocity 433.74 cm/s Mitral Valve E Wave Deceleration Time 151.1 ms  
 Mitral Valve Pressure Half-time 43.8 ms Left Atrium Major Axis 3.68 cm Triscuspid Valve Regurgitation Peak Gradient 27.4 mmHg Pulmonic Valve Max Velocity 67.61 cm/s  
 TR Max Velocity 261.91 cm/s  
 LA Vol Index 34.56 16 - 28 ml/m2 PASP 32.4 mmHg LA Vol Index 29.07 16 - 28 ml/m2 LA Vol Index 35.20 16 - 28 ml/m2 MR Peak Gradient 75.3 mmHg Left Ventricular Fractional Shortening by 2D [id number] % PV End Diastolic Velocity 0.8 mmHg Mitral Valve Deceleration [place] [id number] AV Velocity Ratio 0.71 Pulmonary Artery Mean Presure 16.0 mmHg PI End Diastolic Pressure 7.8 mmHg PV peak gradient 1.8 mmHg GLUCOSE, POC Collection Time: 02/05/20  4:11 PM  
Result Value Ref Range Glucose (POC) 202 (H) 65 - 100 mg/dL Performed by Rexene Speed GLUCOSE, POC Collection Time: 02/05/20  6:09 PM  
Result Value Ref Range Glucose (POC) 207 (H) 65 - 100 mg/dL Performed by Tantaline GLUCOSE, POC Collection Time: 02/05/20  9:32 PM  
Result Value Ref Range Glucose (POC) 237 (H) 65 - 100 mg/dL Performed by Armen Stubbs, BASIC Collection Time: 02/06/20  4:05 AM  
Result Value Ref Range Sodium 136 136 - 145 mmol/L Potassium 3.7 3.5 - 5.1 mmol/L Chloride 102 97 - 108 mmol/L  
 CO2 28 21 - 32 mmol/L Anion gap 6 5 - 15 mmol/L Glucose 219 (H) 65 - 100 mg/dL BUN 40 (H) 6 - 20 MG/DL Creatinine 1.35 (H) 0.55 - 1.02 MG/DL  
 BUN/Creatinine ratio 30 (H) 12 - 20 GFR est AA 45 (L) >60 ml/min/1.73m2 GFR est non-AA 37 (L) >60 ml/min/1.73m2 Calcium 8.2 (L) 8.5 - 10.1 MG/DL  
CBC WITH AUTOMATED DIFF Collection Time: 02/06/20  4:05 AM  
Result Value Ref Range WBC 18.4 (H) 3.6 - 11.0 K/uL  
 RBC 5.20 3.80 - 5.20 M/uL  
 HGB 13.7 11.5 - 16.0 g/dL HCT 42.0 35.0 - 47.0 % MCV 80.8 80.0 - 99.0 FL  
 MCH 26.3 26.0 - 34.0 PG  
 MCHC 32.6 30.0 - 36.5 g/dL  
 RDW 14.5 11.5 - 14.5 % PLATELET 635 130 - 187 K/uL MPV 9.4 8.9 - 12.9 FL  
 NRBC 0.0 0  WBC ABSOLUTE NRBC 0.00 0.00 - 0.01 K/uL NEUTROPHILS 92 (H) 32 - 75 % LYMPHOCYTES 4 (L) 12 - 49 % MONOCYTES 3 (L) 5 - 13 % EOSINOPHILS 0 0 - 7 % BASOPHILS 0 0 - 1 % MYELOCYTES 1 (H) 0 % IMMATURE GRANULOCYTES 0 %  
 ABS. NEUTROPHILS 16.9 (H) 1.8 - 8.0 K/UL ABS. LYMPHOCYTES 0.7 (L) 0.8 - 3.5 K/UL  
 ABS. MONOCYTES 0.6 0.0 - 1.0 K/UL  
 ABS. EOSINOPHILS 0.0 0.0 - 0.4 K/UL  
 ABS. BASOPHILS 0.0 0.0 - 0.1 K/UL  
 ABS. IMM. GRANS. 0.0 K/UL  
 DF MANUAL    
 RBC COMMENTS NORMOCYTIC, NORMOCHROMIC    
 WBC COMMENTS REACTIVE LYMPHS    
GLUCOSE, POC Collection Time: 02/06/20  7:21 AM  
Result Value Ref Range Glucose (POC) 201 (H) 65 - 100 mg/dL Performed by Kourtney Barlow Medications Reviewed: (see below) 
 
______________________________________________________________________ Medications:  
 
Current Facility-Administered Medications Medication Dose Route Frequency  methylPREDNISolone (PF) (SOLU-MEDROL) injection 20 mg  20 mg IntraVENous Q12H  
 insulin NPH (NOVOLIN N, HUMULIN N) injection 15 Units  15 Units SubCUTAneous ONCE  
 insulin NPH (NOVOLIN N, HUMULIN N) injection 30 Units  30 Units SubCUTAneous ACB&D  
 sodium chloride (NS) flush 5-40 mL  5-40 mL IntraVENous Q8H  
 sodium chloride (NS) flush 5-40 mL  5-40 mL IntraVENous PRN  
 naloxone (NARCAN) injection 0.4 mg  0.4 mg IntraVENous PRN  
 acetylcysteine (MUCOMYST) 200 mg/mL (20 %) solution 600 mg  600 mg Nebulization BID RT  
 dilTIAZem (CARDIZEM) IR tablet 60 mg  60 mg Oral Q8H  
 acyclovir (ZOVIRAX) 5 % ointment (Patient Supplied)   Topical DAILY PRN  
 allopurinoL (ZYLOPRIM) tablet 100 mg  100 mg Oral DAILY  aspirin chewable tablet 81 mg  81 mg Oral DAILY  atorvastatin (LIPITOR) tablet 20 mg  20 mg Oral QHS  azelastine (ASTELIN) 137mcg/spray nasal spray  1 Spray Both Nostrils QHS  bumetanide (BUMEX) tablet 1 mg  1 mg Oral DAILY  cholecalciferol (VITAMIN D3) (1000 Units /25 mcg) tablet 2 Tab  2,000 Units Oral DAILY  lisinopril (PRINIVIL, ZESTRIL) tablet 20 mg  20 mg Oral BID  isosorbide mononitrate ER (IMDUR) tablet 60 mg  60 mg Oral BID  metoprolol tartrate (LOPRESSOR) tablet 100 mg  100 mg Oral BID  
  nitroglycerin (NITROSTAT) tablet 0.4 mg  0.4 mg SubLINGual Q5MIN PRN  pantoprazole (PROTONIX) tablet 40 mg  40 mg Oral ACB  promethazine-dextromethorphan (PROMETHAZINE-DM) 6.25-15 mg/5 mL syrup 5 mL  5 mL Oral TID PRN  
 insulin lispro (HUMALOG) injection   SubCUTAneous TIDAC  arformoteroL (BROVANA) neb solution 15 mcg  15 mcg Nebulization BID RT And  
 budesonide (PULMICORT) 500 mcg/2 ml nebulizer suspension  500 mcg Nebulization BID RT  
 albuterol (PROVENTIL VENTOLIN) nebulizer solution 2.5 mg  2.5 mg Nebulization Q4H PRN  
 levoFLOXacin (LEVAQUIN) 250 mg in D5W IVPB  250 mg IntraVENous Q24H  
 sodium chloride (NS) flush 5-40 mL  5-40 mL IntraVENous Q8H  
 sodium chloride (NS) flush 5-40 mL  5-40 mL IntraVENous PRN  
 acetaminophen (TYLENOL) tablet 650 mg  650 mg Oral Q4H PRN Assessment: S/p Pacer . Paced rhythm on monitor Acute Asthmatic Bronchitis better. P: decrease Solumedrol Type 2 DM impacted by steroids. D/w DM specialist here Patient Active Problem List  
Diagnosis Code  Coronary atherosclerosis of native coronary artery I25.10  
 HLD (hyperlipidemia) E78.5  Cerebrovascular accident stroke, other, unspec I67.89  
 Hypertension complicating diabetes ([facility] [address] E11.59, I10  
 PVD (peripheral vascular disease) with claudication (HCC) I73.9  Atrial fibrillation (HCC) I48.91  
 SOB (shortness of breath) R06.02  
 Type 2 diabetes with nephropathy ([facility] E11.21  
 Severe obesity (BMI 35.0-39. 9) with comorbidity ([facility] [address] E66.01  
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 On phone .  
      
 
  
 
 
  
              
 
 
 
 
 
 
      
___________________________________________________ Attending Physician: Nena Vasquez MD

## 2020-02-06 NOTE — PROGRESS NOTES
Transitions of Care: 
 
 -Anticipate discharge home with daughter Tam Bender when medically stable 
 -PCP follow-up The CM reviewed previous CM notes, also met with the patient at bedside in order to introduce role and assess for patient needs. The patient lives in [place] with her daughter Tam Bender, sister Daniel Grace ([phone]) also lives locally in [place]. The patient ambulates with a cane at baseline in the community- at home she does not use any assistive devices. The patient denied use of other DME in the home. The patient denied difficulty accessing medications- she utilizes [email or web address] mail order or [address] for prescriptions. The patient's daughter will transport home. CM offered [facility] for Diabetes, patient declined- endorses she had home health in the past and it was not helpful. CM will follow as needs arise.  ROSA Linton

## 2020-02-06 NOTE — PROGRESS NOTES
TRANSFER - OUT REPORT: 
 
Verbal report given to [facility] RN on Nicole Velazco being transferred to Room 469 for routine progression of care Report consisted of patients Situation, Background, Assessment and  
Recommendations(SBAR). Information from the following report(s) SBAR, Procedure Summary, MAR, Recent Results and Cardiac Rhythm Paced was reviewed with the receiving nurse. Opportunity for questions and clarification was provided.

## 2020-02-06 NOTE — DIABETES MGMT
[facility] Followup Progress Note Presentation Estelita Cole is a 81 y.o. female admitted with acute asthmatic bronchitis and consulted by Nursing for advanced specialty nursing care related to inpatient diabetes management. Hyperglycemia management order set is in place. Subjective Ms Jose Ramon Marcus is s/p pacemaker placement. Breathing improved and steroids weaned today. Potential d/c tomorrow. Objective Physical exam 
 
General Alert, oriented and in no acute distress. Conversant and cooperative Vital Signs Visit Vitals /66 (BP 1 Location: Right arm, BP Patient Position: At rest) Pulse 80 Temp 97.7 °F (36.5 °C) Resp 17 Wt 77.7 kg (171 lb 4.8 oz) SpO2 93% BMI 33.45 kg/m² Skin  Warm and dry Heart   Regular rate and rhythm. No murmurs, rubs or gallops Lungs  Clear to auscultation without rales or rhonchi Extremities No foot wounds Laboratory CBC WITH AUTOMATED DIFF Collection Time: 02/06/20  4:05 AM  
Result Value Ref Range WBC 18.4 (H) 3.6 - 11.0 K/uL  
 RBC 5.20 3.80 - 5.20 M/uL  
 HGB 13.7 11.5 - 16.0 g/dL HCT 42.0 35.0 - 47.0 % MCV 80.8 80.0 - 99.0 FL  
 MCH 26.3 26.0 - 34.0 PG  
 MCHC 32.6 30.0 - 36.5 g/dL  
 RDW 14.5 11.5 - 14.5 % PLATELET 683 898 - 848 K/uL MPV 9.4 8.9 - 12.9 FL  
 NRBC 0.0 0  WBC ABSOLUTE NRBC 0.00 0.00 - 0.01 K/uL NEUTROPHILS 92 (H) 32 - 75 % LYMPHOCYTES 4 (L) 12 - 49 % MONOCYTES 3 (L) 5 - 13 % EOSINOPHILS 0 0 - 7 % BASOPHILS 0 0 - 1 % MYELOCYTES 1 (H) 0 % IMMATURE GRANULOCYTES 0 %  
 ABS. NEUTROPHILS 16.9 (H) 1.8 - 8.0 K/UL  
 ABS. LYMPHOCYTES 0.7 (L) 0.8 - 3.5 K/UL  
 ABS. MONOCYTES 0.6 0.0 - 1.0 K/UL  
 ABS. EOSINOPHILS 0.0 0.0 - 0.4 K/UL  
 ABS. BASOPHILS 0.0 0.0 - 0.1 K/UL  
 ABS. IMM. GRANS. 0.0 K/UL  
 DF MANUAL    
 RBC COMMENTS NORMOCYTIC, NORMOCHROMIC    
 WBC COMMENTS REACTIVE LYMPHS BMP:  
Lab Results Component Value Date/Time  02/06/2020 04:05 AM  
 K 3.7 02/06/2020 04:05 AM  
  02/06/2020 04:05 AM  
 CO2 28 02/06/2020 04:05 AM  
 AGAP 6 02/06/2020 04:05 AM  
  (H) 02/06/2020 04:05 AM  
 BUN 40 (H) 02/06/2020 04:05 AM  
 CREA 1.35 (H) 02/06/2020 04:05 AM  
 GFRAA 45 (L) 02/06/2020 04:05 AM  
 GFRNA 37 (L) 02/06/2020 04:05 AM  
  
 
 
Blood glucose pattern Assessment and Plan Nursing Diagnosis Risk for unstable blood glucose pattern Nursing Intervention Domain 9940 Decision-making Support Nursing Interventions Examined current inpatient diabetes control Explored factors facilitating and impeding inpatient management Identified self-management practices impeding diabetes control Explored corrective strategies with patient and responsible inpatient provider Informed patient of rational for basal bolus insulin strategy while hospitalized Evaluation Basal insulin dosing has not stabilized blood glucose levels in the past 24 hours. Unsure of why the evening NPH dose was held. Bolus insulin dosing is indicated as patient is eating meals Blood glucose management has been impacted by O Erratic meal consumption O Use of glucocorticoids Will transition to prednisone tomorrow. Will require another NPH dose adjustment tomorrow. Recommendations Recommend: 
  
Discussed with  Dr Kayden Man. 
  
1. Insulin adjustment is required for steroid induced hyperglycemia.  Each 20 mg of methyprednisolone dose is equalivent to 0.25 units/kg NPH 
  
Therefore, Adjust Basal insulin (15 units daily) with steroid dose 19 units NPH BID plus 0.2 units/kg basal insulin= total dose 25 units NPH BID 
  
8 mg of methyprednisolone dose is equalivent to 0.1 units/kg NPH 
16 mg of methyprednisolone dose is equalivent to 0.2 units/kg NPH 
24 mg of methyprednisolone dose is equalivent to 0.3 units/kg NPH 
36+mg of methyprednisolone dose is equalivent to 0.4 units/kg NPH 
  
Tomorrow will switch to Prednisone 2. Add Bolus insulin Normal sensitivity: 6 units Humalog/meal 
  
3. Corrective insulin 
[]? ?        Normal sensitivity Correctional Scale for Normal Sensitivity 
  
200-249: 2 units Humalog 250-299: 4 units Humalog 300-349: 6 units Humalog 350-399: 8 units Humalog Over 400: 10 units Humalog 
  
Do NOT hold for NPO; give in addition to meal time insulin dose. If patient does not eat, give correction dose only. 
  
  
4. D/C Glipizide while in-patient due to side effects with erratic meal consumptions On Discharge: 1. Resume Glipizide 2. Will need adjustment of insulin according to steroid taper. Will discuss with Dr Len Bateman according to patient's ability to comprehend insulin adjustment. Billing Code(s)  
75930 Ava Mathews RN [facility] 
[phone]

## 2020-02-06 NOTE — PROGRESS NOTES
[facility] [place] Division of Cardiology [phone] Progress note Seda Acosta M.D., F.A.C.C. NAME:  Cherry Bowman :   1935 MRN:   [id number] ICD-10-CM ICD-9-CM 1. SOB (shortness of breath) R06.02 786.05   
2. COPD exacerbation ([facility] [address] J44.1 491.21   
3. Atrial fibrillation, persistent I48.19 427.31 ELECTROPHYSIOLOGY PROCEDURE  
   ELECTROPHYSIOLOGY PROCEDURE Assessment/Plan: She feels much better. The shortness of breath is greatly improved. No chest pain reported. No headache. She is done well after AV node ablation and permanent pacemaker placement (leadless). . 
 
1.  Asthmatic bronchitis: Much improved continue therapy as per primary care physician. 2.  Chronic atrial fibrillation: Now status post AV jazlyn ablation and a leadless pacemaker placement. She is doing well. Her heart rate is 80. We have discussed with her option of decreasing some of the medication that we are used to for rate control. Stop Cardizem. Stop metoprolol. Start Toprol-XL 50 mg nightly. Start Norvasc 10 mg every morning. Continue lisinopril 20 mg twice a day. She will need close follow-up for her blood pressure. She is aware that changes in medication may lead to increase hypertension. 3.  HTN: As above. 4.  CAD: Asymptomatic at this time. EF 50-55%, continue asa lipitor and imdur for now 5. Diabetes: Continue as per primary care physician.  
 
20 ECHO ADULT FOLLOW-UP OR LIMITED 2020 Narrative · Normal cavity size and wall thickness. Low normal systolic dysfunction. Estimated left ventricular ejection fraction is 50 - 55%. · Image quality for this study was suboptimal. 
· Moderately dilated left atrium. · Mildly dilated right ventricle. Borderline low systolic function. · Moderately dilated right atrium. · Probably trileaflet aortic valve. Moderate aortic valve sclerosis. Aortic valve leaflet calcification present. · Mitral valve non-specific thickening. Mild mitral valve regurgitation is  
present. · Mild tricuspid valve regurgitation is present. · There is no evidence of pulmonary hypertension. Signed by: Breann Caraballo MD  
 
04/17/19 NUCLEAR CARDIAC STRESS TEST 04/18/2019 4/19/2019 Narrative · Baseline ECG: Atrial fibrillation, non-specific ST-T wave abnormalities. · Gated SPECT: Left ventricular function post-stress was abnormal.  
Calculated ejection fraction is 45%. There is no evidence of transient  
ischemic dilation (TID). The TID ratio is 0.97. · Left ventricular perfusion is abnormal. 
· Myocardial perfusion imaging defect 1: There is a defect that is  
moderate in size with a moderate reduction in uptake present in the apical  
to basal anterior location(s) that is non-reversible. There is abnormal  
wall motion in the defect area. Viability in the area is good. caused by  
breast attenuation. The possibility of artifact cannot be excluded. Perfusion defect was visually and quantitatively present. · Myocardial perfusion imaging defect 1: caused by breast attenuation. · Abnormal myocardial perfusion imaging. Fixed defect consistent with  
prior myocardial infarction. Myocardial perfusion imaging supports an  
intermediate risk stress test. 
   
  Signed by: Delroy Zavala MD  
 
   
EKG Results Procedure 720 Value Units Date/Time EKG 12 LEAD INITIAL [[id number]] Collected:  01/31/20 1619 Order Status:  Completed Updated:  01/31/20 1753 Ventricular Rate 114 BPM   
  Atrial Rate 91 BPM   
  QRS Duration 86 ms   
  Q-T Interval 370 ms QTC Calculation (Bezet) 509 ms Calculated R Axis 27 degrees Calculated T Axis 38 degrees Diagnosis -- Atrial fibrillation Nonspecific ST and T wave abnormality When compared with ECG of 23-AUG-2017 14:30, 
 Atrial fibrillation has replaced Sinus rhythm Vent. rate has increased BY  54 BPM 
ST now depressed in Lateral leads Nonspecific T wave abnormality, worse in Inferior leads Nonspecific T wave abnormality now evident in Anterolateral leads Confirmed by Jose Edgar MD, [employer] ([id number]) on 1/31/2020 5:53:51 PM 
  
  
 
 
 
Visit Vitals /66 (BP 1 Location: Right arm, BP Patient Position: At rest) Pulse 80 Temp 97.7 °F (36.5 °C) Resp 17 Wt 171 lb 4.8 oz (77.7 kg) SpO2 93% BMI 33.45 kg/m² Wt Readings from Last 3 Encounters:  
02/06/20 171 lb 4.8 oz (77.7 kg) 01/31/20 176 lb (79.8 kg) 01/23/20 175 lb (79.4 kg) Review of Systems:  
Pertinent items are noted in the History of Present Illness. Objective: No intake/output data recorded. 02/04 1901 - 02/06 0700 In: 760 [P.O.:360; I.V.:400] Out: - Telemetry: AFIB Physical Exam: 
 
Neck: no JVD Heart: regularly irregular rhythm Lungs: diminished breath sounds R anterior, L anterior Abdomen: soft, non-tender. Bowel sounds normal. No masses,  no organomegaly Extremities: no edema Current Facility-Administered Medications Medication Dose Route Frequency  methylPREDNISolone (PF) (SOLU-MEDROL) injection 20 mg  20 mg IntraVENous Q12H  
 insulin NPH (NOVOLIN N, HUMULIN N) injection 25 Units  25 Units SubCUTAneous ACB&D  
 amLODIPine (NORVASC) tablet 10 mg  10 mg Oral DAILY  metoprolol succinate (TOPROL-XL) XL tablet 50 mg  50 mg Oral QHS  sodium chloride (NS) flush 5-40 mL  5-40 mL IntraVENous Q8H  
 sodium chloride (NS) flush 5-40 mL  5-40 mL IntraVENous PRN  
 naloxone (NARCAN) injection 0.4 mg  0.4 mg IntraVENous PRN  
 acetylcysteine (MUCOMYST) 200 mg/mL (20 %) solution 600 mg  600 mg Nebulization BID RT  
 acyclovir (ZOVIRAX) 5 % ointment (Patient Supplied)   Topical DAILY PRN  
 allopurinoL (ZYLOPRIM) tablet 100 mg  100 mg Oral DAILY  aspirin chewable tablet 81 mg  81 mg Oral DAILY  atorvastatin (LIPITOR) tablet 20 mg  20 mg Oral QHS  azelastine (ASTELIN) 137mcg/spray nasal spray  1 Spray Both Nostrils QHS  bumetanide (BUMEX) tablet 1 mg  1 mg Oral DAILY  cholecalciferol (VITAMIN D3) (1000 Units /25 mcg) tablet 2 Tab  2,000 Units Oral DAILY  lisinopril (PRINIVIL, ZESTRIL) tablet 20 mg  20 mg Oral BID  isosorbide mononitrate ER (IMDUR) tablet 60 mg  60 mg Oral BID  nitroglycerin (NITROSTAT) tablet 0.4 mg  0.4 mg SubLINGual Q5MIN PRN  pantoprazole (PROTONIX) tablet 40 mg  40 mg Oral ACB  promethazine-dextromethorphan (PROMETHAZINE-DM) 6.25-15 mg/5 mL syrup 5 mL  5 mL Oral TID PRN  
 insulin lispro (HUMALOG) injection   SubCUTAneous TIDAC  arformoteroL (BROVANA) neb solution 15 mcg  15 mcg Nebulization BID RT And  
 budesonide (PULMICORT) 500 mcg/2 ml nebulizer suspension  500 mcg Nebulization BID RT  
 albuterol (PROVENTIL VENTOLIN) nebulizer solution 2.5 mg  2.5 mg Nebulization Q4H PRN  
 levoFLOXacin (LEVAQUIN) 250 mg in D5W IVPB  250 mg IntraVENous Q24H  
 sodium chloride (NS) flush 5-40 mL  5-40 mL IntraVENous Q8H  
 sodium chloride (NS) flush 5-40 mL  5-40 mL IntraVENous PRN  
 acetaminophen (TYLENOL) tablet 650 mg  650 mg Oral Q4H PRN Lab Results Component Value Date/Time WBC 18.4 (H) 02/06/2020 04:05 AM  
 HGB (POC) 14.6 01/23/2020 02:50 PM  
 HGB 13.7 02/06/2020 04:05 AM  
 HCT (POC) 45.5 01/23/2020 02:50 PM  
 HCT 42.0 02/06/2020 04:05 AM  
 PLATELET 221 95/06/4845 04:05 AM  
 MCV 80.8 02/06/2020 04:05 AM  
 
Lab Results Component Value Date/Time  Sodium 136 02/06/2020 04:05 AM  
 Potassium 3.7 02/06/2020 04:05 AM  
 Chloride 102 02/06/2020 04:05 AM  
 CO2 28 02/06/2020 04:05 AM  
 Anion gap 6 02/06/2020 04:05 AM  
 Glucose 219 (H) 02/06/2020 04:05 AM  
 BUN 40 (H) 02/06/2020 04:05 AM  
 Creatinine 1.35 (H) 02/06/2020 04:05 AM  
 BUN/Creatinine ratio 30 (H) 02/06/2020 04:05 AM  
 GFR est AA 45 (L) 02/06/2020 04:05 AM  
 GFR est non-AA 37 (L) 02/06/2020 04:05 AM  
 Calcium 8.2 (L) 02/06/2020 04:05 AM  
 
Lab Results Component Value Date/Time Cholesterol, total 110 06/13/2017 09:31 AM  
 Cholesterol (POC) <100 12/18/2019 11:53 AM  
 HDL Cholesterol 55 06/13/2017 09:31 AM  
 HDL Cholesterol (POC) 41 12/18/2019 11:53 AM  
 LDL Cholesterol (POC) n/a 12/18/2019 11:53 AM  
 LDL, calculated 36 06/13/2017 09:31 AM  
 VLDL, calculated 19 06/13/2017 09:31 AM  
 Triglyceride 93 06/13/2017 09:31 AM  
 Triglycerides (POC) 82 12/18/2019 11:53 AM  
 CHOL/HDL Ratio 2.0 04/25/2017 03:53 AM  
 
Lab Results Component Value Date/Time  02/04/2015 07:47 PM  
 CK - MB 4.7 (H) 02/04/2015 07:47 PM  
 CK-MB Index 4.6 (H) 02/04/2015 07:47 PM  
 Troponin-I, Qt. <0.05 01/31/2020 03:55 PM  
  (H) 08/25/2017 04:40 AM  
 
Lab Results Component Value Date/Time ALT (SGPT) 20 01/31/2020 03:55 PM  
 AST (SGOT) 20 01/31/2020 03:55 PM  
 Alk. phosphatase 111 01/31/2020 03:55 PM  
 Bilirubin, direct 0.18 02/12/2014 09:42 AM  
 Bilirubin, total 0.6 01/31/2020 03:55 PM  
 
Lab Results Component Value Date/Time   (H) 08/25/2017 04:40 AM  
 NT pro-BNP 1,293 (H) 08/26/2017 03:25 AM  
 NT pro-BNP 1,671 (H) 08/23/2017 02:29 PM

## 2020-02-06 NOTE — PROGRESS NOTES
Cardiac Electrophysiology Wound Check Note Wound Check Patient is seen for wound check, is s/p Medtronic leadless pacemaker & AV node ablation on 02/05/2020. Device check on POD #1 showed proper function. No drainage or hematoma from femoral access site. Physical Exam  
Constitutional: well-developed and well-nourished. Skin: Right femoral access site suture removed. Ecchymosis noted, but no drainage, no significant hematoma. ASSESSMENT and PLAN Ms. Paty Elam had AV node ablation & Medtronic leadless pacemaker implant on 02/05/2020. Device check on POD #1 showed proper function. No drainage or hematoma. If Levaquin IV is discontinued, she should start clindamycin 150 mg po tid x 5 days for post surgical infection prophylaxis. Activity restrictions as noted in discharge instructions. Follow up as noted below. Future Appointments Date Time Provider Carly Mcgrath 3/5/2020  1:30 PM PACEMAKER3, [address]  
3/5/2020  2:00 PM Enriqueta Lin  [address]  
3/19/2020 11:15 AM Severo Sport, MD [facility] [place] SCHED  
3/23/2020 11:20 AM Adriana Smith  [address] [facility], FNP-C 9 [facility] 02/06/20 Addendum from EP attending: 
 I have seen, examined patient, and discussed with nurse practitioner, registered nurse, reviewed, updated note and agree with the assessment and plan I have talked to her this am. She is feeling better and happy she could sleep last night, no concerns Vital signs are stable Exam shows regular rhythm Groin no bleeding Assessment and Plan: 
Continue to monitor leadless pacer in clinic Montez Gregorio M.D. Electrophysiology/Cardiology [address] [facility] [place] [address], [address] [place] [address], [address]                             [address] [place] [address] [phone]

## 2020-02-07 NOTE — PROGRESS NOTES
13:20 Discharge instructions reviewed with pt; copy given to pt. Follow up appointment set with [facility] [address] for March 5 at 13:30. Pt to call to make follow up appt with Dr. Sharifa Nielsen. Prescriptions are to be picked up from [place].

## 2020-02-07 NOTE — PROGRESS NOTES
Transitions of Care: 
 
 -Discharge home when medically stable- lives with jez Luu 
 -Family to transport 
 -Cardiology and PCP follow-up The CM met with patient at bedside, endorses feeling much better, denied any concerns for anticipated discharge home today. IM letter presented at bedside to the patient. The patient endorses her daughter Edinson Luu will transport home. CM will follow for transitions of care, no needs identified at this time.  ROSA Martin

## 2020-02-07 NOTE — PROGRESS NOTES
[address] [facility],4Th Floor Heart and Vascular [place] Division of Cardiology [phone] Progress note Yusuf Delgado M.D., F.A.C.C. NAME:  Daren Rankin :   1935 MRN:   [id number] ICD-10-CM ICD-9-CM 1. SOB (shortness of breath) R06.02 786.05   
2. COPD exacerbation ([address] J44.1 491.21   
3. Atrial fibrillation, persistent I48.19 427.31 ELECTROPHYSIOLOGY PROCEDURE  
   ELECTROPHYSIOLOGY PROCEDURE Assessment/Plan: She feels very well this morning. Shortness of breath has essentially resolved able to lay flat with no issues whatsoever. 1.  Asthmatic bronchitis: Essentially resolved continue treatment as per primary care physician. 2.  Chronic atrial fibrillation: Status post AV jazlyn ablation and leadless pacemaker placement. She doing well the heart rate remains 80 at this time. Resume Eliquis no bleeding issues from the procedure site. 3.  Hypertension: Fluctuating some at this point. Medication changed. Cardizem stopped and beta-blocker decreased. Norvasc started. This will need to be followed closely as an outpatient. 4.  CAD: Asymptomatic at this time. Preserved ejection fraction of 50 to 55%. Continue aspirin Lipitor and Imdur. 5.  Diabetes: Continue as per primary care physician. Okay to discharge from my standpoint and follow-up in the office in 1 to 2 weeks from her discharge. There is been discussed with the patient who is in agreement with the current plan. 20 ECHO ADULT FOLLOW-UP OR LIMITED 2020 Narrative · Normal cavity size and wall thickness. Low normal systolic dysfunction. Estimated left ventricular ejection fraction is 50 - 55%. · Image quality for this study was suboptimal. 
· Moderately dilated left atrium. · Mildly dilated right ventricle. Borderline low systolic function. · Moderately dilated right atrium. · Probably trileaflet aortic valve. Moderate aortic valve sclerosis. Aortic valve leaflet calcification present. · Mitral valve non-specific thickening. Mild mitral valve regurgitation is  
present. · Mild tricuspid valve regurgitation is present. · There is no evidence of pulmonary hypertension. Signed by: Kian Wharton MD  
 
04/17/19 NUCLEAR CARDIAC STRESS TEST 04/18/2019 4/19/2019 Narrative · Baseline ECG: Atrial fibrillation, non-specific ST-T wave abnormalities. · Gated SPECT: Left ventricular function post-stress was abnormal.  
Calculated ejection fraction is 45%. There is no evidence of transient  
ischemic dilation (TID). The TID ratio is 0.97. · Left ventricular perfusion is abnormal. 
· Myocardial perfusion imaging defect 1: There is a defect that is  
moderate in size with a moderate reduction in uptake present in the apical  
to basal anterior location(s) that is non-reversible. There is abnormal  
wall motion in the defect area. Viability in the area is good. caused by  
breast attenuation. The possibility of artifact cannot be excluded. Perfusion defect was visually and quantitatively present. · Myocardial perfusion imaging defect 1: caused by breast attenuation. · Abnormal myocardial perfusion imaging. Fixed defect consistent with  
prior myocardial infarction. Myocardial perfusion imaging supports an  
intermediate risk stress test. 
   
  Signed by: Molly Sloan MD  
 
   
EKG Results Procedure 720 Value Units Date/Time EKG 12 LEAD INITIAL [[id number]] Collected:  01/31/20 1619 Order Status:  Completed Updated:  01/31/20 1753 Ventricular Rate 114 BPM   
  Atrial Rate 91 BPM   
  QRS Duration 86 ms   
  Q-T Interval 370 ms QTC Calculation (Bezet) 509 ms Calculated R Axis 27 degrees Calculated T Axis 38 degrees Diagnosis -- Atrial fibrillation Nonspecific ST and T wave abnormality When compared with ECG of 23-AUG-2017 14:30, Atrial fibrillation has replaced Sinus rhythm Vent. rate has increased BY  54 BPM 
ST now depressed in Lateral leads Nonspecific T wave abnormality, worse in Inferior leads Nonspecific T wave abnormality now evident in Anterolateral leads Confirmed by Arcenio Jimenez MD, [employer] ([id number]) on 1/31/2020 5:53:51 PM 
  
  
 
 
 
Visit Vitals /70 (BP 1 Location: Left arm, BP Patient Position: Sitting) Pulse 82 Temp 97.9 °F (36.6 °C) Resp 20 Wt 170 lb 10.2 oz (77.4 kg) SpO2 98% BMI 33.33 kg/m² Wt Readings from Last 3 Encounters:  
02/07/20 170 lb 10.2 oz (77.4 kg) 01/31/20 176 lb (79.8 kg) 01/23/20 175 lb (79.4 kg) Review of Systems:  
Pertinent items are noted in the History of Present Illness. Objective: No intake/output data recorded. 02/05 1901 - 02/07 0700 In: 1640 [P.O.:1640] Out: - Telemetry: AFIB Physical Exam: 
 
Neck: no JVD Heart: regularly irregular rhythm Lungs: clear to auscultation bilaterally Abdomen: soft, non-tender. Bowel sounds normal. No masses,  no organomegaly Extremities: no edema Current Facility-Administered Medications Medication Dose Route Frequency  predniSONE (DELTASONE) tablet 40 mg  40 mg Oral DAILY WITH BREAKFAST  apixaban (ELIQUIS) tablet 2.5 mg  2.5 mg Oral BID  insulin NPH (NOVOLIN N, HUMULIN N) injection 25 Units  25 Units SubCUTAneous ACB&D  
 amLODIPine (NORVASC) tablet 10 mg  10 mg Oral DAILY  metoprolol succinate (TOPROL-XL) XL tablet 50 mg  50 mg Oral QHS  sodium chloride (NS) flush 5-40 mL  5-40 mL IntraVENous Q8H  
 sodium chloride (NS) flush 5-40 mL  5-40 mL IntraVENous PRN  
 naloxone (NARCAN) injection 0.4 mg  0.4 mg IntraVENous PRN  
 acetylcysteine (MUCOMYST) 200 mg/mL (20 %) solution 600 mg  600 mg Nebulization BID RT  
 allopurinoL (ZYLOPRIM) tablet 100 mg  100 mg Oral DAILY  aspirin chewable tablet 81 mg  81 mg Oral DAILY  atorvastatin (LIPITOR) tablet 20 mg  20 mg Oral QHS  azelastine (ASTELIN) 137mcg/spray nasal spray  1 Spray Both Nostrils QHS  bumetanide (BUMEX) tablet 1 mg  1 mg Oral DAILY  cholecalciferol (VITAMIN D3) (1000 Units /25 mcg) tablet 2 Tab  2,000 Units Oral DAILY  lisinopril (PRINIVIL, ZESTRIL) tablet 20 mg  20 mg Oral BID  isosorbide mononitrate ER (IMDUR) tablet 60 mg  60 mg Oral BID  nitroglycerin (NITROSTAT) tablet 0.4 mg  0.4 mg SubLINGual Q5MIN PRN  pantoprazole (PROTONIX) tablet 40 mg  40 mg Oral ACB  promethazine-dextromethorphan (PROMETHAZINE-DM) 6.25-15 mg/5 mL syrup 5 mL  5 mL Oral TID PRN  
 insulin lispro (HUMALOG) injection   SubCUTAneous TIDAC  arformoteroL (BROVANA) neb solution 15 mcg  15 mcg Nebulization BID RT And  
 budesonide (PULMICORT) 500 mcg/2 ml nebulizer suspension  500 mcg Nebulization BID RT  
 albuterol (PROVENTIL VENTOLIN) nebulizer solution 2.5 mg  2.5 mg Nebulization Q4H PRN  
 levoFLOXacin (LEVAQUIN) 250 mg in D5W IVPB  250 mg IntraVENous Q24H  
 sodium chloride (NS) flush 5-40 mL  5-40 mL IntraVENous Q8H  
 sodium chloride (NS) flush 5-40 mL  5-40 mL IntraVENous PRN  
 acetaminophen (TYLENOL) tablet 650 mg  650 mg Oral Q4H PRN Lab Results Component Value Date/Time WBC 21.2 (H) 02/07/2020 04:17 AM  
 HGB (POC) 14.6 01/23/2020 02:50 PM  
 HGB 13.8 02/07/2020 04:17 AM  
 HCT (POC) 45.5 01/23/2020 02:50 PM  
 HCT 41.5 02/07/2020 04:17 AM  
 PLATELET 398 81/74/5751 04:17 AM  
 MCV 80.9 02/07/2020 04:17 AM  
 
Lab Results Component Value Date/Time  Sodium 135 (L) 02/07/2020 04:17 AM  
 Potassium 3.9 02/07/2020 04:17 AM  
 Chloride 101 02/07/2020 04:17 AM  
 CO2 26 02/07/2020 04:17 AM  
 Anion gap 8 02/07/2020 04:17 AM  
 Glucose 195 (H) 02/07/2020 04:17 AM  
 BUN 47 (H) 02/07/2020 04:17 AM  
 Creatinine 1.51 (H) 02/07/2020 04:17 AM  
 BUN/Creatinine ratio 31 (H) 02/07/2020 04:17 AM  
 GFR est AA 40 (L) 02/07/2020 04:17 AM  
 GFR est non-AA 33 (L) 02/07/2020 04:17 AM  
 Calcium 7.9 (L) 02/07/2020 04:17 AM  
 
Lab Results Component Value Date/Time Cholesterol, total 110 06/13/2017 09:31 AM  
 Cholesterol (POC) <100 12/18/2019 11:53 AM  
 HDL Cholesterol 55 06/13/2017 09:31 AM  
 HDL Cholesterol (POC) 41 12/18/2019 11:53 AM  
 LDL Cholesterol (POC) n/a 12/18/2019 11:53 AM  
 LDL, calculated 36 06/13/2017 09:31 AM  
 VLDL, calculated 19 06/13/2017 09:31 AM  
 Triglyceride 93 06/13/2017 09:31 AM  
 Triglycerides (POC) 82 12/18/2019 11:53 AM  
 CHOL/HDL Ratio 2.0 04/25/2017 03:53 AM  
 
Lab Results Component Value Date/Time  02/04/2015 07:47 PM  
 CK - MB 4.7 (H) 02/04/2015 07:47 PM  
 CK-MB Index 4.6 (H) 02/04/2015 07:47 PM  
 Troponin-I, Qt. <0.05 01/31/2020 03:55 PM  
  (H) 08/25/2017 04:40 AM  
 
Lab Results Component Value Date/Time ALT (SGPT) 20 01/31/2020 03:55 PM  
 AST (SGOT) 20 01/31/2020 03:55 PM  
 Alk. phosphatase 111 01/31/2020 03:55 PM  
 Bilirubin, direct 0.18 02/12/2014 09:42 AM  
 Bilirubin, total 0.6 01/31/2020 03:55 PM  
 
Lab Results Component Value Date/Time   (H) 08/25/2017 04:40 AM  
 NT pro-BNP 1,293 (H) 08/26/2017 03:25 AM  
 NT pro-BNP 1,671 (H) 08/23/2017 02:29 PM

## 2020-02-07 NOTE — DISCHARGE INSTRUCTIONS
PATIENT INSTRUCTIONS POST-LEADLESS PACEMAKER IMPLANT    1. No heavy lifting or strenuous exercises for 1 week. 2.  If you are discharged home with dressing in place, you may remove this the day after your procedure. 3.  Do not drive for 3 days    4. Call Dr. Mary Anne Thompson at [phone] if you experience any of the following symptoms:  1. Redness at the groin site  2. Swelling at or around the groin  3. Pain around the groin  4. Dizziness, lightheadedness, fainting spells  5. Lack of energy  6. Shortness of breath  7. Rapid heart rate  8. Chest or muscle twitches    5. Follow-up    Future Appointments   Date Time Provider Carly Ramila   3/5/2020  1:30  [facility]   3/5/2020  2:00 PM Nimesh Caruso  [address]   3/19/2020 11:15 AM [facility]rano [facility]   3/23/2020 11:20 AM Chele Woodson  [address]     Patient Discharge Instructions    Taryn Boucher / [id number] : 1935    Admitted 2020 Discharged: 2020     Take Home Medications          · It is important that you take the medication exactly as they are prescribed. · Keep your medication in the bottles provided by the pharmacist and keep a list of the medication names, dosages, and times to be taken in your wallet. · Do not take other medications without consulting your doctor. What to do at Home    Recommended diet: Diabetic diet. Recommended activity: wear right leg hose during the day and take off at night. Check blood sugar 3 times daily like before     If you experience any of the following symptoms blood sugar above 400, worsened breathing , please follow up with Dr. Ripley Mcardle with Dr. Fish Curran at [phone] in 1 week. Call for an appointment. Call Dr. Mary Anne Thompson and Dr Jose Luis Weeks for appointments         Information obtained by :  I understand that if any problems occur once I am at home I am to contact my physician.     I understand and acknowledge receipt of the instructions indicated above. Physician's or R.N.'s Signature                                                                  Date/Time                                                                                                                                              Patient or Representative Signature                                                          Date/Time    6. You may use pain medication and ice pack for pain relief as needed. David Maurer M.D.  [facility]  Electrophysiology/Cardiology  [facility] [place]  [address], [address] , [address]  [phone]

## 2020-02-07 NOTE — PROGRESS NOTES
Bedside and Verbal shift change report given to Giulia Lowry (oncoming nurse) by Kieran See (offgoing nurse). Report included the following information SBAR, Kardex, Procedure Summary, Intake/Output, MAR, Recent Results and Cardiac Rhythm Paced.

## 2020-02-07 NOTE — PROGRESS NOTES
Medical Progress Note NAME: Loretta De La Torre :  1935 MRM:  [id number] Date/Time: 2020 Problem List:  
 
Active Problems: 
  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 
 
 
 
 
  
Subjective:  
 
Breathing fine . Little cough  W/o cp, palpitations, or dizziness Past Medical History:  
Diagnosis Date  Atrial fibrillation ([facility]  CAD (coronary artery disease)  Cerebrovascular accident stroke, other, unspec 2010  Coronary atherosclerosis of native coronary artery 2010  Essential hypertension  Essential hypertension, benign 2010  HLD (hyperlipidemia) 2010  PVD (peripheral vascular disease) ([facility] 2017  Type II or unspecified type diabetes mellitus without mention of complication, not stated as uncontrolled 2010  Zoster Objective:  
 
 
 
Vitals:  
  
Last 24hrs VS reviewed since prior progress note. Most recent are: 
 
Visit Vitals /63 (BP 1 Location: Right arm, BP Patient Position: At rest) Pulse 82 Temp 97.9 °F (36.6 °C) Resp 16 Wt 170 lb 10.2 oz (77.4 kg) SpO2 94% BMI 33.33 kg/m² SpO2 Readings from Last 6 Encounters:  
20 94% 20 91% 20 95% 01/10/20 98% 19 91% 19 97% O2 Flow Rate (L/min): 2 l/min Intake/Output Summary (Last 24 hours) at 2020 1919 Last data filed at 2020 9186 Gross per 24 hour Intake 1280 ml Output  Net 1280 ml Exam:  
   General:  Alert, cooperative, no distress, appears stated age. Lungs:   Clear to auscultation bilaterally. Heart:  Irregular rate and rhythm, S1, S2 normal, no murmur, click, rub or gallop. Paced on monitor Abdomen:   Soft, non-tender. Bowel sounds normal. No masses,  No organomegaly. Extremities: Rt groin site dressing in place. 2+ rt femoral and PT pulses Baseline rt 2+ pretibial edema. No pedal edema Lab Data Reviewed: (see below) Recent Results (from the past 24 hour(s)) GLUCOSE, POC Collection Time: 02/06/20  7:21 AM  
Result Value Ref Range Glucose (POC) 201 (H) 65 - 100 mg/dL Performed by Moose Moore GLUCOSE, POC Collection Time: 02/06/20 11:40 AM  
Result Value Ref Range Glucose (POC) 365 (H) 65 - 100 mg/dL Performed by Diaz Bocanegra GLUCOSE, POC Collection Time: 02/06/20  4:32 PM  
Result Value Ref Range Glucose (POC) 222 (H) 65 - 100 mg/dL Performed by Noe Ni GLUCOSE, POC Collection Time: 02/06/20  9:22 PM  
Result Value Ref Range Glucose (POC) 239 (H) 65 - 100 mg/dL Performed by Aki Kelly METABOLIC PANEL, BASIC Collection Time: 02/07/20  4:17 AM  
Result Value Ref Range Sodium 135 (L) 136 - 145 mmol/L Potassium 3.9 3.5 - 5.1 mmol/L Chloride 101 97 - 108 mmol/L  
 CO2 26 21 - 32 mmol/L Anion gap 8 5 - 15 mmol/L Glucose 195 (H) 65 - 100 mg/dL BUN 47 (H) 6 - 20 MG/DL Creatinine 1.51 (H) 0.55 - 1.02 MG/DL  
 BUN/Creatinine ratio 31 (H) 12 - 20 GFR est AA 40 (L) >60 ml/min/1.73m2 GFR est non-AA 33 (L) >60 ml/min/1.73m2 Calcium 7.9 (L) 8.5 - 10.1 MG/DL  
CBC WITH AUTOMATED DIFF Collection Time: 02/07/20  4:17 AM  
Result Value Ref Range WBC 21.2 (H) 3.6 - 11.0 K/uL  
 RBC 5.13 3.80 - 5.20 M/uL  
 HGB 13.8 11.5 - 16.0 g/dL HCT 41.5 35.0 - 47.0 % MCV 80.9 80.0 - 99.0 FL  
 MCH 26.9 26.0 - 34.0 PG  
 MCHC 33.3 30.0 - 36.5 g/dL  
 RDW 14.5 11.5 - 14.5 % PLATELET 782 909 - 282 K/uL MPV 9.4 8.9 - 12.9 FL  
 NRBC 0.0 0  WBC ABSOLUTE NRBC 0.00 0.00 - 0.01 K/uL NEUTROPHILS 85 (H) 32 - 75 % LYMPHOCYTES 6 (L) 12 - 49 % MONOCYTES 5 5 - 13 % EOSINOPHILS 0 0 - 7 % BASOPHILS 0 0 - 1 % IMMATURE GRANULOCYTES 4 (H) 0.0 - 0.5 % ABS. NEUTROPHILS 18.0 (H) 1.8 - 8.0 K/UL  
 ABS. LYMPHOCYTES 1.3 0.8 - 3.5 K/UL  
 ABS. MONOCYTES 1.1 (H) 0.0 - 1.0 K/UL  
 ABS. EOSINOPHILS 0.0 0.0 - 0.4 K/UL  
 ABS. BASOPHILS 0.0 0.0 - 0.1 K/UL  
 ABS. IMM. GRANS. 0.8 (H) 0.00 - 0.04 K/UL  
 DF SMEAR SCANNED    
 RBC COMMENTS ANISOCYTOSIS 
1+ WBC COMMENTS TOXIC GRANULATION Medications Reviewed: (see below) 
 
______________________________________________________________________ Medications:  
 
Current Facility-Administered Medications Medication Dose Route Frequency  predniSONE (DELTASONE) tablet 40 mg  40 mg Oral DAILY WITH BREAKFAST  insulin NPH (NOVOLIN N, HUMULIN N) injection 25 Units  25 Units SubCUTAneous ACB&D  
 amLODIPine (NORVASC) tablet 10 mg  10 mg Oral DAILY  metoprolol succinate (TOPROL-XL) XL tablet 50 mg  50 mg Oral QHS  sodium chloride (NS) flush 5-40 mL  5-40 mL IntraVENous Q8H  
 sodium chloride (NS) flush 5-40 mL  5-40 mL IntraVENous PRN  
 naloxone (NARCAN) injection 0.4 mg  0.4 mg IntraVENous PRN  
 acetylcysteine (MUCOMYST) 200 mg/mL (20 %) solution 600 mg  600 mg Nebulization BID RT  
 allopurinoL (ZYLOPRIM) tablet 100 mg  100 mg Oral DAILY  aspirin chewable tablet 81 mg  81 mg Oral DAILY  atorvastatin (LIPITOR) tablet 20 mg  20 mg Oral QHS  azelastine (ASTELIN) 137mcg/spray nasal spray  1 Spray Both Nostrils QHS  bumetanide (BUMEX) tablet 1 mg  1 mg Oral DAILY  cholecalciferol (VITAMIN D3) (1000 Units /25 mcg) tablet 2 Tab  2,000 Units Oral DAILY  lisinopril (PRINIVIL, ZESTRIL) tablet 20 mg  20 mg Oral BID  isosorbide mononitrate ER (IMDUR) tablet 60 mg  60 mg Oral BID  nitroglycerin (NITROSTAT) tablet 0.4 mg  0.4 mg SubLINGual Q5MIN PRN  
  pantoprazole (PROTONIX) tablet 40 mg  40 mg Oral ACB  promethazine-dextromethorphan (PROMETHAZINE-DM) 6.25-15 mg/5 mL syrup 5 mL  5 mL Oral TID PRN  
 insulin lispro (HUMALOG) injection   SubCUTAneous TIDAC  arformoteroL (BROVANA) neb solution 15 mcg  15 mcg Nebulization BID RT And  
 budesonide (PULMICORT) 500 mcg/2 ml nebulizer suspension  500 mcg Nebulization BID RT  
 albuterol (PROVENTIL VENTOLIN) nebulizer solution 2.5 mg  2.5 mg Nebulization Q4H PRN  
 levoFLOXacin (LEVAQUIN) 250 mg in D5W IVPB  250 mg IntraVENous Q24H  
 sodium chloride (NS) flush 5-40 mL  5-40 mL IntraVENous Q8H  
 sodium chloride (NS) flush 5-40 mL  5-40 mL IntraVENous PRN  
 acetaminophen (TYLENOL) tablet 650 mg  650 mg Oral Q4H PRN Assessment: S/p ablation and leadless pacer Acute Asthmatic Bronchitis. Better. Change Solumedrol to daily Prednisone DM aggravated by steroids. HTN controlled. Patient Active Problem List  
Diagnosis Code  Coronary atherosclerosis of native coronary artery I25.10  
 HLD (hyperlipidemia) E78.5  Cerebrovascular accident stroke, other, unspec I67.89  
 Hypertension complicating diabetes ([facility] E11.59, I10  
 PVD (peripheral vascular disease) with claudication ([facility]) I73.9  Atrial fibrillation (HCC) I48.91  
 SOB (shortness of breath) R06.02  
 Type 2 diabetes with nephropathy ([facility]) E11.21  
 Severe obesity (BMI 35.0-39. 9) with comorbidity ([facility] E66.01  
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  
      
 
  
 
 
  
              
 
 
 
 
 
 
      
___________________________________________________ Attending Physician: Tracey Dakin, MD

## 2020-02-07 NOTE — DIABETES MGMT
[facility] Discharge Recommendations Presentation Cherry Bowman is a 81 y.o. female admitted with acute asthmatic bronchitis and consulted by Provider for advanced specialty nursing care related to inpatient diabetes management. Hyperglycemia management order set has been in place during hospital stay. Subjective Ms Dheeraj Hernández is stable in the CVSU. S/P pacemaker placement. Plans to d/c home today after antibiotic dose. Steroids changed from IV to PO. Will go home on Prednisone. 40 mg given today. Objective Physical exam 
 
General Alert, oriented and in no acute distress. Conversant and cooperative Vital Signs Visit Vitals /70 (BP 1 Location: Left arm, BP Patient Position: Sitting) Pulse 82 Temp 97.9 °F (36.6 °C) Resp 20 Wt 77.4 kg (170 lb 10.2 oz) SpO2 98% BMI 33.33 kg/m² Skin  Warm and dry Heart   Regular rate and rhythm. No murmurs, rubs or gallops Lungs  Clear to auscultation without rales or rhonchi Extremities No foot wounds Laboratory CBC WITH AUTOMATED DIFF Collection Time: 02/07/20  4:17 AM  
Result Value Ref Range WBC 21.2 (H) 3.6 - 11.0 K/uL  
 RBC 5.13 3.80 - 5.20 M/uL  
 HGB 13.8 11.5 - 16.0 g/dL HCT 41.5 35.0 - 47.0 % MCV 80.9 80.0 - 99.0 FL  
 MCH 26.9 26.0 - 34.0 PG  
 MCHC 33.3 30.0 - 36.5 g/dL  
 RDW 14.5 11.5 - 14.5 % PLATELET 453 788 - 296 K/uL MPV 9.4 8.9 - 12.9 FL  
 NRBC 0.0 0  WBC ABSOLUTE NRBC 0.00 0.00 - 0.01 K/uL NEUTROPHILS 85 (H) 32 - 75 % LYMPHOCYTES 6 (L) 12 - 49 % MONOCYTES 5 5 - 13 % EOSINOPHILS 0 0 - 7 % BASOPHILS 0 0 - 1 % IMMATURE GRANULOCYTES 4 (H) 0.0 - 0.5 % ABS. NEUTROPHILS 18.0 (H) 1.8 - 8.0 K/UL  
 ABS. LYMPHOCYTES 1.3 0.8 - 3.5 K/UL  
 ABS. MONOCYTES 1.1 (H) 0.0 - 1.0 K/UL  
 ABS. EOSINOPHILS 0.0 0.0 - 0.4 K/UL  
 ABS. BASOPHILS 0.0 0.0 - 0.1 K/UL  
 ABS. IMM.  GRANS. 0.8 (H) 0.00 - 0.04 K/UL  
 DF SMEAR SCANNED    
 RBC COMMENTS ANISOCYTOSIS 
 1+ 
    
 WBC COMMENTS TOXIC GRANULATION    
 
BMP:  
Lab Results Component Value Date/Time  (L) 02/07/2020 04:17 AM  
 K 3.9 02/07/2020 04:17 AM  
  02/07/2020 04:17 AM  
 CO2 26 02/07/2020 04:17 AM  
 AGAP 8 02/07/2020 04:17 AM  
  (H) 02/07/2020 04:17 AM  
 BUN 47 (H) 02/07/2020 04:17 AM  
 CREA 1.51 (H) 02/07/2020 04:17 AM  
 GFRAA 40 (L) 02/07/2020 04:17 AM  
 GFRNA 33 (L) 02/07/2020 04:17 AM  
  
 
 
Blood glucose pattern Assessment and Plan Nursing Diagnosis Risk for unstable blood glucose pattern Nursing Intervention Domain 5255 Decision-making Support Nursing Interventions Examined current inpatient diabetes control Explored factors facilitating and impeding inpatient management Identified self-management practices impeding diabetes control Explored corrective strategies with patient and responsible inpatient provider Informed patient of rational for basal bolus insulin strategy while hospitalized Instructed patient in medication adjustments. Evaluation Patient experienced steroid induced hyperglycemia and pre-prandial hyperglycemia. To be discharged home today. Recommendations Until seen by primary care provider, O Return to pre-hospitalization diabetes medication regimen, specifically Glipizide 10 mg 
-Levemir 18 units am and 10 units pm  
-Please adjust Levemir with scale below Insulin coverage with prednisone (in addition to basal insulin). 40 mg prednisone = 0.4 units/kg NPH/Levemir to be given with prednisone 30 mg prednisone= 0.3 units/kg NPH/Levemir to be given with prednisone 20 mg prednisone= 0.2 units/kg NPH/Levemir  to be given with prednisone 10 mg prednisone= 0.1 units/kg NPH/Levemir to be given with prednisone Billing Code(s)  
[id number] Alexander Gonzalez RN CNS 
[phone]

## 2020-02-08 NOTE — DISCHARGE SUMMARY
[address] [facility] DISCHARGE SUMMARY Name:  Elysia Rees 
MR#:  [id number] :  1935 ACCOUNT #:  [de-identified] ADMIT DATE:  2020 DISCHARGE DATE:  2020 DISCHARGE DIAGNOSES: 
1. Acute asthmatic bronchitis. 2.  Atrial fibrillation. 3.  Coronary artery disease. 4.  Hypertension complicating diabetes. 5.  Type 2 diabetes mellitus with nephropathy. 6.  Complete atrioventricular block due to atrioventricular jazlyn ablation. 7.  Pacemaker. DISCHARGE INSTRUCTIONS: 
1.  Diabetic diet. 2.  Tylenol 650 mg p.o. q.4 hours p.r.n. 
3.  Norvasc 10 mg daily. 4.  Toprol-XL 50 mg nightly. 5.  Prednisone 40 mg p.o. daily, taper to off over approximaly 9 days' time. 6.  Levaquin 250 mg daily at dinner for 5 days. 7.  Albuterol 2 puffs q.4 hours p.r.n. 
8.  Acyclovir 5% ointment daily as needed. 9.  ASA 81 mg daily. 10.  Eliquis 2.5 mg b.i.d. 11.  Lipitor 20 mg daily. 12.  Aspro 0.15% one spray both nostrils nightly. 13.  Bumex 1 mg one day, 2 mg the next, alternating fashion. 14.  Lotrisone b.i.d. p.r.n. 15.  Levemir 20 units q.a.m., 8-10 units evening. 16.  Humalog 7 units before each meal. 
17.  Allopurinol 100 mg daily. 18.  Glipizide 10 mg b.i.d. 19.  Omeprazole 20 mg daily. 20.  Enalapril 20 mg b.i.d. 21.  Symbicort 160/4.5 two puffs b.i.d. 22.  Vitamin D3, 2000 units daily. Saw Dr. Gely Iglesias for increased dyspnea and cough. Follow up with Dr. Gely Iglesias, moon in 1 week's time and with Dr. Nae Guillermo and Dr. Bruce Garcia in approximately 1-2 weeks' time. CHIEF COMPLAINT:  An 81-year-old white female admitted with dyspnea and cough. HISTORY OF PRESENT ILLNESS:  She has asthmatic bronchitis and did not respond to outpatient treatment including doxycycline, Levaquin, and prednisone tapers; presented to my office with cough and wheezing and was referred to the ER where chest x-ray reviewed no acute infiltrate.   She was admitted for further evaluation and treatment after not improving sufficiently with prednisone and DuoNeb in the ED. PAST MEDICAL HISTORY: 
1. Coronary artery disease. 2.  Chronic atrial fibrillation. 3.  Dizziness, followed by Dr. Shauna Varner  and Dr. Cornelious Felty and is planned to have a pacemaker placement. 4.  Hypertension complicating type 2 diabetes mellitus. 5.  Type 2 diabetes mellitus with nephropathy. 6.  Zoster. 7.  Peripheral vascular disease. 8.  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 9.  Hyperlipidemia. PAST SURGICAL HISTORY: 
1. Cardiac catheterization. 2.  Coronary stent placement. 3.  Hysterectomy. 4.  Right leg revascularization by Dr. Rigoberto Germain. MEDICATIONS: 
1. Acyclovir 5% ointment t.i.d. p.r.n. 
2.  Allopurinol 100 mg daily. 3.  Eliquis 2.5 mg b.i.d. 4.  ASA 81 mg daily. 5.  Atorvastatin 20 mg nightly. 6.  Astelin 0.15% one spray each nostril b.i.d. 
7.  Symbicort 160/4.5 two puffs b.i.d. 8.  Bumex 1 mg on one day, 2 mg the next. 9.  Vitamin D3, 2000 units daily. 10.  Lotrisone b.i.d. p.r.n. 11.  Diltiazem 60 mg immediate release b.i.d., 30 mg in the middle of a day at lunchtime. 12.  Vasotec 20 mg b.i.d. 13.  Glipizide 10 mg b.i.d. 14.  NovoLog 7 units before each meal. 
15.  Levemir 18 units a.m., 10 units at bedtime. 16.  Imdur 60 mg b.i.d. 17.  Levaquin 250 mg daily. 18.  Metoprolol tartrate 100 mg b.i.d. 19.  Bactroban p.r.n. 
20.  Nitroglycerin 0.4 mg sublingual p.r.n. 21.  Nystatin powder and nystatin cream b.i.d. p.r.n. 
22.  Omeprazole 20 mg daily. 23.  Promethazine DM p.r.n. cough. ALLERGIES:  BACTRIM AFFECTS RENAL FUNCTION. CLONIDINE, LIGHTHEADEDNESS. CODEINE, UNKNOWN.  KEFLIN, ITCHING. PENICILLIN, UNKNOWN. TRAMADOL, DIZZINESS. FAMILY HISTORY:  Noncontributory. SOCIAL HISTORY:  Quit smoking long ago. Denies EtOH. She does not wish to linger if there is no chance for meaningful recovery. REVIEW OF SYSTEMS:  She denies fever or chills. She has had cough productive of white sputum. Denies palpitations or chest pain. Bowels regular without hematochezia. She occasionally has decreased urinary output. Chronic right hand tremor and chronic mild right hemiparesis, status post CVA. Denies headache. PHYSICAL EXAMINATION: 
VITAL SIGNS:  Temperature 97.6, pulse 101, /84, RR 20, O2 sat 90% on room air. GENERAL:  Elderly white female appearing stated age, mildly ill. HEENT:  Negative. LUNGS:  Mild expiratory wheezes. HEART:  Irregular S1 and S2 without murmur, gallop or rub. ABDOMEN:  Negative. EXTREMITIES:  Right pretibial edema 3+, left 2+. Chronically more edema on right greater than left leg. Distal feet, no edema. 2+ DP pulses. Light touch sensation intact in feet. NEURO:  Alert, awake, and oriented x3. Mild right hemipareses. Intermittent right hand tremor. Otherwise nonfocal. 
 
RADIOLOGICAL AND LABORATORY DATA:  Chest x-ray, mild bibasilar atelectasis and interstitial prominence, stable cardiomegaly compared to chest x-ray 01/23/2020. Troponin I negative. EKG, AFib, nonspecific ST-T wave changes. Glucose 143, BUN 18, creatinine 1.34, which is baseline. WBC 12,300, recent prednisone. Hemoglobin 14.1, platelet count 779. HOSPITAL COURSE:  The patient was admitted to telemetry and placed on IV Solu-Medrol, DuoNebs, IV Levaquin. She was seen in consultation by Maynor Olson MD, Pulmonary. Pulmicort and Brovana were added. Her asthmatic bronchitis improved on this regimen. She was transitioned to p.o. prednisone successfully. Brenda Pedraza MD, cardiac electrophysiologist, saw her in consultation and participated in her care. She has a history of difficult-to-treat atrial fibrillation despite multiple medications. On 02/05/2020, she underwent AV jazlyn ablation, insertion of a wireless pacemaker by Dr. Charisma Alexandra.   She tolerated this well. Her blood sugars were monitored. Diabetes educator participated in her care, insulin was adjusted. Ultimately, she was discharged to home in improved condition, to have followup as above. MD NATI Spann/[email or web address]/[email or web address] 
D:  02/07/2020 18:46 
T:  02/08/2020 6:42 JOB #:  [id number] CC:  MD Arabella Licona MD

## 2020-02-13 NOTE — TELEPHONE ENCOUNTER
Requested Prescriptions     Signed Prescriptions Disp Refills    isosorbide mononitrate ER (IMDUR) 60 mg CR tablet 180 Tab 1     Sig: TAKE 1 TABLET TWICE A DAY     Authorizing Provider: Jacob Narvaez     Ordering User: Paul Rosales     Verbal order per Dr. Nuha Madsen.     Future Appointments   Date Time Provider Carly Ramila   2/14/2020  2:00 PM Darvin Hyman  [facility]   3/5/2020  1:30 PM Kristi Riggs, [id number] [facility]   3/5/2020  2:00 PM Trinidad Escobar  [address]   3/19/2020 11:15 AM Darvin Hyman MD [facility]   3/23/2020 11:20 AM Surjit Gleason  [address]

## 2020-02-28 PROBLEM — I50.31 ACUTE DIASTOLIC CHF (CONGESTIVE HEART FAILURE) (HCC): Status: ACTIVE | Noted: 2020-01-01

## 2020-02-28 NOTE — TELEPHONE ENCOUNTER
Pt called two identifiers confirmed. Pt stated that she has been SOB all day with intermittent chest pain. Pt stated she talked to her children and they told her to go to the ED. Pt stated she would like to let Dr. Marylen Gale know that she is coming to the ED Notified pt that I will tell Dr. Marylen Gale she is on the way.

## 2020-02-28 NOTE — ED PROVIDER NOTES
81 y.o. female with past medical history significant for coronary atherosclerosis, DM, HLD, CVA, CAD, HTN, PVD, and Atrial fibrillation with a pacemaker who presents from home with chief complaint of shortness of breath. Patient states that she has been experiencing SOB for the past couple of weeks. Patient states that Ketty Burch went to her PCPs office twice with the same complaints noting that there were no abnormal findings in the test.\" Patient states that SOB has persisted prompting patient to come to the ED today. Patient presents to [facility] ED with complaints of persisting SOB described as \"aggrivated when ambulating. \" Patient endorses a dry cough and right leg swelling. Of note, patient is not on oxygen at home. Patient denies fever and wheezing. There are no other acute medical concerns at this time. Social hx: Former smoker, No EtOH use, No illicit drug use. PCP: Corrie Pearce MD 
Cardiologist: Alvina De Dios MD 
 
Note written by Rodolfo Araya, as dictated by Primitivo Case MD 5:26 PM  
 
 
The history is provided by the patient and medical records. No  was used. Past Medical History:  
Diagnosis Date  Atrial fibrillation ([address]  CAD (coronary artery disease)  Cerebrovascular accident stroke, other, unspec 11/18/2010  Coronary atherosclerosis of native coronary artery 11/18/2010  Essential hypertension  Essential hypertension, benign 11/18/2010  HLD (hyperlipidemia) 11/18/2010  PVD (peripheral vascular disease) ([address] 2017  Type II or unspecified type diabetes mellitus without mention of complication, not stated as uncontrolled 11/18/2010  Zoster Past Surgical History:  
Procedure Laterality Date  HX CORONARY STENT PLACEMENT    
 HX HEART CATHETERIZATION    
 [address]  [place] ICAR CATHETER ABLATION ATRIOVENTR NODE FUNCTION N/A 2/5/2020 ABLATION AV NODE performed by Zion Ulloa MD at [address], [facility]  MI TCAT INSJ/RPL PERM LEADLESS PACEMAKER RV W/IMG N/A 2020 INSERT OR REPLACE TRANSCATH PPM LEADLESS performed by Zion Ulloa MD at [address], [facility] No family history on file. Social History Socioeconomic History  Marital status:  Spouse name: Not on file  Number of children: Not on file  Years of education: Not on file  Highest education level: Not on file Occupational History  Not on file Social Needs  Financial resource strain: Not on file  Food insecurity:  
  Worry: Not on file Inability: Not on file  Transportation needs:  
  Medical: Not on file Non-medical: Not on file Tobacco Use  Smoking status: Former Smoker Packs/day: 0.80 Years: 20.00 Pack years: 16.00 Types: Cigarettes Last attempt to quit: 1986 Years since quittin.8  Smokeless tobacco: Never Used Substance and Sexual Activity  Alcohol use: No  
 Drug use: No  
 Sexual activity: Not on file Lifestyle  Physical activity:  
  Days per week: Not on file Minutes per session: Not on file  Stress: Not on file Relationships  Social connections:  
  Talks on phone: Not on file Gets together: Not on file Attends Anabaptism service: Not on file Active member of club or organization: Not on file Attends meetings of clubs or organizations: Not on file Relationship status: Not on file  Intimate partner violence:  
  Fear of current or ex partner: Not on file Emotionally abused: Not on file Physically abused: Not on file Forced sexual activity: Not on file Other Topics Concern  Not on file Social History Narrative  Not on file ALLERGIES: Bactrim [sulfamethoprim]; Clonidine; Codeine; Keflin; Pcn [penicillins]; and Tramadol Review of Systems Constitutional: Negative for fever. Eyes: Negative for visual disturbance. Respiratory: Positive for cough and shortness of breath. Negative for wheezing. Cardiovascular: Negative for chest pain and leg swelling. Gastrointestinal: Negative for abdominal pain, diarrhea, nausea and vomiting. Genitourinary: Negative for dysuria. Musculoskeletal: Negative. Negative for back pain and neck stiffness. Right leg swelling Skin: Negative for rash. Neurological: Negative. Negative for syncope and headaches. Psychiatric/Behavioral: Negative for confusion. All other systems reviewed and are negative. Vitals:  
 20 1432 20 1711 BP: 186/75 Pulse: 83 Resp: 25 Temp: 98.6 °F (37 °C) SpO2: 90% 100% Physical Exam 
Vitals signs and nursing note reviewed. Constitutional:   
   General: She is not in acute distress. HENT:  
   Head: Normocephalic. Neck: Musculoskeletal: Normal range of motion. Cardiovascular:  
   Rate and Rhythm: Rhythm irregular. Pulmonary:  
   Breath sounds: Rales (basilar rales) present. Chest:  
   Comments: Pacemaker in the left upper chest wall. Abdominal:  
   Tenderness: There is no abdominal tenderness. Musculoskeletal: Normal range of motion. Right lower le+ Edema present. Left lower le+ Edema present. Skin: 
   General: Skin is warm and dry. Neurological:  
   Mental Status: She is alert and oriented to person, place, and time. Mental status is at baseline. Cranial Nerves: No cranial nerve deficit. Psychiatric:     
   Behavior: Behavior normal.  
 
 Note written by Rodolfo Ignacio, as dictated by Yudi Sánchez MD 5:26 PM  
 
 
[facility] Procedures PROGRESS NOTE: 
5:34PM 
Provider consulted with  who agreed to admit patient. ED EKG interpretation: 
Rhythm: paced; and regular .  Rate (approx.): 80; Axis: left axis deviation; P wave: ; QRS interval: prolonged; ST/T wave: non-specific changes; in  Lead: ; Other findings: . This EKG was interpreted by Ailin Strauss MD,ED Provider.  9:10 PM

## 2020-02-28 NOTE — ED TRIAGE NOTES
C/o increasingly SOB x weeks. Hard time completing sentences. Ablation & Pacemaker placed on 2/5/2020. Denies chest pain. Denies fevers.  Denies COPD

## 2020-02-29 NOTE — PROGRESS NOTES
Jenny Gross Saravananvanessa Drew Admit Date: 2/28/2020 Subjective:  
 
Pt still with dyspnea, even with ambulation to the bathroom. No new complaints. Current Facility-Administered Medications Medication Dose Route Frequency  albuterol (PROVENTIL VENTOLIN) nebulizer solution 2.5 mg  2.5 mg Nebulization Q4H PRN  
 allopurinoL (ZYLOPRIM) tablet 100 mg  100 mg Oral DAILY  amLODIPine (NORVASC) tablet 5 mg  5 mg Oral DAILY  apixaban (ELIQUIS) tablet 2.5 mg  2.5 mg Oral BID  aspirin chewable tablet 81 mg  81 mg Oral QHS  atorvastatin (LIPITOR) tablet 20 mg  20 mg Oral QHS  azelastine (ASTELIN) 137mcg/spray nasal spray  1 Spray Both Nostrils QHS  arformoteroL (BROVANA) neb solution 15 mcg  15 mcg Nebulization BID RT And  
 budesonide (PULMICORT) 500 mcg/2 ml nebulizer suspension  500 mcg Nebulization BID RT  
 bumetanide (BUMEX) tablet 2 mg  2 mg Oral DAILY  cholecalciferol (VITAMIN D3) (1000 Units /25 mcg) tablet 2 Tab  2,000 Units Oral DAILY  lisinopril (PRINIVIL, ZESTRIL) tablet 20 mg  20 mg Oral BID  
 glipiZIDE (GLUCOTROL) tablet 10 mg  10 mg Oral BID  insulin glargine (LANTUS) injection 20 Units  20 Units SubCUTAneous DAILY  isosorbide mononitrate ER (IMDUR) tablet 60 mg  60 mg Oral BID  metoprolol succinate (TOPROL-XL) XL tablet 50 mg  50 mg Oral QHS  nitroglycerin (NITROSTAT) tablet 0.4 mg  0.4 mg SubLINGual Q5MIN PRN  pantoprazole (PROTONIX) tablet 40 mg  40 mg Oral ACB  ondansetron (ZOFRAN) injection 4 mg  4 mg IntraVENous Q6H PRN  
 acetaminophen (TYLENOL) tablet 650 mg  650 mg Oral Q4H PRN  
 glucose chewable tablet 16 g  4 Tab Oral PRN  
 glucagon (GLUCAGEN) injection 1 mg  1 mg IntraMUSCular PRN  
 dextrose 10% infusion 0-250 mL  0-250 mL IntraVENous PRN  
 insulin lispro (HUMALOG) injection   SubCUTAneous AC&HS Objective:  
 
Patient Vitals for the past 8 hrs: 
 BP Temp Pulse Resp SpO2 Weight 02/29/20 0601 118/81 98.2 °F (36.8 °C) 80 19 98 %   
02/29/20 0201 117/75 98.3 °F (36.8 °C) 81 19 96 % 169 lb 3.2 oz (76.7 kg) 02/29 0701 - 02/29 1900 In: 440 [P.O.:440] Out: - No intake/output data recorded. Physical Exam: NAD. A&O. Neck -- Supple. No JVD. Heart -- RRR. Lungs -- CTA. Wheezing improved, and decent air movement. Abd -- Benign. Ext -- No significant LE edema, b/l. Data Review Recent Results (from the past 24 hour(s)) CBC WITH AUTOMATED DIFF Collection Time: 02/28/20  2:44 PM  
Result Value Ref Range WBC 9.5 3.6 - 11.0 K/uL  
 RBC 4.32 3.80 - 5.20 M/uL  
 HGB 11.4 (L) 11.5 - 16.0 g/dL HCT 35.2 35.0 - 47.0 % MCV 81.5 80.0 - 99.0 FL  
 MCH 26.4 26.0 - 34.0 PG  
 MCHC 32.4 30.0 - 36.5 g/dL  
 RDW 15.7 (H) 11.5 - 14.5 % PLATELET 827 (H) 513 - 400 K/uL MPV 9.2 8.9 - 12.9 FL  
 NRBC 0.0 0  WBC ABSOLUTE NRBC 0.00 0.00 - 0.01 K/uL NEUTROPHILS 78 (H) 32 - 75 % LYMPHOCYTES 9 (L) 12 - 49 % MONOCYTES 6 5 - 13 % EOSINOPHILS 5 0 - 7 % BASOPHILS 1 0 - 1 % MYELOCYTES 1 (H) 0 % IMMATURE GRANULOCYTES 0 %  
 ABS. NEUTROPHILS 7.4 1.8 - 8.0 K/UL  
 ABS. LYMPHOCYTES 0.9 0.8 - 3.5 K/UL  
 ABS. MONOCYTES 0.6 0.0 - 1.0 K/UL  
 ABS. EOSINOPHILS 0.5 (H) 0.0 - 0.4 K/UL  
 ABS. BASOPHILS 0.1 0.0 - 0.1 K/UL  
 ABS. IMM. GRANS. 0.0 K/UL  
 DF MANUAL    
 RBC COMMENTS ANISOCYTOSIS 
1+ METABOLIC PANEL, COMPREHENSIVE Collection Time: 02/28/20  2:44 PM  
Result Value Ref Range Sodium 134 (L) 136 - 145 mmol/L Potassium 4.3 3.5 - 5.1 mmol/L Chloride 99 97 - 108 mmol/L  
 CO2 26 21 - 32 mmol/L Anion gap 9 5 - 15 mmol/L Glucose 301 (H) 65 - 100 mg/dL BUN 20 6 - 20 MG/DL Creatinine 1.54 (H) 0.55 - 1.02 MG/DL  
 BUN/Creatinine ratio 13 12 - 20 GFR est AA 39 (L) >60 ml/min/1.73m2 GFR est non-AA 32 (L) >60 ml/min/1.73m2 Calcium 8.8 8.5 - 10.1 MG/DL Bilirubin, total 0.7 0.2 - 1.0 MG/DL  
 ALT (SGPT) 27 12 - 78 U/L  
 AST (SGOT) 22 15 - 37 U/L Alk. phosphatase 100 45 - 117 U/L Protein, total 6.4 6.4 - 8.2 g/dL Albumin 2.3 (L) 3.5 - 5.0 g/dL Globulin 4.1 (H) 2.0 - 4.0 g/dL A-G Ratio 0.6 (L) 1.1 - 2.2 SAMPLES BEING HELD Collection Time: 02/28/20  2:44 PM  
Result Value Ref Range SAMPLES BEING HELD 1RED,1BLU   
 COMMENT Add-on orders for these samples will be processed based on acceptable specimen integrity and analyte stability, which may vary by analyte. TROPONIN I Collection Time: 02/28/20  2:44 PM  
Result Value Ref Range Troponin-I, Qt. <0.05 <0.05 ng/mL NT-PRO BNP Collection Time: 02/28/20  2:44 PM  
Result Value Ref Range NT pro-BNP 2,482 (H) <450 PG/ML  
EKG, 12 LEAD, INITIAL Collection Time: 02/28/20  9:03 PM  
Result Value Ref Range Ventricular Rate 82 BPM  
 Atrial Rate 300 BPM  
 QRS Duration 122 ms  
 Q-T Interval 410 ms QTC Calculation (Bezet) 479 ms Calculated R Axis -46 degrees Calculated T Axis 98 degrees Diagnosis Wide QRS rhythm Left axis deviation Left bundle branch block When compared with ECG of 31-JAN-2020 16:19, 
Wide QRS rhythm has replaced Atrial fibrillation GLUCOSE, POC Collection Time: 02/28/20 10:14 PM  
Result Value Ref Range Glucose (POC) 133 (H) 65 - 100 mg/dL Performed by Angel Lawler HEMOGLOBIN A1C WITH EAG Collection Time: 02/29/20  1:13 AM  
Result Value Ref Range Hemoglobin A1c 8.7 (H) 4.0 - 5.6 % Est. average glucose 203 mg/dL METABOLIC PANEL, BASIC Collection Time: 02/29/20  1:13 AM  
Result Value Ref Range Sodium 133 (L) 136 - 145 mmol/L Potassium 4.2 3.5 - 5.1 mmol/L Chloride 102 97 - 108 mmol/L  
 CO2 25 21 - 32 mmol/L Anion gap 6 5 - 15 mmol/L Glucose 242 (H) 65 - 100 mg/dL  BUN 19 6 - 20 MG/DL  
 Creatinine 1.45 (H) 0.55 - 1.02 MG/DL  
 BUN/Creatinine ratio 13 12 - 20 GFR est AA 42 (L) >60 ml/min/1.73m2 GFR est non-AA 34 (L) >60 ml/min/1.73m2 Calcium 8.6 8.5 - 10.1 MG/DL  
GLUCOSE, POC Collection Time: 02/29/20  7:30 AM  
Result Value Ref Range Glucose (POC) 128 (H) 65 - 100 mg/dL Performed by Mckinley Donovan Assessment:  
 
Principal Problem: 
  Acute diastolic CHF (congestive heart failure) [facility] (2/28/2020) Active Problems: 
  Coronary atherosclerosis of native coronary artery (11/18/2010) Hypertension complicating diabetes ([facility] (11/17/2016) Atrial fibrillation -- S/p AVN ablation & pacemaker placement. Type 2 diabetes with nephropathy ([facility] (2/12/2018) Pacemaker (2/5/2020) Overview: 2/5/2020 leadless pacemaker Plan: 1. Continue higher dose of PO Bumex, 2 mg every day. 2. Pt says Dr. Dionicio Jordan saw her a little while ago, and she says he talked to her about a possible stress test vs cath. ... Will appreciate his input.  
3. Recheck drew in am. 
 
 
 
 
Cesilia Chavez MD

## 2020-02-29 NOTE — H&P
History and Physical 
 
Subjective:  
 
Ana Maria Merritt is a 81 y.o. white female with PMH as below. She had a recent admission here earlier this month for asthmatic bronchitis. She also had had difficult to manage afib, so she underwent an AVN ablation with wireless pacemaker placement. Over the past week or so, she has had some worsening dyspnea. No CP. No significant coughing. No f/c. She was seen in the office a few days ago. Today, pt came to the ER with continued SOB, and her BNP was modestly elevated at 2,482. She was given a dose of IV Bumex, and she is being admitted for inpatient care. Past Medical History:  
Diagnosis Date  Atrial fibrillation ([facility]  CAD (coronary artery disease)  Cerebrovascular accident stroke, other, unspec 11/18/2010  Coronary atherosclerosis of native coronary artery 11/18/2010  Essential hypertension  Essential hypertension, benign 11/18/2010  HLD (hyperlipidemia) 11/18/2010  PVD (peripheral vascular disease) ([facility] 2017  Type II or unspecified type diabetes mellitus without mention of complication, not stated as uncontrolled 11/18/2010  Zoster Allergies Allergen Reactions  Bactrim [Sulfamethoprim] Other (comments) Affects her kidney funtion  Clonidine Shortness of Breath and Swelling Lightheaded,   
 Codeine Unknown (comments)  Keflin Itching  Pcn [Penicillins] Unknown (comments)  Tramadol Other (comments) Makes her head feel swishy Prior to Admission medications Medication Sig Start Date End Date Taking? Authorizing Provider  
metoprolol succinate (TOPROL-XL) 50 mg XL tablet Take 50 mg by mouth nightly. Yes Provider, Historical  
amLODIPine (NORVASC) 5 mg tablet Take 5 mg by mouth daily.    Yes Provider, Historical  
acyclovir (ZOVIRAX) 5 % ointment Use as directed 2/14/20  Yes Lona Barrientos IV, MD  
isosorbide mononitrate ER (IMDUR) 60 mg CR tablet TAKE 1 TABLET TWICE A DAY 2/13/20  Yes Jose Joe MD  
acetaminophen (TYLENOL) 325 mg tablet Take 2 Tabs by mouth every four (4) hours as needed for Pain or Fever. 2/7/20  Yes Adrienne Harper IV, MD  
albuterol [facility] HFA) 90 mcg/actuation inhaler Take 2 Puffs by inhalation every four (4) hours as needed for Wheezing or Shortness of Breath. 2/7/20  Yes Adrienne Harper IV, MD  
aspirin 81 mg chewable tablet Take 81 mg by mouth nightly. Yes Provider, Historical  
atorvastatin (LIPITOR) 20 mg tablet Take 20 mg by mouth nightly. Yes Provider, Historical  
azelastine (ASTEPRO) 0.15 % (205.5 mcg) 1 Spray by Both Nostrils route nightly. Yes Provider, Historical  
bumetanide (BUMEX) 1 mg tablet Take 1 mg by mouth daily. Takes 1 mg or 2 mg daily on alternating days    Yes Provider, Historical  
clotrimazole-betamethasone (LOTRISONE) topical cream Apply  to affected area two (2) times daily as needed for Skin Irritation or Other (Rash). Yes Provider, Historical  
insulin detemir U-100 (LEVEMIR U-100 INSULIN) 100 unit/mL injection 10 Units by SubCUTAneous route daily. Takes 20 units every morning, and takes 8-10 units every evening as needed/as directed based on blood sugar    Yes Provider, Historical  
mupirocin calcium (BACTROBAN) 2 % topical cream Apply  to affected area two (2) times daily as needed. Yes Provider, Historical  
nystatin (MYCOSTATIN) powder Apply  to affected area two (2) times daily as needed for Other (Rash). Yes Provider, Historical  
glipiZIDE (GLUCOTROL) 5 mg tablet TAKE 2 TABLETS TWICE A DAY 1/28/20  Yes Adrienne Harper IV, MD  
allopurinol (ZYLOPRIM) 100 mg tablet Take 100 mg by mouth daily. 10/9/19  Yes Provider, Historical  
apixaban (ELIQUIS) 2.5 mg tablet Take 1 Tab by mouth two (2) times a day.  10/29/19  Yes Jose Joe MD  
omeprazole (PRILOSEC) 20 mg capsule TAKE 1 CAPSULE DAILY 10/29/19  Yes Jose Joe MD  
 enalapril (VASOTEC) 20 mg tablet TAKE 1 TABLET TWICE A DAY 19  Yes Wallis Epley, MD  
budesonide-formoterol (SYMBICORT) 160-4.5 mcg/actuation HFAA Take 2 Puffs by inhalation two (2) times a day. After each use, rinse mouth with water 3/8/19  Yes Crystal Martins IV, MD  
cholecalciferol, vitamin D3, (VITAMIN D3) 2,000 unit tab Take 2,000 Units by mouth daily. Yes Provider, Historical  
nitroglycerin (NITROSTAT) 0.4 mg SL tablet 1 Tab by SubLINGual route every five (5) minutes as needed for Chest Pain. 17  Yes Renny eMsa MD  
insulin aspart U-100 (NOVOLOG FLEXPEN U-100 INSULIN) 100 unit/mL (3 mL) inpn INJECT 7 UNITS BEFORE EACH MEAL 3 TIMES A DAY OR AS   DIRECTED 19   Jose Luis Cassidy MD  
 
Social History Tobacco Use  Smoking status: Former Smoker Packs/day: 0.80 Years: 20.00 Pack years: 16.00 Types: Cigarettes Last attempt to quit: 1986 Years since quittin.8  Smokeless tobacco: Never Used Substance Use Topics  Alcohol use: No  
 
No family history on file. Review of Systems: As above. Objective:  
 
 
Physical Exam: In NAD. A&O. HEENT -- Unremarkable except for chronic dysconjugate gaze when looking to her right. Neck -- Supple. No JVD. Heart -- RRR. No R/M/G. Lungs -- Some mild wheezing, but good air movement. Abdomen -- Soft. Non-tender. Non-distended. No masses. Bowel sounds present. Extremeties -- trace RLE edema, which she says is chronic. No posterior leg tenderness or dora's sign, etc, to suggest DVT. Data Review:  
Recent Results (from the past 24 hour(s)) CBC WITH AUTOMATED DIFF Collection Time: 20  2:44 PM  
Result Value Ref Range WBC 9.5 3.6 - 11.0 K/uL  
 RBC 4.32 3.80 - 5.20 M/uL  
 HGB 11.4 (L) 11.5 - 16.0 g/dL HCT 35.2 35.0 - 47.0 % MCV 81.5 80.0 - 99.0 FL  
 MCH 26.4 26.0 - 34.0 PG  
 MCHC 32.4 30.0 - 36.5 g/dL  
 RDW 15.7 (H) 11.5 - 14.5 % PLATELET 181 (H) 293 - 400 K/uL MPV 9.2 8.9 - 12.9 FL  
 NRBC 0.0 0  WBC ABSOLUTE NRBC 0.00 0.00 - 0.01 K/uL NEUTROPHILS 78 (H) 32 - 75 % LYMPHOCYTES 9 (L) 12 - 49 % MONOCYTES 6 5 - 13 % EOSINOPHILS 5 0 - 7 % BASOPHILS 1 0 - 1 % MYELOCYTES 1 (H) 0 % IMMATURE GRANULOCYTES 0 %  
 ABS. NEUTROPHILS 7.4 1.8 - 8.0 K/UL  
 ABS. LYMPHOCYTES 0.9 0.8 - 3.5 K/UL  
 ABS. MONOCYTES 0.6 0.0 - 1.0 K/UL  
 ABS. EOSINOPHILS 0.5 (H) 0.0 - 0.4 K/UL  
 ABS. BASOPHILS 0.1 0.0 - 0.1 K/UL  
 ABS. IMM. GRANS. 0.0 K/UL  
 DF MANUAL    
 RBC COMMENTS ANISOCYTOSIS 
1+ METABOLIC PANEL, COMPREHENSIVE Collection Time: 02/28/20  2:44 PM  
Result Value Ref Range Sodium 134 (L) 136 - 145 mmol/L Potassium 4.3 3.5 - 5.1 mmol/L Chloride 99 97 - 108 mmol/L  
 CO2 26 21 - 32 mmol/L Anion gap 9 5 - 15 mmol/L Glucose 301 (H) 65 - 100 mg/dL BUN 20 6 - 20 MG/DL Creatinine 1.54 (H) 0.55 - 1.02 MG/DL  
 BUN/Creatinine ratio 13 12 - 20 GFR est AA 39 (L) >60 ml/min/1.73m2 GFR est non-AA 32 (L) >60 ml/min/1.73m2 Calcium 8.8 8.5 - 10.1 MG/DL Bilirubin, total 0.7 0.2 - 1.0 MG/DL  
 ALT (SGPT) 27 12 - 78 U/L  
 AST (SGOT) 22 15 - 37 U/L Alk. phosphatase 100 45 - 117 U/L Protein, total 6.4 6.4 - 8.2 g/dL Albumin 2.3 (L) 3.5 - 5.0 g/dL Globulin 4.1 (H) 2.0 - 4.0 g/dL A-G Ratio 0.6 (L) 1.1 - 2.2 SAMPLES BEING HELD Collection Time: 02/28/20  2:44 PM  
Result Value Ref Range SAMPLES BEING HELD 1RED,1BLU   
 COMMENT Add-on orders for these samples will be processed based on acceptable specimen integrity and analyte stability, which may vary by analyte. TROPONIN I Collection Time: 02/28/20  2:44 PM  
Result Value Ref Range Troponin-I, Qt. <0.05 <0.05 ng/mL NT-PRO BNP Collection Time: 02/28/20  2:44 PM  
Result Value Ref Range  NT pro-BNP 2,482 (H) <450 PG/ML  
 
 
 
CXR -- IMPRESSION: 
 Mild diffuse interstitial opacities with a basilar predominance may represent 
pulmonary edema or infection. Assessment:  
 
Principal Problem: 
  Acute diastolic [facility]cong[facility] (2/28/2020) Active Problems: 
  Coronary atherosclerosis of native coronary artery (11/18/2010) Hypertension complicating diabetes ([facility] (11/17/2016) [facility] fibrillation [facility] (4/24/2017) Type 2 diabetes with nephropathy ([facility] (2/12/2018) Pacemaker (2/5/2020) Overview: 2/5/2020 leadless pacemaker Plan: 1. Cont IV diuresis & follow lytes. 2.  Will ask Dr. Riddhi Ford, her cardiologist, to see her (may see in am). 3.  Continue inhaled steroid & LABA, and can use PRN Albuterol. 4.  I don't see any sign of bacterial infection at this time, so will hold off on abx at this point. 5.  Cont Eliquis. 6.  Continue insulin & Glipizide, and SSI ordered. 7.  I discussed code status, and she requests FULL CODE at this point. Signed By: Kayla Ballard MD   
 February 28, 2020

## 2020-02-29 NOTE — PROGRESS NOTES
Bedside shift change report given to Sissy Waite RN (oncoming nurse) by Heike Grover RN (offgoing nurse). Report included the following information SBAR, Kardex, ED Summary, Procedure Summary, Intake/Output, MAR, Accordion, Recent Results, Cardiac Rhythm Paced and Dual Neuro Assessment.

## 2020-02-29 NOTE — CONSULTS
[facility] DIVISION of CARDIOLOGY CONSULT NOTE Loni Kehr M.D., F.A.C.C. 
[phone] NAME: Roddy De Paz :  1935 MRN:  [id number] Date/Time:  202010:41 AM 
 
 
________________________________________________________________________ Assessment/Plan Patient with a history of chronic atrial fibrillation status post AV jazlyn ablation and leadless pacemaker placement, hypertension, coronary disease, diabetes, recently discharged from hospital on account of asthmatic bronchitis presents again with worsening shortness of breath for the last 2 weeks. This morning she tells me that the shortness of breath is better but she is still dyspneic just walking to the bathroom. 1.  Shortness of breath: This could be multifactorial but the chest x-ray shows some pulmonary congestion and her proBNP is slightly elevated. Change Bumex to intravenous preparation 2 mg twice a day hopefully for short period of time. I will add a small dose of potassium as well. Follow BMP daily. Follow I's and O's and weight daily as well. Her electrocardiogram upon admission shows atrial fibrillation with ventricular paced rhythm therefore not very helpful from the coronary ischemia standpoint. Shortness of breath in a diabetic female  is of concern and depending on how she does in the next 24 to 48 hours we will have to decide if proceeding with either a stress test or g directly with a cardiac catheterization on Monday. First troponin is normal.  Obtain second troponin as well. 2.  CAD: As above. She will need some coronary evaluation prior to discharge. Stress test the cardiac catheterization depending on her symptoms in the next 24 to 48 hours. Continue asa and lipitor and imdur 3. Atrial fibrillation: Chronic. Status post AV node ablation and leadless pacemaker placement. Continue Eliquis. 4.  Diabetes: As per primary care physician. 5.  Hypertension: It seems well controlled at this time. Continue lisinopril and amlodipine and metoprolol. Thanks for allowing me to continue caring this very pleasant lady will follow closely with you. ICD-10-CM ICD-9-CM 1. Acute systolic congestive heart failure ([address] I50.21 428.21   
  428.0   
  
 
 
01/31/20 ECHO ADULT FOLLOW-UP OR LIMITED 02/05/2020 2/5/2020 Narrative · Normal cavity size and wall thickness. Low normal systolic dysfunction. Estimated left ventricular ejection fraction is 50 - 55%. · Image quality for this study was suboptimal. 
· Moderately dilated left atrium. · Mildly dilated right ventricle. Borderline low systolic function. · Moderately dilated right atrium. · Probably trileaflet aortic valve. Moderate aortic valve sclerosis. Aortic valve leaflet calcification present. · Mitral valve non-specific thickening. Mild mitral valve regurgitation is  
present. · Mild tricuspid valve regurgitation is present. · There is no evidence of pulmonary hypertension. Signed by: Kenroy Caldera MD  
  
04/17/19 NUCLEAR CARDIAC STRESS TEST 04/18/2019 4/19/2019 Narrative · Baseline ECG: Atrial fibrillation, non-specific ST-T wave abnormalities. · Gated SPECT: Left ventricular function post-stress was abnormal.  
Calculated ejection fraction is 45%. There is no evidence of transient  
ischemic dilation (TID). The TID ratio is 0.97. · Left ventricular perfusion is abnormal. 
· Myocardial perfusion imaging defect 1: There is a defect that is  
moderate in size with a moderate reduction in uptake present in the apical  
to basal anterior location(s) that is non-reversible. There is abnormal  
wall motion in the defect area. Viability in the area is good. caused by  
breast attenuation. The possibility of artifact cannot be excluded. Perfusion defect was visually and quantitatively present. · Myocardial perfusion imaging defect 1: caused by breast attenuation. · Abnormal myocardial perfusion imaging. Fixed defect consistent with  
prior myocardial infarction. Myocardial perfusion imaging supports an  
intermediate risk stress test. 
   
  Signed by: Rylie Chopra MD  
 
   
CT Results (most recent): 
Results from [facility] [place]counter encounter on 08/23/17 CTA CHEST W OR W WO CONT Narrative Clinical indication: Dyspnea on exertion. Localizer obtained without contrast at the level of the pulmonary arteries. Fast 
injection rate of 100 cc of Isovue-370 with review of the raw data and MIP 
reconstructions CT dose reduction was achieved through the use of a standardized 
protocol tailored for this examination and automatic exposure control for dose 
modulation . . 
 
 . There is a mild to moderate-sized right pleural effusion and a mild sized 
left pleural effusion. The heart is enlarged there is no pericardial effusion. There is no mediastinal masses or adenopathy. There is no evidence for pulmonary 
emboli. There is no shift or pneumothorax. There are mild congestive changes. Mild emphysema. No focal consolidation or mass. Impression IMPRESSION: Bilateral effusions and cardiomegaly. No pulmonary emboli. Subjective: CHIEF COMPLAINT:  \"sob\" Pertinent items are noted in HPI. HISTORY OF PRESENT ILLNESS:    
Pedro Sevilla is a 81 y.o. female whom presents with complaint noted above. We were asked to evaluation for the above problems. Patricia Blount Past Medical History:  
Diagnosis Date  Atrial fibrillation ([facility]  CAD (coronary artery disease)  Cerebrovascular accident stroke, other, unspec 11/18/2010  Coronary atherosclerosis of native coronary artery 11/18/2010  Essential hypertension  Essential hypertension, benign 11/18/2010  HLD (hyperlipidemia) 11/18/2010  PVD (peripheral vascular disease) ([address] 2017  Type II or unspecified type diabetes mellitus without mention of complication, not stated as uncontrolled 11/18/2010  Zoster Past Surgical History:  
Procedure Laterality Date  HX CORONARY STENT PLACEMENT    
 HX HEART CATHETERIZATION    
  [address]  MS ICAR CATHETER ABLATION ATRIOVENTR NODE FUNCTION N/A 2020 ABLATION AV NODE performed by Wilber Perez MD at [address]  CATH [facility]  MS TCAT INSJ/RPL PERM LEADLESS PACEMAKER RV W/IMG N/A 2020 INSERT OR REPLACE TRANSCATH PPM LEADLESS performed by Wilber Perez MD at [address]  CATH LAB Social History Tobacco Use  Smoking status: Former Smoker Packs/day: 0.80 Years: 20.00 Pack years: 16.00 Types: Cigarettes Last attempt to quit: 1986 Years since quittin.8  Smokeless tobacco: Never Used Substance Use Topics  Alcohol use: No  
  
No family history on file. Allergies Allergen Reactions  Bactrim [Sulfamethoprim] Other (comments) Affects her kidney funtion  Clonidine Shortness of Breath and Swelling Lightheaded,   
 Codeine Unknown (comments)  Keflin Itching  Pcn [Penicillins] Unknown (comments)  Tramadol Other (comments) Makes her head feel swishy Current Facility-Administered Medications Medication Dose Route Frequency  albuterol (PROVENTIL VENTOLIN) nebulizer solution 2.5 mg  2.5 mg Nebulization Q4H PRN  
 allopurinoL (ZYLOPRIM) tablet 100 mg  100 mg Oral DAILY  amLODIPine (NORVASC) tablet 5 mg  5 mg Oral DAILY  apixaban (ELIQUIS) tablet 2.5 mg  2.5 mg Oral BID  aspirin chewable tablet 81 mg  81 mg Oral QHS  atorvastatin (LIPITOR) tablet 20 mg  20 mg Oral QHS  azelastine (ASTELIN) 137mcg/spray nasal spray  1 Spray Both Nostrils QHS  arformoteroL (BROVANA) neb solution 15 mcg  15 mcg Nebulization BID RT And  
 budesonide (PULMICORT) 500 mcg/2 ml nebulizer suspension  500 mcg Nebulization BID RT  
 bumetanide (BUMEX) tablet 2 mg  2 mg Oral DAILY  cholecalciferol (VITAMIN D3) (1000 Units /25 mcg) tablet 2 Tab  2,000 Units Oral DAILY  lisinopril (PRINIVIL, ZESTRIL) tablet 20 mg  20 mg Oral BID  
 glipiZIDE (GLUCOTROL) tablet 10 mg  10 mg Oral BID  insulin glargine (LANTUS) injection 20 Units  20 Units SubCUTAneous DAILY  isosorbide mononitrate ER (IMDUR) tablet 60 mg  60 mg Oral BID  metoprolol succinate (TOPROL-XL) XL tablet 50 mg  50 mg Oral QHS  nitroglycerin (NITROSTAT) tablet 0.4 mg  0.4 mg SubLINGual Q5MIN PRN  pantoprazole (PROTONIX) tablet 40 mg  40 mg Oral ACB  ondansetron (ZOFRAN) injection 4 mg  4 mg IntraVENous Q6H PRN  
 acetaminophen (TYLENOL) tablet 650 mg  650 mg Oral Q4H PRN  
 glucose chewable tablet 16 g  4 Tab Oral PRN  
 glucagon (GLUCAGEN) injection 1 mg  1 mg IntraMUSCular PRN  
 dextrose 10% infusion 0-250 mL  0-250 mL IntraVENous PRN  
 insulin lispro (HUMALOG) injection   SubCUTAneous AC&HS Prior to Admission medications Medication Sig Start Date End Date Taking? Authorizing Provider  
metoprolol succinate (TOPROL-XL) 50 mg XL tablet Take 50 mg by mouth nightly. Yes Provider, Historical  
amLODIPine (NORVASC) 5 mg tablet Take 5 mg by mouth daily. Yes Provider, Historical  
acyclovir (ZOVIRAX) 5 % ointment Use as directed 2/14/20  Yes Moncho Tan IV, MD  
isosorbide mononitrate ER (IMDUR) 60 mg CR tablet TAKE 1 TABLET TWICE A DAY 2/13/20  Yes Reyes Greaser, MD  
acetaminophen (TYLENOL) 325 mg tablet Take 2 Tabs by mouth every four (4) hours as needed for Pain or Fever. 2/7/20  Yes Moncho Tan IV, MD  
albuterol [facility] HFA) 90 mcg/actuation inhaler Take 2 Puffs by inhalation every four (4) hours as needed for Wheezing or Shortness of Breath. 2/7/20  Yes Moncho Tan IV, MD  
aspirin 81 mg chewable tablet Take 81 mg by mouth nightly. Yes Provider, Historical  
atorvastatin (LIPITOR) 20 mg tablet Take 20 mg by mouth nightly.    Yes Provider, Historical  
azelastine (ASTEPRO) 0.15 % (205.5 mcg) 1 Spray by Both Nostrils route nightly. Yes Provider, Historical  
bumetanide (BUMEX) 1 mg tablet Take 1 mg by mouth daily. Takes 1 mg or 2 mg daily on alternating days    Yes Provider, Historical  
clotrimazole-betamethasone (LOTRISONE) topical cream Apply  to affected area two (2) times daily as needed for Skin Irritation or Other (Rash). Yes Provider, Historical  
insulin detemir U-100 (LEVEMIR U-100 INSULIN) 100 unit/mL injection 10 Units by SubCUTAneous route daily. Takes 20 units every morning, and takes 8-10 units every evening as needed/as directed based on blood sugar    Yes Provider, Historical  
mupirocin calcium (BACTROBAN) 2 % topical cream Apply  to affected area two (2) times daily as needed. Yes Provider, Historical  
nystatin (MYCOSTATIN) powder Apply  to affected area two (2) times daily as needed for Other (Rash). Yes Provider, Historical  
glipiZIDE (GLUCOTROL) 5 mg tablet TAKE 2 TABLETS TWICE A DAY 1/28/20  Yes Chalino Ghosh IV, MD  
allopurinol (ZYLOPRIM) 100 mg tablet Take 100 mg by mouth daily. 10/9/19  Yes Provider, Historical  
apixaban (ELIQUIS) 2.5 mg tablet Take 1 Tab by mouth two (2) times a day. 10/29/19  Yes Adriana Smith MD  
omeprazole (PRILOSEC) 20 mg capsule TAKE 1 CAPSULE DAILY 10/29/19  Yes Adriana Smith MD  
enalapril (VASOTEC) 20 mg tablet TAKE 1 TABLET TWICE A DAY 4/23/19  Yes Adriana Smith MD  
budesonide-formoterol (SYMBICORT) 160-4.5 mcg/actuation HFAA Take 2 Puffs by inhalation two (2) times a day. After each use, rinse mouth with water 3/8/19  Yes Chalino Ghosh IV, MD  
cholecalciferol, vitamin D3, (VITAMIN D3) 2,000 unit tab Take 2,000 Units by mouth daily. Yes Provider, Historical  
nitroglycerin (NITROSTAT) 0.4 mg SL tablet 1 Tab by SubLINGual route every five (5) minutes as needed for Chest Pain.  7/17/17  Yes Renny Mesa MD  
insulin aspart U-100 (NOVOLOG FLEXPEN U-100 INSULIN) 100 unit/mL (3 mL) inpn INJECT 7 UNITS BEFORE EACH MEAL 3 TIMES A DAY OR AS   DIRECTED 8/2/19   Chalino Ghosh IV, MD  
 
 
 
 
EKG: atrial fibrillation, rate Vpaced. XR Results (most recent): 
Results from [facility] Encounter encounter on 02/28/20 XR CHEST PA LAT Narrative EXAM:  XR CHEST PA LAT INDICATION: Shortness of breath COMPARISON: 2/4/2020 TECHNIQUE: PA and lateral chest views FINDINGS: The cardiomediastinal contours are stable. The pulmonary vasculature 
is within normal limits. There are mild interstitial opacities with a basilar predominance. There is no 
pleural effusion or pneumothorax. The bones and upper abdomen are stable. Impression IMPRESSION: 
 
Mild diffuse interstitial opacities with a basilar predominance may represent 
pulmonary edema or infection. Objective: VITALS:   
 
Patient Vitals for the past 12 hrs: 
 Temp Pulse Resp BP SpO2  
02/29/20 1010 98.3 °F (36.8 °C) 80 20 135/50 95 % 02/29/20 0601 98.2 °F (36.8 °C) 80 19 118/81 98 % 02/29/20 0201 98.3 °F (36.8 °C) 81 19 117/75 96 % Visit Vitals /50 (BP 1 Location: Left arm, BP Patient Position: Sitting) Pulse 80 Temp 98.3 °F (36.8 °C) Resp 20 Wt 169 lb 3.2 oz (76.7 kg) SpO2 95% BMI 33.04 kg/m² Extended / Orthostatic Vitals: Wt Readings from Last 3 Encounters:  
02/29/20 169 lb 3.2 oz (76.7 kg) 02/25/20 168 lb (76.2 kg) 02/14/20 168 lb (76.2 kg) Intake/Output Summary (Last 24 hours) at 2/29/2020 1041 Last data filed at 2/29/2020 2842 Gross per 24 hour Intake 440 ml Output  Net 440 ml Neck: no JVD Heart: regularly irregular rhythm Lungs: rales R base, L base Abdomen: soft, non-tender. Bowel sounds normal. No masses,  no organomegaly Extremities: no edema LAB DATA REVIEWED:   
 
All Cardiac Markers in the last 24 hours:   
Lab Results Component Value Date/Time  Pastor Jaimalathi <0.05 02/28/2020 02:44 PM  
 BNPNT 2,482 (H) 02/28/2020 02:44 PM  
 Recent Results (from the past 24 hour(s)) CBC WITH AUTOMATED DIFF Collection Time: 02/28/20  2:44 PM  
Result Value Ref Range WBC 9.5 3.6 - 11.0 K/uL  
 RBC 4.32 3.80 - 5.20 M/uL  
 HGB 11.4 (L) 11.5 - 16.0 g/dL HCT 35.2 35.0 - 47.0 % MCV 81.5 80.0 - 99.0 FL  
 MCH 26.4 26.0 - 34.0 PG  
 MCHC 32.4 30.0 - 36.5 g/dL  
 RDW 15.7 (H) 11.5 - 14.5 % PLATELET 106 (H) 543 - 400 K/uL MPV 9.2 8.9 - 12.9 FL  
 NRBC 0.0 0  WBC ABSOLUTE NRBC 0.00 0.00 - 0.01 K/uL NEUTROPHILS 78 (H) 32 - 75 % LYMPHOCYTES 9 (L) 12 - 49 % MONOCYTES 6 5 - 13 % EOSINOPHILS 5 0 - 7 % BASOPHILS 1 0 - 1 % MYELOCYTES 1 (H) 0 % IMMATURE GRANULOCYTES 0 %  
 ABS. NEUTROPHILS 7.4 1.8 - 8.0 K/UL  
 ABS. LYMPHOCYTES 0.9 0.8 - 3.5 K/UL  
 ABS. MONOCYTES 0.6 0.0 - 1.0 K/UL  
 ABS. EOSINOPHILS 0.5 (H) 0.0 - 0.4 K/UL  
 ABS. BASOPHILS 0.1 0.0 - 0.1 K/UL  
 ABS. IMM. GRANS. 0.0 K/UL  
 DF MANUAL    
 RBC COMMENTS ANISOCYTOSIS 
1+ METABOLIC PANEL, COMPREHENSIVE Collection Time: 02/28/20  2:44 PM  
Result Value Ref Range Sodium 134 (L) 136 - 145 mmol/L Potassium 4.3 3.5 - 5.1 mmol/L Chloride 99 97 - 108 mmol/L  
 CO2 26 21 - 32 mmol/L Anion gap 9 5 - 15 mmol/L Glucose 301 (H) 65 - 100 mg/dL BUN 20 6 - 20 MG/DL Creatinine 1.54 (H) 0.55 - 1.02 MG/DL  
 BUN/Creatinine ratio 13 12 - 20 GFR est AA 39 (L) >60 ml/min/1.73m2 GFR est non-AA 32 (L) >60 ml/min/1.73m2 Calcium 8.8 8.5 - 10.1 MG/DL Bilirubin, total 0.7 0.2 - 1.0 MG/DL  
 ALT (SGPT) 27 12 - 78 U/L  
 AST (SGOT) 22 15 - 37 U/L Alk. phosphatase 100 45 - 117 U/L Protein, total 6.4 6.4 - 8.2 g/dL Albumin 2.3 (L) 3.5 - 5.0 g/dL Globulin 4.1 (H) 2.0 - 4.0 g/dL A-G Ratio 0.6 (L) 1.1 - 2.2 SAMPLES BEING HELD Collection Time: 02/28/20  2:44 PM  
Result Value Ref Range SAMPLES BEING HELD 1RED,1BLU   
 COMMENT   Add-on orders for these samples will be processed based on acceptable specimen integrity and analyte stability, which may vary by analyte. TROPONIN I Collection Time: 02/28/20  2:44 PM  
Result Value Ref Range Troponin-I, Qt. <0.05 <0.05 ng/mL NT-PRO BNP Collection Time: 02/28/20  2:44 PM  
Result Value Ref Range NT pro-BNP 2,482 (H) <450 PG/ML  
EKG, 12 LEAD, INITIAL Collection Time: 02/28/20  9:03 PM  
Result Value Ref Range Ventricular Rate 82 BPM  
 Atrial Rate 300 BPM  
 QRS Duration 122 ms  
 Q-T Interval 410 ms QTC Calculation (Bezet) 479 ms Calculated R Axis -46 degrees Calculated T Axis 98 degrees Diagnosis Wide QRS rhythm Left axis deviation Left bundle branch block When compared with ECG of 31-JAN-2020 16:19, 
Wide QRS rhythm has replaced Atrial fibrillation GLUCOSE, POC Collection Time: 02/28/20 10:14 PM  
Result Value Ref Range Glucose (POC) 133 (H) 65 - 100 mg/dL Performed by Timmy Bird HEMOGLOBIN A1C WITH EAG Collection Time: 02/29/20  1:13 AM  
Result Value Ref Range Hemoglobin A1c 8.7 (H) 4.0 - 5.6 % Est. average glucose 203 mg/dL METABOLIC PANEL, BASIC Collection Time: 02/29/20  1:13 AM  
Result Value Ref Range Sodium 133 (L) 136 - 145 mmol/L Potassium 4.2 3.5 - 5.1 mmol/L Chloride 102 97 - 108 mmol/L  
 CO2 25 21 - 32 mmol/L Anion gap 6 5 - 15 mmol/L Glucose 242 (H) 65 - 100 mg/dL BUN 19 6 - 20 MG/DL Creatinine 1.45 (H) 0.55 - 1.02 MG/DL  
 BUN/Creatinine ratio 13 12 - 20 GFR est AA 42 (L) >60 ml/min/1.73m2 GFR est non-AA 34 (L) >60 ml/min/1.73m2 Calcium 8.6 8.5 - 10.1 MG/DL  
GLUCOSE, POC Collection Time: 02/29/20  7:30 AM  
Result Value Ref Range Glucose (POC) 128 (H) 65 - 100 mg/dL Performed by Semaj Blanco Procedures: see electronic medical records for all procedures/Xrays and details which were not copied into this note but were reviewed prior to creation of Plan. Please note that this dictation was completed with [address] [employer] [employer], the computer voice recognition software. Quite often unanticipated grammatical, syntax, homophones, and other interpretive errors are inadvertently transcribed by the computer software. Please disregard these errors. Please excuse any errors that have escaped final proofreading. 
 
 
________________________________________________________________________ Total time spent with patient: 30 minutes Critical Care Provided     Minutes non procedure based Care Plan discussed with: 
Patient Family RN Care Manager Consultant/Specialist:    
______________________________________________________________________ Signed By: Cleveland Panda MD   
 February 29, 2020

## 2020-02-29 NOTE — PROGRESS NOTES
Problem: Activity Intolerance Goal: *Oxygen saturation during activity within specified parameters 2/28/2020 2254 by Elizabeth Mora RN Outcome: Progressing Towards Goal 
2/28/2020 2246 by Elizabeth Mora RN Outcome: Progressing Towards Goal 
  
Problem: Pain Goal: *Control of Pain 2/28/2020 2254 by Elizabeth Mora RN Outcome: Progressing Towards Goal 
2/28/2020 2246 by Elizabeth Mora RN Outcome: Progressing Towards Goal 
  
Problem: Falls - Risk of 
Goal: *Absence of Falls Description Document Rosemary Fleming Fall Risk and appropriate interventions in the flowsheet. 2/28/2020 2254 by Elizabeth Mora RN Outcome: Progressing Towards Goal 
Note: Fall Risk Interventions: 
  
 
  
 
Medication Interventions: Assess postural VS orthostatic hypotension, Evaluate medications/consider consulting pharmacy, Patient to call before getting OOB, Teach patient to arise slowly 2/28/2020 2246 by Elizabeth Mora RN Outcome: Progressing Towards Goal 
Note: Fall Risk Interventions: 
  
 
  
 
  
 
  
 
  
 
 
  
Problem: Pressure Injury - Risk of 
Goal: *Prevention of pressure injury Description Document Ruperto Scale and appropriate interventions in the flowsheet. Outcome: Progressing Towards Goal 
Note: Pressure Injury Interventions: Activity Interventions: Increase time out of bed, Pressure redistribution bed/mattress(bed type), PT/OT evaluation Mobility Interventions: Assess need for specialty bed, HOB 30 degrees or less, Pressure redistribution bed/mattress (bed type), PT/OT evaluation, Turn and reposition approx. every two hours(pillow and wedges) Nutrition Interventions: Document food/fluid/supplement intake, Offer support with meals,snacks and hydration

## 2020-02-29 NOTE — PROGRESS NOTES
Transition of Care Plan ? Disposition: Home with family assistance once medically stable ? RUR- 18 % low ? Transport: Daughter, Reuben Ramirez 
? Continue Medical Care ? Follow up with PCP/Specialist  
 
Reason for Admission:  SOB x weeks RUR Score: 18% low Plan for utilizing home health:  TBD     
 
PCP: First and Last name:  Michael Quach Name of Practice: [facility] Are you a current patient: Yes/No:  
 Approximate date of last visit:  
                 
Current Advanced Directive/Advance Care Plan: Patient does not have [address] [facility]. Transition of Care Plan:     
 
 CM met with patient bedside, introduced role, verified demographics, and offered assistance. Patient currently lives with her daughter, Reuben Ramirez [phone] in a 1.5 story home (patient only utilizes first story) with about 3 steps to enter with railings to assist. Patient stated prior to hospitalization she was doing fairly well completing ADL's /IADL's and that her daughter would occasionally assist if needed. Patient stated she does not have or use any DME. Patient does not have oxygen in the home at this time. Patient uses [address] on [address] [phone]. Care Management Interventions PCP Verified by CM: Yes 
Palliative Care Criteria Met (RRAT>21 & CHF Dx)?: No 
Mode of Transport at Discharge: Self(Reuben story) Transition of Care Consult (CM Consult): Discharge Planning MyChart Signup: No 
Discharge Durable Medical Equipment: No 
Health Maintenance Reviewed: Yes Physical Therapy Consult: No 
Occupational Therapy Consult: No 
Speech Therapy Consult: No 
Current Support Network: Relative's Home(Lives with Reuben story) Confirm Follow Up Transport: Family The Patient and/or Patient Representative was Provided with a Choice of Provider and Agrees with the Discharge Plan?: Yes [employer] Information Provided?: No 
Discharge Location Discharge Placement: Home with family assistance ANA MARIA Summers

## 2020-02-29 NOTE — ROUTINE PROCESS
TRANSFER - OUT REPORT: 
 
Verbal report given to Ally Fountain (name) on Cathy Mosley  being transferred to [address] [facility] (unit) for routine progression of care Report consisted of patients Situation, Background, Assessment and  
Recommendations(SBAR). Information from the following report(s) SBAR, ED Summary and Recent Results was reviewed with the receiving nurse. Lines:  
Peripheral IV Left Antecubital (Active) Site Assessment Clean, dry, & intact 2/28/2020  2:49 PM  
Phlebitis Assessment 0 2/28/2020  2:49 PM  
Infiltration Assessment 0 2/28/2020  2:49 PM  
Dressing Status Clean, dry, & intact 2/28/2020  2:49 PM  
Dressing Type Transparent;Tape 2/28/2020  2:49 PM  
Hub Color/Line Status Pink;Patent; Flushed 2/28/2020  2:49 PM  
Action Taken Blood drawn 2/28/2020  2:49 PM  
Alcohol Cap Used No 2/28/2020  2:49 PM  
  
 
Opportunity for questions and clarification was provided. Patient transported with: 
 O2 @ 2 liters

## 2020-03-01 NOTE — PROGRESS NOTES
Problem: Activity Intolerance Goal: *Oxygen saturation during activity within specified parameters Outcome: Progressing Towards Goal 
Goal: *Able to remain out of bed as prescribed Outcome: Progressing Towards Goal 
  
Problem: Pain Goal: *Control of Pain Outcome: Progressing Towards Goal 
  
Problem: Falls - Risk of 
Goal: *Absence of Falls Description Document Luzma Stratton Fall Risk and appropriate interventions in the flowsheet. Outcome: Progressing Towards Goal 
Note: Fall Risk Interventions: 
  
 
  
 
Medication Interventions: Assess postural VS orthostatic hypotension, Evaluate medications/consider consulting pharmacy, Patient to call before getting OOB, Teach patient to arise slowly, Utilize gait belt for transfers/ambulation Problem: Pressure Injury - Risk of 
Goal: *Prevention of pressure injury Description Document Ruperto Scale and appropriate interventions in the flowsheet. Outcome: Progressing Towards Goal 
Note: Pressure Injury Interventions: Activity Interventions: Assess need for specialty bed, Increase time out of bed, Pressure redistribution bed/mattress(bed type), PT/OT evaluation Mobility Interventions: Assess need for specialty bed, HOB 30 degrees or less, Pressure redistribution bed/mattress (bed type), PT/OT evaluation, Turn and reposition approx. every two hours(pillow and wedges) Nutrition Interventions: Document food/fluid/supplement intake, Offer support with meals,snacks and hydration Problem: Diabetes Self-Management Goal: *Using medications safely Description State effect of diabetes medications on diabetes; name diabetes medication taking, action and side effects. Outcome: Progressing Towards Goal 
Goal: *Monitoring blood glucose, interpreting and using results Description Identify recommended blood glucose targets  and personal targets.  
Outcome: Progressing Towards Goal 
 Goal: *Prevention, detection, treatment of acute complications Description List symptoms of hyper- and hypoglycemia; describe how to treat low blood sugar and actions for lowering  high blood glucose level. Outcome: Progressing Towards Goal 
Goal: *Sick day guidelines Outcome: Progressing Towards Goal

## 2020-03-01 NOTE — PROGRESS NOTES
[facility] [place] Division of Cardiology [phone] Progress note Abelino Hung M.D., F.A.OMAIRA. NAME:  Nicole Velazco :   1935 MRN:   [id number] ICD-10-CM ICD-9-CM 1. Acute systolic congestive heart failure (HCC) I50.21 428.21   
  428.0 Assessment/Plan: She feels a little bit better but not much. No chest pain reported but the shortness of breath is still present even if she is able to go the bathroom without oxygen she tells me. 1.  Shortness of breath: This once again could be multifactorial.  She has not lost any weight since yesterday despite intravenous Bumex. Bumex will be continued for now and creatinine will be followed closely. Obtain chest x-ray follow-up and proBNP. Shortness of breath in a diabetic female remains a concern. Her troponin was normal.  She has improved. Proceed with Lexiscan Myoview stress test tomorrow. If nuclear stress test with ischemia consider pulmonary evaluation as well. 2.  CAD: As above. Stress test in the morning. Continue aspirin, Lipitor and Imdur. 3.  Atrial fibrillation: This is chronic. She is now status post AV node ablation and leadless pacemaker placement. Continue Eliquis. 4.  Hypertension: Continue lisinopril amlodipine and metoprolol. This seems to be well controlled. 20 ECHO ADULT FOLLOW-UP OR LIMITED 2020 Narrative · Normal cavity size and wall thickness. Low normal systolic dysfunction. Estimated left ventricular ejection fraction is 50 - 55%. · Image quality for this study was suboptimal. 
· Moderately dilated left atrium. · Mildly dilated right ventricle. Borderline low systolic function. · Moderately dilated right atrium. · Probably trileaflet aortic valve. Moderate aortic valve sclerosis. Aortic valve leaflet calcification present. · Mitral valve non-specific thickening. Mild mitral valve regurgitation is  
present. · Mild tricuspid valve regurgitation is present. · There is no evidence of pulmonary hypertension. Signed by: Breana Monique MD  
 
04/17/19 NUCLEAR CARDIAC STRESS TEST 04/18/2019 4/19/2019 Narrative · Baseline ECG: Atrial fibrillation, non-specific ST-T wave abnormalities. · Gated SPECT: Left ventricular function post-stress was abnormal.  
Calculated ejection fraction is 45%. There is no evidence of transient  
ischemic dilation (TID). The TID ratio is 0.97. · Left ventricular perfusion is abnormal. 
· Myocardial perfusion imaging defect 1: There is a defect that is  
moderate in size with a moderate reduction in uptake present in the apical  
to basal anterior location(s) that is non-reversible. There is abnormal  
wall motion in the defect area. Viability in the area is good. caused by  
breast attenuation. The possibility of artifact cannot be excluded. Perfusion defect was visually and quantitatively present. · Myocardial perfusion imaging defect 1: caused by breast attenuation. · Abnormal myocardial perfusion imaging. Fixed defect consistent with  
prior myocardial infarction. Myocardial perfusion imaging supports an  
intermediate risk stress test. 
   
  Signed by: Jerolyn Hamman, MD  
 
   
EKG Results Procedure 720 Value Units Date/Time EKG, 12 LEAD, INITIAL [[id number]] Collected:  02/28/20 2103 Order Status:  Completed Updated:  02/29/20 1232 Ventricular Rate 82 BPM   
  Atrial Rate 300 BPM   
  QRS Duration 122 ms   
  Q-T Interval 410 ms QTC Calculation (Bezet) 479 ms Calculated R Axis -46 degrees Calculated T Axis 98 degrees Diagnosis --  
  Ventricular-paced rhythm Confirmed by Jordan Sweet M.D., [facility] ([id number]) on 2/29/2020 12:32:27 PM 
  
  
 
 
 
Visit Vitals /45 Pulse 82 Temp 97.7 °F (36.5 °C) Resp 23 Wt 169 lb 3.2 oz (76.7 kg) SpO2 98% BMI 33.04 kg/m² Wt Readings from Last 3 Encounters:  
03/01/20 169 lb 3.2 oz (76.7 kg) 02/25/20 168 lb (76.2 kg) 02/14/20 168 lb (76.2 kg) Review of Systems:  
Pertinent items are noted in the History of Present Illness. Objective:  
 
 
03/01 0701 - 03/01 1900 In: 240 [P.O.:240] Out: -  
 
02/28 1901 - 03/01 0700 In: 1260 [P.O.:1260] Out: 8119 [[id number]] Telemetry: AFIB Physical Exam: 
 
Neck: no carotid bruit and no JVD Heart: normal apical impulse, regularly irregular rhythm Lungs: rales R base, L base, diminished breath sounds R base, L base Abdomen: soft, non-tender. Bowel sounds normal. No masses,  no organomegaly Extremities: no edema Current Facility-Administered Medications Medication Dose Route Frequency  bumetanide (BUMEX) injection 2 mg  2 mg IntraVENous Q12H  potassium chloride SR (KLOR-CON 10) tablet 20 mEq  20 mEq Oral DAILY  albuterol (PROVENTIL VENTOLIN) nebulizer solution 2.5 mg  2.5 mg Nebulization Q4H PRN  
 allopurinoL (ZYLOPRIM) tablet 100 mg  100 mg Oral DAILY  amLODIPine (NORVASC) tablet 5 mg  5 mg Oral DAILY  apixaban (ELIQUIS) tablet 2.5 mg  2.5 mg Oral BID  aspirin chewable tablet 81 mg  81 mg Oral QHS  atorvastatin (LIPITOR) tablet 20 mg  20 mg Oral QHS  azelastine (ASTELIN) 137mcg/spray nasal spray  1 Spray Both Nostrils QHS  arformoteroL (BROVANA) neb solution 15 mcg  15 mcg Nebulization BID RT And  
 budesonide (PULMICORT) 500 mcg/2 ml nebulizer suspension  500 mcg Nebulization BID RT  
 cholecalciferol (VITAMIN D3) (1000 Units /25 mcg) tablet 2 Tab  2,000 Units Oral DAILY  lisinopril (PRINIVIL, ZESTRIL) tablet 20 mg  20 mg Oral BID  
 glipiZIDE (GLUCOTROL) tablet 10 mg  10 mg Oral BID  insulin glargine (LANTUS) injection 20 Units  20 Units SubCUTAneous DAILY  isosorbide mononitrate ER (IMDUR) tablet 60 mg  60 mg Oral BID  
  metoprolol succinate (TOPROL-XL) XL tablet 50 mg  50 mg Oral QHS  nitroglycerin (NITROSTAT) tablet 0.4 mg  0.4 mg SubLINGual Q5MIN PRN  pantoprazole (PROTONIX) tablet 40 mg  40 mg Oral ACB  ondansetron (ZOFRAN) injection 4 mg  4 mg IntraVENous Q6H PRN  
 acetaminophen (TYLENOL) tablet 650 mg  650 mg Oral Q4H PRN  
 glucose chewable tablet 16 g  4 Tab Oral PRN  
 glucagon (GLUCAGEN) injection 1 mg  1 mg IntraMUSCular PRN  
 dextrose 10% infusion 0-250 mL  0-250 mL IntraVENous PRN  
 insulin lispro (HUMALOG) injection   SubCUTAneous AC&HS Lab Results Component Value Date/Time WBC 9.5 02/28/2020 02:44 PM  
 HGB (POC) 14.6 02/14/2020 02:45 PM  
 HGB 11.4 (L) 02/28/2020 02:44 PM  
 HCT (POC) 43.7 02/14/2020 02:45 PM  
 HCT 35.2 02/28/2020 02:44 PM  
 PLATELET 463 (H) 69/06/3686 02:44 PM  
 MCV 81.5 02/28/2020 02:44 PM  
 
Lab Results Component Value Date/Time Sodium 136 03/01/2020 03:57 AM  
 Potassium 4.1 03/01/2020 03:57 AM  
 Chloride 101 03/01/2020 03:57 AM  
 CO2 29 03/01/2020 03:57 AM  
 Anion gap 6 03/01/2020 03:57 AM  
 Glucose 101 (H) 03/01/2020 03:57 AM  
 BUN 27 (H) 03/01/2020 03:57 AM  
 Creatinine 1.58 (H) 03/01/2020 03:57 AM  
 BUN/Creatinine ratio 17 03/01/2020 03:57 AM  
 GFR est AA 38 (L) 03/01/2020 03:57 AM  
 GFR est non-AA 31 (L) 03/01/2020 03:57 AM  
 Calcium 8.4 (L) 03/01/2020 03:57 AM  
 
Lab Results Component Value Date/Time Cholesterol, total 110 06/13/2017 09:31 AM  
 Cholesterol (POC) <100 12/18/2019 11:53 AM  
 HDL Cholesterol 55 06/13/2017 09:31 AM  
 HDL Cholesterol (POC) 41 12/18/2019 11:53 AM  
 LDL Cholesterol (POC) n/a 12/18/2019 11:53 AM  
 LDL, calculated 36 06/13/2017 09:31 AM  
 VLDL, calculated 19 06/13/2017 09:31 AM  
 Triglyceride 93 06/13/2017 09:31 AM  
 Triglycerides (POC) 82 12/18/2019 11:53 AM  
 CHOL/HDL Ratio 2.0 04/25/2017 03:53 AM  
 
Lab Results Component Value Date/Time   02/04/2015 07:47 PM  
 CK - MB 4.7 (H) 02/04/2015 07:47 PM  
 CK-MB Index 4.6 (H) 02/04/2015 07:47 PM  
 Troponin-I, Qt. <0.05 02/29/2020 01:13 AM  
  (H) 08/25/2017 04:40 AM  
 
Lab Results Component Value Date/Time ALT (SGPT) 27 02/28/2020 02:44 PM  
 AST (SGOT) 22 02/28/2020 02:44 PM  
 Alk. phosphatase 100 02/28/2020 02:44 PM  
 Bilirubin, direct 0.18 02/12/2014 09:42 AM  
 Bilirubin, total 0.7 02/28/2020 02:44 PM  
 
Lab Results Component Value Date/Time   (H) 08/25/2017 04:40 AM  
 NT pro-BNP 2,482 (H) 02/28/2020 02:44 PM  
 NT pro-BNP 1,293 (H) 08/26/2017 03:25 AM  
 NT pro-BNP 1,671 (H) 08/23/2017 02:29 PM

## 2020-03-01 NOTE — PROGRESS NOTES
Bedside shift change report given to Heidi Cook (oncoming nurse) by Hima Banuelos RN (offgoing nurse). Report included the following information SBAR, Kardex, ED Summary, Procedure Summary, Intake/Output, MAR, Accordion, Cardiac Rhythm Paced and Dual Neuro Assessment.

## 2020-03-01 NOTE — PROGRESS NOTES
Problem: Pain Goal: *Control of Pain Outcome: Progressing Towards Goal 
  
Problem: Falls - Risk of 
Goal: *Absence of Falls Description Document Arnulfo Reas Fall Risk and appropriate interventions in the flowsheet. Outcome: Progressing Towards Goal 
Note: Fall Risk Interventions: 
  
 
  
 
Medication Interventions: Patient to call before getting OOB, Teach patient to arise slowly Problem: Pressure Injury - Risk of 
Goal: *Prevention of pressure injury Description Document Ruperto Scale and appropriate interventions in the flowsheet. Outcome: Progressing Towards Goal 
Note: Pressure Injury Interventions: Activity Interventions: Assess need for specialty bed, Increase time out of bed, Pressure redistribution bed/mattress(bed type), PT/OT evaluation Mobility Interventions: Assess need for specialty bed, HOB 30 degrees or less, Pressure redistribution bed/mattress (bed type), PT/OT evaluation, Turn and reposition approx. every two hours(pillow and wedges) Nutrition Interventions: Document food/fluid/supplement intake, Offer support with meals,snacks and hydration

## 2020-03-01 NOTE — PROGRESS NOTES
Bedside shift change report given to Arun Borrego RN (oncoming nurse) by Lazarus Rattan, RN (offgoing nurse). Report included the following information SBAR.

## 2020-03-01 NOTE — PROGRESS NOTES
Patient: Fabián Pedro MRN: [id number]    Age: 81 y.o. YOB: 1935 Room/Bed: [facility]/ Consent for video monitoring obtained after the below has been explained to the patient/family on 3/1/2020: 
1. They are being monitored continuously in an effort to promote their safety. 2. That there may be times when the camera will be discontinued to provide care to me to ensure my dignity, such as during bathing or any activity that risks me being exposed. 3. They have the right to opt out of having this surveillance monitoring at any time. 4. It has been explained to the patient/family and they understand that the hospital does not maintain any recording of this surveillance monitoring.    
Raz Valerio RN

## 2020-03-01 NOTE — PROGRESS NOTES
Jenny Stevenson, Aura Booth Admit Date: 2/28/2020 Subjective:  
 
Pt says she still has some CALDERA, but she feels it is a little better. No new complaints. Current Facility-Administered Medications Medication Dose Route Frequency  bumetanide (BUMEX) injection 2 mg  2 mg IntraVENous Q12H  potassium chloride SR (KLOR-CON 10) tablet 20 mEq  20 mEq Oral DAILY  albuterol (PROVENTIL VENTOLIN) nebulizer solution 2.5 mg  2.5 mg Nebulization Q4H PRN  
 allopurinoL (ZYLOPRIM) tablet 100 mg  100 mg Oral DAILY  amLODIPine (NORVASC) tablet 5 mg  5 mg Oral DAILY  apixaban (ELIQUIS) tablet 2.5 mg  2.5 mg Oral BID  aspirin chewable tablet 81 mg  81 mg Oral QHS  atorvastatin (LIPITOR) tablet 20 mg  20 mg Oral QHS  azelastine (ASTELIN) 137mcg/spray nasal spray  1 Spray Both Nostrils QHS  arformoteroL (BROVANA) neb solution 15 mcg  15 mcg Nebulization BID RT And  
 budesonide (PULMICORT) 500 mcg/2 ml nebulizer suspension  500 mcg Nebulization BID RT  
 cholecalciferol (VITAMIN D3) (1000 Units /25 mcg) tablet 2 Tab  2,000 Units Oral DAILY  lisinopril (PRINIVIL, ZESTRIL) tablet 20 mg  20 mg Oral BID  
 glipiZIDE (GLUCOTROL) tablet 10 mg  10 mg Oral BID  insulin glargine (LANTUS) injection 20 Units  20 Units SubCUTAneous DAILY  isosorbide mononitrate ER (IMDUR) tablet 60 mg  60 mg Oral BID  metoprolol succinate (TOPROL-XL) XL tablet 50 mg  50 mg Oral QHS  nitroglycerin (NITROSTAT) tablet 0.4 mg  0.4 mg SubLINGual Q5MIN PRN  pantoprazole (PROTONIX) tablet 40 mg  40 mg Oral ACB  ondansetron (ZOFRAN) injection 4 mg  4 mg IntraVENous Q6H PRN  
 acetaminophen (TYLENOL) tablet 650 mg  650 mg Oral Q4H PRN  
 glucose chewable tablet 16 g  4 Tab Oral PRN  
 glucagon (GLUCAGEN) injection 1 mg  1 mg IntraMUSCular PRN  
 dextrose 10% infusion 0-250 mL  0-250 mL IntraVENous PRN  
 insulin lispro (HUMALOG) injection   SubCUTAneous AC&HS Objective: Patient Vitals for the past 8 hrs: 
 BP Temp Pulse Resp SpO2  
03/01/20 0947 137/45 97.7 °F (36.5 °C) 82 23 98 % 03/01/20 0814     100 % 03/01/20 0534 125/53 98.4 °F (36.9 °C) 80 23 97 % 03/01 0701 - 03/01 1900 In: 240 [P.O.:240] Out: 300 [Urine:300] 02/28 1901 - 03/01 0700 In: 1260 [P.O.:1260] Out: 3811 [BYOVZ:6095] Physical Exam: NAD. A&O. Neck -- Supple. No JVD. Heart -- RRR. Lungs -- CTA. Abd -- Benign. Ext -- No LE edema, b/l. Data Review Recent Results (from the past 24 hour(s)) GLUCOSE, POC Collection Time: 02/29/20  4:55 PM  
Result Value Ref Range Glucose (POC) 111 (H) 65 - 100 mg/dL Performed by Boom Johnson GLUCOSE, POC Collection Time: 02/29/20  9:07 PM  
Result Value Ref Range Glucose (POC) 151 (H) 65 - 100 mg/dL Performed by [facility] METABOLIC PANEL, BASIC Collection Time: 03/01/20  3:57 AM  
Result Value Ref Range Sodium 136 136 - 145 mmol/L Potassium 4.1 3.5 - 5.1 mmol/L Chloride 101 97 - 108 mmol/L  
 CO2 29 21 - 32 mmol/L Anion gap 6 5 - 15 mmol/L Glucose 101 (H) 65 - 100 mg/dL BUN 27 (H) 6 - 20 MG/DL Creatinine 1.58 (H) 0.55 - 1.02 MG/DL  
 BUN/Creatinine ratio 17 12 - 20 GFR est AA 38 (L) >60 ml/min/1.73m2 GFR est non-AA 31 (L) >60 ml/min/1.73m2 Calcium 8.4 (L) 8.5 - 10.1 MG/DL  
GLUCOSE, POC Collection Time: 03/01/20  6:40 AM  
Result Value Ref Range Glucose (POC) 97 65 - 100 mg/dL Performed by Marissa Kraft GLUCOSE, POC Collection Time: 03/01/20 11:24 AM  
Result Value Ref Range Glucose (POC) 402 (H) 65 - 100 mg/dL Performed by Salma Vicente Assessment:  
 
Principal Problem: 
  Acute diastolic CHF (congestive heart failure) ([facility] [address] (2/28/2020) Active Problems: Coronary atherosclerosis of native coronary artery (11/18/2010) Hypertension complicating diabetes ([facility] [address] (11/17/2016) Atrial fibrillation -- S/p AVN ablation & pacemaker placement. Type 2 diabetes with nephropathy ([facility] (2/12/2018) Pacemaker (2/5/2020) Overview: 2/5/2020 leadless pacemaker Plan: 1. On Bumex, 2 mg IV BID now. 2. For stress lyte tomorrow. Alannah Mccarthy help.  
4. Recheck lytes in am. 
 
 
 
 
Germania Jacome MD

## 2020-03-02 NOTE — PROGRESS NOTES
[address] [facility],4[address] Floor Heart and Vascular [place] Division of Cardiology [phone] Progress note Abelino Hung M.D., F.A.CRenettaC. NAME:  Nicole Velazco :   1935 MRN:   [id number] ICD-10-CM ICD-9-CM 1. Acute systolic congestive heart failure (HCC) I50.21 428.21   
  428.0 Assessment/Plan: Shortness of breath mildly better she tells me. Clearly not back to her baseline now. No chest pain. 1.  Shortness of breath: Likely multifactorial.  Despite intravenous Bumex twice a day her weight is remained about the same. I am not sure the weight is been accurate lately nonetheless. She is -400 cc thus far. change bumex to po Her proBNP is lower than admission. Her chest x-ray shows a decrease in interstitial edema although not complete resolution yet. Completed nuclear stress test results are still pending. If ischemia present proceed with cardiac catheterization although she is very well aware of concern about renal dysfunction. If nuclear stress test with no ischemia consider pulmonary evaluation first. 
 
2.  CAD: Continue aspirin Lipitor Imdur. 3.  A. Fibrillation: Chronic atrial fibrillation status post AV node ablation and leadless pacemaker placement. Continue Eliquis and hold only if cath is planned. 4 hypertension: Seems to be controlled continue lisinopril amlodipine and metoprolol. 20 ECHO ADULT FOLLOW-UP OR LIMITED 2020 Narrative · Normal cavity size and wall thickness. Low normal systolic dysfunction. Estimated left ventricular ejection fraction is 50 - 55%. · Image quality for this study was suboptimal. 
· Moderately dilated left atrium. · Mildly dilated right ventricle. Borderline low systolic function. · Moderately dilated right atrium. · Probably trileaflet aortic valve. Moderate aortic valve sclerosis. Aortic valve leaflet calcification present. · Mitral valve non-specific thickening. Mild mitral valve regurgitation is  
present. · Mild tricuspid valve regurgitation is present. · There is no evidence of pulmonary hypertension. Signed by: Breann Caraballo MD  
 
04/17/19 NUCLEAR CARDIAC STRESS TEST 04/18/2019 4/19/2019 Narrative · Baseline ECG: Atrial fibrillation, non-specific ST-T wave abnormalities. · Gated SPECT: Left ventricular function post-stress was abnormal.  
Calculated ejection fraction is 45%. There is no evidence of transient  
ischemic dilation (TID). The TID ratio is 0.97. · Left ventricular perfusion is abnormal. 
· Myocardial perfusion imaging defect 1: There is a defect that is  
moderate in size with a moderate reduction in uptake present in the apical  
to basal anterior location(s) that is non-reversible. There is abnormal  
wall motion in the defect area. Viability in the area is good. caused by  
breast attenuation. The possibility of artifact cannot be excluded. Perfusion defect was visually and quantitatively present. · Myocardial perfusion imaging defect 1: caused by breast attenuation. · Abnormal myocardial perfusion imaging. Fixed defect consistent with  
prior myocardial infarction. Myocardial perfusion imaging supports an  
intermediate risk stress test. 
   
  Signed by: Delroy Zavala MD  
 
   
EKG Results Procedure 720 Value Units Date/Time EKG, 12 LEAD, INITIAL [[id number]] Collected:  02/28/20 2103 Order Status:  Completed Updated:  02/29/20 1232 Ventricular Rate 82 BPM   
  Atrial Rate 300 BPM   
  QRS Duration 122 ms   
  Q-T Interval 410 ms QTC Calculation (Bezet) 479 ms Calculated R Axis -46 degrees Calculated T Axis 98 degrees Diagnosis --  
  Ventricular-paced rhythm Confirmed by Margarito Cherry M.D., Luz Marina Johansen ([id number]) on 2/29/2020 12:32:27 PM 
  
  
 
 
 
Visit Vitals /58 Pulse 88 Temp 98.5 °F (36.9 °C) Resp 19 Wt 174 lb 2.6 oz (79 kg) SpO2 97% BMI 34.01 kg/m² Wt Readings from Last 3 Encounters:  
03/02/20 174 lb 2.6 oz (79 kg) 02/25/20 168 lb (76.2 kg) 02/14/20 168 lb (76.2 kg) Review of Systems:  
Pertinent items are noted in the History of Present Illness. Objective: No intake/output data recorded. 02/29 1901 - 03/02 0700 In: 480 [P.O.:480] Out: 1850 [IJODO:8678] Telemetry: AFIB Physical Exam: 
 
Neck: no JVD Heart: regularly irregular rhythm Lungs: diminished breath sounds R base, L base Abdomen: soft, non-tender. Bowel sounds normal. No masses,  no organomegaly Extremities: no edema Current Facility-Administered Medications Medication Dose Route Frequency  bumetanide (BUMEX) injection 2 mg  2 mg IntraVENous Q12H  potassium chloride SR (KLOR-CON 10) tablet 20 mEq  20 mEq Oral DAILY  albuterol (PROVENTIL VENTOLIN) nebulizer solution 2.5 mg  2.5 mg Nebulization Q4H PRN  
 allopurinoL (ZYLOPRIM) tablet 100 mg  100 mg Oral DAILY  amLODIPine (NORVASC) tablet 5 mg  5 mg Oral DAILY  apixaban (ELIQUIS) tablet 2.5 mg  2.5 mg Oral BID  aspirin chewable tablet 81 mg  81 mg Oral QHS  atorvastatin (LIPITOR) tablet 20 mg  20 mg Oral QHS  azelastine (ASTELIN) 137mcg/spray nasal spray  1 Spray Both Nostrils QHS  arformoteroL (BROVANA) neb solution 15 mcg  15 mcg Nebulization BID RT And  
 budesonide (PULMICORT) 500 mcg/2 ml nebulizer suspension  500 mcg Nebulization BID RT  
 cholecalciferol (VITAMIN D3) (1000 Units /25 mcg) tablet 2 Tab  2,000 Units Oral DAILY  lisinopril (PRINIVIL, ZESTRIL) tablet 20 mg  20 mg Oral BID  
 glipiZIDE (GLUCOTROL) tablet 10 mg  10 mg Oral BID  insulin glargine (LANTUS) injection 20 Units  20 Units SubCUTAneous DAILY  isosorbide mononitrate ER (IMDUR) tablet 60 mg  60 mg Oral BID  metoprolol succinate (TOPROL-XL) XL tablet 50 mg  50 mg Oral QHS  nitroglycerin (NITROSTAT) tablet 0.4 mg  0.4 mg SubLINGual Q5MIN PRN  pantoprazole (PROTONIX) tablet 40 mg  40 mg Oral ACB  ondansetron (ZOFRAN) injection 4 mg  4 mg IntraVENous Q6H PRN  
 acetaminophen (TYLENOL) tablet 650 mg  650 mg Oral Q4H PRN  
 glucose chewable tablet 16 g  4 Tab Oral PRN  
 glucagon (GLUCAGEN) injection 1 mg  1 mg IntraMUSCular PRN  
 dextrose 10% infusion 0-250 mL  0-250 mL IntraVENous PRN  
 insulin lispro (HUMALOG) injection   SubCUTAneous AC&HS Lab Results Component Value Date/Time WBC 9.5 02/28/2020 02:44 PM  
 HGB (POC) 14.6 02/14/2020 02:45 PM  
 HGB 11.4 (L) 02/28/2020 02:44 PM  
 HCT (POC) 43.7 02/14/2020 02:45 PM  
 HCT 35.2 02/28/2020 02:44 PM  
 PLATELET 337 (H) 70/86/3481 02:44 PM  
 MCV 81.5 02/28/2020 02:44 PM  
 
Lab Results Component Value Date/Time Sodium 136 03/02/2020 04:15 AM  
 Potassium 4.0 03/02/2020 04:15 AM  
 Chloride 101 03/02/2020 04:15 AM  
 CO2 30 03/02/2020 04:15 AM  
 Anion gap 5 03/02/2020 04:15 AM  
 Glucose 118 (H) 03/02/2020 04:15 AM  
 BUN 28 (H) 03/02/2020 04:15 AM  
 Creatinine 1.65 (H) 03/02/2020 04:15 AM  
 BUN/Creatinine ratio 17 03/02/2020 04:15 AM  
 GFR est AA 36 (L) 03/02/2020 04:15 AM  
 GFR est non-AA 30 (L) 03/02/2020 04:15 AM  
 Calcium 8.6 03/02/2020 04:15 AM  
 
Lab Results Component Value Date/Time Cholesterol, total 110 06/13/2017 09:31 AM  
 Cholesterol (POC) <100 12/18/2019 11:53 AM  
 HDL Cholesterol 55 06/13/2017 09:31 AM  
 HDL Cholesterol (POC) 41 12/18/2019 11:53 AM  
 LDL Cholesterol (POC) n/a 12/18/2019 11:53 AM  
 LDL, calculated 36 06/13/2017 09:31 AM  
 VLDL, calculated 19 06/13/2017 09:31 AM  
 Triglyceride 93 06/13/2017 09:31 AM  
 Triglycerides (POC) 82 12/18/2019 11:53 AM  
 CHOL/HDL Ratio 2.0 04/25/2017 03:53 AM  
 
Lab Results Component Value Date/Time   02/04/2015 07:47 PM  
 CK - MB 4.7 (H) 02/04/2015 07:47 PM  
 CK-MB Index 4.6 (H) 02/04/2015 07:47 PM  
 Troponin-I, Qt. <0.05 02/29/2020 01:13 AM  
  (H) 08/25/2017 04:40 AM  
 
Lab Results Component Value Date/Time ALT (SGPT) 27 02/28/2020 02:44 PM  
 AST (SGOT) 22 02/28/2020 02:44 PM  
 Alk. phosphatase 100 02/28/2020 02:44 PM  
 Bilirubin, direct 0.18 02/12/2014 09:42 AM  
 Bilirubin, total 0.7 02/28/2020 02:44 PM  
 
Lab Results Component Value Date/Time   (H) 08/25/2017 04:40 AM  
 NT pro-BNP 1,128 (H) 03/02/2020 04:15 AM  
 NT pro-BNP 2,482 (H) 02/28/2020 02:44 PM  
 NT pro-BNP 1,293 (H) 08/26/2017 03:25 AM  
 NT pro-BNP 1,671 (H) 08/23/2017 02:29 PM

## 2020-03-02 NOTE — PROGRESS NOTES
Medical Progress Note NAME: Estelita Cole :  1935 MRM:  [id number] Date/Time: 3/2/2020 Problem List:  
 
Principal Problem: 
  Acute diastolic CHF (congestive heart failure) ([facility] (2020) Active Problems: 
  Coronary atherosclerosis of native coronary artery (2010) Hypertension complicating diabetes ([facility] (2016) [facility] (2017) Type 2 diabetes with nephropathy ([facility] (2018) Pacemaker (2020) Overview: 2020 leadless pacemaker Subjective:  
 
No change in sob walking to bathroom. Non productive cough about the same. Denies chest pain Past Medical History:  
Diagnosis Date  Atrial fibrillation ([facility]  CAD (coronary artery disease)  Cerebrovascular accident stroke, other, unspec 2010  Coronary atherosclerosis of native coronary artery 2010  Essential hypertension  Essential hypertension, benign 2010  HLD (hyperlipidemia) 2010  PVD (peripheral vascular disease) ([facility]   Type II or unspecified type diabetes mellitus without mention of complication, not stated as uncontrolled 2010  Zoster Objective:  
 
 
 
Vitals:  
  
Last 24hrs VS reviewed since prior progress note. Most recent are: 
 
Visit Vitals /60 (BP 1 Location: Right arm, BP Patient Position: At rest) Pulse 88 Temp 98.5 °F (36.9 °C) Resp 19 Wt 174 lb 2.6 oz (79 kg) SpO2 97% BMI 34.01 kg/m² SpO2 Readings from Last 6 Encounters:  
20 97% 20 93% 20 98% 20 97% 20 91% 20 95% O2 Flow Rate (L/min): 2 l/min Intake/Output Summary (Last 24 hours) at 3/2/2020 8201 Last data filed at 3/1/2020 1521 Gross per 24 hour Intake 480 ml Output 500 ml Net -20 ml Exam:  
   General:  Alert, cooperative, no distress, appears stated age. JVD flat at 45 degrees Lungs:   Clear to auscultation bilaterally. Heart:  Regular rate and rhythm, S1, S2 normal,  
2/6 systolic  murmur, no click, rub or gallop. Abdomen:   Soft, non-tender. Bowel sounds normal. No masses,  No organomegaly. Extremities: Rt pretib very faint small pink area c/w Venous stasis 1+ DP pulses b/l No pedal edema. Lab Data Reviewed: (see below) Recent Results (from the past 24 hour(s)) GLUCOSE, POC Collection Time: 03/01/20 11:24 AM  
Result Value Ref Range Glucose (POC) 402 (H) 65 - 100 mg/dL Performed by Pietro Crespo GLUCOSE, POC Collection Time: 03/01/20  5:03 PM  
Result Value Ref Range Glucose (POC) 217 (H) 65 - 100 mg/dL Performed by Pietro Crespo GLUCOSE, POC Collection Time: 03/01/20  9:27 PM  
Result Value Ref Range Glucose (POC) 139 (H) 65 - 100 mg/dL Performed by Debbie Petersen NT-PRO BNP Collection Time: 03/02/20  4:15 AM  
Result Value Ref Range NT pro-BNP 1,128 (H) <700 PG/ML  
METABOLIC PANEL, BASIC Collection Time: 03/02/20  4:15 AM  
Result Value Ref Range Sodium 136 136 - 145 mmol/L Potassium 4.0 3.5 - 5.1 mmol/L Chloride 101 97 - 108 mmol/L  
 CO2 30 21 - 32 mmol/L Anion gap 5 5 - 15 mmol/L Glucose 118 (H) 65 - 100 mg/dL BUN 28 (H) 6 - 20 MG/DL Creatinine 1.65 (H) 0.55 - 1.02 MG/DL  
 BUN/Creatinine ratio 17 12 - 20 GFR est AA 36 (L) >60 ml/min/1.73m2 GFR est non-AA 30 (L) >60 ml/min/1.73m2 Calcium 8.6 8.5 - 10.1 MG/DL Medications Reviewed: (see below) 
 
______________________________________________________________________ Medications:  
 
Current Facility-Administered Medications Medication Dose Route Frequency  bumetanide (BUMEX) injection 2 mg  2 mg IntraVENous Q12H  potassium chloride SR (KLOR-CON 10) tablet 20 mEq  20 mEq Oral DAILY  albuterol (PROVENTIL VENTOLIN) nebulizer solution 2.5 mg  2.5 mg Nebulization Q4H PRN  
  allopurinoL (ZYLOPRIM) tablet 100 mg  100 mg Oral DAILY  amLODIPine (NORVASC) tablet 5 mg  5 mg Oral DAILY  apixaban (ELIQUIS) tablet 2.5 mg  2.5 mg Oral BID  aspirin chewable tablet 81 mg  81 mg Oral QHS  atorvastatin (LIPITOR) tablet 20 mg  20 mg Oral QHS  azelastine (ASTELIN) 137mcg/spray nasal spray  1 Spray Both Nostrils QHS  arformoteroL (BROVANA) neb solution 15 mcg  15 mcg Nebulization BID RT And  
 budesonide (PULMICORT) 500 mcg/2 ml nebulizer suspension  500 mcg Nebulization BID RT  
 cholecalciferol (VITAMIN D3) (1000 Units /25 mcg) tablet 2 Tab  2,000 Units Oral DAILY  lisinopril (PRINIVIL, ZESTRIL) tablet 20 mg  20 mg Oral BID  
 glipiZIDE (GLUCOTROL) tablet 10 mg  10 mg Oral BID  insulin glargine (LANTUS) injection 20 Units  20 Units SubCUTAneous DAILY  isosorbide mononitrate ER (IMDUR) tablet 60 mg  60 mg Oral BID  metoprolol succinate (TOPROL-XL) XL tablet 50 mg  50 mg Oral QHS  nitroglycerin (NITROSTAT) tablet 0.4 mg  0.4 mg SubLINGual Q5MIN PRN  pantoprazole (PROTONIX) tablet 40 mg  40 mg Oral ACB  ondansetron (ZOFRAN) injection 4 mg  4 mg IntraVENous Q6H PRN  
 acetaminophen (TYLENOL) tablet 650 mg  650 mg Oral Q4H PRN  
 glucose chewable tablet 16 g  4 Tab Oral PRN  
 glucagon (GLUCAGEN) injection 1 mg  1 mg IntraMUSCular PRN  
 dextrose 10% infusion 0-250 mL  0-250 mL IntraVENous PRN  
 insulin lispro (HUMALOG) injection   SubCUTAneous AC&HS Assessment:  
Dyspnea on exertion and non productive cough. For Nuclear stress test today. If negative, may need Pulm eval'n. Suspected Diastolic acute CHF. NT Pro BNP improved CKD. Serum Cr a little higher with diuresis. Follow CAF, s/p Pacer. Paced on monitor now Type 2 DM under tx HTN under tx Hx CAD Patient Active Problem List  
Diagnosis Code  Coronary atherosclerosis of native coronary artery I25.10  
 HLD (hyperlipidemia) E78.5  Cerebrovascular accident stroke, other, unspec I67.89  
 Hypertension complicating diabetes ([facility] E11.59, I10  
 PVD (peripheral vascular disease) with claudication ([facility]) I73.9  Atrial fibrillation ([facility]) I48.91  
 SOB (shortness of breath) R06.02  
 Type 2 diabetes with nephropathy ([facility]) E11.21  
 Severe obesity (BMI 35.0-39. 9) with comorbidity ([facility] E66.01  
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  
      
 
  
 
 
  
              
 
 
 
 
 
 
      
___________________________________________________ Attending Physician: Betsy Lockhart MD

## 2020-03-02 NOTE — PROGRESS NOTES
Nuclear stress test with no ischemia and normal ef at 73% Consider pulmonary evaluation as well Cardiac cath on hold at this time

## 2020-03-02 NOTE — PROGRESS NOTES
Bedside and Verbal shift change report given to Akira Cleveland RN (oncoming nurse) by Karlo Tarango RN (offgoing nurse). Report included the following information SBAR, Kardex, Accordion, Recent Results, Cardiac Rhythm Paced and Dual Neuro Assessment.

## 2020-03-02 NOTE — PROGRESS NOTES
Problem: Activity Intolerance Goal: *Oxygen saturation during activity within specified parameters Outcome: Progressing Towards Goal 
Goal: *Able to remain out of bed as prescribed Outcome: Progressing Towards Goal 
  
Problem: Patient Education: Go to Patient Education Activity Goal: Patient/Family Education Outcome: Progressing Towards Goal 
  
Problem: Pain Goal: *Control of Pain Outcome: Progressing Towards Goal 
  
Problem: Patient Education: Go to Patient Education Activity Goal: Patient/Family Education Outcome: Progressing Towards Goal 
  
Problem: Falls - Risk of 
Goal: *Absence of Falls Description Document Sania Flores Fall Risk and appropriate interventions in the flowsheet. Outcome: Progressing Towards Goal 
Note: Fall Risk Interventions: 
  
 
  
 
Medication Interventions: Patient to call before getting OOB, Teach patient to arise slowly Problem: Patient Education: Go to Patient Education Activity Goal: Patient/Family Education Outcome: Progressing Towards Goal 
  
Problem: Pressure Injury - Risk of 
Goal: *Prevention of pressure injury Description Document Ruperto Scale and appropriate interventions in the flowsheet. Outcome: Progressing Towards Goal 
Note: Pressure Injury Interventions: Activity Interventions: Assess need for specialty bed, Pressure redistribution bed/mattress(bed type) Mobility Interventions: Assess need for specialty bed, Pressure redistribution bed/mattress (bed type), HOB 30 degrees or less, PT/OT evaluation Nutrition Interventions: Document food/fluid/supplement intake Problem: Patient Education: Go to Patient Education Activity Goal: Patient/Family Education Outcome: Progressing Towards Goal 
  
Problem: Diabetes Self-Management Goal: *Using medications safely Description State effect of diabetes medications on diabetes; name diabetes medication taking, action and side effects. Outcome: Progressing Towards Goal 
Goal: *Monitoring blood glucose, interpreting and using results Description Identify recommended blood glucose targets  and personal targets. Outcome: Progressing Towards Goal 
Goal: *Prevention, detection, treatment of acute complications Description List symptoms of hyper- and hypoglycemia; describe how to treat low blood sugar and actions for lowering  high blood glucose level. Outcome: Progressing Towards Goal 
Goal: *Sick day guidelines Outcome: Progressing Towards Goal

## 2020-03-02 NOTE — PROGRESS NOTES
Problem: Activity Intolerance Goal: *Oxygen saturation during activity within specified parameters Outcome: Progressing Towards Goal 
Goal: *Able to remain out of bed as prescribed Outcome: Progressing Towards Goal 
  
Problem: Patient Education: Go to Patient Education Activity Goal: Patient/Family Education Outcome: Progressing Towards Goal 
  
Problem: Pain Goal: *Control of Pain Outcome: Progressing Towards Goal 
  
Problem: Patient Education: Go to Patient Education Activity Goal: Patient/Family Education Outcome: Progressing Towards Goal 
  
Problem: Falls - Risk of 
Goal: *Absence of Falls Description Document Heidi Mercedes Fall Risk and appropriate interventions in the flowsheet. Outcome: Progressing Towards Goal 
Note: Fall Risk Interventions: 
  
 
  
 
Medication Interventions: Patient to call before getting OOB, Teach patient to arise slowly Problem: Patient Education: Go to Patient Education Activity Goal: Patient/Family Education Outcome: Progressing Towards Goal 
  
Problem: Pressure Injury - Risk of 
Goal: *Prevention of pressure injury Description Document Ruperto Scale and appropriate interventions in the flowsheet. Outcome: Progressing Towards Goal 
Note: Pressure Injury Interventions: Activity Interventions: Assess need for specialty bed, Pressure redistribution bed/mattress(bed type) Mobility Interventions: Assess need for specialty bed, Pressure redistribution bed/mattress (bed type), HOB 30 degrees or less, PT/OT evaluation Nutrition Interventions: Document food/fluid/supplement intake Problem: Patient Education: Go to Patient Education Activity Goal: Patient/Family Education Outcome: Progressing Towards Goal 
  
Problem: Diabetes Self-Management Goal: *Disease process and treatment process Description Define diabetes and identify own type of diabetes; list 3 options for treating diabetes. Outcome: Progressing Towards Goal 
Goal: *Incorporating nutritional management into lifestyle Description Describe effect of type, amount and timing of food on blood glucose; list 3 methods for planning meals. Outcome: Progressing Towards Goal 
Goal: *Incorporating physical activity into lifestyle Description State effect of exercise on blood glucose levels. Outcome: Progressing Towards Goal 
Goal: *Developing strategies to promote health/change behavior Description Define the ABC's of diabetes; identify appropriate screenings, schedule and personal plan for screenings. Outcome: Progressing Towards Goal 
Goal: *Using medications safely Description State effect of diabetes medications on diabetes; name diabetes medication taking, action and side effects. Outcome: Progressing Towards Goal 
Goal: *Monitoring blood glucose, interpreting and using results Description Identify recommended blood glucose targets  and personal targets. Outcome: Progressing Towards Goal 
Goal: *Prevention, detection, treatment of acute complications Description List symptoms of hyper- and hypoglycemia; describe how to treat low blood sugar and actions for lowering  high blood glucose level. Outcome: Progressing Towards Goal 
Goal: *Prevention, detection and treatment of chronic complications Description Define the natural course of diabetes and describe the relationship of blood glucose levels to long term complications of diabetes. Outcome: Progressing Towards Goal 
Goal: *Developing strategies to address psychosocial issues Description Describe feelings about living with diabetes; identify support needed and support network Outcome: Progressing Towards Goal 
Goal: *Insulin pump training Outcome: Progressing Towards Goal 
Goal: *Sick day guidelines Outcome: Progressing Towards Goal 
Goal: *Patient Specific Goal (EDIT GOAL, INSERT TEXT) Outcome: Progressing Towards Goal 
  
 Problem: Patient Education: Go to Patient Education Activity Goal: Patient/Family Education Outcome: Progressing Towards Goal 
  
Problem: Discharge Planning Goal: *Discharge to safe environment Outcome: Progressing Towards Goal 
Goal: *Knowledge of medication management Outcome: Progressing Towards Goal 
Goal: *Knowledge of discharge instructions Outcome: Progressing Towards Goal 
  
Problem: Heart Failure: Day 2 Goal: Activity/Safety Outcome: Progressing Towards Goal 
Goal: Consults, if ordered Outcome: Progressing Towards Goal 
Goal: Diagnostic Test/Procedures Outcome: Progressing Towards Goal 
Goal: Nutrition/Diet Outcome: Progressing Towards Goal 
Goal: Discharge Planning Outcome: Progressing Towards Goal 
Goal: Medications Outcome: Progressing Towards Goal 
Goal: Respiratory Outcome: Progressing Towards Goal 
Goal: Treatments/Interventions/Procedures Outcome: Progressing Towards Goal 
Goal: Psychosocial 
Outcome: Progressing Towards Goal 
Goal: *Oxygen saturation within defined limits Outcome: Progressing Towards Goal 
Goal: *Hemodynamically stable Outcome: Progressing Towards Goal 
Goal: *Optimal pain control at patient's stated goal 
Outcome: Progressing Towards Goal 
Goal: *Anxiety reduced or absent Outcome: Progressing Towards Goal 
Goal: *Demonstrates progressive activity Outcome: Progressing Towards Goal 
  
Problem: Heart Failure: Discharge Outcomes Goal: *Demonstrates ability to perform prescribed activity without shortness of breath or discomfort Outcome: Progressing Towards Goal 
Goal: *Left ventricular function assessment completed prior to or during stay, or planned for post-discharge Outcome: Progressing Towards Goal 
Goal: *ACEI prescribed if LVEF less than 40% and no contraindications or ARB prescribed Outcome: Progressing Towards Goal 
Goal: *Verbalizes understanding and describes prescribed diet Outcome: Progressing Towards Goal 
 Goal: *Verbalizes understanding/describes prescribed medications Outcome: Progressing Towards Goal 
Goal: *Describes available resources and support systems Description 
(eg: Home Health, Palliative Care, Advanced Medical Directive) Outcome: Progressing Towards Goal 
Goal: *Describes smoking cessation resources Outcome: Progressing Towards Goal 
Goal: *Understands and describes signs and symptoms to report to providers(Stroke Metric) Outcome: Progressing Towards Goal 
Goal: *Describes/verbalizes understanding of follow-up/return appt Description 
(eg: to physicians, diabetes treatment coordinator, and other resources Outcome: Progressing Towards Goal 
Goal: *Describes importance of continuing daily weights and changes to report to physician Outcome: Progressing Towards Goal

## 2020-03-02 NOTE — NURSE NAVIGATOR
Chart reviewed by Heart Failure Nurse Navigator. Heart Failure database completed. EF:  50/55 2/5/20 ACEi/ARB/ARNi: Lisinopril BB: Toprol XL Aldosterone Antagonist: Not indicted Obstructive Sleep Apnea Screening:No 
 STOP-BANG score: 
 Referred to Sleep Medicine: CRT not indicated. NYHA Functional Class Requested via provider message on [employer]. Heart Failure Teach Back in Patient Education. Heart Failure Avoiding Triggers on Discharge Instructions. Cardiologist: CAV Post discharge follow up phone call to be made within 48-72 hours of discharge.    
 
Non Heart Failure readmit- 1/31/20-2/7/20 COPD

## 2020-03-03 NOTE — PROGRESS NOTES
Problem: Activity Intolerance Goal: *Oxygen saturation during activity within specified parameters Outcome: Progressing Towards Goal 
Goal: *Able to remain out of bed as prescribed Outcome: Progressing Towards Goal 
  
Problem: Patient Education: Go to Patient Education Activity Goal: Patient/Family Education Outcome: Progressing Towards Goal 
  
Problem: Pain Goal: *Control of Pain Outcome: Progressing Towards Goal 
Goal: *PALLIATIVE CARE:  Alleviation of Pain Outcome: Progressing Towards Goal 
  
Problem: Patient Education: Go to Patient Education Activity Goal: Patient/Family Education Outcome: Progressing Towards Goal 
  
Problem: Falls - Risk of 
Goal: *Absence of Falls Description Document Dylan Rosanne Fall Risk and appropriate interventions in the flowsheet. Outcome: Progressing Towards Goal 
Note: Fall Risk Interventions: 
  
 
  
 
Medication Interventions: Patient to call before getting OOB Problem: Patient Education: Go to Patient Education Activity Goal: Patient/Family Education Outcome: Progressing Towards Goal 
  
Problem: Pressure Injury - Risk of 
Goal: *Prevention of pressure injury Description Document Ruperto Scale and appropriate interventions in the flowsheet. Outcome: Progressing Towards Goal 
Note: Pressure Injury Interventions: Activity Interventions: Assess need for specialty bed, Pressure redistribution bed/mattress(bed type) Mobility Interventions: HOB 30 degrees or less, Pressure redistribution bed/mattress (bed type) Nutrition Interventions: Document food/fluid/supplement intake Problem: Patient Education: Go to Patient Education Activity Goal: Patient/Family Education Outcome: Progressing Towards Goal 
  
Problem: Diabetes Self-Management Goal: *Disease process and treatment process Description Define diabetes and identify own type of diabetes; list 3 options for treating diabetes. Outcome: Progressing Towards Goal 
Goal: *Incorporating nutritional management into lifestyle Description Describe effect of type, amount and timing of food on blood glucose; list 3 methods for planning meals. Outcome: Progressing Towards Goal 
Goal: *Incorporating physical activity into lifestyle Description State effect of exercise on blood glucose levels. Outcome: Progressing Towards Goal 
Goal: *Developing strategies to promote health/change behavior Description Define the ABC's of diabetes; identify appropriate screenings, schedule and personal plan for screenings. Outcome: Progressing Towards Goal 
Goal: *Using medications safely Description State effect of diabetes medications on diabetes; name diabetes medication taking, action and side effects. Outcome: Progressing Towards Goal 
Goal: *Monitoring blood glucose, interpreting and using results Description Identify recommended blood glucose targets  and personal targets. Outcome: Progressing Towards Goal 
Goal: *Prevention, detection, treatment of acute complications Description List symptoms of hyper- and hypoglycemia; describe how to treat low blood sugar and actions for lowering  high blood glucose level. Outcome: Progressing Towards Goal 
Goal: *Prevention, detection and treatment of chronic complications Description Define the natural course of diabetes and describe the relationship of blood glucose levels to long term complications of diabetes. Outcome: Progressing Towards Goal 
Goal: *Developing strategies to address psychosocial issues Description Describe feelings about living with diabetes; identify support needed and support network Outcome: Progressing Towards Goal 
Goal: *Insulin pump training Outcome: Progressing Towards Goal 
Goal: *Sick day guidelines Outcome: Progressing Towards Goal 
Goal: *Patient Specific Goal (EDIT GOAL, INSERT TEXT) Outcome: Progressing Towards Goal 
  
 Problem: Patient Education: Go to Patient Education Activity Goal: Patient/Family Education Outcome: Progressing Towards Goal 
  
Problem: Discharge Planning Goal: *Discharge to safe environment Outcome: Progressing Towards Goal 
Goal: *Knowledge of medication management Outcome: Progressing Towards Goal 
Goal: *Knowledge of discharge instructions Outcome: Progressing Towards Goal 
  
Problem: Heart Failure: Day 2 Goal: Activity/Safety Outcome: Progressing Towards Goal 
Goal: Consults, if ordered Outcome: Progressing Towards Goal 
Goal: Diagnostic Test/Procedures Outcome: Progressing Towards Goal 
Goal: Nutrition/Diet Outcome: Progressing Towards Goal 
Goal: Discharge Planning Outcome: Progressing Towards Goal 
Goal: Medications Outcome: Progressing Towards Goal 
Goal: Respiratory Outcome: Progressing Towards Goal 
Goal: Treatments/Interventions/Procedures Outcome: Progressing Towards Goal 
Goal: Psychosocial 
Outcome: Progressing Towards Goal 
Goal: *Oxygen saturation within defined limits Outcome: Progressing Towards Goal 
Goal: *Hemodynamically stable Outcome: Progressing Towards Goal 
Goal: *Optimal pain control at patient's stated goal 
Outcome: Progressing Towards Goal 
Goal: *Anxiety reduced or absent Outcome: Progressing Towards Goal 
Goal: *Demonstrates progressive activity Outcome: Progressing Towards Goal 
  
Problem: Heart Failure: Day 3 Goal: Activity/Safety Outcome: Progressing Towards Goal 
Goal: Diagnostic Test/Procedures Outcome: Progressing Towards Goal 
Goal: Nutrition/Diet Outcome: Progressing Towards Goal 
Goal: Discharge Planning Outcome: Progressing Towards Goal 
Goal: Medications Outcome: Progressing Towards Goal 
Goal: Respiratory Outcome: Progressing Towards Goal 
Goal: Treatments/Interventions/Procedures Outcome: Progressing Towards Goal 
Goal: Psychosocial 
Outcome: Progressing Towards Goal 
Goal: *Oxygen saturation within defined limits Outcome: Progressing Towards Goal 
Goal: *Hemodynamically stable Outcome: Progressing Towards Goal 
Goal: *Optimal pain control at patient's stated goal 
Outcome: Progressing Towards Goal 
Goal: *Anxiety reduced or absent Outcome: Progressing Towards Goal 
Goal: *Demonstrates progressive activity Outcome: Progressing Towards Goal

## 2020-03-03 NOTE — PROGRESS NOTES
[facility] [place] Division of Cardiology [phone] Progress note Kathleen Gautam M.D., F.ARenettaCDIMA. NAME:  Kimberlee Calvert :   1935 MRN:   [id number] ICD-10-CM ICD-9-CM 1. Acute systolic congestive heart failure (HCC) I50.21 428.21   
  428.0 Assessment/Plan: She remains dyspneic With mild activities. She tells me that oxygen makes her feel better. 1.  Shortness of breath: Still present and may be slightly better. Continue some Bumex p.o. for now. Lost 2 pounds. Negative approximately 1000 cc thus far. Etiology of shortness of breath remains unclear. She does have a normal ejection fraction and no evidence of ischemia by nuclear stress test.  There is no clear evidence for diastolic dysfunction by echocardiogram.  Coronary artery disease remains in the differential with a normal stress test but at this point we have decided to get a hold off cardiac catheterization. Continue with pulmonary evaluation CT scan results are pending. 2.  CAD: Continue aspirin, Lipitor and Imdur. 3.  Atrial fibrillation: This is chronic. Continue Eliquis. She is status post AV node ablation and leadless pacemaker placement. 4.  Hypertension: Controlled. 20 ECHO ADULT FOLLOW-UP OR LIMITED 2020 Narrative · Normal cavity size and wall thickness. Low normal systolic dysfunction. Estimated left ventricular ejection fraction is 50 - 55%. · Image quality for this study was suboptimal. 
· Moderately dilated left atrium. · Mildly dilated right ventricle. Borderline low systolic function. · Moderately dilated right atrium. · Probably trileaflet aortic valve. Moderate aortic valve sclerosis. Aortic valve leaflet calcification present. · Mitral valve non-specific thickening. Mild mitral valve regurgitation is present. · Mild tricuspid valve regurgitation is present. · There is no evidence of pulmonary hypertension. Signed by: Eladia Rico MD  
 
02/28/20 NUCLEAR CARDIAC STRESS TEST 03/02/2020, 03/02/2020 3/2/2020 Narrative · Baseline ECG: Paced rhythm, non-specific ST-T wave abnormalities,  
interventricular conduction delay. Shortness of breath, dyspnea A Lexiscan myocardial SPECT gated wall motion study was performed  
utilizing 11 mCi of Tc MYOVIEW for rest and 31.6 mCi for stress. SPECT imaging at stress and rest demonstrates no definite evidence of  
ischemia and/or infarction. Left ventricular ejection fraction equals 73%. Left ventricular wall  
motion and thickening are within normal limits. Impression : 
1. No definite evidence of ischemia and/or infarction. 2. LVEF equals 73%. Left ventricular wall motion and thickening is normal. 
  
  Signed by: Tamiko Murguia MD; Bertin Reynolds MD  
 
   
EKG Results Procedure 720 Value Units Date/Time EKG, 12 LEAD, INITIAL [[id number]] Collected:  02/28/20 2103 Order Status:  Completed Updated:  02/29/20 1232 Ventricular Rate 82 BPM   
  Atrial Rate 300 BPM   
  QRS Duration 122 ms   
  Q-T Interval 410 ms QTC Calculation (Bezet) 479 ms Calculated R Axis -46 degrees Calculated T Axis 98 degrees Diagnosis --  
  Ventricular-paced rhythm Confirmed by Laura Spencer M.D., Donna Gutierrez ([id number]) on 2/29/2020 12:32:27 PM 
  
  
 
 
 
Visit Vitals /61 (BP 1 Location: Right arm, BP Patient Position: Sitting) Pulse 84 Temp 98.1 °F (36.7 °C) Comment: Back on NSTU from CT Resp 21 Wt 167 lb 12.3 oz (76.1 kg) SpO2 95% BMI 32.77 kg/m² Wt Readings from Last 3 Encounters:  
03/03/20 167 lb 12.3 oz (76.1 kg) 02/25/20 168 lb (76.2 kg) 02/14/20 168 lb (76.2 kg) Review of Systems:  
Pertinent items are noted in the History of Present Illness. Objective:  
 
 
03/03 0701 - 03/03 1900 In: 300 [P.O.:300] Out: 450 [Urine:450] 03/01 1901 - 03/03 0700 In: 600 [P.O.:600] Out: 1100 [Urine:1100] Telemetry: SVT Physical Exam: 
 
Neck: no JVD Heart: regularly irregular rhythm Lungs: clear to auscultation bilaterally Abdomen: soft, non-tender. Bowel sounds normal. No masses,  no organomegaly Extremities: no edema Current Facility-Administered Medications Medication Dose Route Frequency  bumetanide (BUMEX) tablet 2 mg  2 mg Oral DAILY  potassium chloride SR (KLOR-CON 10) tablet 20 mEq  20 mEq Oral DAILY  albuterol (PROVENTIL VENTOLIN) nebulizer solution 2.5 mg  2.5 mg Nebulization Q4H PRN  
 allopurinoL (ZYLOPRIM) tablet 100 mg  100 mg Oral DAILY  amLODIPine (NORVASC) tablet 5 mg  5 mg Oral DAILY  apixaban (ELIQUIS) tablet 2.5 mg  2.5 mg Oral BID  aspirin chewable tablet 81 mg  81 mg Oral QHS  atorvastatin (LIPITOR) tablet 20 mg  20 mg Oral QHS  azelastine (ASTELIN) 137mcg/spray nasal spray  1 Spray Both Nostrils QHS  arformoteroL (BROVANA) neb solution 15 mcg  15 mcg Nebulization BID RT And  
 budesonide (PULMICORT) 500 mcg/2 ml nebulizer suspension  500 mcg Nebulization BID RT  
 cholecalciferol (VITAMIN D3) (1000 Units /25 mcg) tablet 2 Tab  2,000 Units Oral DAILY  lisinopril (PRINIVIL, ZESTRIL) tablet 20 mg  20 mg Oral BID  
 glipiZIDE (GLUCOTROL) tablet 10 mg  10 mg Oral BID  insulin glargine (LANTUS) injection 20 Units  20 Units SubCUTAneous DAILY  isosorbide mononitrate ER (IMDUR) tablet 60 mg  60 mg Oral BID  metoprolol succinate (TOPROL-XL) XL tablet 50 mg  50 mg Oral QHS  nitroglycerin (NITROSTAT) tablet 0.4 mg  0.4 mg SubLINGual Q5MIN PRN  pantoprazole (PROTONIX) tablet 40 mg  40 mg Oral ACB  ondansetron (ZOFRAN) injection 4 mg  4 mg IntraVENous Q6H PRN  
 acetaminophen (TYLENOL) tablet 650 mg  650 mg Oral Q4H PRN  
 glucose chewable tablet 16 g  4 Tab Oral PRN  
  glucagon (GLUCAGEN) injection 1 mg  1 mg IntraMUSCular PRN  
 dextrose 10% infusion 0-250 mL  0-250 mL IntraVENous PRN  
 insulin lispro (HUMALOG) injection   SubCUTAneous AC&HS Lab Results Component Value Date/Time WBC 11.4 (H) 03/03/2020 03:52 AM  
 HGB (POC) 14.6 02/14/2020 02:45 PM  
 HGB 11.6 03/03/2020 03:52 AM  
 HCT (POC) 43.7 02/14/2020 02:45 PM  
 HCT 36.4 03/03/2020 03:52 AM  
 PLATELET 131 (H) 52/95/4086 03:52 AM  
 MCV 81.6 03/03/2020 03:52 AM  
 
Lab Results Component Value Date/Time Sodium 135 (L) 03/03/2020 03:52 AM  
 Potassium 3.6 03/03/2020 03:52 AM  
 Chloride 100 03/03/2020 03:52 AM  
 CO2 28 03/03/2020 03:52 AM  
 Anion gap 7 03/03/2020 03:52 AM  
 Glucose 140 (H) 03/03/2020 03:52 AM  
 BUN 24 (H) 03/03/2020 03:52 AM  
 Creatinine 1.43 (H) 03/03/2020 03:52 AM  
 BUN/Creatinine ratio 17 03/03/2020 03:52 AM  
 GFR est AA 42 (L) 03/03/2020 03:52 AM  
 GFR est non-AA 35 (L) 03/03/2020 03:52 AM  
 Calcium 8.6 03/03/2020 03:52 AM  
 
Lab Results Component Value Date/Time Cholesterol, total 110 06/13/2017 09:31 AM  
 Cholesterol (POC) <100 12/18/2019 11:53 AM  
 HDL Cholesterol 55 06/13/2017 09:31 AM  
 HDL Cholesterol (POC) 41 12/18/2019 11:53 AM  
 LDL Cholesterol (POC) n/a 12/18/2019 11:53 AM  
 LDL, calculated 36 06/13/2017 09:31 AM  
 VLDL, calculated 19 06/13/2017 09:31 AM  
 Triglyceride 93 06/13/2017 09:31 AM  
 Triglycerides (POC) 82 12/18/2019 11:53 AM  
 CHOL/HDL Ratio 2.0 04/25/2017 03:53 AM  
 
Lab Results Component Value Date/Time  02/04/2015 07:47 PM  
 CK - MB 4.7 (H) 02/04/2015 07:47 PM  
 CK-MB Index 4.6 (H) 02/04/2015 07:47 PM  
 Troponin-I, Qt. <0.05 02/29/2020 01:13 AM  
  (H) 08/25/2017 04:40 AM  
 
Lab Results Component Value Date/Time ALT (SGPT) 27 02/28/2020 02:44 PM  
 AST (SGOT) 22 02/28/2020 02:44 PM  
 Alk.  phosphatase 100 02/28/2020 02:44 PM  
 Bilirubin, direct 0.18 02/12/2014 09:42 AM  
 Bilirubin, total 0.7 02/28/2020 02:44 PM  
 
Lab Results Component Value Date/Time   (H) 08/25/2017 04:40 AM  
 NT pro-BNP 1,128 (H) 03/02/2020 04:15 AM  
 NT pro-BNP 2,482 (H) 02/28/2020 02:44 PM  
 NT pro-BNP 1,293 (H) 08/26/2017 03:25 AM  
 NT pro-BNP 1,671 (H) 08/23/2017 02:29 PM

## 2020-03-03 NOTE — CONSULTS
PULMONARY/CRITICAL CARE/SLEEP MEDICINE Initial Physician Consultation Note Name: Roddy De Paz : 1935 MRN: [id number] Date: 3/3/2020 Subjective:  
Consult Note: 3/3/2020 Requesting Physician: Dr. Kristy Segal Reason for consult: Dyspnea Medical records and data reviewed. Patient is a 81 y.o. female who presented to the hospital with worsening dyspnea. She was in the hospital recently for similar symptoms which improved with multimodality treatment for cardiac problems and presumed asthmatic bronchitis. She denies fevers. She has had a history of cough while on ACE I therapy. She has been compliant with symbicort at home. She has underlying OHD with recent PM placement. She presented with pedal edema which has improved with diuretics. CXR shows effusions and interstitial edema. She on apixaban and suspicion for VTE is low Review of Systems: A comprehensive 12 system review of systems was negative except for as documented in HPI Assessment:  
 
Dyspnea-- suspect cardiac etiology, but screen for pulmonary pathology OHD 
ACE I associated cough Recommendations: PFTs 
CT chest without contrast 
Consider replacement of ACE I Further recommendations to follow D/W patient Active Problem List:  
 
Problem List  Date Reviewed: 2020 Codes Class * (Principal) Acute diastolic CHF (congestive heart failure) (HCC) ICD-10-CM: I50.31 ICD-9-CM: 428.31, 428.0 Pacemaker ICD-10-CM: Z95.0 ICD-9-CM: V45.01 Overview Signed 2020  5:45 PM by Amie Carter MD  
  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  
  2020 junctional escape 37 bpm 
  
  
   
 Acute asthmatic bronchitis ICD-10-CM: X22.803 ICD-9-CM: 493.90  Coronary artery disease with stable angina pectoris ([facility] ICD-10-CM: I25.118 
 ICD-9-CM: 414.00, 413.9 Severe obesity (BMI 35.0-39. 9) with comorbidity ([facility] ICD-10-CM: E66.01 
ICD-9-CM: 278.01 Type 2 diabetes with nephropathy (HCC) ICD-10-CM: E11.21 
ICD-9-CM: 250.40, 583.81   
   
 SOB (shortness of breath) ICD-10-CM: R06.02 
ICD-9-CM: 786.05 Atrial fibrillation ([facility] ICD-10-CM: I48.91 
ICD-9-CM: 427.31 PVD (peripheral vascular disease) with claudication (HCC) ICD-10-CM: I73.9 ICD-9-CM: 443.9 Hypertension complicating diabetes ([facility] ICD-10-CM: E11.59, I10 
ICD-9-CM: 250.80, 401.9 Coronary atherosclerosis of native coronary artery ICD-10-CM: I25.10 ICD-9-CM: 414.01   
   
 HLD (hyperlipidemia) ICD-10-CM: F66.0 ICD-9-CM: 272.4 Cerebrovascular accident stroke, other, unspec ICD-10-CM: I67.89 ICD-9-CM: 085 Past Medical History:  
 
 has a past medical history of Atrial fibrillation ([facility] CAD (coronary artery disease), Cerebrovascular accident stroke, other, unspec (11/18/2010), Coronary atherosclerosis of native coronary artery (11/18/2010), Essential hypertension, Essential hypertension, benign (11/18/2010), HLD (hyperlipidemia) (11/18/2010), PVD (peripheral vascular disease) ([facility] (2017), Type II or unspecified type diabetes mellitus without mention of complication, not stated as uncontrolled (11/18/2010), and Zoster. Past Surgical History:  
 
 has a past surgical history that includes hx heart catheterization; hx coronary stent placement; hx hysterectomy (1972); pr tcat insj/rpl perm leadless pacemaker rv w/img (N/A, 2/5/2020); and pr icar catheter ablation atrioventr node function (N/A, 2/5/2020). Home Medications:  
 
Prior to Admission medications Medication Sig Start Date End Date Taking? Authorizing Provider  
metoprolol succinate (TOPROL-XL) 50 mg XL tablet Take 50 mg by mouth nightly. Yes Provider, Historical  
amLODIPine (NORVASC) 5 mg tablet Take 5 mg by mouth daily.    Yes Provider, Historical  
 acyclovir (ZOVIRAX) 5 % ointment Use as directed 2/14/20  Yes Connor May IV, MD  
isosorbide mononitrate ER (IMDUR) 60 mg CR tablet TAKE 1 TABLET TWICE A DAY 2/13/20  Yes Aracelis Herron MD  
acetaminophen (TYLENOL) 325 mg tablet Take 2 Tabs by mouth every four (4) hours as needed for Pain or Fever. 2/7/20  Yes Connor May IV, MD  
albuterol [facility] HFA) 90 mcg/actuation inhaler Take 2 Puffs by inhalation every four (4) hours as needed for Wheezing or Shortness of Breath. 2/7/20  Yes Connor May IV, MD  
aspirin 81 mg chewable tablet Take 81 mg by mouth nightly. Yes Provider, Historical  
atorvastatin (LIPITOR) 20 mg tablet Take 20 mg by mouth nightly. Yes Provider, Historical  
azelastine (ASTEPRO) 0.15 % (205.5 mcg) 1 Spray by Both Nostrils route nightly. Yes Provider, Historical  
bumetanide (BUMEX) 1 mg tablet Take 1 mg by mouth daily. Takes 1 mg or 2 mg daily on alternating days    Yes Provider, Historical  
clotrimazole-betamethasone (LOTRISONE) topical cream Apply  to affected area two (2) times daily as needed for Skin Irritation or Other (Rash). Yes Provider, Historical  
insulin detemir U-100 (LEVEMIR U-100 INSULIN) 100 unit/mL injection 10 Units by SubCUTAneous route daily. Takes 20 units every morning, and takes 8-10 units every evening as needed/as directed based on blood sugar    Yes Provider, Historical  
mupirocin calcium (BACTROBAN) 2 % topical cream Apply  to affected area two (2) times daily as needed. Yes Provider, Historical  
nystatin (MYCOSTATIN) powder Apply  to affected area two (2) times daily as needed for Other (Rash). Yes Provider, Historical  
glipiZIDE (GLUCOTROL) 5 mg tablet TAKE 2 TABLETS TWICE A DAY 1/28/20  Yes Connor May IV, MD  
allopurinol (ZYLOPRIM) 100 mg tablet Take 100 mg by mouth daily. 10/9/19  Yes Provider, Historical  
apixaban (ELIQUIS) 2.5 mg tablet Take 1 Tab by mouth two (2) times a day. 10/29/19  Yes Manpreet Estrella MD  
omeprazole (PRILOSEC) 20 mg capsule TAKE 1 CAPSULE DAILY 10/29/19  Yes Manpreet Estrella MD  
enalapril (VASOTEC) 20 mg tablet TAKE 1 TABLET TWICE A DAY 19  Yes Manpreet Estrella MD  
budesonide-formoterol (SYMBICORT) 160-4.5 mcg/actuation HFAA Take 2 Puffs by inhalation two (2) times a day. After each use, rinse mouth with water 3/8/19  Yes Rashmi Burton IV, MD  
cholecalciferol, vitamin D3, (VITAMIN D3) 2,000 unit tab Take 2,000 Units by mouth daily. Yes Provider, Historical  
nitroglycerin (NITROSTAT) 0.4 mg SL tablet 1 Tab by SubLINGual route every five (5) minutes as needed for Chest Pain. 17  Yes Manpreet Estrella MD  
insulin aspart U-100 (NOVOLOG FLEXPEN U-100 INSULIN) 100 unit/mL (3 mL) inpn INJECT 7 UNITS BEFORE EACH MEAL 3 TIMES A DAY OR AS   DIRECTED 19   Ricardo Ford MD  
 
 
Allergies/Social/Family History: Allergies Allergen Reactions  Bactrim [Sulfamethoprim] Other (comments) Affects her kidney funtion  Clonidine Shortness of Breath and Swelling Lightheaded,   
 Codeine Unknown (comments)  Keflin Itching  Pcn [Penicillins] Unknown (comments)  Tramadol Other (comments) Makes her head feel swishy Social History Tobacco Use  Smoking status: Former Smoker Packs/day: 0.80 Years: 20.00 Pack years: 16.00 Types: Cigarettes Last attempt to quit: 1986 Years since quittin.8  Smokeless tobacco: Never Used Substance Use Topics  Alcohol use: No  
  
No family history on file. Objective:  
Vital Signs: 
Visit Vitals /56 (BP 1 Location: Right arm, BP Patient Position: At rest;Sitting) Pulse 87 Temp 97.3 °F (36.3 °C) Resp 16 Wt 76.1 kg (167 lb 12.3 oz) SpO2 98% BMI 32.77 kg/m² O2 Flow Rate (L/min): 2 l/min O2 Device: Room air Temp (24hrs), Av.8 °F (36.6 °C), Min:97.3 °F (36.3 °C), Max:98.5 °F (36.9 °C) Intake/Output: Intake/Output Summary (Last 24 hours) at 3/3/2020 1246 Last data filed at 3/3/2020 5982 Gross per 24 hour Intake 600 ml Output 1100 ml Net -500 ml Physical Exam:  
General:  Alert, cooperative, no distress, appears stated age. Head:  Normocephalic, without obvious abnormality, atraumatic. Eyes:  Conjunctivae/corneas clear. PERRL Neck: Supple, symmetrical, no adenopathy, no carotid bruit and no JVD. Lungs:   Clear to auscultation bilaterally. Chest wall:  No tenderness or deformity. Heart:  Regular rate and rhythm, S1-S2 normal, no murmur, no click, rub or gallop. Abdomen:   Soft, non-tender. Bowel sounds present. No masses,  No organomegaly. Extremities: Atraumatic, no cyanosis or edema. Pulses: Palpable Skin: No rashes or lesions Neurologic: Grossly nonfocal  
 
  
 LABS AND  DATA: Personally reviewed Recent Labs 03/03/20 
6350 WBC 11.4* HGB 11.6 HCT 36.4 * Recent Labs 03/03/20 
0974 03/02/20 
6747 * 136  
K 3.6 4.0  
 101 CO2 28 30 BUN 24* 28* CREA 1.43* 1.65* * 118* CA 8.6 8.6 No results for input(s): SGOT, GPT, AP, TBIL, TP, ALB, GLOB, AML, LPSE in the last 72 hours. No lab exists for component: AMYP No results for input(s): INR, PTP, APTT, INREXT in the last 72 hours. No results for input(s): PHI, PCO2I, PO2I, FIO2I in the last 72 hours. No results for input(s): CPK, CKMB, TROIQ, BNPP in the last 72 hours. MEDS: Reviewed Chest Imaging: personally reviewed and report checked Tele- reviewed Medical decision making:  
I have reviewed the flowsheet and previous day's notes Patient has acute or chronic illness that poses a threat to life or bodily function Review and order of Clinical lab tests Review and Order of Radiology tests Independent visualization of Image Thank you for allowing me to participate in this patient's care. Johny Santoyo MD 
 
 
[facility]

## 2020-03-03 NOTE — INTERDISCIPLINARY ROUNDS
During interdisciplinary rounds at 0930. Care management, physical therapy, nutritionist, pharmacist, and nursing discussed discharge and plan of care. See clinical pathway and/or care plan for interventions and desired outcomes.

## 2020-03-03 NOTE — PROGRESS NOTES
Bedside and Verbal shift change report given to Paco Stark RN (oncoming nurse) by Raisa Colon RN (offgoing nurse). Report included the following information SBAR, Kardex, Intake/Output, MAR, Recent Results, Med Rec Status, Cardiac Rhythm V-Paced and Dual Neuro Assessment.

## 2020-03-03 NOTE — PROGRESS NOTES
Transition of Care Plan 
  
· Disposition: Home with family assistance once medically stable · Heart Failure Bundle Patient · RUR- 18 % low · Transport: Daughter, Yulisa Fall · Continue Medical Care · Follow up with PCP/Specialist  
 
 
ROSA Mcclure

## 2020-03-03 NOTE — PROGRESS NOTES
Bedside and Verbal shift change report given to Saji Mccabe RN (oncoming nurse) by Gill Brewer RN (offgoing nurse). Report included the following information SBAR, Kardex, Intake/Output, MAR, Recent Results, Med Rec Status and Cardiac Rhythm V-paced.

## 2020-03-03 NOTE — PROGRESS NOTES
Bedside and Verbal shift change report given to Lucretia العلي RN (oncoming nurse) by Army Audrey RN (offgoing nurse). Report included the following information SBAR, Kardex, ED Summary, MAR, Recent Results, Cardiac Rhythm A-fib, Alarm Parameters  and Dual Neuro Assessment.

## 2020-03-03 NOTE — PROGRESS NOTES
Medical Progress Note NAME: Rob Poe :  1935 MRM:  [id number] Date/Time: 3/3/2020 Problem List:  
 
Principal Problem: 
  Acute diastolic CHF (congestive heart failure) ([facility] (2020) Active Problems: 
  Coronary atherosclerosis of native coronary artery (2010) Hypertension complicating diabetes ([facility] (2016) Atrial fibrillation ([facility] (2017) Type 2 diabetes with nephropathy ([facility] (2018) Pacemaker (2020) Overview: 2020 leadless pacemaker Subjective:  
 
Nuc stress test neg. CALDERA and non prod cough improved. Little PNDrip Past Medical History:  
Diagnosis Date  Atrial fibrillation ([facility]  CAD (coronary artery disease)  Cerebrovascular accident stroke, other, unspec 2010  Coronary atherosclerosis of native coronary artery 2010  Essential hypertension  Essential hypertension, benign 2010  HLD (hyperlipidemia) 2010  PVD (peripheral vascular disease) ([facility]   Type II or unspecified type diabetes mellitus without mention of complication, not stated as uncontrolled 2010  Zoster Objective:  
 
 
 
Vitals:  
  
Last 24hrs VS reviewed since prior progress note. Most recent are: 
 
Visit Vitals /52 (BP 1 Location: Right arm, BP Patient Position: At rest) Pulse 81 Temp 98 °F (36.7 °C) Resp 13 Wt 167 lb 12.3 oz (76.1 kg) SpO2 97% BMI 32.77 kg/m² SpO2 Readings from Last 6 Encounters:  
20 97% 20 93% 20 98% 20 97% 20 91% 20 95% O2 Flow Rate (L/min): 2 l/min Intake/Output Summary (Last 24 hours) at 3/3/2020 3132 Last data filed at 3/3/2020 0829 Gross per 24 hour Intake 600 ml Output 1050 ml Net -450 ml Exam:  
   General:  Alert, cooperative, no distress, appears stated age. Lungs:   Clear to auscultation bilaterally. Heart:  Regular rate and rhythm, S1, S2 normal, no murmur, click, rub or gallop. Abdomen:   Soft, non-tender. Bowel sounds normal. No masses,  No organomegaly. Extremities: Rt pretib small faint pink area unchanged. No pedal edema. Lab Data Reviewed: (see below) Recent Results (from the past 24 hour(s)) GLUCOSE, POC Collection Time: 03/02/20  8:03 AM  
Result Value Ref Range Glucose (POC) 102 (H) 65 - 100 mg/dL Performed by [employer] NUCLEAR CARDIAC STRESS TEST Collection Time: 03/02/20 12:35 PM  
Result Value Ref Range Target  bpm  
 Exercise duration time 00:03:00 Stress Base Systolic  mmHg Stress Base Diastolic BP 55 mmHg Post peak HR 93 BPM  
 Baseline HR 82 BPM  
 Estimated workload 1.0 METS Baseline  mmHg Percent HR 68 % Stress Base Diastolic BP 70 mmHg Stress Rate Pressure Product 13,578 BPM*mmHg ST Elevation (mm) 0 mm ST Depression (mm) 0 mm GLUCOSE, POC Collection Time: 03/02/20  1:08 PM  
Result Value Ref Range Glucose (POC) 195 (H) 65 - 100 mg/dL Performed by Munir Rei GLUCOSE, POC Collection Time: 03/02/20  5:06 PM  
Result Value Ref Range Glucose (POC) 257 (H) 65 - 100 mg/dL Performed by Renetta Jones GLUCOSE, POC Collection Time: 03/02/20  9:40 PM  
Result Value Ref Range Glucose (POC) 72 65 - 100 mg/dL Performed by Radha Ariza GLUCOSE, POC Collection Time: 03/03/20  1:42 AM  
Result Value Ref Range Glucose (POC) 190 (H) 65 - 100 mg/dL Performed by Lilliam Chavez METABOLIC PANEL, BASIC Collection Time: 03/03/20  3:52 AM  
Result Value Ref Range Sodium 135 (L) 136 - 145 mmol/L Potassium 3.6 3.5 - 5.1 mmol/L Chloride 100 97 - 108 mmol/L  
 CO2 28 21 - 32 mmol/L Anion gap 7 5 - 15 mmol/L Glucose 140 (H) 65 - 100 mg/dL BUN 24 (H) 6 - 20 MG/DL  Creatinine 1.43 (H) 0.55 - 1.02 MG/DL  
 BUN/Creatinine ratio 17 12 - 20    
 GFR est AA 42 (L) >60 ml/min/1.73m2 GFR est non-AA 35 (L) >60 ml/min/1.73m2 Calcium 8.6 8.5 - 10.1 MG/DL  
CBC WITH AUTOMATED DIFF Collection Time: 03/03/20  3:52 AM  
Result Value Ref Range WBC 11.4 (H) 3.6 - 11.0 K/uL  
 RBC 4.46 3.80 - 5.20 M/uL  
 HGB 11.6 11.5 - 16.0 g/dL HCT 36.4 35.0 - 47.0 % MCV 81.6 80.0 - 99.0 FL  
 MCH 26.0 26.0 - 34.0 PG  
 MCHC 31.9 30.0 - 36.5 g/dL  
 RDW 15.6 (H) 11.5 - 14.5 % PLATELET 991 (H) 922 - 400 K/uL MPV 8.8 (L) 8.9 - 12.9 FL  
 NRBC 0.0 0  WBC ABSOLUTE NRBC 0.00 0.00 - 0.01 K/uL NEUTROPHILS 69 32 - 75 % LYMPHOCYTES 22 12 - 49 % MONOCYTES 2 (L) 5 - 13 % EOSINOPHILS 3 0 - 7 % BASOPHILS 0 0 - 1 % METAMYELOCYTES 1 (H) 0 % MYELOCYTES 3 (H) 0 % IMMATURE GRANULOCYTES 0 %  
 ABS. NEUTROPHILS 7.9 1.8 - 8.0 K/UL  
 ABS. LYMPHOCYTES 2.5 0.8 - 3.5 K/UL  
 ABS. MONOCYTES 0.2 0.0 - 1.0 K/UL  
 ABS. EOSINOPHILS 0.3 0.0 - 0.4 K/UL  
 ABS. BASOPHILS 0.0 0.0 - 0.1 K/UL  
 ABS. IMM. GRANS. 0.0 K/UL  
 DF MANUAL    
 RBC COMMENTS ANISOCYTOSIS 
1+ Medications Reviewed: (see below) 
 
______________________________________________________________________ Medications:  
 
Current Facility-Administered Medications Medication Dose Route Frequency  bumetanide (BUMEX) tablet 2 mg  2 mg Oral DAILY  potassium chloride SR (KLOR-CON 10) tablet 20 mEq  20 mEq Oral DAILY  albuterol (PROVENTIL VENTOLIN) nebulizer solution 2.5 mg  2.5 mg Nebulization Q4H PRN  
 allopurinoL (ZYLOPRIM) tablet 100 mg  100 mg Oral DAILY  amLODIPine (NORVASC) tablet 5 mg  5 mg Oral DAILY  apixaban (ELIQUIS) tablet 2.5 mg  2.5 mg Oral BID  aspirin chewable tablet 81 mg  81 mg Oral QHS  atorvastatin (LIPITOR) tablet 20 mg  20 mg Oral QHS  azelastine (ASTELIN) 137mcg/spray nasal spray  1 Spray Both Nostrils QHS  arformoteroL (BROVANA) neb solution 15 mcg  15 mcg Nebulization BID RT  And  
  budesonide (PULMICORT) 500 mcg/2 ml nebulizer suspension  500 mcg Nebulization BID RT  
 cholecalciferol (VITAMIN D3) (1000 Units /25 mcg) tablet 2 Tab  2,000 Units Oral DAILY  lisinopril (PRINIVIL, ZESTRIL) tablet 20 mg  20 mg Oral BID  
 glipiZIDE (GLUCOTROL) tablet 10 mg  10 mg Oral BID  insulin glargine (LANTUS) injection 20 Units  20 Units SubCUTAneous DAILY  isosorbide mononitrate ER (IMDUR) tablet 60 mg  60 mg Oral BID  metoprolol succinate (TOPROL-XL) XL tablet 50 mg  50 mg Oral QHS  nitroglycerin (NITROSTAT) tablet 0.4 mg  0.4 mg SubLINGual Q5MIN PRN  pantoprazole (PROTONIX) tablet 40 mg  40 mg Oral ACB  ondansetron (ZOFRAN) injection 4 mg  4 mg IntraVENous Q6H PRN  
 acetaminophen (TYLENOL) tablet 650 mg  650 mg Oral Q4H PRN  
 glucose chewable tablet 16 g  4 Tab Oral PRN  
 glucagon (GLUCAGEN) injection 1 mg  1 mg IntraMUSCular PRN  
 dextrose 10% infusion 0-250 mL  0-250 mL IntraVENous PRN  
 insulin lispro (HUMALOG) injection   SubCUTAneous AC&HS Assessment:  
Cough , SOB  Better with diuresis. Will also ask Pulm to see. Cards care appreciated. CKD Cr improved DM under tx. HTN controlled. Patient Active Problem List  
Diagnosis Code  Coronary atherosclerosis of native coronary artery I25.10  
 HLD (hyperlipidemia) E78.5  Cerebrovascular accident stroke, other, unspec I67.89  
 Hypertension complicating diabetes ([facility] E11.59, I10  
 PVD (peripheral vascular disease) with claudication (HCC) I73.9  Atrial fibrillation (HCC) I48.91  
 SOB (shortness of breath) R06.02  
 Type 2 diabetes with nephropathy (HCC) E11.21  
 Severe obesity (BMI 35.0-39. 9) with comorbidity ([address] E66.01  
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  
 
F/u labs in am . Left VM for dtr 
      
 
  
 
 
  
              
 
 
 
 
 
 
      
___________________________________________________ Attending Physician: Paul Baig MD

## 2020-03-03 NOTE — PROGRESS NOTES
Problem: Activity Intolerance Goal: *Oxygen saturation during activity within specified parameters Outcome: Progressing Towards Goal 
Goal: *Able to remain out of bed as prescribed Outcome: Progressing Towards Goal 
  
Problem: Patient Education: Go to Patient Education Activity Goal: Patient/Family Education Outcome: Progressing Towards Goal 
  
Problem: Pain Goal: *Control of Pain Outcome: Progressing Towards Goal 
  
Problem: Patient Education: Go to Patient Education Activity Goal: Patient/Family Education Outcome: Progressing Towards Goal 
  
Problem: Falls - Risk of 
Goal: *Absence of Falls Description Document Duran Corbindevin Fall Risk and appropriate interventions in the flowsheet. Outcome: Progressing Towards Goal 
Note: Fall Risk Interventions: 
  
 
  
 
Medication Interventions: Patient to call before getting OOB Problem: Patient Education: Go to Patient Education Activity Goal: Patient/Family Education Outcome: Progressing Towards Goal 
  
Problem: Pressure Injury - Risk of 
Goal: *Prevention of pressure injury Description Document Ruperto Scale and appropriate interventions in the flowsheet. Outcome: Progressing Towards Goal 
Note: Pressure Injury Interventions: Activity Interventions: Assess need for specialty bed, Pressure redistribution bed/mattress(bed type) Mobility Interventions: HOB 30 degrees or less, Pressure redistribution bed/mattress (bed type) Nutrition Interventions: Document food/fluid/supplement intake Problem: Patient Education: Go to Patient Education Activity Goal: Patient/Family Education Outcome: Progressing Towards Goal 
  
Problem: Diabetes Self-Management Goal: *Disease process and treatment process Description Define diabetes and identify own type of diabetes; list 3 options for treating diabetes. Outcome: Progressing Towards Goal 
Goal: *Incorporating nutritional management into lifestyle Description Describe effect of type, amount and timing of food on blood glucose; list 3 methods for planning meals. Outcome: Progressing Towards Goal 
Goal: *Incorporating physical activity into lifestyle Description State effect of exercise on blood glucose levels. Outcome: Progressing Towards Goal 
Goal: *Developing strategies to promote health/change behavior Description Define the ABC's of diabetes; identify appropriate screenings, schedule and personal plan for screenings. Outcome: Progressing Towards Goal 
Goal: *Using medications safely Description State effect of diabetes medications on diabetes; name diabetes medication taking, action and side effects. Outcome: Progressing Towards Goal 
Goal: *Monitoring blood glucose, interpreting and using results Description Identify recommended blood glucose targets  and personal targets. Outcome: Progressing Towards Goal 
Goal: *Prevention, detection, treatment of acute complications Description List symptoms of hyper- and hypoglycemia; describe how to treat low blood sugar and actions for lowering  high blood glucose level. Outcome: Progressing Towards Goal 
Goal: *Prevention, detection and treatment of chronic complications Description Define the natural course of diabetes and describe the relationship of blood glucose levels to long term complications of diabetes. Outcome: Progressing Towards Goal 
Goal: *Developing strategies to address psychosocial issues Description Describe feelings about living with diabetes; identify support needed and support network Outcome: Progressing Towards Goal 
Goal: *Insulin pump training Outcome: Progressing Towards Goal 
Goal: *Sick day guidelines Outcome: Progressing Towards Goal 
Goal: *Patient Specific Goal (EDIT GOAL, INSERT TEXT) Outcome: Progressing Towards Goal 
  
Problem: Patient Education: Go to Patient Education Activity Goal: Patient/Family Education Outcome: Progressing Towards Goal 
  
 Problem: Discharge Planning Goal: *Discharge to safe environment Outcome: Progressing Towards Goal 
Goal: *Knowledge of medication management Outcome: Progressing Towards Goal 
Goal: *Knowledge of discharge instructions Outcome: Progressing Towards Goal 
  
Problem: Heart Failure: Day 4 Goal: Activity/Safety Outcome: Progressing Towards Goal 
Goal: Diagnostic Test/Procedures Outcome: Progressing Towards Goal 
Goal: Nutrition/Diet Outcome: Progressing Towards Goal 
Goal: Discharge Planning Outcome: Progressing Towards Goal 
Goal: Medications Outcome: Progressing Towards Goal 
Goal: Respiratory Outcome: Progressing Towards Goal 
Goal: Treatments/Interventions/Procedures Outcome: Progressing Towards Goal 
Goal: Psychosocial 
Outcome: Progressing Towards Goal 
Goal: *Oxygen saturation within defined limits Outcome: Progressing Towards Goal 
Goal: *Hemodynamically stable Outcome: Progressing Towards Goal 
Goal: *Optimal pain control at patient's stated goal 
Outcome: Progressing Towards Goal 
Goal: *Anxiety reduced or absent Outcome: Progressing Towards Goal 
Goal: *Demonstrates progressive activity Outcome: Progressing Towards Goal 
  
Problem: Heart Failure: Discharge Outcomes Goal: *Demonstrates ability to perform prescribed activity without shortness of breath or discomfort Outcome: Progressing Towards Goal 
Goal: *Left ventricular function assessment completed prior to or during stay, or planned for post-discharge Outcome: Progressing Towards Goal 
Goal: *ACEI prescribed if LVEF less than 40% and no contraindications or ARB prescribed Outcome: Progressing Towards Goal 
Goal: *Verbalizes understanding and describes prescribed diet Outcome: Progressing Towards Goal 
Goal: *Verbalizes understanding/describes prescribed medications Outcome: Progressing Towards Goal 
Goal: *Describes available resources and support systems Description 
(eg: Home Health, Palliative Care, [employer] Directive) Outcome: Progressing Towards Goal 
Goal: *Describes smoking cessation resources Outcome: Progressing Towards Goal 
Goal: *Understands and describes signs and symptoms to report to providers(Stroke Metric) Outcome: Progressing Towards Goal 
Goal: *Describes/verbalizes understanding of follow-up/return appt Description 
(eg: to physicians, diabetes treatment coordinator, and other resources Outcome: Progressing Towards Goal 
Goal: *Describes importance of continuing daily weights and changes to report to physician Outcome: Progressing Towards Goal

## 2020-03-04 NOTE — PROGRESS NOTES
Problem: Heart Failure: Day 5 Goal: Activity/Safety Outcome: Progressing Towards Goal 
Goal: Diagnostic Test/Procedures Outcome: Progressing Towards Goal 
Goal: Nutrition/Diet Outcome: Progressing Towards Goal 
Goal: Discharge Planning Outcome: Progressing Towards Goal 
Goal: Medications Outcome: Progressing Towards Goal 
Goal: Respiratory Outcome: Progressing Towards Goal 
Goal: Treatments/Interventions/Procedures Outcome: Progressing Towards Goal

## 2020-03-04 NOTE — PROGRESS NOTES
[address] [facility],4Th Floor Heart and Vascular [place] Division of Cardiology [phone] Progress note Kamilah Li M.D., DARRELL. NAME:  Cathy Mosley :   1935 MRN:   [id number] ICD-10-CM ICD-9-CM 1. Acute systolic congestive heart failure (HCC) I50.21 428.21   
  428.0 Assessment/Plan: She remains dyspneic with mild exertion. No chest pain reported. No much improvement on the shortness of breath she tells me. 1.  Shortness of breath: In my opinion still unclear exact etiology of the shortness of breath. Possibly multifactorial.  Cardiac wise no clear additional interventions at this point will be helpful to improve her situation. She continues to lose weight and she has a negative fluid intake thus far. I will therefore continue with Bumex at this point. She is not behaving as diastolic dysfunction at this time I am my opinion. There is no evidence of diastolic dysfunction by echo at least. 
 
We have discussed the possibility of right and left cardiac catheterization although she. Just had a nuclear stress test which was negative for ischemia Chest CT noted with emphysematous changes. Defer to pulmonologist if intervention needed. There is no evidence of volume overload at this time. Proceed with 6 minute walk Consider change lisinopril to see if cough improves as suggested by pulmonologist 
Wbcs increased defer to primary team 
 
2. Atrial fibrillation[de-identified] This is a chronic atrial fibrillation. Status post leadless pacemaker placement and AV jazlyn ablation. Continue oral anticoagulation. 3.  CAD: Continue aspirin Lipitor Imdur and Toprol. 4.  Hypertension: Controlled. 5.  Hyperlipidemia: Continue statin. 20 ECHO ADULT FOLLOW-UP OR LIMITED 2020 Narrative · Normal cavity size and wall thickness.  Low normal systolic dysfunction. Estimated left ventricular ejection fraction is 50 - 55%. · Image quality for this study was suboptimal. 
· Moderately dilated left atrium. · Mildly dilated right ventricle. Borderline low systolic function. · Moderately dilated right atrium. · Probably trileaflet aortic valve. Moderate aortic valve sclerosis. Aortic valve leaflet calcification present. · Mitral valve non-specific thickening. Mild mitral valve regurgitation is  
present. · Mild tricuspid valve regurgitation is present. · There is no evidence of pulmonary hypertension. Signed by: Naresh Vazquez MD  
 
02/28/20 NUCLEAR CARDIAC STRESS TEST 03/02/2020, 03/02/2020 3/2/2020 Narrative · Baseline ECG: Paced rhythm, non-specific ST-T wave abnormalities,  
interventricular conduction delay. Shortness of breath, dyspnea A Lexiscan myocardial SPECT gated wall motion study was performed  
utilizing 11 mCi of Tc MYOVIEW for rest and 31.6 mCi for stress. SPECT imaging at stress and rest demonstrates no definite evidence of  
ischemia and/or infarction. Left ventricular ejection fraction equals 73%. Left ventricular wall  
motion and thickening are within normal limits. Impression : 
1. No definite evidence of ischemia and/or infarction. 2. LVEF equals 73%. Left ventricular wall motion and thickening is normal. 
  
  Signed by: Jaclynn Mcardle, MD; Jerry Ghosh MD  
 
CT Results (most recent): 
Results from [place] encounter on 02/28/20 CT CHEST WO CONT Narrative INDICATION: Dyspnea COMPARISON: 2/28/2020 CONTRAST: None. TECHNIQUE:  5 mm axial images were obtained through the chest. Coronal and 
sagittal reconstructions were generated. CT dose reduction was achieved through 
use of a standardized protocol tailored for this examination and automatic 
exposure control for dose modulation.  
 
The absence of intravenous contrast reduces the sensitivity for evaluation of 
 the mediastinum and upper abdominal organs. FINDINGS: 
 
THYROID: No nodule. MEDIASTINUM: Aortic atherosclerotic change. Minimal scattered mediastinal lymph 
nodes are unchanged compared to 8/23/2017 study. MARIELA: No change THORACIC AORTA: No aneurysm. MAIN PULMONARY ARTERY: Normal in caliber. TRACHEA/BRONCHI: Patent. ESOPHAGUS: No wall thickening or dilatation. HEART: Coronary artery disease. PLEURA: No effusion or pneumothorax. LUNGS: No suspicious pulmonary mass or nodule. Minimal scattered tree-in-bud 
nodularity. Emphysematous change. INCIDENTALLY IMAGED UPPER ABDOMEN: No focal abnormality. BONES: No destructive bone lesion. Impression IMPRESSION: 
No acute thoracic process. Few scattered stable pulmonary nodules and chronic parenchymal changes No large pulmonary mass or nodule. EKG Results Procedure 720 Value Units Date/Time EKG, 12 LEAD, INITIAL [[id number]] Collected:  02/28/20 2103 Order Status:  Completed Updated:  02/29/20 1232 Ventricular Rate 82 BPM   
  Atrial Rate 300 BPM   
  QRS Duration 122 ms   
  Q-T Interval 410 ms QTC Calculation (Bezet) 479 ms Calculated R Axis -46 degrees Calculated T Axis 98 degrees Diagnosis --  
  Ventricular-paced rhythm Confirmed by Alfonso Reynaga M.D., Patience Moritz ([id number]) on 2/29/2020 12:32:27 PM 
  
  
 
 
 
Visit Vitals /56 (BP 1 Location: Right arm, BP Patient Position: Sitting) Pulse 82 Temp 97.7 °F (36.5 °C) Resp 15 Wt 165 lb 12.6 oz (75.2 kg) SpO2 95% BMI 32.38 kg/m² Wt Readings from Last 3 Encounters:  
03/04/20 165 lb 12.6 oz (75.2 kg) 02/25/20 168 lb (76.2 kg) 02/14/20 168 lb (76.2 kg) Review of Systems:  
Pertinent items are noted in the History of Present Illness. Objective:  
 
 
03/04 0701 - 03/04 1900 In: 120 [P.O.:120] Out: -  
 
03/02 1901 - 03/04 0700 In: 300 [P.O.:300] Out: 1400 [[id number]] Telemetry: AFIB Physical Exam: 
 
Neck: no JVD Heart: regularly irregular rhythm Lungs: clear to auscultation bilaterally Abdomen: soft, non-tender. Bowel sounds normal. No masses,  no organomegaly Extremities: no edema Current Facility-Administered Medications Medication Dose Route Frequency  bumetanide (BUMEX) tablet 2 mg  2 mg Oral DAILY  potassium chloride SR (KLOR-CON 10) tablet 20 mEq  20 mEq Oral DAILY  albuterol (PROVENTIL VENTOLIN) nebulizer solution 2.5 mg  2.5 mg Nebulization Q4H PRN  
 allopurinoL (ZYLOPRIM) tablet 100 mg  100 mg Oral DAILY  amLODIPine (NORVASC) tablet 5 mg  5 mg Oral DAILY  apixaban (ELIQUIS) tablet 2.5 mg  2.5 mg Oral BID  aspirin chewable tablet 81 mg  81 mg Oral QHS  atorvastatin (LIPITOR) tablet 20 mg  20 mg Oral QHS  azelastine (ASTELIN) 137mcg/spray nasal spray  1 Spray Both Nostrils QHS  arformoteroL (BROVANA) neb solution 15 mcg  15 mcg Nebulization BID RT And  
 budesonide (PULMICORT) 500 mcg/2 ml nebulizer suspension  500 mcg Nebulization BID RT  
 cholecalciferol (VITAMIN D3) (1000 Units /25 mcg) tablet 2 Tab  2,000 Units Oral DAILY  lisinopril (PRINIVIL, ZESTRIL) tablet 20 mg  20 mg Oral BID  
 glipiZIDE (GLUCOTROL) tablet 10 mg  10 mg Oral BID  insulin glargine (LANTUS) injection 20 Units  20 Units SubCUTAneous DAILY  isosorbide mononitrate ER (IMDUR) tablet 60 mg  60 mg Oral BID  metoprolol succinate (TOPROL-XL) XL tablet 50 mg  50 mg Oral QHS  nitroglycerin (NITROSTAT) tablet 0.4 mg  0.4 mg SubLINGual Q5MIN PRN  pantoprazole (PROTONIX) tablet 40 mg  40 mg Oral ACB  ondansetron (ZOFRAN) injection 4 mg  4 mg IntraVENous Q6H PRN  
 acetaminophen (TYLENOL) tablet 650 mg  650 mg Oral Q4H PRN  
 glucose chewable tablet 16 g  4 Tab Oral PRN  
 glucagon (GLUCAGEN) injection 1 mg  1 mg IntraMUSCular PRN  
 dextrose 10% infusion 0-250 mL  0-250 mL IntraVENous PRN  
 insulin lispro (HUMALOG) injection   SubCUTAneous AC&HS Lab Results Component Value Date/Time WBC 12.5 (H) 03/04/2020 01:49 AM  
 HGB (POC) 14.6 02/14/2020 02:45 PM  
 HGB 11.6 03/04/2020 01:49 AM  
 HCT (POC) 43.7 02/14/2020 02:45 PM  
 HCT 36.3 03/04/2020 01:49 AM  
 PLATELET 427 (H) 86/13/3796 01:49 AM  
 MCV 81.4 03/04/2020 01:49 AM  
 
Lab Results Component Value Date/Time Sodium 136 03/04/2020 01:49 AM  
 Potassium 3.6 03/04/2020 01:49 AM  
 Chloride 100 03/04/2020 01:49 AM  
 CO2 29 03/04/2020 01:49 AM  
 Anion gap 7 03/04/2020 01:49 AM  
 Glucose 57 (L) 03/04/2020 01:49 AM  
 BUN 27 (H) 03/04/2020 01:49 AM  
 Creatinine 1.52 (H) 03/04/2020 01:49 AM  
 BUN/Creatinine ratio 18 03/04/2020 01:49 AM  
 GFR est AA 40 (L) 03/04/2020 01:49 AM  
 GFR est non-AA 33 (L) 03/04/2020 01:49 AM  
 Calcium 8.7 03/04/2020 01:49 AM  
 
Lab Results Component Value Date/Time Cholesterol, total 110 06/13/2017 09:31 AM  
 Cholesterol (POC) <100 12/18/2019 11:53 AM  
 HDL Cholesterol 55 06/13/2017 09:31 AM  
 HDL Cholesterol (POC) 41 12/18/2019 11:53 AM  
 LDL Cholesterol (POC) n/a 12/18/2019 11:53 AM  
 LDL, calculated 36 06/13/2017 09:31 AM  
 VLDL, calculated 19 06/13/2017 09:31 AM  
 Triglyceride 93 06/13/2017 09:31 AM  
 Triglycerides (POC) 82 12/18/2019 11:53 AM  
 CHOL/HDL Ratio 2.0 04/25/2017 03:53 AM  
 
Lab Results Component Value Date/Time  02/04/2015 07:47 PM  
 CK - MB 4.7 (H) 02/04/2015 07:47 PM  
 CK-MB Index 4.6 (H) 02/04/2015 07:47 PM  
 Troponin-I, Qt. <0.05 02/29/2020 01:13 AM  
  (H) 08/25/2017 04:40 AM  
 
Lab Results Component Value Date/Time ALT (SGPT) 27 02/28/2020 02:44 PM  
 AST (SGOT) 22 02/28/2020 02:44 PM  
 Alk. phosphatase 100 02/28/2020 02:44 PM  
 Bilirubin, direct 0.18 02/12/2014 09:42 AM  
 Bilirubin, total 0.7 02/28/2020 02:44 PM  
 
Lab Results Component Value Date/Time   (H) 08/25/2017 04:40 AM  
 NT pro-BNP 1,128 (H) 03/02/2020 04:15 AM  
 NT pro-BNP 2,482 (H) 02/28/2020 02:44 PM  
 NT pro-BNP 1,293 (H) 08/26/2017 03:25 AM  
 NT pro-BNP 1,671 (H) 08/23/2017 02:29 PM

## 2020-03-04 NOTE — PROGRESS NOTES
Bedside shift change report given to Zeus Holder RN (oncoming nurse) by Lincoln Redmond RN (offgoing nurse). Report included the following information SBAR, Kardex, MAR, Accordion, Recent Results, Cardiac Rhythm PACED and Dual Neuro Assessment.

## 2020-03-04 NOTE — PROGRESS NOTES
Problem: Activity Intolerance Goal: *Oxygen saturation during activity within specified parameters Outcome: Progressing Towards Goal 
Goal: *Able to remain out of bed as prescribed Outcome: Progressing Towards Goal 
  
Problem: Patient Education: Go to Patient Education Activity Goal: Patient/Family Education Outcome: Progressing Towards Goal 
  
Problem: Pain Goal: *Control of Pain Outcome: Progressing Towards Goal 
Goal: *PALLIATIVE CARE:  Alleviation of Pain Outcome: Progressing Towards Goal 
  
Problem: Patient Education: Go to Patient Education Activity Goal: Patient/Family Education Outcome: Progressing Towards Goal 
  
Problem: Falls - Risk of 
Goal: *Absence of Falls Description Document Viniluis Ramirez Fall Risk and appropriate interventions in the flowsheet. Outcome: Progressing Towards Goal 
Note: Fall Risk Interventions: 
Mobility Interventions: Patient to call before getting OOB Medication Interventions: Teach patient to arise slowly History of Falls Interventions: Door open when patient unattended Problem: Patient Education: Go to Patient Education Activity Goal: Patient/Family Education Outcome: Progressing Towards Goal 
  
Problem: Pressure Injury - Risk of 
Goal: *Prevention of pressure injury Description Document Ruperto Scale and appropriate interventions in the flowsheet. Outcome: Progressing Towards Goal 
Note: Pressure Injury Interventions: Activity Interventions: Increase time out of bed, Pressure redistribution bed/mattress(bed type), PT/OT evaluation Mobility Interventions: Float heels, HOB 30 degrees or less, Pressure redistribution bed/mattress (bed type), PT/OT evaluation Nutrition Interventions: Document food/fluid/supplement intake Problem: Patient Education: Go to Patient Education Activity Goal: Patient/Family Education Outcome: Progressing Towards Goal 
  
Problem: Diabetes Self-Management Goal: *Disease process and treatment process Description Define diabetes and identify own type of diabetes; list 3 options for treating diabetes. Outcome: Progressing Towards Goal 
Goal: *Incorporating nutritional management into lifestyle Description Describe effect of type, amount and timing of food on blood glucose; list 3 methods for planning meals. Outcome: Progressing Towards Goal 
Goal: *Incorporating physical activity into lifestyle Description State effect of exercise on blood glucose levels. Outcome: Progressing Towards Goal 
Goal: *Developing strategies to promote health/change behavior Description Define the ABC's of diabetes; identify appropriate screenings, schedule and personal plan for screenings. Outcome: Progressing Towards Goal 
Goal: *Using medications safely Description State effect of diabetes medications on diabetes; name diabetes medication taking, action and side effects. Outcome: Progressing Towards Goal 
Goal: *Monitoring blood glucose, interpreting and using results Description Identify recommended blood glucose targets  and personal targets. Outcome: Progressing Towards Goal 
Goal: *Prevention, detection, treatment of acute complications Description List symptoms of hyper- and hypoglycemia; describe how to treat low blood sugar and actions for lowering  high blood glucose level. Outcome: Progressing Towards Goal 
Goal: *Prevention, detection and treatment of chronic complications Description Define the natural course of diabetes and describe the relationship of blood glucose levels to long term complications of diabetes. Outcome: Progressing Towards Goal 
Goal: *Developing strategies to address psychosocial issues Description Describe feelings about living with diabetes; identify support needed and support network Outcome: Progressing Towards Goal 
Goal: *Insulin pump training Outcome: Progressing Towards Goal 
Goal: *Sick day guidelines Outcome: Progressing Towards Goal 
Goal: *Patient Specific Goal (EDIT GOAL, INSERT TEXT) Outcome: Progressing Towards Goal 
  
Problem: Patient Education: Go to Patient Education Activity Goal: Patient/Family Education Outcome: Progressing Towards Goal 
  
Problem: Discharge Planning Goal: *Discharge to safe environment Outcome: Progressing Towards Goal 
Goal: *Knowledge of medication management Outcome: Progressing Towards Goal 
Goal: *Knowledge of discharge instructions Outcome: Progressing Towards Goal 
  
Problem: Patient Education: Go to Patient Education Activity Goal: Patient/Family Education Outcome: Progressing Towards Goal 
  
Problem: Heart Failure: Day 1 Goal: Activity/Safety 3/4/2020 0020 by Dorise Maxon Outcome: Progressing Towards Goal 
3/3/2020 2329 by Dorise Maxon Outcome: Progressing Towards Goal 
Goal: Consults, if ordered 3/4/2020 0020 by Dorise Maxon Outcome: Progressing Towards Goal 
3/3/2020 2329 by Dorise Maxon Outcome: Progressing Towards Goal 
Goal: Diagnostic Test/Procedures 3/4/2020 0020 by Dorise Maxon Outcome: Progressing Towards Goal 
3/3/2020 2329 by Dorise Maxon Outcome: Progressing Towards Goal 
Goal: Nutrition/Diet 3/4/2020 0020 by Dorise Maxon Outcome: Progressing Towards Goal 
3/3/2020 2329 by Dorise Maxon Outcome: Progressing Towards Goal 
Goal: Discharge Planning 3/4/2020 0020 by Dorise Maxon Outcome: Progressing Towards Goal 
3/3/2020 2329 by Dorise Maxon Outcome: Progressing Towards Goal 
Goal: Medications 3/4/2020 0020 by Dorise Maxon Outcome: Progressing Towards Goal 
3/3/2020 2329 by Dorise Maxon Outcome: Progressing Towards Goal 
Goal: Respiratory 3/4/2020 0020 by Dorise Maxon Outcome: Progressing Towards Goal 
3/3/2020 2329 by Dorise Maxon Outcome: Progressing Towards Goal 
Goal: Treatments/Interventions/Procedures 3/4/2020 0020 by Dorise Maxon 
 Outcome: Progressing Towards Goal 
3/3/2020 2329 by Alex Pulse Outcome: Progressing Towards Goal 
Goal: Psychosocial 
3/4/2020 0020 by Alex Pulse Outcome: Progressing Towards Goal 
3/3/2020 2329 by Alex Pulse Outcome: Progressing Towards Goal 
Goal: *Oxygen saturation within defined limits 3/4/2020 0020 by Alex Pulse Outcome: Progressing Towards Goal 
3/3/2020 2329 by Alex Pulse Outcome: Progressing Towards Goal 
Goal: *Hemodynamically stable 3/4/2020 0020 by Alex Pulse Outcome: Progressing Towards Goal 
3/3/2020 2329 by Alex Pulse Outcome: Progressing Towards Goal 
Goal: *Optimal pain control at patient's stated goal 
3/4/2020 0020 by Alex Pulse Outcome: Progressing Towards Goal 
3/3/2020 2329 by Alex Pulse Outcome: Progressing Towards Goal 
Goal: *Anxiety reduced or absent 3/4/2020 0020 by Alex Pulse Outcome: Progressing Towards Goal 
3/3/2020 2329 by Alex Pulse Outcome: Progressing Towards Goal 
  
Problem: Heart Failure: Day 2 Goal: Activity/Safety Outcome: Progressing Towards Goal 
Goal: Consults, if ordered Outcome: Progressing Towards Goal 
Goal: Diagnostic Test/Procedures Outcome: Progressing Towards Goal 
Goal: Nutrition/Diet Outcome: Progressing Towards Goal 
Goal: Discharge Planning Outcome: Progressing Towards Goal 
Goal: Medications Outcome: Progressing Towards Goal 
Goal: Respiratory Outcome: Progressing Towards Goal 
Goal: Treatments/Interventions/Procedures Outcome: Progressing Towards Goal 
Goal: Psychosocial 
Outcome: Progressing Towards Goal 
Goal: *Oxygen saturation within defined limits Outcome: Progressing Towards Goal 
Goal: *Hemodynamically stable Outcome: Progressing Towards Goal 
Goal: *Optimal pain control at patient's stated goal 
Outcome: Progressing Towards Goal 
Goal: *Anxiety reduced or absent Outcome: Progressing Towards Goal 
Goal: *Demonstrates progressive activity Outcome: Progressing Towards Goal 
  
Problem: Heart Failure: Day 3 Goal: Activity/Safety Outcome: Progressing Towards Goal 
Goal: Diagnostic Test/Procedures Outcome: Progressing Towards Goal 
Goal: Nutrition/Diet Outcome: Progressing Towards Goal 
Goal: Discharge Planning Outcome: Progressing Towards Goal 
Goal: Medications Outcome: Progressing Towards Goal 
Goal: Respiratory Outcome: Progressing Towards Goal 
Goal: Treatments/Interventions/Procedures Outcome: Progressing Towards Goal 
Goal: Psychosocial 
Outcome: Progressing Towards Goal 
Goal: *Oxygen saturation within defined limits Outcome: Progressing Towards Goal 
Goal: *Hemodynamically stable Outcome: Progressing Towards Goal 
Goal: *Optimal pain control at patient's stated goal 
Outcome: Progressing Towards Goal 
Goal: *Anxiety reduced or absent Outcome: Progressing Towards Goal 
Goal: *Demonstrates progressive activity Outcome: Progressing Towards Goal 
  
Problem: Heart Failure: Day 4 Goal: Activity/Safety Outcome: Progressing Towards Goal 
Goal: Diagnostic Test/Procedures Outcome: Progressing Towards Goal 
Goal: Nutrition/Diet Outcome: Progressing Towards Goal 
Goal: Discharge Planning Outcome: Progressing Towards Goal 
Goal: Medications Outcome: Progressing Towards Goal 
Goal: Respiratory Outcome: Progressing Towards Goal 
Goal: Treatments/Interventions/Procedures Outcome: Progressing Towards Goal 
Goal: Psychosocial 
Outcome: Progressing Towards Goal 
Goal: *Oxygen saturation within defined limits Outcome: Progressing Towards Goal 
Goal: *Hemodynamically stable Outcome: Progressing Towards Goal 
Goal: *Optimal pain control at patient's stated goal 
Outcome: Progressing Towards Goal 
Goal: *Anxiety reduced or absent Outcome: Progressing Towards Goal 
Goal: *Demonstrates progressive activity Outcome: Progressing Towards Goal 
  
Problem: Heart Failure: Day 5 Goal: Activity/Safety Outcome: Progressing Towards Goal 
 Goal: Diagnostic Test/Procedures Outcome: Progressing Towards Goal 
Goal: Nutrition/Diet Outcome: Progressing Towards Goal 
Goal: Discharge Planning Outcome: Progressing Towards Goal 
Goal: Medications Outcome: Progressing Towards Goal 
Goal: Respiratory Outcome: Progressing Towards Goal 
Goal: Treatments/Interventions/Procedures Outcome: Progressing Towards Goal 
Goal: Psychosocial 
Outcome: Progressing Towards Goal 
  
Problem: Heart Failure: Discharge Outcomes Goal: *Demonstrates ability to perform prescribed activity without shortness of breath or discomfort Outcome: Progressing Towards Goal 
Goal: *Left ventricular function assessment completed prior to or during stay, or planned for post-discharge Outcome: Progressing Towards Goal 
Goal: *ACEI prescribed if LVEF less than 40% and no contraindications or ARB prescribed Outcome: Progressing Towards Goal 
Goal: *Verbalizes understanding and describes prescribed diet Outcome: Progressing Towards Goal 
Goal: *Verbalizes understanding/describes prescribed medications Outcome: Progressing Towards Goal 
Goal: *Describes available resources and support systems Description 
(eg: Home Health, Palliative Care, [employer] Directive) Outcome: Progressing Towards Goal 
Goal: *Describes smoking cessation resources Outcome: Progressing Towards Goal 
Goal: *Understands and describes signs and symptoms to report to providers(Stroke Metric) Outcome: Progressing Towards Goal 
Goal: *Describes/verbalizes understanding of follow-up/return appt Description 
(eg: to physicians, diabetes treatment coordinator, and other resources Outcome: Progressing Towards Goal 
Goal: *Describes importance of continuing daily weights and changes to report to physician Outcome: Progressing Towards Goal

## 2020-03-04 NOTE — PROGRESS NOTES
Problem: Activity Intolerance Goal: *Oxygen saturation during activity within specified parameters Outcome: Progressing Towards Goal 
  
Problem: Pain Goal: *Control of Pain Outcome: Progressing Towards Goal 
  
Problem: Falls - Risk of 
Goal: *Absence of Falls Description Document Diya Chandra Fall Risk and appropriate interventions in the flowsheet. Outcome: Progressing Towards Goal 
Note: Fall Risk Interventions: 
Mobility Interventions: Patient to call before getting OOB Medication Interventions: Teach patient to arise slowly Problem: Pressure Injury - Risk of 
Goal: *Prevention of pressure injury Description Document Ruperto Scale and appropriate interventions in the flowsheet. Outcome: Progressing Towards Goal 
Note: Pressure Injury Interventions: Activity Interventions: Increase time out of bed Mobility Interventions: Float heels, HOB 30 degrees or less Nutrition Interventions: Document food/fluid/supplement intake, Offer support with meals,snacks and hydration Problem: Diabetes Self-Management Goal: *Using medications safely Description State effect of diabetes medications on diabetes; name diabetes medication taking, action and side effects. Outcome: Progressing Towards Goal 
  
Problem: Heart Failure: Day 3 Goal: Activity/Safety Outcome: Progressing Towards Goal 
Goal: *Hemodynamically stable Outcome: Progressing Towards Goal 
Goal: *Optimal pain control at patient's stated goal 
Outcome: Progressing Towards Goal 
  
Problem: Heart Failure: Day 5 Goal: Activity/Safety Outcome: Progressing Towards Goal 
Goal: Nutrition/Diet Outcome: Progressing Towards Goal 
Goal: Respiratory Outcome: Progressing Towards Goal 
  
Problem: Heart Failure: Discharge Outcomes Goal: *Verbalizes understanding/describes prescribed medications Outcome: Progressing Towards Goal 
Goal: *Describes/verbalizes understanding of follow-up/return appt Description 
(eg: to physicians, diabetes treatment coordinator, and other resources Outcome: Progressing Towards Goal

## 2020-03-04 NOTE — PROGRESS NOTES
[facility] Transitional Care Team: Initial G Note Date of Assessment: 03/04/20 Time of Assessment:  7:51 AM 
 
Assessment & Plan  
MATTHEW Diagnoses Acute diastolic CHF (congestive heart failure) -echo 2/5/20--EF of 50-55%, low normal LV systolic dysfunction, borderline low RV systolic function, mild MR, mild TR; comment that image quality for study was suboptimal 
-per cardiology note, 3/3--\"She does have a normal ejection fraction and no evidence of ischemia by nuclear stress test.  There is no clear evidence for diastolic dysfunction by echocardiogram.  Coronary artery disease remains in the differential with a normal stress test but at this point we have decided to get a hold off cardiac catheterization. \" 
-pulmonary consulted and saw 3/3 
-chest CT result pending Atrial fibrillation 
-chronic 
-S/P AV node ablation and has leadless pacemaker 
-on Eliquis 
 
  
Readmission Risks:   high 1. Older adult with multiple co-morbidities 2. Full code status 3. Caregiver strain Nurse Navigator: no [facility] Transitions Nurse assigned yet 
MATTHEW appointments: TBD Code status: Full Recommended Disposition: TBD Number of Admissions this year:  2, including current, so far in 2020 Heart Failure Bundle:  Yes In to meet Mrs. Dunaway to introduce myself and the HUG program and its goals. She is CALDERA, after a trip to the restroom. States she is not on oxygen at this time. After talking with her for a short while, [facility] [place] left card with contact information and will follow while she is here. Advance Care Plan: not on file; [facility] [place] discussed its importance with patient and offered to help her complete one whlie here. Offered to leave her with a sample copy of the [place] form in the meanwhile, and she accepted this. Will revisit as soon as possible. Medication reconciliation was performed on admission by PharmD. Discharge Needs: TBD; currently her daughter lives with her and she has everything she needs on one floor. Has transportation to [facility]--not sure if she drives herself or her daughter drives her. She reports being able to manage her medications independently. Awareness of medical conditions: is waiting to find out the cause of her SOB--states she has seen a pulmonary doctor and has had the chest CT that was ordered; waiting on results. States this is the hard and confusing part--not knowing the cause. Patient's willingness to go to SNF or inpt rehab if necessary: did not ask during this visit. [facility] NP will leave patient with [facility] calender/follow up appointments at discharge, if appropriate at that time. [facility] information given to patient with explanation of services. Continue to follow CM documentation. Opal Councilman is a 81 y.o. female inpatient at [facility] admitted with shortness of breath on 2/28/20. Chart reviewed by Beatrice Kay NP and [facility] Understanding of Goals) program introduced to patient. The Transitional Care Team bridges the gaps in care and education surrounding discharge from the acute care facility. The objective is to empower the patient and family in taking a proactive role in the task of preventing readmission within the first thirty days after discharge from the acute care setting. The team is also involved in the efforts to reduce readmission to the acute care setting after stabilization and discharge from the acute care environment either to the skilled nursing facilities or community. Past Medical History:  
Diagnosis Date  Atrial fibrillation ([address]  CAD (coronary artery disease)  Cerebrovascular accident stroke, other, unspec 11/18/2010  Coronary atherosclerosis of native coronary artery 11/18/2010  Essential hypertension  Essential hypertension, benign 11/18/2010  HLD (hyperlipidemia) 11/18/2010  PVD (peripheral vascular disease) ([address] 2017  Type II or unspecified type diabetes mellitus without mention of complication, not stated as uncontrolled 11/18/2010  Zoster Advance Care Planning 2/28/2020 Patient's Healthcare Decision Maker is: Legal Next of Kin Primary Decision Maker Name -  
Primary Decision Maker Phone Number -  
Primary Decision Maker Relationship to Patient -  
Confirm Advance Directive None Patient Would Like to Complete Advance Directive No

## 2020-03-04 NOTE — PROGRESS NOTES
03/04/20 1310 03/04/20 1312 03/04/20 1314 RT Walking Oximetry Stage Resting (Room [place] [employer]) During Walk [employer]) During Walk [employer]) SpO2 96 % 93 % 92 % HR 67 bpm 78 bpm 80 bpm  
Rate of Dyspnea 0 0 1 Symptoms  --   
(No symptoms)  --   
O2 Device  --  None (Room air)  --   
FIO2 (%)  --  21 %  --   
Walk/Assistive Device  --   --   --   
Distance Walked in Feet (ft)  --   --   --   
Comments  --   --   --   
 
 03/04/20 1316 RT Walking Oximetry Stage After Walk SpO2 94 % HR 87 bpm  
Rate of Dyspnea 1 Symptoms  
(mild sob) O2 Device None (Room air) FIO2 (%) 21 % Walk/Assistive Device (walked without device) Distance Walked in Feet (ft) 300 ft Comments  
(pt passed six minute walk test with room air.)

## 2020-03-04 NOTE — PROGRESS NOTES
HYPOGLYCEMIC EPISODE DOCUMENTATION Patient with hypoglycemic episode(s) at 0245(time) on 3/4/20(date). BG value(s) pre-treatment 59 Was patient symptomatic? [] yes, [x] no Patient was treated with the following rescue medications/treatments: [] D50 [] Glucose tablets 
              [] Glucagon 
              [x] 4oz juice 
              [] 6oz reg soda 
              [] 8oz low fat milk BG value post-treatment: 83

## 2020-03-04 NOTE — PROGRESS NOTES
PULMONARY/CRITICAL CARE/SLEEP MEDICINE Progress Note Name: Reyna Johansen : 1935 MRN: [id number] Date: 3/4/2020 Subjective:  
 
3/4/20 PFTs with a mild restrictive pattern CT scan with a mild scattered tree-in-bud nodularity Consult Note: 3/3/20 Requesting Physician: Dr. Edmar Davison Reason for consult: Dyspnea Medical records and data reviewed. Patient is a 81 y.o. female who presented to the hospital with worsening dyspnea. She was in the hospital recently for similar symptoms which improved with multimodality treatment for cardiac problems and presumed asthmatic bronchitis. She denies fevers. She has had a history of cough while on ACE I therapy. She has been compliant with symbicort at home. She has underlying OHD with recent PM placement. She presented with pedal edema which has improved with diuretics. CXR shows effusions and interstitial edema. She on apixaban and suspicion for VTE is low Review of Systems: A comprehensive 12 system review of systems was negative except for as documented in HPI Assessment:  
 
Dyspnea-- suspect cardiac etiology, but screen for pulmonary pathology OHD 
ACE I associated cough Recommendations:  
 
Continue nebs for now, She seems to benefit from it O2 titration above 90% MARIFER evaluation would be helpful, we can follow up in the outpatient pulmonary office Continue protonix Consider replacement of ACE I Little evidence support pulmonary disease as the prime  of her dyspnea We will be available again to see if needed Active Problem List:  
 
Problem List  Date Reviewed: 2020 Codes Class * (Principal) Acute diastolic CHF (congestive heart failure) ([facility]) ICD-10-CM: I50.31 ICD-9-CM: 428.31, 428.0 Pacemaker ICD-10-CM: Z95.0 ICD-9-CM: V45.01 Overview Signed 2020  5:45 PM by Petros Simon MD  
  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  
  2/5/2020 junctional escape 37 bpm 
  
  
   
 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 ICD-10-CM: I25.118 
ICD-9-CM: 414.00, 413.9 Severe obesity (BMI 35.0-39. 9) with comorbidity ([facility] ICD-10-CM: E66.01 
ICD-9-CM: 278.01 Type 2 diabetes with nephropathy ([facility]) ICD-10-CM: E11.21 
ICD-9-CM: 250.40, 583.81   
   
 SOB (shortness of breath) ICD-10-CM: R06.02 
ICD-9-CM: 786.05 Atrial fibrillation ([facility] ICD-10-CM: I48.91 
ICD-9-CM: 427.31 PVD (peripheral vascular disease) with claudication ([facility]) ICD-10-CM: I73.9 ICD-9-CM: 443.9 Hypertension complicating diabetes ([facility] ICD-10-CM: E11.59, I10 
ICD-9-CM: 250.80, 401.9 Coronary atherosclerosis of native coronary artery ICD-10-CM: I25.10 ICD-9-CM: 414.01   
   
 HLD (hyperlipidemia) ICD-10-CM: S87.1 ICD-9-CM: 272.4 Cerebrovascular accident stroke, other, unspec ICD-10-CM: I67.89 ICD-9-CM: 569 Past Medical History:  
 
 has a past medical history of Atrial fibrillation ([facility] CAD (coronary artery disease), Cerebrovascular accident stroke, other, unspec (11/18/2010), Coronary atherosclerosis of native coronary artery (11/18/2010), Essential hypertension, Essential hypertension, benign (11/18/2010), HLD (hyperlipidemia) (11/18/2010), PVD (peripheral vascular disease) ([facility] (2017), Type II or unspecified type diabetes mellitus without mention of complication, not stated as uncontrolled (11/18/2010), and Zoster.  
 
Past Surgical History:  
 
 has a past surgical history that includes hx heart catheterization; hx coronary stent placement; hx hysterectomy (1972); pr tcat insj/rpl perm leadless pacemaker rv w/img (N/A, 2/5/2020); and pr icar catheter ablation atrioventr node function (N/A, 2/5/2020). Home Medications:  
 
Prior to Admission medications Medication Sig Start Date End Date Taking? Authorizing Provider  
metoprolol succinate (TOPROL-XL) 50 mg XL tablet Take 50 mg by mouth nightly. Yes Provider, Historical  
amLODIPine (NORVASC) 5 mg tablet Take 5 mg by mouth daily. Yes Provider, Historical  
acyclovir (ZOVIRAX) 5 % ointment Use as directed 2/14/20  Yes Fawad Sherman IV, MD  
isosorbide mononitrate ER (IMDUR) 60 mg CR tablet TAKE 1 TABLET TWICE A DAY 2/13/20  Yes Elizabeth Sanderson MD  
acetaminophen (TYLENOL) 325 mg tablet Take 2 Tabs by mouth every four (4) hours as needed for Pain or Fever. 2/7/20  Yes Fawad Sherman IV, MD  
albuterol [facility] HFA) 90 mcg/actuation inhaler Take 2 Puffs by inhalation every four (4) hours as needed for Wheezing or Shortness of Breath. 2/7/20  Yes Fawad Sherman IV, MD  
aspirin 81 mg chewable tablet Take 81 mg by mouth nightly. Yes Provider, Historical  
atorvastatin (LIPITOR) 20 mg tablet Take 20 mg by mouth nightly. Yes Provider, Historical  
azelastine (ASTEPRO) 0.15 % (205.5 mcg) 1 Spray by Both Nostrils route nightly. Yes Provider, Historical  
bumetanide (BUMEX) 1 mg tablet Take 1 mg by mouth daily. Takes 1 mg or 2 mg daily on alternating days    Yes Provider, Historical  
clotrimazole-betamethasone (LOTRISONE) topical cream Apply  to affected area two (2) times daily as needed for Skin Irritation or Other (Rash). Yes Provider, Historical  
insulin detemir U-100 (LEVEMIR U-100 INSULIN) 100 unit/mL injection 10 Units by SubCUTAneous route daily. Takes 20 units every morning, and takes 8-10 units every evening as needed/as directed based on blood sugar    Yes Provider, Historical  
mupirocin calcium (BACTROBAN) 2 % topical cream Apply  to affected area two (2) times daily as needed.    Yes Provider, Historical  
nystatin (MYCOSTATIN) powder Apply  to affected area two (2) times daily as needed for Other (Rash). Yes Provider, Historical  
glipiZIDE (GLUCOTROL) 5 mg tablet TAKE 2 TABLETS TWICE A DAY 20  Yes Thalia Ibarra IV, MD  
allopurinol (ZYLOPRIM) 100 mg tablet Take 100 mg by mouth daily. 10/9/19  Yes Provider, Historical  
apixaban (ELIQUIS) 2.5 mg tablet Take 1 Tab by mouth two (2) times a day. 10/29/19  Yes Corbett Schilder, MD  
omeprazole (PRILOSEC) 20 mg capsule TAKE 1 CAPSULE DAILY 10/29/19  Yes Corbett Schilder, MD  
enalapril (VASOTEC) 20 mg tablet TAKE 1 TABLET TWICE A DAY 19  Yes Corbett Schilder, MD  
budesonide-formoterol (SYMBICORT) 160-4.5 mcg/actuation HFAA Take 2 Puffs by inhalation two (2) times a day. After each use, rinse mouth with water 3/8/19  Yes Thalia Ibarra IV, MD  
cholecalciferol, vitamin D3, (VITAMIN D3) 2,000 unit tab Take 2,000 Units by mouth daily. Yes Provider, Historical  
nitroglycerin (NITROSTAT) 0.4 mg SL tablet 1 Tab by SubLINGual route every five (5) minutes as needed for Chest Pain. 17  Yes Corbett Schilder, MD  
insulin aspart U-100 (NOVOLOG FLEXPEN U-100 INSULIN) 100 unit/mL (3 mL) inpn INJECT 7 UNITS BEFORE EACH MEAL 3 TIMES A DAY OR AS   DIRECTED 19   Kary West MD  
 
 
Allergies/Social/Family History: Allergies Allergen Reactions  Bactrim [Sulfamethoprim] Other (comments) Affects her kidney funtion  Clonidine Shortness of Breath and Swelling Lightheaded,   
 Codeine Unknown (comments)  Keflin Itching  Pcn [Penicillins] Unknown (comments)  Tramadol Other (comments) Makes her head feel swishy Social History Tobacco Use  Smoking status: Former Smoker Packs/day: 0.80 Years: 20.00 Pack years: 16.00 Types: Cigarettes Last attempt to quit: 1986 Years since quittin.9  Smokeless tobacco: Never Used Substance Use Topics  Alcohol use: No  
  
No family history on file. Objective:  
Vital Signs: 
Visit Vitals /56 (BP 1 Location: Right arm, BP Patient Position: Sitting) Pulse 82 Temp 97.7 °F (36.5 °C) Resp 15 Wt 75.2 kg (165 lb 12.6 oz) SpO2 95% BMI 32.38 kg/m² O2 Flow Rate (L/min): 2 l/min O2 Device: Room air Temp (24hrs), Av °F (36.7 °C), Min:97.5 °F (36.4 °C), Max:98.6 °F (37 °C) Intake/Output:  
 
Intake/Output Summary (Last 24 hours) at 3/4/2020 1050 Last data filed at 3/4/2020 0800 Gross per 24 hour Intake 420 ml Output 850 ml Net -430 ml Physical Exam:  
General:  Alert, cooperative, no distress, appears stated age. Head:  Normocephalic, without obvious abnormality, atraumatic. Eyes:  Conjunctivae/corneas clear. PERRL Neck: Supple, symmetrical, no adenopathy, no carotid bruit and no JVD. Lungs:   Clear to auscultation bilaterally. Chest wall:  No tenderness or deformity. Heart:  Regular rate and rhythm, S1-S2 normal, no murmur, no click, rub or gallop. Abdomen:   Soft, non-tender. Bowel sounds present. No masses,  No organomegaly. Extremities: Atraumatic, no cyanosis or edema. Pulses: Palpable Skin: No rashes or lesions Neurologic: Grossly nonfocal  
 
  
 LABS AND  DATA: Personally reviewed Recent Labs 20 
01420 
7067 WBC 12.5* 11.4* HGB 11.6 11.6 HCT 36.3 36.4 * 434* Recent Labs 20 
0149 20 
8606  135* K 3.6 3.6  100 CO2 29 28 BUN 27* 24* CREA 1.52* 1.43* GLU 57* 140* CA 8.7 8.6 No results for input(s): SGOT, GPT, AP, TBIL, TP, ALB, GLOB, AML, LPSE in the last 72 hours. No lab exists for component: AMYP No results for input(s): INR, PTP, APTT, INREXT, INREXT in the last 72 hours. No results for input(s): PHI, PCO2I, PO2I, FIO2I in the last 72 hours. No results for input(s): CPK, CKMB, TROIQ, BNPP in the last 72 hours. MEDS: Reviewed Chest Imaging: personally reviewed and report checked Tele- reviewed Medical decision making: I have reviewed the flowsheet and previous day's notes Patient has acute or chronic illness that poses a threat to life or bodily function Review and order of Clinical lab tests Review and Order of Radiology tests Independent visualization of Image Thank you for allowing me to participate in this patient's care. Mikal Braun PA-C [facility]

## 2020-03-04 NOTE — PROGRESS NOTES
Medical Progress Note NAME: Steph Schroeder :  1935 MRM:  [id number] Date/Time: 3/4/2020 Problem List:  
 
Principal Problem: 
  Acute diastolic CHF (congestive heart failure) ([facility] (2020) Active Problems: 
  Coronary atherosclerosis of native coronary artery (2010) Hypertension complicating diabetes ([facility] (2016) [facility] ([facility] (2017) Type 2 diabetes with nephropathy ([facility] (2018) Pacemaker (2020) Overview: 2020 leadless pacemaker Subjective:  
 
Largely non productive cough on deep breathing or walking short distance(assoc'd w/ sob) about the same. Denies cp Past Medical History:  
Diagnosis Date  Atrial fibrillation ([facility]  CAD (coronary artery disease)  Cerebrovascular accident stroke, other, unspec 2010  Coronary atherosclerosis of native coronary artery 2010  Essential hypertension  Essential hypertension, benign 2010  HLD (hyperlipidemia) 2010  PVD (peripheral vascular disease) ([facility]   Type II or unspecified type diabetes mellitus without mention of complication, not stated as uncontrolled 2010  Zoster Objective:  
 
 
 
Vitals:  
  
Last 24hrs VS reviewed since prior progress note. Most recent are: 
 
Visit Vitals /50 (BP 1 Location: Right arm, BP Patient Position: At rest) Pulse 82 Temp 98 °F (36.7 °C) Resp 17 Wt 165 lb 12.6 oz (75.2 kg) SpO2 96% BMI 32.38 kg/m² SpO2 Readings from Last 6 Encounters:  
20 96% 20 93% 20 98% 20 97% 20 91% 20 95% O2 Flow Rate (L/min): 2 l/min Intake/Output Summary (Last 24 hours) at 3/4/2020 8906 Last data filed at 3/4/2020 0530 Gross per 24 hour Intake 300 ml Output 850 ml Net -550 ml Exam:  
   General:  Alert, cooperative, no distress, appears stated age. Lungs:   Clear to auscultation bilaterally. Heart:  Regular rate and rhythm, S1, S2 normal, no murmur, click, rub or gallop. Abdomen:   Soft, non-tender. Bowel sounds normal. No masses,  No organomegaly. Extremities: No pedal  edema. Lab Data Reviewed: (see below) Recent Results (from the past 24 hour(s)) GLUCOSE, POC Collection Time: 03/03/20 11:51 AM  
Result Value Ref Range Glucose (POC) 281 (H) 65 - 100 mg/dL Performed by Harmeet Carey GLUCOSE, POC Collection Time: 03/03/20  5:37 PM  
Result Value Ref Range Glucose (POC) 174 (H) 65 - 100 mg/dL Performed by Lilia Rogel, POC Collection Time: 03/03/20  9:20 PM  
Result Value Ref Range Glucose (POC) 275 (H) 65 - 100 mg/dL Performed by Shruthi Merchant CBC WITH AUTOMATED DIFF Collection Time: 03/04/20  1:49 AM  
Result Value Ref Range WBC 12.5 (H) 3.6 - 11.0 K/uL  
 RBC 4.46 3.80 - 5.20 M/uL  
 HGB 11.6 11.5 - 16.0 g/dL HCT 36.3 35.0 - 47.0 % MCV 81.4 80.0 - 99.0 FL  
 MCH 26.0 26.0 - 34.0 PG  
 MCHC 32.0 30.0 - 36.5 g/dL  
 RDW 15.5 (H) 11.5 - 14.5 % PLATELET 648 (H) 122 - 400 K/uL MPV 8.7 (L) 8.9 - 12.9 FL  
 NRBC 0.0 0  WBC ABSOLUTE NRBC 0.00 0.00 - 0.01 K/uL NEUTROPHILS 56 32 - 75 % BAND NEUTROPHILS 7 (H) 0 - 6 % LYMPHOCYTES 21 12 - 49 % MONOCYTES 10 5 - 13 % EOSINOPHILS 3 0 - 7 % BASOPHILS 0 0 - 1 % MYELOCYTES 3 (H) 0 % IMMATURE GRANULOCYTES 0 %  
 ABS. NEUTROPHILS 7.9 1.8 - 8.0 K/UL  
 ABS. LYMPHOCYTES 2.6 0.8 - 3.5 K/UL  
 ABS. MONOCYTES 1.3 (H) 0.0 - 1.0 K/UL  
 ABS. EOSINOPHILS 0.4 0.0 - 0.4 K/UL  
 ABS. BASOPHILS 0.0 0.0 - 0.1 K/UL  
 ABS. IMM. GRANS. 0.0 K/UL  
 DF MANUAL    
 RBC COMMENTS MICROCYTOSIS 1+ 
    
 RBC COMMENTS ANISOCYTOSIS 1+ 
    
 RBC COMMENTS ATYPICAL LYMPHOCYTES PRESENT 
TOXIC GRANULATION 
    
METABOLIC PANEL, BASIC Collection Time: 03/04/20  1:49 AM  
Result Value Ref Range  Sodium 136 136 - 145 mmol/L  
 Potassium 3.6 3.5 - 5.1 mmol/L Chloride 100 97 - 108 mmol/L  
 CO2 29 21 - 32 mmol/L Anion gap 7 5 - 15 mmol/L Glucose 57 (L) 65 - 100 mg/dL BUN 27 (H) 6 - 20 MG/DL Creatinine 1.52 (H) 0.55 - 1.02 MG/DL  
 BUN/Creatinine ratio 18 12 - 20 GFR est AA 40 (L) >60 ml/min/1.73m2 GFR est non-AA 33 (L) >60 ml/min/1.73m2 Calcium 8.7 8.5 - 10.1 MG/DL  
GLUCOSE, POC Collection Time: 03/04/20  2:55 AM  
Result Value Ref Range Glucose (POC) 59 (L) 65 - 100 mg/dL Performed by Celina Pierce GLUCOSE, POC Collection Time: 03/04/20  3:15 AM  
Result Value Ref Range Glucose (POC) 83 65 - 100 mg/dL Performed by Courtney Johnson Medications Reviewed: (see below) 
 
______________________________________________________________________ Medications:  
 
Current Facility-Administered Medications Medication Dose Route Frequency  bumetanide (BUMEX) tablet 2 mg  2 mg Oral DAILY  potassium chloride SR (KLOR-CON 10) tablet 20 mEq  20 mEq Oral DAILY  albuterol (PROVENTIL VENTOLIN) nebulizer solution 2.5 mg  2.5 mg Nebulization Q4H PRN  
 allopurinoL (ZYLOPRIM) tablet 100 mg  100 mg Oral DAILY  amLODIPine (NORVASC) tablet 5 mg  5 mg Oral DAILY  apixaban (ELIQUIS) tablet 2.5 mg  2.5 mg Oral BID  aspirin chewable tablet 81 mg  81 mg Oral QHS  atorvastatin (LIPITOR) tablet 20 mg  20 mg Oral QHS  azelastine (ASTELIN) 137mcg/spray nasal spray  1 Spray Both Nostrils QHS  arformoteroL (BROVANA) neb solution 15 mcg  15 mcg Nebulization BID RT And  
 budesonide (PULMICORT) 500 mcg/2 ml nebulizer suspension  500 mcg Nebulization BID RT  
 cholecalciferol (VITAMIN D3) (1000 Units /25 mcg) tablet 2 Tab  2,000 Units Oral DAILY  lisinopril (PRINIVIL, ZESTRIL) tablet 20 mg  20 mg Oral BID  
 glipiZIDE (GLUCOTROL) tablet 10 mg  10 mg Oral BID  insulin glargine (LANTUS) injection 20 Units  20 Units SubCUTAneous DAILY  isosorbide mononitrate ER (IMDUR) tablet 60 mg  60 mg Oral BID  metoprolol succinate (TOPROL-XL) XL tablet 50 mg  50 mg Oral QHS  nitroglycerin (NITROSTAT) tablet 0.4 mg  0.4 mg SubLINGual Q5MIN PRN  pantoprazole (PROTONIX) tablet 40 mg  40 mg Oral ACB  ondansetron (ZOFRAN) injection 4 mg  4 mg IntraVENous Q6H PRN  
 acetaminophen (TYLENOL) tablet 650 mg  650 mg Oral Q4H PRN  
 glucose chewable tablet 16 g  4 Tab Oral PRN  
 glucagon (GLUCAGEN) injection 1 mg  1 mg IntraMUSCular PRN  
 dextrose 10% infusion 0-250 mL  0-250 mL IntraVENous PRN  
 insulin lispro (HUMALOG) injection   SubCUTAneous AC&HS Assessment:  
 
Cough , exertional sob, Chest CT pending. Pulm , Cards c/s 's appreciated. DM . Earlier this am. Mild hypoglycemic episode responded to oral glucose. Follow CKD , serum Cr stable. Patient Active Problem List  
Diagnosis Code  Coronary atherosclerosis of native coronary artery I25.10  
 HLD (hyperlipidemia) E78.5  Cerebrovascular accident stroke, other, unspec I67.89  
 Hypertension complicating diabetes ([facility] E11.59, I10  
 PVD (peripheral vascular disease) with claudication ([facility]) I73.9  Atrial fibrillation ([facility]) I48.91  
 SOB (shortness of breath) R06.02  
 Type 2 diabetes with nephropathy ([facility]) E11.21  
 Severe obesity (BMI 35.0-39. 9) with comorbidity ([facility] E66.01  
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  
 
Spoke w/ dtr on phone 
      
 
  
 
 
  
              
 
 
 
 
 
 
      
___________________________________________________ Attending Physician: Litzy Guerrero MD

## 2020-03-04 NOTE — INTERDISCIPLINARY ROUNDS
During interdisciplinary rounds at 0930 care management, physical therapy, and nursing discussed discharge and plan of care. See clinical pathway and/or care plan for interventions and desired outcomes.

## 2020-03-04 NOTE — PROGRESS NOTES
Problem: Heart Failure: Day 1 Goal: Activity/Safety Outcome: Progressing Towards Goal 
Goal: Consults, if ordered Outcome: Progressing Towards Goal 
Goal: Diagnostic Test/Procedures Outcome: Progressing Towards Goal 
Goal: Nutrition/Diet Outcome: Progressing Towards Goal 
Goal: Discharge Planning Outcome: Progressing Towards Goal 
Goal: Medications Outcome: Progressing Towards Goal 
Goal: Respiratory Outcome: Progressing Towards Goal 
Goal: Treatments/Interventions/Procedures Outcome: Progressing Towards Goal 
Goal: Psychosocial 
Outcome: Progressing Towards Goal 
Goal: *Oxygen saturation within defined limits Outcome: Progressing Towards Goal 
Goal: *Hemodynamically stable Outcome: Progressing Towards Goal 
Goal: *Optimal pain control at patient's stated goal 
Outcome: Progressing Towards Goal 
Goal: *Anxiety reduced or absent Outcome: Progressing Towards Goal

## 2020-03-04 NOTE — PROGRESS NOTES
Bedside and Verbal shift change report given to Sindi Jordan RN (oncoming nurse) by Mariposa Lizarraga RN (offgoing nurse). Report included the following information SBAR, Kardex, Intake/Output, Recent Results, Cardiac Rhythm Paced and Dual Neuro Assessment.

## 2020-03-05 NOTE — CDMP QUERY
Query 2 Patient admitted with Acute CHF, noted to have CKD. If possible, please document in progress notes and discharge summary if you are evaluating and/or treating any of the following: ? CKD Stage 3 GFR 30-59 
? CKD, please specify other stage 
? Other, please specify ? Clinically unable to determine The medical record reflects the following: 
 
  Risk Factors: HTN, DM, CAD Clinical Indicators:  
- Hosp PN 3/4: 
\"CKD , serum Cr stable. \"  
- GFR 
2/7 = 33 
2/14 = 43 
2/28 = 32 
3/4 = 33 
- Creat 2/14 = 1.18 
2/28 = 1.54 
3/4 = 1.52 Treatment: - Serial labs - Amlodipine, Metoprolol, Losartan 
- Diabetic Low Na Diet Thank You, 
   Sharon Andrews, [address] [place] [address] 
   [phone] Reference:    CKD Stages / [address] [place] [address] Stage 1:  GFR = > 90 ml/min Stage 2:  GFR = 60 to 89 Stage 3:  GFR = 30 to 59 Stage 4:  GFR = 15 to 29 Stage 5:  GFR = < 15 Please clarify and document your clinical opinion in the progress notes and discharge summary including the definitive and/or presumptive diagnosis, (suspected or probable), related to the above clinical findings. Please include clinical findings supporting your diagnosis.

## 2020-03-05 NOTE — PROGRESS NOTES
Medical Progress Note NAME: Mainor Sandy :  1935 MRM:  [id number] Date/Time: 3/5/2020 Problem List:  
 
Principal Problem: 
  Acute diastolic CHF (congestive heart failure) ([facility] (2020) Active Problems: 
  Coronary atherosclerosis of native coronary artery (2010) Hypertension complicating diabetes ([facility] (2016) Atrial fibrillation ([facility] (2017) Type 2 diabetes with nephropathy ([facility] (2018) Pacemaker (2020) Overview: 2020 leadless pacemaker Subjective:  
 
Walked to/from bathroom without sob . Cough better. Chest CT noted Chronic intermittent rt post pelvis pain helped by office steroid inj by Gloria Cameron MD in past . At times pain radiates to r leg. Past Medical History:  
Diagnosis Date  Atrial fibrillation ([facility]  CAD (coronary artery disease)  Cerebrovascular accident stroke, other, unspec 2010  Coronary atherosclerosis of native coronary artery 2010  Essential hypertension  Essential hypertension, benign 2010  HLD (hyperlipidemia) 2010  PVD (peripheral vascular disease) ([facility]   Type II or unspecified type diabetes mellitus without mention of complication, not stated as uncontrolled 2010  Zoster Objective:  
 
 
 
Vitals:  
  
Last 24hrs VS reviewed since prior progress note. Most recent are: 
 
Visit Vitals /47 Pulse 80 Temp 98.2 °F (36.8 °C) Resp 21 Wt 166 lb 12.8 oz (75.7 kg) SpO2 96% BMI 32.58 kg/m² SpO2 Readings from Last 6 Encounters:  
20 96% 20 93% 20 98% 20 97% 20 91% 20 95% O2 Flow Rate (L/min): 2 l/min Intake/Output Summary (Last 24 hours) at 3/5/2020 1420 Last data filed at 3/5/2020 1886 Gross per 24 hour Intake 600 ml Output 1200 ml Net -600 ml Exam: General:  Alert, cooperative, no distress, appears stated age. Lungs:   Clear to auscultation bilaterally. Heart:  Regular rate and rhythm, S1, S2 normal, no murmur, click, rub or gallop. Under breasts : pink mild raised eruption c/w Monilia Abdomen:   Soft, non-tender. Bowel sounds normal.  
Rt post pelvis slight tender area w/o acute deformity Extremities: Hips from No pedal  edema. Lab Data Reviewed: (see below) Recent Results (from the past 24 hour(s)) GLUCOSE, POC Collection Time: 03/04/20 11:55 AM  
Result Value Ref Range Glucose (POC) 215 (H) 65 - 100 mg/dL Performed by Jonel Velazco (CON) GLUCOSE, POC Collection Time: 03/04/20  4:48 PM  
Result Value Ref Range Glucose (POC) 243 (H) 65 - 100 mg/dL Performed by Gladys Bettencourt GLUCOSE, POC Collection Time: 03/04/20  8:58 PM  
Result Value Ref Range Glucose (POC) 180 (H) 65 - 100 mg/dL Performed by Priya Ugarte GLUCOSE, POC Collection Time: 03/05/20  7:09 AM  
Result Value Ref Range Glucose (POC) 178 (H) 65 - 100 mg/dL Performed by Priya Ugarte Medications Reviewed: (see below) 
 
______________________________________________________________________ Medications:  
 
Current Facility-Administered Medications Medication Dose Route Frequency  valsartan (DIOVAN) tablet 160 mg  160 mg Oral Q12H  nystatin (MYCOSTATIN) 100,000 unit/gram cream   Topical BID  bumetanide (BUMEX) tablet 2 mg  2 mg Oral DAILY  potassium chloride SR (KLOR-CON 10) tablet 20 mEq  20 mEq Oral DAILY  albuterol (PROVENTIL VENTOLIN) nebulizer solution 2.5 mg  2.5 mg Nebulization Q4H PRN  
 allopurinoL (ZYLOPRIM) tablet 100 mg  100 mg Oral DAILY  amLODIPine (NORVASC) tablet 5 mg  5 mg Oral DAILY  apixaban (ELIQUIS) tablet 2.5 mg  2.5 mg Oral BID  aspirin chewable tablet 81 mg  81 mg Oral QHS  atorvastatin (LIPITOR) tablet 20 mg  20 mg Oral QHS  azelastine (ASTELIN) 137mcg/spray nasal spray  1 Spray Both Nostrils QHS  arformoteroL (BROVANA) neb solution 15 mcg  15 mcg Nebulization BID RT And  
 budesonide (PULMICORT) 500 mcg/2 ml nebulizer suspension  500 mcg Nebulization BID RT  
 cholecalciferol (VITAMIN D3) (1000 Units /25 mcg) tablet 2 Tab  2,000 Units Oral DAILY  glipiZIDE (GLUCOTROL) tablet 10 mg  10 mg Oral BID  insulin glargine (LANTUS) injection 20 Units  20 Units SubCUTAneous DAILY  isosorbide mononitrate ER (IMDUR) tablet 60 mg  60 mg Oral BID  metoprolol succinate (TOPROL-XL) XL tablet 50 mg  50 mg Oral QHS  nitroglycerin (NITROSTAT) tablet 0.4 mg  0.4 mg SubLINGual Q5MIN PRN  pantoprazole (PROTONIX) tablet 40 mg  40 mg Oral ACB  ondansetron (ZOFRAN) injection 4 mg  4 mg IntraVENous Q6H PRN  
 acetaminophen (TYLENOL) tablet 650 mg  650 mg Oral Q4H PRN  
 glucose chewable tablet 16 g  4 Tab Oral PRN  
 glucagon (GLUCAGEN) injection 1 mg  1 mg IntraMUSCular PRN  
 dextrose 10% infusion 0-250 mL  0-250 mL IntraVENous PRN  
 insulin lispro (HUMALOG) injection   SubCUTAneous AC&HS Assessment:  
Cough sob better. Change from ACE to Valsartan today . Rt post pelvis pain. Check xray . Ortho to see. About steroid injection Hopefully home soon. ? Later today. Patient Active Problem List  
Diagnosis Code  Coronary atherosclerosis of native coronary artery I25.10  
 HLD (hyperlipidemia) E78.5  Cerebrovascular accident stroke, other, unspec I67.89  
 Hypertension complicating diabetes ([facility] E11.59, I10  
 PVD (peripheral vascular disease) with claudication (HCC) I73.9  Atrial fibrillation (HCC) I48.91  
 SOB (shortness of breath) R06.02  
 Type 2 diabetes with nephropathy (HCC) E11.21  
 Severe obesity (BMI 35.0-39. 9) with comorbidity ([facility] E66.01  
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 
      
 
  
 
 
  
              
 
 
 
 
 
 
      
___________________________________________________ Attending Physician: Amor Lundberg MD

## 2020-03-05 NOTE — ADVANCED PRACTICE NURSE
[facility] [address] [place] [address], [address] [place] [address] Meño Arrington 
 [phone] Luis Manuel Rah 3/5/2020 
11:51 AM 
 
 
OK for discharge from Cardiology standpoint. Appointment with Dr. Nuha Madsen for Monday the 9th at 11:00 am. 
 
Future Appointments Date Time Provider [facility] 3/9/2020 11:00 AM Surjit Gleason  [address]  
3/19/2020 11:15 AM Darvin Hyman  [facility] Shelly Grier PA-C

## 2020-03-05 NOTE — WOUND CARE
Wound Consult:  New Patient Visit. Chart reviewed. Consulted for breast rash. Patient up in recliner - lowers legs and stands independently. Assessment/Treatment: 
Bilateral breast folds under pendulous breasts - scattered light pink rash, no odor, intact skin. Antifungal cream in use - patient prefers cream over powder. Tucked soft disposable wash cloth under breast in fold to prevent skin on skin. Distal gluteal cleft/inner buttock - red, blanching intact skin; where buttocks come together against each other. Cannot see unless buttocks spread apart. Applied hydrogaurd ointment which is at bedside. Skin Care Recommendations: 1. Minimize friction/shear: minimize layers of linen/pads under patient. 2. Off load pressure/reposition: continue to turn and reposition approximately every 2 hours; float heels if needed. 3. Manage Moisture - keep skin folds dry; continent with perhaps some urgency as she uses pad in underwear; barrier cream at bedside and would continue to inner buttocks and continue her antifungal cream - can use the soft disposable wash clothes to help keep skin off skin thereby decreasing moisture. 4. Continue to monitor nutrition, pain, and skin risk scale, and skin assessment. Plan: 
Please re-consult if needed. Suzie Valle RN,[facility] [phone] Pager [phone]

## 2020-03-05 NOTE — PROGRESS NOTES
Bedside shift change report given to Soha Parish (oncoming nurse) by Jeaneth Light (offgoing nurse). Report included the following information SBAR, Kardex, Intake/Output, MAR, Recent Results and Dual Neuro Assessment.

## 2020-03-05 NOTE — ACP (ADVANCE CARE PLANNING)
Date of ACP Conversation: 03/05/20 Persons included in Conversation:  patient and family 
  
EDDIE NP stopped by room to offer to help patient complete ACP. She has company at present--her daughter and granddaughter. Patient declines at this time. 
  
Conversation Outcomes / Follow-Up Plan:  
Follow-up with patient while in hospital to help complete if opportunity presents.   If unable, communicate with PCP/Care Transitions Nurse that conversations took place and that patient was given a sample copy to review and discuss with family/health care agents.

## 2020-03-05 NOTE — DISCHARGE INSTRUCTIONS
Patient Discharge Instructions    Reyna Johansen / [id number] : 1935    Admitted 2020 Discharged: 3/5/2020     Take Home Medications            · It is important that you take the medication exactly as they are prescribed. · Keep your medication in the bottles provided by the pharmacist and keep a list of the medication names, dosages, and times to be taken in your wallet. · Do not take other medications without consulting your doctor. What to do at Home    Recommended diet: Diabetic Diet     Recommended activity: stop Nystatin powder for now. Apply Nystatin cream under breasts twice daily for 10 days. After rash has cleared, you can restart  Nystatin powder twice a day as needed . Check blood sugar 4 times daily and record them on paper . Walk with cane     If you experience any of the following symptoms : worse cough or breathing, worse rash under breasts, then  please follow up with Dr. Dinesh Mccauley at [phone]     On 3/6, call Dr. Kendrick Galvan for an appointment on 3/9/2020. Keep 3/19/2020 appt with Dr. Edmar Davison  And mid April Appt with Dr.Matthew Wendy Teixeira . If back pain does not improve, see Dr Renetta Schultz at [phone]        Information obtained by :  I understand that if any problems occur once I am at home I am to contact my physician. I understand and acknowledge receipt of the instructions indicated above.                                                                                                                                            Physician's or R.N.'s Signature                                                                  Date/Time                                                                                                                                              Patient or Representative Signature                                                          Date/Time

## 2020-03-05 NOTE — PROGRESS NOTES
I have reviewed discharge instructions with the patient. The patient verbalized understanding. Discharge medications reviewed with patient and appropriate educational materials and side effects teaching were provided. PIV removed. Hard scripts given to patient. Personal belongings sent with patient. Patient transported home via daughter.

## 2020-03-05 NOTE — INTERDISCIPLINARY ROUNDS
During interdisciplinary rounds at 0930, care management, physical therapy, and nursing discussed discharge and plan of care. See clinical pathway and/or care plan for interventions and desired outcomes.

## 2020-03-05 NOTE — PROGRESS NOTES
Bedside RN performed patient education and medication education. Discharge concerns initiated and discussed with patient, including clarification on \"who\" assists the patient at their home and instructions for when the home going patient should call their provider after discharge. Opportunity for questions and clarification was provided. Patient receptive to education: YES Patient stated: \"My daughter is coming to get me\" Barriers to Education: None Diagnosis Education given:  YES Length of stay: 6 Expected Day of Discharge: 3/5 Ask if they have \"Help at Home\" & add to white board? YES Education Day #: 2 Medication Education Given:  YES 
M in the box Medication name: Losartan Pt aware of [facility]CA[facility]PS survey: YES

## 2020-03-05 NOTE — PROGRESS NOTES
Problem: Heart Failure: Day 5 Goal: Activity/Safety Outcome: Progressing Towards Goal 
Goal: Diagnostic Test/Procedures Outcome: Progressing Towards Goal 
Goal: Nutrition/Diet Outcome: Progressing Towards Goal 
Goal: Discharge Planning Outcome: Progressing Towards Goal 
Goal: Medications Outcome: Progressing Towards Goal 
Goal: Respiratory Outcome: Progressing Towards Goal

## 2020-03-05 NOTE — CONSULTS
ORTHO CONSULT NOTE Date of Consultation:  2020 Referring Physician:  Melissa Rodriges CC: right sided low back pain HPI:  Worley Holstein is a 81 y.o. female admitted for SOB who c/o right sided low back pain for 2 months. Her pain is worse with activity. She denies radiation down leg. She denies change from baseline neuropathy in her feet and LLE weakness d/t CVA. She had right SI injection in office Dr. Lori Varma  with good results lasting about 8 months. She missed her appointment with him this week so her PCP asked Ortho to see her. Past Medical History:  
Diagnosis Date  Atrial fibrillation ([facility]  CAD (coronary artery disease)  Cerebrovascular accident stroke, other, unspec 2010  Coronary atherosclerosis of native coronary artery 2010  Essential hypertension  Essential hypertension, benign 2010  HLD (hyperlipidemia) 2010  PVD (peripheral vascular disease) ([facility] 2017  Type II or unspecified type diabetes mellitus without mention of complication, not stated as uncontrolled 2010  Zoster Past Surgical History:  
Procedure Laterality Date  HX CORONARY STENT PLACEMENT    
 HX HEART CATHETERIZATION    
 [address]  [place] ICAR CATHETER ABLATION ATRIOVENTR NODE FUNCTION N/A 2020 ABLATION AV NODE performed by Radha Tejada MD at [address] CATH LAB  [place] TCAT INSJ/RPL PERM LEADLESS PACEMAKER RV W/IMG N/A 2020 INSERT OR REPLACE TRANSCATH PPM LEADLESS performed by Radha Tejada MD at [address] CATH LAB No family history on file. Social History Tobacco Use  Smoking status: Former Smoker Packs/day: 0.80 Years: 20.00 Pack years: 16.00 Types: Cigarettes Last attempt to quit: 1986 Years since quittin.9  Smokeless tobacco: Never Used Substance Use Topics  Alcohol use: No  
 
Allergies Allergen Reactions  Bactrim [Sulfamethoprim] Other (comments) Affects her kidney funtion  Clonidine Shortness of Breath and Swelling Lightheaded,   
 Codeine Unknown (comments)  Keflin Itching  Pcn [Penicillins] Unknown (comments)  Tramadol Other (comments) Makes her head feel swishy Review of Systems:  Per HPI. Objective:  
 
Patient Vitals for the past 8 hrs: 
 BP Temp Pulse Resp SpO2  
20 1007 143/77 97.5 °F (36.4 °C) 86 16 94 % 20 0900 133/66  74    
20 0548 134/47 98.2 °F (36.8 °C) 80 21 96 % Temp (24hrs), Av.9 °F (36.6 °C), Min:97.5 °F (36.4 °C), Max:98.2 °F (36.8 °C) EXAM:  
NAD. Sleeping in recliner. Easily awakened. Answers questions appropriately. Moves BUE spontaneously. No central spine TTP.  +TTP right SI joint. BLE reduced sens feet R>L but SILT otherwise. No open wound BLE. No pain log roll bilat hip. Some TTP right greater troch. LLE hip flexion strength 3/5 but resisted knee/ankle/toe motion 4+/5. RLE resisted strength hip/knee/ankle/toe motion 4+/5. No clonus. Toes CR < 2 secs. Imaging Review: No current imaging of hip/spine. Labs: WBC 12.5 BUN 27, Cr 1.52, eGFR 33. Impression:  
 
Patient Active Problem List  
 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 9) with comorbidity ([address] 2018  Type 2 diabetes with nephropathy ([address] 2018  SOB (shortness of breath) 2017  Atrial fibrillation ([address] 2017  PVD (peripheral vascular disease) with claudication ([address] 2017  Hypertension complicating diabetes ([facility] 2016  Coronary atherosclerosis of native coronary artery 2010  HLD (hyperlipidemia) 2010  Cerebrovascular accident stroke, other, unspec 11/18/2010 Principal Problem: 
  Acute diastolic CHF (congestive heart failure) ([facility] (2/28/2020) Active Problems: 
  Coronary atherosclerosis of native coronary artery (11/18/2010) Hypertension complicating diabetes ([facility] (11/17/2016) Atrial fibrillation ([facility] (4/24/2017) Type 2 diabetes with nephropathy ([facility] (2/12/2018) Pacemaker (2/5/2020) Overview: 2/5/2020 leadless pacemaker Right sided SI pain. Plan: D/W Dr. Srikanth Jensen who prefers office F/U over injection as inpatient. Tylenol prn pain. Avoid NSAIDs given medical co-morbidities. Dr. Srikanth Jensen is aware and agrees with above plan. LUDIVINA [facility] [address] [facility]

## 2020-03-05 NOTE — PROGRESS NOTES
Spiritual Care Assessment/Progress Note [address] 
 
 
NAME: Tiana Coronado      MRN: [id number] AGE: 81 y.o. SEX: female Episcopal Affiliation: Faith  
Language: English  
 
3/5/2020     Total Time (in minutes): 15 Spiritual Assessment begun in [facility] 6S NEURO-SCI TELE through conversation with: 
  
    [x]Patient        [] Family    [] Friend(s) Reason for Consult: Initial/Spiritual assessment, patient floor Spiritual beliefs: (Please include comment if needed) [x] Identifies with a flor tradition: Faith   
   [] Supported by a flor community:        
   [] Claims no spiritual orientation:       
   [] Seeking spiritual identity:            
   [] Adheres to an individual form of spirituality:       
   [] Not able to assess:                   
 
    
Identified resources for coping:  
   [] Prayer                           
   [] Music                  [] Guided Imagery [x] Family/friends                 [] Pet visits [] Devotional reading                         [] Unknown 
   [] Other:                                         
 
 
Interventions offered during this visit: (See comments for more details) Patient Interventions: Affirmation of emotions/emotional suffering, Affirmation of flor, Catharsis/review of pertinent events in supportive environment, Coping skills reviewed/reinforced, Iconic (affirming the presence of God/Higher Power) Plan of Care: 
 
 [] Support spiritual and/or cultural needs  
 [] Support AMD and/or advance care planning process    
 [] Support grieving process 
 [] Coordinate Rites and/or Rituals  
 [] Coordination with community clergy [] No spiritual needs identified at this time 
 [] Detailed Plan of Care below (See Comments)  [] Make referral to Music Therapy 
[] Make referral to Pet Therapy    
[] Make referral to Addiction services 
[] Make referral to [facility] 
[] Make referral to Spiritual Care Partner [] No future visits requested       
[x] Follow up visits as needed Comments:  visit for initial spiritual assessment. Patient sitting in chair at bedside reading newspaper. Says she is feeling much better. Provided spiritual presence and listening as she spoke of her present thoughts, feelings, and concerns. Spoke about her health and health struggles over the past two months saying she feels better today than she has in two months. Hopes to be able to return home as soon as able. Appeared comforted and encouraged as a result of this visit and expressed gratitdue for this visit. Visited by Rev. Sascha Schneider MDiv, [facility], [facility]  paging service: [phone])

## 2020-03-05 NOTE — CDMP QUERY
Query 1 Patient admitted with Acute diastolic congestive heart failure. Noted documentation of Acute diastolic CHF (congestive heart failure) in the H&P and Acute systolic congestive heart failure in Cardiology Progress Note from 3/5/20. If possible, please document in progress notes and discharge summary if you are evaluating and /or treating any of the following: ? Acute Systolic CHF ? Acute Diastolic CHF ? Acute Systolic and Diastolic CHF 
? Other, please specify ? Clinically unable to determine The medical record reflects the following: 
 
  Risk Factors: Chronic AFib, HTN, CAD Clinical Indicators:  
- Cardiology PN 3/5: \"Acute systolic congestive heart failure . .. not behaving as diastolic dysfunction at this time I am my opinion. There is no evidence of diastolic dysfunction by echo at least. ... Shortness of breath: Likely multifactorial in my opinion. Stress test with no ischemia and normal ejection fraction. At this point cardiac catheterization on hold but if no improvement we will consider right and left cardiac catheterization as an outpatient. Continue same dose of Bumex at this point\" - Hosp PN 3/5: 
\"Principal Problem: Acute diastolic CHF (congestive heart failure)\" - Cards PN 3/2: \"Nuclear stress test with no ischemia and normal ef at 73% Consider pulmonary evaluation as well Cardiac cath on hold at this time\" - Pulmonary PN 3/4: \"Little evidence support pulmonary disease as the prime  of her dyspnea\" -pBNP = 2.482 (2/28), 1,128 (3/2) Treatment: - Bumex - Metoprolol - Amlodipine - Cardiology consult Thank You, 
   [facility] 
   [phone]

## 2020-03-05 NOTE — PROGRESS NOTES
[facility] [place] Division of Cardiology [phone] Progress note Chad Thornton M.D., F.A.C.C. NAME:  Jarret De La Vega :   1935 MRN:   [id number] ICD-10-CM ICD-9-CM 1. Acute systolic congestive heart failure (HCC) I50.21 428.21   
  428.0 Assessment/Plan: Her shortness of breath somewhat better. No chest pain reported. Cough mostly with activities she tells me. 1.  Shortness of breath: Likely multifactorial in my opinion. Stress test with no ischemia and normal ejection fraction. At this point cardiac catheterization on hold but if no improvement we will consider right and left cardiac catheterization as an outpatient. Continue same dose of Bumex at this point. She has done well in a 6 minutes walk with maintenance of acceptable oxygenation. No additional cardiac interventions for now. Agree in changing lisinopril to losartan in case this is the contributing factor to her cough. 2.  Atrial fibrillation: Chronic. Status post AV node ablation and leadless pacemaker. Continue oral anticoagulation. 3.  Pretension: Controlled. 4.  CAD: No chest pain reported continue aspirin Lipitor and Imdur and Toprol. No additional cardiac intervention at this point. Pulmonary evaluation noted and chest CT noted. To be noted that the chest CT revealed emphysematous changes. Continue nebulizer as per primary team. 
 
I will sign off for now but I will remain available as needed the patient to see me back in the office early next week. 20 ECHO ADULT FOLLOW-UP OR LIMITED 2020 Narrative · Normal cavity size and wall thickness. Low normal systolic dysfunction. Estimated left ventricular ejection fraction is 50 - 55%. · Image quality for this study was suboptimal. 
· Moderately dilated left atrium. · Mildly dilated right ventricle. Borderline low systolic function. · Moderately dilated right atrium. · Probably trileaflet aortic valve. Moderate aortic valve sclerosis. Aortic valve leaflet calcification present. · Mitral valve non-specific thickening. Mild mitral valve regurgitation is  
present. · Mild tricuspid valve regurgitation is present. · There is no evidence of pulmonary hypertension. Signed by: Giovanan Carlisle MD  
 
02/28/20 NUCLEAR CARDIAC STRESS TEST 03/02/2020, 03/02/2020 3/2/2020 Narrative · Baseline ECG: Paced rhythm, non-specific ST-T wave abnormalities,  
interventricular conduction delay. Shortness of breath, dyspnea A Lexiscan myocardial SPECT gated wall motion study was performed  
utilizing 11 mCi of Tc MYOVIEW for rest and 31.6 mCi for stress. SPECT imaging at stress and rest demonstrates no definite evidence of  
ischemia and/or infarction. Left ventricular ejection fraction equals 73%. Left ventricular wall  
motion and thickening are within normal limits. Impression : 
1. No definite evidence of ischemia and/or infarction. 2. LVEF equals 73%. Left ventricular wall motion and thickening is normal. 
  
  Signed by: Mario Mcintosh MD; Radha Blount MD  
 
   
EKG Results Procedure 720 Value Units Date/Time EKG, 12 LEAD, INITIAL [[id number]] Collected:  02/28/20 2103 Order Status:  Completed Updated:  02/29/20 1232 Ventricular Rate 82 BPM   
  Atrial Rate 300 BPM   
  QRS Duration 122 ms   
  Q-T Interval 410 ms QTC Calculation (Bezet) 479 ms Calculated R Axis -46 degrees Calculated T Axis 98 degrees Diagnosis --  
  Ventricular-paced rhythm Confirmed by Ariela Padron M.D., Kelly Schwartz ([id number]) on 2/29/2020 12:32:27 PM 
  
  
 
 
 
Visit Vitals /47 Pulse 80 Temp 98.2 °F (36.8 °C) Resp 21 Wt 166 lb 12.8 oz (75.7 kg) SpO2 96% BMI 32.58 kg/m² Wt Readings from Last 3 Encounters: 03/05/20 166 lb 12.8 oz (75.7 kg) 02/25/20 168 lb (76.2 kg) 02/14/20 168 lb (76.2 kg) Review of Systems:  
Pertinent items are noted in the History of Present Illness. Objective:  
 
 
03/05 0701 - 03/05 1900 In: -  
Out: 200 [Urine:200] 03/03 1901 - 03/05 0700 In: 600 [P.O.:600] Out: 1600 [Urine:1600] Telemetry: AFIB Physical Exam: 
 
Neck: no JVD Heart: regularly irregular rhythm Lungs: clear to auscultation bilaterally Abdomen: soft, non-tender. Bowel sounds normal. No masses,  no organomegaly Extremities: no edema Current Facility-Administered Medications Medication Dose Route Frequency  losartan (COZAAR) tablet 100 mg  100 mg Oral Q12H  nystatin (MYCOSTATIN) 100,000 unit/gram cream   Topical BID  bumetanide (BUMEX) tablet 2 mg  2 mg Oral DAILY  potassium chloride SR (KLOR-CON 10) tablet 20 mEq  20 mEq Oral DAILY  albuterol (PROVENTIL VENTOLIN) nebulizer solution 2.5 mg  2.5 mg Nebulization Q4H PRN  
 allopurinoL (ZYLOPRIM) tablet 100 mg  100 mg Oral DAILY  amLODIPine (NORVASC) tablet 5 mg  5 mg Oral DAILY  apixaban (ELIQUIS) tablet 2.5 mg  2.5 mg Oral BID  aspirin chewable tablet 81 mg  81 mg Oral QHS  atorvastatin (LIPITOR) tablet 20 mg  20 mg Oral QHS  azelastine (ASTELIN) 137mcg/spray nasal spray  1 Spray Both Nostrils QHS  arformoteroL (BROVANA) neb solution 15 mcg  15 mcg Nebulization BID RT And  
 budesonide (PULMICORT) 500 mcg/2 ml nebulizer suspension  500 mcg Nebulization BID RT  
 cholecalciferol (VITAMIN D3) (1000 Units /25 mcg) tablet 2 Tab  2,000 Units Oral DAILY  glipiZIDE (GLUCOTROL) tablet 10 mg  10 mg Oral BID  insulin glargine (LANTUS) injection 20 Units  20 Units SubCUTAneous DAILY  isosorbide mononitrate ER (IMDUR) tablet 60 mg  60 mg Oral BID  metoprolol succinate (TOPROL-XL) XL tablet 50 mg  50 mg Oral QHS  nitroglycerin (NITROSTAT) tablet 0.4 mg  0.4 mg SubLINGual Q5MIN PRN  
  pantoprazole (PROTONIX) tablet 40 mg  40 mg Oral ACB  ondansetron (ZOFRAN) injection 4 mg  4 mg IntraVENous Q6H PRN  
 acetaminophen (TYLENOL) tablet 650 mg  650 mg Oral Q4H PRN  
 glucose chewable tablet 16 g  4 Tab Oral PRN  
 glucagon (GLUCAGEN) injection 1 mg  1 mg IntraMUSCular PRN  
 dextrose 10% infusion 0-250 mL  0-250 mL IntraVENous PRN  
 insulin lispro (HUMALOG) injection   SubCUTAneous AC&HS Lab Results Component Value Date/Time WBC 12.5 (H) 03/04/2020 01:49 AM  
 HGB (POC) 14.6 02/14/2020 02:45 PM  
 HGB 11.6 03/04/2020 01:49 AM  
 HCT (POC) 43.7 02/14/2020 02:45 PM  
 HCT 36.3 03/04/2020 01:49 AM  
 PLATELET 881 (H) 91/72/2783 01:49 AM  
 MCV 81.4 03/04/2020 01:49 AM  
 
Lab Results Component Value Date/Time Sodium 136 03/04/2020 01:49 AM  
 Potassium 3.6 03/04/2020 01:49 AM  
 Chloride 100 03/04/2020 01:49 AM  
 CO2 29 03/04/2020 01:49 AM  
 Anion gap 7 03/04/2020 01:49 AM  
 Glucose 57 (L) 03/04/2020 01:49 AM  
 BUN 27 (H) 03/04/2020 01:49 AM  
 Creatinine 1.52 (H) 03/04/2020 01:49 AM  
 BUN/Creatinine ratio 18 03/04/2020 01:49 AM  
 GFR est AA 40 (L) 03/04/2020 01:49 AM  
 GFR est non-AA 33 (L) 03/04/2020 01:49 AM  
 Calcium 8.7 03/04/2020 01:49 AM  
 
Lab Results Component Value Date/Time Cholesterol, total 110 06/13/2017 09:31 AM  
 Cholesterol (POC) <100 12/18/2019 11:53 AM  
 HDL Cholesterol 55 06/13/2017 09:31 AM  
 HDL Cholesterol (POC) 41 12/18/2019 11:53 AM  
 LDL Cholesterol (POC) n/a 12/18/2019 11:53 AM  
 LDL, calculated 36 06/13/2017 09:31 AM  
 VLDL, calculated 19 06/13/2017 09:31 AM  
 Triglyceride 93 06/13/2017 09:31 AM  
 Triglycerides (POC) 82 12/18/2019 11:53 AM  
 CHOL/HDL Ratio 2.0 04/25/2017 03:53 AM  
 
Lab Results Component Value Date/Time   02/04/2015 07:47 PM  
 CK - MB 4.7 (H) 02/04/2015 07:47 PM  
 CK-MB Index 4.6 (H) 02/04/2015 07:47 PM  
 Troponin-I, Qt. <0.05 02/29/2020 01:13 AM  
  (H) 08/25/2017 04:40 AM  
 
 Lab Results Component Value Date/Time ALT (SGPT) 27 02/28/2020 02:44 PM  
 AST (SGOT) 22 02/28/2020 02:44 PM  
 Alk. phosphatase 100 02/28/2020 02:44 PM  
 Bilirubin, direct 0.18 02/12/2014 09:42 AM  
 Bilirubin, total 0.7 02/28/2020 02:44 PM  
 
Lab Results Component Value Date/Time   (H) 08/25/2017 04:40 AM  
 NT pro-BNP 1,128 (H) 03/02/2020 04:15 AM  
 NT pro-BNP 2,482 (H) 02/28/2020 02:44 PM  
 NT pro-BNP 1,293 (H) 08/26/2017 03:25 AM  
 NT pro-BNP 1,671 (H) 08/23/2017 02:29 PM

## 2020-03-05 NOTE — PROGRESS NOTES
Transitional Care Team: Follow-Up RAYOG Note Date of ACP Conversation: 03/05/20 Persons included in Conversation:  patient and family HUG NP stopped by room to offer to help patient complete ACP. She has company at present--her daughter and granddaughter. Patient declines at this time. Conversation Outcomes / Follow-Up Plan:  
Follow-up with patient while in hospital to help complete if opportunity presents. If unable, communicate with PCP/Care Transitions Nurse that conversations took place and that patient was given a sample copy to review and discuss with family/health care agents.

## 2020-03-05 NOTE — PROGRESS NOTES
Problem: Activity Intolerance Goal: *Oxygen saturation during activity within specified parameters Outcome: Progressing Towards Goal 
  
Problem: Pain Goal: *Control of Pain Outcome: Progressing Towards Goal 
  
Problem: Falls - Risk of 
Goal: *Absence of Falls Description Document Thom Rios Fall Risk and appropriate interventions in the flowsheet. Outcome: Progressing Towards Goal 
Note: Fall Risk Interventions: 
Mobility Interventions: Assess mobility with egress test, Communicate number of staff needed for ambulation/transfer, Patient to call before getting OOB Medication Interventions: Patient to call before getting OOB, Teach patient to arise slowly, Evaluate medications/consider consulting pharmacy History of Falls Interventions: Door open when patient unattended, Evaluate medications/consider consulting pharmacy Problem: Diabetes Self-Management Goal: *Disease process and treatment process Description Define diabetes and identify own type of diabetes; list 3 options for treating diabetes. Outcome: Progressing Towards Goal 
  
Problem: Heart Failure: Discharge Outcomes Goal: *Demonstrates ability to perform prescribed activity without shortness of breath or discomfort Outcome: Progressing Towards Goal 
Goal: *Verbalizes understanding/describes prescribed medications Outcome: Progressing Towards Goal

## 2020-03-05 NOTE — PROGRESS NOTES
Bedside shift change report given to Christos Weiss RN (oncoming nurse) by Greg Rivera RN (offgoing nurse). Report included the following information SBAR, Kardex, Intake/Output, MAR, Accordion, Recent Results and Cardiac Rhythm Paced.

## 2020-03-06 NOTE — PROGRESS NOTES
Hospital Discharge Follow-Up Date/Time:  3/6/2020 12:12 PM 
Michelle Oquendo RN Care Transitions Nurse [facility] Coordination Team 
[phone] Patient was admitted to [facility] on 2/28/20 and discharged on 3/5/20 for CHF. The physician discharge summary was not available at the time of outreach. Patient was contacted within 1 business days of discharge. Top Challenges reviewed with the provider Patient declined [facility] visit at discharge. Patient ACE replaced with Cozaar due to cough Per pulmonary: MARIFER evaluation would be helpful, we can follow up in the outpatient pulmonary office Abnormal labs at discharge: BUN 27  High   6 - 20 MG/DL Final  
Creatinine 1.52  High   0.55 - 1.02 MG/DL Final  
 
WBC 12.5  High   3.6 - 11.0 K/uL Final  
 
Per cards IP note: 
Shortness of breath: Likely multifactorial in my opinion. Stress test with no ischemia and normal ejection fraction. At this point cardiac catheterization on hold but if no improvement we will consider right and left cardiac catheterization as an outpatient. Continue same dose of Bumex at this point Patient seen for back pain while IP. Per chart notes: 
Plan: D/W Dr. Yusuf Monreal who prefers office F/U over injection as inpatient. Tylenol prn pain. Avoid NSAIDs given medical co-morbidities. Advance Care Planning:  
Does patient have an Advance Directive:  not on file; education provided Per IP [facility] [place]: 
Conversation Outcomes / Follow-Up Plan:  
Follow-up with patient while in hospital to help complete if opportunity presents. If unable, communicate with PCP/Care Transitions Nurse that conversations took place and that patient was given a sample copy to review and discuss with family/health care agents Method of communication with provider :chart routing Was this a readmission?  no  
 
 
Care Transition Nurse (CTN) contacted the patient by telephone to perform post hospital discharge assessment. Verified name and  with patient as identifiers. Provided introduction to self, and explanation of the CTN role. Patient received hospital discharge instructions. CTN reviewed discharge instructions and red flags with patient who verbalized understanding. Patient given an opportunity to ask questions and does not have any further questions or concerns at this time. The patient agrees to contact the PCP office for questions related to their healthcare. CTN provided contact information for future reference. Disease Specific:   CHF Patients top risk factors for readmission:  none identified at this time Home Health orders at discharge: patient refused [facility] visit offered Durable Medical Equipment ordered at discharge: none Medication(s):  
New Medications at Discharge: Cozaar, Nystatin Cream, Potassium Changed Medications at Discharge: none Discontinued Medications at Discharge: Vasotec Medication reconciliation was performed with patient, who verbalizes understanding of administration of home medications. There were no barriers to obtaining medications identified at this time. Referral to Pharm D needed: no  
 
Current Outpatient Medications Medication Sig  bumetanide (BUMEX) 2 mg tablet Take 1 Tab by mouth daily.  nystatin (MYCOSTATIN) topical cream Apply  to affected area two (2) times a day.  losartan (COZAAR) 100 mg tablet Take 1 Tab by mouth daily.  potassium chloride SR (K-TAB) 20 mEq tablet Take 1 Tab by mouth daily.  bumetanide (BUMEX) 2 mg tablet Take 1 Tab by mouth daily.  metoprolol succinate (TOPROL-XL) 50 mg XL tablet Take 50 mg by mouth nightly.  amLODIPine (NORVASC) 5 mg tablet Take 5 mg by mouth daily.  acyclovir (ZOVIRAX) 5 % ointment Use as directed  isosorbide mononitrate ER (IMDUR) 60 mg CR tablet TAKE 1 TABLET TWICE A DAY  acetaminophen (TYLENOL) 325 mg tablet Take 2 Tabs by mouth every four (4) hours as needed for Pain or Fever.  albuterol (PROAIR HFA) 90 mcg/actuation inhaler Take 2 Puffs by inhalation every four (4) hours as needed for Wheezing or Shortness of Breath.  aspirin 81 mg chewable tablet Take 81 mg by mouth nightly.  atorvastatin (LIPITOR) 20 mg tablet Take 20 mg by mouth nightly.  azelastine (ASTEPRO) 0.15 % (205.5 mcg) 1 Spray by Both Nostrils route nightly.  clotrimazole-betamethasone (LOTRISONE) topical cream Apply  to affected area two (2) times daily as needed for Skin Irritation or Other (Rash).  insulin detemir U-100 (LEVEMIR U-100 INSULIN) 100 unit/mL injection 10 Units by SubCUTAneous route daily. Takes 20 units every morning, and takes 8-10 units every evening as needed/as directed based on blood sugar  mupirocin calcium (BACTROBAN) 2 % topical cream Apply  to affected area two (2) times daily as needed.  glipiZIDE (GLUCOTROL) 5 mg tablet TAKE 2 TABLETS TWICE A DAY  allopurinol (ZYLOPRIM) 100 mg tablet Take 100 mg by mouth daily.  apixaban (ELIQUIS) 2.5 mg tablet Take 1 Tab by mouth two (2) times a day.  omeprazole (PRILOSEC) 20 mg capsule TAKE 1 CAPSULE DAILY  insulin aspart U-100 (NOVOLOG FLEXPEN U-100 INSULIN) 100 unit/mL (3 mL) inpn INJECT 7 UNITS BEFORE EACH MEAL 3 TIMES A DAY OR AS   DIRECTED  budesonide-formoterol (SYMBICORT) 160-4.5 mcg/actuation HFAA Take 2 Puffs by inhalation two (2) times a day. After each use, rinse mouth with water  cholecalciferol, vitamin D3, (VITAMIN D3) 2,000 unit tab Take 2,000 Units by mouth daily.  nitroglycerin (NITROSTAT) 0.4 mg SL tablet 1 Tab by SubLINGual route every five (5) minutes as needed for Chest Pain. No current facility-administered medications for this visit. There are no discontinued medications. [facility] follow up appointment(s):  
Future Appointments Date Time Provider Carly Mckayi 3/9/2020 11:00 AM Aaron Napier  [address]  
 3/19/2020 11:15 AM Aster Maddox  [facility]  
5/7/2020  1:30 PM [facility] [id number] [facility]  
5/7/2020  2:00 PM Lily Branch  [address]  Non-[facility] follow up appointment(s): Dr Gurdeep Reyes, mid April per patient [facility]:  information provided as a resource will mail to patient Goals  Attends follow-up appointments as directed. 03/06/20 Reveiwed post hospiatal appts with pt. Dr Jennifer Longo PCP Dr Lpue Jordan Pt reports she ahs follow up with Dr Gurdeep Reyes, nephrology in mid April. She states Dr Sharifa Nielsen told her that he would fax labs and hospital notes to him. CTN will follow up---mkrw  Patient verbalizes understanding of self-management goals of living with Congestive Heart Failure 03/06/20 CTN spoke to patient to review ways to decrease of CHF exacerbation. __Daily weights--pt reports she will start weighing daily. CTN educated on weighing same time every morning , recording weight and calling MD with increases 3 lbs/day or 5 lbs/week. __Low sodium diet---Reinforced low sodium diet to prevent fluid retention. Pt states she has information given to her from previous admission about low sodium diet that she can review. ---Medication---patient reports that she is taking 2 mg Bumex daily. CTN educated pt on her creatnine and instructed her to drink 6 glasses fluid a day but must eat low sodium diet so kidneys can be hydrated without retention. Patient verbalizes understanding of instructions. At time of call, patient denies feet/ankle edema, denies coughing, states she is breathing fine, denies CP. Last Hospital weight 166 lbs.---mkrw

## 2020-03-06 NOTE — Clinical Note
Hi,I am following Mrs Vanessa Becerra for CHF Bundle for 90 days. Please see yellow box for concerns/updates. Let me know if I can assist with patient during my 90 day follow. Thanks! [id number] [facility] Coordination [phone]

## 2020-03-06 NOTE — PROGRESS NOTES
Pharmacist Discharge Medication Reconciliation Discharging Provider: Dr. Shameka Dos Santos Significant PMH:  
Past Medical History:  
Diagnosis Date Atrial fibrillation ([facility] [address] CAD (coronary artery disease) Cerebrovascular accident stroke, other, unspec 11/18/2010 Coronary atherosclerosis of native coronary artery 11/18/2010 Essential hypertension Essential hypertension, benign 11/18/2010 HLD (hyperlipidemia) 11/18/2010 PVD (peripheral vascular disease) ([facility] [address] 2017 Type II or unspecified type diabetes mellitus without mention of complication, not stated as uncontrolled 11/18/2010 [place] Chief Complaint for this Admission: Chief Complaint Patient presents with Shortness of Breath Allergies: Bactrim [sulfamethoprim]; Clonidine; Codeine; Keflin; Pcn [penicillins]; and Tramadol Discharge Medications:  
Discharge Medication List as of 3/5/2020  6:26 PM  
  
 
START taking these medications Details  
nystatin (MYCOSTATIN) topical cream Apply  to affected area two (2) times a day., Normal, Disp-30 g, R-0  
  
losartan (COZAAR) 100 mg tablet Take 1 Tab by mouth daily. , Normal, Disp-30 Tab, R-11  
  
potassium chloride SR (K-TAB) 20 mEq tablet Take 1 Tab by mouth daily. , Normal, Disp-30 Tab, R-11 CONTINUE these medications which have CHANGED Details  
!! bumetanide (BUMEX) 2 mg tablet Take 1 Tab by mouth daily. , Print, Disp-30 Tab, R-3  
  
!! bumetanide (BUMEX) 2 mg tablet Take 1 Tab by mouth daily. , Normal, Disp-90 Tab, R-3  
  
 !! - Potential duplicate medications found. Please discuss with provider. CONTINUE these medications which have NOT CHANGED Details  
metoprolol succinate (TOPROL-XL) 50 mg XL tablet Take 50 mg by mouth nightly., Historical Med  
  
amLODIPine (NORVASC) 5 mg tablet Take 5 mg by mouth daily. , Historical Med  
  
acyclovir (ZOVIRAX) 5 % ointment Use as directed, Normal, Disp-15 g, R-3  
  
 isosorbide mononitrate ER (IMDUR) 60 mg CR tablet TAKE 1 TABLET TWICE A DAY, Normal, Disp-180 Tab, R-1  
  
acetaminophen (TYLENOL) 325 mg tablet Take 2 Tabs by mouth every four (4) hours as needed for Pain or Fever., No Print, Disp-20 Tab, R-1  
  
albuterol (PROAIR HFA) 90 mcg/actuation inhaler Take 2 Puffs by inhalation every four (4) hours as needed for Wheezing or Shortness of Breath., Normal, Disp-1 Inhaler, R-1  
  
aspirin 81 mg chewable tablet Take 81 mg by mouth nightly., Historical Med  
  
atorvastatin (LIPITOR) 20 mg tablet Take 20 mg by mouth nightly., Historical Med  
  
azelastine (ASTEPRO) 0.15 % (205.5 mcg) 1 Spray by Both Nostrils route nightly., Historical Med  
  
clotrimazole-betamethasone (LOTRISONE) topical cream Apply  to affected area two (2) times daily as needed for Skin Irritation or Other (Rash). , Historical Med  
  
insulin detemir U-100 (LEVEMIR U-100 INSULIN) 100 unit/mL injection 10 Units by SubCUTAneous route daily. Takes 20 units every morning, and takes 8-10 units every evening as needed/as directed based on blood sugar , Historical Med  
  
mupirocin calcium (BACTROBAN) 2 % topical cream Apply  to affected area two (2) times daily as needed., Historical Med  
  
glipiZIDE (GLUCOTROL) 5 mg tablet TAKE 2 TABLETS TWICE A DAY, Normal, Disp-360 Tab, R-3  
  
allopurinol (ZYLOPRIM) 100 mg tablet Take 100 mg by mouth daily. , Historical Med  
  
apixaban (ELIQUIS) 2.5 mg tablet Take 1 Tab by mouth two (2) times a day., Normal, Disp-180 Tab, R-2  
  
omeprazole (PRILOSEC) 20 mg capsule TAKE 1 CAPSULE DAILY, Normal, Disp-90 Cap, R-2  
  
budesonide-formoterol (SYMBICORT) 160-4.5 mcg/actuation HFAA Take 2 Puffs by inhalation two (2) times a day. After each use, rinse mouth with water, Normal, Disp-3 Inhaler, R-3  
  
cholecalciferol, vitamin D3, (VITAMIN D3) 2,000 unit tab Take 2,000 Units by mouth daily. , Historical Med  
  
 nitroglycerin (NITROSTAT) 0.4 mg SL tablet 1 Tab by SubLINGual route every five (5) minutes as needed for Chest Pain., Print, Disp-25 Tab, R-3  
  
insulin aspart U-100 (NOVOLOG FLEXPEN U-100 INSULIN) 100 unit/mL (3 mL) inpn INJECT 7 UNITS BEFORE EACH MEAL 3 TIMES A DAY OR AS   DIRECTED, Normal, Disp-45 mL, R-3 STOP taking these medications  
  
 nystatin (MYCOSTATIN) powder Comments:  
Reason for Stopping:   
   
 enalapril (VASOTEC) 20 mg tablet Comments:  
Reason for Stopping:   
   
  
 
 
The patient's chart, MAR and AVS were reviewed by Uriah Oconnor, PHARMD.

## 2020-03-08 PROBLEM — I50.21 SYSTOLIC CHF, ACUTE (HCC): Status: ACTIVE | Noted: 2020-01-01

## 2020-03-09 NOTE — PROGRESS NOTES
HISTORY OF PRESENT ILLNESS Amada Mcdonald is a 81 y.o. female. She has h/o HTN, HLD, AODM and CAD  And PAF (diagnosed on 4/17 for which she underwent successful cardioversion on oac and amiodarone)here for follow up Stress test normal on 11/10   
Doppler /US renal NO MANDO b/l   
PTCA and stent of right coronary artery in 1996 July 26, 2007. The cardiac catheterization revealed mild 10% tapering of the ostium. The left anterior descending artery had mild intimal disease throughout. There was a 30% stenosis in the proximal portion of the LAD between the first and second septal . The remainder of the LAD had only mild intimal disease. The circumflex artery had a 30%-40% stenosis in the proximal mid segment prior to take off of obtuse marginal branch. The right coronary artery was a large dominant artery with 80% proximal stenosis followed by 90% proximal to mid stenosis, followed by 30% residual stenosis. Ejection fraction was estimated at 50%-55%. 2/14: nuclear stress test: mild ischemia mid distal kailey lateral wall EF 59%   
3/14: cardiac catheterization:Left main: The vessel was normal sized. Angiography 
showed minor luminal irregularities. LAD: The vessel was normal sized. Angiography showed mild atherosclerosis. There was a 30 % stenosis in the 
middle third of the vessel segment. 1st diagonal: The vessel was small 
sized. Angiography showed minor luminal irregularities. 2nd diagonal: The 
vessel was normal sized. Angiography showed mild atherosclerosis. Circumflex: The vessel was normal sized. Angiography showed mild 
atherosclerosis. There was a 30 % stenosis. RCA: The vessel was large 
sized (dominant). Angiography showed mild atherosclerosis and 3 patent 
prior stents.  There was a tubular 30 % stenosis in the distal third of the 
vessel segment.   
MEDICAL RX ONLY at that time  
Stopped niacin in 2014 for \"sick \" feeling   
 Seen in [facility] for HTN on 2/15 aldactone increased to 25 mg at that time Renal US on 3/15 no MANDO 
ON 4/17 admitted to [facility] for sob and found to be in AF Nuclear stress test on 4/25/17:No ischemia demonstrated. No infarct visible. LVEF 61%. 
   
Chest ct on 4/17:1. Atherosclerotic aorta with coronary artery calcification. 2. Small bilateral pleural effusions with right pleural calcifications. 3. Status post cholecystectomy. Valentin/Cardioversion  on 4/28/17:VALENTIN with normal EF mild mr and TR no evidence for gross intracardiac thrombi Cardioversion: 150 joules were delivered in a synch fashion with restoration of NSR Amiodarone started lopressor decreased Head ct on 8/18/18:Severe chronic microvascular ischemic change. 
   
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  
   
Admitted to [facility] on 8/17:Admitted with SOB.  Amiodarone stopped with concerns for toxicity.  Also noted to hyponatremic.   
Nephrology consulted and felt low Na related to HCTZ and Bactrim use - both stopped indefinitely                       - Bumex 1 mg PO daily started and continued through discharge 
                      - Na improve to 130 before discharge 
                      - Patient to follow up with Nephrology as needed. Pulmonary consulted and felt no Amiodarone toxicity, SOB related to COPD and volume 
                      - SOB improved as Na improved and Bumex was used. 
   
ECHO on 8/17:Left ventricle: Systolic function was normal. Ejection fraction was 
estimated in the range of 55 % to 60 %. Suboptimal endocardial 
visualization limits wall motion analysis. Wall thickness was mildly 
increased. Left atrium: The atrium was mildly dilated. Mitral valve: There was mild regurgitation. Aortic valve: Leaflets exhibited lower normal cuspal separation and 
sclerosis. Tricuspid valve: There was mild regurgitation.  
CTA  8/17 of chest without toxicity changes or PTE: . There is a mild to moderate-sized right pleural effusion and a mild sized 
left pleural effusion. The heart is enlarged there is no pericardial effusion. There is no mediastinal masses or adenopathy. There is no evidence for pulmonary 
emboli. There is no shift or pneumothorax. There are mild congestive changes. Mild emphysema. No focal consolidation or mass. 
   
IMPRESSION IMPRESSION: Bilateral effusions and cardiomegaly. No pulmonary emboli. Nuclear stress test on 4/23/18:SPECT images demonstrate a medium, fixed abnormality of mild degree in the anterior region on the stress and rest images. Gated SPECT images reveals normal myocardial thickening and wall motion. The left ventricular ejection fraction was calculated to be 63 %. Impression:  
Myocardial perfusion imaging is normal. No gross ischemia or mi Overall left ventricular systolic function was normal.  
 
 
These test results indicate low likelihood for the presence of angiographically significant coronary artery disease.  
  
Vascular us le : no dvt seroma on right thigh 
       
04/17/19 ECHO ADULT COMPLETE 04/20/2019 4/20/2019  
  Narrative · Left ventricular low normal systolic function. Calculated left  
ventricular ejection fraction is 53%. Biplane method used to measure  
ejection fraction. · Left atrial cavity size is moderately dilated. · Right atrial cavity size is mildly dilated. · Mild aortic valve sclerosis with no significant stenosis. · Mitral valve thickening. Mild mitral valve regurgitation. · Mild pulmonary hypertension is present. 
   
    Signed by: Adriana Smith MD  
· Nuclear stress test on 4/19:Baseline ECG: Atrial fibrillation, non-specific ST-T wave abnormalities. · Gated SPECT: Left ventricular function post-stress was abnormal. Calculated ejection fraction is 45%. There is no evidence of transient ischemic dilation (TID). The TID ratio is 0.97.  
· Left ventricular perfusion is abnormal. 
 · Myocardial perfusion imaging defect 1: There is a defect that is moderate in size with a moderate reduction in uptake present in the apical to basal anterior location(s) that is non-reversible. There is abnormal wall motion in the defect area. Viability in the area is good. caused by breast attenuation. The possibility of artifact cannot be excluded. Perfusion defect was visually and quantitatively present. · Myocardial perfusion imaging defect 1: caused by breast attenuation. Abnormal myocardial perfusion imaging. Fixed defect consistent with prior myocardial infarction. Myocardial perfusion imaging supports an intermediate risk stress test. 
02/28/20 NUCLEAR CARDIAC STRESS TEST 03/02/2020, 03/02/2020 3/2/2020 Narrative · Baseline ECG: Paced rhythm, non-specific ST-T wave abnormalities,  
interventricular conduction delay. Shortness of breath, dyspnea A Lexiscan myocardial SPECT gated wall motion study was performed  
utilizing 11 mCi of Tc MYOVIEW for rest and 31.6 mCi for stress. SPECT imaging at stress and rest demonstrates no definite evidence of  
ischemia and/or infarction. Left ventricular ejection fraction equals 73%. Left ventricular wall  
motion and thickening are within normal limits. Impression : 
1. No definite evidence of ischemia and/or infarction. 2. LVEF equals 73%. Left ventricular wall motion and thickening is normal. 
  
  Signed by: Yi Pitt MD; Ami Ibarra MD  
 
01/31/20 ECHO ADULT FOLLOW-UP OR LIMITED 02/05/2020 2/5/2020 Narrative · Normal cavity size and wall thickness. Low normal systolic dysfunction. Estimated left ventricular ejection fraction is 50 - 55%. · Image quality for this study was suboptimal. 
· Moderately dilated left atrium. · Mildly dilated right ventricle. Borderline low systolic function. · Moderately dilated right atrium. · Probably trileaflet aortic valve. Moderate aortic valve sclerosis. Aortic valve leaflet calcification present. · Mitral valve non-specific thickening. Mild mitral valve regurgitation is  
present. · Mild tricuspid valve regurgitation is present. · There is no evidence of pulmonary hypertension. Signed by: Aaron Napier MD  
 
CT Results (most recent): 
[place] [facility] 02/28/20 CT CHEST WO CONT Narrative INDICATION: Dyspnea COMPARISON: 2/28/2020 CONTRAST: None. TECHNIQUE:  5 mm axial images were obtained through the chest. Coronal and 
sagittal reconstructions were generated. CT dose reduction was achieved through 
use of a standardized protocol tailored for this examination and automatic 
exposure control for dose modulation. The absence of intravenous contrast reduces the sensitivity for evaluation of 
the mediastinum and upper abdominal organs. FINDINGS: 
 
THYROID: No nodule. MEDIASTINUM: Aortic atherosclerotic change. Minimal scattered mediastinal lymph 
nodes are unchanged compared to 8/23/2017 study. MARIELA: No change THORACIC AORTA: No aneurysm. MAIN PULMONARY ARTERY: Normal in caliber. TRACHEA/BRONCHI: Patent. ESOPHAGUS: No wall thickening or dilatation. HEART: Coronary artery disease. PLEURA: No effusion or pneumothorax. LUNGS: No suspicious pulmonary mass or nodule. Minimal scattered tree-in-bud 
nodularity. Emphysematous change. INCIDENTALLY IMAGED UPPER ABDOMEN: No focal abnormality. BONES: No destructive bone lesion. Impression IMPRESSION: 
No acute thoracic process. Few scattered stable pulmonary nodules and chronic parenchymal changes No large pulmonary mass or nodule. 
 
  
 
 
  
          
Past Medical History:  
Diagnosis Date  Atrial fibrillation ([address]     
 CAD (coronary artery disease)     
 Cerebrovascular accident stroke, other, unspec 11/18/2010  Coronary atherosclerosis of native coronary artery 11/18/2010  Essential hypertension     
  Essential hypertension, benign 11/18/2010  HLD (hyperlipidemia) 11/18/2010  PVD (peripheral vascular disease) ([facility] [address] 2017  Type II or unspecified type diabetes mellitus without mention of complication, not stated as uncontrolled 11/18/2010  
   
             
Past Surgical History:  
Procedure Laterality Date  HX CORONARY STENT PLACEMENT        
 HX HEART CATHETERIZATION        
 HX HYSTERECTOMY    9792 HPI No cp or sob reported 
 again full head and dizziness repored Review of Systems Respiratory: Negative. Cardiovascular: Negative. Neurological: Positive for dizziness. Visit Vitals /78 (BP 1 Location: Left arm, BP Patient Position: Sitting) Pulse 82 Resp 20 Ht 5' (1.524 m) Wt 169 lb (76.7 kg) SpO2 96% BMI 33.01 kg/m² Physical Exam 
Neck:  
   Vascular: No JVD. Cardiovascular:  
   Rate and Rhythm: Normal rate. Rhythm regularly irregular. Pulmonary:  
   Effort: Pulmonary effort is normal.  
   Breath sounds: Examination of the right-lower field reveals decreased breath sounds. Examination of the left-lower field reveals decreased breath sounds. Decreased breath sounds present. Abdominal:  
   Palpations: Abdomen is soft. Musculoskeletal:  
   Right lower leg: No edema. Left lower leg: No edema. Psychiatric:     
   Mood and Affect: Mood and affect normal.  
 
 
Wt Readings from Last 3 Encounters:  
03/09/20 169 lb (76.7 kg) 03/05/20 166 lb 12.8 oz (75.7 kg) 02/25/20 168 lb (76.2 kg) Lab Results Component Value Date/Time  Cholesterol, total 110 06/13/2017 09:31 AM  
 Cholesterol (POC) <100 12/18/2019 11:53 AM  
 HDL Cholesterol 55 06/13/2017 09:31 AM  
 HDL Cholesterol (POC) 41 12/18/2019 11:53 AM  
 LDL Cholesterol (POC) n/a 12/18/2019 11:53 AM  
 LDL, calculated 36 06/13/2017 09:31 AM  
 VLDL, calculated 19 06/13/2017 09:31 AM  
 Triglyceride 93 06/13/2017 09:31 AM  
 Triglycerides (POC) 82 12/18/2019 11:53 AM  
 CHOL/HDL Ratio 2.0 04/25/2017 03:53 AM  
 
Lab Results Component Value Date/Time ALT (SGPT) 27 02/28/2020 02:44 PM  
 AST (SGOT) 22 02/28/2020 02:44 PM  
 Alk. phosphatase 100 02/28/2020 02:44 PM  
 Bilirubin, direct 0.18 02/12/2014 09:42 AM  
 Bilirubin, total 0.7 02/28/2020 02:44 PM  
 
Lab Results Component Value Date/Time Sodium 136 03/04/2020 01:49 AM  
 Potassium 3.6 03/04/2020 01:49 AM  
 Chloride 100 03/04/2020 01:49 AM  
 CO2 29 03/04/2020 01:49 AM  
 Anion gap 7 03/04/2020 01:49 AM  
 Glucose 57 (L) 03/04/2020 01:49 AM  
 BUN 27 (H) 03/04/2020 01:49 AM  
 Creatinine 1.52 (H) 03/04/2020 01:49 AM  
 BUN/Creatinine ratio 18 03/04/2020 01:49 AM  
 GFR est AA 40 (L) 03/04/2020 01:49 AM  
 GFR est non-AA 33 (L) 03/04/2020 01:49 AM  
 Calcium 8.7 03/04/2020 01:49 AM  
 
 
 
ASSESSMENT and PLAN 1.  Atrial fibrillation[de-identified] This is chronic. Continue eliquis ( no untoward effects) S/p leadless ppm and av jazlyn ablation and doing well 
ecg with as and v paced 
  
Hypertension: Controlled today.   
  
Chronic kidney disease: Closely followed by nephrology.   
  
Hyperlipidemia: Closely followed by her primary care physician. Continue lipitor 
  
CAD: She remains completely asymptomatic at this time.  Continue medical therapy only at this point. Stress test normal 
And normal ef by echo recently Continue statin and asa and imdur Diabetes: Closely followed by her primary care physician. 
  
Hyponatremia: Followed by nephrology and resolved for now. Dizziness : I suspect non cardiac ( normal hr and bp) Refer to ent and eventually neuro 
  
See her back in 3 months otherwise

## 2020-03-09 NOTE — DISCHARGE SUMMARY
[address] DISCHARGE SUMMARY Name:  Kiesha Lagos 
MR#:  [id number] :  1935 ACCOUNT #:  [de-identified] ADMIT DATE:  2020 DISCHARGE DATE:  2020 DISCHARGE DIAGNOSES: 
1. Acute systolic congestive heart failure. 2.  Atrial fibrillation. 3.  Hypertension complicating Type 2 DM 4. Chronic kidney disease, stage III. 5.  Type 2 diabetes mellitus with nephropathy, hypertension complicating type 2 diabetes mellitus. 6.  Probable monilial dermatitis. 7. Long term insulin DISCHARGE MEDICATIONS: 
1. Bumetanide 2 mg daily. 2.  Nystatin cream apply topically to rash under her breasts b.i.d. for 10 days. 3.  Losartan 100 mg daily. 4.  KCl 20 mEq daily. 5.  Metoprolol-XL 50 mg nightly. 6.  Amlodipine 5 mg daily. 7.  Zovirax 5% ointment as needed. 8.  Imdur 60 mg p.o. b.i.d. 9.  Acetaminophen 650 mg p.o. q.4 h, p.r.n. 10.  Albuterol 2 puffs q.4 h. p.r.n. 11.  ASA 81 mg daily. 12.  Lipitor 20 mg daily. 13.  Astepro 0.15% one spray to both nostrils twice daily. 14.  Glipizide 10 mg p.o. b.i.d. 15.  Allopurinol 100 mg daily. 16.  Eliquis 2.5 mg p.o. b.i.d. 17.  Omeprazole 20 mg daily. 18.  NovoLog 7 units before each meal 3 times a day. 19.  Symbicort 160/4.5 mcg 2 puffs b.i.d. 20.  Vitamin D3 2000 units daily. 21.  Nitroglycerin 0.4 mg sublingual q.5 minutes p.r.n. chest pain. 22.  Levemir insulin 20 units every morning under the skin and 8-10 units every evening or as directed. DISCHARGE INSTRUCTIONS:  Diabetic diet. Contact Dr. Shameka Dos Santos for worsened breathing, worsened cough, or worsened rash. See Dr. Nuha Madsen on 2020; Dr. Shameka Dos Santos on 2020; and Dr. Javier Yang, nephrologist, in 2020 as previously directed. CHIEF COMPLAINT:  An 61-year-old white female admitted with worsening dyspnea.  
 
HISTORY OF PRESENT ILLNESS:  She was hospitalized for asthmatic bronchitis recently. She has CHF and  difficult to mange AFib, so she underwent AV node ablation with wireless pacemaker placement recently. Over the past week or so, she has had worsening dyspnea without chest pain or significant cough, fever, or chills. Today, she came to the ER with continued dyspnea. Her BNP was elevated at 2482. She was given a dose of IV Bumex and she was admitted for inpatient evaluation. PAST MEDICAL HISTORY:  Atrial fibrillation, coronary artery disease, cerebrovascular disease, hypertension complicating diabetes mellitus, type 2 diabetes mellitus with nephropathy, peripheral vascular disease, and herpes zoster. CHF PAST SURGICAL HISTORY:  On 02/05/2020, placement of wireless pacemaker and ablation of AV node, hysterectomy, coronary artery stent placement. MEDICATIONS:  Bumex 1 mg 1 day alternating with 2 mg the next, Vasotec 20 mg p.o. b.i.d., vitamin D3 2000 units daily,  Symbicort 160/4.5 two puffs b.i.d., Levemir insulin twice daily 20 units in the morning and 8-10 units in the evening, NovoLog 7 units before each meal, allopurinol 100 mg daily, glipizide 10 mg b.i.d., Astelin 0.15% 1 spray to both nostrils b.i.d., acyclovir 5% ointment p.r.n., Imdur 60 mg p.o. b.i.d., albuterol 2 puffs q.4 h. p.r.n., ASA 81 mg daily, atorvastatin 20 mg daily, amlodipine 5 mg daily, metoprolol XL 50 mg daily, KCl 20 mEq p.o. daily. FAMILY HISTORY:  Noncontributory. SOCIAL HISTORY:  Former smoker, quit some years ago. Denies EtOH. She is . Has supportive family. REVIEW OF SYSTEMS:  As above. PHYSICAL EXAMINATION: 
GENERAL:  In no acute distress, alert and oriented. HEENT:  Chronic disconjugate gaze when looking to the right. NECK:  No JVD. LUNGS:  Mild wheezing with good air movement. HEART:  Regular rate and rhythm. No rubs, murmurs, or gallops. ABDOMEN:  Negative. EXTREMITIES:  Trace right lower extremity edema, which is chronic.   No posterior leg tenderness or Homans' sign. LABORATORY DATA AND IMAGING:  Chest x-ray abnormalities, interstitial opacities with a basilar predominance, may represent pulmonary edema or infection. Hemoglobin A1c 8.7%, troponin I less than 0.05. Sodium 134, BUN 20, creatinine 1.54. WBC 9.5, hemoglobin 11.4, hematocrit 35.2, platelet count of 667. HOSPITAL COURSE:  The patient was admitted to full inpatient admission to telemetry, was seen in consultation by Dr. Amelia Bullock, her cardiologist.  She was diuresed. She made improvement in her breathing. She was monitored on telemetry. Followup chest x-ray 03/01/2020 revealed minimal interstitial edema. Nuclear cardiac SPECT stress test was negative for ischemia or infarction. Her estimated EF was 73 % A chest CT without contrast showed no acute process, stable pulmonary nodules and chronic parenchymal changes, no enlarged pulmonary mass or nodule. Her cough improved at  the end of her hospitalization. Near the end of stay, she was  switched from ACE inhibitor lisinopril to losartan which she tolerated. She had right low back discomfort chronically and intermittently with a flare in the past 2 months. Right hip xray revealed spondlylitic changes in the lumbar spine, DISH, and mild b/l hip arthritis. [facility] [place] evaluated the patient and recommended Tylenol PRN and office follow up. She was mobilized with physical and occupational therapy. She was seen in Pulmonary consultation by Francis Reeves MD.  She was discharged home in improved condition to have followup as above. Two days prior to discharge, her BUN was 27, creatinine was 1.52. Kylee Patterson MD 
 
 
[id number] 
D:  03/08/2020 14:35 T:  03/09/2020 1:56 JOB #:  [id number] CC:  MD Kunal Ulrich MD Tura Knife, MD

## 2020-03-09 NOTE — PROGRESS NOTES
Visit Vitals /78 (BP 1 Location: Left arm, BP Patient Position: Sitting) Pulse 82 Resp 20 Ht 5' (1.524 m) Wt 169 lb (76.7 kg) SpO2 96% BMI 33.01 kg/m²

## 2020-03-12 NOTE — PROCEDURES
[address] PULMONARY FUNCTION TEST Name:  Gilbert Najera 
MR#:  [id number] :  1935 ACCOUNT #:  [de-identified] DATE OF SERVICE:  2020 REQUESTING PHYSICIAN:  Anu Gallardo MD 
 
DIAGNOSIS:  Shortness of breath. ATS criteria for reproducibility and acceptability was met. SPIROMETRY:  FEV1/FVC ratio is 80, which is normal.  FEV1 is 1.35 L, which is 101% predicted, which is normal.  FVC is 1.68 L, which is 81% predicted, which is also normal.  This suggests normal spirometry. LUNG VOLUME:  Total lung capacity is 2.85 L, which is 75% predicted, which is mildly reduced. RV is 1.52 L, which is 88% predicted. RV/TLC ratio is 53, which is increased. This suggests mild restriction with air trapping. DIFFUSION CAPACITY:  DLCO uncorrected for hemoglobin is 7.20 mL/mmHg per minute, which is 48% predicted, which is moderately reduced, which remained low when corrected for alveolar ventilation. Flow volume loop is suggestive of obstruction. INTERPRETATION:  Mild restrictive lung disease with evidence of air trapping (pseudorestriction). There is concomitant moderate reduction in diffusion capacity. Clinical correlation advised. Xavi Norwood MD [email or web address]/ 
D:  2020 20:38 
T:  2020 2:39 
JOB #:  [id number] CC:  Anu Gallardo MD

## 2020-03-13 NOTE — NURSE NAVIGATOR
Per Dr. Jhonatan Martin staff message on 3/10/20. NYHA class  III on admission, NYHA class II on discharge

## 2020-03-16 NOTE — CDMP QUERY
Retro Query Pt admitted with Acute diastolic congestive heart failure. Pt noted to have Type 2 diabetes with nephropathy. For clarification purposes please specify if the etiology of the CKD to be further specified as one of the below options. If possible, please document in the progress notes and discharge summary if you are evaluating and/or treating any of the following: ? CKD related to type 2 diabetes only ? CKD related to type 2 diabetes and hypertension ? Other, please specify ? Clinically unable to determine The medical record reflects the following: 
 
  Risk Factors: CHF, Type 2 DM with nephropathy, HTN Clinical Indicators:  
- PMH: CHF, Type 2 DM with nephropathy, HTN 
- Creatinine, GFR at D/C = 1.52, 33 Treatment: - Bumex - Metoprolol - Amlodipine - Lantus and SSI 
- Blood glucose monitoring - Serial renal function labs (as part of metabolic panel) - Cardiology consult Thank You, 
   Anna Romero, [facility] 
   [phone]

## 2020-04-13 NOTE — TELEPHONE ENCOUNTER
Per Dr. Beulah Baker:          Increase bumex to 2 mg bid x 3 days then back to once a day    Daily weights and call us towards the endo of the week      Identifiers x 2. Informed of the above. Verbalized understanding.

## 2020-04-13 NOTE — TELEPHONE ENCOUNTER
Identifiers x 2. Patient concerned with weight gain and increase in shortness of breath since hospital discharge. Denies swelling. States the following VS:    4-10-20 145/89; 80; 170.8 lbs  4-11-20 133/81; 81; 170 lbs  4-12-20 130/78; 82; 172 lbs  4-13-20          173 lbs. Discussed concern of 3 lbs in 1 day or 5 lbs in 1 week. Patient did not monitor weights immediately after discharge. Confirmed that she is taking bumex 2 mg daily; cozaar 100 mg daily; toprol 50 mg nightly; imdur 60 mg bid.

## 2020-04-23 NOTE — PROGRESS NOTES
Goals  Attends follow-up appointments as directed. 03/06/20 Reveiwed post hospiatal appts with pt. Dr Malinda Davis PCP Dr Kary Rowell Pt reports she has follow up with Dr Gabriele Magallanes, nephrology in mid April. She states Dr Angelina Lezama told her that he would fax labs and hospital notes to him. Pt to follow up with Dr Tati Lazcano for back injection for pain. CTN will follow up---rw 04/13/20 CTN spoke to patient , she reports that she has appt to see an MD for dizziness next week that her PCP is sending her to. Per chart notes, pt attended visit with Dr Les Brown on 3/9/20---[facility]  Patient verbalizes understanding of self-management goals of living with Congestive Heart Failure 03/06/20 CTN spoke to patient to review ways to decrease of CHF exacerbation. __Daily weights--pt reports she will start weighing daily. CTN educated on weighing same time every morning , recording weight and calling MD with increases 3 lbs/day or 5 lbs/week. __Low sodium diet---Reinforced low sodium diet to prevent fluid retention. Pt states she has information given to her from previous admission about low sodium diet that she can review. ---Medication---patient reports that she is taking 2 mg Bumex daily. CTN educated pt on her creatnine and instructed her to drink 6 glasses fluid a day but must eat low sodium diet so kidneys can be hydrated without retention. Patient verbalizes understanding of instructions. At time of call, patient denies feet/ankle edema, denies coughing, states she is breathing fine, denies CP. Last Hospital weight 166 lbs.---rw 04/13/20 CTN spoke to patient today. Weight today is 173.8. Up 7 lbs. Pt denies swelling but does state she is SOB at times. CTN instructed patient to call Dr Heather Roque office to let them know she has gained weight/SOB for advice.  CTN will notify MD as well via .  
CTN also advised low sodium diet again, between 1500--2000mg day and instructed pt to read labels. Pt verbalizes understaning--lexi 04/23/20 CTN spoke to patient for update. She reports her breathing is much better since she increased the bumex for the few days she did. Reports weight at 169--170lbs. Denies edema. States she is able to perform ADL's without SOB. Patient states she is trying to maintain low sodium diet, \"doing the best I can. \" 
 
CTN will call pt again in 2 weeks for update---lexi

## 2020-04-24 NOTE — TELEPHONE ENCOUNTER
----- Message from Amrita Sheldon, RN sent at 4/23/2020  1:05 PM EDT -----  Regarding: Patient has improved and doing better  Hi there,    I called Mrs Heidi Farias to check on her breathing and weight. She said she is doing much better since she went up on bumex for a few days. Her weight today is 170 lb---she reports it has stayed 169-170 lbs this week.     Just wanted you to know---have a great day!!    [address] [place] [facility] Coordination Team  [phone]

## 2020-05-04 NOTE — PROGRESS NOTES
Cardiac Electrophysiology TELEPHONE VIRTUAL VISIT Note Pursuant to the emergency declaration under the [facility], [facility] waiver authority and the [employer] and [employer] Act, this Virtual  Visit was conducted, with patient's consent, to reduce the patient's risk of exposure to COVID-19 and provide continuity of care for an established patient. Services were provided through an audio synchronous discussion virtually to substitute for in-person clinic visit. Subjective:  
  
Catrachito Doss is a 81 y.o. patient who is addressed virtually via synchronous audio call for follow up s/p AV node ablation & MRI leadless pacemaker (DOI 02/05/2020). Device check on 05/05/2020 showed proper function, adequate generator longevity. Pacer dependent s/p AV node ablation. LRL remains at 80 bpm. 
 
Since procedure, she was admitted to [facility], required diuresis. Otherwise, she reports improved energy & CALDERA. She did note HR elevated to 97 bpm yesterday after eating, has had some increased CALDERA for the past few days. NYHA II chronic HFpEF, on appropriate GDMT. She denies chest pain, palpitations, PND, orthopnea, syncope, or increased edema. Nuclear stress test in 03/2020 negative, showed normal LVEF. BP well controlled. SBP typically 130s. Anticoagulated with low dose Eliquis (age, renal function), denies bleeding issues. Previous: 
Nuclear stress (03/02/2020): LVEF 73%, no definite evidence of ischemia and/or infarction. Admitted 02/28/2020-03/05/2020 for dyspnea. BNP elevated (2482). Improved with diuresis. Nuclear stress test negative. Stable pulmonary nodules. Limited echo (02/05/2020): LVEF 50-55%. Mildly dilated RV. Mod dilated LA. Mod dilated RA. Mild MR. Mod aortic valve sclerosis. Mild TR. S/p AV node ablation & Medtronic Micra leadless pacemaker 02/05/2020. Holter (11/07/2019): AF  bpm. 
 
Medications for ventricular rate control caused fatigue & dizziness. S/p POP/CV 04/2017. Previous CVA that she believes was r/t cardiac cath (mild CAD in 2014). S/p PCI RCA 1996. Primary cardiologist is Dr. John Cuellar. Problem List  Date Reviewed: 5/6/2020 Codes Class Noted Systolic CHF, acute ([facility] ICD-10-CM: I50.21 ICD-9-CM: 428.21, 428.0  3/8/2020 Acute diastolic CHF (congestive heart failure) (HCC) ICD-10-CM: I50.31 ICD-9-CM: 428.31, 428.0  2/28/2020 Pacemaker ICD-10-CM: Z95.0 ICD-9-CM: V45.01  2/5/2020 Overview Signed 2/5/2020  5:45 PM by Brian Steven MD  
  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  
  2/5/2020 junctional escape 37 bpm 
  
  
   
 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 
ICD-9-CM: 414.00, 413.9  12/13/2019 Severe obesity (BMI 35.0-39. 9) with comorbidity ([facility] ICD-10-CM: E66.01 
ICD-9-CM: 278.01  5/1/2018 Type 2 diabetes with nephropathy ([facility] ICD-10-CM: E11.21 
ICD-9-CM: 250.40, 583.81  2/12/2018 SOB (shortness of breath) ICD-10-CM: R06.02 
ICD-9-CM: 786.05  8/23/2017 Atrial fibrillation ([facility] ICD-10-CM: I48.91 
ICD-9-CM: 427.31  4/24/2017 PVD (peripheral vascular disease) with claudication (HCC) ICD-10-CM: I73.9 ICD-9-CM: 443.9  2/20/2017 Hypertension complicating diabetes ([facility] ICD-10-CM: E11.59, I10 
ICD-9-CM: 250.80, 401.9  11/17/2016 Coronary atherosclerosis of native coronary artery ICD-10-CM: I25.10 ICD-9-CM: 414.01  11/18/2010 HLD (hyperlipidemia) ICD-10-CM: E78.5 ICD-9-CM: 272.4  11/18/2010  Cerebrovascular accident stroke, other, unspec ICD-10-CM: I67.89 
 ICD-9-CM: 261  11/18/2010 Current Outpatient Medications Medication Sig Dispense Refill  metoprolol succinate (TOPROL-XL) 50 mg XL tablet Take 1.5 Tabs by mouth nightly. 135 Tab 3  
 losartan (COZAAR) 100 mg tablet Take 1 Tab by mouth daily. 90 Tab 3  
 budesonide-formoteroL (SYMBICORT) 160-4.5 mcg/actuation HFAA Take 2 Puffs by inhalation two (2) times a day. After each use, rinse mouth with water 3 Inhaler 3  
 bumetanide (BUMEX) 2 mg tablet Take 1 Tab by mouth daily. 30 Tab 3  
 nystatin (MYCOSTATIN) topical cream Apply  to affected area two (2) times a day. 30 g 0  
 potassium chloride SR (K-TAB) 20 mEq tablet Take 1 Tab by mouth daily. 30 Tab 11  
 acyclovir (ZOVIRAX) 5 % ointment Use as directed 15 g 3  
 isosorbide mononitrate ER (IMDUR) 60 mg CR tablet TAKE 1 TABLET TWICE A  Tab 1  
 acetaminophen (TYLENOL) 325 mg tablet Take 2 Tabs by mouth every four (4) hours as needed for Pain or Fever. 20 Tab 1  
 albuterol (PROAIR HFA) 90 mcg/actuation inhaler Take 2 Puffs by inhalation every four (4) hours as needed for Wheezing or Shortness of Breath. 1 Inhaler 1  
 aspirin 81 mg chewable tablet Take 81 mg by mouth nightly.  atorvastatin (LIPITOR) 20 mg tablet Take 20 mg by mouth nightly.  azelastine (ASTEPRO) 0.15 % (205.5 mcg) 1 Spray by Both Nostrils route nightly.  clotrimazole-betamethasone (LOTRISONE) topical cream Apply  to affected area two (2) times daily as needed for Skin Irritation or Other (Rash).  insulin detemir U-100 (LEVEMIR U-100 INSULIN) 100 unit/mL injection 10 Units by SubCUTAneous route daily. Takes 20 units every morning, and takes 8-10 units every evening as needed/as directed based on blood sugar  mupirocin calcium (BACTROBAN) 2 % topical cream Apply  to affected area two (2) times daily as needed.     
 glipiZIDE (GLUCOTROL) 5 mg tablet TAKE 2 TABLETS TWICE A  Tab 3  
  allopurinol (ZYLOPRIM) 100 mg tablet Take 100 mg by mouth daily.  apixaban (ELIQUIS) 2.5 mg tablet Take 1 Tab by mouth two (2) times a day. 180 Tab 2  
 omeprazole (PRILOSEC) 20 mg capsule TAKE 1 CAPSULE DAILY 90 Cap 2  
 insulin aspart U-100 (NOVOLOG FLEXPEN U-100 INSULIN) 100 unit/mL (3 mL) inpn INJECT 7 UNITS BEFORE EACH MEAL 3 TIMES A DAY OR AS   DIRECTED 45 mL 3  cholecalciferol, vitamin D3, (VITAMIN D3) 2,000 unit tab Take 2,000 Units by mouth daily.  nitroglycerin (NITROSTAT) 0.4 mg SL tablet 1 Tab by SubLINGual route every five (5) minutes as needed for Chest Pain. 25 Tab 3 Allergies Allergen Reactions  Bactrim [Sulfamethoprim] Other (comments) Affects her kidney funtion  Clonidine Shortness of Breath and Swelling Lightheaded,   
 Codeine Unknown (comments)  Keflin Itching  Pcn [Penicillins] Unknown (comments)  Tramadol Other (comments) Makes her head feel swishy Past Medical History:  
Diagnosis Date  Atrial fibrillation ([facility]  CAD (coronary artery disease)  Cerebrovascular accident stroke, other, unspec 11/18/2010  Coronary atherosclerosis of native coronary artery 11/18/2010  Essential hypertension  Essential hypertension, benign 11/18/2010  HLD (hyperlipidemia) 11/18/2010  PVD (peripheral vascular disease) ([facility] 2017  Type II or unspecified type diabetes mellitus without mention of complication, not stated as uncontrolled 11/18/2010  Zoster Past Surgical History:  
Procedure Laterality Date  HX CORONARY STENT PLACEMENT    
 HX HEART CATHETERIZATION    
 [address]  CO ICAR CATHETER ABLATION ATRIOVENTR NODE FUNCTION N/A 2/5/2020 ABLATION AV NODE performed by Prachi Lanier MD at [address] CATH LAB  CO TCAT INSJ/RPL PERM LEADLESS PACEMAKER RV W/IMG N/A 2/5/2020  INSERT OR REPLACE TRANSCATH PPM LEADLESS performed by Prachi Lanier MD at [address] CATH LAB  
 
 No pacemaker in family history. Social History Tobacco Use  Smoking status: Former Smoker Packs/day: 0.80 Years: 20.00 Pack years: 16.00 Types: Cigarettes Last attempt to quit: 1986 Years since quittin.0  Smokeless tobacco: Never Used Substance Use Topics  Alcohol use: No  
  
 
Review of Systems:  
Constitutional: Negative for fever, chills, weight loss, + malaise/fatigue. HEENT: Negative for nosebleeds, vision changes. Respiratory: No cough, no hemoptysis Cardiovascular: Negative for chest pain, palpitations, no orthopnea, claudication, + chronic right lower extremity leg swelling, no syncope, and no PND. + CALDERA Gastrointestinal: Negative for nausea, vomiting, diarrhea, blood in stool and melena. Genitourinary: Negative for dysuria, and hematuria. Musculoskeletal: Negative for myalgias, arthralgia. + foot pain Skin: Negative for rash. Heme: Does not bleed or bruise easily. Neurological: Negative for speech change and focal weakness Objective:  
Due to this being a TeleHealth evaluation, many elements of the physical examination are unable to be assessed. Respiratory: Does not sound SOB with speaking via telephone. Neuro: A&Ox3, speech clear, answering questions appropriately Assessment/Plan: ICD-10-CM ICD-9-CM 1. Cardiac pacemaker in situ Z95.0 V45.01   
2. Essential hypertension, benign I10 401.1 3. Atrial fibrillation, persistent I48.19 427.31   
4. Anticoagulant long-term use Z79.01 V58.61   
5. SOB (shortness of breath) R06.02 786.05 Device check on 2020 showed proper function, adequate generator longevity. Pacer dependent s/p AV node ablation. LRL 80 bpm currently. Increased SOB for the last few days, also feels HR was elevated. Discussed that AV node ablation prevents atrial arrhythmia from causing ventricular rate to increase. Also discussed rate response.   Will increase Toprol XL to 75 mg po daily to see if this causes her to feel better. Recent nuclear stress test negative, showed normal LVEF. NYHA II chronic HFpEF, on appropriate GDMT. Will add an additional Bumex 1 mg po midday for the next 3 days. If this does not improve SOB as it has in the past, she will call back. She will continue to check daily weights. Continue reduced dose Eliquis for embolic CVA prophylaxis. Schedule in clinic pacer check to coincide with her next appt with Dr. Sherin Moon, should have LRL reduced s/p AV node ablation. Otherwise, remote pacer checks q 3 months. EP clinic follow up with Dr. Fili Zamora in 1 year. Future Appointments Date Time Provider Carly Mcgrath 5/6/2020 11:30 AM Corinna Hatch  [address]  
6/2/2020 10:00 AM PACEMAKER3, [place] SOTOENA SCHED  
6/2/2020 10:20 AM Nancy Doran  [address]  
8/12/2020  1:15 PM REMOTE1, [id number]  
11/16/2020 10:00 AM REMOTE1, [address]  
2/17/2021  8:00 AM REMOTE1, [address]  
5/18/2021 10:30 AM PACEMAKER3, [address]  
5/18/2021 10:40 AM Zehra Whitehead  [address] We discussed the expected course, resolution and complications of the diagnosis(es) in detail. Medication risks, benefits, costs, interactions, and alternatives were discussed as indicated. I advised her to contact the office if her condition worsens, changes or fails to improve as anticipated. She expressed understanding with the diagnosis(es) and plan. Patient was made aware and verbalized understanding that an appointment will be scheduled for them for a virtual visit and/or office visit within the above time frame. Patient understanding his/her responsibility to call and change time/date if he/she so chooses. Call duration: 19 minutes.  
 
 
Thank you for involving me in this patient's care and please call with further concerns or questions. TANA Stevens-C [address] [place] [address] 05/06/20

## 2020-05-06 NOTE — PROGRESS NOTES
Verified patient with two types of identifiers. Reviewed with patient to increase Toprol XL 75 mg daily and take an extra 1 mg Bumex after lunch for 3 days then resume 2 mg daily. Scheduled pacer check with up coming appointment with Dr. Macho Moody to [facility]. Patient verbalized understanding and will call with any other questions. Future Appointments Date Time Provider [facility] Ramila 5/6/2020 11:30 AM Latrell Kevin  [address]  
6/2/2020 10:00 AM PACEMAKER3, Nery Alert ARUN SCHED  
6/2/2020 10:20 AM Reyes Borg,  [address]  
8/12/2020  1:15 PM REMOTE1, [address]  
11/16/2020 10:00 AM REMOTE1, [address]  
2/17/2021  8:00 AM REMOTE1, [address]  
5/18/2021 10:30 AM PACEMAKER3, [address]  
5/18/2021 10:40 AM Blaise Turcios  [address]

## 2020-05-14 NOTE — PROGRESS NOTES
05/14/20 CTN unable to reach patient on phone, LM on  requesting return call. CTN will follow up next week if no return call received----lexi

## 2020-05-20 NOTE — PATIENT INSTRUCTIONS
"[facility]   Jose E Capps is being evaluated via a billable video visit due to the restrictions of the Covid-19 pandemic.   The patient has been notified of following:  This video visit will be conducted via a call between you and your provider. We have found that certain health care needs can be provided without the need for an in-person physical exam.  This service lets us provide the care you need with a video conversation. If during the course of the call the provider feels a video visit is not appropriate, you will not be charged for this service.\"   The provider has received verbal consent for a Video Visit from the patient or first contact? Yes  Patient  or facility staff would like the video invitation sent by: N/A   Video Start Time: 6:01 PM    [place] Medical Record Number:  [id number]  Place of Location at the time of visit: [facility]   Chief Complaint   Patient presents with     RECHECK     HPI:  Jose E Capps  is a 89 year old (9/28/1930), who is being seen today for a visit.  HPI information obtained from: facility chart records and [facility] chart review. Today's concern is:    Seen today for follow up. In his room playing cards. Does not like sitting in his new chair since no rollers on wheels so makes it difficult to push back from table. Denies lower back pain today. Has continued on scheduled Tylenol.  Area of right toe abrasion has healed. Some swelling and stiffness to right hand.     Past Medical and Surgical History reviewed in Epic today.  MEDICATIONS:    Current Outpatient Medications   Medication Sig Dispense Refill     ACETAMINOPHEN EXTRA STRENGTH 500 MG tablet TAKE TWO TABLETS (1000MG) BY MOUTH TWICE DAILY;MAY ALSO TAKE ONE TABLET EVERY 8 HOURS AS NEEDED FOR MILD PAIN *NOT TO EXCEED 4000MG APAP/24 HRS* 112 tablet 97     ASPIRIN NOT PRESCRIBED (INTENTIONAL) Please choose reason not prescribed, below 0 each 0     atorvastatin (LIPITOR) " A referral has been sent to ENT-Dr. Cherie Patel. If you are not contacted in 7-10 days, please call his office at [phone]. If dizziness is not related to ENT, a referral has been sent to Dr. Leida Go. If you are not contacted in 7- 10 days, please call her office at [phone]. Follow up with Dr. Nuha Madsen in 3 months. Verify that you are not taking norvasc. "10 MG tablet TAKE 1 TABLET BY MOUTH ONCE DAILY 31 tablet 98     Calcium Carb-Cholecalciferol (CALCIUM + D3) 600-200 MG-UNIT TABS TAKE 1 TABLET BY MOUTH ONCE DAILY 31 tablet 98     cholecalciferol (VITAMIN D3) 5000 units (125 mcg) capsule Take 5,000 Units by mouth daily       cyanocobalamin (VITAMIN B-12) 500 MCG SUBL sublingual tablet Place 500 mcg under the tongue 2 times daily       ELIQUIS ANTICOAGULANT 5 MG tablet TAKE 1 TABLET BY MOUTH TWICE DAILY 62 tablet 98     leflunomide (ARAVA) 10 MG tablet TAKE 1 TABLET BY MOUTH ONCE DAILY 31 tablet 98     lisinopril (ZESTRIL) 10 MG tablet TAKE ONE TABLET (10MG) BY MOUTH ONCE DAILY 28 tablet 97     loperamide (IMODIUM) 2 MG capsule TAKE 1 CAPSULE BY MOUTH FOUR TIMES DAILY AS NEEDED FOR DIARRHEA 30 capsule 11     memantine (NAMENDA) 10 MG tablet Take 10 mg by mouth daily From 12/4/19 - 01/02/20 then starting 01/03/20 take 10 mg PO BID       metoprolol tartrate (LOPRESSOR) 25 MG tablet TAKE ONE-FOURTH TABLET (6.25MG) BY MOUTH TWICE DAILY 16 tablet 98     Multiple Vitamins-Minerals (CEROVITE SENIOR) TABS TAKE 1 TABLET BY MOUTH ONCE DAILY 31 tablet 98     polyethylene glycol (MIRALAX/GLYCOLAX) powder MIX 17GM OF POWDER IN 8OZ OF WATER UNTIL COMPLETELY DISSOLVED. DRINK SOLUTION BY MOUTH ONCE DAILY AS NEEDED FOR CONSTIPATION 527 g 5     REVIEW OF SYSTEMS: Limited secondary to cognitive impairment but today pt reports ok  Objective: /78   Pulse (!) 48   Temp 98.1  F (36.7  C)   Resp 16   Ht 1.626 m (5' 4\")   Wt 80.7 kg (178 lb)   SpO2 97%   BMI 30.55 kg/m    Limited visit exam done given COVID-19 precautions.   GENERAL APPEARANCE:  alert and pleasant, forgetful   ENT:  Mouth and posterior oropharynx normal, dry mucous membranes  EYES:  EOM, conjunctivae, lids, pupils and irises normal-glasses  RESP:  respiratory effort appears at baseline  CV:  GARRISON  ABDOMEN: GARRISON  M/S: mostly use of WC or 4WW. Edema +1 to right hand and digits   SKIN:  no signs of open areas to " viewed arms, legs  NEURO:   Cranial nerves 2-12 are normal tested and grossly at patient's baseline  PSYCH:  insight and judgement impaired, memory impaired    Labs:   CBC RESULTS:   Recent Labs   Lab Test 12/05/19  0840 07/03/19  0845   WBC 7.3 7.5   RBC 4.56 4.72   HGB 14.0 14.6   HCT 43.1 43.8   MCV 95 93   MCH 30.7 30.9   MCHC 32.5 33.3   RDW 14.4 14.0    235       Last Basic Metabolic Panel:  Recent Labs   Lab Test 12/05/19  0840 07/03/19  0845 02/18/19  1555   NA  --  142 139   POTASSIUM  --  4.5 4.6   CHLORIDE  --  110* 106   GISELA  --  8.8 8.7   CO2  --  28 26   BUN  --  22 23   CR 0.98 1.07 1.04   GLC  --  103* 131*       Liver Function Studies -   Recent Labs   Lab Test 12/05/19  0840 07/03/19  0845 09/28/18  1305   PROTTOTAL  --  7.0 6.8   ALBUMIN 3.4 3.6 3.4   BILITOTAL  --  0.5 1.0   ALKPHOS  --  73 97   AST 16 15 26   ALT 30 26 27       TSH   Date Value Ref Range Status   07/03/2019 1.72 0.40 - 4.00 mU/L Final   09/11/2018 1.81 0.40 - 4.00 mU/L Final       Lab Results   Component Value Date    A1C 5.6 09/28/2018    A1C 6.0 07/01/2015       ASSESSMENT/PLAN:  (F01.50) Vascular dementia without behavioral disturbance (H)  (primary encounter diagnosis)  Z86.73) History of CVA (cerebrovascular accident)    Comment: residual deficits with cognition, mobility has mostly returned  Plan:  -Follows with neurology  -statin, anticoagulation and bp meds for secondary prevention  -memantine BID  -Unable to start homocysteine supplement from neuro secondary to cost with set-up from [place]. Reviewed with family and neuro clinic.Labs for homocysteine at neurology but unable to see     (I50.32) Chronic diastolic congestive heart failure (H)  (I48.0) Paroxysmal A-fib (H)  (I25.119) Coronary artery disease with angina pectoris, unspecified   vessel or lesion type, unspecified whether native or transplanted heart (H)  (I10) Hypertension goal BP (blood pressure) < 140/90   Comment: stable-Echo 9/2018 EF at 55-60%  Plan:    -statin, Eliquis and BP medications   -follow BMP  -VS with visits (HR bradycardic at times)    (M06.00) Seronegative rheumatoid arthritis (H)  Comment: recent flare in right hand  Plan:   -follow up with Dr. Maurer-rheumatologist   -leflunomide 10 mg po daily  -short course of prednisone prn  -consider checking CRP   -follow CBC, LFTs with DMARD     (E04.1) Thyroid nodule  Comment: noted on imaging   Plan:  -following TSH        Electronically signed by:  SHIRA Parra CNP     Video-Visit Details  Type of service:  Video Visit  Video End Time (time video stopped): 6:05 PM  Distant Location (provider location):  [facility]

## 2020-05-21 NOTE — PROGRESS NOTES
Goals  Attends follow-up appointments as directed. 03/06/20 Reveiwed post hospiatal appts with pt. Dr Conchita Sharif PCP Dr Bruce Potts Pt reports she has follow up with Dr Juliette Guzman, nephrology in mid April. She states Dr Stephanie Still told her that he would fax labs and hospital notes to him. Pt to follow up with Dr Elzie Holter for back injection for pain. CTN will follow up---mkrw 04/13/20 CTN spoke to patient , she reports that she has appt to see an MD for dizziness next week that her PCP is sending her to. Per chart notes, pt attended visit with Dr Sherin Moon on 3/9/20---r  Patient verbalizes understanding of self-management goals of living with Congestive Heart Failure 03/06/20 CTN spoke to patient to review ways to decrease of CHF exacerbation. __Daily weights--pt reports she will start weighing daily. CTN educated on weighing same time every morning , recording weight and calling MD with increases 3 lbs/day or 5 lbs/week. __Low sodium diet---Reinforced low sodium diet to prevent fluid retention. Pt states she has information given to her from previous admission about low sodium diet that she can review. ---Medication---patient reports that she is taking 2 mg Bumex daily. CTN educated pt on her creatnine and instructed her to drink 6 glasses fluid a day but must eat low sodium diet so kidneys can be hydrated without retention. Patient verbalizes understanding of instructions. At time of call, patient denies feet/ankle edema, denies coughing, states she is breathing fine, denies CP. Last Hospital weight 166 lbs.---mkrw 04/13/20 CTN spoke to patient today. Weight today is 173.8. Up 7 lbs. Pt denies swelling but does state she is SOB at times. CTN instructed patient to call Dr Latricia Hui office to let them know she has gained weight/SOB for advice.  CTN will notify MD as well via .  
CTN also advised low sodium diet again, between 1500--2000mg day and instructed pt to read labels. Pt verbalizes understaning--lexi 04/23/20 CTN spoke to patient for update. She reports her breathing is much better since she increased the bumex for the few days she did. Reports weight at 169--170lbs. Denies edema. States she is able to perform ADL's without SOB. Patient states she is trying to maintain low sodium diet, \"doing the best I can. \" 
 
CTN will call pt again in 2 weeks for update---lexi 05/21/20 Patient reports she has been doing well---has no concerns at time of call. Pt reports weight has remained stable within 1-2 lbs fluctuations---reports today weight 171.2 lbs. Patient reports she gets SOB with exertion but feels that this is \"normal\" for her. States she is fine at rest, denies edema. Patient reports she has appts with both Dr Tanvir Moreno and Dr Bernardino Lake on 6/2/20. Patient has no concerns at this time--thanked CTN for calling and checking on her. CTN will follow up in 2 weeks for updates---lexi

## 2020-06-04 NOTE — PROGRESS NOTES
Patient has graduated from the Bundle program on 6/4/20. Patient/family has the ability to self-manage at this time Care management goals have been completed. Patient was not referred to the [facility] team for further management. Goals Addressed This Visit's Progress  COMPLETED: Attends follow-up appointments as directed. 03/06/20 Reveiwed post hospiatal appts with pt. Dr Cody Ramires PCP Dr Tai Honeycutt Pt reports she has follow up with Dr Dandy Harper, nephrology in mid April. She states Dr Jaziel John told her that he would fax labs and hospital notes to him. Pt to follow up with Dr Alejandrina Mackenzie for back injection for pain. CTN will follow up---mkrw 04/13/20 CTN spoke to patient , she reports that she has appt to see an MD for dizziness next week that her PCP is sending her to. Per chart notes, pt attended visit with Dr Daphney Doty on 3/9/20---mkrw 06/04/20 CTN unable to reach patient on phone, LM on  requesting return call. Patient has had no readmission during 90 day bundle period---mkrw  COMPLETED: Patient verbalizes understanding of self-management goals of living with Congestive Heart Failure 03/06/20 CTN spoke to patient to review ways to decrease of CHF exacerbation. __Daily weights--pt reports she will start weighing daily. CTN educated on weighing same time every morning , recording weight and calling MD with increases 3 lbs/day or 5 lbs/week. __Low sodium diet---Reinforced low sodium diet to prevent fluid retention. Pt states she has information given to her from previous admission about low sodium diet that she can review. ---Medication---patient reports that she is taking 2 mg Bumex daily. CTN educated pt on her creatnine and instructed her to drink 6 glasses fluid a day but must eat low sodium diet so kidneys can be hydrated without retention. Patient verbalizes understanding of instructions. At time of call, patient denies feet/ankle edema, denies coughing, states she is breathing fine, denies CP. Last Hospital weight 166 lbs.---mkrw 04/13/20 CTN spoke to patient today. Weight today is 173.8. Up 7 lbs. Pt denies swelling but does state she is SOB at times. CTN instructed patient to call Dr Fermín Reyes office to let them know she has gained weight/SOB for advice. CTN will notify MD as well via .  
CTN also advised low sodium diet again, between 1500--2000mg day and instructed pt to read labels. Pt verbalizes understaning--mkrw 04/23/20 CTN spoke to patient for update. She reports her breathing is much better since she increased the bumex for the few days she did. Reports weight at 169--170lbs. Denies edema. States she is able to perform ADL's without SOB. Patient states she is trying to maintain low sodium diet, \"doing the best I can. \" 
 
CTN will call pt again in 2 weeks for update---mkrw 05/21/20 Patient reports she has been doing well---has no concerns at time of call. Pt reports weight has remained stable within 1-2 lbs fluctuations---reports today weight 171.2 lbs. Patient reports she gets SOB with exertion but feels that this is \"normal\" for her. States she is fine at rest, denies edema. Patient reports she has appts with both Dr Jeffrey Durham and Dr Mahi Sevilla on 6/2/20. Patient has no concerns at this time--thanked CTN for calling and checking on her. CTN will follow up in 2 weeks for updates---r Patient has Care Transition Nurse's contact information for any further questions, concerns, or needs. Patients upcoming visits:   
Future Appointments Date Time Provider Carly Mcgrath 8/12/2020  1:15 PM REMOTE1, [address]  
9/1/2020 10:20 AM Philip Montes  [address]  
11/16/2020 10:00 AM REMOTE1, [id number]  
2/17/2021  8:00 AM REMOTE1, [address]  
 5/18/2021 10:30 AM [email or web address], [address]  
5/18/2021 10:40 AM Rohit Braga  [address]

## 2020-06-08 PROBLEM — I65.23 BILATERAL CAROTID ARTERY STENOSIS: Status: ACTIVE | Noted: 2020-01-01

## 2020-06-08 PROBLEM — I65.03 VERTEBRAL ARTERY STENOSIS, BILATERAL: Status: ACTIVE | Noted: 2020-01-01

## 2020-06-08 PROBLEM — I63.9 CVA (CEREBRAL VASCULAR ACCIDENT) (HCC): Status: ACTIVE | Noted: 2020-01-01

## 2020-06-08 NOTE — ED NOTES
Reports \"always having something wrong with my head. I can't get anywhere with the doctors\". Reports having seen ENT and having \"something done with my crystals and I felt better for 2 weeks\"

## 2020-06-08 NOTE — ED NOTES
3:42 PM 
Change of shift. Care of patient taken over from Dr Lorrie Vegas; H&P reviewed, bedside handoff complete. Awaiting results CONSULT  NOTE 
3:42 PM 
Janny Chiang MD spoke with Dr Sudhir Turner. Specialty: Teleneurology Discussed pt's hx, disposition, and available diagnostic and imaging results. Reviewed care plans. Consulting physician agrees with plans as outlined 'will defer to neurointerventional/ I dont think it is a stroke/  
 
 
ED EKG interpretation: 
Rhythm: paced; and regular . Rate (approx.): 60; Axis: left axis deviation; P wave: absent; QRS interval: prolonged; ST/T wave: non-specific changes; in  Lead: ; Other findings: unchanged from previous ekg dated 03/09/2020. This EKG was interpreted by Benny Reyes MD,ED Provider. Hospitalist Perfect Serve for Admission 4:47 PM 
 
ED Room Number: ER09/09 Patient Name and age:  Isaac Garcia 81 y.o.  female Working Diagnosis: No diagnosis found. COVID-19 Suspicion:  no 
 
Code Status:  Full Code Readmission: no 
Isolation Requirements:  no 
Recommended Level of Care:  telemetry Department:[facility] Adult ED - [phone] Other:  Code S level 2/ no tpa;

## 2020-06-08 NOTE — CONSULTS
Neurointerventional Surgery Consult Alberto Christina [facility] Neurocritical Care Nurse Practitioner [phone] Patient: Rachna Collier MRN: [id number]  SSN: [id number] YOB: 1935  Age: 81 y.o. Sex: female Chief Complaint: dizziness Subjective:  
  
Rachna Collier is a 81 y.o. 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 The patient presented to the ED today after waking up this morning around 8:00 AM with complaints of dizziness. Symptoms are associated with nausea and vomiting. She denies any other associated symptoms. Patient reports she has dizzy spells at baseline for the past three years mainly when she wakes up in the morning. She reports that she will have some SOB with the dizziness. She reports that she hasn't had the dizziness further evaluated. She can usually get up out of the bed and continue with her chores around the house without it bothering her. However, today she feels that her symptoms worsened over the course of the day. Around 1:00 pm she told her daughter that she wanted to go the ED for evaluation. The patient was brought to the ED via EMS as a Code Stroke. CT of the Head was performed which showed no acute intracranial abnormalities. Suboptimal evaluation of the left posterior fossa due to streak artifact. Marked hypodensity of the cerebral white matter likely due to chronic small vessel ischemic changes. CTA of the head and neck showed no large vessel occlusion. Severe bilateral internal carotid artery origin stenoses. Moderate bilateral cavernous carotid stenoses.  Bilateral patent vertebral arteries with moderate bilateral vertebral stenosis in the midportion of the V4 segments. Status post left suboccipital craniectomy and resection of medial left inferior cerebellar hemisphere. No significant perfusion abnormalities on CT Perfusion. Neurointerventional Surgery was consulted for further evaluation of the severe bilateral ICA stenoses and vertebral artery stenoses. The patient reports her initial symptoms have improved. She currently denies any headache, vision changes, chest pain, back or neck pain, abdominal pain, SOB, numbness, tingling, weakness, coordination, or difficulty with speech. She does complain of some intermittent palpitations and balance issues. 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 Patient reports, \" I have sugar in my eyes. \" In addition to the Eliquis, patient also reports she takes a baby aspirin daily. She has taken Plavix in the past, but it was stopped after the patient started Eliquis. I spoke with the patient's daughter via phone and she has had some issues with high blood pressure. Her SBP is currently 160's-180's. Past Medical History:  
Diagnosis Date  Atrial fibrillation ([address]  CAD (coronary artery disease)  Cerebrovascular accident stroke, other, unspec 11/18/2010  Coronary atherosclerosis of native coronary artery 11/18/2010  Essential hypertension  Essential hypertension, benign 11/18/2010  HLD (hyperlipidemia) 11/18/2010  PVD (peripheral vascular disease) ([address] 2017  Type II or unspecified type diabetes mellitus without mention of complication, not stated as uncontrolled 11/18/2010 Congenital right eye abnormality, gaze palsy  Zoster Past Surgical History:  
Procedure Laterality Date  HX CORONARY STENT PLACEMENT    
 HX HEART CATHETERIZATION    
 [facility]  [place] ICAR CATHETER ABLATION ATRIOVENTR NODE FUNCTION N/A 2020 ABLATION AV NODE performed by Louella Goodell, MD at [address] [facility] CATH LAB  [place] TCAT INSJ/RPL PERM LEADLESS PACEMAKER RV W/IMG N/A 2020 Right femoral-popliteal bypass with vein in 2017 due to PVD  
  shunt placement by Dr. Peggy Dwyer after stroke due to cerebral swelling per patient INSERT OR REPLACE TRANSCATH PPM LEADLESS [facility] [address] C[facility] LAB Pertinent Family History: Mother  at age 70, hx of CVA Father  at age 80, from CHF Brother recently passed from [address] [place] [address] and had hx of vascular disease Social History Tobacco Use  Smoking status: Former Smoker Packs/day: 0.80 Years: 20.00 Pack years: 16.00 Types: Cigarettes Last attempt to quit: 1986 Years since quittin.1  Smokeless tobacco: Never Used Substance Use Topics  Alcohol use: No  
  
Current Facility-Administered Medications Medication Dose Route Frequency Provider Last Rate Last Dose  iopamidoL (ISOVUE-370) 76 % injection 50 mL  50 mL IntraVENous RAD ONCE Mary Montanez MD      
 glucose chewable tablet 16 g  4 Tab Oral PRN Slime Spear MD      
 glucagon (GLUCAGEN) injection 1 mg  1 mg IntraMUSCular PRN Slime Spear MD      
 dextrose 10% infusion 0-250 mL  0-250 mL IntraVENous PRN Slime Spear MD      
 insulin lispro (HUMALOG) injection   SubCUTAneous Q6H Slime Spear MD      
 
Current Outpatient Medications Medication Sig Dispense Refill  metoprolol succinate (TOPROL-XL) 50 mg XL tablet Take 1.5 Tabs by mouth nightly. 135 Tab 3  
 losartan (COZAAR) 100 mg tablet Take 1 Tab by mouth daily. 90 Tab 3  
 budesonide-formoteroL (SYMBICORT) 160-4.5 mcg/actuation HFAA Take 2 Puffs by inhalation two (2) times a day.  After each use, rinse mouth with water 3 Inhaler 3  
  bumetanide (BUMEX) 2 mg tablet Take 1 Tab by mouth daily. 30 Tab 3  
 nystatin (MYCOSTATIN) topical cream Apply  to affected area two (2) times a day. 30 g 0  
 potassium chloride SR (K-TAB) 20 mEq tablet Take 1 Tab by mouth daily. 30 Tab 11  
 acyclovir (ZOVIRAX) 5 % ointment Use as directed 15 g 3  
 isosorbide mononitrate ER (IMDUR) 60 mg CR tablet TAKE 1 TABLET TWICE A  Tab 1  
 acetaminophen (TYLENOL) 325 mg tablet Take 2 Tabs by mouth every four (4) hours as needed for Pain or Fever. 20 Tab 1  
 albuterol (PROAIR HFA) 90 mcg/actuation inhaler Take 2 Puffs by inhalation every four (4) hours as needed for Wheezing or Shortness of Breath. 1 Inhaler 1  
 aspirin 81 mg chewable tablet Take 81 mg by mouth nightly.  atorvastatin (LIPITOR) 20 mg tablet Take 20 mg by mouth nightly.  azelastine (ASTEPRO) 0.15 % (205.5 mcg) 1 Spray by Both Nostrils route nightly.  clotrimazole-betamethasone (LOTRISONE) topical cream Apply  to affected area two (2) times daily as needed for Skin Irritation or Other (Rash).  insulin detemir U-100 (LEVEMIR U-100 INSULIN) 100 unit/mL injection 10 Units by SubCUTAneous route daily. Takes 20 units every morning, and takes 8-10 units every evening as needed/as directed based on blood sugar  mupirocin calcium (BACTROBAN) 2 % topical cream Apply  to affected area two (2) times daily as needed.  glipiZIDE (GLUCOTROL) 5 mg tablet TAKE 2 TABLETS TWICE A  Tab 3  
 allopurinol (ZYLOPRIM) 100 mg tablet Take 100 mg by mouth daily.  apixaban (ELIQUIS) 2.5 mg tablet Take 1 Tab by mouth two (2) times a day. 180 Tab 2  
 omeprazole (PRILOSEC) 20 mg capsule TAKE 1 CAPSULE DAILY 90 Cap 2  
 insulin aspart U-100 (NOVOLOG FLEXPEN U-100 INSULIN) 100 unit/mL (3 mL) inpn INJECT 7 UNITS BEFORE EACH MEAL 3 TIMES A DAY OR AS   DIRECTED 45 mL 3  cholecalciferol, vitamin D3, (VITAMIN D3) 2,000 unit tab Take 2,000 Units by mouth daily.  nitroglycerin (NITROSTAT) 0.4 mg SL tablet 1 Tab by SubLINGual route every five (5) minutes as needed for Chest Pain. 25 Tab 3 Allergies Allergen Reactions  Bactrim [Sulfamethoprim] Other (comments) Affects her kidney funtion  Clonidine Shortness of Breath and Swelling Lightheaded,   
 Codeine Unknown (comments)  Keflin Itching  Pcn [Penicillins] Unknown (comments)  Tramadol Other (comments) Makes her head feel swishy Review of Systems: 
Pertinent items are noted in the History of Present Illness. Objective:  
 
Vitals:  
 06/08/20 1520 06/08/20 1614 06/08/20 1616 06/08/20 1700 BP: 174/70 162/72  162/75 Pulse: 60 61 62 61 Resp: 16 20 20 17 Temp: 97.5 °F (36.4 °C) 97.5 °F (36.4 °C)  98 °F (36.7 °C) SpO2: 96% 95% 95% 97% Physical Exam: 
GENERAL: alert, cooperative, no distress, appears stated age EYE: conjunctivae/corneas clear. PERRL, EOM's intact. NECK: neck supple and symmetrical.  no carotid bruits noted LUNG: clear to auscultation bilaterally HEART: Paced, S1, S2 normal, no murmur, click, rub or gallop ABDOMEN: soft, non-tender. Bowel sounds normal. No masses,  no organomegaly EXTREMITIES:  extremities normal, atraumatic, no cyanosis or edema. 2+ radial pulses bilaterally. +1 posterior tibial and pedal pulses bilaterally SKIN: Skin warm to touch. Neurologic Exam: 
Mental Status:  Alert and oriented x 4. Appropriate affect, mood and behavior. Language:    Normal fluency, repetition, comprehension and naming. Cranial Nerves:    
   Pupils equal, round and reactive to light. Visual fields full to confrontation. Extraocular movements intact, with the exception of right eye palsy (inability to look to the right with right eye at baseline) Facial sensation intact. Full facial strength, no asymmetry. Hearing grossly intact bilaterally. No dysarthria.  Tongue protrudes to midline, palate elevates symmetrically. Shoulder shrug 5/5 bilaterally. Motor:    No pronator drift. Bulk and tone normal.  
   5/5 power in all extremities proximally and distally. No involuntary movements. Sensation:    Sensation intact throughout to light touch Reflexes:    Deferred. Coordination & Gait: No ataxia with FTN and HTS bilaterally. Gait deferred. NIHSS:   
  1a-LOC:0 
  1b-Month/Age:0 
  1c-Open/Close Hand:0 2-Best Gaze:1(partial gaze palsy in right eye, unable to look to the right at baseline, congenital) 3-Visual Fields:0 
  4-Facial Palsy:0 
  5a-Left Arm:0 
  5b-Right Arm:0 
  6a-Left Le 
  6b-Right Le 
  7-Limb Ataxia:0 
  8-Sensory:0 
  9-Best Language:0 
  10-Dysarthria:0 
  11-Extinction/Inattention:0 
TOTAL SCORE:1 
 
Recent Results (from the past 24 hour(s)) CBC WITH AUTOMATED DIFF Collection Time: 20  3:23 PM  
Result Value Ref Range WBC 12.7 (H) 3.6 - 11.0 K/uL  
 RBC 4.81 3.80 - 5.20 M/uL  
 HGB 13.2 11.5 - 16.0 g/dL HCT 39.9 35.0 - 47.0 % MCV 83.0 80.0 - 99.0 FL  
 MCH 27.4 26.0 - 34.0 PG  
 MCHC 33.1 30.0 - 36.5 g/dL  
 RDW 14.2 11.5 - 14.5 % PLATELET 874 288 - 327 K/uL MPV 8.9 8.9 - 12.9 FL  
 NRBC 0.0 0  WBC ABSOLUTE NRBC 0.00 0.00 - 0.01 K/uL NEUTROPHILS 74 32 - 75 % LYMPHOCYTES 15 12 - 49 % MONOCYTES 7 5 - 13 % EOSINOPHILS 2 0 - 7 % BASOPHILS 1 0 - 1 % IMMATURE GRANULOCYTES 1 (H) 0.0 - 0.5 % ABS. NEUTROPHILS 9.4 (H) 1.8 - 8.0 K/UL  
 ABS. LYMPHOCYTES 1.9 0.8 - 3.5 K/UL  
 ABS. MONOCYTES 0.9 0.0 - 1.0 K/UL  
 ABS. EOSINOPHILS 0.3 0.0 - 0.4 K/UL  
 ABS. BASOPHILS 0.1 0.0 - 0.1 K/UL  
 ABS. IMM. GRANS. 0.2 (H) 0.00 - 0.04 K/UL  
 DF AUTOMATED METABOLIC PANEL, COMPREHENSIVE Collection Time: 20  3:23 PM  
Result Value Ref Range Sodium 135 (L) 136 - 145 mmol/L Potassium 4.4 3.5 - 5.1 mmol/L Chloride 101 97 - 108 mmol/L  
 CO2 26 21 - 32 mmol/L  Anion gap 8 5 - 15 mmol/L  
 Glucose 220 (H) 65 - 100 mg/dL BUN 27 (H) 6 - 20 MG/DL Creatinine 1.61 (H) 0.55 - 1.02 MG/DL  
 BUN/Creatinine ratio 17 12 - 20 GFR est AA 37 (L) >60 ml/min/1.73m2 GFR est non-AA 31 (L) >60 ml/min/1.73m2 Calcium 9.1 8.5 - 10.1 MG/DL Bilirubin, total 0.9 0.2 - 1.0 MG/DL  
 ALT (SGPT) 20 12 - 78 U/L  
 AST (SGOT) 27 15 - 37 U/L Alk. phosphatase 115 45 - 117 U/L Protein, total 7.6 6.4 - 8.2 g/dL Albumin 3.5 3.5 - 5.0 g/dL Globulin 4.1 (H) 2.0 - 4.0 g/dL A-G Ratio 0.9 (L) 1.1 - 2.2 SAMPLES BEING HELD Collection Time: 06/08/20  3:23 PM  
Result Value Ref Range SAMPLES BEING HELD 1 red, 1 blue COMMENT Add-on orders for these samples will be processed based on acceptable specimen integrity and analyte stability, which may vary by analyte. TROPONIN I Collection Time: 06/08/20  3:23 PM  
Result Value Ref Range Troponin-I, Qt. <0.05 <0.05 ng/mL URINALYSIS W/ RFLX MICROSCOPIC Collection Time: 06/08/20  4:08 PM  
Result Value Ref Range Color YELLOW/STRAW Appearance CLEAR CLEAR Specific gravity 1.013 1.003 - 1.030    
 pH (UA) 7.0 5.0 - 8.0 Protein Negative NEG mg/dL Glucose Negative NEG mg/dL Ketone Negative NEG mg/dL Bilirubin Negative NEG Blood TRACE (A) NEG Urobilinogen 0.2 0.2 - 1.0 EU/dL Nitrites Negative NEG Leukocyte Esterase Negative NEG    
 WBC 0-4 0 - 4 /hpf  
 RBC 0-5 0 - 5 /hpf Epithelial cells FEW FEW /lpf Bacteria 1+ (A) NEG /hpf  
SAMPLES BEING HELD Collection Time: 06/08/20  4:08 PM  
Result Value Ref Range SAMPLES BEING HELD 1UC   
 COMMENT Add-on orders for these samples will be processed based on acceptable specimen integrity and analyte stability, which may vary by analyte. EKG, 12 LEAD, INITIAL Collection Time: 06/08/20  4:14 PM  
Result Value Ref Range Ventricular Rate 61 BPM  
 Atrial Rate 64 BPM  
 QRS Duration 146 ms  
 Q-T Interval 474 ms QTC Calculation (Bezet) 477 ms Calculated R Axis -39 degrees Calculated T Axis 131 degrees Diagnosis ** Poor data quality, interpretation may be adversely affected Ventricular-paced rhythm When compared with ECG of 28-FEB-2020 21:03, 
Vent. rate has decreased BY  21 BPM 
  
 
 
Imaging: CT Results  (Last 48 hours) 06/08/20 1602  CTA CODE NEURO HEAD AND NECK W CONT Final result Impression:  IMPRESSION:  
1. No acute large vessel occlusion. 2. Severe bilateral internal carotid artery origin stenoses. Moderate bilateral  
cavernous carotid stenoses. 3. Bilaterally patent vertebral arteries with moderate bilateral vertebral  
stenosis in the midportion of the V4 segments. 4. Status post left suboccipital craniectomy and resection of medial the left  
inferior cerebellar hemisphere. 5. No significant perfusion abnormalities. Narrative:  EXAM:  CTA CODE NEURO HEAD AND NECK W CONT, CT CODE NEURO PERF W CBF INDICATION:  code S level 1 COMPARISON:  Noncontrast head CT of earlier in the day TECHNIQUE:    
Multislice helical axial CT angiography was performed from the aortic arch to  
the top of the head during uneventful rapid bolus intravenous administration of  
80 mL of Isovue 370. Postprocessing was performed to include MIP and volume  
rendered images. Standard images of the head were also obtained following  
contrast administration and a delay. CT brain perfusion was performed following the IV injection of 40 cc Isovue-370  
with generation of hemodynamic maps of multiple parameters, including cerebral  
blood flow, cerebral volume, Tmax,  and MTT (mean transit time). CT Dose reduction was achieved to use of a standardized protocol tailored for  
this examination and automatic exposure control for dose modulationn. CT HEAD The postcontrast images are less degraded by artifact on the precontrast images because of the different gantry angle. Changes of prior left suboccipital  
craniectomy with resection of the inferomedial left cerebellum are noted. Diffuse hypodensity of the cerebral white matter is again noted. No enhancing  
lesions or masses are demonstrated. CT PERFUSION:  
No significant perfusion abnormalities. The inferior cerebellar hemispheres were  
not completely included on the perfusion examination. CTA NECK There is conventional three vessel arch anatomy. The bilateral subclavian and  
common carotid  are patent with no flow-limiting stenosis. There is calcified  
plaque in the proximal right internal carotid artery resulting in severe (70 to  
90%) luminal diameter stenosis. There is calcified plaque with moderate stenosis  
in the mid right common carotid artery. There is also calcified plaque in the  
proximal left internal carotid artery resulting in severe (70-90%) luminal  
diameter stenosis. NASCET method was utilized for calculating stenosis. Vertebral arteries are bilaterally patent and the right is dominant. The  
cervical soft tissues are unremarkable. There are degenerative changes in the  
cervical spine as well as extensive anterior paravertebral ossification with  
fusion anteriorly between C4 and C7. CTA HEAD The distal vertebral arteries are bilaterally patent. There is calcified plaque  
in the mid V4 segments bilaterally, with moderate stenosis. . The basilar artery  
and its branches demonstrate no significant abnormalities. . The internal  
carotid, anterior cerebral, and middle cerebral arteries are patent. There is  
calcified plaque in the cavernous carotid arteries bilaterally, with moderate  
stenosis. There is irregularity of the intracranial vessels without severe  
stenosis or occlusion. . No aneurysms are demonstrated. . There is a patent right  
posterior communicating artery. 06/08/20 1602  CT CODE NEURO PERF W CBF Final result Impression:  IMPRESSION:  
1. No acute large vessel occlusion. 2. Severe bilateral internal carotid artery origin stenoses. Moderate bilateral  
cavernous carotid stenoses. 3. Bilaterally patent vertebral arteries with moderate bilateral vertebral  
stenosis in the midportion of the V4 segments. 4. Status post left suboccipital craniectomy and resection of medial the left  
inferior cerebellar hemisphere. 5. No significant perfusion abnormalities. Narrative:  EXAM:  CTA CODE NEURO HEAD AND NECK W CONT, CT CODE NEURO PERF W CBF INDICATION:  code S level 1 COMPARISON:  Noncontrast head CT of earlier in the day TECHNIQUE:    
Multislice helical axial CT angiography was performed from the aortic arch to  
the top of the head during uneventful rapid bolus intravenous administration of  
80 mL of Isovue 370. Postprocessing was performed to include MIP and volume  
rendered images. Standard images of the head were also obtained following  
contrast administration and a delay. CT brain perfusion was performed following the IV injection of 40 cc Isovue-370  
with generation of hemodynamic maps of multiple parameters, including cerebral  
blood flow, cerebral volume, Tmax,  and MTT (mean transit time). CT Dose reduction was achieved to use of a standardized protocol tailored for  
this examination and automatic exposure control for dose modulationn. CT HEAD The postcontrast images are less degraded by artifact on the precontrast images  
because of the different gantry angle. Changes of prior left suboccipital  
craniectomy with resection of the inferomedial left cerebellum are noted. Diffuse hypodensity of the cerebral white matter is again noted. No enhancing  
lesions or masses are demonstrated. CT PERFUSION:  
No significant perfusion abnormalities. The inferior cerebellar hemispheres were  
not completely included on the perfusion examination. CTA NECK There is conventional three vessel arch anatomy. The bilateral subclavian and  
common carotid  are patent with no flow-limiting stenosis. There is calcified  
plaque in the proximal right internal carotid artery resulting in severe (70 to  
90%) luminal diameter stenosis. There is calcified plaque with moderate stenosis  
in the mid right common carotid artery. There is also calcified plaque in the  
proximal left internal carotid artery resulting in severe (70-90%) luminal  
diameter stenosis. NASCET method was utilized for calculating stenosis. Vertebral arteries are bilaterally patent and the right is dominant. The  
cervical soft tissues are unremarkable. There are degenerative changes in the  
cervical spine as well as extensive anterior paravertebral ossification with  
fusion anteriorly between C4 and C7. CTA HEAD The distal vertebral arteries are bilaterally patent. There is calcified plaque  
in the mid V4 segments bilaterally, with moderate stenosis. . The basilar artery  
and its branches demonstrate no significant abnormalities. . The internal  
carotid, anterior cerebral, and middle cerebral arteries are patent. There is  
calcified plaque in the cavernous carotid arteries bilaterally, with moderate  
stenosis. There is irregularity of the intracranial vessels without severe  
stenosis or occlusion. . No aneurysms are demonstrated. . There is a patent right  
posterior communicating artery. 06/08/20 1514  CT CODE NEURO HEAD WO CONTRAST Final result Impression:  IMPRESSION:    
1. No acute intracranial abnormalities. Suboptimal evaluation of the left  
posterior fossa due to streak artifact. 2. Marked hypodensity of the cerebral white matter likely due to chronic small  
vessel ischemic changes. Narrative:  EXAMINATION:  CT CODE NEURO HEAD WO CONTRAST CLINICAL INFORMATION:  n/v dizziness, onset upon awakening this morning COMPARISON:  None. TECHNIQUE: Routine axial head CT was performed. IV contrast was not  
administered. Sagittal and coronal reconstructions were generated. CT dose reduction was achieved through use of a standardized protocol tailored  
for this examination and automatic exposure control for dose modulation. Adaptive statistical iterative reconstruction (ASIR) was utilized. FINDINGS:  
No acute infarct, hemorrhage or mass. VENTRICULAR SYSTEM:  Normal for age. BASAL CISTERNS:  Patent. BRAIN PARENCHYMA:  Marked hypodensity of the cerebral white matter, likely due  
to intracranial small vessel disease. Suboptimal evaluation of the inferior  
posterior fossa due to extensive dental streak artifact. MIDLINE SHIFT:  None. 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 The  
patient does not indicate a prior neurosurgical history and this area is  
suboptimally evaluated because of streak artifact. PARANASAL SINUSES AND MASTOID AIR CELLS: Clear. VISUALIZED ORBITS: No significant abnormalities. SELLA: No enlargement. Assessment Hospital Problems  Date Reviewed: 5/6/2020 Codes Class Noted POA  
 CVA (cerebral vascular accident) ([address] ICD-10-CM: I63.9 ICD-9-CM: 434.91  6/8/2020 Unknown Bilateral carotid artery stenosis ICD-10-CM: I65.23 ICD-9-CM: 433.10, 433.30  6/8/2020 Unknown Vertebral artery stenosis, bilateral ICD-10-CM: I65.03 
ICD-9-CM: 433.20  6/8/2020 Unknown This is a 81year-old female with multiple stroke risk factors who presented to the ED with complaints of nausea, vomiting, and dizziness. No other associated symptoms were reported. CT of the Head was performed which showed no acute intracranial abnormalities. Suboptimal evaluation of the left posterior fossa due to streak artifact. Marked hypodensity of the cerebral white matter likely due to chronic small vessel ischemic changes. CTA of the head and neck showed no large vessel occlusion. Severe bilateral internal carotid artery origin stenoses. Moderate bilateral cavernous carotid stenoses. Bilateral patent vertebral arteries with moderate bilateral vertebral stenosis in the midportion of the V4 segments. Status post left suboccipital craniectomy and resection of medial left inferior cerebellar hemisphere. No significant perfusion abnormalities on CT Perfusion. 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 Patient has had elevated blood pressure readings while being evaluated in the ED. There is suspicion that patient could potentially be pressure dependent in the setting of bilateral carotid artery stenosis and bilateral vertebral artery stenosis as seen on CTA. Plan:  
- obtain MRI of Brain WO to assess for any underlying ischemia (we will need to check if  shunt and pacemaker is compatible with MRI) 
- order stroke work-up (ECHO, lipid panel, and Hgb A1C) 
- give 4 tablets of chewable Aspirin (324 mg) now and check an Aspirin Test in the AM 
- Keep patient NPO starting at midnight in case patient will possibly need a cerebral angiogram (this was discussed briefly with the patient) 
- allow permissive HTN in the setting of severe bilateral carotid artery stenosis and bilateral vertebral artery stenosis, treat SBP >200, Hydralazine PRN 
- check bilateral carotid artery dopplers 
- PT/OT/SLP evals Plan discussed with Dr. Steven Mauro, Dr. Hans Walters RN, patient, and patient's daughter Angie Howard, contact number [phone]). Thank you for this consult and participating in the care of this patient. I have discussed the diagnosis with the patient and the intended plan as seen in the above orders. Patient is in agreement. Signed By: Gadiel Finn NP   
 June 8, 2020

## 2020-06-08 NOTE — ROUTINE PROCESS
TRANSFER - OUT REPORT: 
 
Verbal report given to Gabrielle Davenport (name) on [facility]  being transferred to Room 673 (unit) for routine progression of care Report consisted of patients Situation, Background, Assessment and  
Recommendations(SBAR). Information from the following report(s) SBAR, Kardex, ED Summary, MAR, Recent Results and Cardiac Rhythm Paced was reviewed with the receiving nurse. Lines:  
Peripheral IV 06/08/20 Right Antecubital (Active) Site Assessment Clean, dry, & intact 6/8/2020  3:20 PM  
Phlebitis Assessment 0 6/8/2020  3:20 PM  
Infiltration Assessment 0 6/8/2020  3:20 PM  
Dressing Status Clean, dry, & intact 6/8/2020  3:20 PM  
  
 
Opportunity for questions and clarification was provided. Patient transported with: 
 [employer]

## 2020-06-08 NOTE — ED PROVIDER NOTES
Please note that this dictation was completed with [employer], the computer voice recognition software.  Quite often unanticipated grammatical, syntax, homophones, and other interpretive errors are inadvertently transcribed by the computer software.  Please disregard these errors.  Please excuse any errors that have escaped final proofreading. Pt is a 81 y.o. F with PMH of Afib, CAD, HTN here with c/o dizziness and N/V. She says she woke at 8 AM and felt fine but believes sx started around 11AM.  However, notes upon trying to get out of bed she didn't feel well. 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 No other complaints at this time. Past Medical History:  
Diagnosis Date  Atrial fibrillation ([address]  CAD (coronary artery disease)  Cerebrovascular accident stroke, other, unspec 11/18/2010  Coronary atherosclerosis of native coronary artery 11/18/2010  Essential hypertension  Essential hypertension, benign 11/18/2010  HLD (hyperlipidemia) 11/18/2010  PVD (peripheral vascular disease) [address] 2017  Type II or unspecified type diabetes mellitus without mention of complication, not stated as uncontrolled 11/18/2010  Zoster Past Surgical History:  
Procedure Laterality Date  HX CORONARY STENT PLACEMENT    
 HX HEART CATHETERIZATION    
 [address]  NH ICAR CATHETER ABLATION ATRIOVENTR NODE FUNCTION N/A 2/5/2020 ABLATION AV NODE performed by Suman Amador MD at [address] CATH LAB  NH TCAT INSJ/RPL PERM LEADLESS PACEMAKER RV W/IMG N/A 2/5/2020 INSERT OR REPLACE TRANSCATH PPM LEADLESS performed by Suman Amador MD at [address] CATH LAB No family history on file. Social History Socioeconomic History  Marital status:  Spouse name: Not on file  Number of children: Not on file  Years of education: Not on file  Highest education level: Not on file Occupational History  Not on file Social Needs  Financial resource strain: Not on file  Food insecurity Worry: Not on file Inability: Not on file  Transportation needs Medical: Not on file Non-medical: Not on file Tobacco Use  Smoking status: Former Smoker Packs/day: 0.80 Years: 20.00 Pack years: 16.00 Types: Cigarettes Last attempt to quit: 1986 Years since quittin.1  Smokeless tobacco: Never Used Substance and Sexual Activity  Alcohol use: No  
 Drug use: No  
 Sexual activity: Not on file Lifestyle  Physical activity Days per week: Not on file Minutes per session: Not on file  Stress: Not on file Relationships  Social connections Talks on phone: Not on file Gets together: Not on file Attends Yarsani service: Not on file Active member of club or organization: Not on file Attends meetings of clubs or organizations: Not on file Relationship status: Not on file  Intimate partner violence Fear of current or ex partner: Not on file Emotionally abused: Not on file Physically abused: Not on file Forced sexual activity: Not on file Other Topics Concern  Not on file Social History Narrative  Not on file ALLERGIES: Bactrim [sulfamethoprim]; Clonidine; Codeine; Keflin; Pcn [penicillins]; and Tramadol Review of Systems Gastrointestinal: Positive for nausea and vomiting. Neurological: Positive for dizziness. All other systems reviewed and are negative. Visit Vitals /55 (BP 1 Location: Left arm, BP Patient Position: Sitting) Pulse 87 Temp 98.2 °F (36.8 °C) Resp 25 Wt 78.2 kg (172 lb 6.4 oz) SpO2 97% BMI 33.67 kg/m² Physical Exam 
Vitals signs reviewed. Constitutional:   
   General: She is not in acute distress. Appearance: She is well-developed. She is not diaphoretic. HENT:  
   Head: Normocephalic and atraumatic. Eyes:  
   General: Visual field deficit present. Extraocular Movements:  
   Right eye: Abnormal extraocular motion present. Pupils: Pupils are equal, round, and reactive to light. Comments: R eye palsy. Unable to look laterally. R visual field deficit. Neck: Musculoskeletal: Normal range of motion. Cardiovascular:  
   Rate and Rhythm: Normal rate. Pulmonary:  
   Effort: Pulmonary effort is normal.  
Abdominal:  
   General: There is no distension. Musculoskeletal: Normal range of motion. Neurological:  
   Mental Status: She is alert and oriented to person, place, and time. GCS: GCS eye subscore is 4. GCS verbal subscore is 5. GCS motor subscore is 6. Cranial Nerves: Cranial nerve deficit (R EOM) present. MDM Procedures No chief complaint on file. 3:05 PM 
The patients presenting problems have been discussed, and they are in agreement with the care plan formulated and outlined with them. I have encouraged them to ask questions as they arise throughout their visit. MEDICATIONS GIVEN: 
Medications - No data to display LABS REVIEWED: 
Labs Reviewed - No data to display RADIOLOGY RESULTS: 
The following have been ordered and reviewed: 
_____________________________________________________________________ 
_____________________________________________________________________ PROCEDURES: 
 
 
 
CONSULTATIONS:  
 
 
PROGRESS NOTES: 
 
 
DIAGNOSIS: 
 
No diagnosis found. PLAN: 
1- 
 
 
ED COURSE: The patients hospital course has been uncomplicated. CONSULT NOTE: 
3:21 PM Jason Childers MD spoke with  On call for tele neuro who will see patient. Pt arrived to ED at change of shift but I accompanied her to CT scanner to get work up started 2/2 to concern for acute CVA. However, care turned over to Dr. Julia Cristina to follow up tele neuro recs. Change of shift. Care of patient signed over to DR. Ramirez.   Bedside handoff complete. Awaiting labs, EKG, CT, tele neuro eval 
 
Jayda Torres MD 
.

## 2020-06-08 NOTE — PROGRESS NOTES
H&P dictated Neuro interventional radiology consulted MRI pending Spoke with Dr. Angela Dash Patient's care transferred to their practice Dr. Jack Rojas will be the attending moving forward

## 2020-06-08 NOTE — ED TRIAGE NOTES
Patient comes to the ER via EMS as Code S Level 2. Reports waking up at 0800 this morning and as soon as her feet hit the floor she felt bad. +Nausea/vomiting and dizziness. Denies weakness, numbness or tingling. Patient to CT scanner upon arrival with RN and MD 
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

## 2020-06-08 NOTE — PROGRESS NOTES
Neurocritical Care Code Stroke Documentation Symptoms:   dizziness, nausea, vomiting Last Known Well: 0800 am this morning Medical hx: Atrial fibrillation, CAD, remote stroke, HTN, HLD, PVD, Diabetes Type II Anticoagulation:  Eliquis 2.5 mg tablet, 81 mg of ASA daily VAN:   Negative NIHSS:   1a-LOC:0 
  1b-Month/Age:0 
  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 
  5a-Left Arm:0 
  5b-Right Arm:0 
  6a-Left Le 
  6b-Right Le 
  7-Limb Ataxia:0 
  8-Sensory:0 
  9-Best Language:0 
  10-Dysarthria:0 
  11-Extinction/Inattention:0 
TOTAL SCORE:2  
Imaging:   CT: No acute intracranial abnormalities. Suboptimal evaluation of the left 
posterior fossa due to streak artifact. Marked hypodensity of the cerebral white matter likely due to chronic small vessel ischemic changes. Plan:   TPA Candidate: NO   
 
Recommend CTA/CTP in the setting of posterior circulation symptoms. Discussed with Dr. Eleonora Kwon (ED Physician), Dr. Hieu Pan ([facility] on call), and RN. 25 minutes of time spent with the patient. Addendum:  
1860: CTA shows no acute LVO, however patient has severe bilateral ICA stenoses. Moderate bilateral cavernous carotid stenoses. Bilateral patent vertebral arteries with moderate bilateral vertebral stenosis. CTP shows no significant perfusion abnormalities. Neurointerventional Surgery consult needed. Patient will need carotid duplex and MRI of Brain preferably tonight. Full consult note to follow. Keo Milian NP Neurocritical Care Nurse Practitioner [phone]

## 2020-06-09 NOTE — CDMP QUERY
#1 
 
Pt admitted with CVA/Pt noted to have history of diabetes with elevated serum glucose levels. . If possible, please document in the progress notes and d/c summary if you are evaluating and / or treating any of the following: 
 
? Diabetes with Hyperglycemia ? Hyperglycemia not related to Diabetes ? Other, please specify ? Clinically unable to determine The medical record reflects the following: 
  Risk Factors: diabetes Clinical Indicators: H/P-Diabetes mellitus type 2, poorly controlled 6/8/2020 15:23 Glucose: 220 (H) 
 
6/8/2020 19:44 GLUCOSE,FAST - POC: 226 (H) 
 
6/8/2020 23:21 GLUCOSE,FAST - POC: 346 (H) Treatment: POC glucose monitoring, lispro insulin Thank you, 
       Erna Mejia [facility], [address] [facility]

## 2020-06-09 NOTE — CONSULTS
[facility] [address], [address] [place] 
 [phone] Rayne Rodriguez 6/9/2020 
11:03 AM 
 
 
Asked by Dr. Rufino Mederos to advise if PPM is MRI safe. Patient as a Med[employer]onic Micra MRI leadless PPM.  Placed by Dr. Tati Landers on 2/5/20. Clarissa Hennessy is FDA approved for 1.5T and 3T MRI scans under specific conditions for use.  
 
Rylee Dwyer PA-C

## 2020-06-09 NOTE — PROGRESS NOTES
Bedside shift change report given to [facility], RN (oncoming nurse) by Jennifer Leon RN (offgoing nurse). Report included the following information SBAR, Cardiac Rhythm paced and Dual Neuro Assessment.

## 2020-06-09 NOTE — PROGRESS NOTES
Neurointerventional Surgery Progress Note Evan JUAN DIEGO Hernandez Cell: [phone] Admit Date: 6/8/2020 Daily Progress Note: 6/9/2020 LOS: 1 day POD:* No surgery found * Interval History/Subjective: No acute events overnight. Patient with TCDs today which showed 50-69% stenosis of right carotid and peak systolic velocities on the left that do not indicate stenting at this time. She feels better today and says that she is no longer feeling \"light headed\". She does tell me that her Bumex dosing was recently increased to 3 mg daily which was about one month ago. Plans for MRI later today/tomorrow. Assessment & Plan: Active Problems: 
  CVA (cerebral vascular accident) ([address] (6/8/2020) Bilateral carotid artery stenosis (6/8/2020) Vertebral artery stenosis, bilateral (6/8/2020) B/L Carotid Artery Stenosis:  
- per duplex studies she does have 50-69% stenosis in the right carotid artery as well as left carotid artery stenosis. Velocities on the left do not exceed 100 in the internal carotid. Currently not a candidate for endovascular stenting  
- recommend continuing on full strength aspirin ( 325 mg ) daily as well as Eliquis  
- recommend following up with Dr. Brandt Burroughs in our office in 6 months after bilateral follow up carotid doppler obtained - we will follow MRI to ensure there are no posterior circulation strokes related to mild/moderate narrowing int he vertebral arteries Plan d/w Dr. Brandt Burroughs, bedside RN Admission Summary:  
 
Trena Graf is a 81 y.o. 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 The patient presented to the ED today after waking up this morning around 8:00 AM with complaints of dizziness. Symptoms are associated with nausea and vomiting. CTA of the head and neck showed no large vessel occlusion. Severe bilateral internal carotid artery origin stenoses. Moderate bilateral cavernous carotid stenoses. Bilateral patent vertebral arteries with moderate bilateral vertebral stenosis in the midportion of the V4 segments. Neurointerventional Surgery was consulted for further evaluation of the severe bilateral ICA stenoses and vertebral artery stenoses. Current Facility-Administered Medications Medication Dose Route Frequency Provider Last Rate Last Dose  ondansetron (ZOFRAN) injection 4 mg  4 mg IntraVENous Q4H PRN Mallissa Aschoff IV, MD      
 LORazepam (ATIVAN) tablet 0.5 mg  0.5 mg Oral ONCE PRN Mallissa Aschoff IV, MD      
 allopurinoL (ZYLOPRIM) tablet 100 mg  100 mg Oral DAILY Rian Murrell MD   100 mg at 06/09/20 1108  apixaban (ELIQUIS) tablet 2.5 mg  2.5 mg Oral BID Rian Murrell MD   2.5 mg at 06/09/20 1809  
 atorvastatin (LIPITOR) tablet 20 mg  20 mg Oral QHS Rian Murrell MD   20 mg at 06/08/20 2027  
 isosorbide mononitrate ER (IMDUR) tablet 60 mg  60 mg Oral DAILY Rian Murrell MD   60 mg at 06/09/20 6683  pantoprazole (PROTONIX) tablet 20 mg  20 mg Oral ACB Rian Murrell MD   20 mg at 06/09/20 6497  acetaminophen (TYLENOL) tablet 650 mg  650 mg Oral Q4H PRN Rian Murrell MD      
 Or  
 acetaminophen (TYLENOL) solution 650 mg  650 mg Per NG tube Q4H PRN Rian Murrell MD      
 Or  
Kevin Hoose acetaminophen (TYLENOL) suppository 650 mg  650 mg Rectal Q4H PRN Rian Murrell MD      
 0.9% sodium chloride infusion  75 mL/hr IntraVENous CONTINUOUS Rian Murrell MD      
 glucose chewable tablet 16 g  4 Tab Oral PRN Rian Murrell MD      
 glucagon (GLUCAGEN) injection 1 mg  1 mg IntraMUSCular PRN Leopoldo MD Jennifer      
 dextrose 10% infusion 0-250 mL  0-250 mL IntraVENous PRN Jerry Hoff MD      
 insulin lispro (HUMALOG) injection   SubCUTAneous Q6H Jerry Hoff MD   2 Units at 20 1235  
 0.9% sodium chloride infusion  75 mL/hr IntraVENous CONTINUOUS Jerry Hoff MD 75 mL/hr at 20 1236 75 mL/hr at 20 1236  
 benzocaine-menthoL (CEPACOL) lozenge 1 Lozenge  1 Lozenge Mucous Membrane PRN Patrick Valle MD   1 Lozenge at 20 2218  
 azelastine (ASTELIN) 137mcg/spray nasal spray  1 Spray Both Nostrils BID PRN Patrick Valle MD   1 Spray at 20  hydrALAZINE (APRESOLINE) 20 mg/mL injection 20 mg  20 mg IntraVENous Q6H PRN [place], NP Allergies Allergen Reactions  Bactrim [Sulfamethoprim] Other (comments) Affects her kidney funtion  Clonidine Shortness of Breath and Swelling Lightheaded,   
 Codeine Unknown (comments)  Keflin Itching  Pcn [Penicillins] Unknown (comments)  Tramadol Other (comments) Makes her head feel swishy Review of Systems: 
Pertinent items are noted in the History of Present Illness. Objective:  
 
Vital signs Temp (24hrs), Av.8 °F (36.6 °C), Min:97.5 °F (36.4 °C), Max:98.1 °F (36.7 °C) No intake/output data recorded. No intake/output data recorded. Visit Vitals /62 (BP 1 Location: Left arm, BP Patient Position: At rest) Pulse 67 Temp 97.9 °F (36.6 °C) Resp 18 Wt 170 lb 13.7 oz (77.5 kg) SpO2 94% BMI 33.37 kg/m² O2 Device: Room air Vitals:  
 20 2066 20 4853 20 9732 20 1405 BP: 161/71 161/71 164/62 170/62 Pulse:  64 63 67 Resp:   20 18 Temp:   98 °F (36.7 °C) 97.9 °F (36.6 °C) SpO2:   96% 94% Weight:      
  
 
Physical Exam: 
GENERAL: alert, cooperative, no distress, appears stated age EYE: conjunctivae/corneas clear. PERRL, EOM's intact. NECK: neck supple and symmetrical.  no carotid bruits noted LUNG: clear to auscultation bilaterally HEART: Paced, S1, S2 normal, no murmur, click, rub or gallop ABDOMEN: soft, non-tender. Bowel sounds normal. No masses,  no organomegaly EXTREMITIES:  extremities normal, atraumatic, no cyanosis or edema. 2+ radial pulses bilaterally. +1 posterior tibial and pedal pulses bilaterally SKIN: Skin warm to touch.  
  
  
Neurologic Exam: 
Mental Status:             Alert and oriented x 4. Appropriate affect, mood and behavior.    
  
Language:                   Normal fluency, repetition, comprehension and naming. 
  
Cranial Nerves:            
                                    Pupils equal, round and reactive to light. Visual fields full to confrontation. Extraocular movements intact, with the exception of right eye palsy (inability to look to the right with right eye at baseline) Facial sensation intact. Full facial strength, no asymmetry. Hearing grossly intact bilaterally ( [place]) No dysarthria. Tongue protrudes to midline, palate elevates symmetrically. Shoulder shrug 5/5 bilaterally. 
  
Motor:                          No pronator drift. Bulk and tone normal.  
                                    5/5 power in all extremities proximally and distally. No involuntary movements. 
  
Sensation:                   Sensation intact throughout to light touch 
  
Reflexes:                     Deferred. 
  
Coordination & Gait:   No ataxia with FTN and HTS bilaterally. Gait deferred.  
  
 
24 hour results: 
 
Recent Results (from the past 24 hour(s)) CBC WITH AUTOMATED DIFF Collection Time: 06/08/20  3:23 PM  
Result Value Ref Range WBC 12.7 (H) 3.6 - 11.0 K/uL  
 RBC 4.81 3.80 - 5.20 M/uL  
 HGB 13.2 11.5 - 16.0 g/dL HCT 39.9 35.0 - 47.0 % MCV 83.0 80.0 - 99.0 FL  
 MCH 27.4 26.0 - 34.0 PG  
 MCHC 33.1 30.0 - 36.5 g/dL  
 RDW 14.2 11.5 - 14.5 % PLATELET 808 000 - 419 K/uL MPV 8.9 8.9 - 12.9 FL  
 NRBC 0.0 0  WBC ABSOLUTE NRBC 0.00 0.00 - 0.01 K/uL NEUTROPHILS 74 32 - 75 % LYMPHOCYTES 15 12 - 49 % MONOCYTES 7 5 - 13 % EOSINOPHILS 2 0 - 7 % BASOPHILS 1 0 - 1 % IMMATURE GRANULOCYTES 1 (H) 0.0 - 0.5 % ABS. NEUTROPHILS 9.4 (H) 1.8 - 8.0 K/UL  
 ABS. LYMPHOCYTES 1.9 0.8 - 3.5 K/UL  
 ABS. MONOCYTES 0.9 0.0 - 1.0 K/UL  
 ABS. EOSINOPHILS 0.3 0.0 - 0.4 K/UL  
 ABS. BASOPHILS 0.1 0.0 - 0.1 K/UL  
 ABS. IMM. GRANS. 0.2 (H) 0.00 - 0.04 K/UL  
 DF AUTOMATED METABOLIC PANEL, COMPREHENSIVE Collection Time: 06/08/20  3:23 PM  
Result Value Ref Range Sodium 135 (L) 136 - 145 mmol/L Potassium 4.4 3.5 - 5.1 mmol/L Chloride 101 97 - 108 mmol/L  
 CO2 26 21 - 32 mmol/L Anion gap 8 5 - 15 mmol/L Glucose 220 (H) 65 - 100 mg/dL BUN 27 (H) 6 - 20 MG/DL Creatinine 1.61 (H) 0.55 - 1.02 MG/DL  
 BUN/Creatinine ratio 17 12 - 20 GFR est AA 37 (L) >60 ml/min/1.73m2 GFR est non-AA 31 (L) >60 ml/min/1.73m2 Calcium 9.1 8.5 - 10.1 MG/DL Bilirubin, total 0.9 0.2 - 1.0 MG/DL  
 ALT (SGPT) 20 12 - 78 U/L  
 AST (SGOT) 27 15 - 37 U/L Alk. phosphatase 115 45 - 117 U/L Protein, total 7.6 6.4 - 8.2 g/dL Albumin 3.5 3.5 - 5.0 g/dL Globulin 4.1 (H) 2.0 - 4.0 g/dL A-G Ratio 0.9 (L) 1.1 - 2.2 SAMPLES BEING HELD Collection Time: 06/08/20  3:23 PM  
Result Value Ref Range SAMPLES BEING HELD 1 red, 1 blue COMMENT Add-on orders for these samples will be processed based on acceptable specimen integrity and analyte stability, which may vary by analyte. TROPONIN I Collection Time: 06/08/20  3:23 PM  
Result Value Ref Range Troponin-I, Qt. <0.05 <0.05 ng/mL URINALYSIS W/ RFLX MICROSCOPIC Collection Time: 06/08/20  4:08 PM  
Result Value Ref Range Color YELLOW/STRAW Appearance CLEAR CLEAR Specific gravity 1.013 1.003 - 1.030    
 pH (UA) 7.0 5.0 - 8.0 Protein Negative NEG mg/dL Glucose Negative NEG mg/dL Ketone Negative NEG mg/dL Bilirubin Negative NEG Blood TRACE (A) NEG Urobilinogen 0.2 0.2 - 1.0 EU/dL Nitrites Negative NEG Leukocyte Esterase Negative NEG    
 WBC 0-4 0 - 4 /hpf  
 RBC 0-5 0 - 5 /hpf Epithelial cells FEW FEW /lpf Bacteria 1+ (A) NEG /hpf  
SAMPLES BEING HELD Collection Time: 06/08/20  4:08 PM  
Result Value Ref Range SAMPLES BEING HELD 1UC   
 COMMENT Add-on orders for these samples will be processed based on acceptable specimen integrity and analyte stability, which may vary by analyte. EKG, 12 LEAD, INITIAL Collection Time: 06/08/20  4:14 PM  
Result Value Ref Range Ventricular Rate 61 BPM  
 Atrial Rate 64 BPM  
 QRS Duration 146 ms  
 Q-T Interval 474 ms QTC Calculation (Bezet) 477 ms Calculated R Axis -39 degrees Calculated T Axis 131 degrees Diagnosis ** Poor data quality, interpretation may be adversely affected Ventricular-paced rhythm When compared with ECG of 28-FEB-2020 21:03, 
Vent. rate has decreased BY  21 BPM 
Confirmed by Markie Steen M.D., Mendel Pair ([id number]) on 6/9/2020 5:42:11 AM 
  
GLUCOSE, POC Collection Time: 06/08/20  7:44 PM  
Result Value Ref Range Glucose (POC) 226 (H) 65 - 100 mg/dL Performed by Cassy Aguilar GLUCOSE, POC Collection Time: 06/08/20 11:21 PM  
Result Value Ref Range Glucose (POC) 346 (H) 65 - 100 mg/dL Performed by Rahul Romero  PCT   
LIPID PANEL Collection Time: 06/09/20  4:11 AM  
Result Value Ref Range LIPID PROFILE Cholesterol, total 93 <200 MG/DL Triglyceride 128 <150 MG/DL  
 HDL Cholesterol 37 MG/DL  
 LDL, calculated 30.4 0 - 100 MG/DL  VLDL, calculated 25.6 MG/DL  
 CHOL/HDL Ratio 2.5 0.0 - 5.0    
CBC W/O DIFF Collection Time: 06/09/20  4:11 AM  
Result Value Ref Range WBC 12.4 (H) 3.6 - 11.0 K/uL  
 RBC 4.62 3.80 - 5.20 M/uL  
 HGB 12.7 11.5 - 16.0 g/dL HCT 38.6 35.0 - 47.0 % MCV 83.5 80.0 - 99.0 FL  
 MCH 27.5 26.0 - 34.0 PG  
 MCHC 32.9 30.0 - 36.5 g/dL  
 RDW 14.2 11.5 - 14.5 % PLATELET 753 093 - 361 K/uL MPV 9.3 8.9 - 12.9 FL  
 NRBC 0.0 0  WBC ABSOLUTE NRBC 0.00 0.00 - 0.01 K/uL  
ASPIRIN TEST Collection Time: 06/09/20  4:11 AM  
Result Value Ref Range Aspirin test 571 ARU HEMOGLOBIN A1C WITH EAG Collection Time: 06/09/20  4:11 AM  
Result Value Ref Range Hemoglobin A1c 8.5 (H) 4.0 - 5.6 % Est. average glucose 197 mg/dL METABOLIC PANEL, BASIC Collection Time: 06/09/20  4:11 AM  
Result Value Ref Range Sodium 139 136 - 145 mmol/L Potassium 3.9 3.5 - 5.1 mmol/L Chloride 105 97 - 108 mmol/L  
 CO2 24 21 - 32 mmol/L Anion gap 10 5 - 15 mmol/L Glucose 169 (H) 65 - 100 mg/dL BUN 26 (H) 6 - 20 MG/DL Creatinine 1.50 (H) 0.55 - 1.02 MG/DL  
 BUN/Creatinine ratio 17 12 - 20 GFR est AA 40 (L) >60 ml/min/1.73m2 GFR est non-AA 33 (L) >60 ml/min/1.73m2 Calcium 8.5 8.5 - 10.1 MG/DL  
GLUCOSE, POC Collection Time: 06/09/20  6:31 AM  
Result Value Ref Range Glucose (POC) 203 (H) 65 - 100 mg/dL Performed by Layo Burton  PCT GLUCOSE, POC Collection Time: 06/09/20 11:47 AM  
Result Value Ref Range Glucose (POC) 227 (H) 65 - 100 mg/dL Performed by Akash Carlson DUPLEX CAROTID BILATERAL Collection Time: 06/09/20  1:45 PM  
Result Value Ref Range Right cca dist sys 98.3 cm/s Right CCA dist [place]erson 12.3 cm/s Right CCA prox sys 67.4 cm/s Right CCA prox [place]erson 9.9 cm/s Right eca sys 81.1 cm/s RIGHT EXTERNAL CAROTID ARTERY D 0.00 cm/s Right ICA dist sys 55.2 cm/s Right ICA dist nickerson 11.2 cm/s Right ICA mid sys 100.1 cm/s Right ICA mid [place] 10.7 cm/s Right ICA prox sys 221.4 cm/s Right ICA prox nickerson 30.9 cm/s Right vertebral sys 57.0 cm/s RIGHT VERTEBRAL ARTERY D 12.48 cm/s Right ICA/CCA sys 2.3 Left CCA dist sys 45.6 cm/s Left CCA dist nickerson 10.7 cm/s Left CCA prox sys 39.6 cm/s Left CCA prox nickerson 7.8 cm/s Left ECA sys 505.1 cm/s LEFT EXTERNAL CAROTID ARTERY D 0.00 cm/s Left ICA dist sys 58.1 cm/s Left ICA dist nickerson 9.0 cm/s Left ICA mid sys 100.6 cm/s Left ICA mid nickerson 11.8 cm/s Left ICA prox sys 90.9 cm/s Left ICA prox nickerson 15.0 cm/s Left vertebral sys 38.7 cm/s LEFT VERTEBRAL ARTERY D 5.91 cm/s Left ICA/CCA sys 2.21 Dawit De Los Santos, NP

## 2020-06-09 NOTE — CONSULTS
Consult dictated. 67-year-old with the near syncope/lightheadedness likely related to carotid and vertebral stenosis causing cerebral hypoperfusion/vascular insufficiency. From medical perspective agree with aspirin/Eliquis and SBP goal 160-180 for now.   
Marixa Quinn MD

## 2020-06-09 NOTE — PROGRESS NOTES
Orders received, chart reviewed and patient evaluated by physical therapy. Pending progression with skilled acute physical therapy, recommend: 
Physical therapy at least 2 days/week in the home AND ensure assist and/or supervision for safety with ADLs/mobility pending progress Recommend with nursing patient to complete as able in order to maintain strength, endurance and independence: OOB to chair 3x/day with assist x1 and ambulating with assist x1. Thank you for your assistance. Full evaluation to follow.   
 
Chan Krishnamurthy, PT, DPT

## 2020-06-09 NOTE — PROGRESS NOTES
Problem: Mobility Impaired (Adult and Pediatric) Goal: *Acute Goals and Plan of Care (Insert Text) Description:  
FUNCTIONAL STATUS PRIOR TO ADMISSION: Patient was modified independent using a single point cane for community level functional mobility. Denies falls. Reports ongoing intermittent \"dizziness\" at baseline for the past 3 years. HOME SUPPORT PRIOR TO ADMISSION: The patient lived with her daughter but did not require assist. 
 
Physical Therapy Goals Initiated 6/9/2020 1. Patient will move from supine to sit and sit to supine  and scoot up and down in bed with modified independence within 7 day(s). 2.  Patient will transfer from bed to chair and chair to bed with modified independence using the least restrictive device within 7 day(s). 3.  Patient will perform sit to stand with modified independence within 7 day(s). 4.  Patient will ambulate with modified independence for 150 feet with the least restrictive device within 7 day(s). 5.  Patient will ascend/descend 3 stairs with handrail(s) with modified independence within 7 day(s). 6.  Patient will improve Ramirez Balance score by 7 points within 7 days. Outcome: Progressing Towards Goal 
  
PHYSICAL THERAPY EVALUATION- NEURO POPULATION Patient: Edilia Bedoya (68 y.o. female) Date: 6/9/2020 Primary Diagnosis: CVA (cerebral vascular accident) ([facility] [I63.9] CVA (cerebral vascular accident) ([facility] [I63.9] Precautions:     
 
 
ASSESSMENT Based on the objective data described below, the patient presents with slightly impaired functional mobility as compared to reported baseline level 2* significant persistent \"dizziness\" leading to impaired balance, gait, and overall tolerance to activity. Imaging showing severe B ICA stenosis; MRI pending at time of eval, cleared for mobility by neurology.  At baseline, she reports that her daughter lives with her, however she was relatively indep with ADLs and mobility using a SPC for longer distances. Denies falls. 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 BPs assessed with positional changes and in stance post activity - remained 170s/60s with all. She was able to complete sit<>stands with CGA and cues for hand placement sequencing. Reports immediate onset of sx in stance despite stable BPs - did not improve with time. With HHA x1, she was able to ambulate approx 75ft with noted NBOS, guarded posture, increased sway, and LOBs with turning. Quick fatigue. At this time, she is near but below her baseline level - hopeful with improvements in symptoms, she will be appropriate for discharge home with family assist and HHPT vs none. Will follow. Current Level of Function Impacting Discharge (mobility/balance): CGA/min A for short distance ambulation; ongoing dizziness; Functional Outcome Measure: The patient scored Total: 12/56 on the [facility] Assessment which is indicative of high fall risk. Other factors to consider for discharge: has good family support Patient will benefit from skilled therapy intervention to address the above noted impairments. PLAN : 
Recommendations and Planned Interventions: bed mobility training, transfer training, gait training, therapeutic exercises, neuromuscular re-education, patient and family training/education and therapeutic activities Frequency/Duration: Patient will be followed by physical therapy:  5 times a week to address goals. Recommendation for discharge: (in order for the patient to meet his/her long term goals) Physical therapy at least 2 days/week in the home AND ensure assist and/or supervision for safety with ADLs/mobility pending progress This discharge recommendation: 
Has been made in collaboration with the attending provider and/or case management IF patient discharges home will need the following DME: patient owns DME required for discharge SUBJECTIVE:  
Patient stated Bonifacio Charles - It's never been like this before.  OBJECTIVE DATA SUMMARY:  
HISTORY:   
Past Medical History:  
Diagnosis Date  Atrial fibrillation ([facility]  CAD (coronary artery disease)  Cerebrovascular accident stroke, other, unspec 11/18/2010  Coronary atherosclerosis of native coronary artery 11/18/2010  Essential hypertension  Essential hypertension, benign 11/18/2010  HLD (hyperlipidemia) 11/18/2010  PVD (peripheral vascular disease) ([facility] 2017  Type II or unspecified type diabetes mellitus without mention of complication, not stated as uncontrolled 11/18/2010  [place] Past Surgical History:  
Procedure Laterality Date  HX CORONARY STENT PLACEMENT    
 HX HEART CATHETERIZATION    
 [facility]  [place] ICAR CATHETER ABLATION ATRIOVENTR NODE FUNCTION N/A 2/5/2020 ABLATION AV NODE performed by Leti Jara MD at [address] CATH LAB  [place] TCAT INSJ/RPL PERM LEADLESS PACEMAKER RV W/IMG N/A 2/5/2020 INSERT OR REPLACE TRANSCATH PPM LEADLESS performed by Leti Jara MD at [address] CATH LAB Personal factors and/or comorbidities impacting plan of care: PMHx Home Situation Home Environment: Private residence # Steps to Enter: 3 Rails to Enter: Yes Hand Rails : Bilateral 
One/Two Story Residence: Two story, live on 1st floor Living Alone: No 
Support Systems: Child(baldemar) Patient Expects to be Discharged to[de-identified] Private residence Current DME Used/Available at Home: Cane, straight, Walker, rolling Tub or Shower Type: Tub/Shower combination EXAMINATION/PRESENTATION/DECISION MAKING:  
Critical Behavior: 
Neurologic State: Alert Orientation Level: Oriented X4 Cognition: Appropriate decision making Safety/Judgement: Awareness of environment, Insight into deficits Hearing: Auditory Auditory Impairment: Hard of hearing, bilateral 
Skin:   
Edema:  
Range Of Motion: 
AROM: Generally decreased, functional 
PROM: Generally decreased, functional 
  
  
Strength:   
Strength: Generally decreased, functional 
  
Tone & Sensation:  
Tone: Normal 
Sensation: Intact Coordination: 
Coordination: Generally decreased, functional 
Vision:  
Tracking: Able to track stimulus in all quadrants w/o difficulty(except R CN VI palsy at baseline) Diplopia: No 
Acuity: Within Defined Limits Corrective Lenses: Glasses Functional Mobility: 
Bed Mobility: 
Transfers: 
Sit to Stand: Contact guard assistance Stand to Sit: Contact guard assistance Balance:  
Sitting: Intact; With support Standing: Impaired Standing - Static: Fair;Constant support Standing - Dynamic : Fair;Constant support Ambulation/Gait Training: 
Distance (ft): 75 Feet (ft) Assistive Device: Gait belt(unilateral HHA) Ambulation - Level of Assistance: Minimal assistance Gait Description (WDL): Exceptions to [facility] Gait Abnormalities: Shuffling gait; Decreased step clearance;Trunk sway increased Base of Support: Narrowed Speed/Teri: Slow;Shuffled Step Length: Right shortened;Left shortened Functional Measure Ramirez Balance Test: 
 
Sitting to Standing: 3 Standing Unsupported: 2 Sitting with Back Unsupported: 3 Standing to Sitting: 3 Transfers: 1 Standing Unsupported with Eyes Closed: 0 Standing Unsupported with Feet Together: 0 Reach Forward with Outstretched Arm: 0  Object: 0 Turn to Look Over Shoulders: 0 Turn 360 Degrees: 0 Alternate Foot on Step/Stool: 0 Standing Unsupported One Foot in Front: 0 Stand on One Le Total:  56=Maximum possible score;  
0-20=High fall risk 21-40=Moderate fall risk 41-56=Low fall risk Physical Therapy Evaluation Charge Determination History Examination Presentation Decision-Making HIGH Complexity :3+ comorbidities / personal factors will impact the outcome/ POC  MEDIUM Complexity : 3 Standardized tests and measures addressing body structure, function, activity limitation and / or participation in recreation  LOW Complexity : Stable, uncomplicated  HIGH Complexity : FOTO score of 1- 25 Based on the above components, the patient evaluation is determined to be of the following complexity level: LOW Activity Tolerance:  
Good Please refer to the flowsheet for vital signs taken during this treatment. After treatment patient left in no apparent distress:  
Sitting in chair, Call bell within reach, Bed / chair alarm activated and Caregiver / family present COMMUNICATION/EDUCATION:  
The patients plan of care was discussed with: Occupational therapist, Registered nurse and Physician. Patient was educated regarding her deficit(s) of dizziness as this relates to her diagnosis of possible CVA/TIA. She demonstrated Good understanding as evidenced by nodding. Patient and/or family was verbally educated on the BE FAST acronym for signs/symptoms of CVA and TIA. BE FAST was written on patient's communication board  for visual education and reinforcement. All questions answered with patient indicating good understanding. Fall prevention education was provided and the patient/caregiver indicated understanding., Patient/family have participated as able in goal setting and plan of care. and Patient/family agree to work toward stated goals and plan of care. Thank you for this referral. 
Delfino Loja, PT, DPT Time Calculation: 34 mins

## 2020-06-09 NOTE — PROGRESS NOTES
Duplex reviewed. No stent indicated. No severe stenosis in the posterior circulation, V4 segments specifically. We will follow up on MRI.

## 2020-06-09 NOTE — PROGRESS NOTES
Problem: Falls - Risk of 
Goal: *Absence of Falls Description: Document Shila March Fall Risk and appropriate interventions in the flowsheet. Outcome: Progressing Towards Goal 
Note: Fall Risk Interventions: 
Mobility Interventions: Communicate number of staff needed for ambulation/transfer, OT consult for ADLs, Patient to call before getting OOB, PT Consult for mobility concerns Medication Interventions: Evaluate medications/consider consulting pharmacy, Patient to call before getting OOB, Teach patient to arise slowly Elimination Interventions: Call light in reach, Patient to call for help with toileting needs, Stay With Me (per policy), Toileting schedule/hourly rounds Problem: Patient Education: Go to Patient Education Activity Goal: Patient/Family Education Outcome: Progressing Towards Goal 
  
Problem: Pain Goal: *Control of Pain Outcome: Progressing Towards Goal 
  
Problem: Pressure Injury - Risk of 
Goal: *Prevention of pressure injury Description: Document Ruperto Scale and appropriate interventions in the flowsheet. Outcome: Progressing Towards Goal 
Note: Pressure Injury Interventions: 
Sensory Interventions: Assess changes in LOC, Float heels, Discuss PT/OT consult with provider, Pressure redistribution bed/mattress (bed type) Activity Interventions: Increase time out of bed, Pressure redistribution bed/mattress(bed type) Mobility Interventions: Float heels, HOB 30 degrees or less, Pressure redistribution bed/mattress (bed type), PT/OT evaluation Nutrition Interventions: Document food/fluid/supplement intake Problem: TIA/CVA Stroke: Day 2 Until Discharge Goal: Activity/Safety Outcome: Progressing Towards Goal 
Goal: Diagnostic Test/Procedures Outcome: Progressing Towards Goal 
Goal: Nutrition/Diet Outcome: Progressing Towards Goal 
Goal: Discharge Planning Outcome: Progressing Towards Goal 
Goal: Medications Outcome: Progressing Towards Goal 
Goal: Respiratory Outcome: Progressing Towards Goal 
Goal: Treatments/Interventions/Procedures Outcome: Progressing Towards Goal 
Goal: Psychosocial 
Outcome: Progressing Towards Goal 
Goal: *Verbalizes anxiety and depression are reduced or absent Outcome: Progressing Towards Goal 
Goal: *Absence of aspiration Outcome: Progressing Towards Goal 
Goal: *Absence of deep venous thrombosis signs and symptoms(Stroke Metric) Outcome: Progressing Towards Goal 
Goal: *Optimal pain control at patient's stated goal 
Outcome: Progressing Towards Goal 
Goal: *Tolerating diet Outcome: Progressing Towards Goal 
Goal: *Ability to perform ADLs and demonstrates progressive mobility and function Outcome: Progressing Towards Goal 
Goal: *Stroke education continued(Stroke Metric) Outcome: Progressing Towards Goal

## 2020-06-09 NOTE — PROGRESS NOTES
2130 Patient called out that she was having similar symptoms that brought her in. Her head was feeling \"wavy\". No symptoms of nausea or vomiting. Neuro assessment is same as previous assessment otherwise. Symptom lasted about 5 minutes 2230 Assisted patient to sitting position on side of bed. Patient again had \"wavy\" feeling in head. No nausea or vomiting associated. Neuro assessment same as prior. Symptom lasted about 5 minutes again. 2350 Patient desating to 82% on room air. Placed 2 L NC on patient 
 
0700 In patient's room and she is complaining of same \"wavy\" dizziness feeling. Other neuro exam intact. /81. Feeling lasted about 10 minutes. Patient started to feel nauseous but \"wavy\" feeling passed and nausea did as well.

## 2020-06-09 NOTE — PROGRESS NOTES
Problem: Falls - Risk of 
Goal: *Absence of Falls Description: Document Eddie Heller Fall Risk and appropriate interventions in the flowsheet. Outcome: Progressing Towards Goal 
Note: Fall Risk Interventions: 
Mobility Interventions: Assess mobility with egress test, Communicate number of staff needed for ambulation/transfer, Patient to call before getting OOB, PT Consult for mobility concerns, PT Consult for assist device competence, Utilize walker, cane, or other assistive device Medication Interventions: Evaluate medications/consider consulting pharmacy, Patient to call before getting OOB, Teach patient to arise slowly Elimination Interventions: Call light in reach, Patient to call for help with toileting needs, Stay With Me (per policy), Toilet paper/wipes in reach, Toileting schedule/hourly rounds

## 2020-06-09 NOTE — PROGRESS NOTES
Medical Progress Note NAME: Trena Graf :  1935 MRM:  [id number] Date/Time: 2020 Problem List:  
 
Active Problems: 
  CVA (cerebral vascular accident) ([facility] [address] (2020) Bilateral carotid artery stenosis (2020) Vertebral artery stenosis, bilateral (2020) Subjective:  
 
Last night after admission had two brief dizziness episodes. This am denies dizziness sitting or lying down. Last night 02 sat RA dropped to 82 % responding to 02 2 L/min NC . She denies cp, sob, cough , palpitations, HA, nausea, or focal neuro sx. Past Medical History:  
Diagnosis Date  Atrial fibrillation ([facility] [address]  CAD (coronary artery disease)  Cerebrovascular accident stroke, other, unspec 2010  Coronary atherosclerosis of native coronary artery 2010  Essential hypertension  Essential hypertension, benign 2010  HLD (hyperlipidemia) 2010  PVD (peripheral vascular disease) ([facility] [address] 2017  Type II or unspecified type diabetes mellitus without mention of complication, not stated as uncontrolled 2010  Zoster Objective:  
 
 
 
Vitals:  
  
Last 24hrs VS reviewed since prior progress note. Most recent are: 
 
Visit Vitals /71 (BP 1 Location: Left arm, BP Patient Position: At rest) Pulse 60 Temp 97.5 °F (36.4 °C) Resp 21 Wt 170 lb 13.7 oz (77.5 kg) SpO2 97% BMI 33.37 kg/m² SpO2 Readings from Last 6 Encounters:  
20 97% 20 96% 20 100% 20 93% 20 98% 20 97% No intake or output data in the 24 hours ending 20 0646 Exam:  
   General:  Alert, cooperative, no distress, appears stated age. Rt eye unable to look rt past midline, at baseline No nystagmus No carotid bruits heard JVD flat at 45 degrees Lungs:   Clear to auscultation bilaterally.   
Heart:  Regular rate and rhythm, S1, S2 normal, no murmur, click, rub or gallop. Monitor : paced Abdomen:   Soft, non-tender. Bowel sounds normal. No masses,  No organomegaly. Extremities: No pedal edema. Neuro: rt eye findings as above . O/w non focal   
 
Lab Data Reviewed: (see below) Recent Results (from the past 24 hour(s)) CBC WITH AUTOMATED DIFF Collection Time: 06/08/20  3:23 PM  
Result Value Ref Range WBC 12.7 (H) 3.6 - 11.0 K/uL  
 RBC 4.81 3.80 - 5.20 M/uL  
 HGB 13.2 11.5 - 16.0 g/dL HCT 39.9 35.0 - 47.0 % MCV 83.0 80.0 - 99.0 FL  
 MCH 27.4 26.0 - 34.0 PG  
 MCHC 33.1 30.0 - 36.5 g/dL  
 RDW 14.2 11.5 - 14.5 % PLATELET 049 581 - 509 K/uL MPV 8.9 8.9 - 12.9 FL  
 NRBC 0.0 0  WBC ABSOLUTE NRBC 0.00 0.00 - 0.01 K/uL NEUTROPHILS 74 32 - 75 % LYMPHOCYTES 15 12 - 49 % MONOCYTES 7 5 - 13 % EOSINOPHILS 2 0 - 7 % BASOPHILS 1 0 - 1 % IMMATURE GRANULOCYTES 1 (H) 0.0 - 0.5 % ABS. NEUTROPHILS 9.4 (H) 1.8 - 8.0 K/UL  
 ABS. LYMPHOCYTES 1.9 0.8 - 3.5 K/UL  
 ABS. MONOCYTES 0.9 0.0 - 1.0 K/UL  
 ABS. EOSINOPHILS 0.3 0.0 - 0.4 K/UL  
 ABS. BASOPHILS 0.1 0.0 - 0.1 K/UL  
 ABS. IMM. GRANS. 0.2 (H) 0.00 - 0.04 K/UL  
 DF AUTOMATED METABOLIC PANEL, COMPREHENSIVE Collection Time: 06/08/20  3:23 PM  
Result Value Ref Range Sodium 135 (L) 136 - 145 mmol/L Potassium 4.4 3.5 - 5.1 mmol/L Chloride 101 97 - 108 mmol/L  
 CO2 26 21 - 32 mmol/L Anion gap 8 5 - 15 mmol/L Glucose 220 (H) 65 - 100 mg/dL BUN 27 (H) 6 - 20 MG/DL Creatinine 1.61 (H) 0.55 - 1.02 MG/DL  
 BUN/Creatinine ratio 17 12 - 20 GFR est AA 37 (L) >60 ml/min/1.73m2 GFR est non-AA 31 (L) >60 ml/min/1.73m2 Calcium 9.1 8.5 - 10.1 MG/DL Bilirubin, total 0.9 0.2 - 1.0 MG/DL  
 ALT (SGPT) 20 12 - 78 U/L  
 AST (SGOT) 27 15 - 37 U/L Alk. phosphatase 115 45 - 117 U/L Protein, total 7.6 6.4 - 8.2 g/dL Albumin 3.5 3.5 - 5.0 g/dL Globulin 4.1 (H) 2.0 - 4.0 g/dL A-G Ratio 0.9 (L) 1.1 - 2.2 SAMPLES BEING HELD  
 Collection Time: 06/08/20  3:23 PM  
Result Value Ref Range SAMPLES BEING HELD 1 red, 1 blue COMMENT Add-on orders for these samples will be processed based on acceptable specimen integrity and analyte stability, which may vary by analyte. TROPONIN I Collection Time: 06/08/20  3:23 PM  
Result Value Ref Range Troponin-I, Qt. <0.05 <0.05 ng/mL URINALYSIS W/ RFLX MICROSCOPIC Collection Time: 06/08/20  4:08 PM  
Result Value Ref Range Color YELLOW/STRAW Appearance CLEAR CLEAR Specific gravity 1.013 1.003 - 1.030    
 pH (UA) 7.0 5.0 - 8.0 Protein Negative NEG mg/dL Glucose Negative NEG mg/dL Ketone Negative NEG mg/dL Bilirubin Negative NEG Blood TRACE (A) NEG Urobilinogen 0.2 0.2 - 1.0 EU/dL Nitrites Negative NEG Leukocyte Esterase Negative NEG    
 WBC 0-4 0 - 4 /hpf  
 RBC 0-5 0 - 5 /hpf Epithelial cells FEW FEW /lpf Bacteria 1+ (A) NEG /hpf  
SAMPLES BEING HELD Collection Time: 06/08/20  4:08 PM  
Result Value Ref Range SAMPLES BEING HELD 1UC   
 COMMENT Add-on orders for these samples will be processed based on acceptable specimen integrity and analyte stability, which may vary by analyte. EKG, 12 LEAD, INITIAL Collection Time: 06/08/20  4:14 PM  
Result Value Ref Range Ventricular Rate 61 BPM  
 Atrial Rate 64 BPM  
 QRS Duration 146 ms  
 Q-T Interval 474 ms QTC Calculation (Bezet) 477 ms Calculated R Axis -39 degrees Calculated T Axis 131 degrees Diagnosis ** Poor data quality, interpretation may be adversely affected Ventricular-paced rhythm When compared with ECG of 28-FEB-2020 21:03, 
Vent. rate has decreased BY  21 BPM 
Confirmed by Danya Zeng M.D., [facility] ([id number]) on 6/9/2020 5:42:11 AM 
  
GLUCOSE, POC Collection Time: 06/08/20  7:44 PM  
Result Value Ref Range Glucose (POC) 226 (H) 65 - 100 mg/dL Performed by Iron Walker GLUCOSE, POC  Collection Time: 06/08/20 11:21 PM  
 Result Value Ref Range Glucose (POC) 346 (H) 65 - 100 mg/dL Performed by Delia Paddy  PCT   
LIPID PANEL Collection Time: 06/09/20  4:11 AM  
Result Value Ref Range LIPID PROFILE Cholesterol, total 93 <200 MG/DL Triglyceride 128 <150 MG/DL  
 HDL Cholesterol 37 MG/DL  
 LDL, calculated 30.4 0 - 100 MG/DL VLDL, calculated 25.6 MG/DL  
 CHOL/HDL Ratio 2.5 0.0 - 5.0    
CBC W/O DIFF Collection Time: 06/09/20  4:11 AM  
Result Value Ref Range WBC 12.4 (H) 3.6 - 11.0 K/uL  
 RBC 4.62 3.80 - 5.20 M/uL  
 HGB 12.7 11.5 - 16.0 g/dL HCT 38.6 35.0 - 47.0 % MCV 83.5 80.0 - 99.0 FL  
 MCH 27.5 26.0 - 34.0 PG  
 MCHC 32.9 30.0 - 36.5 g/dL  
 RDW 14.2 11.5 - 14.5 % PLATELET 838 202 - 149 K/uL MPV 9.3 8.9 - 12.9 FL  
 NRBC 0.0 0  WBC ABSOLUTE NRBC 0.00 0.00 - 0.01 K/uL  
ASPIRIN TEST Collection Time: 06/09/20  4:11 AM  
Result Value Ref Range Aspirin test 571 ARU  
GLUCOSE, POC Collection Time: 06/09/20  6:31 AM  
Result Value Ref Range Glucose (POC) 203 (H) 65 - 100 mg/dL Performed by Delia Paddy  PCT Medications Reviewed: (see below) 
 
______________________________________________________________________ Medications:  
 
Current Facility-Administered Medications Medication Dose Route Frequency  allopurinoL (ZYLOPRIM) tablet 100 mg  100 mg Oral DAILY  apixaban (ELIQUIS) tablet 2.5 mg  2.5 mg Oral BID  atorvastatin (LIPITOR) tablet 20 mg  20 mg Oral QHS  isosorbide mononitrate ER (IMDUR) tablet 60 mg  60 mg Oral DAILY  pantoprazole (PROTONIX) tablet 20 mg  20 mg Oral ACB  acetaminophen (TYLENOL) tablet 650 mg  650 mg Oral Q4H PRN Or  
 acetaminophen (TYLENOL) solution 650 mg  650 mg Per NG tube Q4H PRN  Or  
 acetaminophen (TYLENOL) suppository 650 mg  650 mg Rectal Q4H PRN  
 0.9% sodium chloride infusion  75 mL/hr IntraVENous CONTINUOUS  
 glucose chewable tablet 16 g  4 Tab Oral PRN  
  glucagon (GLUCAGEN) injection 1 mg  1 mg IntraMUSCular PRN  
 dextrose 10% infusion 0-250 mL  0-250 mL IntraVENous PRN  
 insulin lispro (HUMALOG) injection   SubCUTAneous Q6H  
 0.9% sodium chloride infusion  75 mL/hr IntraVENous CONTINUOUS  
 benzocaine-menthoL (CEPACOL) lozenge 1 Lozenge  1 Lozenge Mucous Membrane PRN  
 azelastine (ASTELIN) 137mcg/spray nasal spray  1 Spray Both Nostrils BID PRN  
 hydrALAZINE (APRESOLINE) 20 mg/mL injection 20 mg  20 mg IntraVENous Q6H PRN Assessment:  
Dizziness under eval'n. Neurology to see. Neurointerventional Surgery c/s is appreciated. Carotid stenosis. Check carotid doppler. Nsgy to advise if  shunt is MRI compatible. Pt claustrophobic and would need sedation prior to MRI  
S/p pacemaker in past . Hx Afib HTN , labile. Cardiology to see and advise if Pacer is MRI compatible DM, under tx Hypoxia . Check CXR  
CKD f/u lab ordered Minor Leukocytosis , will follow Patient Active Problem List  
Diagnosis Code  Coronary atherosclerosis of native coronary artery I25.10  
 HLD (hyperlipidemia) E78.5  Cerebrovascular accident stroke, other, unspec I67.89  
 Hypertension complicating diabetes ([facility] [address] E11.59, I10  
 PVD (peripheral vascular disease) with claudication ([facility]) I73.9  Atrial fibrillation (HCC) I48.91  
 SOB (shortness of breath) R06.02  
 Type 2 diabetes with nephropathy ([facility]) E11.21  
 Severe obesity (BMI 35.0-39. 9) with comorbidity ([facility] [address] E66.01  
 Coronary artery disease with stable angina pectoris (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  
 CVA (cerebral vascular accident) ([facility] [address] I63.9  Bilateral carotid artery stenosis I65.23  Vertebral artery stenosis, bilateral I65.03 Plan: case Alarcon and ROSALVA spoke on phone.  
      
 
  
 
 
  
              
 
 
 
 
 
 
      
___________________________________________________ Attending Physician: Margo Driver MD

## 2020-06-09 NOTE — PROGRESS NOTES
Spiritual Care Assessment/Progress Note [address] 
 
 
NAME: Edilia Bedoya      MRN: [id number] AGE: 81 y.o. SEX: female Sikh Affiliation: Taoist  
Language: English  
 
6/9/2020     Total Time (in minutes): 5 Spiritual Assessment begun in [facility] 6S NEURO-SCI TELE through conversation with: 
  
    []Patient        [] Family    [] Friend(s) Reason for Consult: Initial visit Spiritual beliefs: (Please include comment if needed) 
   [] Identifies with a flor tradition:     
   [] Supported by a flor community:        
   [] Claims no spiritual orientation:       
   [] Seeking spiritual identity:            
   [] Adheres to an individual form of spirituality:       
   [x] Not able to assess:                   
 
    
Identified resources for coping:  
   [] Prayer                           
   [] Music                  [] Guided Imagery 
   [] Family/friends                 [] Pet visits [] Devotional reading                         [x] Unknown 
   [] Other:                                        
 
 
Interventions offered during this visit: (See comments for more details) Patient Interventions: Initial visit Plan of Care: 
 
 [] Support spiritual and/or cultural needs  
 [] Support AMD and/or advance care planning process    
 [] Support grieving process 
 [] Coordinate Rites and/or Rituals  
 [] Coordination with community clergy [] No spiritual needs identified at this time 
 [] Detailed Plan of Care below (See Comments)  [] Make referral to Music Therapy 
[] Make referral to Pet Therapy    
[] Make referral to Addiction services 
[] Make referral to [facility] 
[] Make referral to Spiritual Care Partner 
[] No future visits requested       
[x] Follow up visits as needed Attempted to visit pt without success. Pt sleeping and no family present. Chaplains will continue to offer support as needed. Chaplain Yepez MDiv, MS, [facility] 
[phone]

## 2020-06-09 NOTE — CDMP QUERY
#3 
 
Patient admitted [address], noted to have a history of atrial fibrillation. If possible, please document in progress notes and discharge summary further specificity regarding the type of atrial fibrillation: 
 
? Paroxysmal Atrial Fibrillation ? Longstanding Persistent Atrial Fibrillation ? Chronic Atrial Fibrillation, unspecified ? Other, please specify ? Clinically unable to determine The medical record reflects the following: 
   Risk Factors: HX of A fib Clinical Indicators:  
6/9-progress note Hx Afib Treatment: Eliquis 2.5 mg po daily, EKG Thank you, 
       Vern Mccord [facility], [address] Chronic: nonspecific term that could be referring to paroxysmal, persistent, or permanent Longstanding persistent: persistent and continuous, lasting > 1 year. Paroxysmal - self-terminating or intermittent; resolves with or without intervention within 7 days of onset; may recur with various frequency. Persistent - Fails to terminate within 7 days; Often requires meds or cardioversion to restore to NSR. Permanent - longstanding & persistent; Medication has been ineffective in restoring NSR &/or cardioversion is contraindicated Definitions per [employer] Training Guide and Quick Reference Guide, Edel Eganmar 112 5 Diseases and Disorders of the Circulatory System; 2019; [email or web address].  Software content from the [employer]

## 2020-06-09 NOTE — PROGRESS NOTES
Reason for Admission:   Dizziness RUR Score:     20% PCP: First and Last name:  Dr. Delio Butterfield MD  
 Name of Practice: [facility] Are you a current patient: Yes/No: Yes Approximate date of last visit: March 2020 Can you participate in a virtual visit if needed: Yes Do you (patient/family) have any concerns for transition/discharge? No concerns noted. Plan for utilizing home health: Therapy to evaluate Current Advanced Directive/Advance Care Plan:  Not on file Transition of Care Plan:       
Ongoing medical/ stroke work-up; TBD pending medical disposition. Therapy to evaluate; Awaiting recommendations. DaughterOlivia [id number] will transport upon discharge. Patient resides with daughterWiliam in a private residence with 3 steps to enter. Patient is somewhat independent with ADLs/IADLs but requires some assistance from family members. Uses a cane to ambulate and fills Rx at [facility]. Care Management Interventions PCP Verified by CM: Yes 
Palliative Care Criteria Met (RRAT>21 & CHF Dx)?: No 
Mode of Transport at Discharge: Other (see comment) Transition of Care Consult (CM Consult): Discharge Planning Discharge Durable Medical Equipment: No 
Physical Therapy Consult: Yes Occupational Therapy Consult: Yes Speech Therapy Consult: Yes Current Support Network: Lives with Spouse Confirm Follow Up Transport: Self Discharge Location Discharge Placement: Home with home health CM to follow. ROSA Vizcaino,CRM

## 2020-06-09 NOTE — PROGRESS NOTES
Ms Tori Oswald has a [address] pacemaker implanted February 5th of this year. This is MRI conditional to 3T. She also has a shunt of unknown manufacture placed by Dr. Abram Manuel in [id number] per pt. Records on this have been requested from Dr. Diandra Meyer office, once received will research MR safety of this shunt. 2:48pm Surgical record received from Dr. Diandra Meyer office, no mention of shunt during craniectomy. Dr. Joann Wu looked up pt's CT images and stated no visible shunt, ok to proceed with MRI. Femoral stent was mentioned in a cardiology note, discussed this with RN, if pt has femoral stent will need patient stent card.   
 
Device settings order sheet faxed to Dr. Emily Valenzuela, [employer] rep coordinated for MRI tomorrow at 1pm. Discussed with Ruthy Dean RN

## 2020-06-09 NOTE — CDMP QUERY
#4 
 
Pt admitted with CVA/Pt noted to have history of CHF If possible, please document in the progress notes and d/c summary if you are evaluating and / or treating any of the following: 
 
? Chronic Systolic CHF ? Chronic Diastolic CHF ? Chronic Combined CHF ? Chronic CHF-unable to determine type ? Other, please specify ? Clinically unable to determine The medical record reflects the following: 
  Risk Factors:HX of CHF Clinical Indicators:  
noted HX of systolic CHF 3/6/95  
 and also  
diastolic CHF 1/96/56 Treatment: cardiology consult, echo Thank you, 
       Nellie Martines [facility], [address] [facility]

## 2020-06-09 NOTE — PROGRESS NOTES
Problem: Self Care Deficits Care Plan (Adult) Goal: *Acute Goals and Plan of Care (Insert Text) Description:  
FUNCTIONAL STATUS PRIOR TO ADMISSION: Patient was modified independent using a single point cane for functional mobility outside of the house. Patient was modified independent for basic and some instrumental ADLs, daughter assists with grocery shopping. HOME SUPPORT: The patient lived with her daughter but did not require assist. 
 
Occupational Therapy Goals Initiated 6/9/2020 1. Patient will perform grooming at the sink with supervision/set-up within 7 day(s). 2.  Patient will perform upper and lower body dressing with modified independence within 7 day(s). 3.  Patient will perform simple home management with supervision/set-up within 7 day(s). 4.  Patient will perform toilet transfers with supervision/set-up within 7 day(s). 5.  Patient will perform all aspects of toileting with modified independence within 7 day(s). 6.  Patient will participate in upper extremity therapeutic exercise/activities with independence for 5 minutes within 7 day(s). 7.  Patient will utilize energy conservation techniques during functional activities with verbal cues within 7 day(s). Outcome: Not Met OCCUPATIONAL THERAPY EVALUATION Patient: Andrez Luis (2 y.o. female) Date: 6/9/2020 Primary Diagnosis: CVA (cerebral vascular accident) ([facility] [address] [I63.9] CVA (cerebral vascular accident) ([facility] [address] [I63.9] Precautions: Fall ASSESSMENT Based on the objective data described below, the patient presents with decreased balance, activity tolerance, safety awareness, and cardiopulmonary tolerance s/p admission for increasing dizziness, imaging negative for acute process. PTA, patient Mod I for ADLs and mobility, living with her daughter who occasionally assists with IADLs, uses SPC vs furniture walking.  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 She now presents below her baseline, largely CGA for brief mobility. She does demo SOB and reports dizziness with positional changes, but reports she this has been worsening since 2017. Anticipate she is not far from her baseline, will continue to follow, anticipate no OT needs upon d/c home with family support. Current Level of Function Impacting Discharge (ADLs/self-care): CGA for ADLs and mobility Functional Outcome Measure: The patient scored Total: 55/100 on the Barthel Index outcome measure which is indicative of 45% impaired ability to care for basic self needs/dependency on others; inferred 50% dependency on others for instrumental ADLs. Other factors to consider for discharge: fall risk, worsening dizziness Patient will benefit from skilled therapy intervention to address the above noted impairments. PLAN : 
Recommendations and Planned Interventions: self care training, functional mobility training, therapeutic exercise, balance training, therapeutic activities, endurance activities, patient education, home safety training and family training/education Frequency/Duration: Patient will be followed by occupational therapy 4 times a week to address goals. Recommendation for discharge: (in order for the patient to meet his/her long term goals) No skilled occupational therapy/ follow up rehabilitation needs identified at this time. This discharge recommendation: 
Has been made in collaboration with the attending provider and/or case management IF patient discharges home will need the following DME: None, has DME at home SUBJECTIVE:  
Patient stated I also got therapy for that thing in your head [points to ear], the crystals. They told me I had a crystal loose.  OBJECTIVE DATA SUMMARY:  
HISTORY:  
Past Medical History:  
Diagnosis Date  Atrial fibrillation ([facility] [address]  CAD (coronary artery disease)  Cerebrovascular accident stroke, other, unspec 11/18/2010  Coronary atherosclerosis of native coronary artery 11/18/2010  Essential hypertension  Essential hypertension, benign 11/18/2010  HLD (hyperlipidemia) 11/18/2010  PVD (peripheral vascular disease) ([facility] [address] 2017  Type II or unspecified type diabetes mellitus without mention of complication, not stated as uncontrolled 11/18/2010  Zoster Past Surgical History:  
Procedure Laterality Date  HX CORONARY STENT PLACEMENT    
 HX HEART CATHETERIZATION    
 [address]  [place] ICAR CATHETER ABLATION ATRIOVENTR NODE FUNCTION N/A 2/5/2020 ABLATION AV NODE performed by Brandi Machuca MD at [address] CATH LAB  [place] TCAT INSJ/RPL PERM LEADLESS PACEMAKER RV W/IMG N/A 2/5/2020 INSERT OR REPLACE TRANSCATH PPM LEADLESS performed by Brandi Machuca MD at [address] CATH LAB Expanded or extensive additional review of patient history:  
 
Home Situation Home Environment: Private residence # Steps to Enter: 3 Rails to Enter: Yes Hand Rails : Bilateral 
One/Two Story Residence: Two story, live on 1st floor Living Alone: No 
Support Systems: Child(baldemar) Patient Expects to be Discharged to[de-identified] Private residence Current DME Used/Available at Home: Cane, straight, Walker, rolling, Raised toilet seat, Shower chair, Grab bars, Commode, bedside Tub or Shower Type: Tub/Shower combination Hand dominance: Right EXAMINATION OF PERFORMANCE DEFICITS: 
Cognitive/Behavioral Status: 
Neurologic State: Alert Orientation Level: Oriented X4 Cognition: Appropriate decision making Perception: Appears intact Perseveration: No perseveration noted Safety/Judgement: Awareness of environment; Insight into deficits Skin: Appears intact Edema: None Hearing: Auditory Auditory Impairment: Hard of hearing, bilateral 
 
Vision/Perceptual: Tracking: Able to track stimulus in all quadrants w/o difficulty(except R CN VI palsy at baseline) Diplopia: No   
Acuity: Within Defined Limits Corrective Lenses: Glasses Range of Motion: 
AROM: Generally decreased, functional 
PROM: Generally decreased, functional 
  
  
  
  
  
  
 
Strength: 
Strength: Generally decreased, functional 
  
  
  
  
 
Coordination: 
Coordination: Generally decreased, functional 
Fine Motor Skills-Upper: Left Intact; Right Intact Gross Motor Skills-Upper: Left Intact; Right Intact Tone & Sensation: 
Tone: Normal 
Sensation: Intact Balance: 
Sitting: Intact; With support Standing: Impaired Standing - Static: Fair;Constant support Standing - Dynamic : Fair;Constant support Functional Mobility and Transfers for ADLs: 
Bed Mobility: 
Supine to Sit: Supervision Sit to Supine: (in chair) Scooting: Contact guard assistance Transfers: 
Sit to Stand: Contact guard assistance Stand to Sit: Contact guard assistance Toilet Transfer : Contact guard assistance(infer per mobility) Assistive Device : [employer] ADL Assessment: 
Feeding: Independent Oral Facial Hygiene/Grooming: Contact guard assistance Bathing: Contact guard assistance Upper Body Dressing: Independent Lower Body Dressing: Contact guard assistance Toileting: Contact guard assistance ADL Intervention and task modifications: 
  
 
  
 
  
 
  
 
  
 
Lower Body Dressing Assistance Dressing Assistance: Contact guard assistance Pants With Elastic Waist: Contact guard assistance(simulated) Socks: Stand-by assistance Leg Crossed Method Used: Yes Position Performed: Seated edge of bed;Standing Cues: Tactile cues provided;Verbal cues provided;Visual cues provided Cognitive Retraining Safety/Judgement: Awareness of environment; Insight into deficits Functional Measure: 
Barthel Index: 
 
Bathin Bladder: 10 Bowels: 10 
 Groomin Dressin Feeding: 10 Mobility: 0 Stairs: 0 Toilet Use: 5 Transfer (Bed to Chair and Back): 10 Total: 55/100 The Barthel ADL Index: Guidelines 1. The index should be used as a record of what a patient does, not as a record of what a patient could do. 2. The main aim is to establish degree of independence from any help, physical or verbal, however minor and for whatever reason. 3. The need for supervision renders the patient not independent. 4. A patient's performance should be established using the best available evidence. Asking the patient, friends/relatives and nurses are the usual sources, but direct observation and common sense are also important. However direct testing is not needed. 5. Usually the patient's performance over the preceding 24-48 hours is important, but occasionally longer periods will be relevant. 6. Middle categories imply that the patient supplies over 50 per cent of the effort. 7. Use of aids to be independent is allowed. Saji James., Barthel, D.W. (1229). Functional evaluation: the Barthel Index. [facility] (14)2. COLOTN Parada, Dimitris Hadley., John Chambers., [place], [address] Measuring the change indisability after inpatient rehabilitation; comparison of the responsiveness of the Barthel Index and Functional [place] Measure. Journal of Neurology, Neurosurgery, and Psychiatry, 66(4), 627-716. Harlanbobby Rudd, [place]JARED, VALERIE Scott, & Sneha Boothe MRenettaA. (2004.) Assessment of post-stroke quality of life in cost-effectiveness studies: The usefulness of the Barthel Index and the EuroQoL-5D. [facility], 13, 136-30 Occupational Therapy Evaluation Charge Determination History Examination Decision-Making LOW Complexity : Brief history review  LOW Complexity : 1-3 performance deficits relating to physical, cognitive , or psychosocial skils that result in activity limitations and / or participation restrictions  LOW Complexity : No comorbidities that affect functional and no verbal or physical assistance needed to complete eval tasks Based on the above components, the patient evaluation is determined to be of the following complexity level: LOW Pain Rating: 
None Activity Tolerance:  
Good despite dizziness with OOB mobility Please refer to the flowsheet for vital signs taken during this treatment. After treatment patient left in no apparent distress:   
Sitting in chair, Call bell within reach and RNs in room COMMUNICATION/EDUCATION:  
The patients plan of care was discussed with: Physical therapist and Registered nurse. Home safety education was provided and the patient/caregiver indicated understanding., Patient/family have participated as able in goal setting and plan of care. and Patient/family agree to work toward stated goals and plan of care. This patients plan of care is appropriate for delegation to [facility]. Thank you for this referral. 
CHEIKH Walton, OTR/L Time Calculation: 30 mins

## 2020-06-09 NOTE — CDMP QUERY
#2 
 
Pt admitted with CVA/Pt noted to have a history of CKD without noted staging documented. If possible, please document in the progress notes and d/c summary if you are evaluating and / or treating any of the following: ? CKD stage 3 (GFR 30-59) ? CKD unable to stage 
? Other, please specify ? Clinically unable to determine The medical record reflects the following: 
  Risk Factors:HX of CKD Clinical Indicators:  
6/8/2020 15:23 BUN: 27 (H) Creatinine: 1.61 (H) GFR est non-AA: 31 (L) Treatment: lab monitoring Thank you, 
       Johnna [address], [address] [facility]

## 2020-06-09 NOTE — H&P
[address]  HISTORY AND PHYSICAL Name:  Avery Calderón 
MR#:  [id number] :  1935 ACCOUNT #:  [de-identified] ADMIT DATE:  2020 CHIEF COMPLAINT:  Dizziness. HISTORY OF PRESENT ILLNESS:  The patient is an 81-year-old female with a past medical history of atrial fibrillation currently on Eliquis, history of coronary artery disease, hypertension, history of stroke, congenital right eye vision loss, peripheral vascular disease, hyperlipidemia, and diabetes mellitus type 2, who presents to the hospital today with the above-mentioned symptom. History was primarily obtained from the patient. The patient reports that she woke up around 8 a.m. and started having increased dizziness. The patient reports that dizziness started around 11 a.m. Reports that she tried to get out of the bed, but could not because she was extremely dizzy. Reports that she got concerned and called EMS and was brought over here. The patient reports she gets dizziness off and on, but this was quite severe. Denies any previous history of vertigo. The patient came to the ER and was found to have vertebral artery stenosis and was requested to be admitted under the hospitalist service. The patient denies any headache, blurry vision, sore throat, trouble swallowing, trouble with speech, chest pain, abdominal pain, constipation, diarrhea, shortness of breath, cough, fever, chills, urinary symptoms, focal or generalized neurological weakness, recent travel, sick contact, falls, injuries, hematemesis, melena, hemoptysis, hematuria, or any other concerns or problems. PAST MEDICAL HISTORY:  See above.  
 
HOME MEDICATIONS:  Currently the patient is on Symbicort, Bumex 2 mg daily, acyclovir 5% ointment, Imdur 60 mg daily, acetaminophen, albuterol p.r.n., aspirin 81 mg daily, Lipitor 20 mg daily, Astepro as needed, Levemir 10 units daily, Bactroban 2%, glipizide 10 mg b.i.d., allopurinol 100 mg daily, apixaban 2.5 mg b.i.d., omeprazole 20 mg daily, and NovoLog FlexPen. SOCIAL HISTORY:  Former smoker, used to smoke two-thirds of a pack per day for 20 years. No alcohol. No IV drug abuse. ALLERGIES:  TO BACTRIM, CLONIDINE, CODEINE, KEFLIN, PENICILLIN, AND TRAMADOL. REVIEW OF SYSTEMS:  All systems were reviewed and found to be essentially negative except for the symptoms mentioned above. FAMILY HISTORY:  Discussed. No family history of stroke. PHYSICAL EXAMINATION: 
VITAL SIGNS:  Temperature 98, pulse 60, respiratory rate 20, blood pressure 182/88, pulse oximetry 97% on room air. GENERAL:  Alert x3, awake, no acute distress, resting in bed, pleasant female, appears to be stated age. HEENT:  Patient with vision loss in the right eye. Left eye, pupils equal and reactive to light. Moist mucous membranes. Tympanic membranes are clear. NECK:  Supple. No JVD. CHEST:  Decreased basilar breath sounds. CORONARY:  S1 and S2 heard. ABDOMEN:  Soft, nontender, and nondistended. Bowel sounds are physiological. 
EXTREMITIES:  No clubbing, no cyanosis, no edema. NEURO/PSYCH:  Pleasant mood and affect. Cranial nerves II through XII are grossly intact except for the right eye vision loss. Moves all 4. Strength 5/5. SKIN:  Warm. LABORATORY DATA:  White count 12.7, hemoglobin 13.2, hematocrit 39.9, platelets 481. Urine shows no signs of infection. Sodium 135, potassium 4.4, chloride 101, bicarbonate 26, anion gap 8, glucose 220, BUN 27, creatinine 1.61, calcium 9.1. Bilirubin total 0.9, ALT 20, AST 27, alkaline phosphatase 150. Troponin less than 0.05. CTA of the head, no acute large vessel occlusion, severe bilateral internal carotid artery origin stenosis, moderate bilateral cavernous carotid stenosis, bilateral patent vertebral arteries with moderate bilateral vertebral stenosis in the midportion of the V4 segments.   Status post left suboccipital craniectomy and resection of medial left inferior cerebellar hemisphere. No significant perfusion abnormality. EKG shows ventricular paced rhythm with nonspecific ST changes. CT of the head shows no acute intracranial abnormality, mild hyperdensity of the cerebral white matter, likely due to chronic small vessel ischemic disease. ASSESSMENT AND PLAN: 
1. Dizziness with concern for vertebral artery stenosis with several bilateral internal carotid artery origin stenosis. We will admit the patient on neuro telemetry bed. Neuro checks every 2 hours. Spoke with Dr. Clint Brewer from Neurointerventional Radiology. We will continue aspirin and Eliquis per his recommendation. We will allow permissive hypertension and hold oral antihypertensive medications. Provide IV hydration. We will put hydralazine for systolic greater than 627. Provide neurovascular checks every 2 hours. Physical Therapy, Occupational Therapy, Speech consult, fall precautions, Neurology consult, MRI, echocardiogram, and further intervention per hospital course. Lipid panel and hemoglobin A1c. Further intervention per hospital course. Reassess as needed and continue to closely monitor. Further intervention per Neurointerventional Radiology. 2.  Diabetes mellitus type 2, poorly controlled. The patient will be on sliding scale NovoLog insulin, Accu-Cheks, diet control, close monitoring. Further intervention per hospital course. Reassess as needed. 3.  Chronic kidney disease, stage III. Continue to trend creatinine. Continue to monitor. Continue home medications and further intervention per hospital course. Reassess as needed. 4.  Hypertension. We will allow permissive hypertension. We will hold oral medication. We will provide hydralazine p.r.n. for now and optimize blood pressure control. Continue to closely monitor. Reassess as needed. Further intervention per hospital course. 5.  Gastrointestinal/deep venous thrombosis prophylaxis. The patient will be on sequential compression devices. Vandana Cruz MD [id number] 
D:  06/08/2020 18:15 
T:  06/08/2020 22:45 JOB #:  [id number]

## 2020-06-09 NOTE — CONSULTS
[facility] Name:  Christine Chandra 
MR#:  [id number] :  1935 ACCOUNT #:  [de-identified] DATE OF SERVICE:  2020 REQUESTING PHYSICIAN:  Brenda Berumen MD 
 
REASON FOR EVALUATION:  Near syncope. HISTORY OF PRESENT ILLNESS:  The patient is an 64-year-old female with history of atrial fibrillation, on Eliquis; coronary artery disease; hypertension; history of stroke without any residual deficits; peripheral vascular disease; type 2 diabetes; hyperlipidemia; who states that she has been having dizzy spells for the past year or so and she gets at least mild dizziness almost on a daily basis. It will usually come on when she is upright or out and about. She will try to sit down or lie down for some time which will resolve the symptoms. She describes the feeling as a lightheaded or as if she was going to faint. There are no associated focal motor or sensory deficits, headaches, changes in vision, speech, chest pain, shortness of breath, palpitations, nausea, or vomiting. PAST MEDICAL HISTORY:  As mentioned above. HOME MEDICATIONS: 
1. Symbicort. 2.  Bumex. 3.  Acetaminophen. 4.  Aspirin. 5.  Lipitor. 6.  Astepro. 7.  Allopurinol. 8.  Apixaban. 9.  Omeprazole. 10.  NovoLog. SOCIAL HISTORY:  Former smoker, used to smoke two-thirds of a pack per day for 20 years. No alcohol use. No IV drug use. ALLERGIES:  BACTRIM, CLONIDINE, CODEINE, KEFLIN , PENICILLIN, AND TRAMADOL. REVIEW OF SYSTEMS:  Negative except as mentioned in the HPI. PHYSICAL EXAMINATION: 
GENERAL:  The patient is alert, fully oriented. VITAL SIGNS:  Blood pressure 164/62, temperature 98, pulse 63. NEUROLOGIC:  Speech is clear. Comprehension is normal.  Pupils are equal, round, reactive. Extraocular movements are full. Face is symmetric. Tongue is midline. Hearing is baseline.   Muscle tone and bulk normal. Strength normal in all extremities. DTRs 2/2 and symmetric. Toes downgoing. Sensation impaired to pinprick vibration distally in feet. Gait exam is deferred. HEART:  Regular rate and rhythm. CHEST:  Clear. ABDOMEN:  Soft, nontender. Positive bowel sounds. EXTREMITIES:  No edema. LABORATORY DATA:  CBC shows WBC 12.4, hemoglobin 12.7, hematocrit 38.6, platelets 528. Chemistry:  Sodium 139, potassium 3.9, BUN is 26 and creatinine is 1.52. Lipid Profile:  Triglycerides 128, HDL 37, LDL 30.4. CT brain did not show any acute abnormalities. CTA of the head and neck showed severe bilateral internal carotid artery origin stenoses in the range of 70-90%. There is moderate bilateral vertebral stenoses in the midportion of the V4 segments as well. She is status post left suboccipital craniectomy and resection of the medial left inferior cerebellar hemisphere. ASSESSMENT AND PLAN:  An 81-year-old female with multiple vascular risk factors who is admitted with recurrent episodes of near syncope/lightheadedness. Based on CTA findings, these are most likely related to carotid and vertebral stenosis causing cerebral hypoperfusion/vascular insufficiency. From medical perspective, agree with aspirin and Eliquis and keeping a systolic blood pressure above 160 with a goal of 160-180 for now. Neurointerventional Surgery is on the case. Carotid duplex is pending. We may need to consider carotid endarterectomy or stenting and we will defer that to Neurointerventional Surgery Team. 
 
Thank you for this consultation. Maurilio Moon MD 
 
 
[id number] 
D:  06/09/2020 12:03 
T:  06/09/2020 14:41 JOB #:  [id number]

## 2020-06-09 NOTE — PROGRESS NOTES
Speech Therapy Chart reviewed. Note patient is currently NPO pending possible cerebral angiogram. Will hold off on bedside swallow evaluation until cleared for PO trials by neurosurgery. Thank you. Rosaura Bonner M.S., CCC-SLP

## 2020-06-10 NOTE — PROGRESS NOTES
Bedside shift change report given to PIPER Acosta (oncoming nurse) by Sumit Castellon RN (offgoing nurse). Report included the following information SBAR, Cardiac Rhythm paced and Dual Neuro Assessment.

## 2020-06-10 NOTE — PROGRESS NOTES
Bedside and Verbal shift change report given to Chance Heaton (oncoming nurse) by Benedicto Pinzon RN (offgoing nurse). Report included the following information SBAR, Kardex, Intake/Output, MAR, Recent Results, Cardiac Rhythm paced and Dual Neuro Assessment.

## 2020-06-10 NOTE — PROGRESS NOTES
Problem: Mobility Impaired (Adult and Pediatric) Goal: *Acute Goals and Plan of Care (Insert Text) Description:  
FUNCTIONAL STATUS PRIOR TO ADMISSION: Patient was modified independent using a single point cane for community level functional mobility. Denies falls. Reports ongoing intermittent \"dizziness\" at baseline for the past 3 years. HOME SUPPORT PRIOR TO ADMISSION: The patient lived with her daughter but did not require assist. 
 
Physical Therapy Goals Initiated 6/9/2020 1. Patient will move from supine to sit and sit to supine  and scoot up and down in bed with modified independence within 7 day(s). 2.  Patient will transfer from bed to chair and chair to bed with modified independence using the least restrictive device within 7 day(s). 3.  Patient will perform sit to stand with modified independence within 7 day(s). 4.  Patient will ambulate with modified independence for 150 feet with the least restrictive device within 7 day(s). 5.  Patient will ascend/descend 3 stairs with handrail(s) with modified independence within 7 day(s). 6.  Patient will improve Ramirez Balance score by 7 points within 7 days. Outcome: Progressing Towards Goal 
 
PHYSICAL THERAPY TREATMENT Patient: Lela Bar (60 y.o. female) Date: 6/10/2020 Diagnosis: CVA (cerebral vascular accident) ([facility] [address] [I63.9] CVA (cerebral vascular accident) ([facility] [address] [I63.9] <principal problem not specified> Precautions:   
Chart, physical therapy assessment, plan of care and goals were reviewed. ASSESSMENT Patient continues with skilled PT services and is progressing towards goals - \"dizziness\" reportedly better today however note new significant SOB with limited activity. She was able to complete sit<>stands from chair with CGA without difficulty.  Participated in gait training without AD - required 2 standing rest breaks to complete the same distance as yesterday - significant SOB and increased WOB observed- RR up to 40s and HR up to 114bpm, and SpO2 decreased to 90% (RN notified). Provided education on PLB and energy conservation strategies - may benefit from rollator trial next session. Remained up in chair at end of session, vitals back to baseline with prolonged rest. Hopeful for discharge home with family assist and HHPT pending progress. Will follow Current Level of Function Impacting Discharge (mobility/balance): Significant SOB and WOB today; min A for mobility Other factors to consider for discharge: falls risk; poor cardiopulmonary tolerance to activity PLAN : 
Patient continues to benefit from skilled intervention to address the above impairments. Continue treatment per established plan of care. to address goals. Recommendation for discharge: (in order for the patient to meet his/her long term goals) Physical therapy at least 2 days/week in the home AND ensure assist and/or supervision for safety with ADLs/mobility This discharge recommendation: A follow-up discussion with the attending provider and/or case management is planned IF patient discharges home will need the following DME: to be determined (TBD) SUBJECTIVE:  
Patient stated Sometimes I get like this.  OBJECTIVE DATA SUMMARY:  
Critical Behavior: 
Neurologic State: Alert, Eyes open spontaneously Orientation Level: Oriented X4 Cognition: Appropriate safety awareness, Follows commands Safety/Judgement: Awareness of environment, Insight into deficits Functional Mobility Training: 
Bed Mobility: 
 
Transfers: 
Sit to Stand: Contact guard assistance Stand to Sit: Contact guard assistance Balance: 
Standing: Impaired Standing - Static: Fair Standing - Dynamic : Fair Ambulation/Gait Training: 
Distance (ft): 75 Feet (ft) Assistive Device: Gait belt(HHA) Ambulation - Level of Assistance: Contact guard assistance;Minimal assistance Gait Abnormalities: Decreased step clearance;Shuffling gait Base of Support: Narrowed Speed/Teri: Slow;Shuffled Step Length: Right shortened;Left shortened Activity Tolerance:  
Fair, requires frequent rest breaks, and observed SOB with activity Please refer to the flowsheet for vital signs taken during this treatment. After treatment patient left in no apparent distress:  
Sitting in chair, Call bell within reach, and Bed / chair alarm activated COMMUNICATION/COLLABORATION:  
The patients plan of care was discussed with: Registered nurse. Alfredo Kelley PT, DPT Time Calculation: 16 mins

## 2020-06-10 NOTE — CONSULTS
Cardiology Consult Note Assessment/Plan/Discussion:Cardiology Attending:  
 
Patient seen on the day of progress note and examined  and agree with Advance Practice Provider (ARLIN, NP,PA)  assessment and plans. Alireza Quiroz is a 81 y.o. female Nice lady followed by my partner for some time Now with dizziness but its not new, had been getting worse and very severe now Saw Dr Larry Guzmán and got improvement for 8 days post ENT PT maneuvers Now echo shows big change in LV fx There is severe HK of distal half of the myocardium This looks like Takatsubo's (stress induced)  cardiomyopathy but cant exclude an interim infarction, usually we see elevated troponin with this acutely but perhaps had a recent event Lab Results Component Value Date/Time  2015 07:47 PM  
 CK - MB 4.7 (H) 2015 07:47 PM  
 CK-MB Index 4.6 (H) 2015 07:47 PM  
 Troponin-I, Qt. <0.05 2020 03:23 PM  
  (H) 2017 04:40 AM  
  
Will need cath once we control bp, perhaps as soon as tomorrow Since no CVA would like to stop the permissive hypertension 
Selina Guillen MD   
 
Patient Vitals for the past 12 hrs: 
 Temp Pulse Resp BP SpO2  
06/10/20 1805 97.5 °F (36.4 °C) 63 23 180/62 95 % 06/10/20 1722  63  172/58   
06/10/20 1515     100 % 06/10/20 1408 98.8 °F (37.1 °C) 62 24 171/68 99 % 06/10/20 1009 98.2 °F (36.8 °C) 87 25 161/55 97 % Patient Name: Alireza Quiroz  : 1935 MRN: [id number] Date: 6/10/2020  Time: 4:22 PM 
 
Admit Diagnosis: CVA (cerebral vascular accident) ([address] [I63.9] CVA (cerebral vascular accident) ([address] [I63.9] Primary Cardiologist: Dr. Bridget Garcia MD    Consulting Cardiologist: BENNIE Sullivan for Consult: dizziness, shortness of breath, on multiple cardiac meds Requesting MD:  Dr. Augie Mazariegos MD 
 
HPI: 
 Alireaz Quiroz is a 81 y.o. female admitted on 6/8/2020  For evaluation of dizziness and near syncope. Has PMH of chronic Afib(on eliquis) s/p AV jazlyn ablation and leadless ppm (3/2020), HTN, HLD, PVD s/p femeral-popliteal bypass, CVA, DM, CAD, ?  shunt. She presented with chief c/o dizziness and near syncope. Had nausea and vomiting on Monday as well. Reports dizziness for 3 years, describes at lightheadedness. States it occurs at any time, no just when upright. .  Had near syncope episode Monday. She presented for evaluation of these symptoms. Neurology and NSG are following. She has been found to have Bilateral carotid artery stenosis (50-69%) and not candidate for stenting. She does have Bilaterally patent vertebral arteries with moderate bilateral vertebral stenosis in the midportion of the V4 segments by CTA neck but Per neuro interventional, not severe specifically in posterior circulation,V4. ASA and eliquis recommended (although asa has not been started) Cardiology is also consulted to review pt's cardiac meds in light of pt dizziness complaints as well as to evaluate SOB. Pt reports SOB for approximately 1 month with activity, usually improves with inhaler but now SOB and has recently worsened. She reports orthopnea, uses 2 pillows which she has done so for 2 years. Reports some BLE edema but improved today. She denies chest pain, palpitations, cough, fever and chills. No dizziness currently. Echocardiogram done today denotes new reduced LV function with E 30-35% and moderately reduced RV function. She denies any recent stressors. Assessment and Plan 1. Cardiomyopathy/Systolic CHF:  new diagnosis 
 -EF 30-35%, mod MR, mod reduced RV, NWMA 
06/08/20 ECHO ADULT COMPLETE 06/10/2020 6/10/2020 Narrative · Normal cavity size and wall thickness. Mild-to-moderate systolic  
function. Estimated left ventricular ejection fraction is 30 - 35%.  No  
 regional wall motion abnormality noted. · Moderately dilated right atrium. · Moderately dilated left atrium. · Mitral valve non-specific thickening. Moderate mitral valve  
regurgitation is present. · Mild tricuspid valve regurgitation is present. · Moderately reduced right ventricular systolic function. Signed by: Rosezetta Leyden, MD  
 
 -Uncertain etiology. Will d/w Dr. Ashanti Almanzar about ischemic evaluate- did have recent nuclear stress test with no ischemia. ?need [facility] at some point- will d/w Dr. Ashanti Almanzar. 
 -Home losartan held- need neurology to clarify BP parameters (SBP goal was 160-180 d/t concern for vertebral artery stenosis). -Home toprol XL 75 mg nightly PTA  
 -pro BNP 4145, CXR with no pulmonary edema per read but patchy haziness.  
 -Bumex: 0.5 IV x 1 ordered per primary team- 
Discussed above with Dr. Ashanti Almanzar- he will make medication adjustments and additional recommendations after looking at echo images again. 2. Hx of CAD: RCA stent 1996, Last [facility] 3/2014 multivessal nonocclusive disease as below 
 -Continue statin, Imdur., ASA  
 -Troponin negative, no chest pain 
 -Nuc stress 3/2020 no ischemia or infarct  
 -EKG not with v paced rhythm 3. Hx of afib s/p av jazlyn ablation and leadless PPM (2/2020) -follows with Dr. Arnaud Villarreal 
 -currently v paced, afib underlying 
 -continue Eliquis, BB 
 
4. Hx of HTN: bp elevated but ?parameters needed in light of vertebral artery stenosis 
 -On Toprol XL, losartan PTA as well as imdur 5. CKD: stage 3B 
 -follows with Dr. Areli Molina 
 -creatinine 1.44, stable 6. DM type II: uncontrolled 
 -A1C 8.5. management per primary team 
7. HLD: lipitor 8. Dizziness 
 -has Carotid artery disease, vertebral artery stenosis-no interventions recommended at this time by neuro services 
 -Severe bilateral ICA origin stenosis and moderate bilateral cavernous carotid stenoses. -?main contributor to dizziness. -Neuro and neurointerventional following. There were recommendations at one point to keep -180- ?still recommended. 
 -will check orthostatics but no orthostasis with sitting position currently. Attempted to see pt this a.m. but she was at MRI. Saw pt this afternoon. Reviewed above with Dr. Abram Rayo,- he will be by to see pt this evening and will relook at echo images and make recommendations. Cardiac testing: 
 Lexiscan stress 3/2/20: EF 73%, no ischemia or infarction Echo 2/5/20\" EF 50-55%, borderline low RV systolic function 3/14: cardiac catheterization:Left main: The vessel was normal sized. Angiography 
showed minor luminal irregularities. LAD: The vessel was normal sized. Angiography showed mild atherosclerosis. There was a 30 % stenosis in the 
middle third of the vessel segment. 1st diagonal: The vessel was small 
sized. Angiography showed minor luminal irregularities. 2nd diagonal: The 
vessel was normal sized. Angiography showed mild atherosclerosis. Circumflex: The vessel was normal sized. Angiography showed mild 
atherosclerosis. There was a 30 % stenosis. RCA: The vessel was large 
sized (dominant). Angiography showed mild atherosclerosis and 3 patent 
prior stents. There was a tubular 30 % stenosis in the distal third of the 
vessel segment.   
 
Stress test normal on 11/10 Doppler /US renal NO MANDO b/l PTCA and stent of right coronary artery in 1996 July 26, 2007. The cardiac catheterization revealed mild 10% tapering of the ostium. The left anterior descending artery had mild intimal disease throughout. There was a 30% stenosis in the proximal portion of the LAD between the first and second septal . The remainder of the LAD had only mild intimal disease. The circumflex artery had a 30%-40% stenosis in the proximal mid segment prior to take off of obtuse marginal branch.  The right coronary artery was a large dominant artery with 80% proximal stenosis followed by 90% proximal to mid stenosis, followed by 30% residual stenosis. Ejection fraction was estimated at 50%-55%. 4/19 lexiscan with fixed anterior defect and normal wall motion, probably artifact 04/17/19  echo lvef 53%, crow, sclerotic non stenotic av, mild mr, mild pul htn Review of Symptoms: 
Constitutional: No fevers or chills. HEET:+ mild [place]. Neck: No adenopathy or swelling. Heart: No chest pain or palpitations. +orthopnea Lungs: +SOB No cough. Abdomen: No pain. +N&V Monday, No BRBPR or melena. No diarrhea. Urology: No dysuria or hematuria. Ext: + BLE edema Skin: No acute rashes or ulcers. Endocrine: No heat or cold intolerance. Neuro: No transient neurologic deficit chronic RLE numbness for years Previous treatment/evaluation includes Percutaneous Coronary Intervention, echocardiogram, cardiac catheterization and nuclear stress teste . Cardiac risk factors: smoking/ tobacco exposure, dyslipidemia, diabetes mellitus, obesity, sedentary life style, hypertension, post-menopausal. 
 
Past Medical History:  
Diagnosis Date  Atrial fibrillation ([address]  CAD (coronary artery disease)  Cerebrovascular accident stroke, other, unspec 11/18/2010  Coronary atherosclerosis of native coronary artery 11/18/2010  Essential hypertension  Essential hypertension, benign 11/18/2010  HLD (hyperlipidemia) 11/18/2010  PVD (peripheral vascular disease) [address] [facility] 2017  Type II or unspecified type diabetes mellitus without mention of complication, not stated as uncontrolled 11/18/2010  Zoster Past Surgical History:  
Procedure Laterality Date  HX CORONARY STENT PLACEMENT    
 HX HEART CATHETERIZATION    
 [address]  NH ICAR CATHETER ABLATION ATRIOVENTR NODE FUNCTION N/A 2/5/2020 ABL[facility] NO[facility]  NH TCAT INSJ/RPL PERM LEADLESS PACEMAKER RV W/IMG N/A 2/5/2020 INSERT OR REPLACE TRANSCATH PPM LEADLESS performed by Evaristo Lares MD at [address], [facility] [address] [facility] Allergies Allergen Reactions  Bactrim [Sulfamethoprim] Other (comments) Affects her kidney funtion  Clonidine Shortness of Breath and Swelling Lightheaded,   
 Codeine Unknown (comments)  Keflin Itching  Pcn [Penicillins] Unknown (comments)  Tramadol Other (comments) Makes her head feel swishy History reviewed. No pertinent family history. reports that she quit smoking about 34 years ago. Her smoking use included cigarettes. She has a 16.00 pack-year smoking history. She has never used smokeless tobacco. She reports that she does not drink alcohol or use drugs. Objective: 
  Physical Exam 
 
Vitals:  
Vitals:  
 06/10/20 0822 06/10/20 1009 06/10/20 1408 06/10/20 1515 BP: 165/60 161/55 171/68 Pulse: 62 87 62 Resp:  25 24 Temp:  98.2 °F (36.8 °C) 98.8 °F (37.1 °C) SpO2:  97% 99% 100% Weight:      
 
 
General:    Alert, cooperative, no distress, appears stated age. Neck:   Supple,   
Back:     Symmetric,   
Lungs:     Few scattered faint wheezes. Heart[de-identified]    Regular rate and rhythm, S1, S2 normal, no murmur, click, rub or gallop. Abdomen:     Soft, non-tender. Bowel sounds present. Extremities:   Extremities normal, atraumatic, no cyanosis or edema. Vascular:   Pulses - 2+ rt DP, Left DP 1+ Skin:   Skin color normal. No rashes or lesions Neurologic:   JONES, Telemetry: v paced, underlying afib ECG:  
EKG Results Procedure 720 Value Units Date/Time EKG 12 LEAD INITIAL [[id number]] Collected:  06/08/20 1614 Order Status:  Completed Updated:  06/09/20 1048 Ventricular Rate 61 BPM   
  Atrial Rate 64 BPM   
  QRS Duration 146 ms   
  Q-T Interval 474 ms QTC Calculation (Bezet) 477 ms Calculated R Axis -39 degrees Calculated T Axis 131 degrees   Diagnosis --  
 ** Poor data quality, interpretation may be adversely affected Ventricular-paced rhythm When compared with ECG of 28-FEB-2020 21:03, 
Vent. rate has decreased BY  21 BPM 
Confirmed by Rosalee Hennessy M.D., Juan Collins ([id number]) on 6/9/2020 5:42:11 AM 
  
 EKG, 12 LEAD, INITIAL [[id number]] Order Status:  Canceled Data Review:  
 
Radiology: XR Results (most recent): 
[place] [facility] [place] XR CHEST PORT Narrative EXAM: Portable CXR. 0745 hours. COMPARISON: Chest CT 3/3/2020. INDICATION: hypoxia  . Compare to prior cxr FINDINGS: 
There are some patchy nonspecific pulmonary haziness new since March affecting 
right lung greater than left. This is superimposed on mild chronic pulmonary 
markings. Heart size is top normal. There is no overt pulmonary edema. There is 
no pneumothorax, midline shift or apparent pleural effusion. Intracardiac 
leadless pacemaker remains. Impression IMPRESSION: New nonspecific airspace disease. CT Results (most recent): 
[place] [facility] [place]20 CT CODE NEURO PERF W CBF Narrative EXAM:  CTA CODE NEURO HEAD AND NECK W CONT, CT CODE NEURO PERF W CBF INDICATION:  code S level 1 COMPARISON:  Noncontrast head CT of earlier in the day TECHNIQUE:   
Multislice helical axial CT angiography was performed from the aortic arch to 
the top of the head during uneventful rapid bolus intravenous administration of 
80 mL of Isovue 370. Postprocessing was performed to include MIP and volume 
rendered images. Standard images of the head were also obtained following 
contrast administration and a delay. CT brain perfusion was performed following the IV injection of 40 cc Isovue-370 
with generation of hemodynamic maps of multiple parameters, including cerebral 
blood flow, cerebral volume, Tmax,  and MTT (mean transit time).  
 
CT Dose reduction was achieved to use of a standardized protocol tailored for 
this examination and automatic exposure control for dose modulationn. CT HEAD The postcontrast images are less degraded by artifact on the precontrast images 
because of the different gantry angle. Changes of prior left suboccipital 
craniectomy with resection of the inferomedial left cerebellum are noted. Diffuse hypodensity of the cerebral white matter is again noted. No enhancing 
lesions or masses are demonstrated. CT PERFUSION: 
No significant perfusion abnormalities. The inferior cerebellar hemispheres were 
not completely included on the perfusion examination. CTA NECK There is conventional three vessel arch anatomy. The bilateral subclavian and 
common carotid  are patent with no flow-limiting stenosis. There is calcified 
plaque in the proximal right internal carotid artery resulting in severe (70 to 
90%) luminal diameter stenosis. There is calcified plaque with moderate stenosis 
in the mid right common carotid artery. There is also calcified plaque in the 
proximal left internal carotid artery resulting in severe (70-90%) luminal 
diameter stenosis. NASCET method was utilized for calculating stenosis. Vertebral arteries are bilaterally patent and the right is dominant. The 
cervical soft tissues are unremarkable. There are degenerative changes in the 
cervical spine as well as extensive anterior paravertebral ossification with 
fusion anteriorly between C4 and C7. CTA HEAD The distal vertebral arteries are bilaterally patent. There is calcified plaque 
in the mid V4 segments bilaterally, with moderate stenosis. . The basilar artery 
and its branches demonstrate no significant abnormalities. . The internal 
carotid, anterior cerebral, and middle cerebral arteries are patent. There is 
calcified plaque in the cavernous carotid arteries bilaterally, with moderate 
stenosis. There is irregularity of the intracranial vessels without severe stenosis or occlusion. . No aneurysms are demonstrated. . There is a patent right 
posterior communicating artery. Impression IMPRESSION: 
1. No acute large vessel occlusion. 2. Severe bilateral internal carotid artery origin stenoses. Moderate bilateral 
cavernous carotid stenoses. 3. Bilaterally patent vertebral arteries with moderate bilateral vertebral 
stenosis in the midportion of the V4 segments. 4. Status post left suboccipital craniectomy and resection of medial the left 
inferior cerebellar hemisphere. 5. No significant perfusion abnormalities. MRI Results (most recent): 
Results from Hospital Encounter encounter on 06/08/20 MRI BRAIN WO CONT Narrative EXAM:  MRI BRAIN WO CONT INDICATION:  CVA TECHNIQUE: Sagittal T1, axial FLAIR, T2,T1 and gradient echo images as well as 
coronal T2 weighted images and axial diffusion weighted images of the head were 
obtained. COMPARISON:  CT, CTA 6/8/20 FINDINGS: 
Mild prominence of ventricles and sulci consistent with cerebral volume loss 
within normal limits for age. Extensive multifocal and confluent T2 hyperintensity in cerebral white matter 
variably increasing prominence since at least 2012. Nonspecific and possibly 
related to chronic small vessel ischemic change. No restricted diffusion or other finding that would suggest an acute or subacute 
brain infarction. No evidence of acute intracranial hemorrhage. No abnormal extra-axial fluid collections or masses. Again demonstrated is left suboccipital craniotomy site for resection of left 
inferior cerebellar infarct/mass effect in 2008. Flow voids are present in vertebral basilar and carotid artery systems. Mildly enlarged partial empty sella. Paranasal sinuses and other structures of 
the skull base are unremarkable. Impression IMPRESSION: 
1. Chronic advanced nonspecific white matter T2 hyperintensity. Possibly related 
to chronic small vessel ischemia. 2. No acute intracranial abnormality demonstrated. 3. Old left suboccipital craniotomy from 2008. Recent Labs  
  06/08/20 
1523 TROIQ <0.05 Recent Labs  
  06/10/20 
0526 06/09/20 
0411 * 139  
K 4.1 3.9  105 CO2 24 24 BUN 18 26* CREA 1.44* 1.50* * 169* CA 8.4* 8.5 Recent Labs  
  06/10/20 
0526 06/09/20 
0411 WBC 12.3* 12.4* HGB 11.6 12.7 HCT 36.3 38.6  247 Recent Labs  
  06/08/20 
1523  Recent Labs  
  06/09/20 
0411 CHOL 93 LDLC 30.4 No results for input(s): CRP, TSH, TSHEXT in the last 72 hours. No lab exists for component: ESR Current Facility-Administered Medications Medication Dose Route Frequency  albuterol (PROVENTIL VENTOLIN) nebulizer solution 2.5 mg  2.5 mg Nebulization Q4H PRN  
 arformoteroL (BROVANA) neb solution 15 mcg  15 mcg Nebulization BID RT  
 budesonide (PULMICORT) 500 mcg/2 ml nebulizer suspension  500 mcg Nebulization BID RT  
 ondansetron (ZOFRAN) injection 4 mg  4 mg IntraVENous Q4H PRN  
 insulin lispro (HUMALOG) injection   SubCUTAneous AC&HS  allopurinoL (ZYLOPRIM) tablet 100 mg  100 mg Oral DAILY  apixaban (ELIQUIS) tablet 2.5 mg  2.5 mg Oral BID  atorvastatin (LIPITOR) tablet 20 mg  20 mg Oral QHS  isosorbide mononitrate ER (IMDUR) tablet 60 mg  60 mg Oral DAILY  pantoprazole (PROTONIX) tablet 20 mg  20 mg Oral ACB  acetaminophen (TYLENOL) tablet 650 mg  650 mg Oral Q4H PRN Or  
 acetaminophen (TYLENOL) solution 650 mg  650 mg Per NG tube Q4H PRN  Or  
 acetaminophen (TYLENOL) suppository 650 mg  650 mg Rectal Q4H PRN  
 glucose chewable tablet 16 g  4 Tab Oral PRN  
 glucagon (GLUCAGEN) injection 1 mg  1 mg IntraMUSCular PRN  
 dextrose 10% infusion 0-250 mL  0-250 mL IntraVENous PRN  
 benzocaine-menthoL (CEPACOL) lozenge 1 Lozenge  1 Lozenge Mucous Membrane PRN  
 azelastine (ASTELIN) 137mcg/spray nasal spray  1 Spray Both Nostrils BID PRN  
  hydrALAZINE (APRESOLINE) 20 mg/mL injection 20 mg  20 mg IntraVENous Q6H PRN Medications Prior to Admission Medication Sig  
 metoprolol succinate (TOPROL-XL) 50 mg XL tablet Take 1.5 Tabs by mouth nightly.  losartan (COZAAR) 100 mg tablet Take 1 Tab by mouth daily.  budesonide-formoteroL (SYMBICORT) 160-4.5 mcg/actuation HFAA Take 2 Puffs by inhalation two (2) times a day. After each use, rinse mouth with water  bumetanide (BUMEX) 2 mg tablet Take 1 Tab by mouth daily.  potassium chloride SR (K-TAB) 20 mEq tablet Take 1 Tab by mouth daily.  isosorbide mononitrate ER (IMDUR) 60 mg CR tablet TAKE 1 TABLET TWICE A DAY  albuterol (PROAIR HFA) 90 mcg/actuation inhaler Take 2 Puffs by inhalation every four (4) hours as needed for Wheezing or Shortness of Breath.  aspirin 81 mg chewable tablet Take 81 mg by mouth nightly.  atorvastatin (LIPITOR) 20 mg tablet Take 20 mg by mouth nightly.  azelastine (ASTEPRO) 0.15 % (205.5 mcg) 1 Spray by Both Nostrils route nightly.  insulin detemir U-100 (LEVEMIR U-100 INSULIN) 100 unit/mL injection 10 Units by SubCUTAneous route daily. Takes 20 units every morning, and takes 8-10 units every evening as needed/as directed based on blood sugar  glipiZIDE (GLUCOTROL) 5 mg tablet TAKE 2 TABLETS TWICE A DAY  allopurinol (ZYLOPRIM) 100 mg tablet Take 100 mg by mouth daily.  apixaban (ELIQUIS) 2.5 mg tablet Take 1 Tab by mouth two (2) times a day.  omeprazole (PRILOSEC) 20 mg capsule TAKE 1 CAPSULE DAILY  insulin aspart U-100 (NOVOLOG FLEXPEN U-100 INSULIN) 100 unit/mL (3 mL) inpn INJECT 7 UNITS BEFORE EACH MEAL 3 TIMES A DAY OR AS   DIRECTED  cholecalciferol, vitamin D3, (VITAMIN D3) 2,000 unit tab Take 2,000 Units by mouth daily.  nystatin (MYCOSTATIN) topical cream Apply  to affected area two (2) times a day.  acyclovir (ZOVIRAX) 5 % ointment Use as directed  acetaminophen (TYLENOL) 325 mg tablet Take 2 Tabs by mouth every four (4) hours as needed for Pain or Fever.  clotrimazole-betamethasone (LOTRISONE) topical cream Apply  to affected area two (2) times daily as needed for Skin Irritation or Other (Rash).  mupirocin calcium (BACTROBAN) 2 % topical cream Apply  to affected area two (2) times daily as needed.  nitroglycerin (NITROSTAT) 0.4 mg SL tablet 1 Tab by SubLINGual route every five (5) minutes as needed for Chest Pain. Thank you very much for this referral. I appreciate the opportunity to participate in this patient's care. I will follow along with above stated plan. José Luis Fay. JUAN DIEGO [facility], [address] [place] 
   [phone] Ken Mario MD

## 2020-06-10 NOTE — PROGRESS NOTES
Neurology Progress Note Patient ID: Genaro Cameron [id number] 
40 y.o. 
1935 Initial HISTORY OF PRESENT ILLNESS:  The patient is an 36-year-old female with history of atrial fibrillation, on Eliquis; coronary artery disease; hypertension; history of stroke without any residual deficits; peripheral vascular disease; type 2 diabetes; hyperlipidemia; who states that she has been having dizzy spells for the past year or so and she gets at least mild dizziness almost on a daily basis. It will usually come on when she is upright or out and about. She will try to sit down or lie down for some time which will resolve the symptoms. She describes the feeling as a lightheaded or as if she was going to faint. There are no associated focal motor or sensory deficits, headaches, changes in vision, speech, chest pain, shortness of breath, palpitations, nausea, or vomiting. Subjective:  
 Clinically she is doing well this afternoon eating lunch. She has not had any dizzy spells today. Objective: All records in [facility] reviewed and noted ROS: 
Per HPI All other 12 pt ROS negative Meds: 
Current Facility-Administered Medications Medication Dose Route Frequency  ondansetron (ZOFRAN) injection 4 mg  4 mg IntraVENous Q4H PRN  
 insulin lispro (HUMALOG) injection   SubCUTAneous AC&HS  allopurinoL (ZYLOPRIM) tablet 100 mg  100 mg Oral DAILY  apixaban (ELIQUIS) tablet 2.5 mg  2.5 mg Oral BID  atorvastatin (LIPITOR) tablet 20 mg  20 mg Oral QHS  isosorbide mononitrate ER (IMDUR) tablet 60 mg  60 mg Oral DAILY  pantoprazole (PROTONIX) tablet 20 mg  20 mg Oral ACB  acetaminophen (TYLENOL) tablet 650 mg  650 mg Oral Q4H PRN Or  
 acetaminophen (TYLENOL) solution 650 mg  650 mg Per NG tube Q4H PRN  Or  
 acetaminophen (TYLENOL) suppository 650 mg  650 mg Rectal Q4H PRN  
 glucose chewable tablet 16 g  4 Tab Oral PRN  
 glucagon (GLUCAGEN) injection 1 mg  1 mg IntraMUSCular PRN  
  dextrose 10% infusion 0-250 mL  0-250 mL IntraVENous PRN  
 0.9% sodium chloride infusion  50 mL/hr IntraVENous CONTINUOUS  
 benzocaine-menthoL (CEPACOL) lozenge 1 Lozenge  1 Lozenge Mucous Membrane PRN  
 azelastine (ASTELIN) 137mcg/spray nasal spray  1 Spray Both Nostrils BID PRN  
 hydrALAZINE (APRESOLINE) 20 mg/mL injection 20 mg  20 mg IntraVENous Q6H PRN Imaging: MRI Results (most recent): 
Results from Hospital Encounter encounter on 06/08/20 MRI BRAIN WO CONT Narrative EXAM:  MRI BRAIN WO CONT INDICATION:  CVA TECHNIQUE: Sagittal T1, axial FLAIR, T2,T1 and gradient echo images as well as 
coronal T2 weighted images and axial diffusion weighted images of the head were 
obtained. COMPARISON:  CT, CTA 6/8/20 FINDINGS: 
Mild prominence of ventricles and sulci consistent with cerebral volume loss 
within normal limits for age. Extensive multifocal and confluent T2 hyperintensity in cerebral white matter 
variably increasing prominence since at least 2012. Nonspecific and possibly 
related to chronic small vessel ischemic change. No restricted diffusion or other finding that would suggest an acute or subacute 
brain infarction. No evidence of acute intracranial hemorrhage. No abnormal extra-axial fluid collections or masses. Again demonstrated is left suboccipital craniotomy site for resection of left 
inferior cerebellar infarct/mass effect in 2008. Flow voids are present in vertebral basilar and carotid artery systems. Mildly enlarged partial empty sella. Paranasal sinuses and other structures of 
the skull base are unremarkable. Impression IMPRESSION: 
1. Chronic advanced nonspecific white matter T2 hyperintensity. Possibly related 
to chronic small vessel ischemia. 2. No acute intracranial abnormality demonstrated. 3. Old left suboccipital craniotomy from 2008. Lab Review Recent Results (from the past 24 hour(s)) DUPLEX CAROTID BILATERAL  
 Collection Time: 06/09/20  1:45 PM  
Result Value Ref Range Right cca dist sys 98.3 cm/s Right CCA dist nickerson 12.3 cm/s Right CCA prox sys 67.4 cm/s Right CCA prox nickerson 9.9 cm/s Right eca sys 81.1 cm/s RIGHT EXTERNAL CAROTID ARTERY D 0.00 cm/s Right ICA dist sys 55.2 cm/s Right ICA dist nickerson 11.2 cm/s Right ICA mid sys 100.1 cm/s Right ICA mid nickerson 10.7 cm/s Right ICA prox sys 221.4 cm/s Right ICA prox nickerson 30.9 cm/s Right vertebral sys 57.0 cm/s RIGHT VERTEBRAL ARTERY D 12.48 cm/s Right ICA/CCA sys 2.3 Left CCA dist sys 45.6 cm/s Left CCA dist nickerson 10.7 cm/s Left CCA prox sys 39.6 cm/s Left CCA prox nickerson 7.8 cm/s Left ECA sys 505.1 cm/s LEFT EXTERNAL CAROTID ARTERY D 0.00 cm/s Left ICA dist sys 58.1 cm/s Left ICA dist nickerson 9.0 cm/s Left ICA mid sys 100.6 cm/s Left ICA mid nickerson 11.8 cm/s Left ICA prox sys 90.9 cm/s Left ICA prox nickerson 15.0 cm/s Left vertebral sys 38.7 cm/s LEFT VERTEBRAL ARTERY D 5.91 cm/s Left ICA/CCA sys 2.21   
ECHO ADULT COMPLETE Collection Time: 06/09/20  2:56 PM  
Result Value Ref Range Tapse 1.03 (A) 1.5 - 2.0 cm Aortic Valve Systolic Peak Velocity 544.36 cm/s AoV PG 5.5 mmHg LVIDd 3.86 (A) 3.9 - 5.3 cm  
 LVPWd 0.91 (A) 0.6 - 0.9 cm LVIDs 1.89 cm IVSd 1.20 (A) 0.6 - 0.9 cm  
 LVOT Peak Velocity 89.49 cm/s LVOT Peak Gradient 3.2 mmHg RVIDd 3.26 cm  
 LV Mass .1 67 - 162 g Left Atrium Major Axis 4.06 cm Triscuspid Valve Regurgitation Peak Gradient 67.9 mmHg  
 TR Max Velocity 411.93 cm/s Left Ventricular Fractional Shortening by 2D 26.459439394 % AV Velocity Ratio 0.76 GLUCOSE, POC Collection Time: 06/09/20  4:45 PM  
Result Value Ref Range Glucose (POC) 157 (H) 65 - 100 mg/dL Performed by Mendel Kyle GLUCOSE, POC Collection Time: 06/09/20  8:35 PM  
Result Value Ref Range Glucose (POC) 322 (H) 65 - 100 mg/dL Performed by Dayne Gosselin METABOLIC PANEL, BASIC Collection Time: 06/10/20  5:26 AM  
Result Value Ref Range Sodium 135 (L) 136 - 145 mmol/L Potassium 4.1 3.5 - 5.1 mmol/L Chloride 104 97 - 108 mmol/L  
 CO2 24 21 - 32 mmol/L Anion gap 7 5 - 15 mmol/L Glucose 248 (H) 65 - 100 mg/dL BUN 18 6 - 20 MG/DL Creatinine 1.44 (H) 0.55 - 1.02 MG/DL  
 BUN/Creatinine ratio 13 12 - 20 GFR est AA 42 (L) >60 ml/min/1.73m2 GFR est non-AA 35 (L) >60 ml/min/1.73m2 Calcium 8.4 (L) 8.5 - 10.1 MG/DL  
CBC W/O DIFF Collection Time: 06/10/20  5:26 AM  
Result Value Ref Range WBC 12.3 (H) 3.6 - 11.0 K/uL  
 RBC 4.31 3.80 - 5.20 M/uL  
 HGB 11.6 11.5 - 16.0 g/dL HCT 36.3 35.0 - 47.0 % MCV 84.2 80.0 - 99.0 FL  
 MCH 26.9 26.0 - 34.0 PG  
 MCHC 32.0 30.0 - 36.5 g/dL  
 RDW 14.5 11.5 - 14.5 % PLATELET 393 247 - 004 K/uL MPV 9.0 8.9 - 12.9 FL  
 NRBC 0.0 0  WBC ABSOLUTE NRBC 0.00 0.00 - 0.01 K/uL GLUCOSE, POC Collection Time: 06/10/20  7:17 AM  
Result Value Ref Range Glucose (POC) 296 (H) 65 - 100 mg/dL Performed by Dayne Gosselin Exam: 
Visit Vitals /55 (BP 1 Location: Left arm, BP Patient Position: Sitting) Pulse 87 Temp 98.2 °F (36.8 °C) Resp 25 Wt 78.2 kg (172 lb 6.4 oz) SpO2 97% BMI 33.67 kg/m² Gen: Well developed CV: RRR Neuro: A&O x 3, no dysarthria or aphasia CN II-XII:face symmetric, tongue/palate midline Motor: strength 5/5 all four ext Gait: deferred Assessment:  
 
Patient Active Problem List  
Diagnosis Code  Coronary atherosclerosis of native coronary artery I25.10  
 HLD (hyperlipidemia) E78.5  Cerebrovascular accident stroke, other, unspec I67.89  
 [facility] E11.59, I10  
 PVD (peripheral vascular disease) with claudication (HCC) I73.9  Atrial fibrillation (HCC) I48.91  
 SOB (shortness of breath) R06.02  
 Type 2 diabetes with nephropathy ([facility]) E11.21  
  Severe obesity (BMI 35.0-39. 9) with comorbidity ([facility] E66.01  
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  
 CVA (cerebral vascular accident) ([facility] I63.9  Bilateral carotid artery stenosis I65.23  Vertebral artery stenosis, bilateral I65.03 Plan:  
1.)Bilateral Carotid Artery Stenosis  
 -NIS on board  
 - continue  mg daily  
 -Continue Eliquis 5mg BID 
 -follow up in 6 months 
 - doppler,Moderate, 50 to 69 percent, stenosis of the right internal carotid  artery. Moderate to severe left external carotid stenosis. 2.)Near syncope/lightheadedness - possible due to #1  
 - obtain orthostatic 
 - SBP goal 140-160 
 -Continue PT/OT 
 -MRI  No acute events Thank you for allowing the Neurology Service the pleasure of participating in the care of your patient. This patient will be discussed with my collaborating care team physician  and he may have further recommendations regarding this patient's care.   
Signed: 
Kim Medley NP 
6/10/2020 
12:43 PM

## 2020-06-10 NOTE — PROGRESS NOTES
PCP . Brain MRI negative for cva.  Breathing better after Jet neb. 02 sat 100% on 3 L/min NC.  NT-PRO BNP elevated 4,145  With pulm haziness on CXR , will give Bumex 0.5 mg IV x 1

## 2020-06-10 NOTE — PROGRESS NOTES
Medical Progress Note NAME: Maite Navarro :  1935 MRM:  [id number] Date/Time: 6/10/2020 Problem List:  
 
Active Problems: 
  [facility]cere[facility] vascular [facility] ([facility]) Bilateral carotid artery stenosis (2020) Vertebral artery stenosis, bilateral (2020) Subjective:  
 
Yesterday CXR new  non specific haziness affecting rt >L lung Last night felt warm , mild sob, nl RA sat, and got 02 with improvement in breathing. No cp , palpitations. Denies dizziness last night or this am. Hx PVD , chronic rt lower leg mild numbness at baseline . No new c/o's Past Medical History:  
Diagnosis Date  Atrial fibrillation ([facility]  CAD (coronary artery disease)  Cerebrovascular accident stroke, other, unspec 2010  Coronary atherosclerosis of native coronary artery 2010  Essential hypertension  Essential hypertension, benign 2010  HLD (hyperlipidemia) 2010  PVD (peripheral vascular disease) ([facility] 2017  Type II or unspecified type diabetes mellitus without mention of complication, not stated as uncontrolled 2010  Zoster Objective:  
 
 
 
Vitals:  
  
Last 24hrs VS reviewed since prior progress note. Most recent are: 
 
Visit Vitals /59 Pulse 94 Temp 98.5 °F (36.9 °C) Resp 20 Wt 172 lb 6.4 oz (78.2 kg) SpO2 98% BMI 33.67 kg/m² SpO2 Readings from Last 6 Encounters:  
06/10/20 98% 20 96% 20 100% 20 93% 20 98% 20 97% Intake/Output Summary (Last 24 hours) at 6/10/2020 1529 Last data filed at 2020 0800 Gross per 24 hour Intake 800 ml Output  Net 800 ml Exam:  
   General:  Alert, cooperative, no distress, appears stated age. JVD flat at 45 degrees Lungs:   Clear to auscultation bilaterally.   
Heart:  Regular rate and rhythm, S1, S2 normal, no murmur, click, rub or gallop. Monitor : Paced. Abdomen:   Soft, non-tender. Bowel sounds normal. No masses,  No organomegaly. Extremities: Rt foot 1+ DP pulse. No pedal b/l  Edema. 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   
 
Lab Data Reviewed: (see below) Recent Results (from the past 24 hour(s)) GLUCOSE, POC Collection Time: 06/09/20 11:47 AM  
Result Value Ref Range Glucose (POC) 227 (H) 65 - 100 mg/dL Performed by Rajendra Fuentes DUPLEX CAROTID BILATERAL Collection Time: 06/09/20  1:45 PM  
Result Value Ref Range Right cca dist sys 98.3 cm/s Right CCA dist nickerson 12.3 cm/s Right CCA prox sys 67.4 cm/s Right CCA prox nickerson 9.9 cm/s Right eca sys 81.1 cm/s RIGHT EXTERNAL CAROTID ARTERY D 0.00 cm/s Right ICA dist sys 55.2 cm/s Right ICA dist nickerson 11.2 cm/s Right ICA mid sys 100.1 cm/s Right ICA mid nickerson 10.7 cm/s Right ICA prox sys 221.4 cm/s Right ICA prox nickerson 30.9 cm/s Right vertebral sys 57.0 cm/s RIGHT VERTEBRAL ARTERY D 12.48 cm/s Right ICA/CCA sys 2.3 Left CCA dist sys 45.6 cm/s Left CCA dist nickerson 10.7 cm/s Left CCA prox sys 39.6 cm/s Left CCA prox nickerson 7.8 cm/s Left ECA sys 505.1 cm/s LEFT EXTERNAL CAROTID ARTERY D 0.00 cm/s Left ICA dist sys 58.1 cm/s Left ICA dist nickerson 9.0 cm/s Left ICA mid sys 100.6 cm/s Left ICA mid nickerson 11.8 cm/s Left ICA prox sys 90.9 cm/s Left ICA prox nickerson 15.0 cm/s Left vertebral sys 38.7 cm/s LEFT VERTEBRAL ARTERY D 5.91 cm/s Left ICA/CCA sys 2.21   
ECHO ADULT COMPLETE Collection Time: 06/09/20  2:56 PM  
Result Value Ref Range Tapse 1.03 (A) 1.5 - 2.0 cm Aortic Valve Systolic Peak Velocity 472.73 cm/s AoV PG 5.5 mmHg LVIDd 3.86 (A) 3.9 - 5.3 cm  
 LVPWd 0.91 (A) 0.6 - 0.9 cm LVIDs 1.89 cm IVSd 1.20 (A) 0.6 - 0.9 cm  
 LVOT Peak Velocity 89.49 cm/s LVOT Peak Gradient 3.2 mmHg RVIDd 3.26 cm  
 LV Mass .1 67 - 162 g Left Atrium Major Axis 4.06 cm Triscuspid Valve Regurgitation Peak Gradient 67.9 mmHg  
 TR Max Velocity 411.93 cm/s Left Ventricular Fractional Shortening by 2D 49.254416927 % AV Velocity Ratio 0.76 GLUCOSE, POC Collection Time: 06/09/20  4:45 PM  
Result Value Ref Range Glucose (POC) 157 (H) 65 - 100 mg/dL Performed by Deja Kitchen GLUCOSE, POC Collection Time: 06/09/20  8:35 PM  
Result Value Ref Range Glucose (POC) 322 (H) 65 - 100 mg/dL Performed by Justin Hollingsworth METABOLIC PANEL, BASIC Collection Time: 06/10/20  5:26 AM  
Result Value Ref Range Sodium 135 (L) 136 - 145 mmol/L Potassium 4.1 3.5 - 5.1 mmol/L Chloride 104 97 - 108 mmol/L  
 CO2 24 21 - 32 mmol/L Anion gap 7 5 - 15 mmol/L Glucose 248 (H) 65 - 100 mg/dL BUN 18 6 - 20 MG/DL Creatinine 1.44 (H) 0.55 - 1.02 MG/DL  
 BUN/Creatinine ratio 13 12 - 20 GFR est AA 42 (L) >60 ml/min/1.73m2 GFR est non-AA 35 (L) >60 ml/min/1.73m2 Calcium 8.4 (L) 8.5 - 10.1 MG/DL  
CBC W/O DIFF Collection Time: 06/10/20  5:26 AM  
Result Value Ref Range WBC 12.3 (H) 3.6 - 11.0 K/uL  
 RBC 4.31 3.80 - 5.20 M/uL  
 HGB 11.6 11.5 - 16.0 g/dL HCT 36.3 35.0 - 47.0 % MCV 84.2 80.0 - 99.0 FL  
 MCH 26.9 26.0 - 34.0 PG  
 MCHC 32.0 30.0 - 36.5 g/dL  
 RDW 14.5 11.5 - 14.5 % PLATELET 850 200 - 113 K/uL MPV 9.0 8.9 - 12.9 FL  
 NRBC 0.0 0  WBC ABSOLUTE NRBC 0.00 0.00 - 0.01 K/uL Carotid doppler w/ no stenoses requiring sgy per Dr. Jomar Gomes note Medications Reviewed: (see below) 
 
______________________________________________________________________ Medications:  
 
Current Facility-Administered Medications Medication Dose Route Frequency  ondansetron (ZOFRAN) injection 4 mg  4 mg IntraVENous Q4H PRN  
 LORazepam (ATIVAN) tablet 0.5 mg  0.5 mg Oral ONCE PRN  
 insulin lispro (HUMALOG) injection   SubCUTAneous AC&HS  
  allopurinoL (ZYLOPRIM) tablet 100 mg  100 mg Oral DAILY  apixaban (ELIQUIS) tablet 2.5 mg  2.5 mg Oral BID  atorvastatin (LIPITOR) tablet 20 mg  20 mg Oral QHS  isosorbide mononitrate ER (IMDUR) tablet 60 mg  60 mg Oral DAILY  pantoprazole (PROTONIX) tablet 20 mg  20 mg Oral ACB  acetaminophen (TYLENOL) tablet 650 mg  650 mg Oral Q4H PRN Or  
 acetaminophen (TYLENOL) solution 650 mg  650 mg Per NG tube Q4H PRN Or  
 acetaminophen (TYLENOL) suppository 650 mg  650 mg Rectal Q4H PRN  
 glucose chewable tablet 16 g  4 Tab Oral PRN  
 glucagon (GLUCAGEN) injection 1 mg  1 mg IntraMUSCular PRN  
 dextrose 10% infusion 0-250 mL  0-250 mL IntraVENous PRN  
 0.9% sodium chloride infusion  50 mL/hr IntraVENous CONTINUOUS  
 benzocaine-menthoL (CEPACOL) lozenge 1 Lozenge  1 Lozenge Mucous Membrane PRN  
 azelastine (ASTELIN) 137mcg/spray nasal spray  1 Spray Both Nostrils BID PRN  
 hydrALAZINE (APRESOLINE) 20 mg/mL injection 20 mg  20 mg IntraVENous Q6H PRN Assessment:  
Dizziness improved. She had ENT vestibular therapy 1 m ago for dizziness but no sustained improvement in dizziness. Will ask Dr. Karyle Lawman to see about her cardiac meds . If no contra indication, then Head MRI today. Dr. Shorty Almonte has indicated to me that her rt leg 2017 arterial stents are ok for MRI. Need neurosurgery to advise if any neurosurgical contraindications to MRI . Input of all appreciated New Pulm Haziness on CXR. Dr. Karyle Lawman to advise on ? Need to restart Diuretic. CKD serum Cr stable DM on SS Humalog HTN bp varies. Follow Patient Active Problem List  
Diagnosis Code  Coronary atherosclerosis of native coronary artery I25.10  
 HLD (hyperlipidemia) E78.5  Cerebrovascular accident stroke, other, unspec I67.89  
 Hypertension complicating diabetes ([address] E11.59, I10  
 PVD (peripheral vascular disease) with claudication (HCC) I73.9  Atrial fibrillation ([address] I48.91  
  SOB (shortness of breath) R06.02  
 Type 2 diabetes with nephropathy ([facility]) E11.21  
 Severe obesity (BMI 35.0-39. 9) with comorbidity ([facility] E66.01  
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  
 CVA (cerebral vascular accident) ([facility] I63.9  Bilateral carotid artery stenosis I65.23  Vertebral artery stenosis, bilateral I65.03 Plan: I   Spoke  With dtr  On phone  
      
 
  
 
 
  
              
 
 
 
 
 
 
      
___________________________________________________ Attending Physician: Claude Zamudio MD

## 2020-06-10 NOTE — PROGRESS NOTES
Wound care consulted per wound care nurse request. Gluteal cleft has small narrow crack, barrier cream in place. Patient states she has gotten that before in February and it cleared up with some cream. Will continue to monitor.

## 2020-06-10 NOTE — PROGRESS NOTES
Speech pathology note Reviewed chart and note patient admitted with dizziness with concern for CVA. Note MRI showed no acute intracranial abnormality. Note patient passed the STAND and a regular diet was ordered. Discussed case with RN who reported no SLP-related concerns. Formal SLP evaluation not clinically indicated at this time. Will sign off. Please re-consult if further needs arise. Thank you. Ernie Welch., [facility]

## 2020-06-10 NOTE — PROGRESS NOTES
Problem: Falls - Risk of 
Goal: *Absence of Falls Description: Document Werner Godoy Fall Risk and appropriate interventions in the flowsheet. Outcome: Progressing Towards Goal 
Note: Fall Risk Interventions: 
Mobility Interventions: Communicate number of staff needed for ambulation/transfer, OT consult for ADLs, Patient to call before getting OOB, PT Consult for mobility concerns, Strengthening exercises (ROM-active/passive) Medication Interventions: Assess postural VS orthostatic hypotension, Evaluate medications/consider consulting pharmacy, Patient to call before getting OOB, Teach patient to arise slowly Elimination Interventions: Call light in reach, Patient to call for help with toileting needs, Stay With Me (per policy), Toileting schedule/hourly rounds Problem: Patient Education: Go to Patient Education Activity Goal: Patient/Family Education Outcome: Progressing Towards Goal 
  
Problem: Pain Goal: *Control of Pain Outcome: Progressing Towards Goal 
  
Problem: Patient Education: Go to Patient Education Activity Goal: Patient/Family Education Outcome: Progressing Towards Goal 
  
Problem: Pressure Injury - Risk of 
Goal: *Prevention of pressure injury Description: Document Ruperto Scale and appropriate interventions in the flowsheet. Outcome: Progressing Towards Goal 
Note: Pressure Injury Interventions: 
Sensory Interventions: Assess changes in LOC, Check visual cues for pain, Discuss PT/OT consult with provider, Keep linens dry and wrinkle-free, Minimize linen layers, Pressure redistribution bed/mattress (bed type), Turn and reposition approx. every two hours (pillows and wedges if needed) Activity Interventions: Increase time out of bed, Pressure redistribution bed/mattress(bed type), PT/OT evaluation Mobility Interventions: Assess need for specialty bed, HOB 30 degrees or less, Pressure redistribution bed/mattress (bed type), PT/OT evaluation, Turn and reposition approx. every two hours(pillow and wedges) Nutrition Interventions: Document food/fluid/supplement intake Problem: Patient Education: Go to Patient Education Activity Goal: Patient/Family Education Outcome: Progressing Towards Goal 
  
Problem: Patient Education: Go to Patient Education Activity Goal: Patient/Family Education Outcome: Progressing Towards Goal 
  
Problem: TIA/CVA Stroke: Day 2 Until Discharge Goal: Activity/Safety Outcome: Progressing Towards Goal 
Goal: Diagnostic Test/Procedures Outcome: Progressing Towards Goal 
Goal: Nutrition/Diet Outcome: Progressing Towards Goal 
Goal: Discharge Planning Outcome: Progressing Towards Goal 
Goal: Medications Outcome: Progressing Towards Goal 
Goal: Respiratory Outcome: Progressing Towards Goal 
Goal: Treatments/Interventions/Procedures Outcome: Progressing Towards Goal 
Goal: Psychosocial 
Outcome: Progressing Towards Goal 
Goal: *Absence of aspiration Outcome: Progressing Towards Goal 
Goal: *Absence of deep venous thrombosis signs and symptoms(Stroke Metric) Outcome: Progressing Towards Goal 
Goal: *Optimal pain control at patient's stated goal 
Outcome: Progressing Towards Goal 
Goal: *Tolerating diet Outcome: Progressing Towards Goal 
Goal: *Ability to perform ADLs and demonstrates progressive mobility and function Outcome: Progressing Towards Goal 
Goal: *Stroke education continued(Stroke Metric) Outcome: Progressing Towards Goal 
  
Problem: Ischemic Stroke: Discharge Outcomes Goal: *Verbalize understanding of risk factor modification(Stroke Metric) Outcome: Progressing Towards Goal 
Goal: *Verbalize understanding of prescribed medications including anti-coagulants, anti-lipid, and/or anti-platelets(Stroke Metric) Outcome: Progressing Towards Goal 
Goal: *Absence of DVT(Stroke Metric) Outcome: Progressing Towards Goal 
Goal: *Verbalizes understanding of activation of EMS(911) for stroke symptoms(Stroke Metric) Outcome: Progressing Towards Goal 
Goal: *Understands and describes signs and symptoms to report to providers(Stroke Metric) Outcome: Progressing Towards Goal 
Goal: *Verbalizes importance of follow-up with primary care physician(Stroke Metric) Outcome: Progressing Towards Goal 
Goal: *Smoking cessation discussed,if applicable(Stroke Metric) Outcome: Progressing Towards Goal 
Goal: *Depression screening completed(Stroke Metric) Outcome: Progressing Towards Goal

## 2020-06-10 NOTE — PROGRESS NOTES
Bedside and Verbal shift change report given to David Forrester RN (oncoming nurse) by Lawanda Cano RN (offgoing nurse). Report included the following information SBAR, Kardex, MAR, Recent Results, Cardiac Rhythm NSR and Dual Neuro Assessment.

## 2020-06-10 NOTE — PROGRESS NOTES
Problem: Falls - Risk of 
Goal: *Absence of Falls Description: Document Hermelinda Cutler Fall Risk and appropriate interventions in the flowsheet. Outcome: Progressing Towards Goal 
Note: Fall Risk Interventions: 
Mobility Interventions: Assess mobility with egress test, Communicate number of staff needed for ambulation/transfer, OT consult for ADLs, Patient to call before getting OOB, PT Consult for mobility concerns, PT Consult for assist device competence, Utilize walker, cane, or other assistive device Medication Interventions: Evaluate medications/consider consulting pharmacy, Patient to call before getting OOB, Teach patient to arise slowly Elimination Interventions: Call light in reach, Patient to call for help with toileting needs, Stay With Me (per policy), Toilet paper/wipes in reach, Toileting schedule/hourly rounds

## 2020-06-10 NOTE — WOUND CARE
WOCN Note:  
 
New consult placed for assessment of gluteal cleft. Assessed in room 673. PPE: gloves and mask Chart reviewed. Admitted DX:  CVA Assessment:  
Patient is A&O x 3, communicative and sitting up in chair. Stands with assistance. Bed: total care with foam. 
Patient reports tenderness to gluteal cleft. Heels intact without erythema. 1. POA Gluteal cleft, partial thickness wound from MASD : 1.5 x 1.5 x 0.1 cm  100% red. no exudate; no odor. Periwound with moist blanching erythema. Wound, Pressure Prevention & Skin Care Recommendations: 1. Minimize layers of linen/pads under patient to optimize support surface. 2.  Turn/reposition approximately every 2 hours and offload heels;  
3. Manage incontinence / promote continence. Zinc (orange tube) to gluteal cleft and buttocks. Discussed above plan with patient and [address] [place] [address]. Transition of Care: Plan to follow as needed while admitted to hospital. 
 
VICK ClarkN RN [facility] [place] Inpatient Wound Care Available on Perfect Serve Pager [phone] Office [phone]

## 2020-06-10 NOTE — PROGRESS NOTES
2030 Patient requested to sit in chair to be in a more upright position. 2130 Patient requesting oxygen. O2 at 94% on room air. Offered nose spray to patient but declined. Auscultated patient, clear, but diminished. Placed 2 L NC on patient for comfort 
 
2200 Patient requesting to get back into bed. Repositioned patient back into bed on left side, slightly elevated. Oxygen at 2 L NC, sating between 96%-100%. Patient comfortable. 0530 As of this note, patient has been neurologically intact without any \"wavy\" episodes. Patient believes it's from laying on left side and with oxygen in place.

## 2020-06-11 PROBLEM — I63.9 CVA (CEREBRAL VASCULAR ACCIDENT) (HCC): Status: RESOLVED | Noted: 2020-01-01 | Resolved: 2020-01-01

## 2020-06-11 PROBLEM — I42.9 CARDIOMYOPATHY (HCC): Status: ACTIVE | Noted: 2020-01-01

## 2020-06-11 NOTE — PROGRESS NOTES
Physical Therapy Note 
06/11/2020 Chart reviewed in prep for PT tx session - spoke with RN who reports pt's BP is significantly elevated (systolic 861Q-054S), is in new CHF with EF of 30%, and is about to go off unit for cardiac cath. Requesting to defer PT at this time. Will follow up as medically appropriate.  
 
Brock Naranjo, PT, DPT

## 2020-06-11 NOTE — PROGRESS NOTES
TRANSFER - IN REPORT: 
 
Verbal report received from [facility] on Jennie Thomas  being received from cath lab procedure area  for routine progression of care. Report consisted of patients Situation, Background, Assessment and Recommendations(SBAR). Information from the following report(s) SBAR and Procedure Summary was reviewed with the receiving clinician. Opportunity for questions and clarification was provided. Assessment completed upon patients arrival to [address] [place] [address] and care assumed. Cardiac Cath Lab Recovery Arrival Note: 
 
Jennie Thomas arrived to [facility] recovery area. Patient procedure= R/LHC. Patient on cardiac monitor, non-invasive blood pressure, SPO2 monitor. On  O2 @ 2 lpm via NC.  IV  of NS off. Patient status doing well without problems. Patient is A&Ox 3. Patient reports no pain. PROCEDURE SITE CHECK: 
 
Procedure site:without any bleeding and no hematoma, No pain/discomfort reported at procedure site. No change in patient status. Continue to monitor patient and status.

## 2020-06-11 NOTE — PROGRESS NOTES
Cardiology Progress Note Admit Date: 6/8/2020 Admit Diagnosis: CVA (cerebral vascular accident) ([facility] [I63.9] CVA (cerebral vascular accident) ([facility] [I63.9] Date: 6/11/2020     Time: 10:12 AM 
 
Assessment/Plan/Discussion:Cardiology Attending:  
 
Patient seen on the day of progress note and examined  and agree with Advance Practice Provider (ARLIN, NP,PA)  assessment and plans. Swathi Chau is a 81 y.o. female Went over echo and distal ballooning Cath today to determine if CAD or Takatsubo No trop elevation so may be more remote CHF meds and cath going forward Arline Llanes MD   
 
 
 
 Assessment and Plan 1. Cardiomyopathy/Systolic CHF:  new diagnosis. -Takatsubo's in appearance with apical ballooning 
            -EF 30-35%, mod MR, mod reduced RV, NWMA- 
 -Uncertain etiology. Troponins negative suggesting event causing CM occurred >1 week ago, sometime since February (date of last echo) 
 -Uncertain etiology Will proceed with Left heart catheterization today to evaluate CORS, also RHC today. 
 -Resume losartan-  
            -Received bumex yesterday 0.5 mg IV x 1 
            -Daily standing weights. 
  
2. Hx of CAD: RCA stent 1996, Last C 3/2014 multivessal nonocclusive disease as below 
            -Continue statin, Imdur. ,ADD ASA 
            -Troponin negative, no chest pain 
            -Nuc stress 3/2020 no ischemia or infarct  
            -C today. 
  
3. Hx of afib s/p av jazlyn ablation and leadless PPM (2/2020) -currently v paced, afib underlying 
            -Eliquis held for cath today. Continue BB 
  
4. Hx of HTN: bp elevated  
 -D/W dr. Enrique Kelly- prefer BP lower in setting of Cardiomyopathy. 
            -Coreg 6.25 mg BID, Imdur 60 mg daily, losartan 25 mg daily. 
  
5.  CKD: stage 3B 
            -follows with Dr. David Diaz 
            -creatinine 1.49 stable 
  
 6. DM type II: uncontrolled 
            -A1C 8.5.  
 -management per primary team 
7. HLD:  
 -Lipitor 20 mg daily 8. Dizziness/near syncope 
            -has Carotid artery disease, vertebral artery stenosis-no interventions recommended at this time by neuro services 
            -Severe bilateral ICA origin stenosis and moderate bilateral cavernous carotid stenoses. -Neuro and neurointerventional following. Recommendations for -160 per neuro (for vertebral artery stenosis)but d/w Dr. Saldana Even that lower BP preferential to manage cardiomyopathy. -ASA 25 mg daily and ELIQUIS 5 mg BID recommended by neurology for carotid disease 
 -Etiology of dizziness may be multifactorial- vertebral artery/carotid disease. CM may contribute but less likely prime contributor as she had dizziness for some time and present when EF was NL. 
 
81 y.o. female with near syncope/dizziness on presentation. Now noted new cardiomyopathy with Takatsubo's appearance. Uncertain etiology. Need ischemic evaluation so will proceed with LHC today. Also will do RHC today as well to get hemodynamics. Holding eliquis for cath. Lower BP more ideal for management of cardiomyopathy- further recs after LHC. Discussed The risks, benefits, and to cardiac catheterization. Dr. Chencho Cazares also spoke to patient's family. The patient understands and wishes to proceed. LHC today at 15 NOON with Dr. Diya Stubbs MD 
 
 
Subjective: 
 Surinder Elliott reports SOB, orthopnea. SOB a little better after nebulizer treatment. Denies chest pain. No dizziness now. A little dizziness when she stood up. Cardiac testin20 ECHO ADULT COMPLETE 06/10/2020 6/10/2020 Narrative · Normal cavity size and wall thickness. Mild-to-moderate systolic  
function. Estimated left ventricular ejection fraction is 30 - 35%. No  
regional wall motion abnormality noted. · Moderately dilated right atrium. · Moderately dilated left atrium. · Mitral valve non-specific thickening. Moderate mitral valve  
regurgitation is present. · Mild tricuspid valve regurgitation is present. · Moderately reduced right ventricular systolic function. Signed by: Ebony Johnson MD  
3/20/20:  Nuclear stress test:1. No definite evidence of ischemia and/or infarction. 2. LVEF equals 73%. Left ventricular wall motion and thickening is normal. 
  
20:  Echo: EF 50-55%, borderline low RV systolic function PMH Past Medical History:  
Diagnosis Date  [facility]  CAD (coronary artery disease)  Cardiomyopathy ([facility] 2020 Acute, 2020.  Cerebrovascular accident stroke, other, unspec 2010  Coronary atherosclerosis of native coronary artery 2010  Essential hypertension  Essential hypertension, benign 2010  HLD (hyperlipidemia) 2010  PVD (peripheral vascular disease) ([facility] 2017  Type II or unspecified type diabetes mellitus without mention of complication, not stated as uncontrolled 2010  Zoster Social Hx Social History Socioeconomic History  Marital status:  Spouse name: Not on file  Number of children: Not on file  Years of education: Not on file  Highest education level: Not on file Occupational History  Not on file Social Needs  Financial resource strain: Not on file  Food insecurity Worry: Not on file Inability: Not on file  Transportation needs Medical: Not on file Non-medical: Not on file Tobacco Use  Smoking status: Former Smoker Packs/day: 0.80 Years: 20.00 Pack years: 16.00 Types: Cigarettes Last attempt to quit: 1986 Years since quittin.1  Smokeless tobacco: Never Used Substance and Sexual Activity  Alcohol use: No  
 Drug use: No  
 Sexual activity: Not on file Lifestyle  Physical activity Days per week: Not on file Minutes per session: Not on file  Stress: Not on file Relationships  Social connections Talks on phone: Not on file Gets together: Not on file Attends Taoist service: Not on file Active member of club or organization: Not on file Attends meetings of clubs or organizations: Not on file Relationship status: Not on file  Intimate partner violence Fear of current or ex partner: Not on file Emotionally abused: Not on file Physically abused: Not on file Forced sexual activity: Not on file Other Topics Concern  Not on file Social History Narrative  Not on file Objective: 
  
 Physical Exam: 
             
Visit Vitals /50 (BP 1 Location: Left arm, BP Patient Position: Sitting) Pulse 60 Temp 98.2 °F (36.8 °C) Resp 20 Wt 181 lb 14.1 oz (82.5 kg) SpO2 95% BMI 35.52 kg/m² General Appearance:   Well developed, alert and oriented x 3, and 
 individual in no acute distress. Ears/Nose/Mouth/Throat:    Hearing grossly normal. 
  
    Neck:  Supple. Chest:    Lungs with few right basilar crackles. Prabhu Rook Cardiovascular:   Regular rate and rhythm, S1, S2 normal, no murmur. Abdomen:    Soft, non-tender, bowel sounds are active. Extremities:  No edema bilaterally. Skin:  Warm and dry. Telemetry: v paced. Data Review:  
 Labs:   
Recent Results (from the past 24 hour(s)) GLUCOSE, POC Collection Time: 06/10/20 12:38 PM  
Result Value Ref Range Glucose (POC) 369 (H) 65 - 100 mg/dL Performed by Bernice Montoya   
GLUCOSE, POC Collection Time: 06/10/20  4:39 PM  
Result Value Ref Range Glucose (POC) 333 (H) 65 - 100 mg/dL Performed by ARTHUR SMITH(CON) GLUCOSE, POC Collection Time: 06/10/20  9:05 PM  
Result Value Ref Range Glucose (POC) 345 (H) 65 - 100 mg/dL Performed by Renae Pike GLUCOSE, POC Collection Time: 06/11/20  7:11 AM  
Result Value Ref Range Glucose (POC) 316 (H) 65 - 100 mg/dL Performed by Matty Samson METABOLIC PANEL, BASIC Collection Time: 06/11/20  8:17 AM  
Result Value Ref Range Sodium 134 (L) 136 - 145 mmol/L Potassium 4.0 3.5 - 5.1 mmol/L Chloride 101 97 - 108 mmol/L  
 CO2 23 21 - 32 mmol/L Anion gap 10 5 - 15 mmol/L Glucose 283 (H) 65 - 100 mg/dL BUN 20 6 - 20 MG/DL Creatinine 1.49 (H) 0.55 - 1.02 MG/DL  
 BUN/Creatinine ratio 13 12 - 20 GFR est AA 41 (L) >60 ml/min/1.73m2 GFR est non-AA 33 (L) >60 ml/min/1.73m2 Calcium 8.6 8.5 - 10.1 MG/DL Radiology:  
 
  
Current Facility-Administered Medications Medication Dose Route Frequency  loratadine (CLARITIN) tablet 10 mg  10 mg Oral DAILY  insulin glargine (LANTUS) injection 5 Units  5 Units SubCUTAneous DAILY  aspirin chewable tablet 81 mg  81 mg Oral DAILY  albuterol (PROVENTIL VENTOLIN) nebulizer solution 2.5 mg  2.5 mg Nebulization Q4H PRN  
 arformoteroL (BROVANA) neb solution 15 mcg  15 mcg Nebulization BID RT  
 budesonide (PULMICORT) 500 mcg/2 ml nebulizer suspension  500 mcg Nebulization BID RT  
 carvediloL (COREG) tablet 6.25 mg  6.25 mg Oral BID WITH MEALS  
 losartan (COZAAR) tablet 25 mg  25 mg Oral DAILY  ondansetron (ZOFRAN) injection 4 mg  4 mg IntraVENous Q4H PRN  
 insulin lispro (HUMALOG) injection   SubCUTAneous AC&HS  allopurinoL (ZYLOPRIM) tablet 100 mg  100 mg Oral DAILY  atorvastatin (LIPITOR) tablet 20 mg  20 mg Oral QHS  isosorbide mononitrate ER (IMDUR) tablet 60 mg  60 mg Oral DAILY  pantoprazole (PROTONIX) tablet 20 mg  20 mg Oral ACB  acetaminophen (TYLENOL) tablet 650 mg  650 mg Oral Q4H PRN Or  
 acetaminophen (TYLENOL) solution 650 mg  650 mg Per NG tube Q4H PRN  Or  
 acetaminophen (TYLENOL) suppository 650 mg  650 mg Rectal Q4H PRN  
 glucose chewable tablet 16 g  4 Tab Oral PRN  
 glucagon (GLUCAGEN) injection 1 mg  1 mg IntraMUSCular PRN  
  dextrose 10% infusion 0-250 mL  0-250 mL IntraVENous PRN  
 benzocaine-menthoL (CEPACOL) lozenge 1 Lozenge  1 Lozenge Mucous Membrane PRN  
 azelastine (ASTELIN) 137mcg/spray nasal spray  1 Spray Both Nostrils BID PRN  
 hydrALAZINE (APRESOLINE) 20 mg/mL injection 20 mg  20 mg IntraVENous Q6H PRN Mort Bernardo Aguirre NP [facility] [address] [place], [place] [address], [address] [facility] 
 [phone]

## 2020-06-11 NOTE — PROGRESS NOTES
Problem: TIA/CVA Stroke: Day 2 Until Discharge Goal: Activity/Safety Outcome: Progressing Towards Goal 
Goal: Diagnostic Test/Procedures Outcome: Progressing Towards Goal 
Goal: Nutrition/Diet Outcome: Progressing Towards Goal 
Goal: Discharge Planning Outcome: Progressing Towards Goal 
Goal: Medications Outcome: Progressing Towards Goal 
Goal: Respiratory Outcome: Progressing Towards Goal 
Goal: Treatments/Interventions/Procedures Outcome: Progressing Towards Goal 
Goal: Psychosocial 
Outcome: Progressing Towards Goal 
Goal: *Verbalizes anxiety and depression are reduced or absent Outcome: Progressing Towards Goal 
Goal: *Absence of aspiration Outcome: Progressing Towards Goal 
Goal: *Absence of deep venous thrombosis signs and symptoms(Stroke Metric) Outcome: Progressing Towards Goal 
Goal: *Optimal pain control at patient's stated goal 
Outcome: Progressing Towards Goal 
Goal: *Tolerating diet Outcome: Progressing Towards Goal 
Goal: *Ability to perform ADLs and demonstrates progressive mobility and function Outcome: Progressing Towards Goal 
Goal: *Stroke education continued(Stroke Metric) Outcome: Progressing Towards Goal

## 2020-06-11 NOTE — PROGRESS NOTES
Occupational Therapy 
  
Chart reviewed in prep for OT tx session - Note  RN reporting pt's BP is significantly elevated (systolic 709S-090A), is in new CHF with EF of 30%, and is about to go off unit for cardiac cath. Will defer OT at this time. Will follow up as medically appropriate.  Nadya Enciso OTR/L

## 2020-06-11 NOTE — PROCEDURES
Findings: 
1)Elevated right sided filling, pressures, left sided filling pressures. 2)EF 60% on long axis view, 45-50% on short axis 3)Mild moderate disease in RCA and moderate to severe angiographic disease in LAD without critical stenosis. EF appears improved compared to to what was noted on echo. 4)Reduced cardiac output/cardiac index 1.7 (l/min/m2) Access: right radial: tortuosity Contrast 56 cc. Recommendations: 
1)GDMT for secondary prevention 2)GDMT for ? cardiomyopathy--may be related to RV pacing.

## 2020-06-11 NOTE — PROGRESS NOTES
Medical Progress Note NAME: Siria Jara :  1935 MRM:  [id number] Date/Time: 2020 Problem List:  
 
Active Problems: 
  HLD (hyperlipidemia) (2010) Hypertension complicating diabetes ([address] (2016) Type 2 diabetes with nephropathy ([address] (2018) Bilateral carotid artery stenosis (2020) Vertebral artery stenosis, bilateral (2020) Cardiomyopathy ([address] (2020) Overview: Acute, 2020. Subjective: As noted yesterday Bumex 0.5 mg IV given last evening and breathing better . Denies cp, dizziness, palpitations. Had CVA with cardiac cath  , with excellent recovery from that CVA.  Cath uneventful. Chronic ear congestion Denies acute focal neuro c/o's Past Medical History:  
Diagnosis Date  Atrial fibrillation ([address]  CAD (coronary artery disease)  Cardiomyopathy ([address] 2020 Acute, 2020.  Cerebrovascular accident stroke, other, unspec 2010  Coronary atherosclerosis of native coronary artery 2010  Essential hypertension  Essential hypertension, benign 2010  HLD (hyperlipidemia) 2010  PVD (peripheral vascular disease) ([address] 2017  Type II or unspecified type diabetes mellitus without mention of complication, not stated as uncontrolled 2010  Zoster Objective:  
 
 
 
Vitals:  
  
Last 24hrs VS reviewed since prior progress note. Most recent are: 
 
Visit Vitals /59 Pulse 62 Temp 98.2 °F (36.8 °C) Resp 20 Wt 181 lb 14.1 oz (82.5 kg) SpO2 99% BMI 35.52 kg/m² SpO2 Readings from Last 6 Encounters:  
20 99% 20 96% 20 100% 20 93% 20 98% 20 97% O2 Flow Rate (L/min): 1 l/min Intake/Output Summary (Last 24 hours) at 2020 8236 Last data filed at 2020 5139 Gross per 24 hour Intake 600 ml Output 2 ml Net 598 ml Exam:  
   General:  Alert, cooperative, no distress, appears stated age. ENT: ears TM's clear. Eyes baseline rt eye not looking past midline to the right Throat clear Lungs:   Clear to auscultation bilaterally. Heart:  Regular rate and rhythm, S1, S2 normal, no murmur, click, rub or gallop. Abdomen:   Soft, non-tender. Bowel sounds normal. No masses,  No organomegaly. Extremities: No  Edema. Neuro: as above , o/w non focal   
 
Lab Data Reviewed: (see below) Recent Results (from the past 24 hour(s)) GLUCOSE, POC Collection Time: 06/10/20  7:17 AM  
Result Value Ref Range Glucose (POC) 296 (H) 65 - 100 mg/dL Performed by Jimena Plummer GLUCOSE, POC Collection Time: 06/10/20 12:38 PM  
Result Value Ref Range Glucose (POC) 369 (H) 65 - 100 mg/dL Performed by Alan Cortes   
GLUCOSE, POC Collection Time: 06/10/20  4:39 PM  
Result Value Ref Range Glucose (POC) 333 (H) 65 - 100 mg/dL Performed by SANDER SMITH(CON) GLUCOSE, POC Collection Time: 06/10/20  9:05 PM  
Result Value Ref Range Glucose (POC) 345 (H) 65 - 100 mg/dL Performed by Jimena Plummer Medications Reviewed: (see below) 
 
______________________________________________________________________ Medications:  
 
Current Facility-Administered Medications Medication Dose Route Frequency  loratadine (CLARITIN) tablet 10 mg  10 mg Oral DAILY  insulin glargine (LANTUS) injection 5 Units  5 Units SubCUTAneous DAILY  albuterol (PROVENTIL VENTOLIN) nebulizer solution 2.5 mg  2.5 mg Nebulization Q4H PRN  
 arformoteroL (BROVANA) neb solution 15 mcg  15 mcg Nebulization BID RT  
 budesonide (PULMICORT) 500 mcg/2 ml nebulizer suspension  500 mcg Nebulization BID RT  
 carvediloL (COREG) tablet 6.25 mg  6.25 mg Oral BID WITH MEALS  
 losartan (COZAAR) tablet 25 mg  25 mg Oral DAILY  ondansetron (ZOFRAN) injection 4 mg  4 mg IntraVENous Q4H PRN  
  insulin lispro (HUMALOG) injection   SubCUTAneous AC&HS  allopurinoL (ZYLOPRIM) tablet 100 mg  100 mg Oral DAILY  atorvastatin (LIPITOR) tablet 20 mg  20 mg Oral QHS  isosorbide mononitrate ER (IMDUR) tablet 60 mg  60 mg Oral DAILY  pantoprazole (PROTONIX) tablet 20 mg  20 mg Oral ACB  acetaminophen (TYLENOL) tablet 650 mg  650 mg Oral Q4H PRN Or  
 acetaminophen (TYLENOL) solution 650 mg  650 mg Per NG tube Q4H PRN Or  
 acetaminophen (TYLENOL) suppository 650 mg  650 mg Rectal Q4H PRN  
 glucose chewable tablet 16 g  4 Tab Oral PRN  
 glucagon (GLUCAGEN) injection 1 mg  1 mg IntraMUSCular PRN  
 dextrose 10% infusion 0-250 mL  0-250 mL IntraVENous PRN  
 benzocaine-menthoL (CEPACOL) lozenge 1 Lozenge  1 Lozenge Mucous Membrane PRN  
 azelastine (ASTELIN) 137mcg/spray nasal spray  1 Spray Both Nostrils BID PRN  
 hydrALAZINE (APRESOLINE) 20 mg/mL injection 20 mg  20 mg IntraVENous Q6H PRN Assessment:  
Acute Cardiomyopathy. ?Takatsubo . Breathing better after Bumex. Pt would like to discuss cath further with Cardiology . Coreg to start. Losartan on board . Cardiology to advise on diuretic dosing CAD Chronic Afib . S/p jazlyn ablation and  pacer in the past  
 
CKD stage 3  
 
HTN. In view of Brain MRI neg for CVA, will ask Neurology if permissive hypertension remains appropriate. Type 2 DM . Add Lantus. Probable Eustachian tube dysfunction. Add Claritin Patient Active Problem List  
Diagnosis Code  Coronary atherosclerosis of native coronary artery I25.10  
 HLD (hyperlipidemia) E78.5  Cerebrovascular accident stroke, other, unspec I67.89  
 Hypertension complicating diabetes ([facility] E11.59, I10  
 PVD (peripheral vascular disease) with claudication (HCC) I73.9  Atrial fibrillation (HCC) I48.91  
 SOB (shortness of breath) R06.02  
 Type 2 diabetes with nephropathy (HCC) E11.21  
  Severe obesity (BMI 35.0-39. 9) with comorbidity ([facility] E66.01  
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  
 Bilateral carotid artery stenosis I65.23  Vertebral artery stenosis, bilateral I65.03  
 Cardiomyopathy ([facility] I42.9 Plan: I spoke w/ dtr on phone  
      
 
  
 
 
  
              
 
 
 
 
 
 
      
___________________________________________________ Attending Physician: Rikki Holstein, MD

## 2020-06-11 NOTE — PROGRESS NOTES
Neurology Progress Note Patient ID: Fabian Muñoz [id number] 
77 y.o. 
1935 Initial HISTORY OF PRESENT ILLNESS:  The patient is an 28-year-old female with history of atrial fibrillation, on Eliquis; coronary artery disease; hypertension; history of stroke without any residual deficits; peripheral vascular disease; type 2 diabetes; hyperlipidemia; who states that she has been having dizzy spells for the past year or so and she gets at least mild dizziness almost on a daily basis. It will usually come on when she is upright or out and about. She will try to sit down or lie down for some time which will resolve the symptoms. She describes the feeling as a lightheaded or as if she was going to faint. There are no associated focal motor or sensory deficits, headaches, changes in vision, speech, chest pain, shortness of breath, palpitations, nausea, or vomiting. Subjective:  
 Clinically doing well. Plan for cardiac cath this afternoon. orthostatic blood pressure being taking now. She was newly diagnosis with cardiomyopathy ( takatsubo;s in appearance) Objective: All records in [facility] reviewed and noted ROS: 
Per HPI All other 12 pt ROS negative Meds: 
Current Facility-Administered Medications Medication Dose Route Frequency  loratadine (CLARITIN) tablet 10 mg  10 mg Oral DAILY  insulin glargine (LANTUS) injection 5 Units  5 Units SubCUTAneous DAILY  albuterol (PROVENTIL VENTOLIN) nebulizer solution 2.5 mg  2.5 mg Nebulization Q4H PRN  
 arformoteroL (BROVANA) neb solution 15 mcg  15 mcg Nebulization BID RT  
 budesonide (PULMICORT) 500 mcg/2 ml nebulizer suspension  500 mcg Nebulization BID RT  
 carvediloL (COREG) tablet 6.25 mg  6.25 mg Oral BID WITH MEALS  
 losartan (COZAAR) tablet 25 mg  25 mg Oral DAILY  ondansetron (ZOFRAN) injection 4 mg  4 mg IntraVENous Q4H PRN  
 insulin lispro (HUMALOG) injection   SubCUTAneous AC&HS  
  allopurinoL (ZYLOPRIM) tablet 100 mg  100 mg Oral DAILY  atorvastatin (LIPITOR) tablet 20 mg  20 mg Oral QHS  isosorbide mononitrate ER (IMDUR) tablet 60 mg  60 mg Oral DAILY  pantoprazole (PROTONIX) tablet 20 mg  20 mg Oral ACB  acetaminophen (TYLENOL) tablet 650 mg  650 mg Oral Q4H PRN Or  
 acetaminophen (TYLENOL) solution 650 mg  650 mg Per NG tube Q4H PRN Or  
 acetaminophen (TYLENOL) suppository 650 mg  650 mg Rectal Q4H PRN  
 glucose chewable tablet 16 g  4 Tab Oral PRN  
 glucagon (GLUCAGEN) injection 1 mg  1 mg IntraMUSCular PRN  
 dextrose 10% infusion 0-250 mL  0-250 mL IntraVENous PRN  
 benzocaine-menthoL (CEPACOL) lozenge 1 Lozenge  1 Lozenge Mucous Membrane PRN  
 azelastine (ASTELIN) 137mcg/spray nasal spray  1 Spray Both Nostrils BID PRN  
 hydrALAZINE (APRESOLINE) 20 mg/mL injection 20 mg  20 mg IntraVENous Q6H PRN Imaging: MRI Results (most recent): 
Results from Hospital Encounter encounter on 06/08/20 MRI BRAIN WO CONT Narrative EXAM:  MRI BRAIN WO CONT INDICATION:  CVA TECHNIQUE: Sagittal T1, axial FLAIR, T2,T1 and gradient echo images as well as 
coronal T2 weighted images and axial diffusion weighted images of the head were 
obtained. COMPARISON:  CT, CTA 6/8/20 FINDINGS: 
Mild prominence of ventricles and sulci consistent with cerebral volume loss 
within normal limits for age. Extensive multifocal and confluent T2 hyperintensity in cerebral white matter 
variably increasing prominence since at least 2012. Nonspecific and possibly 
related to chronic small vessel ischemic change. No restricted diffusion or other finding that would suggest an acute or subacute 
brain infarction. No evidence of acute intracranial hemorrhage. No abnormal extra-axial fluid collections or masses. Again demonstrated is left suboccipital craniotomy site for resection of left 
inferior cerebellar infarct/mass effect in 2008. Flow voids are present in vertebral basilar and carotid artery systems. Mildly enlarged partial empty sella. Paranasal sinuses and other structures of 
the skull base are unremarkable. Impression IMPRESSION: 
1. Chronic advanced nonspecific white matter T2 hyperintensity. Possibly related 
to chronic small vessel ischemia. 2. No acute intracranial abnormality demonstrated. 3. Old left suboccipital craniotomy from 2008. Lab Review Recent Results (from the past 24 hour(s)) GLUCOSE, POC Collection Time: 06/10/20 12:38 PM  
Result Value Ref Range Glucose (POC) 369 (H) 65 - 100 mg/dL Performed by Catie Walker   
GLUCOSE, POC Collection Time: 06/10/20  4:39 PM  
Result Value Ref Range Glucose (POC) 333 (H) 65 - 100 mg/dL Performed by NICK LOWERY) GLUCOSE, POC Collection Time: 06/10/20  9:05 PM  
Result Value Ref Range Glucose (POC) 345 (H) 65 - 100 mg/dL Performed by Sagrario Draper GLUCOSE, POC Collection Time: 06/11/20  7:11 AM  
Result Value Ref Range Glucose (POC) 316 (H) 65 - 100 mg/dL Performed by Sagrario Draper Exam: 
Visit Vitals /59 Pulse 62 Temp 98.2 °F (36.8 °C) Resp 20 Wt 82.5 kg (181 lb 14.1 oz) SpO2 97% BMI 35.52 kg/m² Gen: Well developed CV: RRR Neuro: A&O x 3, no dysarthria or aphasia CN II-XII:face symmetric, tongue/palate midline Motor: strength 5/5 all four ext Gait: deferred Assessment:  
 
Patient Active Problem List  
Diagnosis Code  Coronary atherosclerosis of native coronary artery I25.10  
 HLD (hyperlipidemia) E78.5  Cerebrovascular accident stroke, other, unspec I67.89  
 Hypertension complicating diabetes ([facility] E11.59, I10  
 PVD (peripheral vascular disease) with claudication (HCC) I73.9  Atrial fibrillation (HCC) I48.91  
 SOB (shortness of breath) R06.02  
 Type 2 diabetes with nephropathy (HCC) E11.21  
 Severe obesity (BMI 35.0-39. 9) with comorbidity ([address] E66.01  
 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  
 Bilateral carotid artery stenosis I65.23  Vertebral artery stenosis, bilateral I65.03  
 Cardiomyopathy ([facility] I42.9 Plan:  
1.)Bilateral Carotid Artery Stenosis  
 -NIS on board  
 - continue  mg daily  
 -Continue Eliquis 5mg BID 
 -follow up in 6 months with NIS  
 - Doppler,Moderate, 50 to 69 percent, stenosis of the right internal carotid  artery. Moderate to severe left external carotid stenosis. 2.)Near syncope/lightheadedness 
 - multifactorial  
 - SBP goal less than 120, given newly diagnosis of cardiomyopathy  
 -Continue PT/OT 
 -MRI  no acute events Thank you for allowing the Neurology Service the pleasure of participating in the care of your patient. This patient will be discussed with my collaborating care team physician  and he may have further recommendations regarding this patient's care.   
Signed: 
Chastity Uribe NP 
6/11/2020 
12:43 PM

## 2020-06-11 NOTE — INTERDISCIPLINARY ROUNDS
During interdisciplinary rounds at 0930. Care management, physical therapy, and nursing discussed discharge and plan of care. See clinical pathway and/or care plan for interventions and desired outcomes. During interdisciplinary rounds at 0930. Care management, physical therapy, and nursing discussed discharge and plan of care. See clinical pathway and/or care plan for interventions and desired outcomes.

## 2020-06-11 NOTE — NURSE NAVIGATOR
Chart reviewed by Heart Failure Nurse Navigator. Heart Failure database completed. EF:  30/35 ACEi/ARB/ARNi: losartan 25 mg daily BB: coreg 6.25 mg twice daily Aldosterone Antagonist: need documentation as to contraindication/reason not prescribed Obstructive Sleep Apnea Screening: NO 
 STOP-BANG score: 
 Referred to Sleep Medicine: CRT:   
 
NYHA Functional Class documentation requested. Heart Failure Teach Back in Patient Education. Heart Failure Avoiding Triggers on Discharge Instructions. Cardiologist: Dr. Ad Manuel ([facility]) Post discharge follow up phone call to be made within 48-72 hours of discharge.

## 2020-06-11 NOTE — PROGRESS NOTES
Cardiac Cath Lab Recovery Arrival Note: 
 
 
Jeffrey Pedraza arrived to Cardiac Cath Lab, Recovery Area. Staff introduced to patient. Patient identifiers verified with NAME and DATE OF BIRTH. Procedure verified with patient. Consent forms reviewed and signed by patient or authorized representative and verified. Allergies verified. Patient and family oriented to department. Patient and family informed of procedure and plan of care. Questions answered with review. Patient prepped for procedure, per orders from physician, prior to arrival. 
 
Patient on cardiac monitor, non-invasive blood pressure, SPO2 monitor. On RA. Patient is A&Ox 4. Patient reports no pain. Patient in stretcher, in low position, with side rails up, call bell within reach, patient instructed to call if assistance as needed. Patient prep in: [address], [place]. Patient family has pager # Family in:  
. Prep by: RITA Matos RN

## 2020-06-11 NOTE — ROUTINE PROCESS
Bedside and Verbal shift change report given to PIPER De Dios  (oncoming nurse) by Gale Hobbs RN  (offgoing nurse). Report included the following information SBAR, Kardex, ED Summary, Intake/Output, MAR, Cardiac Rhythm PAced. , Alarm Parameters , Procedure Verification, Quality Measures and Dual Neuro Assessment.

## 2020-06-11 NOTE — ROUTINE PROCESS
TRANSFER - OUT REPORT: 
 
Verbal report given to Willis Mejia RN (name) on [facility]  being transferred to Cath Lab (unit) for routine progression of care Report consisted of patients Situation, Background, Assessment and  
Recommendations(SBAR). Information from the following report(s) SBAR, Kardex, ED Summary, Intake/Output, MAR, Recent Results, Med Rec Status, Cardiac Rhythm Paced., Alarm Parameters , Pre Procedure Checklist, Procedure Verification and Quality Measures was reviewed with the receiving nurse. Lines:  
Peripheral IV 06/08/20 Right Antecubital (Active) Site Assessment Clean, dry, & intact 6/11/2020  4:00 PM  
Phlebitis Assessment 0 6/11/2020  4:00 PM  
Infiltration Assessment 0 6/11/2020  4:00 PM  
Dressing Status Clean, dry, & intact 6/11/2020  4:00 PM  
Dressing Type Transparent;Tape 6/11/2020  4:00 PM  
Hub Color/Line Status Green;Capped 6/11/2020  4:00 PM  
Action Taken Open ports on tubing capped 6/11/2020  4:00 PM  
Alcohol Cap Used Yes 6/11/2020  4:00 PM  
  
 
Opportunity for questions and clarification was provided. Patient transported with: 
 Sulia

## 2020-06-11 NOTE — PROGRESS NOTES
Patient has TR band on right wrist with 11cc of air in the bladder of TR band . 2-3 cc of air to be removed until all air removed if no bleeding noted TR band will be removed and Dressing applied. Discharged instructions to be  given by discharging nurse. Ehsan Yu

## 2020-06-11 NOTE — PROGRESS NOTES
Patient ambulates in room to bathroom safely with SBA. Patient compliant with medications. Patient started on Eliquis.

## 2020-06-11 NOTE — PROGRESS NOTES
1250- 
 
 
Cardiac Cath Lab Procedure Area Arrival Note: 
 
Lela Bar arrived to Cardiac Cath Lab, Procedure Area. Patient identifiers verified with NAME and DATE OF BIRTH. Procedure verified with patient. Consent forms verified. Allergies verified. Patient informed of procedure and plan of care. Questions answered with review. Patient voiced understanding of procedure and plan of care. Patient on cardiac monitor, non-invasive blood pressure, SPO2 monitor. Placed On O2 @ 2 lpm via NC.  IV of NS on pump at 100 ml/hr. Patient status doing well without problems. Patient is A&Ox 4. Patient reports no complaints of pain-shortness of breath with activity. NC placed. Patient medicated during procedure with orders obtained and verified by Dr. Valente Coyle. 
 
1355- 
 
TRANSFER - OUT REPORT: 
 
Verbal report given to LG RN (name) on Lela Bar  being transferred to  (unit) for routine post - op Report consisted of patients Situation, Background, Assessment and  
Recommendations(SBAR). Information from the following report(s) Procedure Summary and MAR was reviewed with the receiving nurse. Lines:  
Peripheral IV 06/08/20 Right Antecubital (Active) Site Assessment Clean, dry, & intact 6/11/2020  4:00 AM  
Phlebitis Assessment 0 6/11/2020  4:00 AM  
Infiltration Assessment 0 6/11/2020  4:00 AM  
Dressing Status Clean, dry, & intact 6/11/2020  4:00 AM  
Dressing Type Transparent;Tape 6/11/2020  4:00 AM  
Hub Color/Line Status Green;Capped 6/11/2020  4:00 AM  
Action Taken Open ports on tubing capped 6/11/2020  4:00 AM  
Alcohol Cap Used Yes 6/11/2020  4:00 AM  
  
 
Opportunity for questions and clarification was provided. Patient transported with: 
 Registered Nurse Refer to patients Cardiac Cath Lab PROCEDURE REPORT for vital signs, assessment, status, and response during procedure, printed at end of case. Printed report on chart or scanned into chart.

## 2020-06-11 NOTE — PROGRESS NOTES
TRANSITION OF CARE; RUR 20% DISPOSITION--Return home with [facility] and family assist. Need home health orders to arrange services. Ongoing medical evaluation; Cardiology following. Awaiting recommendations. *BCPI-A letter regarding Heart Failure has been delivered to the patient/caregiver. CM to follow. ROSA Falcon,CRM

## 2020-06-12 NOTE — PROGRESS NOTES
dtr Vicky Kathleen: [phone]. I called her with an update . I anticipate pt will be in hospital over W/E to receive diuretic regimen per Cardiology note today

## 2020-06-12 NOTE — PROGRESS NOTES
Problem: Self Care Deficits Care Plan (Adult) Goal: *Acute Goals and Plan of Care (Insert Text) Description:  
FUNCTIONAL STATUS PRIOR TO ADMISSION: Patient was modified independent using a single point cane for functional mobility outside of the house. Patient was modified independent for basic and some instrumental ADLs, daughter assists with grocery shopping. HOME SUPPORT: The patient lived with her daughter but did not require assist. 
 
Occupational Therapy Goals Initiated 6/9/2020 1. Patient will perform grooming at the sink with supervision/set-up within 7 day(s). 2.  Patient will perform upper and lower body dressing with modified independence within 7 day(s). 3.  Patient will perform simple home management with supervision/set-up within 7 day(s). 4.  Patient will perform toilet transfers with supervision/set-up within 7 day(s). 5.  Patient will perform all aspects of toileting with modified independence within 7 day(s). 6.  Patient will participate in upper extremity therapeutic exercise/activities with independence for 5 minutes within 7 day(s). 7.  Patient will utilize energy conservation techniques during functional activities with verbal cues within 7 day(s). Outcome: Progressing Towards Goal 
 
OCCUPATIONAL THERAPY TREATMENT Patient: Andrez Luis (82 y.o. female) Date: 6/12/2020 Diagnosis: CVA (cerebral vascular accident) ([facility] [I63.9] [facility]cere[facility] vascular accident) [facility] [I63.9] <principal problem not specified> Procedure(s) (LRB): LEFT AND RIGHT HEART CATH / CORONARY ANGIOGRAPHY (N/A) 1 Day Post-Op Precautions:  fall Chart, occupational therapy assessment, plan of care, and goals were reviewed. ASSESSMENT Patient continues with skilled OT services and is progressing towards goals.   Patient is limited by mildly impaired standing balance, impaired cardiopulmonary tolerance, impaired functional endurance, constant lightheadedness both at rest and with activity, and CALDERA (although maintained SPO2 >94% on RA). Patient was receptive to extensive education on energy conservation/ work simplification as well as importance of monitoring daily weight with heart failure. Patient with good UB and LB access for ADLs. Ambulated within room and performed toileting as well as standing at counter/ folding laundry, all with supervision. Patient did require verbal cues to lock rollator brakes prior to 1st sit-stand but demonstrated improvement. Recommend d/c home with family support and no follow-up OT needs when medically stable. Current Level of Function Impacting Discharge (ADLs): supervision PLAN : 
Patient continues to benefit from skilled intervention to address the above impairments. Continue treatment per established plan of care. to address goals. Recommendation for discharge: (in order for the patient to meet his/her long term goals) No skilled occupational therapy/ follow up rehabilitation needs identified at this time. This discharge recommendation: 
Has been made in collaboration with the attending provider and/or case management SUBJECTIVE:  
Patient stated I feel lightheaded all the time.  OBJECTIVE DATA SUMMARY:  
Cognitive/Behavioral Status: 
Neurologic State: Alert Orientation Level: Oriented X4 Cognition: Appropriate decision making; Appropriate for age attention/concentration; Appropriate safety awareness; Follows commands Functional Mobility and Transfers for ADLs: 
 
Transfers: 
Sit to Stand: Supervision Balance: 
Standing: Impaired; Without support Standing - Static: Good Standing - Dynamic : Fair ADL Intervention: Lower Body Dressing Assistance Socks: Supervision(doffed/ donned in tailor sit) Toileting Bladder Hygiene: Supervision(seated on toilet) Clothing Management: Supervision(managed gown and underwear) Pain: Patient did not report pain Activity Tolerance:  
Fair, VSS After treatment patient left in no apparent distress:  
Sitting in chair and Call bell within reach COMMUNICATION/COLLABORATION:  
The patients plan of care was discussed with: Physical therapist and Registered nurse. Nataly Brennan OT Time Calculation: 35 mins

## 2020-06-12 NOTE — PROGRESS NOTES
Problem: Falls - Risk of 
Goal: *Absence of Falls Description: Document Leonela Ken Fall Risk and appropriate interventions in the flowsheet. Outcome: Progressing Towards Goal 
Note: Fall Risk Interventions: 
Mobility Interventions: Bed/chair exit alarm, OT consult for ADLs, Patient to call before getting OOB, PT Consult for mobility concerns, PT Consult for assist device competence Medication Interventions: Assess postural VS orthostatic hypotension, Evaluate medications/consider consulting pharmacy, Patient to call before getting OOB, Teach patient to arise slowly Elimination Interventions: Call light in reach, Patient to call for help with toileting needs, Stay With Me (per policy), Toilet paper/wipes in reach, Toileting schedule/hourly rounds Problem: Patient Education: Go to Patient Education Activity Goal: Patient/Family Education Outcome: Progressing Towards Goal 
  
Problem: Pain Goal: *Control of Pain Outcome: Progressing Towards Goal 
  
Problem: Patient Education: Go to Patient Education Activity Goal: Patient/Family Education Outcome: Progressing Towards Goal 
  
Problem: Pressure Injury - Risk of 
Goal: *Prevention of pressure injury Description: Document Ruperto Scale and appropriate interventions in the flowsheet. Outcome: Progressing Towards Goal 
Note: Pressure Injury Interventions: 
Sensory Interventions: Assess changes in LOC, Avoid rigorous massage over bony prominences, Check visual cues for pain, Discuss PT/OT consult with provider, Float heels, Keep linens dry and wrinkle-free, Maintain/enhance activity level, Minimize linen layers, Pressure redistribution bed/mattress (bed type), Turn and reposition approx. every two hours (pillows and wedges if needed) Activity Interventions: Increase time out of bed, Pressure redistribution bed/mattress(bed type), PT/OT evaluation Mobility Interventions: HOB 30 degrees or less, Pressure redistribution bed/mattress (bed type), PT/OT evaluation, Turn and reposition approx. every two hours(pillow and wedges) Nutrition Interventions: Document food/fluid/supplement intake Problem: Patient Education: Go to Patient Education Activity Goal: Patient/Family Education Outcome: Progressing Towards Goal 
  
Problem: Patient Education: Go to Patient Education Activity Goal: Patient/Family Education Outcome: Progressing Towards Goal 
  
Problem: TIA/CVA Stroke: Day 2 Until Discharge Goal: Activity/Safety Outcome: Progressing Towards Goal 
Goal: Diagnostic Test/Procedures Outcome: Progressing Towards Goal 
Goal: Nutrition/Diet Outcome: Progressing Towards Goal 
Goal: Discharge Planning Outcome: Progressing Towards Goal 
Goal: Medications Outcome: Progressing Towards Goal 
Goal: Respiratory Outcome: Progressing Towards Goal 
Goal: Treatments/Interventions/Procedures Outcome: Progressing Towards Goal 
Goal: Psychosocial 
Outcome: Progressing Towards Goal 
Goal: *Verbalizes anxiety and depression are reduced or absent Outcome: Progressing Towards Goal 
Goal: *Absence of aspiration Outcome: Progressing Towards Goal 
Goal: *Absence of deep venous thrombosis signs and symptoms(Stroke Metric) Outcome: Progressing Towards Goal 
Goal: *Optimal pain control at patient's stated goal 
Outcome: Progressing Towards Goal 
Goal: *Tolerating diet Outcome: Progressing Towards Goal 
Goal: *Ability to perform ADLs and demonstrates progressive mobility and function Outcome: Progressing Towards Goal 
Goal: *Stroke education continued(Stroke Metric) Outcome: Progressing Towards Goal 
  
Problem: Ischemic Stroke: Discharge Outcomes Goal: *Verbalizes anxiety and depression are reduced or absent Outcome: Progressing Towards Goal 
Goal: *Verbalize understanding of risk factor modification(Stroke Metric) Outcome: Progressing Towards Goal 
Goal: *Hemodynamically stable Outcome: Progressing Towards Goal 
 Goal: *Aware of needed dietary changes Outcome: Progressing Towards Goal 
Goal: *Verbalize understanding of prescribed medications including anti-coagulants, anti-lipid, and/or anti-platelets(Stroke Metric) Outcome: Progressing Towards Goal 
Goal: *Tolerating diet Outcome: Progressing Towards Goal 
Goal: *Aware of follow-up diagnostics related to anticoagulants Outcome: Progressing Towards Goal 
Goal: *Ability to perform ADLs and demonstrates progressive mobility and function Outcome: Progressing Towards Goal 
Goal: *Absence of DVT(Stroke Metric) Outcome: Progressing Towards Goal 
Goal: *Absence of aspiration Outcome: Progressing Towards Goal 
Goal: *Optimal pain control at patient's stated goal 
Outcome: Progressing Towards Goal 
Goal: *Home safety concerns addressed Outcome: Progressing Towards Goal 
Goal: *Describes available resources and support systems Outcome: Progressing Towards Goal 
Goal: *Verbalizes understanding of activation of EMS(911) for stroke symptoms(Stroke Metric) Outcome: Progressing Towards Goal 
Goal: *Understands and describes signs and symptoms to report to providers(Stroke Metric) Outcome: Progressing Towards Goal 
Goal: *Neurolgocially stable (absence of additional neurological deficits) Outcome: Progressing Towards Goal 
Goal: *Verbalizes importance of follow-up with primary care physician(Stroke Metric) Outcome: Progressing Towards Goal 
Goal: *Smoking cessation discussed,if applicable(Stroke Metric) Outcome: Progressing Towards Goal 
Goal: *Depression screening completed(Stroke Metric) Outcome: Progressing Towards Goal 
  
Problem: Diabetes Self-Management Goal: *Disease process and treatment process Description: Define diabetes and identify own type of diabetes; list 3 options for treating diabetes. Outcome: Progressing Towards Goal 
Goal: *Incorporating nutritional management into lifestyle Description: Describe effect of type, amount and timing of food on blood glucose; list 3 methods for planning meals. Outcome: Progressing Towards Goal 
Goal: *Incorporating physical activity into lifestyle Description: State effect of exercise on blood glucose levels. Outcome: Progressing Towards Goal 
Goal: *Developing strategies to promote health/change behavior Description: Define the ABC's of diabetes; identify appropriate screenings, schedule and personal plan for screenings. Outcome: Progressing Towards Goal 
Goal: *Using medications safely Description: State effect of diabetes medications on diabetes; name diabetes medication taking, action and side effects. Outcome: Progressing Towards Goal 
Goal: *Monitoring blood glucose, interpreting and using results Description: Identify recommended blood glucose targets  and personal targets. Outcome: Progressing Towards Goal 
Goal: *Prevention, detection, treatment of acute complications Description: List symptoms of hyper- and hypoglycemia; describe how to treat low blood sugar and actions for lowering  high blood glucose level. Outcome: Progressing Towards Goal 
Goal: *Prevention, detection and treatment of chronic complications Description: Define the natural course of diabetes and describe the relationship of blood glucose levels to long term complications of diabetes. Outcome: Progressing Towards Goal 
Goal: *Developing strategies to address psychosocial issues Description: Describe feelings about living with diabetes; identify support needed and support network Outcome: Progressing Towards Goal 
Goal: *Insulin pump training Outcome: Progressing Towards Goal 
Goal: *Sick day guidelines Outcome: Progressing Towards Goal 
  
Problem: Patient Education: Go to Patient Education Activity Goal: Patient/Family Education Outcome: Progressing Towards Goal

## 2020-06-12 NOTE — PROGRESS NOTES
Problem: Mobility Impaired (Adult and Pediatric) Goal: *Acute Goals and Plan of Care (Insert Text) Description:  
FUNCTIONAL STATUS PRIOR TO ADMISSION: Patient was modified independent using a single point cane for community level functional mobility. Denies falls. Reports ongoing intermittent \"dizziness\" at baseline for the past 3 years. HOME SUPPORT PRIOR TO ADMISSION: The patient lived with her daughter but did not require assist. 
 
Physical Therapy Goals Initiated 6/9/2020 1. Patient will move from supine to sit and sit to supine  and scoot up and down in bed with modified independence within 7 day(s). 2.  Patient will transfer from bed to chair and chair to bed with modified independence using the least restrictive device within 7 day(s). 3.  Patient will perform sit to stand with modified independence within 7 day(s). 4.  Patient will ambulate with modified independence for 150 feet with the least restrictive device within 7 day(s). 5.  Patient will ascend/descend 3 stairs with handrail(s) with modified independence within 7 day(s). 6.  Patient will improve Ramirez Balance score by 7 points within 7 days. Outcome: Progressing Towards Goal 
 
PHYSICAL THERAPY TREATMENT Patient: Siria Jara (89 y.o. female) Date: 6/12/2020 Diagnosis: CVA (cerebral vascular accident) ([facility] [address] [I63.9] CVA (cerebral vascular accident) ([facility] [address] [I63.9] <principal problem not specified> Procedure(s) (LRB): LEFT AND RIGHT HEART CATH / CORONARY ANGIOGRAPHY (N/A) 1 Day Post-Op Precautions:   
Chart, physical therapy assessment, plan of care and goals were reviewed. ASSESSMENT Patient continues with skilled PT services and is progressing towards goals however remains most limited by persistent \"dizziness\", generalized weakness, and decreased cardiopulmonary tolerance to sustained activity leading to increased dependency and falls risk from baseline level Recent events noted - s/p RHC yesterday. Reports improvements in SOB compared to last session. Received pt up in chair, cleared for mobility by RN - currently on 1L with sats in the mid 90s. Introduced RW trial today- demo'ed much improved stability and confidence - able to tolerate ~150ft without overt LOB or difficulty. Continues to have mild SOB with movement but overall significantly improved (SpO2 89% on room air). Provided education on PLB and energy conservation strategies. Feel that she would benefit from RW vs rollator - hope to trial rollator this PM. Will follow back. Addendum: Returned this PM - gait trial with rollator completed. Provided education on correct hand placement sequencing, brake management, and safety strategies when needing to take a seated rest break. Required occasional cues/reminders with practice. Anticipate carryover to improve with continued practice and that she would benefit from rollator for energy conservation. Will order through her insurance today per her request. Unsure if she will be admitted through the weekend. Remain hopeful for discharge home with family assist, HHPT, and RW/rollator - will continue to follow. For the weekend, recommend patient to complete as able in order to maintain strength, endurance and independence with nursing: OOB to chair 3x/day with assist x1 and ambulating with assist x1 and RW. PT to follow-up with patient after the weekend. Current Level of Function Impacting Discharge (mobility/balance): SUP/CGA for ambulation; easily SOB; weaning O2 Other factors to consider for discharge: PLAN : 
Patient continues to benefit from skilled intervention to address the above impairments. Continue treatment per established plan of care. to address goals. Recommendation for discharge: (in order for the patient to meet his/her long term goals) Physical therapy at least 2 days/week in the home AND ensure assist and/or supervision for safety with ADLs/mobility This discharge recommendation: A follow-up discussion with the attending provider and/or case management is planned IF patient discharges home will need the following DME: Rollator - order placed today - addendum: pt is not eligible for rollator through insurance, will need to privately purchase. Pt notified. SUBJECTIVE:  
Patient stated I just want this feeling to go away.  OBJECTIVE DATA SUMMARY:  
Critical Behavior: 
Neurologic State: Alert Orientation Level: Oriented X4 Cognition: Appropriate decision making, Appropriate for age attention/concentration, Appropriate safety awareness, Follows commands Safety/Judgement: Awareness of environment, Insight into deficits Functional Mobility Training: 
  
Transfers: 
Sit to Stand: Supervision Stand to Sit: Supervision Balance: 
Standing: Impaired; Without support Standing - Static: Good Standing - Dynamic : Fair Ambulation/Gait Training: 
Distance (ft): 150 Feet (ft) Assistive Device: Gait belt;Walker, rolling Ambulation - Level of Assistance: Stand-by assistance Gait Abnormalities: Decreased step clearance;Shuffling gait Base of Support: Narrowed Speed/Teri: Slow;Shuffled Step Length: Right shortened;Left shortened Activity Tolerance:  
Fair, desaturates with exertion and requires oxygen, requires rest breaks, and observed SOB with activity Please refer to the flowsheet for vital signs taken during this treatment. After treatment patient left in no apparent distress:  
Sitting in chair and Call bell within reach COMMUNICATION/COLLABORATION:  
The patients plan of care was discussed with: Registered nurse. Enrique Dumont, PT, DPT Time Calculation: 19 mins

## 2020-06-12 NOTE — PROGRESS NOTES
Problem: TIA/CVA Stroke: Day 2 Until Discharge Goal: Activity/Safety Outcome: Progressing Towards Goal 
Goal: Diagnostic Test/Procedures Outcome: Progressing Towards Goal 
Goal: Nutrition/Diet Outcome: Progressing Towards Goal 
Goal: Discharge Planning Outcome: Progressing Towards Goal 
Goal: Medications Outcome: Progressing Towards Goal 
Goal: Respiratory Outcome: Progressing Towards Goal 
Goal: Treatments/Interventions/Procedures Outcome: Progressing Towards Goal 
Goal: Psychosocial 
Outcome: Progressing Towards Goal 
Goal: *Verbalizes anxiety and depression are reduced or absent Outcome: Progressing Towards Goal 
Goal: *Absence of aspiration Outcome: Progressing Towards Goal 
Goal: *Absence of deep venous thrombosis signs and symptoms(Stroke Metric) Outcome: Progressing Towards Goal 
Goal: *Tolerating diet Outcome: Progressing Towards Goal 
Goal: *Ability to perform ADLs and demonstrates progressive mobility and function Outcome: Progressing Towards Goal 
Goal: *Stroke education continued(Stroke Metric) Outcome: Progressing Towards Goal 
  
Problem: Ischemic Stroke: Discharge Outcomes Goal: *Hemodynamically stable Outcome: Progressing Towards Goal 
Goal: *Aware of needed dietary changes Outcome: Progressing Towards Goal 
Goal: *Verbalize understanding of prescribed medications including anti-coagulants, anti-lipid, and/or anti-platelets(Stroke Metric) Outcome: Progressing Towards Goal 
Goal: *Tolerating diet Outcome: Progressing Towards Goal 
Goal: *Aware of follow-up diagnostics related to anticoagulants Outcome: Progressing Towards Goal 
Goal: *Ability to perform ADLs and demonstrates progressive mobility and function Outcome: Progressing Towards Goal 
Goal: *Absence of DVT(Stroke Metric) Outcome: Progressing Towards Goal 
Goal: *Absence of aspiration Outcome: Progressing Towards Goal

## 2020-06-12 NOTE — TELEPHONE ENCOUNTER
Heart Failure Nurse Navigator, Maribel Trejo, is calling to schedule the patient for a 1 week follow up with Dr. Bhavana Ceballos around the end of next week. Please advise.     Phone #: [phone]  Thanks

## 2020-06-12 NOTE — PROGRESS NOTES
Bedside and Verbal shift change report given to [address] (oncoming nurse) by Blanka Hale RN (offgoing nurse). Report included the following information SBAR, Kardex, ED Summary, Procedure Summary, Intake/Output, MAR, Recent Results, Cardiac Rhythm NSR and Dual Neuro Assessment.

## 2020-06-12 NOTE — PROGRESS NOTES
Cardiology Progress Note Admit Date: 6/8/2020 Admit Diagnosis: CVA (cerebral vascular accident) ([address] [I63.9] CVA (cerebral vascular accident) ([address] [I63.9] Date: 6/12/2020     Time: 10:12 AM 
 
 
 Assessment and Plan 1. Nonischemic Cardiomyopathy/ Systolic and diastolic CHF: NYHA III. EF 45-50% (by cath). Abnormal septal motion by echo, NWMA. -[facility] with mild-moderate disease in RCA and Mod-severe disease in LAD without critical stenosis. 
 -C.I. 1.66l/min/m2 per RHC 
 -RHC with elevated RA/RV, PCWP and PA pressures indicating need for diuresis. IV bumex 2 mg daily x 2 days- monitor renal function closely. Transition to oral diuretics in next 1-2 days.  
   
2. Hx of CAD: RCA stent 1996,  
 -[facility] 6/12/20: mild-mod disease RCA, mod-severe LAD without critical stenosis, no stents needed 
            -Continue ASA, statin, Imdur.  
            -Troponin negative, no chest pain 
             
3. Hx of afib s/p av jazlyn ablation and leadless PPM (2/2020) -currently v paced, afib underlying 
            -Restart Eliquis (was held for cath)  Continue BB 
  
4. Hx of HTN: bp elevated  
 -Coreg 6.25 mg BID, Imdur 60 mg daily, losartan 25 mg daily. 
  
5. CKD: stage 3B 
            -Follows with Dr. Rosa Guillory outpatient. 
            -Creatinine 1.51 stable 
  
6. DM type II: uncontrolled 
            -A1C 8.5.  
 -management per primary team 
7. HLD:  
 -Lipitor 20 mg daily 8.  Dizziness/near syncope 
 -Do not suspect that primary etiology of symptoms are cardiac/due to cardiomyopathy. 
            -has Carotid artery disease, vertebral artery stenosis-no interventions recommended at this time by neuro services 
            -Severe bilateral ICA origin stenosis and moderate bilateral cavernous carotid stenoses and vertebral stenosis(? bening intracranial positional hypotension can be a cause but persistent dizziness even sitting in bed suggest cerebellar cause). -Neuro and neurointerventional following. Recommendations for -160 per neuro (for vertebral artery stenosis). - mg and Eliquis recommended by neurology for carotid disease Subjective: 
 Isaac Garcia reports SOB, orthopnea. SOB a little better after nebulizer treatment. Denies chest pain. No dizziness now. A little dizziness when she stood up. Cardiac testin20 ECHO ADULT COMPLETE 06/10/2020 6/10/2020 Narrative · Normal cavity size and wall thickness. Mild-to-moderate systolic  
function. Estimated left ventricular ejection fraction is 30 - 35%. No  
regional wall motion abnormality noted. · Moderately dilated right atrium. · Moderately dilated left atrium. · Mitral valve non-specific thickening. Moderate mitral valve  
regurgitation is present. · Mild tricuspid valve regurgitation is present. · Moderately reduced right ventricular systolic function. Signed by: Limmie Favre, MD  
3/20/20:  Nuclear stress test:1. No definite evidence of ischemia and/or infarction. 2. LVEF equals 73%. Left ventricular wall motion and thickening is normal. 
  
20:  Echo: EF 50-55%, borderline low RV systolic function PMH Past Medical History:  
Diagnosis Date  Atrial fibrillation ([address]  CAD (coronary artery disease)  Cardiomyopathy ([address] 2020 Acute, 2020.  Cerebrovascular accident stroke, other, unspec 2010  Coronary atherosclerosis of native coronary artery 2010  Essential hypertension  Essential hypertension, benign 2010  HLD (hyperlipidemia) 2010  PVD (peripheral vascular disease) ([address] 2017  Type II or unspecified type diabetes mellitus without mention of complication, not stated as uncontrolled 2010  Zoster Social Hx Social History Socioeconomic History  Marital status:  Spouse name: Not on file  Number of children: Not on file  Years of education: Not on file  Highest education level: Not on file Occupational History  Not on file Social Needs  Financial resource strain: Not on file  Food insecurity Worry: Not on file Inability: Not on file  Transportation needs Medical: Not on file Non-medical: Not on file Tobacco Use  Smoking status: Former Smoker Packs/day: 0.80 Years: 20.00 Pack years: 16.00 Types: Cigarettes Last attempt to quit: 1986 Years since quittin.1  Smokeless tobacco: Never Used Substance and Sexual Activity  Alcohol use: No  
 Drug use: No  
 Sexual activity: Not on file Lifestyle  Physical activity Days per week: Not on file Minutes per session: Not on file  Stress: Not on file Relationships  Social connections Talks on phone: Not on file Gets together: Not on file Attends Jewish service: Not on file Active member of club or organization: Not on file Attends meetings of clubs or organizations: Not on file Relationship status: Not on file  Intimate partner violence Fear of current or ex partner: Not on file Emotionally abused: Not on file Physically abused: Not on file Forced sexual activity: Not on file Other Topics Concern  Not on file Social History Narrative  Not on file Objective: 
  
 Physical Exam: 
             
Visit Vitals /54 (BP 1 Location: Left arm, BP Patient Position: Sitting) Pulse 61 Temp 97.5 °F (36.4 °C) Resp 21 Wt 181 lb (82.1 kg) SpO2 95% BMI 35.35 kg/m² General Appearance:   Well developed, alert and oriented x 3, and 
 individual in no acute distress. Ears/Nose/Mouth/Throat:    Hearing grossly normal. 
  
    Neck:  Supple. Chest:    Lungs with bibasilar faint crackles noted. Cardiovascular:   Regular rate and rhythm, S1, S2 normal, no murmur. Abdomen:    Soft, non-tender, bowel sounds are active. Extremities:  No edema bilaterally. Skin:  Warm and dry. Telemetry: v paced. Data Review:  
 Labs:   
Recent Results (from the past 24 hour(s)) GLUCOSE, POC Collection Time: 06/11/20  4:48 PM  
Result Value Ref Range Glucose (POC) 291 (H) 65 - 100 mg/dL Performed by Karen Bush   PCT GLUCOSE, POC Collection Time: 06/11/20 10:03 PM  
Result Value Ref Range Glucose (POC) 290 (H) 65 - 100 mg/dL Performed by [place] Surjit METABOLIC PANEL, BASIC Collection Time: 06/12/20  2:53 AM  
Result Value Ref Range Sodium 136 136 - 145 mmol/L Potassium 4.3 3.5 - 5.1 mmol/L Chloride 106 97 - 108 mmol/L  
 CO2 22 21 - 32 mmol/L Anion gap 8 5 - 15 mmol/L Glucose 271 (H) 65 - 100 mg/dL BUN 21 (H) 6 - 20 MG/DL Creatinine 1.51 (H) 0.55 - 1.02 MG/DL  
 BUN/Creatinine ratio 14 12 - 20 GFR est AA 40 (L) >60 ml/min/1.73m2 GFR est non-AA 33 (L) >60 ml/min/1.73m2 Calcium 8.4 (L) 8.5 - 10.1 MG/DL MAGNESIUM Collection Time: 06/12/20  2:53 AM  
Result Value Ref Range Magnesium 1.7 1.6 - 2.4 mg/dL GLUCOSE, POC Collection Time: 06/12/20  6:58 AM  
Result Value Ref Range Glucose (POC) 361 (H) 65 - 100 mg/dL Performed by [place] Surjit GLUCOSE, POC Collection Time: 06/12/20  7:51 AM  
Result Value Ref Range Glucose (POC) 330 (H) 65 - 100 mg/dL Performed by [facility]\"" GLUCOSE, POC Collection Time: 06/12/20 11:49 AM  
Result Value Ref Range Glucose (POC) 432 (H) 65 - 100 mg/dL Performed by Karen [facility]   [facility] Radiology:  
 
  
Current Facility-Administered Medications Medication Dose Route Frequency  insulin glargine (LANTUS) injection 15 Units  15 Units SubCUTAneous DAILY  bumetanide (BUMEX) injection 2 mg  2 mg IntraVENous DAILY  loratadine (CLARITIN) tablet 10 mg  10 mg Oral DAILY  aspirin chewable tablet 81 mg  81 mg Oral DAILY  0.9% sodium chloride infusion 500 mL  500 mL IntraVENous CONTINUOUS  
 sodium chloride (NS) flush 5-40 mL  5-40 mL IntraVENous Q8H  
 sodium chloride (NS) flush 5-40 mL  5-40 mL IntraVENous PRN  
 albuterol (PROVENTIL VENTOLIN) nebulizer solution 2.5 mg  2.5 mg Nebulization Q4H PRN  
 arformoteroL (BROVANA) neb solution 15 mcg  15 mcg Nebulization BID RT  
 budesonide (PULMICORT) 500 mcg/2 ml nebulizer suspension  500 mcg Nebulization BID RT  
 carvediloL (COREG) tablet 6.25 mg  6.25 mg Oral BID WITH MEALS  
 losartan (COZAAR) tablet 25 mg  25 mg Oral DAILY  ondansetron (ZOFRAN) injection 4 mg  4 mg IntraVENous Q4H PRN  
 insulin lispro (HUMALOG) injection   SubCUTAneous AC&HS  allopurinoL (ZYLOPRIM) tablet 100 mg  100 mg Oral DAILY  atorvastatin (LIPITOR) tablet 20 mg  20 mg Oral QHS  isosorbide mononitrate ER (IMDUR) tablet 60 mg  60 mg Oral DAILY  pantoprazole (PROTONIX) tablet 20 mg  20 mg Oral ACB  acetaminophen (TYLENOL) tablet 650 mg  650 mg Oral Q4H PRN Or  
 acetaminophen (TYLENOL) solution 650 mg  650 mg Per NG tube Q4H PRN Or  
 acetaminophen (TYLENOL) suppository 650 mg  650 mg Rectal Q4H PRN  
 glucose chewable tablet 16 g  4 Tab Oral PRN  
 glucagon (GLUCAGEN) injection 1 mg  1 mg IntraMUSCular PRN  
 dextrose 10% infusion 0-250 mL  0-250 mL IntraVENous PRN  
 benzocaine-menthoL (CEPACOL) lozenge 1 Lozenge  1 Lozenge Mucous Membrane PRN  
 azelastine (ASTELIN) 137mcg/spray nasal spray  1 Spray Both Nostrils BID PRN  
 hydrALAZINE (APRESOLINE) 20 mg/mL injection 20 mg  20 mg IntraVENous Q6H PRN MD Adan Suggs. [facility] [address] [place] [address], [address] [place] 
 [phone]

## 2020-06-12 NOTE — PROGRESS NOTES
Medical Progress Note NAME: Lauro Murphy :  1935 MRM:  [id number] Date/Time: 2020 Problem List:  
 
Active Problems: 
  Coronary atherosclerosis of native coronary artery (2010) HLD (hyperlipidemia) (2010) Hypertension complicating diabetes ([address] (2016) Type 2 diabetes with nephropathy ([address] (2018) Bilateral carotid artery stenosis (2020) Vertebral artery stenosis, bilateral (2020) Cardiomyopathy ([address] (2020) Overview: Acute, 2020. Subjective:  
 
Cath with RCA mild to mod dz  And LAD mod to severe dz but no critical stenoses; her EF 60% on long axis and 45-50% on short axis and  was better vs EF on Echo Breathing ok on 2 L/min and oxygenating well  W/o chest pain Denies dizziness this am  
Has baseline mild rt hp s/p old CVA Past Medical History:  
Diagnosis Date  Atrial fibrillation ([address]  CAD (coronary artery disease)  Cardiomyopathy ([address] 2020 Acute, 2020.  Cerebrovascular accident stroke, other, unspec 2010  Coronary atherosclerosis of native coronary artery 2010  Essential hypertension  Essential hypertension, benign 2010  HLD (hyperlipidemia) 2010  PVD (peripheral vascular disease) ([address] 2017  Type II or unspecified type diabetes mellitus without mention of complication, not stated as uncontrolled 2010  Zoster Objective:  
 
 
 
Vitals:  
  
Last 24hrs VS reviewed since prior progress note. Most recent are: 
 
Visit Vitals /50 Pulse 60 Temp 98.2 °F (36.8 °C) Resp 19 Wt 181 lb (82.1 kg) SpO2 95% BMI 35.35 kg/m² SpO2 Readings from Last 6 Encounters:  
20 95% 20 96% 20 100% 20 93% 20 98% 20 97% O2 Flow Rate (L/min): 2 l/min No intake or output data in the 24 hours ending 20 0644 Exam: General:  Alert, cooperative, no distress, appears stated age. Lungs:   Clear to auscultation bilaterally. Heart:  Regular rate and rhythm, S1, S2 normal, no murmur, click, rub or gallop. Abdomen:   Soft, non-tender. Bowel sounds normal. No masses,  No organomegaly. Extremities: No pedal edema. 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  
Result Value Ref Range Glucose (POC) 316 (H) 65 - 100 mg/dL Performed by [employer] METABOLIC PANEL, BASIC Collection Time: 06/11/20  8:17 AM  
Result Value Ref Range Sodium 134 (L) 136 - 145 mmol/L Potassium 4.0 3.5 - 5.1 mmol/L Chloride 101 97 - 108 mmol/L  
 CO2 23 21 - 32 mmol/L Anion gap 10 5 - 15 mmol/L Glucose 283 (H) 65 - 100 mg/dL BUN 20 6 - 20 MG/DL Creatinine 1.49 (H) 0.55 - 1.02 MG/DL  
 BUN/Creatinine ratio 13 12 - 20 GFR est AA 41 (L) >60 ml/min/1.73m2 GFR est non-AA 33 (L) >60 ml/min/1.73m2 Calcium 8.6 8.5 - 10.1 MG/DL  
GLUCOSE, POC Collection Time: 06/11/20 12:24 PM  
Result Value Ref Range Glucose (POC) 235 (H) 65 - 100 mg/dL Performed by Ramos العراقي GLUCOSE, POC Collection Time: 06/11/20  4:48 PM  
Result Value Ref Range Glucose (POC) 291 (H) 65 - 100 mg/dL Performed by Celina VILLAREAL GLUCOSE, POC Collection Time: 06/11/20 10:03 PM  
Result Value Ref Range Glucose (POC) 290 (H) 65 - 100 mg/dL Performed by Krystyna Fatima METABOLIC PANEL, BASIC Collection Time: 06/12/20  2:53 AM  
Result Value Ref Range Sodium 136 136 - 145 mmol/L Potassium 4.3 3.5 - 5.1 mmol/L Chloride 106 97 - 108 mmol/L  
 CO2 22 21 - 32 mmol/L Anion gap 8 5 - 15 mmol/L Glucose 271 (H) 65 - 100 mg/dL BUN 21 (H) 6 - 20 MG/DL  Creatinine 1.51 (H) 0.55 - 1.02 MG/DL  
 BUN/Creatinine ratio 14 12 - 20 GFR est AA 40 (L) >60 ml/min/1.73m2 GFR est non-AA 33 (L) >60 ml/min/1.73m2 Calcium 8.4 (L) 8.5 - 10.1 MG/DL MAGNESIUM Collection Time: 06/12/20  2:53 AM  
Result Value Ref Range Magnesium 1.7 1.6 - 2.4 mg/dL Medications Reviewed: (see below) 
 
______________________________________________________________________ Medications:  
 
Current Facility-Administered Medications Medication Dose Route Frequency  insulin glargine (LANTUS) injection 15 Units  15 Units SubCUTAneous DAILY  loratadine (CLARITIN) tablet 10 mg  10 mg Oral DAILY  aspirin chewable tablet 81 mg  81 mg Oral DAILY  0.9% sodium chloride infusion 500 mL  500 mL IntraVENous CONTINUOUS  
 sodium chloride (NS) flush 5-40 mL  5-40 mL IntraVENous Q8H  
 sodium chloride (NS) flush 5-40 mL  5-40 mL IntraVENous PRN  
 albuterol (PROVENTIL VENTOLIN) nebulizer solution 2.5 mg  2.5 mg Nebulization Q4H PRN  
 arformoteroL (BROVANA) neb solution 15 mcg  15 mcg Nebulization BID RT  
 budesonide (PULMICORT) 500 mcg/2 ml nebulizer suspension  500 mcg Nebulization BID RT  
 carvediloL (COREG) tablet 6.25 mg  6.25 mg Oral BID WITH MEALS  
 losartan (COZAAR) tablet 25 mg  25 mg Oral DAILY  ondansetron (ZOFRAN) injection 4 mg  4 mg IntraVENous Q4H PRN  
 insulin lispro (HUMALOG) injection   SubCUTAneous AC&HS  allopurinoL (ZYLOPRIM) tablet 100 mg  100 mg Oral DAILY  atorvastatin (LIPITOR) tablet 20 mg  20 mg Oral QHS  isosorbide mononitrate ER (IMDUR) tablet 60 mg  60 mg Oral DAILY  pantoprazole (PROTONIX) tablet 20 mg  20 mg Oral ACB  acetaminophen (TYLENOL) tablet 650 mg  650 mg Oral Q4H PRN Or  
 acetaminophen (TYLENOL) solution 650 mg  650 mg Per NG tube Q4H PRN  Or  
 acetaminophen (TYLENOL) suppository 650 mg  650 mg Rectal Q4H PRN  
 glucose chewable tablet 16 g  4 Tab Oral PRN  
 glucagon (GLUCAGEN) injection 1 mg  1 mg IntraMUSCular PRN  
  dextrose 10% infusion 0-250 mL  0-250 mL IntraVENous PRN  
 benzocaine-menthoL (CEPACOL) lozenge 1 Lozenge  1 Lozenge Mucous Membrane PRN  
 azelastine (ASTELIN) 137mcg/spray nasal spray  1 Spray Both Nostrils BID PRN  
 hydrALAZINE (APRESOLINE) 20 mg/mL injection 20 mg  20 mg IntraVENous Q6H PRN Assessment: ?Cardiomyopathy vs Acute diastolic or combined systolic /diastolic CHF. Breathing better. Cardiology to advise further on dx  And cardiac meds and NYHA level of chf on admission and d/c timing . Was on bumex at home . Will give low dose 0.5 mg now PO . Wean 02 as tolerated. Mobilize with more PT. Type 2 DM. At home takes levemir 20 units am , 10 units hs. And novolog SS: 120-249= 4 units; 250 or greater = 7 units; I advised if bs 300 or > then take 9 units. Here in hospital, increase Lantus from 5 to 15 units q am. Continue Humalog SS. CKD stg 3 , stable. HTN controlled. Rt Carotid non critical stenosis at 50-69% Patient Active Problem List  
Diagnosis Code  Coronary atherosclerosis of native coronary artery I25.10  
 HLD (hyperlipidemia) E78.5  Cerebrovascular accident stroke, other, unspec I67.89  
 Hypertension complicating diabetes ([address] E11.59, I10  
 PVD (peripheral vascular disease) with claudication ([facility]) I73.9  Atrial fibrillation ([facility]) I48.91  
 SOB (shortness of breath) R06.02  
 Type 2 diabetes with nephropathy ([facility]) E11.21  
 Severe obesity (BMI 35.0-39. 9) with comorbidity ([address] E66.01  
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  
 Bilateral carotid artery stenosis I65.23  Vertebral artery stenosis, bilateral I65.03  
 Cardiomyopathy ([address] I42.9 Plan: I spoke on phone w/ dtr  
      
 
  
 
 
  
              
 
 
 
 
 
 
      
___________________________________________________ Attending Physician: Vida Hadley MD

## 2020-06-12 NOTE — PROGRESS NOTES
Rollator order faxed to [employer]. Awaiting insurance approval. Will follow up. The Rehabilitation department at [facility] has ordered the following durable medical equipment (DME):  
rollator From: 
[employer] [phone] If the rehab department or DME company is waiting for insurance approval for the equipment and the patient decides to discharge from the hospital before the medical equipment arrives, the patient may contact the company above to work out the delivery. Please keep in mind that some DME companies WILL NOT deliver to the home. Insurance companies and DME companies are not open on the weekends to approve authorization and deliver to the hospital. Therefore it is the patient's responsibility to figure out a way to access the DME medical equipment. Thank you so much for your help as we provide the equipment the patient requires.

## 2020-06-12 NOTE — ROUTINE PROCESS
Bedside shift change report given to [address] [place] [address] (oncoming nurse) by Janie Brown RN (offgoing nurse). Report included the following information SBAR, Kardex, ED Summary, Procedure Summary, Intake/Output, MAR, Accordion, Cardiac Rhythm NSR, Quality Measures and Dual Neuro Assessment.

## 2020-06-13 PROBLEM — R42 DIZZINESS: Status: ACTIVE | Noted: 2020-01-01

## 2020-06-13 NOTE — ROUTINE PROCESS
Bedside shift change report given to [address] [place] [address] (oncoming nurse) by Alpesh Bautista RN (offgoing nurse). Report included the following information SBAR, Kardex, ED Summary, Procedure Summary, Intake/Output, MAR, Cardiac Rhythm NSR, Quality Measures and Dual Neuro Assessment.

## 2020-06-13 NOTE — PROGRESS NOTES
Raul Guajardo Admit Date: 6/8/2020 Subjective:  
 
Pt says she is feeling better since admission -- Dizziness improved. No new complaints. Current Facility-Administered Medications Medication Dose Route Frequency  losartan (COZAAR) tablet 50 mg  50 mg Oral DAILY  bumetanide (BUMEX) tablet 1 mg  1 mg Oral BID  potassium chloride SR (KLOR-CON 10) tablet 30 mEq  30 mEq Oral NOW  insulin glargine (LANTUS) injection 15 Units  15 Units SubCUTAneous DAILY  apixaban (ELIQUIS) tablet 2.5 mg  2.5 mg Oral Q12H  loratadine (CLARITIN) tablet 10 mg  10 mg Oral DAILY  aspirin chewable tablet 81 mg  81 mg Oral DAILY  sodium chloride (NS) flush 5-40 mL  5-40 mL IntraVENous Q8H  
 sodium chloride (NS) flush 5-40 mL  5-40 mL IntraVENous PRN  
 albuterol (PROVENTIL VENTOLIN) nebulizer solution 2.5 mg  2.5 mg Nebulization Q4H PRN  
 arformoteroL (BROVANA) neb solution 15 mcg  15 mcg Nebulization BID RT  
 budesonide (PULMICORT) 500 mcg/2 ml nebulizer suspension  500 mcg Nebulization BID RT  
 carvediloL (COREG) tablet 6.25 mg  6.25 mg Oral BID WITH MEALS  ondansetron (ZOFRAN) injection 4 mg  4 mg IntraVENous Q4H PRN  
 insulin lispro (HUMALOG) injection   SubCUTAneous AC&HS  allopurinoL (ZYLOPRIM) tablet 100 mg  100 mg Oral DAILY  atorvastatin (LIPITOR) tablet 20 mg  20 mg Oral QHS  isosorbide mononitrate ER (IMDUR) tablet 60 mg  60 mg Oral DAILY  pantoprazole (PROTONIX) tablet 20 mg  20 mg Oral ACB  acetaminophen (TYLENOL) tablet 650 mg  650 mg Oral Q4H PRN Or  
 acetaminophen (TYLENOL) solution 650 mg  650 mg Per NG tube Q4H PRN  Or  
 acetaminophen (TYLENOL) suppository 650 mg  650 mg Rectal Q4H PRN  
 glucose chewable tablet 16 g  4 Tab Oral PRN  
 glucagon (GLUCAGEN) injection 1 mg  1 mg IntraMUSCular PRN  
 dextrose 10% infusion 0-250 mL  0-250 mL IntraVENous PRN  
  benzocaine-menthoL (CEPACOL) lozenge 1 Lozenge  1 Lozenge Mucous Membrane PRN  
 azelastine (ASTELIN) 137mcg/spray nasal spray  1 Spray Both Nostrils BID PRN  
 hydrALAZINE (APRESOLINE) 20 mg/mL injection 20 mg  20 mg IntraVENous Q6H PRN Objective:  
 
Patient Vitals for the past 8 hrs: 
 BP Temp Pulse Resp SpO2  
06/13/20 0958 115/44 98.2 °F (36.8 °C) 63 24 95 % 06/13/20 0803 158/59  63    
06/13/20 0800     96 % 06/13/20 0714     97 % 06/13/20 0608 159/59 98.2 °F (36.8 °C) (!) 56 16 96 % No intake/output data recorded. 06/11 1901 - 06/13 0700 In: 720 [P.O.:720] Out: - Physical Exam: NAD. A&O. Neck -- Supple. No JVD. Heart -- RRR. Lungs -- CTA. Abd -- Benign. Ext -- No LE edema, b/l. Data Review Recent Results (from the past 24 hour(s)) GLUCOSE, POC Collection Time: 06/12/20  4:40 PM  
Result Value Ref Range Glucose (POC) 286 (H) 65 - 100 mg/dL Performed by Laural Marking   PCT GLUCOSE, POC Collection Time: 06/12/20  9:29 PM  
Result Value Ref Range Glucose (POC) 274 (H) 65 - 100 mg/dL Performed by Chepe Toussaint METABOLIC PANEL, BASIC Collection Time: 06/13/20  2:38 AM  
Result Value Ref Range Sodium 136 136 - 145 mmol/L Potassium 3.4 (L) 3.5 - 5.1 mmol/L Chloride 104 97 - 108 mmol/L  
 CO2 23 21 - 32 mmol/L Anion gap 9 5 - 15 mmol/L Glucose 227 (H) 65 - 100 mg/dL BUN 27 (H) 6 - 20 MG/DL Creatinine 1.42 (H) 0.55 - 1.02 MG/DL  
 BUN/Creatinine ratio 19 12 - 20 GFR est AA 43 (L) >60 ml/min/1.73m2 GFR est non-AA 35 (L) >60 ml/min/1.73m2 Calcium 8.4 (L) 8.5 - 10.1 MG/DL  
GLUCOSE, POC Collection Time: 06/13/20  7:22 AM  
Result Value Ref Range Glucose (POC) 350 (H) 65 - 100 mg/dL Performed by Chepe Toussaint Assessment:  
 
Principal Problem: Dizziness -- May be multifactorial. 
 
 
 
Active Problems: 
  Coronary atherosclerosis of native coronary artery (11/18/2010) [facility]pertension complicating diabetes [facility] (11/17/2016) Type 2 diabetes with nephropathy ([facility] (2/12/2018) Bilateral carotid artery stenosis (6/8/2020) Vertebral artery stenosis, bilateral (6/8/2020) Cardiomyopathy ([facility] -- Cath EF better than ECHO EF. (?Takatsubo cardiomyopathy) Plan: 1. Cont ASA & Eliquis. 2. On PO Bumex now. 3. Replete K+. 4. Dr. Da Watson increased Losartan for better BP control. 5. Cont PT. Left VM for dtr [facility]: [phone] Angela Cutler MD

## 2020-06-13 NOTE — PROGRESS NOTES
Problem: Falls - Risk of 
Goal: *Absence of Falls Description: Document Constance Dear Fall Risk and appropriate interventions in the flowsheet. Outcome: Progressing Towards Goal 
Note: Fall Risk Interventions: 
Mobility Interventions: Utilize walker, cane, or other assistive device, Assess mobility with egress test, Bed/chair exit alarm, Communicate number of staff needed for ambulation/transfer, OT consult for ADLs, Patient to call before getting OOB, PT Consult for mobility concerns, PT Consult for assist device competence Medication Interventions: Assess postural VS orthostatic hypotension, Evaluate medications/consider consulting pharmacy, Patient to call before getting OOB, Teach patient to arise slowly Elimination Interventions: Call light in reach, Patient to call for help with toileting needs, Stay With Me (per policy), Toilet paper/wipes in reach, Toileting schedule/hourly rounds Problem: Patient Education: Go to Patient Education Activity Goal: Patient/Family Education Outcome: Progressing Towards Goal 
  
Problem: Pain Goal: *Control of Pain Outcome: Progressing Towards Goal 
Goal: *PALLIATIVE CARE:  Alleviation of Pain Outcome: Progressing Towards Goal 
  
Problem: Patient Education: Go to Patient Education Activity Goal: Patient/Family Education Outcome: Progressing Towards Goal 
  
Problem: Pressure Injury - Risk of 
Goal: *Prevention of pressure injury Description: Document Ruperto Scale and appropriate interventions in the flowsheet. Outcome: Progressing Towards Goal 
Note: Pressure Injury Interventions: 
Sensory Interventions: Assess changes in LOC, Avoid rigorous massage over bony prominences, Check visual cues for pain, Discuss PT/OT consult with provider, Keep linens dry and wrinkle-free, Maintain/enhance activity level, Minimize linen layers, Pressure redistribution bed/mattress (bed type), Turn and reposition approx. every two hours (pillows and wedges if needed) Activity Interventions: Increase time out of bed, Pressure redistribution bed/mattress(bed type), PT/OT evaluation Mobility Interventions: HOB 30 degrees or less, Pressure redistribution bed/mattress (bed type), PT/OT evaluation Nutrition Interventions: Document food/fluid/supplement intake Problem: Patient Education: Go to Patient Education Activity Goal: Patient/Family Education Outcome: Progressing Towards Goal 
  
Problem: Patient Education: Go to Patient Education Activity Goal: Patient/Family Education Outcome: Progressing Towards Goal 
  
Problem: TIA/CVA Stroke: Day 2 Until Discharge Goal: Off Pathway (Use only if patient is Off Pathway) Outcome: Progressing Towards Goal 
Goal: Activity/Safety Outcome: Progressing Towards Goal 
Goal: Diagnostic Test/Procedures Outcome: Progressing Towards Goal 
Goal: Nutrition/Diet Outcome: Progressing Towards Goal 
Goal: Discharge Planning Outcome: Progressing Towards Goal 
Goal: Medications Outcome: Progressing Towards Goal 
Goal: Respiratory Outcome: Progressing Towards Goal 
Goal: Treatments/Interventions/Procedures Outcome: Progressing Towards Goal 
Goal: Psychosocial 
Outcome: Progressing Towards Goal 
Goal: *Verbalizes anxiety and depression are reduced or absent Outcome: Progressing Towards Goal 
Goal: *Absence of aspiration Outcome: Progressing Towards Goal 
Goal: *Absence of deep venous thrombosis signs and symptoms(Stroke Metric) Outcome: Progressing Towards Goal 
Goal: *Optimal pain control at patient's stated goal 
Outcome: Progressing Towards Goal 
Goal: *Tolerating diet Outcome: Progressing Towards Goal 
Goal: *Ability to perform ADLs and demonstrates progressive mobility and function Outcome: Progressing Towards Goal 
Goal: *Stroke education continued(Stroke Metric) Outcome: Progressing Towards Goal 
  
Problem: Ischemic Stroke: Discharge Outcomes Goal: *Verbalizes anxiety and depression are reduced or absent Outcome: Progressing Towards Goal 
Goal: *Verbalize understanding of risk factor modification(Stroke Metric) Outcome: Progressing Towards Goal 
Goal: *Hemodynamically stable Outcome: Progressing Towards Goal 
Goal: *Aware of needed dietary changes Outcome: Progressing Towards Goal 
Goal: *Verbalize understanding of prescribed medications including anti-coagulants, anti-lipid, and/or anti-platelets(Stroke Metric) Outcome: Progressing Towards Goal 
Goal: *Tolerating diet Outcome: Progressing Towards Goal 
Goal: *Aware of follow-up diagnostics related to anticoagulants Outcome: Progressing Towards Goal 
Goal: *Ability to perform ADLs and demonstrates progressive mobility and function Outcome: Progressing Towards Goal 
Goal: *Absence of DVT(Stroke Metric) Outcome: Progressing Towards Goal 
Goal: *Absence of aspiration Outcome: Progressing Towards Goal 
Goal: *Optimal pain control at patient's stated goal 
Outcome: Progressing Towards Goal 
Goal: *Home safety concerns addressed Outcome: Progressing Towards Goal 
Goal: *Describes available resources and support systems Outcome: Progressing Towards Goal 
Goal: *Verbalizes understanding of activation of EMS(911) for stroke symptoms(Stroke Metric) Outcome: Progressing Towards Goal 
Goal: *Understands and describes signs and symptoms to report to providers(Stroke Metric) Outcome: Progressing Towards Goal 
Goal: *Neurolgocially stable (absence of additional neurological deficits) Outcome: Progressing Towards Goal 
Goal: *Verbalizes importance of follow-up with primary care physician(Stroke Metric) Outcome: Progressing Towards Goal 
Goal: *Smoking cessation discussed,if applicable(Stroke Metric) Outcome: Progressing Towards Goal 
Goal: *Depression screening completed(Stroke Metric) Outcome: Progressing Towards Goal 
  
Problem: Diabetes Self-Management Goal: *Disease process and treatment process Description: Define diabetes and identify own type of diabetes; list 3 options for treating diabetes. Outcome: Progressing Towards Goal 
Goal: *Incorporating nutritional management into lifestyle Description: Describe effect of type, amount and timing of food on blood glucose; list 3 methods for planning meals. Outcome: Progressing Towards Goal 
Goal: *Incorporating physical activity into lifestyle Description: State effect of exercise on blood glucose levels. Outcome: Progressing Towards Goal 
Goal: *Developing strategies to promote health/change behavior Description: Define the ABC's of diabetes; identify appropriate screenings, schedule and personal plan for screenings. Outcome: Progressing Towards Goal 
Goal: *Using medications safely Description: State effect of diabetes medications on diabetes; name diabetes medication taking, action and side effects. Outcome: Progressing Towards Goal 
Goal: *Monitoring blood glucose, interpreting and using results Description: Identify recommended blood glucose targets  and personal targets. Outcome: Progressing Towards Goal 
Goal: *Prevention, detection, treatment of acute complications Description: List symptoms of hyper- and hypoglycemia; describe how to treat low blood sugar and actions for lowering  high blood glucose level. Outcome: Progressing Towards Goal 
Goal: *Prevention, detection and treatment of chronic complications Description: Define the natural course of diabetes and describe the relationship of blood glucose levels to long term complications of diabetes. Outcome: Progressing Towards Goal 
Goal: *Developing strategies to address psychosocial issues Description: Describe feelings about living with diabetes; identify support needed and support network Outcome: Progressing Towards Goal 
Goal: *Insulin pump training Outcome: Progressing Towards Goal 
Goal: *Sick day guidelines Outcome: Progressing Towards Goal 
 Goal: *Patient Specific Goal (EDIT GOAL, INSERT TEXT) Outcome: Progressing Towards Goal 
  
Problem: Patient Education: Go to Patient Education Activity Goal: Patient/Family Education Outcome: Progressing Towards Goal 
  
Problem: Patient Education: Go to Patient Education Activity Goal: Patient/Family Education Outcome: Progressing Towards Goal 
  
Problem: Patient Education: Go to Patient Education Activity Goal: Patient/Family Education Outcome: Progressing Towards Goal

## 2020-06-13 NOTE — PROGRESS NOTES
Problem: Falls - Risk of 
Goal: *Absence of Falls Description: Document Elizabeth Marks Fall Risk and appropriate interventions in the flowsheet. Outcome: Progressing Towards Goal 
Note: Fall Risk Interventions: 
Mobility Interventions: Communicate number of staff needed for ambulation/transfer, Patient to call before getting OOB, OT consult for ADLs, PT Consult for mobility concerns, PT Consult for assist device competence, Strengthening exercises (ROM-active/passive) Medication Interventions: Evaluate medications/consider consulting pharmacy, Patient to call before getting OOB, Teach patient to arise slowly Elimination Interventions: Call light in reach, Elevated toilet seat, Patient to call for help with toileting needs, Toilet paper/wipes in reach, Stay With Me (per policy), Toileting schedule/hourly rounds Problem: Patient Education: Go to Patient Education Activity Goal: Patient/Family Education Outcome: Progressing Towards Goal 
  
Problem: TIA/CVA Stroke: Day 2 Until Discharge Goal: Nutrition/Diet Outcome: Progressing Towards Goal 
Goal: Discharge Planning Outcome: Progressing Towards Goal 
Goal: Medications Outcome: Progressing Towards Goal 
Goal: Respiratory Outcome: Progressing Towards Goal 
Goal: Treatments/Interventions/Procedures Outcome: Progressing Towards Goal 
Goal: Psychosocial 
Outcome: Progressing Towards Goal 
Goal: *Verbalizes anxiety and depression are reduced or absent Outcome: Progressing Towards Goal 
Goal: *Absence of aspiration Outcome: Progressing Towards Goal 
Goal: *Absence of deep venous thrombosis signs and symptoms(Stroke Metric) Outcome: Progressing Towards Goal 
Goal: *Optimal pain control at patient's stated goal 
Outcome: Progressing Towards Goal 
Goal: *Tolerating diet Outcome: Progressing Towards Goal 
Goal: *Ability to perform ADLs and demonstrates progressive mobility and function Outcome: Progressing Towards Goal 
 Goal: *Stroke education continued(Stroke Metric) Outcome: Progressing Towards Goal

## 2020-06-13 NOTE — PROGRESS NOTES
Cardiology Progress Note 6/13/2020 9:45 AM 
 
Admit Date: 6/8/2020 Admit Diagnosis: CVA (cerebral vascular accident) ([facility] [I63.9];CVA (cerebral vascular accident) ([facility] [I63.9] Subjective:  
 
[place] Bumps is seen for Dr Joshua Chaparro I had done leadless pacer and av node ablation for her 2/2020 She had cath yesterday and Dr Joshua Chaparro informed me LVEF is better than echo (cath: 45-60%) She felt better today Has not got up so not sure about dizziness 06/08/20 ECHO ADULT COMPLETE 06/10/2020 6/10/2020 Narrative · Normal cavity size and wall thickness. Mild-to-moderate systolic  
function. Estimated left ventricular ejection fraction is 30 - 35%. No  
regional wall motion abnormality noted. · Moderately dilated right atrium. · Moderately dilated left atrium. · Mitral valve non-specific thickening. Moderate mitral valve  
regurgitation is present. · Mild tricuspid valve regurgitation is present. · Moderately reduced right ventricular systolic function. Signed by: Meera Arce MD  
 
 
Past Medical History:  
Diagnosis Date  Atrial fibrillation ([facility]  CAD (coronary artery disease)  Cardiomyopathy ([facility] 6/11/2020 Acute, 6/8/2020.  Cerebrovascular accident stroke, other, unspec 11/18/2010  Coronary atherosclerosis of native coronary artery 11/18/2010  Essential hypertension  Essential hypertension, benign 11/18/2010  HLD (hyperlipidemia) 11/18/2010  PVD (peripheral vascular disease) ([facility] 2017  Type II or unspecified type diabetes mellitus without mention of complication, not stated as uncontrolled 11/18/2010  Zoster Past Surgical History:  
Procedure Laterality Date  HX CORONARY STENT PLACEMENT    
 HX HEART CATHETERIZATION    
 [address]  CT ICAR CATHETER ABLATION ATRIOVENTR NODE FUNCTION N/A 2/5/2020  ABLATION AV NODE performed by Delmy Monique MD at [address], [facility]  CATH LAB  
  MD TCAT INSJ/RPL PERM LEADLESS PACEMAKER RV W/IMG N/A 2020 INSERT OR REPLACE TRANSCATH PPM LEADLESS performed by Sonal Sanabria MD at [address], [facility] Social History Socioeconomic History  Marital status:  Spouse name: Not on file  Number of children: Not on file  Years of education: Not on file  Highest education level: Not on file Occupational History  Not on file Social Needs  Financial resource strain: Not on file  Food insecurity Worry: Not on file Inability: Not on file  Transportation needs Medical: Not on file Non-medical: Not on file Tobacco Use  Smoking status: Former Smoker Packs/day: 0.80 Years: 20.00 Pack years: 16.00 Types: Cigarettes Last attempt to quit: 1986 Years since quittin.1  Smokeless tobacco: Never Used Substance and Sexual Activity  Alcohol use: No  
 Drug use: No  
 Sexual activity: Not on file Lifestyle  Physical activity Days per week: Not on file Minutes per session: Not on file  Stress: Not on file Relationships  Social connections Talks on phone: Not on file Gets together: Not on file Attends [employer] service: Not on file Active member of club or organization: Not on file Attends meetings of clubs or organizations: Not on file Relationship status: Not on file  Intimate partner violence Fear of current or ex partner: Not on file Emotionally abused: Not on file Physically abused: Not on file Forced sexual activity: Not on file Other Topics Concern  Not on file Social History Narrative  Not on file Visit Vitals /59 Pulse 63 Temp 98.2 °F (36.8 °C) Resp 16 Ht 5' (1.524 m) Wt 174 lb 11.8 oz (79.3 kg) SpO2 96% BMI 34.13 kg/m² Current Facility-Administered Medications Medication Dose Route Frequency  losartan (COZAAR) tablet 50 mg  50 mg Oral DAILY  spironolactone (ALDACTONE) tablet 25 mg  25 mg Oral DAILY  insulin glargine (LANTUS) injection 15 Units  15 Units SubCUTAneous DAILY  bumetanide (BUMEX) injection 2 mg  2 mg IntraVENous DAILY  apixaban (ELIQUIS) tablet 2.5 mg  2.5 mg Oral Q12H  loratadine (CLARITIN) tablet 10 mg  10 mg Oral DAILY  aspirin chewable tablet 81 mg  81 mg Oral DAILY  0.9% sodium chloride infusion 500 mL  500 mL IntraVENous CONTINUOUS  
 sodium chloride (NS) flush 5-40 mL  5-40 mL IntraVENous Q8H  
 sodium chloride (NS) flush 5-40 mL  5-40 mL IntraVENous PRN  
 albuterol (PROVENTIL VENTOLIN) nebulizer solution 2.5 mg  2.5 mg Nebulization Q4H PRN  
 arformoteroL (BROVANA) neb solution 15 mcg  15 mcg Nebulization BID RT  
 budesonide (PULMICORT) 500 mcg/2 ml nebulizer suspension  500 mcg Nebulization BID RT  
 carvediloL (COREG) tablet 6.25 mg  6.25 mg Oral BID WITH MEALS  ondansetron (ZOFRAN) injection 4 mg  4 mg IntraVENous Q4H PRN  
 insulin lispro (HUMALOG) injection   SubCUTAneous AC&HS  allopurinoL (ZYLOPRIM) tablet 100 mg  100 mg Oral DAILY  atorvastatin (LIPITOR) tablet 20 mg  20 mg Oral QHS  isosorbide mononitrate ER (IMDUR) tablet 60 mg  60 mg Oral DAILY  pantoprazole (PROTONIX) tablet 20 mg  20 mg Oral ACB  acetaminophen (TYLENOL) tablet 650 mg  650 mg Oral Q4H PRN Or  
 acetaminophen (TYLENOL) solution 650 mg  650 mg Per NG tube Q4H PRN Or  
 acetaminophen (TYLENOL) suppository 650 mg  650 mg Rectal Q4H PRN  
 glucose chewable tablet 16 g  4 Tab Oral PRN  
 glucagon (GLUCAGEN) injection 1 mg  1 mg IntraMUSCular PRN  
 dextrose 10% infusion 0-250 mL  0-250 mL IntraVENous PRN  
 benzocaine-menthoL (CEPACOL) lozenge 1 Lozenge  1 Lozenge Mucous Membrane PRN  
 azelastine (ASTELIN) 137mcg/spray nasal spray  1 Spray Both Nostrils BID PRN  
 hydrALAZINE (APRESOLINE) 20 mg/mL injection 20 mg  20 mg IntraVENous Q6H PRN Objective:  
  
Physical Exam: 
Visit Vitals /59 Pulse 63 Temp 98.2 °F (36.8 °C) Resp 16 Ht 5' (1.524 m) Wt 174 lb 11.8 oz (79.3 kg) SpO2 96% BMI 34.13 kg/m² General Appearance:  Well developed, well nourished,alert and oriented x 3, and individual in no acute distress. Ears/Nose/Mouth/Throat:   Hearing grossly normal. 
  
    Neck: Supple. Chest:   Lungs clear to auscultation bilaterally. Cardiovascular:  Regular rhythm, no murmur. Abdomen:   Soft, non-tender, obese Extremities: trace edema bilaterally. Skin: Warm and dry. Data Review:  
Labs:   
Recent Results (from the past 24 hour(s)) GLUCOSE, POC Collection Time: 06/12/20 11:49 AM  
Result Value Ref Range Glucose (POC) 432 (H) 65 - 100 mg/dL Performed by [place]   PCT GLUCOSE, POC Collection Time: 06/12/20  4:40 PM  
Result Value Ref Range Glucose (POC) 286 (H) 65 - 100 mg/dL Performed by [place]   PCT GLUCOSE, POC Collection Time: 06/12/20  9:29 PM  
Result Value Ref Range Glucose (POC) 274 (H) 65 - 100 mg/dL Performed by [employer]lie METABOLIC PANEL, BASIC Collection Time: 06/13/20  2:38 AM  
Result Value Ref Range Sodium 136 136 - 145 mmol/L Potassium 3.4 (L) 3.5 - 5.1 mmol/L Chloride 104 97 - 108 mmol/L  
 CO2 23 21 - 32 mmol/L Anion gap 9 5 - 15 mmol/L Glucose 227 (H) 65 - 100 mg/dL BUN 27 (H) 6 - 20 MG/DL Creatinine 1.42 (H) 0.55 - 1.02 MG/DL  
 BUN/Creatinine ratio 19 12 - 20 GFR est AA 43 (L) >60 ml/min/1.73m2 GFR est non-AA 35 (L) >60 ml/min/1.73m2 Calcium 8.4 (L) 8.5 - 10.1 MG/DL  
GLUCOSE, POC Collection Time: 06/13/20  7:22 AM  
Result Value Ref Range Glucose (POC) 350 (H) 65 - 100 mg/dL Performed by Codeoscopicashlie Telemetry: AFIB with ventricular pacing Assessment:  
 
Active Problems: 
  Coronary atherosclerosis of native coronary artery (11/18/2010) HLD (hyperlipidemia) (11/18/2010) Hypertension complicating diabetes ([facility] (11/17/2016) Type 2 diabetes with nephropathy ([facility] (2/12/2018) Bilateral carotid artery stenosis (6/8/2020) Vertebral artery stenosis, bilateral (6/8/2020) Cardiomyopathy ([facility] (6/11/2020) Overview: Acute, 6/8/2020. CKD AFIB ventricular pacing Plan:  
 
Her LVEF is improving? Or just different techniques of measuring LVEF I think we need to wait before considering biventricular pacemaker Therefore it is important to optimize GDMT and assess her dizziness severity Bp still high so losartan is 50 mg every day Change IV bumex to PO and monitor K She may eventually go back to eliquis 5 mg bid If creatinine is less than 1.5 and stable

## 2020-06-13 NOTE — PROGRESS NOTES
Bedside and Verbal shift change report given to [address] (oncoming nurse) by Armani Doran RN (offgoing nurses). Report included the following information SBAR, Kardex, OR Summary, Intake/Output, MAR, Recent Results, Cardiac Rhythm NSR and Dual Neuro Assessment.

## 2020-06-14 NOTE — PROGRESS NOTES
Cardiology Progress Note 6/14/2020 9:45 AM 
 
Admit Date: 6/8/2020 Admit Diagnosis: CVA (cerebral vascular accident) ([facility] [I63.9];CVA (cerebral vascular accident) ([facility] [address] [facility] [I63.9] Subjective:  
 
Sera Burch is seen for Dr Christopher Seals I had done leadless pacer and av node ablation for her 2/2020 She had cath 2 days ago and Dr Kiet Chavis informed me LVEF is better than echo (cath: 45-60%) She felt better today and said she was able to walk with walker and PT I tried to increase losartan to 50 mg every day yesterday as BP was high for the benefit of LVEF but creatinine naida to 1.7 from 1.4 
06/08/20 ECHO ADULT COMPLETE 06/10/2020 6/10/2020 Narrative · Normal cavity size and wall thickness. Mild-to-moderate systolic  
function. Estimated left ventricular ejection fraction is 30 - 35%. No  
regional wall motion abnormality noted. · Moderately dilated right atrium. · Moderately dilated left atrium. · Mitral valve non-specific thickening. Moderate mitral valve  
regurgitation is present. · Mild tricuspid valve regurgitation is present. · Moderately reduced right ventricular systolic function. Signed by: Benedicto Ludwig MD  
 
 
Past Medical History:  
Diagnosis Date  Atrial fibrillation ([facility]  CAD (coronary artery disease)  Cardiomyopathy ([facility] [facility] 6/11/2020 Acute, 6/8/2020.  Cerebrovascular accident stroke, other, unspec 11/18/2010  Coronary atherosclerosis of native coronary artery 11/18/2010  Essential hypertension  Essential hypertension, benign 11/18/2010  HLD (hyperlipidemia) 11/18/2010  PVD (peripheral vascular disease) ([facility] [facility] 2017  Type II or unspecified type diabetes mellitus without mention of complication, not stated as uncontrolled 11/18/2010  Zoster Past Surgical History:  
Procedure Laterality Date  HX CORONARY STENT PLACEMENT    
 HX HEART CATHETERIZATION    
 [address] Card  NM ICAR CATHETER ABLATION ATRIOVENTR NODE FUNCTION N/A 2020 ABLATION AV NODE performed by Carol Viveros MD at [address]  CATH LAB  NM TCAT INSJ/RPL PERM LEADLESS PACEMAKER RV W/IMG N/A 2020 INSERT OR REPLACE TRANSCATH PPM LEADLESS performed by Carol Viveros MD at [address]  CATH LAB Social History Socioeconomic History  Marital status:  Spouse name: Not on file  Number of children: Not on file  Years of education: Not on file  Highest education level: Not on file Occupational History  Not on file Social Needs  Financial resource strain: Not on file  Food insecurity Worry: Not on file Inability: Not on file  Transportation needs Medical: Not on file Non-medical: Not on file Tobacco Use  Smoking status: Former Smoker Packs/day: 0.80 Years: 20.00 Pack years: 16.00 Types: Cigarettes Last attempt to quit: 1986 Years since quittin.1  Smokeless tobacco: Never Used Substance and Sexual Activity  Alcohol use: No  
 Drug use: No  
 Sexual activity: Not on file Lifestyle  Physical activity Days per week: Not on file Minutes per session: Not on file  Stress: Not on file Relationships  Social connections Talks on phone: Not on file Gets together: Not on file Attends Jainism service: Not on file Active member of club or organization: Not on file Attends meetings of clubs or organizations: Not on file Relationship status: Not on file  Intimate partner violence Fear of current or ex partner: Not on file Emotionally abused: Not on file Physically abused: Not on file Forced sexual activity: Not on file Other Topics Concern  Not on file Social History Narrative  Not on file Visit Vitals /62 Pulse 60 Temp 98.8 °F (37.1 °C) Resp 13 Ht 5' (1.524 m) Wt 175 lb 4.3 oz (79.5 kg) SpO2 96% BMI 34.23 kg/m² Current Facility-Administered Medications Medication Dose Route Frequency  hydrALAZINE (APRESOLINE) tablet 10 mg  10 mg Oral TID  bumetanide (BUMEX) tablet 1 mg  1 mg Oral BID  insulin glargine (LANTUS) injection 15 Units  15 Units SubCUTAneous DAILY  apixaban (ELIQUIS) tablet 2.5 mg  2.5 mg Oral Q12H  loratadine (CLARITIN) tablet 10 mg  10 mg Oral DAILY  aspirin chewable tablet 81 mg  81 mg Oral DAILY  sodium chloride (NS) flush 5-40 mL  5-40 mL IntraVENous Q8H  
 sodium chloride (NS) flush 5-40 mL  5-40 mL IntraVENous PRN  
 albuterol (PROVENTIL VENTOLIN) nebulizer solution 2.5 mg  2.5 mg Nebulization Q4H PRN  
 arformoteroL (BROVANA) neb solution 15 mcg  15 mcg Nebulization BID RT  
 budesonide (PULMICORT) 500 mcg/2 ml nebulizer suspension  500 mcg Nebulization BID RT  
 carvediloL (COREG) tablet 6.25 mg  6.25 mg Oral BID WITH MEALS  ondansetron (ZOFRAN) injection 4 mg  4 mg IntraVENous Q4H PRN  
 insulin lispro (HUMALOG) injection   SubCUTAneous AC&HS  allopurinoL (ZYLOPRIM) tablet 100 mg  100 mg Oral DAILY  atorvastatin (LIPITOR) tablet 20 mg  20 mg Oral QHS  isosorbide mononitrate ER (IMDUR) tablet 60 mg  60 mg Oral DAILY  pantoprazole (PROTONIX) tablet 20 mg  20 mg Oral ACB  acetaminophen (TYLENOL) tablet 650 mg  650 mg Oral Q4H PRN Or  
 acetaminophen (TYLENOL) solution 650 mg  650 mg Per NG tube Q4H PRN Or  
 acetaminophen (TYLENOL) suppository 650 mg  650 mg Rectal Q4H PRN  
 glucose chewable tablet 16 g  4 Tab Oral PRN  
 glucagon (GLUCAGEN) injection 1 mg  1 mg IntraMUSCular PRN  
 dextrose 10% infusion 0-250 mL  0-250 mL IntraVENous PRN  
 benzocaine-menthoL (CEPACOL) lozenge 1 Lozenge  1 Lozenge Mucous Membrane PRN  
 azelastine (ASTELIN) 137mcg/spray nasal spray  1 Spray Both Nostrils BID PRN  
 hydrALAZINE (APRESOLINE) 20 mg/mL injection 20 mg  20 mg IntraVENous Q6H PRN Objective:  
  
Physical Exam: 
Visit Vitals /62 Pulse 60 Temp 98.8 °F (37.1 °C) Resp 13 Ht 5' (1.524 m) Wt 175 lb 4.3 oz (79.5 kg) SpO2 96% BMI 34.23 kg/m² General Appearance:  Well developed, well nourished,alert and oriented x 3, and individual in no acute distress. Ears/Nose/Mouth/Throat:   Hearing grossly normal. 
  
    Neck: Supple. Chest:   Lungs clear to auscultation bilaterally. Cardiovascular:  Regular rhythm, no murmur. Abdomen:   Soft, non-tender, obese Extremities: no edema bilaterally. Skin: Warm and dry. Data Review:  
Labs:   
Recent Results (from the past 24 hour(s)) GLUCOSE, POC Collection Time: 06/13/20 12:04 PM  
Result Value Ref Range Glucose (POC) 440 (H) 65 - 100 mg/dL Performed by Seth Messer GLUCOSE, POC Collection Time: 06/13/20  4:51 PM  
Result Value Ref Range Glucose (POC) 221 (H) 65 - 100 mg/dL Performed by Seth Messer GLUCOSE, POC Collection Time: 06/13/20  9:31 PM  
Result Value Ref Range Glucose (POC) 237 (H) 65 - 100 mg/dL Performed by tweetTVd METABOLIC PANEL, BASIC Collection Time: 06/14/20  2:16 AM  
Result Value Ref Range Sodium 137 136 - 145 mmol/L Potassium 3.8 3.5 - 5.1 mmol/L Chloride 103 97 - 108 mmol/L  
 CO2 25 21 - 32 mmol/L Anion gap 9 5 - 15 mmol/L Glucose 182 (H) 65 - 100 mg/dL BUN 29 (H) 6 - 20 MG/DL Creatinine 1.73 (H) 0.55 - 1.02 MG/DL  
 BUN/Creatinine ratio 17 12 - 20 GFR est AA 34 (L) >60 ml/min/1.73m2 GFR est non-AA 28 (L) >60 ml/min/1.73m2 Calcium 8.9 8.5 - 10.1 MG/DL  
GLUCOSE, POC Collection Time: 06/14/20  7:48 AM  
Result Value Ref Range Glucose (POC) 199 (H) 65 - 100 mg/dL Performed by Vilas Alberto Telemetry: AFIB with ventricular pacing Assessment:  
 
Principal Problem: 
  Dizziness (6/13/2020) Active Problems: 
  Coronary atherosclerosis of native coronary artery (11/18/2010) Hypertension complicating diabetes ([facility] [address] (11/17/2016) Type 2 diabetes with nephropathy ([facility] (2/12/2018) Bilateral carotid artery stenosis (6/8/2020) Vertebral artery stenosis, bilateral (6/8/2020) Cardiomyopathy ([facility] (6/11/2020) Overview: Acute, 6/8/2020. CKD AFIB ventricular pacing Plan:  
 
Her LVEF on cath is better than echo I think we can wait before considering biventricular pacemaker and allow GDMT helps her LVEF Therefore it is important to optimize GDMT and assess her dizziness severity Bp still high but with losartan 50 mg every day her renal function got worse She is on imdur so I changed losartan to hydralazine. It is inconvenient with TID dosing but if she tolerates we can try to keep her on BIDIL dosing later Bumex is now PO She may eventually go back to eliquis 5 mg bid If creatinine is less than 1.5 and stable Continue with PT and may go home if steady otherwise Dr Abril Bowers will her for Dr Javed Monte tomorrow Cheyenne Guzman M.D. [facility] Electrophysiology/Cardiology [facility] [place] [address]                             
[phone]

## 2020-06-14 NOTE — PROGRESS NOTES
Jenny Robbins Hussein Grafnes Admit Date: 6/8/2020 Subjective: No new c/o's. BP's been better, but creatinine up a little to 1.7 this am. 
 
 
 
 
Current Facility-Administered Medications Medication Dose Route Frequency  hydrALAZINE (APRESOLINE) tablet 10 mg  10 mg Oral TID  bumetanide (BUMEX) tablet 1 mg  1 mg Oral BID  insulin glargine (LANTUS) injection 15 Units  15 Units SubCUTAneous DAILY  apixaban (ELIQUIS) tablet 2.5 mg  2.5 mg Oral Q12H  loratadine (CLARITIN) tablet 10 mg  10 mg Oral DAILY  aspirin chewable tablet 81 mg  81 mg Oral DAILY  sodium chloride (NS) flush 5-40 mL  5-40 mL IntraVENous Q8H  
 sodium chloride (NS) flush 5-40 mL  5-40 mL IntraVENous PRN  
 albuterol (PROVENTIL VENTOLIN) nebulizer solution 2.5 mg  2.5 mg Nebulization Q4H PRN  
 arformoteroL (BROVANA) neb solution 15 mcg  15 mcg Nebulization BID RT  
 budesonide (PULMICORT) 500 mcg/2 ml nebulizer suspension  500 mcg Nebulization BID RT  
 carvediloL (COREG) tablet 6.25 mg  6.25 mg Oral BID WITH MEALS  ondansetron (ZOFRAN) injection 4 mg  4 mg IntraVENous Q4H PRN  
 insulin lispro (HUMALOG) injection   SubCUTAneous AC&HS  allopurinoL (ZYLOPRIM) tablet 100 mg  100 mg Oral DAILY  atorvastatin (LIPITOR) tablet 20 mg  20 mg Oral QHS  isosorbide mononitrate ER (IMDUR) tablet 60 mg  60 mg Oral DAILY  pantoprazole (PROTONIX) tablet 20 mg  20 mg Oral ACB  acetaminophen (TYLENOL) tablet 650 mg  650 mg Oral Q4H PRN Or  
 acetaminophen (TYLENOL) solution 650 mg  650 mg Per NG tube Q4H PRN  Or  
 acetaminophen (TYLENOL) suppository 650 mg  650 mg Rectal Q4H PRN  
 glucose chewable tablet 16 g  4 Tab Oral PRN  
 glucagon (GLUCAGEN) injection 1 mg  1 mg IntraMUSCular PRN  
 dextrose 10% infusion 0-250 mL  0-250 mL IntraVENous PRN  
 benzocaine-menthoL (CEPACOL) lozenge 1 Lozenge  1 Lozenge Mucous Membrane PRN  
  azelastine (ASTELIN) 137mcg/spray nasal spray  1 Spray Both Nostrils BID PRN  
 hydrALAZINE (APRESOLINE) 20 mg/mL injection 20 mg  20 mg IntraVENous Q6H PRN Objective:  
 
Patient Vitals for the past 8 hrs: 
 BP Temp Pulse Resp SpO2  
06/14/20 0955 113/41 97.9 °F (36.6 °C) 60 19 96 % 06/14/20 0800 163/62  60    
06/14/20 0735     96 % 06/14/20 0616 145/45 98.8 °F (37.1 °C) 62 13 98 % No intake/output data recorded. No intake/output data recorded. Physical Exam: NAD. A&O. Neck -- Supple. No JVD. Heart -- RRR. Lungs -- CTA. Abd -- Benign. Ext -- No LE edema, b/l. Data Review Recent Results (from the past 24 hour(s)) GLUCOSE, POC Collection Time: 06/13/20 12:04 PM  
Result Value Ref Range Glucose (POC) 440 (H) 65 - 100 mg/dL Performed by Arcadio Lord GLUCOSE, POC Collection Time: 06/13/20  4:51 PM  
Result Value Ref Range Glucose (POC) 221 (H) 65 - 100 mg/dL Performed by Arcadio Lord GLUCOSE, POC Collection Time: 06/13/20  9:31 PM  
Result Value Ref Range Glucose (POC) 237 (H) 65 - 100 mg/dL Performed by Rayne Fruitland METABOLIC PANEL, BASIC Collection Time: 06/14/20  2:16 AM  
Result Value Ref Range Sodium 137 136 - 145 mmol/L Potassium 3.8 3.5 - 5.1 mmol/L Chloride 103 97 - 108 mmol/L  
 CO2 25 21 - 32 mmol/L Anion gap 9 5 - 15 mmol/L Glucose 182 (H) 65 - 100 mg/dL BUN 29 (H) 6 - 20 MG/DL Creatinine 1.73 (H) 0.55 - 1.02 MG/DL  
 BUN/Creatinine ratio 17 12 - 20 GFR est AA 34 (L) >60 ml/min/1.73m2 GFR est non-AA 28 (L) >60 ml/min/1.73m2 Calcium 8.9 8.5 - 10.1 MG/DL  
GLUCOSE, POC Collection Time: 06/14/20  7:48 AM  
Result Value Ref Range Glucose (POC) 199 (H) 65 - 100 mg/dL Performed by Ej Finn Assessment:  
 
Principal Problem: Dizziness -- May be multifactorial. 
 
 
 
Active Problems: 
  Coronary atherosclerosis of native coronary artery (11/18/2010) Hypertension complicating diabetes ([facility] (11/17/2016) Type 2 diabetes with nephropathy ([facility] (2/12/2018) Bilateral carotid artery stenosis (6/8/2020) Vertebral artery stenosis, bilateral (6/8/2020) [facility] -- Cath EF better than ECHO EF. (?Takatsubo cardiomyopathy) Plan: 1. Cont ASA & Eliquis. 2. On PO Bumex now. 3. Dr. Mehdi Mcgrath changed Losartan to Hydralazine due to rising creatinine. Recheck drew in am. 
4. Cont PT. D/w dtr Hiral Phillipsst: [phone] Mary Ba MD

## 2020-06-14 NOTE — PROGRESS NOTES
Problem: Falls - Risk of 
Goal: *Absence of Falls Description: Document Yuniel Santos Fall Risk and appropriate interventions in the flowsheet. Outcome: Progressing Towards Goal 
Note: Fall Risk Interventions: 
Mobility Interventions: Assess mobility with egress test, Communicate number of staff needed for ambulation/transfer, OT consult for ADLs, Patient to call before getting OOB, PT Consult for mobility concerns, PT Consult for assist device competence, Strengthening exercises (ROM-active/passive), Utilize walker, cane, or other assistive device, Utilize gait belt for transfers/ambulation Medication Interventions: Assess postural VS orthostatic hypotension, Evaluate medications/consider consulting pharmacy, Patient to call before getting OOB, Teach patient to arise slowly, Utilize gait belt for transfers/ambulation Elimination Interventions: Call light in reach, Patient to call for help with toileting needs, Stay With Me (per policy), Toilet paper/wipes in reach, Toileting schedule/hourly rounds Problem: Patient Education: Go to Patient Education Activity Goal: Patient/Family Education Outcome: Progressing Towards Goal 
  
Problem: Pain Goal: *Control of Pain Outcome: Progressing Towards Goal 
  
Problem: Patient Education: Go to Patient Education Activity Goal: Patient/Family Education Outcome: Progressing Towards Goal 
  
Problem: Pressure Injury - Risk of 
Goal: *Prevention of pressure injury Description: Document Ruperto Scale and appropriate interventions in the flowsheet. Outcome: Progressing Towards Goal 
Note: Pressure Injury Interventions: 
Sensory Interventions: Assess changes in LOC, Assess need for specialty bed, Avoid rigorous massage over bony prominences, Check visual cues for pain, Discuss PT/OT consult with provider, Float heels, Keep linens dry and wrinkle-free, Monitor skin under medical devices, Minimize linen layers, Maintain/enhance activity level Moisture Interventions: Apply protective barrier, creams and emollients, Limit adult briefs, Minimize layers, Moisture barrier, Offer toileting Q_hr Activity Interventions: Assess need for specialty bed, Increase time out of bed, PT/OT evaluation Mobility Interventions: Assess need for specialty bed, Float heels, HOB 30 degrees or less, Pressure redistribution bed/mattress (bed type), PT/OT evaluation, Turn and reposition approx. every two hours(pillow and wedges) Nutrition Interventions: Document food/fluid/supplement intake, Discuss nutritional consult with provider, Offer support with meals,snacks and hydration Problem: Patient Education: Go to Patient Education Activity Goal: Patient/Family Education Outcome: Progressing Towards Goal 
  
Problem: Diabetes Self-Management Goal: *Disease process and treatment process Description: Define diabetes and identify own type of diabetes; list 3 options for treating diabetes. Outcome: Progressing Towards Goal 
Goal: *Incorporating nutritional management into lifestyle Description: Describe effect of type, amount and timing of food on blood glucose; list 3 methods for planning meals. Outcome: Progressing Towards Goal 
Goal: *Incorporating physical activity into lifestyle Description: State effect of exercise on blood glucose levels. Outcome: Progressing Towards Goal 
Goal: *Developing strategies to promote health/change behavior Description: Define the ABC's of diabetes; identify appropriate screenings, schedule and personal plan for screenings. Outcome: Progressing Towards Goal 
Goal: *Using medications safely Description: State effect of diabetes medications on diabetes; name diabetes medication taking, action and side effects. Outcome: Progressing Towards Goal 
Goal: *Monitoring blood glucose, interpreting and using results Description: Identify recommended blood glucose targets  and personal targets. Outcome: Progressing Towards Goal 
Goal: *Prevention, detection, treatment of acute complications Description: List symptoms of hyper- and hypoglycemia; describe how to treat low blood sugar and actions for lowering  high blood glucose level. Outcome: Progressing Towards Goal 
Goal: *Prevention, detection and treatment of chronic complications Description: Define the natural course of diabetes and describe the relationship of blood glucose levels to long term complications of diabetes. Outcome: Progressing Towards Goal 
Goal: *Developing strategies to address psychosocial issues Description: Describe feelings about living with diabetes; identify support needed and support network Outcome: Progressing Towards Goal 
Goal: *Sick day guidelines Outcome: Progressing Towards Goal 
  
Problem: Patient Education: Go to Patient Education Activity Goal: Patient/Family Education Outcome: Progressing Towards Goal

## 2020-06-14 NOTE — PROGRESS NOTES
Bedside shift change report given to Jorge Benítez RN (oncoming nurse) by Ignacio Pacheco RN (offgoing nurse). Report included the following information SBAR, Kardex, ED Summary, Procedure Summary, Intake/Output, MAR, Recent Results, Cardiac Rhythm NSR/A-paced, Quality Measures and Dual Neuro Assessment.

## 2020-06-14 NOTE — PROGRESS NOTES
Problem: Falls - Risk of 
Goal: *Absence of Falls Description: Document Richie Tee Fall Risk and appropriate interventions in the flowsheet. Outcome: Progressing Towards Goal 
Note: Fall Risk Interventions: 
Mobility Interventions: Communicate number of staff needed for ambulation/transfer, OT consult for ADLs, Patient to call before getting OOB, PT Consult for mobility concerns, PT Consult for assist device competence, Strengthening exercises (ROM-active/passive) Medication Interventions: Evaluate medications/consider consulting pharmacy, Patient to call before getting OOB, Teach patient to arise slowly Elimination Interventions: Call light in reach, Elevated toilet seat, Patient to call for help with toileting needs, Stay With Me (per policy), Toilet paper/wipes in reach, Toileting schedule/hourly rounds Problem: Patient Education: Go to Patient Education Activity Goal: Patient/Family Education Outcome: Progressing Towards Goal 
  
Problem: Pain Goal: *Control of Pain Outcome: Progressing Towards Goal 
Goal: *PALLIATIVE CARE:  Alleviation of Pain Outcome: Progressing Towards Goal 
  
Problem: Patient Education: Go to Patient Education Activity Goal: Patient/Family Education Outcome: Progressing Towards Goal 
  
Problem: Pressure Injury - Risk of 
Goal: *Prevention of pressure injury Description: Document Ruperto Scale and appropriate interventions in the flowsheet. Outcome: Progressing Towards Goal 
Note: Pressure Injury Interventions: 
Sensory Interventions: Assess need for specialty bed, Avoid rigorous massage over bony prominences, Assess changes in LOC, Check visual cues for pain, Discuss PT/OT consult with provider, Float heels, Keep linens dry and wrinkle-free Moisture Interventions: Absorbent underpads, Apply protective barrier, creams and emollients, Assess need for specialty bed, Check for incontinence Q2 hours and as needed Activity Interventions: Increase time out of bed, Pressure redistribution bed/mattress(bed type), PT/OT evaluation Mobility Interventions: Float heels, HOB 30 degrees or less, Pressure redistribution bed/mattress (bed type) Nutrition Interventions: Offer support with meals,snacks and hydration Problem: Patient Education: Go to Patient Education Activity Goal: Patient/Family Education Outcome: Progressing Towards Goal 
  
Problem: Diabetes Self-Management Goal: *Disease process and treatment process Description: Define diabetes and identify own type of diabetes; list 3 options for treating diabetes. Outcome: Progressing Towards Goal 
Goal: *Incorporating nutritional management into lifestyle Description: Describe effect of type, amount and timing of food on blood glucose; list 3 methods for planning meals. Outcome: Progressing Towards Goal 
Goal: *Incorporating physical activity into lifestyle Description: State effect of exercise on blood glucose levels. Outcome: Progressing Towards Goal 
Goal: *Developing strategies to promote health/change behavior Description: Define the ABC's of diabetes; identify appropriate screenings, schedule and personal plan for screenings. Outcome: Progressing Towards Goal 
Goal: *Using medications safely Description: State effect of diabetes medications on diabetes; name diabetes medication taking, action and side effects. Outcome: Progressing Towards Goal 
Goal: *Monitoring blood glucose, interpreting and using results Description: Identify recommended blood glucose targets  and personal targets. Outcome: Progressing Towards Goal 
Goal: *Prevention, detection, treatment of acute complications Description: List symptoms of hyper- and hypoglycemia; describe how to treat low blood sugar and actions for lowering  high blood glucose level. Outcome: Progressing Towards Goal 
Goal: *Prevention, detection and treatment of chronic complications Description: Define the natural course of diabetes and describe the relationship of blood glucose levels to long term complications of diabetes. Outcome: Progressing Towards Goal 
Goal: *Developing strategies to address psychosocial issues Description: Describe feelings about living with diabetes; identify support needed and support network Outcome: Progressing Towards Goal 
Goal: *Insulin pump training Outcome: Progressing Towards Goal 
Goal: *Sick day guidelines Outcome: Progressing Towards Goal 
Goal: *Patient Specific Goal (EDIT GOAL, INSERT TEXT) Outcome: Progressing Towards Goal 
  
Problem: Patient Education: Go to Patient Education Activity Goal: Patient/Family Education Outcome: Progressing Towards Goal 
  
Problem: Patient Education: Go to Patient Education Activity Goal: Patient/Family Education Outcome: Progressing Towards Goal 
  
Problem: Patient Education: Go to Patient Education Activity Goal: Patient/Family Education Outcome: Progressing Towards Goal

## 2020-06-14 NOTE — PROGRESS NOTES
Bedside and Verbal shift change report given to [address] (oncoming nurse) by Kaiser Garcia (offgoing nurse). Report included the following information SBAR, Kardex, Intake/Output, MAR, Recent Results, Cardiac Rhythm Paced and Dual Neuro Assessment.

## 2020-06-15 PROBLEM — L30.9 DERMATITIS: Status: ACTIVE | Noted: 2020-01-01

## 2020-06-15 NOTE — PROGRESS NOTES
Bedside shift change report given to Jamal Matthews RN (oncoming nurse) by Kevin Schmitz RN (offgoing nurse). Report included the following information SBAR, Kardex, Intake/Output, MAR, Recent Results, Cardiac Rhythm V paced and Dual Neuro Assessment. I have read and agree with the documentation provided by Clark Garcia, Nurse Danuta, as her preceptor.

## 2020-06-15 NOTE — PROGRESS NOTES
Physical Therapy Note 
06/15/2020 
 
 
6 MWT results: (Specify if any supplemental oxygen is used, the type, pre, during and post sats.) Distance Walked in Feet (ft): 374 ft. Pre Heart Rate: 60  
Pre O2 Saturation: 96 - Room air Post Heart Rate: 77 Post O2 Saturation: 95 - Room air Assistive device used:   Rolling walker Normative data:  
Men 39-80 years old = 1889 feet; Women 3680 years epj=9383 feet Modified 10 point Karlos RPE scale utilized: 
0 = no breathlessness at all ---> 10 = maximum exertion Please refer to the flowsheet for any additional vital signs taken during this treatment. Full note to follow.  
 
Jace Wagner, PT, DPT

## 2020-06-15 NOTE — PROGRESS NOTES
Problem: Falls - Risk of 
Goal: *Absence of Falls Description: Document Richie Tee Fall Risk and appropriate interventions in the flowsheet. Outcome: Progressing Towards Goal 
Note: Fall Risk Interventions: 
Mobility Interventions: Patient to call before getting OOB Medication Interventions: Assess postural VS orthostatic hypotension, Teach patient to arise slowly, Patient to call before getting OOB Elimination Interventions: Call light in reach, Toileting schedule/hourly rounds Problem: Patient Education: Go to Patient Education Activity Goal: Patient/Family Education Outcome: Progressing Towards Goal 
  
Problem: Pain Goal: *Control of Pain Outcome: Progressing Towards Goal 
Goal: *PALLIATIVE CARE:  Alleviation of Pain Outcome: Progressing Towards Goal 
  
Problem: Patient Education: Go to Patient Education Activity Goal: Patient/Family Education Outcome: Progressing Towards Goal 
  
Problem: Pressure Injury - Risk of 
Goal: *Prevention of pressure injury Description: Document Ruperto Scale and appropriate interventions in the flowsheet. Outcome: Progressing Towards Goal 
Note: Pressure Injury Interventions: 
Sensory Interventions: Assess need for specialty bed, Avoid rigorous massage over bony prominences, Assess changes in LOC, Check visual cues for pain, Discuss PT/OT consult with provider, Float heels, Keep linens dry and wrinkle-free Moisture Interventions: Absorbent underpads, Offer toileting Q_hr Activity Interventions: Increase time out of bed, PT/OT evaluation Mobility Interventions: HOB 30 degrees or less, PT/OT evaluation Nutrition Interventions: Document food/fluid/supplement intake Problem: Patient Education: Go to Patient Education Activity Goal: Patient/Family Education Outcome: Progressing Towards Goal 
  
Problem: Diabetes Self-Management Goal: *Disease process and treatment process Description: Define diabetes and identify own type of diabetes; list 3 options for treating diabetes. Outcome: Progressing Towards Goal 
Goal: *Incorporating nutritional management into lifestyle Description: Describe effect of type, amount and timing of food on blood glucose; list 3 methods for planning meals. Outcome: Progressing Towards Goal 
Goal: *Incorporating physical activity into lifestyle Description: State effect of exercise on blood glucose levels. Outcome: Progressing Towards Goal 
Goal: *Developing strategies to promote health/change behavior Description: Define the ABC's of diabetes; identify appropriate screenings, schedule and personal plan for screenings. Outcome: Progressing Towards Goal 
Goal: *Using medications safely Description: State effect of diabetes medications on diabetes; name diabetes medication taking, action and side effects. Outcome: Progressing Towards Goal 
Goal: *Monitoring blood glucose, interpreting and using results Description: Identify recommended blood glucose targets  and personal targets. Outcome: Progressing Towards Goal 
Goal: *Prevention, detection, treatment of acute complications Description: List symptoms of hyper- and hypoglycemia; describe how to treat low blood sugar and actions for lowering  high blood glucose level. Outcome: Progressing Towards Goal 
Goal: *Prevention, detection and treatment of chronic complications Description: Define the natural course of diabetes and describe the relationship of blood glucose levels to long term complications of diabetes. Outcome: Progressing Towards Goal 
Goal: *Developing strategies to address psychosocial issues Description: Describe feelings about living with diabetes; identify support needed and support network Outcome: Progressing Towards Goal 
Goal: *Insulin pump training Outcome: Progressing Towards Goal 
Goal: *Sick day guidelines Outcome: Progressing Towards Goal 
 Goal: *Patient Specific Goal (EDIT GOAL, INSERT TEXT) Outcome: Progressing Towards Goal 
  
Problem: Patient Education: Go to Patient Education Activity Goal: Patient/Family Education Outcome: Progressing Towards Goal 
  
Problem: Patient Education: Go to Patient Education Activity Goal: Patient/Family Education Outcome: Progressing Towards Goal 
  
Problem: Patient Education: Go to Patient Education Activity Goal: Patient/Family Education Outcome: Progressing Towards Goal 
  
Problem: Patient Education: Go to Patient Education Activity Goal: Patient/Family Education Outcome: Progressing Towards Goal 
  
Problem: Heart Failure: Day 1 Goal: Off Pathway (Use only if patient is Off Pathway) Outcome: Progressing Towards Goal 
Goal: Activity/Safety Outcome: Progressing Towards Goal 
Goal: Consults, if ordered Outcome: Progressing Towards Goal 
Goal: Diagnostic Test/Procedures Outcome: Progressing Towards Goal 
Goal: Nutrition/Diet Outcome: Progressing Towards Goal 
Goal: Discharge Planning Outcome: Progressing Towards Goal 
Goal: Medications Outcome: Progressing Towards Goal 
Goal: Respiratory Outcome: Progressing Towards Goal 
Goal: Treatments/Interventions/Procedures Outcome: Progressing Towards Goal 
Goal: Psychosocial 
Outcome: Progressing Towards Goal 
Goal: *Oxygen saturation within defined limits Outcome: Progressing Towards Goal 
Goal: *Hemodynamically stable Outcome: Progressing Towards Goal 
Goal: *Optimal pain control at patient's stated goal 
Outcome: Progressing Towards Goal 
Goal: *Anxiety reduced or absent Outcome: Progressing Towards Goal

## 2020-06-15 NOTE — PROGRESS NOTES
Problem: Falls - Risk of 
Goal: *Absence of Falls Description: Document Rudy Morales Fall Risk and appropriate interventions in the flowsheet. Outcome: Progressing Towards Goal 
Note: Fall Risk Interventions: 
Mobility Interventions: Bed/chair exit alarm, Patient to call before getting OOB, PT Consult for assist device competence Medication Interventions: Patient to call before getting OOB, Bed/chair exit alarm Elimination Interventions: Bed/chair exit alarm, Call light in reach, Toileting schedule/hourly rounds Problem: Pain Goal: *Control of Pain Outcome: Progressing Towards Goal 
  
Problem: Pressure Injury - Risk of 
Goal: *Prevention of pressure injury Description: Document Ruperto Scale and appropriate interventions in the flowsheet. Outcome: Progressing Towards Goal 
Note: Pressure Injury Interventions: 
Sensory Interventions: Assess changes in LOC, Discuss PT/OT consult with provider, Keep linens dry and wrinkle-free, Minimize linen layers Moisture Interventions: Absorbent underpads, Apply protective barrier, creams and emollients, Limit adult briefs Activity Interventions: Assess need for specialty bed, PT/OT evaluation, Increase time out of bed Mobility Interventions: PT/OT evaluation, Float heels, Assess need for specialty bed Nutrition Interventions: Document food/fluid/supplement intake, Discuss nutritional consult with provider Problem: Diabetes Self-Management Goal: *Disease process and treatment process Description: Define diabetes and identify own type of diabetes; list 3 options for treating diabetes. Outcome: Progressing Towards Goal 
Goal: *Incorporating physical activity into lifestyle Description: State effect of exercise on blood glucose levels. Outcome: Progressing Towards Goal 
Goal: *Developing strategies to promote health/change behavior Description: Define the ABC's of diabetes; identify appropriate screenings, schedule and personal plan for screenings. Outcome: Progressing Towards Goal 
Goal: *Using medications safely Description: State effect of diabetes medications on diabetes; name diabetes medication taking, action and side effects.  
Outcome: Progressing Towards Goal

## 2020-06-15 NOTE — PROGRESS NOTES
Problem: Mobility Impaired (Adult and Pediatric) Goal: *Acute Goals and Plan of Care (Insert Text) Description:  
FUNCTIONAL STATUS PRIOR TO ADMISSION: Patient was modified independent using a single point cane for community level functional mobility. Denies falls. Reports ongoing intermittent \"dizziness\" at baseline for the past 3 years. HOME SUPPORT PRIOR TO ADMISSION: The patient lived with her daughter but did not require assist. 
 
Physical Therapy Goals Initiated 6/9/2020 1. Patient will move from supine to sit and sit to supine  and scoot up and down in bed with modified independence within 7 day(s). 2.  Patient will transfer from bed to chair and chair to bed with modified independence using the least restrictive device within 7 day(s). 3.  Patient will perform sit to stand with modified independence within 7 day(s). 4.  Patient will ambulate with modified independence for 150 feet with the least restrictive device within 7 day(s). 5.  Patient will ascend/descend 3 stairs with handrail(s) with modified independence within 7 day(s). 6.  Patient will improve Ramirez Balance score by 7 points within 7 days. Outcome: Resolved/Met PHYSICAL THERAPY TREATMENT/DISCHARGE Patient: Naveed Rodriguez (89 y.o. female) Date: 6/15/2020 Diagnosis: CVA (cerebral vascular accident) ([facility] [address] [I63.9] CVA (cerebral vascular accident) ([facility] [address] [I63.9] Dizziness Procedure(s) (LRB): LEFT AND RIGHT HEART CATH / CORONARY ANGIOGRAPHY (N/A) 4 Days Post-Op Precautions:   
Chart, physical therapy assessment, plan of care and goals were reviewed. ASSESSMENT Patient continues with skilled PT services and has progressed towards goals - appears to be back to baseline level and reports pending discharge this PM. Reports dizziness and SOB has greatly improved.  She was able to participate in 6MWT - overall SUP level with use of RW for stability and energy conservation. Did require one standing rest break but no overt LOB or deviation observed. Provided education on energy conservation, home safety, and use of RW at all times. Answered questions to satisfaction. At this time, anticipate that she will be appropriate for discharge home with family assist once medically cleared (declining HHPT) without further acute care PT. Recommend continued mobility in room and hallway with RN SUP and use of Rw to reduce risk of decline. Will sign off. Other factors to consider for discharge: None. Has RW and supportive daughter PLAN : 
Patient will be discharged from acute skilled physical therapy at this time. Rationale for discharge: 
Goals achieved Recommendation for discharge: (in order for the patient to meet his/her long term goals) Pt has declined recommendation for HHPT. Recommend SUP/assist from family and use of RW This discharge recommendation: 
Has been made in collaboration with the attending provider and/or case management IF patient discharges home will need the following DME: patient owns DME required for discharge SUBJECTIVE:  
Patient stated I'm ready to get out of here.  OBJECTIVE DATA SUMMARY:  
Critical Behavior: 
Neurologic State: Alert, Eyes open spontaneously Orientation Level: Oriented X4 Cognition: Follows commands, Appropriate safety awareness Safety/Judgement: Awareness of environment, Insight into deficits Functional Mobility Training: 
Bed Mobility: 
  
Transfers: 
Sit to Stand: Supervision Stand to Sit: Supervision Balance: 
Standing: Impaired Standing - Static: Good Standing - Dynamic : Good;Constant support Ambulation/Gait Training: 
Distance (ft): 375 Feet (ft) Assistive Device: Gait belt;Walker, rolling Ambulation - Level of Assistance: Supervision Gait Abnormalities: Decreased step clearance;Shuffling gait Base of Support: Narrowed Speed/Teri: Slow;Shuffled Step Length: Right shortened;Left shortened 6 MWT results: (Specify if any supplemental oxygen is used, the type, pre, during and post sats.) Distance Walked in Feet (ft): 374 ft. Pre Heart Rate: 60  
Pre O2 Saturation: 96 - Room air Post Heart Rate: 77 Post O2 Saturation: 95 - Room air Assistive device used: Assistive Device: Gait belt;Walker, rolling Normative data:  
Men 39-80 years old = 1889 feet; Women 3680 years qtc=8884 feet Modified 10 point Karlos RPE scale utilized: 
0 = no breathlessness at all ---> 10 = maximum exertion Please refer to the flowsheet for any additional vital signs taken during this treatment. Activity Tolerance:  
Good, SpO2 stable on RA, and requires rest breaks Please refer to the flowsheet for vital signs taken during this treatment. After treatment patient left in no apparent distress:  
Sitting in chair and Call bell within reach COMMUNICATION/COLLABORATION:  
The patients plan of care was discussed with: Registered nurse. Mark Jules PT, DPT Time Calculation: 23 mins

## 2020-06-15 NOTE — DISCHARGE SUMMARY
[address] DISCHARGE SUMMARY Name:  Henrietta Lu 
MR#:  [id number] :  1935 ACCOUNT #:  [de-identified] ADMIT DATE:  2020 DISCHARGE DATE:  06/15/2020 DISCHARGE DIAGNOSES: 
1. Probable multifactorial dizziness. 2.  Cardiomyopathy. 3.  Chronic kidney disease stage III. 4.  Type 2 diabetes mellitus with nephropathy. 5.  Vertebral artery stenosis. 6.  Coronary artery disease. 7.  Hypertension complicating diabetes. 8.  Hyperlipidemia. DISCHARGE INSTRUCTIONS: 
1. Bumex 1 mg p.o. b.i.d. 2.  KCl 10 mEq p.o. daily. 3.  Levemir insulin 20 units a.m., 10 units at bedtime. 4.  NovoLog insulin 7 units before each meal. 
5.  Hydralazine 10 mg p.o. t.i.d. 6.  Coreg 6.25 mg p.o. b.i.d. 
7.  Tylenol 650 mg p.o. q.4 h. p.r.n. pain or fever. 8.  Acyclovir 5% ointment t.i.d. p.r.n. fever blister. 9.  Albuterol inhaler 2 puffs q.4 h. p.r.n. 10.  Allopurinol 100 mg daily. 11.  Apixaban 2.5 mg p.o. q.12 h. 
12. ASA 81 mg daily chewable. 13.  Lipitor 20 mg daily. 14.  Astepro 0.15% one spray to both nostrils nightly. 15.  Symbicort 160/4.5 two puffs p.o. b.i.d. 16.  Vitamin D3 2000 units daily. 17.  Nystatin topically to sacral fold dermatitis b.i.d. for 10 days. 18.  Omeprazole 20 mg p.o. daily. Check blood sugars 3 times daily. Use diabetic diet. Walk with a rolling walker. Call Dr. Daniel Fischer for acute change in health including dizziness or shortness of breath, otherwise follow up with him in 2 to 4 days in the office and with Dr. Domo Mendes in 2 weeks. CHIEF COMPLAINT:  An 52-year-old white female admitted with dizziness. HISTORY OF PRESENT ILLNESS:  She has had dizziness in the past that briefly responded to vestibular therapy. However, she woke on the morning of admission and started having increased dizziness which prevented her from getting out of bed.   She came to the ER and was admitted by the hospitalist.  She denies headache, blurry vision, sore throat, trouble swallowing, trouble with speech, chest pain, abdominal pain, constipation, diarrhea, dyspnea, cough, fever, chills, urinary symptoms, focal or generalized neurological weakness, recent travel, sick contact, falls, injuries, hematemesis, melena, hemoptysis, hematuria, or other concerns or problems. PAST MEDICAL HISTORY: 
1. Coronary artery disease. 2.  Hypertension. 3.  Type 2 diabetes mellitus. 4.  History of CVA. 5.  Congenital right lateral rectus palsy. 6.  Peripheral vascular disease. 7.  Hyperlipidemia. 8.  Atrial fibrillation. PAST SURGICAL HISTORY: 
1.  Placement of transcath leadless permanent pacemaker on 02/05/2020. AV node ablation about that time. 2.  Hysterectomy. 3.  Cardiac catheterization. 4.  Coronary artery stenting. MEDICATIONS: 
1. Symbicort 160/4.5 two puffs b.i.d. 2.  Bumex 2 mg daily. 3.  Acyclovir 5% ointment t.i.d. p.r.n. 
4.  Imdur 60 mg daily. 5.  Tylenol 650 mg q. 4 h. p.r.n. 
6.  Albuterol inhaler 2 puffs q.4 h. p.r.n. 
7.  ASA 81 mg daily. 8.  Lipitor 20 mg daily. 9.  Astepro 0.15% as needed. 10.  Levemir 20 units q.a.m. and 10 units at bedtime. 11.  NovoLog FlexPen 4 to 7 units before each meal. 
12.  Bactroban ointment b.i.d. p.r.n. 13.  Glipizide 10 mg b.i.d. 14.  Allopurinol 100 mg daily. 15.  Apixaban 2.5 mg p.o. q.12 h. 
16.  Omeprazole 20 mg daily. ALLERGIES:  BACTRIM, AFFECTS HER KIDNEY FUNCTION. CLONIDINE, LIGHTHEADEDNESS. CODEINE, UNKNOWN.  KEFLIN, ITCHING. PENICILLIN, UNKNOWN. TRAMADOL, GROGGINESS. FAMILY HISTORY:  Noncontributory. SOCIAL HISTORY:  Former smoker. Denies EtOH. , has a supportive family, lives locally. REVIEW OF SYSTEMS:  All systems were reviewed and found to be essentially negative except for the symptoms mentioned above. PHYSICAL EXAMINATION: 
VITAL SIGNS:  Temperature 98, pulse 60, RR 20, /88, pulse ox 97% on room air. GENERAL:  Alert and oriented x3, in no acute distress. Appears stated age. HEENT:  She has vision loss in the right eye. Left eye, pupil equal and reactive to light. Moist mucous membranes. NECK:  Supple. CHEST:  Decreased basilar breath sounds. CORONARY:  S1 and S2 heard. ABDOMEN:  Negative. EXTREMITIES:  No edema. NEUROLOGIC:  Vision loss to right eye, otherwise cranial nerves II through XII are grossly intact. Moves all 4. Strength 5/5. LABORATORY DATA:  WBC 12.7, hemoglobin 13.2, hematocrit 39.9, platelets 962. UA, no evidence of infection. Sodium 135, K 4.4, BUN 27, creatinine 1.61. Alkaline phosphatase 150, remainder of the LFTs normal.  troponin I less than 0.05. IMAGING:  CTA of the head:  No acute large vessel occlusion. Severe bilateral internal carotid artery origin stenosis, moderate bilateral cavernous carotid stenosis, bilateral patent vertebral arteries with moderate bilateral vertebral stenosis in the midportion of the V4 segments. Status post left craniectomy and resection of medial inferior cerebellar hemisphere. No significant perfusion abnormality. EKG:  Ventricular paced rhythm with nonspecific ST changes. CT of the head:  No acute intracranial abnormality, mild hyperdensity of the cerebral white matter likely due to chronic small vessel ischemic disease. HOSPITAL COURSE:  The patient was admitted by the hospitalist.  Dr. Courtney Padgett of Neurointerventional Radiology was consulted. Aspirin and Eliquis were continued. There was an initial concern about potential CVA. [facility] consulted and participated in her care by Dr. Shahbaz Moreira. Fortunately Brain MRI negative for acute CVA. Echocardiogram, EF 30-35%. No regional wall abnormality. , mildly dilated right and left atria, moderate mitral regurgitation, moderately reduced right ventricular systolic function, and mild tricuspid regurgitation.   [facility], Dr. Phi Barnett saw the patient in consultation for Dr. Glenn Allred. After discussion, the patient underwent cardiac catheterization by Dr. Duane Colony. She was felt to have nonischemic cardiomyopathy with systolic and diastolic CHF, New [place] Heart Association Classification III. Her EF by cath was better than seen on echo. EF by cath 45-50%. There was mild-to-moderate disease in the RCA, moderate-to-severe disease in the LAD without critical stenosis. Right heart cath revealed elevated RA, RV, pulmonary capillary wedge and PA pressures; indicating the need for diuresis. The patient was diuresed with IV Bumex. She was placed on Coreg. She was continued on losartan but her creatinine raised from 1.4 to 1.79. Her losartan was discontinued. She was placed on hydralazine . Imdur was continued. Her dizziness may have been multifactorial including her cardiomyopathy and her vertebral artery and carotid artery stenosis. She was not felt to require vascular stenting. She was mobilized with physical therapy and was walking well with a walker by the end of her hospital stay. Hemoglobin A1c was 8.5%. She was treated with Lantus insulin and Humalog sliding scale. Her lipid disorder was treated with Lipitor. She was discharged in improved condition to have followup as above. She was breathing well and oxygenating well by the end of her hospitalization without dizziness. MD NATI [email or web address] 
D:  06/15/2020 7:48 T:  06/15/2020 13:48 JOB #:  [id number] CC:   MD Eliane Jarvis MD Bambi Hazy, MD Encarnacion Fickle, MD

## 2020-06-15 NOTE — PROGRESS NOTES
Medical Progress Note NAME: Haja Mustafa :  1935 MRM:  [id number] Date/Time: 6/15/2020 Problem List:  
 
Principal Problem: 
  Dizziness (2020) Active Problems: 
  Coronary atherosclerosis of native coronary artery (2010) Hypertension complicating diabetes ([address] (2016) Type 2 diabetes with nephropathy ([address] (2018) Bilateral carotid artery stenosis (2020) Vertebral artery stenosis, bilateral (2020) Cardiomyopathy ([address] (2020) Overview: Acute, 2020. Subjective:  
 
Ambulatory with rolling walker in the veloz this am w/o sob . Denies dizziness or chest pain this am   
 
Past Medical History:  
Diagnosis Date  Atrial fibrillation ([address]  CAD (coronary artery disease)  Cardiomyopathy ([address] 2020 Acute, 2020.  Cerebrovascular accident stroke, other, unspec 2010  Coronary atherosclerosis of native coronary artery 2010  Essential hypertension  Essential hypertension, benign 2010  HLD (hyperlipidemia) 2010  PVD (peripheral vascular disease) ([address] 2017  Type II or unspecified type diabetes mellitus without mention of complication, not stated as uncontrolled 2010  Zoster Objective:  
 
 
 
Vitals:  
  
Last 24hrs VS reviewed since prior progress note. Most recent are: 
 
Visit Vitals /62 (BP 1 Location: Right arm, BP Patient Position: At rest) Pulse 80 Temp 97.3 °F (36.3 °C) Resp 15 Ht 5' (1.524 m) Wt 169 lb 1.6 oz (76.7 kg) SpO2 97% BMI 33.03 kg/m² SpO2 Readings from Last 6 Encounters:  
06/15/20 97% 20 96% 20 100% 20 93% 20 98% 20 97% O2 Flow Rate (L/min): 1 l/min No intake or output data in the 24 hours ending 06/15/20 4010 Exam:  
   General:  Alert, cooperative, no distress, appears stated age. Lungs:   Clear to auscultation bilaterally. Heart:  Regular rate and rhythm, S1, S2 normal, no murmur, click, rub or gallop. Abdomen:   Soft, non-tender. Bowel sounds normal.   
Extremities: No  Pedal edema. Lab Data Reviewed: (see below) Recent Results (from the past 24 hour(s)) GLUCOSE, POC Collection Time: 06/14/20  7:48 AM  
Result Value Ref Range Glucose (POC) 199 (H) 65 - 100 mg/dL Performed by Suman Spencer GLUCOSE, POC Collection Time: 06/14/20 11:43 AM  
Result Value Ref Range Glucose (POC) 377 (H) 65 - 100 mg/dL Performed by Levi Gerardo GLUCOSE, POC Collection Time: 06/14/20  4:34 PM  
Result Value Ref Range Glucose (POC) 329 (H) 65 - 100 mg/dL Performed by Levi Gerardo GLUCOSE, POC Collection Time: 06/14/20  9:45 PM  
Result Value Ref Range Glucose (POC) 226 (H) 65 - 100 mg/dL Performed by [facility] METABOLIC PANEL, BASIC Collection Time: 06/15/20  3:50 AM  
Result Value Ref Range Sodium 134 (L) 136 - 145 mmol/L Potassium 3.5 3.5 - 5.1 mmol/L Chloride 100 97 - 108 mmol/L  
 CO2 28 21 - 32 mmol/L Anion gap 6 5 - 15 mmol/L Glucose 200 (H) 65 - 100 mg/dL BUN 30 (H) 6 - 20 MG/DL Creatinine 1.79 (H) 0.55 - 1.02 MG/DL  
 BUN/Creatinine ratio 17 12 - 20 GFR est AA 33 (L) >60 ml/min/1.73m2 GFR est non-AA 27 (L) >60 ml/min/1.73m2 Calcium 8.6 8.5 - 10.1 MG/DL Medications Reviewed: (see below) 
 
______________________________________________________________________ Medications:  
 
Current Facility-Administered Medications Medication Dose Route Frequency  pantoprazole (PROTONIX) tablet 20 mg  20 mg Oral ACB  hydrALAZINE (APRESOLINE) tablet 10 mg  10 mg Oral TID  bumetanide (BUMEX) tablet 1 mg  1 mg Oral BID  insulin glargine (LANTUS) injection 15 Units  15 Units SubCUTAneous DAILY  apixaban (ELIQUIS) tablet 2.5 mg  2.5 mg Oral Q12H  loratadine (CLARITIN) tablet 10 mg  10 mg Oral DAILY  aspirin chewable tablet 81 mg  81 mg Oral DAILY  sodium chloride (NS) flush 5-40 mL  5-40 mL IntraVENous Q8H  
 sodium chloride (NS) flush 5-40 mL  5-40 mL IntraVENous PRN  
 albuterol (PROVENTIL VENTOLIN) nebulizer solution 2.5 mg  2.5 mg Nebulization Q4H PRN  
 arformoteroL (BROVANA) neb solution 15 mcg  15 mcg Nebulization BID RT  
 budesonide (PULMICORT) 500 mcg/2 ml nebulizer suspension  500 mcg Nebulization BID RT  
 carvediloL (COREG) tablet 6.25 mg  6.25 mg Oral BID WITH MEALS  ondansetron (ZOFRAN) injection 4 mg  4 mg IntraVENous Q4H PRN  
 insulin lispro (HUMALOG) injection   SubCUTAneous AC&HS  allopurinoL (ZYLOPRIM) tablet 100 mg  100 mg Oral DAILY  atorvastatin (LIPITOR) tablet 20 mg  20 mg Oral QHS  isosorbide mononitrate ER (IMDUR) tablet 60 mg  60 mg Oral DAILY  acetaminophen (TYLENOL) tablet 650 mg  650 mg Oral Q4H PRN Or  
 acetaminophen (TYLENOL) solution 650 mg  650 mg Per NG tube Q4H PRN Or  
 acetaminophen (TYLENOL) suppository 650 mg  650 mg Rectal Q4H PRN  
 glucose chewable tablet 16 g  4 Tab Oral PRN  
 glucagon (GLUCAGEN) injection 1 mg  1 mg IntraMUSCular PRN  
 dextrose 10% infusion 0-250 mL  0-250 mL IntraVENous PRN  
 benzocaine-menthoL (CEPACOL) lozenge 1 Lozenge  1 Lozenge Mucous Membrane PRN  
 azelastine (ASTELIN) 137mcg/spray nasal spray  1 Spray Both Nostrils BID PRN  
 hydrALAZINE (APRESOLINE) 20 mg/mL injection 20 mg  20 mg IntraVENous Q6H PRN Assessment:  
Cardiomyopathy(?Takatsubo ) responding to tx. CKD serum Cr stable vs yesterday Probable multifactorial dizziness -better Type 2 DM. Insulin being adjusted. Chronic Afib, s/p Pacer in the past  
Patient Active Problem List  
Diagnosis Code  Coronary atherosclerosis of native coronary artery I25.10  
 HLD (hyperlipidemia) E78.5  Cerebrovascular accident stroke, other, unspec I67.89  
 Hypertension complicating diabetes ([facility] E11.59, I10  
  PVD (peripheral vascular disease) with claudication ([facility]) I73.9  Atrial fibrillation ([facility]) I48.91  
 SOB (shortness of breath) R06.02  
 Type 2 diabetes with nephropathy ([facility]) E11.21  
 Severe obesity (BMI 35.0-39. 9) with comorbidity ([facility] E66.01  
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  
 Bilateral carotid artery stenosis I65.23  Vertebral artery stenosis, bilateral I65.03  
 Cardiomyopathy ([facility] I42.9  Dizziness R42 Plan:  
 
She does not want HH. She appears stable for d/c if Cardiology agrees. I reviewed my instructions with pt and dtr(phone) 
      
 
  
 
 
  
              
 
 
 
 
 
 
      
___________________________________________________ Attending Physician: Nelson Shaffer MD

## 2020-06-15 NOTE — NURSE NAVIGATOR
Heart Failure Nurse Navigator met with patient in room, introduced self and explained role. Patient was provided with Living with Heart Failure booklet, daily weight calendar, and refrigerator magnet with HF info. When asked what patient has been told about her heart, patient states \"my heart is not pumping as well as it should\". HF NN discussed signs/symptoms of HF. Explained necessity for patient to weigh herself daily and record weights; to call MD if gain of 3 lbs in one day or 5 lbs in one week. Today's weight was recorded on calendar for patient. HF NN instructed patient regarding low sodium diet. Discussed reading labels and to try to stay between 9331-4899 mg of sodium per day. Patient named off several foods items that she knew to be high salt. As patient does eat out occasionally, discussed planning for restaurant meals and to try to select lower salt items off the menu. Patient verbalized understanding. Patient was informed of cardiology follow up appointment with Dr. Javed Monte on 6/19/20 at 9:40 AM. Patient states that all of her questions were answered to her satisfaction.

## 2020-06-15 NOTE — DISCHARGE INSTRUCTIONS
Take weight daily and notify Dr. Joni Steven of weight gain of 3 lbs or greater in 24 hours or 5 lbs in 1 week. Patient Discharge Instructions    Yanelis Asp / [id number] : 1935    Admitted 2020 Discharged: 6/15/2020     Take Home Medications         · It is important that you take the medication exactly as they are prescribed. · Keep your medication in the bottles provided by the pharmacist and keep a list of the medication names, dosages, and times to be taken in your wallet. · Do not take other medications without consulting your doctor. What to do at Home    Recommended diet: Diabetic     Recommended activity: check blood sugar before meal. Walk with rolling walker. Weigh yourself daily. Call if your weight goes up or down by 3 pounds or more      If you experience any of the following symptoms : shortness of breath or dizziness or other sudden change in health , please follow up with Dr. Jay Eduardo at [phone]    Follow-up with Dr. Tg Mahan in his office . Call [phone] for an appointment. Call Dr. Madeleine Wells for an appointment in 2 weeks         Information obtained by :  I understand that if any problems occur once I am at home I am to contact my physician. I understand and acknowledge receipt of the instructions indicated above.                                                                                                                                            Physician's or R.N.'s Signature                                                                  Date/Time                                                                                                                                              Patient or Representative Signature                                                          Date/Time

## 2020-06-15 NOTE — PROGRESS NOTES
Pharmacist Discharge Medication Reconciliation Discharging Provider: Dr. Jack Rojas Significant PMH:  
Past Medical History:  
Diagnosis Date Atrial fibrillation ([facility] CAD (coronary artery disease) Cardiomyopathy ([facility] 6/11/2020 Acute, 6/8/2020. Cerebrovascular accident stroke, other, unspec 11/18/2010 Coronary atherosclerosis of native coronary artery 11/18/2010 Dermatitis 6/15/2020 Essential hypertension Essential hypertension, benign 11/18/2010 HLD (hyperlipidemia) 11/18/2010 PVD (peripheral vascular disease) ([facility] 2017 Type II or unspecified type diabetes mellitus without mention of complication, not stated as uncontrolled 11/18/2010 Zoster Chief Complaint for this Admission: Chief Complaint Patient presents with Nausea Vomiting Allergies: Bactrim [sulfamethoprim]; Clonidine; Codeine; Keflin; Pcn [penicillins]; and Tramadol Discharge Medications:  
Current Discharge Medication List  
  
 
START taking these medications Details  
carvediloL (COREG) 6.25 mg tablet Take 1 Tab by mouth two (2) times daily (with meals). Qty: 60 Tab, Refills: 11  
  
hydrALAZINE (APRESOLINE) 10 mg tablet Take 1 Tab by mouth three (3) times daily. Qty: 90 Tab, Refills: 11  
  
loratadine (CLARITIN) 10 mg tablet Take 1 Tab by mouth daily. Qty: 30 Tab, Refills: 1  
  
potassium chloride (KLOR-CON) 10 mEq tablet Take 1 Tab by mouth daily. Qty: 30 Tab, Refills: 11 CONTINUE these medications which have CHANGED Details  
bumetanide (BUMEX) 1 mg tablet Take 1 Tab by mouth two (2) times a day. At 8 am and 2 pm 
Qty: 60 Tab, Refills: 11  
  
nystatin (MYCOSTATIN) topical cream Apply  to affected area two (2) times a day. Apply to rash in buttocks fold for 10 days Qty: 15 g, Refills: 0 CONTINUE these medications which have NOT CHANGED  Details  
budesonide-formoteroL (SYMBICORT) 160-4.5 mcg/actuation HFAA Take 2 Puffs by inhalation two (2) times a day. After each use, rinse mouth with water 
Qty: 3 Inhaler, Refills: 3  
  
isosorbide mononitrate ER (IMDUR) 60 mg CR tablet TAKE 1 TABLET TWICE A DAY Qty: 180 Tab, Refills: 1 Associated Diagnoses: Essential hypertension, benign; Atherosclerosis of native coronary artery of native heart without angina pectoris; Coronary artery disease involving native coronary artery of native heart without angina pectoris; Hyperlipidemia, unspecified hyperlipidemia type; Acute, but ill-defined, cerebrovascular disease  
  
albuterol (PROAIR HFA) 90 mcg/actuation inhaler Take 2 Puffs by inhalation every four (4) hours as needed for Wheezing or Shortness of Breath. Qty: 1 Inhaler, Refills: 1  
  
aspirin 81 mg chewable tablet Take 81 mg by mouth nightly. atorvastatin (LIPITOR) 20 mg tablet Take 20 mg by mouth nightly. azelastine (ASTEPRO) 0.15 % (205.5 mcg) 1 Spray by Both Nostrils route nightly. insulin detemir U-100 (LEVEMIR U-100 INSULIN) 100 unit/mL injection 10 Units by SubCUTAneous route daily. Takes 20 units every morning, and takes 8-10 units every evening as needed/as directed based on blood sugar   
  
allopurinol (ZYLOPRIM) 100 mg tablet Take 100 mg by mouth daily. apixaban (ELIQUIS) 2.5 mg tablet Take 1 Tab by mouth two (2) times a day. Qty: 180 Tab, Refills: 2  
  
omeprazole (PRILOSEC) 20 mg capsule TAKE 1 CAPSULE DAILY Qty: 90 Cap, Refills: 2  
  
insulin aspart U-100 (NOVOLOG FLEXPEN U-100 INSULIN) 100 unit/mL (3 mL) inpn INJECT 7 UNITS BEFORE EACH MEAL 3 TIMES A DAY OR AS   DIRECTED Qty: 45 mL, Refills: 3 cholecalciferol, vitamin D3, (VITAMIN D3) 2,000 unit tab Take 2,000 Units by mouth daily. acyclovir (ZOVIRAX) 5 % ointment Use as directed Qty: 15 g, Refills: 3  
  
acetaminophen (TYLENOL) 325 mg tablet Take 2 Tabs by mouth every four (4) hours as needed for Pain or Fever. Qty: 20 Tab, Refills: 1 mupirocin calcium (BACTROBAN) 2 % topical cream Apply  to affected area two (2) times daily as needed. nitroglycerin (NITROSTAT) 0.4 mg SL tablet 1 Tab by SubLINGual route every five (5) minutes as needed for Chest Pain. Qty: 25 Tab, Refills: 3 STOP taking these medications  
  
 metoprolol succinate (TOPROL-XL) 50 mg XL tablet Comments:  
Reason for Stopping:   
   
 losartan (COZAAR) 100 mg tablet Comments:  
Reason for Stopping:   
   
 potassium chloride SR (K-TAB) 20 mEq tablet Comments:  
Reason for Stopping:   
   
 glipiZIDE (GLUCOTROL) 5 mg tablet Comments:  
Reason for Stopping:   
   
 clotrimazole-betamethasone (LOTRISONE) topical cream Comments:  
Reason for Stopping:   
   
  
 
 
The patient's chart, MAR and AVS were reviewed by Jose Alfredo Roberson.

## 2020-06-15 NOTE — PROGRESS NOTES
TRANSITIONS OF CARE;RUR 26% DISPOSITION--Return home with family assistance today. Home Health recommended. Patient politely declined [facility] at this time. Daughter will transportation upon discharge. No further case management needs identified. CM to follow. ROSA Myers,CRM

## 2020-06-15 NOTE — PROGRESS NOTES
I have reviewed discharge instructions with the patient. The patient verbalized understanding. Stroke Education documented in Patient Education: YES Core Measures Documented in [facility]: 
Risk Factors: YES Warning signs of stroke: YES When to Activate 911: YES Medication Education for Risk Factors: YES Smoking cessation if applicable: YES Written Education Given:  Claudio Leavitt Discharge NIH Completed: YES Score: 1 BRAINS: YES Follow Up Appointment Made: YES Date/Time if applicable: June 01WJ @ 940am 
 
Patient discharged to home with belongings, IV and telemetry removed.

## 2020-06-15 NOTE — PROGRESS NOTES
Cardiology Progress Note Admit Date: 6/8/2020 Admit Diagnosis: CVA (cerebral vascular accident) ([facility] [I63.9] CVA (cerebral vascular accident) ([facility] [address] [facility] [I63.9] Date: 6/15/2020     Time: 9:13 AM 
 
 
 Assessment and Plan 1. Nonischemic Cardiomyopathy/ Systolic and diastolic CHF: NYHA III on admission, II today. -EF 45-50% (by cath 6/11/20). Abnormal septal motion by echo, NWMA. EF better on [facility] than prior echo 6/9/20. 
            -C.I. 1.66l/min/m2 per [facility] 
            -Continue Bumex po at reduced dose of 1 mg po daily (due to rising creatinine). -Continue coreg, hydralazine and imdur. Off losartan (rising creatinine and on hydralazine and imdur) 
 -6MWT prior to discharge 
 -Follow up with Dr. Gigi Valentine this Friday (see below) 
              
2. Hx of CAD: RCA stent 1996, 
            -[facility] 6/12/20: mild-mod disease RCA, mod-severe LAD without critical stenosis, no stents needed 
            -Continue ASA, statin, Imdur.  
            -Troponin negative, no chest pain 
             
3. Hx of afib s/p av jazlyn ablation and leadless PPM (2/2020)             -currently v paced, afib underlying             -Continue Eliquis 2.5 mg BID (reduced dose due to age and creatinine) 
 -continue Coreg. 
  
4. Hx of HTN: bp acceptable given age and vertebral/carotid disease.  
            -Coreg 6.25 mg BID, Imdur 60 mg daily, hydralazine 10 TIDA 
  
5. CKD: stage 3B 
            -Follows with Dr. Elizabeth Washington outpatient- has appointment 1st week of July. 
            -Creatinine trending up 1.79 
  
6. DM type II: 
            -T3U 8.5.   
            
7.HLD:  
            -Lipitor 20 mg daily 
  
8. Dizziness/near syncope 
            -Do not suspect that primary etiology of symptoms are cardiac/due to cardiomyopathy. 
            -has Carotid artery disease, vertebral artery stenosis-no interventions recommended at this time by neuro services             -Severe bilateral ICA origin stenosis and moderate bilateral cavernous carotid stenoses and vertebral stenosis(? being intracranial positional hypotension can be a cause but persistent dizziness even sitting in bed suggest cerebellar cause). Defer to neurology/neurointerventional.  
            -Neuro and neurointerventional following. Recommendations for -160 per neuro (for vertebral artery stenosis). - mg and Eliquis recommended by neurology for carotid disease- only needs 81 mg daily ASA from cardiology perspective but defer to neurology/primary team for ASA dosing. Pt was seen by Dr. Maame Lyle. No dizziness this a.m. no chest pain and BP acceptable. Would continue po bumex at reduced dose of 1 mg daily given creatinine with slow trend up. Ok to be discharged with follow up with Dr. Kenji Mcarthur this Friday. Saw and evaluated pt and agree with assessment and plan. [facility] for \[facility] [place] [facility]\"" and close follow up with Dr. Kenji Mcarthur. Akhil Mohan MD 
 
  
Future Appointments Date Time Provider Carly Mcgrath 6/19/2020  9:40 AM Zoë Martins  [address]  
8/12/2020  1:15 PM REMOTE1, [address]  
9/1/2020 10:20 AM Zoë Martins  [address]  
11/16/2020 10:00 AM REMOTE1, [address]  
2/17/2021  8:00 AM REMOTE1, [address]  
5/18/2021 10:30 AM PACEMAKER3, [address]  
5/18/2021 10:40 AM Pradip Cade  [address] Subjective: 
 Guerda Garnica denies chest pain, SOB. States that she ambulated this a.m. with Dr. Orly Perez. No chest pain, no SOB. States she has SOB from time to time, none now. Denies dizziness/lightheadedness,. Reports she feels better today than this weekend. PMH Past Medical History:  
Diagnosis Date  Atrial fibrillation ([facility] [address]  CAD (coronary artery disease)  Cardiomyopathy ([facility] 2020 Acute, 2020.  Cerebrovascular accident stroke, other, unspec 2010  Coronary atherosclerosis of native coronary artery 2010  Dermatitis 6/15/2020  Essential hypertension  Essential hypertension, benign 2010  HLD (hyperlipidemia) 2010  PVD (peripheral vascular disease) ([facility] 2017  Type II or unspecified type diabetes mellitus without mention of complication, not stated as uncontrolled 2010  [place] Social Hx Social History Socioeconomic History  Marital status:  Spouse name: Not on file  Number of children: Not on file  Years of education: Not on file  Highest education level: Not on file Occupational History  Not on file Social Needs  Financial resource strain: Not on file  Food insecurity Worry: Not on file Inability: Not on file  Transportation needs Medical: Not on file Non-medical: Not on file Tobacco Use  Smoking status: Former Smoker Packs/day: 0.80 Years: 20.00 Pack years: 16.00 Types: Cigarettes Last attempt to quit: 1986 Years since quittin.1  Smokeless tobacco: Never Used Substance and Sexual Activity  Alcohol use: No  
 Drug use: No  
 Sexual activity: Not on file Lifestyle  Physical activity Days per week: Not on file Minutes per session: Not on file  Stress: Not on file Relationships  Social connections Talks on phone: Not on file Gets together: Not on file Attends Confucianist service: Not on file Active member of club or organization: Not on file Attends meetings of clubs or organizations: Not on file Relationship status: Not on file  Intimate partner violence Fear of current or ex partner: Not on file Emotionally abused: Not on file Physically abused: Not on file Forced sexual activity: Not on file Other Topics Concern  Not on file Social History Narrative  Not on file Objective: 
  
 Physical Exam: 
             
Visit Vitals /41 Pulse 64 Temp 97.3 °F (36.3 °C) Resp 15 Ht 5' (1.524 m) Wt 169 lb 1.6 oz (76.7 kg) SpO2 96% BMI 33.03 kg/m² General Appearance:   Well developed, alert and oriented x 3, and 
 individual in no acute distress. Ears/Nose/Mouth/Throat:    Hearing grossly normal. 
  
    Neck:  Supple. Chest:    Lungs with bibasilar crackles to auscultation bilaterally. Cardiovascular:   Irregularly irregular rate and rhythm, S1, S2 normal, no murmur. Abdomen:    Soft, non-tender, bowel sounds are active. Extremities:  No edema bilaterally. Skin:  Warm and dry. Telemetry: v paced Data Review:  
 Labs:   
Recent Results (from the past 24 hour(s)) GLUCOSE, POC Collection Time: 06/14/20 11:43 AM  
Result Value Ref Range Glucose (POC) 377 (H) 65 - 100 mg/dL Performed by Madhavi Mclean GLUCOSE, POC Collection Time: 06/14/20  4:34 PM  
Result Value Ref Range Glucose (POC) 329 (H) 65 - 100 mg/dL Performed by Madhavi Mclean GLUCOSE, POC Collection Time: 06/14/20  9:45 PM  
Result Value Ref Range Glucose (POC) 226 (H) 65 - 100 mg/dL Performed by Dilma Flood METABOLIC PANEL, BASIC Collection Time: 06/15/20  3:50 AM  
Result Value Ref Range Sodium 134 (L) 136 - 145 mmol/L Potassium 3.5 3.5 - 5.1 mmol/L Chloride 100 97 - 108 mmol/L  
 CO2 28 21 - 32 mmol/L Anion gap 6 5 - 15 mmol/L Glucose 200 (H) 65 - 100 mg/dL BUN 30 (H) 6 - 20 MG/DL Creatinine 1.79 (H) 0.55 - 1.02 MG/DL  
 BUN/Creatinine ratio 17 12 - 20 GFR est AA 33 (L) >60 ml/min/1.73m2 GFR est non-AA 27 (L) >60 ml/min/1.73m2 Calcium 8.6 8.5 - 10.1 MG/DL  
GLUCOSE, POC Collection Time: 06/15/20  7:45 AM  
Result Value Ref Range Glucose (POC) 241 (H) 65 - 100 mg/dL Performed by Bryanna Santana Radiology: Current Facility-Administered Medications Medication Dose Route Frequency  pantoprazole (PROTONIX) tablet 20 mg  20 mg Oral ACB  nystatin (MYCOSTATIN) 100,000 unit/gram cream   Topical BID  insulin glargine (LANTUS) injection 20 Units  20 Units SubCUTAneous DAILY  potassium chloride SR (KLOR-CON 10) tablet 10 mEq  10 mEq Oral DAILY  hydrALAZINE (APRESOLINE) tablet 10 mg  10 mg Oral TID  apixaban (ELIQUIS) tablet 2.5 mg  2.5 mg Oral Q12H  loratadine (CLARITIN) tablet 10 mg  10 mg Oral DAILY  aspirin chewable tablet 81 mg  81 mg Oral DAILY  sodium chloride (NS) flush 5-40 mL  5-40 mL IntraVENous Q8H  
 sodium chloride (NS) flush 5-40 mL  5-40 mL IntraVENous PRN  
 albuterol (PROVENTIL VENTOLIN) nebulizer solution 2.5 mg  2.5 mg Nebulization Q4H PRN  
 arformoteroL (BROVANA) neb solution 15 mcg  15 mcg Nebulization BID RT  
 budesonide (PULMICORT) 500 mcg/2 ml nebulizer suspension  500 mcg Nebulization BID RT  
 carvediloL (COREG) tablet 6.25 mg  6.25 mg Oral BID WITH MEALS  ondansetron (ZOFRAN) injection 4 mg  4 mg IntraVENous Q4H PRN  
 insulin lispro (HUMALOG) injection   SubCUTAneous AC&HS  allopurinoL (ZYLOPRIM) tablet 100 mg  100 mg Oral DAILY  atorvastatin (LIPITOR) tablet 20 mg  20 mg Oral QHS  isosorbide mononitrate ER (IMDUR) tablet 60 mg  60 mg Oral DAILY  acetaminophen (TYLENOL) tablet 650 mg  650 mg Oral Q4H PRN Or  
 acetaminophen (TYLENOL) solution 650 mg  650 mg Per NG tube Q4H PRN  Or  
 acetaminophen (TYLENOL) suppository 650 mg  650 mg Rectal Q4H PRN  
 glucose chewable tablet 16 g  4 Tab Oral PRN  
 glucagon (GLUCAGEN) injection 1 mg  1 mg IntraMUSCular PRN  
 dextrose 10% infusion 0-250 mL  0-250 mL IntraVENous PRN  
 benzocaine-menthoL (CEPACOL) lozenge 1 Lozenge  1 Lozenge Mucous Membrane PRN  
 azelastine (ASTELIN) 137mcg/spray nasal spray  1 Spray Both Nostrils BID PRN  
  hydrALAZINE (APRESOLINE) 20 mg/mL injection 20 mg  20 mg IntraVENous Q6H PRN Donna Randhawa. [facility] of [address] [place] [address] Rd, [address] [place] 
 [phone]

## 2020-06-16 NOTE — TELEPHONE ENCOUNTER
20 3:05 PM--attempted to reach patient on available phone #s but neither she nor her family members are answering and no one has VM that includes a personal greeting. I will attempt once more to reach her tomorrow. 20 4:09 PM     Patient contacted regarding recent discharge and COVID-19 risk. Discussed COVID-19 related testing which was not done at this time. Test results were not done. Patient informed of results, if available? N/A    Care Transition Nurse/ Ambulatory Care Manager contacted the patient by telephone to perform post discharge assessment. Verified name and  with patient as identifiers. Patient has following risk factors of: heart failure, asthma, acute respiratory failure, diabetes and chronic kidney disease. CTN/ACM reviewed discharge instructions, medical action plan and red flags related to discharge diagnosis. Reviewed and educated them on any new and changed medications related to discharge diagnosis. Advised obtaining a 90-day supply of all daily and as-needed medications. Education provided regarding infection prevention, and signs and symptoms of COVID-19 and when to seek medical attention with patient who verbalized understanding. Discussed exposure protocols and quarantine from [address] [place] [address] Hwy you at higher risk for severe illness  and given an opportunity for questions and concerns. The patient agrees to contact the COVID-19 hotline [phone] or PCP office for questions related to their healthcare. CTN/ACM provided contact information for future reference. From CDC: Are you at higher risk for severe illness?  Wash your hands often.  Avoid close contact (6 feet, which is about two arm lengths) with people who are sick.  Put distance between yourself and other people if COVID-19 is spreading in your community.  Clean and disinfect frequently touched surfaces.  Avoid all cruise travel and non-essential air travel.    Call your healthcare professional if you have concerns about COVID-19 and your underlying condition or if you are sick. For more information on steps you can take to protect yourself, see CDC's How to Protect Yourself      Unable to complete the call due to another incoming call. I tried to call her daughter Abril Long back and was unable to reach her. Left a message apologizing for needing to end the call before completing today and asked if she will have her mother call me back for a brief conversation to finish giving her our information and resources. Patient/family/caregiver given information for [employer] and agrees to enroll no, doesn't do email  Patient's preferred e-mail:  N/A  Patient's preferred phone number: N/A  Based on Loop alert triggers, patient will be contacted by nurse care manager for worsening symptoms. Plan for follow-up call in 5-7 days based on severity of symptoms and risk factors.

## 2020-06-19 NOTE — PROGRESS NOTES
[address] [facility] DIVISION OF [facility] 
                                                          OFFICE NOTE Cyndi Ramirez M.D.,CHELSE[place] Late  
1935 
[id number] Date/Time:  6/19/202012:47 PM 
 
 
No diagnosis found. SUBJECTIVE: 
Still dizzy 
 no cp or worsening sob reported Assessment/Plan 1.  Atrial fibrillation[de-identified] This is chronic. Continue eliquis ( no untoward effects) S/p leadless ppm and av jazlyn ablation 
 
  
Hypertension: Controlled today.  Avoid excessive drop in blood pressure on account of carotid artery stenosis and vertebral artery stenosis. 
  
Chronic kidney disease: Closely followed by nephrology.   
  
Hyperlipidemia: Closely followed by her primary care physician. Continue lipitor 
  
CAD: She remains completely asymptomatic at this time.  Continue medical therapy only at this point.  
Recent cardiac catheterization of June 2020 with no recurrent flow-limiting stenosis. Continue statin and asa and imdur 
  
Diabetes: Closely followed by her primary care physician. 
  
Hyponatremia: Followed by nephrology and resolved for now. 
  
Dizziness : I suspect non cardiac continue to follow with Ent and eventually neuro Cardiomyopathy: This is new. Her ejection fraction has decreased from 50/55% of 30/35% from February to June 2020. She has had no myocardial infarction or viral infection of significance. There is a consideration about pacemaker induced cardiomyopathy. Continue with Coreg hydralazine and nitrates at this time. Refer to Dr. Munir Tucker for potential consideration of biventricular pacing or biventricular AICD if she remains when ejection fraction of less than 35%. Otherwise see her back in 4 weeks. 
  
See her back in 3 months otherwise HPI  
81 y.o. female. She has h/o HTN, HLD, AODM and CAD  And PAF (diagnosed on 4/17 for which she underwent successful cardioversion on oac and amiodarone)here for follow up Stress test normal on 11/10   
Doppler /US renal NO MANDO b/l   
PTCA and stent of right coronary artery in 1996 July 26, 2007. The cardiac catheterization revealed mild 10% tapering of the ostium. The left anterior descending artery had mild intimal disease throughout. There was a 30% stenosis in the proximal portion of the LAD between the first and second septal . The remainder of the LAD had only mild intimal disease. The circumflex artery had a 30%-40% stenosis in the proximal mid segment prior to take off of obtuse marginal branch. The right coronary artery was a large dominant artery with 80% proximal stenosis followed by 90% proximal to mid stenosis, followed by 30% residual stenosis. Ejection fraction was estimated at 50%-55%. 2/14: nuclear stress test: mild ischemia mid distal kailey lateral wall EF 59%   
3/14: cardiac catheterization:Left main: The vessel was normal sized. Angiography 
showed minor luminal irregularities. LAD: The vessel was normal sized. Angiography showed mild atherosclerosis. There was a 30 % stenosis in the 
middle third of the vessel segment. 1st diagonal: The vessel was small 
sized. Angiography showed minor luminal irregularities. 2nd diagonal: The 
vessel was normal sized. Angiography showed mild atherosclerosis. Circumflex: The vessel was normal sized. Angiography showed mild 
atherosclerosis. There was a 30 % stenosis. RCA: The vessel was large 
sized (dominant). Angiography showed mild atherosclerosis and 3 patent 
prior stents. There was a tubular 30 % stenosis in the distal third of the 
vessel segment.   
MEDICAL RX ONLY at that time  
Stopped niacin in 2014 for \"sick \" feeling   
Seen in [facility] for HTN on 2/15 aldactone increased to 25 mg at that time Renal US on 3/15 no MANDO 
ON 4/17 admitted to [facility] for sob and found to be in AF 
 Nuclear stress test on 4/25/17:No ischemia demonstrated. No infarct visible. LVEF 61%. 
   
Chest ct on 4/17:1. Atherosclerotic aorta with coronary artery calcification. 2. Small bilateral pleural effusions with right pleural calcifications. 3. Status post cholecystectomy. Valentin/Cardioversion  on 4/28/17:VALENTIN with normal EF mild mr and TR no evidence for gross intracardiac thrombi Cardioversion: 150 joules were delivered in a synch fashion with restoration of NSR Amiodarone started lopressor decreased Head ct on 8/18/18:Severe chronic microvascular ischemic change. 
   
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  
   
Admitted to [facility] on 8/17:Admitted with SOB.  Amiodarone stopped with concerns for toxicity.  Also noted to hyponatremic.   
Nephrology consulted and felt low Na related to HCTZ and Bactrim use - both stopped indefinitely                       - Bumex 1 mg PO daily started and continued through discharge 
                      - Na improve to 130 before discharge 
                      - Patient to follow up with Nephrology as needed. Pulmonary consulted and felt no Amiodarone toxicity, SOB related to COPD and volume 
                      - SOB improved as Na improved and Bumex was used. 
   
ECHO on 8/17:Left ventricle: Systolic function was normal. Ejection fraction was 
estimated in the range of 55 % to 60 %. Suboptimal endocardial 
visualization limits wall motion analysis. Wall thickness was mildly 
increased. Left atrium: The atrium was mildly dilated. Mitral valve: There was mild regurgitation. Aortic valve: Leaflets exhibited lower normal cuspal separation and 
sclerosis. Tricuspid valve: There was mild regurgitation. CTA  8/17 of chest without toxicity changes or PTE: . There is a mild to moderate-sized right pleural effusion and a mild sized 
left pleural effusion. The heart is enlarged there is no pericardial effusion. There is no mediastinal masses or adenopathy. There is no evidence for pulmonary 
emboli. There is no shift or pneumothorax. There are mild congestive changes. Mild emphysema. No focal consolidation or mass. 
   
IMPRESSION IMPRESSION: Bilateral effusions and cardiomegaly. No pulmonary emboli. Nuclear stress test on 4/23/18:SPECT images demonstrate a medium, fixed abnormality of mild degree in the anterior region on the stress and rest images. Gated SPECT images reveals normal myocardial thickening and wall motion. The left ventricular ejection fraction was calculated to be 63 %. Impression:  
Myocardial perfusion imaging is normal. No gross ischemia or mi Overall left ventricular systolic function was normal.  
 
 
These test results indicate low likelihood for the presence of angiographically significant coronary artery disease.  
  
Vascular us le : no dvt seroma on right thigh 
       
       
04/17/19 ECHO ADULT COMPLETE 04/20/2019 4/20/2019   
  Narrative · Left ventricular low normal systolic function. Calculated left  
ventricular ejection fraction is 53%. Biplane method used to measure  
ejection fraction. · Left atrial cavity size is moderately dilated. · Right atrial cavity size is mildly dilated. · Mild aortic valve sclerosis with no significant stenosis. · Mitral valve thickening. Mild mitral valve regurgitation. · Mild pulmonary hypertension is present. 
    
    Signed by: Citlalli Castellon MD   
· Nuclear stress test on 4/19:Baseline ECG: Atrial fibrillation, non-specific ST-T wave abnormalities. · Gated SPECT: Left ventricular function post-stress was abnormal. Calculated ejection fraction is 45%. There is no evidence of transient ischemic dilation (TID). The TID ratio is 0.97.  
· Left ventricular perfusion is abnormal. 
· Myocardial perfusion imaging defect 1: There is a defect that is moderate in size with a moderate reduction in uptake present in the apical to basal anterior location(s) that is non-reversible. There is abnormal wall motion in the defect area. Viability in the area is good. caused by breast attenuation. The possibility of artifact cannot be excluded. Perfusion defect was visually and quantitatively present. · Myocardial perfusion imaging defect 1: caused by breast attenuation. Abnormal myocardial perfusion imaging. Fixed defect consistent with prior myocardial infarction. Myocardial perfusion imaging supports an intermediate risk stress test. 
 
Admitted on June 2020 for severe dizziness. Carotid ultrasound in MRI as below. It was felt the dizziness is related to combination of issues. She does have some vertebral artery stenosis and carotid artery stenosis. At the same time she has undergone cardiac catheterization which has failed to reveal is flow-limiting stenosis. On the other hand she has developed cardiomyopathy with ejection fraction at 30 to 35% from 50 to 55% February 2020. CARDIAC STUDIES 
 
 
 
06/08/20 ECHO ADULT COMPLETE 06/10/2020 6/10/2020 Narrative · Normal cavity size and wall thickness. Mild-to-moderate systolic  
function. Estimated left ventricular ejection fraction is 30 - 35%. No  
regional wall motion abnormality noted. · Moderately dilated right atrium. · Moderately dilated left atrium. · Mitral valve non-specific thickening. Moderate mitral valve  
regurgitation is present. · Mild tricuspid valve regurgitation is present. · Moderately reduced right ventricular systolic function. Signed by: Madhavi Doss MD  
 
 
 
 
 
02/28/20 NUCLEAR CARDIAC STRESS TEST 03/02/2020, 03/02/2020 3/2/2020 Narrative · Baseline ECG: Paced rhythm, non-specific ST-T wave abnormalities,  
interventricular conduction delay. Shortness of breath, dyspnea A Lexiscan myocardial SPECT gated wall motion study was performed  
utilizing 11 mCi of Tc MYOVIEW for rest and 31.6 mCi for stress. SPECT imaging at stress and rest demonstrates no definite evidence of  
ischemia and/or infarction. Left ventricular ejection fraction equals 73%. Left ventricular wall  
motion and thickening are within normal limits. Impression : 
1. No definite evidence of ischemia and/or infarction. 2. LVEF equals 73%. Left ventricular wall motion and thickening is normal. 
  
  Signed by: Lynda Lorenzana MD; Lilli Baker MD  
 
 
 
 
 
06/08/20 CARDIAC PROCEDURE 06/11/2020 6/11/2020 Narrative Findings: 
1)Elevated right sided filling, pressures, left sided filling pressures. 2)EF 60% on long axis view, 45-50% on short axis 3)Mild moderate disease in RCA and moderate to severe angiographic disease  
in LAD without critical stenosis. EF appears improved compared to to what  
was noted on echo. 4)Reduced cardiac output/cardiac index 1.7 (l/min/m2) Access: right radial: tortuosity Contrast 56 cc. Recommendations: 
1)GDMT for secondary prevention 2)GDMT for ? cardiomyopathy--may be related to RV pacing. Signed by: Aster Rodriguez MD  
 
 
 
 
EKG Results None IMAGING 
 
 
MRI Results (most recent): 
Results from Hospital Encounter encounter on 06/08/20 MRI BRAIN WO CONT Narrative EXAM:  MRI BRAIN WO CONT INDICATION:  CVA TECHNIQUE: Sagittal T1, axial FLAIR, T2,T1 and gradient echo images as well as 
coronal T2 weighted images and axial diffusion weighted images of the head were 
obtained. COMPARISON:  CT, CTA 6/8/20 FINDINGS: 
Mild prominence of ventricles and sulci consistent with cerebral volume loss 
within normal limits for age. Extensive multifocal and confluent T2 hyperintensity in cerebral white matter 
variably increasing prominence since at least 2012. Nonspecific and possibly 
related to chronic small vessel ischemic change. No restricted diffusion or other finding that would suggest an acute or subacute 
brain infarction. No evidence of acute intracranial hemorrhage. No abnormal extra-axial fluid collections or masses. Again demonstrated is left suboccipital craniotomy site for resection of left 
inferior cerebellar infarct/mass effect in 2008. Flow voids are present in vertebral basilar and carotid artery systems. Mildly enlarged partial empty sella. Paranasal sinuses and other structures of 
the skull base are unremarkable. Impression IMPRESSION: 
1. Chronic advanced nonspecific white matter T2 hyperintensity. Possibly related 
to chronic small vessel ischemia. 2. No acute intracranial abnormality demonstrated. 3. Old left suboccipital craniotomy from 2008. CT Results (most recent): 
[place] [facility] [place] 06/08/20 CT CODE NEURO PERF W CBF Narrative EXAM:  CTA CODE NEURO HEAD AND NECK W CONT, CT CODE NEURO PERF W CBF INDICATION:  code S level 1 COMPARISON:  Noncontrast head CT of earlier in the day TECHNIQUE:   
Multislice helical axial CT angiography was performed from the aortic arch to 
the top of the head during uneventful rapid bolus intravenous administration of 
80 mL of Isovue 370. Postprocessing was performed to include MIP and volume 
rendered images. Standard images of the head were also obtained following 
contrast administration and a delay. CT brain perfusion was performed following the IV injection of 40 cc Isovue-370 
with generation of hemodynamic maps of multiple parameters, including cerebral 
blood flow, cerebral volume, Tmax,  and MTT (mean transit time). CT Dose reduction was achieved to use of a standardized protocol tailored for 
this examination and automatic exposure control for dose modulationn. CT HEAD The postcontrast images are less degraded by artifact on the precontrast images 
because of the different gantry angle. Changes of prior left suboccipital 
craniectomy with resection of the inferomedial left cerebellum are noted. Diffuse hypodensity of the cerebral white matter is again noted. No enhancing 
lesions or masses are demonstrated. CT PERFUSION: 
No significant perfusion abnormalities. The inferior cerebellar hemispheres were 
not completely included on the perfusion examination. CTA NECK There is conventional three vessel arch anatomy. The bilateral subclavian and 
common carotid  are patent with no flow-limiting stenosis. There is calcified 
plaque in the proximal right internal carotid artery resulting in severe (70 to 
90%) luminal diameter stenosis. There is calcified plaque with moderate stenosis 
in the mid right common carotid artery. There is also calcified plaque in the 
proximal left internal carotid artery resulting in severe (70-90%) luminal 
diameter stenosis. NASCET method was utilized for calculating stenosis. Vertebral arteries are bilaterally patent and the right is dominant. The 
cervical soft tissues are unremarkable. There are degenerative changes in the 
cervical spine as well as extensive anterior paravertebral ossification with 
fusion anteriorly between C4 and C7. CTA HEAD The distal vertebral arteries are bilaterally patent. There is calcified plaque 
in the mid V4 segments bilaterally, with moderate stenosis. . The basilar artery 
and its branches demonstrate no significant abnormalities. . The internal 
carotid, anterior cerebral, and middle cerebral arteries are patent. There is 
calcified plaque in the cavernous carotid arteries bilaterally, with moderate 
stenosis. There is irregularity of the intracranial vessels without severe 
stenosis or occlusion. . No aneurysms are demonstrated. . There is a patent right 
posterior communicating artery. Impression IMPRESSION: 
1. No acute large vessel occlusion. 2. Severe bilateral internal carotid artery origin stenoses. Moderate bilateral 
cavernous carotid stenoses.  
3. Bilaterally patent vertebral arteries with moderate bilateral vertebral stenosis in the midportion of the V4 segments. 4. Status post left suboccipital craniectomy and resection of medial the left 
inferior cerebellar hemisphere. 5. No significant perfusion abnormalities. XR Results (most recent): 
Results from [facility] Encounter encounter on 06/08/20 XR CHEST PORT Narrative EXAM: Portable CXR. 0745 hours. COMPARISON: Chest CT 3/3/2020. INDICATION: hypoxia  . Compare to prior cxr FINDINGS: 
There are some patchy nonspecific pulmonary haziness new since March affecting 
right lung greater than left. This is superimposed on mild chronic pulmonary 
markings. Heart size is top normal. There is no overt pulmonary edema. There is 
no pneumothorax, midline shift or apparent pleural effusion. Intracardiac 
leadless pacemaker remains. Impression IMPRESSION: New nonspecific airspace disease. Past Medical History:  
Diagnosis Date  Atrial fibrillation ([address]  CAD (coronary artery disease)  Cardiomyopathy ([address] 6/11/2020 Acute, 6/8/2020.  Cerebrovascular accident stroke, other, unspec 11/18/2010  Coronary atherosclerosis of native coronary artery 11/18/2010  Dermatitis 6/15/2020  Essential hypertension  Essential hypertension, benign 11/18/2010  HLD (hyperlipidemia) 11/18/2010  PVD (peripheral vascular disease) ([address] 2017  Type II or unspecified type diabetes mellitus without mention of complication, not stated as uncontrolled 11/18/2010  Zoster Past Surgical History:  
Procedure Laterality Date  HX CORONARY STENT PLACEMENT    
 HX HEART CATHETERIZATION    
 [address]  [place] ICAR CATHETER ABLATION ATRIOVENTR NODE FUNCTION N/A 2/5/2020 ABLATION AV NODE performed by Brianne May MD at [address] CATH LAB  [place] TCAT INSJ/RPL PERM LEADLESS PACEMAKER RV W/IMG N/A 2/5/2020 INSERT OR REPLACE TRANSCATH PPM LEADLESS performed by Brianne May MD at [address] CATH LAB Social History Tobacco Use  Smoking status: Former Smoker Packs/day: 0.80 Years: 20.00 Pack years: 16.00 Types: Cigarettes Last attempt to quit: 1986 Years since quittin.1  Smokeless tobacco: Never Used Substance Use Topics  Alcohol use: No  
 Drug use: No  
 
No family history on file. Allergies Allergen Reactions  Bactrim [Sulfamethoprim] Other (comments) Affects her kidney funtion  Clonidine Shortness of Breath and Swelling Lightheaded,   
 Codeine Unknown (comments)  Keflin Itching  Pcn [Penicillins] Unknown (comments)  Tramadol Other (comments) Makes her head feel swishy Visit Vitals /76 (BP 1 Location: Left arm, BP Patient Position: Sitting) Pulse 76 Resp 16 Ht 5' (1.524 m) Wt 172 lb 9.6 oz (78.3 kg) SpO2 94% BMI 33.71 kg/m² Last 3 Recorded Weights in this Encounter 20 1129 Weight: 172 lb 9.6 oz (78.3 kg) Review of Systems:  
Pertinent items are noted in the History of Present Illness. Neck: no carotid bruit and no JVD Heart: regular rate and rhythm Lungs: clear to auscultation bilaterally Abdomen: soft, non-tender. Bowel sounds normal. No masses,  no organomegaly Extremities: no edema Current Outpatient Medications on File Prior to Visit Medication Sig Dispense Refill  nystatin (Nystop) powder Apply  to affected area two (2) times a day. 60 g 11  carvediloL (COREG) 6.25 mg tablet Take 1 Tab by mouth two (2) times daily (with meals). 60 Tab 11  
 hydrALAZINE (APRESOLINE) 10 mg tablet Take 1 Tab by mouth three (3) times daily. 90 Tab 11  
 loratadine (CLARITIN) 10 mg tablet Take 1 Tab by mouth daily. 30 Tab 1  
 bumetanide (BUMEX) 1 mg tablet Take 1 Tab by mouth two (2) times a day. At 8 am and 2 pm 60 Tab 11  
 nystatin (MYCOSTATIN) topical cream Apply  to affected area two (2) times a day.  Apply to rash in buttocks fold for 10 days 15 g 0  
  potassium chloride (KLOR-CON) 10 mEq tablet Take 1 Tab by mouth daily. 30 Tab 11  
 budesonide-formoteroL (SYMBICORT) 160-4.5 mcg/actuation HFAA Take 2 Puffs by inhalation two (2) times a day. After each use, rinse mouth with water 3 Inhaler 3  
 acyclovir (ZOVIRAX) 5 % ointment Use as directed 15 g 3  
 isosorbide mononitrate ER (IMDUR) 60 mg CR tablet TAKE 1 TABLET TWICE A  Tab 1  
 acetaminophen (TYLENOL) 325 mg tablet Take 2 Tabs by mouth every four (4) hours as needed for Pain or Fever. 20 Tab 1  
 albuterol (PROAIR HFA) 90 mcg/actuation inhaler Take 2 Puffs by inhalation every four (4) hours as needed for Wheezing or Shortness of Breath. 1 Inhaler 1  
 aspirin 81 mg chewable tablet Take 81 mg by mouth nightly.  atorvastatin (LIPITOR) 20 mg tablet Take 20 mg by mouth nightly.  azelastine (ASTEPRO) 0.15 % (205.5 mcg) 1 Spray by Both Nostrils route nightly.  insulin detemir U-100 (LEVEMIR U-100 INSULIN) 100 unit/mL injection 10 Units by SubCUTAneous route daily. Takes 20 units every morning, and takes 8-10 units every evening as needed/as directed based on blood sugar  mupirocin calcium (BACTROBAN) 2 % topical cream Apply  to affected area two (2) times daily as needed.  allopurinol (ZYLOPRIM) 100 mg tablet Take 100 mg by mouth daily.  apixaban (ELIQUIS) 2.5 mg tablet Take 1 Tab by mouth two (2) times a day. 180 Tab 2  
 omeprazole (PRILOSEC) 20 mg capsule TAKE 1 CAPSULE DAILY 90 Cap 2  
 insulin aspart U-100 (NOVOLOG FLEXPEN U-100 INSULIN) 100 unit/mL (3 mL) inpn INJECT 7 UNITS BEFORE EACH MEAL 3 TIMES A DAY OR AS   DIRECTED 45 mL 3  cholecalciferol, vitamin D3, (VITAMIN D3) 2,000 unit tab Take 2,000 Units by mouth daily.  nitroglycerin (NITROSTAT) 0.4 mg SL tablet 1 Tab by SubLINGual route every five (5) minutes as needed for Chest Pain. 25 Tab 3 No current facility-administered medications on file prior to visit. Merlinda Crome Eades had no medications administered during this visit. Current Outpatient Medications Medication Sig  
 nystatin (Nystop) powder Apply  to affected area two (2) times a day.  carvediloL (COREG) 6.25 mg tablet Take 1 Tab by mouth two (2) times daily (with meals).  hydrALAZINE (APRESOLINE) 10 mg tablet Take 1 Tab by mouth three (3) times daily.  loratadine (CLARITIN) 10 mg tablet Take 1 Tab by mouth daily.  bumetanide (BUMEX) 1 mg tablet Take 1 Tab by mouth two (2) times a day. At 8 am and 2 pm  
 nystatin (MYCOSTATIN) topical cream Apply  to affected area two (2) times a day. Apply to rash in buttocks fold for 10 days  potassium chloride (KLOR-CON) 10 mEq tablet Take 1 Tab by mouth daily.  budesonide-formoteroL (SYMBICORT) 160-4.5 mcg/actuation HFAA Take 2 Puffs by inhalation two (2) times a day. After each use, rinse mouth with water  acyclovir (ZOVIRAX) 5 % ointment Use as directed  isosorbide mononitrate ER (IMDUR) 60 mg CR tablet TAKE 1 TABLET TWICE A DAY  acetaminophen (TYLENOL) 325 mg tablet Take 2 Tabs by mouth every four (4) hours as needed for Pain or Fever.  albuterol (PROAIR HFA) 90 mcg/actuation inhaler Take 2 Puffs by inhalation every four (4) hours as needed for Wheezing or Shortness of Breath.  aspirin 81 mg chewable tablet Take 81 mg by mouth nightly.  atorvastatin (LIPITOR) 20 mg tablet Take 20 mg by mouth nightly.  azelastine (ASTEPRO) 0.15 % (205.5 mcg) 1 Spray by Both Nostrils route nightly.  insulin detemir U-100 (LEVEMIR U-100 INSULIN) 100 unit/mL injection 10 Units by SubCUTAneous route daily. Takes 20 units every morning, and takes 8-10 units every evening as needed/as directed based on blood sugar  mupirocin calcium (BACTROBAN) 2 % topical cream Apply  to affected area two (2) times daily as needed.  allopurinol (ZYLOPRIM) 100 mg tablet Take 100 mg by mouth daily.  apixaban (ELIQUIS) 2.5 mg tablet Take 1 Tab by mouth two (2) times a day.  omeprazole (PRILOSEC) 20 mg capsule TAKE 1 CAPSULE DAILY  insulin aspart U-100 (NOVOLOG FLEXPEN U-100 INSULIN) 100 unit/mL (3 mL) inpn INJECT 7 UNITS BEFORE EACH MEAL 3 TIMES A DAY OR AS   DIRECTED  cholecalciferol, vitamin D3, (VITAMIN D3) 2,000 unit tab Take 2,000 Units by mouth daily.  nitroglycerin (NITROSTAT) 0.4 mg SL tablet 1 Tab by SubLINGual route every five (5) minutes as needed for Chest Pain. No current facility-administered medications for this visit. Lab Results Component Value Date/Time Cholesterol, total 93 06/09/2020 04:11 AM  
 Cholesterol (POC) <100 12/18/2019 11:53 AM  
 HDL Cholesterol 37 06/09/2020 04:11 AM  
 HDL Cholesterol (POC) 41 12/18/2019 11:53 AM  
 LDL Cholesterol (POC) n/a 12/18/2019 11:53 AM  
 LDL, calculated 30.4 06/09/2020 04:11 AM  
 VLDL, calculated 25.6 06/09/2020 04:11 AM  
 Triglyceride 128 06/09/2020 04:11 AM  
 Triglycerides (POC) 82 12/18/2019 11:53 AM  
 CHOL/HDL Ratio 2.5 06/09/2020 04:11 AM  
 
 
Lab Results Component Value Date/Time Sodium 140 06/17/2020 12:22 PM  
 Potassium 4.3 06/17/2020 12:22 PM  
 Chloride 99 06/17/2020 12:22 PM  
 CO2 25 06/17/2020 12:22 PM  
 Anion gap 6 06/15/2020 03:50 AM  
 Glucose 96 06/17/2020 12:22 PM  
 BUN 26 06/17/2020 12:22 PM  
 Creatinine 1.64 (H) 06/17/2020 12:22 PM  
 BUN/Creatinine ratio 16 06/17/2020 12:22 PM  
 GFR est AA 33 (L) 06/17/2020 12:22 PM  
 GFR est non-AA 29 (L) 06/17/2020 12:22 PM  
 Calcium 9.2 06/17/2020 12:22 PM  
 
 
Lab Results Component Value Date/Time ALT (SGPT) 20 06/08/2020 03:23 PM  
 Alk. phosphatase 115 06/08/2020 03:23 PM  
 Bilirubin, direct 0.18 02/12/2014 09:42 AM  
 Bilirubin, total 0.9 06/08/2020 03:23 PM  
 
 
Lab Results Component Value Date/Time  WBC 12.3 (H) 06/10/2020 05:26 AM  
 HGB (POC) 14.6 02/14/2020 02:45 PM  
 HGB 11.6 06/10/2020 05:26 AM  
 HCT (POC) 43.7 02/14/2020 02:45 PM  
 HCT 36.3 06/10/2020 05:26 AM  
 PLATELET 879 97/78/6052 05:26 AM  
 MCV 84.2 06/10/2020 05:26 AM  
 
 
Lab Results Component Value Date/Time TSH 1.41 08/23/2017 02:29 PM  
 
 
 
Lab Results Component Value Date/Time Cholesterol, total 93 06/09/2020 04:11 AM  
 Cholesterol, total 110 06/13/2017 09:31 AM  
 Cholesterol, total 79 04/25/2017 03:53 AM  
 Cholesterol, total 114 02/12/2014 09:42 AM  
 Cholesterol, total 119 01/16/2013 12:00 AM  
 Cholesterol (POC) <100 12/18/2019 11:53 AM  
 Cholesterol (POC) <100 11/28/2018 11:06 AM  
 Cholesterol (POC) 100 08/17/2016 11:37 AM  
 Cholesterol (POC) 115 04/14/2016 11:44 AM  
 HDL Cholesterol 37 06/09/2020 04:11 AM  
 HDL Cholesterol 55 06/13/2017 09:31 AM  
 HDL Cholesterol 40 04/25/2017 03:53 AM  
 HDL Cholesterol 50 02/12/2014 09:42 AM  
 HDL Cholesterol 65 01/16/2013 12:00 AM  
 LDL, calculated 30.4 06/09/2020 04:11 AM  
 LDL, calculated 36 06/13/2017 09:31 AM  
 LDL, calculated 19.2 04/25/2017 03:53 AM  
 LDL, calculated 44 02/12/2014 09:42 AM  
 LDL, calculated 38 01/16/2013 12:00 AM  
 Triglyceride 128 06/09/2020 04:11 AM  
 Triglyceride 93 06/13/2017 09:31 AM  
 Triglyceride 99 04/25/2017 03:53 AM  
 Triglyceride 100 02/12/2014 09:42 AM  
 Triglyceride 80 01/16/2013 12:00 AM  
 Triglycerides (POC) 82 12/18/2019 11:53 AM  
 Triglycerides (POC) 80 11/28/2018 11:06 AM  
 Triglycerides (POC) 112 08/17/2016 11:37 AM  
 Triglycerides (POC) 122 04/14/2016 11:44 AM  
 CHOL/HDL Ratio 2.5 06/09/2020 04:11 AM  
 CHOL/HDL Ratio 2.0 04/25/2017 03:53 AM  
 CHOL/HDL Ratio 3.6 06/02/2010 09:35 AM  
 CHOL/HDL Ratio 2.9 08/05/2009 11:13 AM  
  
 
 
 
 
 
Please note that this dictation was completed with Tello, the computer voice recognition software. Quite often unanticipated grammatical, syntax, homophones, and other interpretative errors are inadvertently transcribed by the computer software. Please disregard these errors. Please excuse any errors that have escaped final proofreading.

## 2020-06-19 NOTE — CDMP QUERY
RETRO QUERY Pt admitted with CVA/Pt noted to have combined CHF. If possible, please document in the progress notes and d/c summary if you are evaluating and / or treating any of the following: 
 
? Chronic Combined CHF (acute phase ruled out) ? Acute on Chronic Combined CHF 
? Other, please specify ? Clinically unable to determine The medical record reflects the following: 
  Risk Factors: CHF Clinical Indicators:  
6/9 CXR There is no overt pulmonary edema 6/9 
echo Left Atrium Moderately dilated left atrium. No atrial septal defect present. Right Atrium Moderately dilated right atrium. 6/11-cath Findings: 
1)Elevated right sided filling, pressures, left sided filling pressures. 2)EF 60% on long axis view, 45-50% on short axis 3)Mild moderate disease in RCA and moderate to severe angiographic disease in LAD without critical stenosis. EF appears improved compared to to what was noted on echo 6/17 DC summary--She was felt to have nonischemic cardiomyopathy with systolic and diastolic CHF Right heart cath revealed elevated RA, RV, pulmonary capillary wedge and PA pressures; indicating the need for diuresis Treatment: Bumex 2 mg IV daily  x 2 doses, heart cath, echo, Coreg po, cardiology consult Thank you, 
       Ector Prajapati [facility], [address] [facility]

## 2020-06-22 NOTE — PROGRESS NOTES
Cardiac Electrophysiology OFFICE Note Subjective:  
  
Venancio Traylor is a 81 y.o. patient who presents for follow up, is s/p AV node ablation & Medtronic leadless pacemaker (DOI 02/05/2020) & consideration for CRT device. She has new cardiomyopathy (now 30-35% from 50-55% in February), thought to be possibly pacemaker induced. NYHA II chronic systolic CHF, on appropriate GDMT with exception of ACEi/ARB (renal function). Nuclear stress test in 03/2020 negative, showed normal LVEF at that time. Cardiac cath on 06/11/2020 showed moderate to severe LAD disease, but no critical stenosis; LVEF appeared better on cath than recent echo. Device check on 05/05/2020 showed proper function, adequate generator longevity. Pacer dependent s/p AV node ablation. LRL remains at 80 bpm. 
 
Since procedure, she was admitted to [facility], required diuresis. States she has dizziness that waxes & wanes throughout the day, has been going on for a few years. She does have known carotid stenosis. She denies chest pain, palpitations, PND, orthopnea, syncope, or increased edema. BP well controlled per check today. States home -160 mmHg, but this is prior to taking medications. Anticoagulated with low dose Eliquis (age, renal function), denies bleeding issues. Previous: LHC/RHC (06/11/2020): LVEF 45-50% on short axis. Elevated right & left side filling pressures. Mild moderate disease in RCA & mod to severe angiographic disease in LAD without critical stenosis. Echo (06/09/2020): LVEF 30-35%, no RWMA. Mod reduced RV systolic function. Mod YULIET. Mod MR. Mild TR. Carotid duplex (06/09/2020): Mod (50-69%) stenosis of ISHA. Calcific plaque over first 2 cm of LICA prevents assessment of stenosis. Mod to severe left external carotid stenosis. Nuclear stress (03/02/2020): LVEF 73%, no definite evidence of ischemia and/or infarction. Admitted 02/28/2020-03/05/2020 for dyspnea. BNP elevated (2482). Improved with diuresis. Nuclear stress test negative. Stable pulmonary nodules. Limited echo (02/05/2020): LVEF 50-55%. Mildly dilated RV. Mod dilated LA. Mod dilated RA. Mild MR. Mod aortic valve sclerosis. Mild TR. S/p AV node ablation & Medtronic Micra leadless pacemaker 02/05/2020. Holter (11/07/2019): AF  bpm. 
 
Medications for ventricular rate control caused fatigue & dizziness. S/p POP/CV 04/2017. Previous CVA that she believes was r/t cardiac cath (mild CAD in 2014). S/p PCI RCA 1996. Primary cardiologist is Dr. Shanika Bryant. Problem List  Date Reviewed: 6/19/2020 Codes Class Noted Dermatitis ICD-10-CM: L30.9 ICD-9-CM: 692.9  6/15/2020 Dizziness ICD-10-CM: N27 ICD-9-CM: 780.4  6/13/2020 Cardiomyopathy ([facility] ICD-10-CM: I42.9 ICD-9-CM: 425.4  6/11/2020 Overview Signed 6/11/2020  7:11 AM by Maria A Armstrong MD  
  Acute, 6/8/2020. Bilateral carotid artery stenosis ICD-10-CM: I65.23 ICD-9-CM: 433.10, 433.30  6/8/2020 Vertebral artery stenosis, bilateral ICD-10-CM: I65.03 
ICD-9-CM: 433.20  6/8/2020 Systolic CHF, acute ([facility] ICD-10-CM: I50.21 ICD-9-CM: 428.21, 428.0  3/8/2020 Acute diastolic CHF (congestive heart failure) (HCC) ICD-10-CM: I50.31 ICD-9-CM: 428.31, 428.0  2/28/2020 Pacemaker ICD-10-CM: Z95.0 ICD-9-CM: V45.01  2/5/2020 Overview Signed 2/5/2020  5:45 PM by Joshua Blackwell MD  
  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  
  2/5/2020 junctional escape 37 bpm 
  
  
   
 Acute asthmatic bronchitis ICD-10-CM: R07.434 ICD-9-CM: 493.90  1/31/2020  Coronary artery disease with stable angina pectoris ([facility] [address] ICD-10-CM: I25.118 
 ICD-9-CM: 414.00, 413.9  12/13/2019 Severe obesity (BMI 35.0-39. 9) with comorbidity ([place] [address] ICD-10-CM: E66.01 
ICD-9-CM: 278.01  5/1/2018 Type 2 diabetes with nephropathy ([place] [address] ICD-10-CM: E11.21 
ICD-9-CM: 250.40, 583.81  2/12/2018 SOB (shortness of breath) ICD-10-CM: R06.02 
ICD-9-CM: 786.05  8/23/2017 Atrial fibrillation ([place] [address] ICD-10-CM: I48.91 
ICD-9-CM: 427.31  4/24/2017 PVD (peripheral vascular disease) with claudication ([facility]) ICD-10-CM: I73.9 ICD-9-CM: 443.9  2/20/2017 Hypertension complicating diabetes ([place] [address] ICD-10-CM: E11.59, I10 
ICD-9-CM: 250.80, 401.9  11/17/2016 Coronary atherosclerosis of native coronary artery ICD-10-CM: I25.10 ICD-9-CM: 414.01  11/18/2010 HLD (hyperlipidemia) ICD-10-CM: E78.5 ICD-9-CM: 272.4  11/18/2010 Cerebrovascular accident stroke, other, unspec ICD-10-CM: I67.89 ICD-9-CM: 669  11/18/2010 Current Outpatient Medications Medication Sig Dispense Refill  nystatin (Nystop) powder Apply  to affected area two (2) times a day. 60 g 11  carvediloL (COREG) 6.25 mg tablet Take 1 Tab by mouth two (2) times daily (with meals). 60 Tab 11  
 hydrALAZINE (APRESOLINE) 10 mg tablet Take 1 Tab by mouth three (3) times daily. 90 Tab 11  
 loratadine (CLARITIN) 10 mg tablet Take 1 Tab by mouth daily. 30 Tab 1  
 bumetanide (BUMEX) 1 mg tablet Take 1 Tab by mouth two (2) times a day. At 8 am and 2 pm 60 Tab 11  
 nystatin (MYCOSTATIN) topical cream Apply  to affected area two (2) times a day. Apply to rash in buttocks fold for 10 days 15 g 0  
 potassium chloride (KLOR-CON) 10 mEq tablet Take 1 Tab by mouth daily. 30 Tab 11  
 budesonide-formoteroL (SYMBICORT) 160-4.5 mcg/actuation HFAA Take 2 Puffs by inhalation two (2) times a day.  After each use, rinse mouth with water 3 Inhaler 3  
 acyclovir (ZOVIRAX) 5 % ointment Use as directed 15 g 3  
  isosorbide mononitrate ER (IMDUR) 60 mg CR tablet TAKE 1 TABLET TWICE A  Tab 1  
 acetaminophen (TYLENOL) 325 mg tablet Take 2 Tabs by mouth every four (4) hours as needed for Pain or Fever. 20 Tab 1  
 albuterol (PROAIR HFA) 90 mcg/actuation inhaler Take 2 Puffs by inhalation every four (4) hours as needed for Wheezing or Shortness of Breath. 1 Inhaler 1  
 aspirin 81 mg chewable tablet Take 81 mg by mouth nightly.  atorvastatin (LIPITOR) 20 mg tablet Take 20 mg by mouth nightly.  azelastine (ASTEPRO) 0.15 % (205.5 mcg) 1 Spray by Both Nostrils route nightly.  insulin detemir U-100 (LEVEMIR U-100 INSULIN) 100 unit/mL injection 10 Units by SubCUTAneous route daily. Takes 20 units every morning, and takes 8-10 units every evening as needed/as directed based on blood sugar  mupirocin calcium (BACTROBAN) 2 % topical cream Apply  to affected area two (2) times daily as needed.  allopurinol (ZYLOPRIM) 100 mg tablet Take 100 mg by mouth daily.  apixaban (ELIQUIS) 2.5 mg tablet Take 1 Tab by mouth two (2) times a day. 180 Tab 2  
 omeprazole (PRILOSEC) 20 mg capsule TAKE 1 CAPSULE DAILY 90 Cap 2  
 insulin aspart U-100 (NOVOLOG FLEXPEN U-100 INSULIN) 100 unit/mL (3 mL) inpn INJECT 7 UNITS BEFORE EACH MEAL 3 TIMES A DAY OR AS   DIRECTED 45 mL 3  cholecalciferol, vitamin D3, (VITAMIN D3) 2,000 unit tab Take 2,000 Units by mouth daily.  nitroglycerin (NITROSTAT) 0.4 mg SL tablet 1 Tab by SubLINGual route every five (5) minutes as needed for Chest Pain. 25 Tab 3 Allergies Allergen Reactions  Bactrim [Sulfamethoprim] Other (comments) Affects her kidney funtion  Clonidine Shortness of Breath and Swelling Lightheaded,   
 Codeine Unknown (comments)  Keflin Itching  Pcn [Penicillins] Unknown (comments)  Tramadol Other (comments) Makes her head feel swishy Past Medical History:  
Diagnosis Date  Atrial fibrillation ([address]  CAD (coronary artery disease)  [facility] 2020 Acute, 2020.  Cerebrovascular accident stroke, other, unspec 2010  Coronary atherosclerosis of native coronary artery 2010  Dermatitis 6/15/2020  Essential hypertension  Essential hypertension, benign 2010  HLD (hyperlipidemia) 2010  PVD (peripheral vascular disease) ([facility] 2017  Type II or unspecified type diabetes mellitus without mention of complication, not stated as uncontrolled 2010  Zoster Past Surgical History:  
Procedure Laterality Date  HX CORONARY STENT PLACEMENT    
 HX HEART CATHETERIZATION    
 [address]  [place] ICAR CATHETER ABLATION ATRIOVENTR NODE FUNCTION N/A 2020 ABLATION AV NODE performed by Lore Stockton MD at [address] CATH LAB  [place] TCAT INSJ/RPL PERM LEADLESS PACEMAKER RV W/IMG N/A 2020 INSERT OR REPLACE TRANSCATH PPM LEADLESS performed by Lore Stockton MD at [address] CATH LAB No pacemaker in family history. Social History Tobacco Use  Smoking status: Former Smoker Packs/day: 0.80 Years: 20.00 Pack years: 16.00 Types: Cigarettes Last attempt to quit: 1986 Years since quittin.2  Smokeless tobacco: Never Used Substance Use Topics  Alcohol use: No  
  
 
Review of Systems:  
Constitutional: Negative for fever, chills, weight loss, + malaise/fatigue. HEENT: Negative for nosebleeds, vision changes. Respiratory: No cough, no hemoptysis Cardiovascular: Negative for chest pain, palpitations, no orthopnea, claudication, + chronic right lower extremity leg swelling, no syncope, and no PND. + CALDERA, + chronic dizziness Gastrointestinal: Negative for nausea, vomiting, diarrhea, blood in stool and melena. Genitourinary: Negative for dysuria, and hematuria. Musculoskeletal: Negative for myalgias, arthralgia. + foot pain Skin: Negative for rash. Heme: Does not bleed or bruise easily. Neurological: Negative for speech change and focal weakness Objective:  
 
Visit Vitals /54 (BP 1 Location: Left arm, BP Patient Position: Sitting) Pulse 60 Ht 5' (1.524 m) Wt 172 lb 6.4 oz (78.2 kg) SpO2 95% BMI 33.67 kg/m² Physical Exam:  
Constitutional: Well-developed and well-nourished. No respiratory distress. Head: Normocephalic and atraumatic. Eyes: Pupils are equal, round ENT: Hearing normal 
Neck: Supple. No JVD present. Cardiovascular: Normal rate, regular rhythm. Exam reveals no gallop and no friction rub. No murmur heard. Pulmonary/Chest: Effort normal and breath sounds normal. No wheezes. Abdominal: Soft, no tenderness. Obese Musculoskeletal: No edema. Neurological: Alert,oriented. Skin: Skin is warm and dry Psychiatric: normal mood and affect. Behavior is normal. Judgment and thought content normal.   
 
 
Assessment/Plan: ICD-10-CM ICD-9-CM 1. Cardiac pacemaker in situ Z95.0 V45.01 NUCLEAR CARDIAC BLOOD POOL MUGA REST/STRS 2. Atrial fibrillation, persistent ([facility]) I48.19 427.31 NUCLEAR CARDIAC BLOOD POOL MUGA REST/STRS 3. Hx of atrioventricular node ablation Z98.890 V15.1 NUCLEAR CARDIAC BLOOD POOL MUGA REST/STRS 4. NICM (nonischemic cardiomyopathy) ([facility]) I42.8 425.4 NUCLEAR CARDIAC BLOOD POOL MUGA REST/STRS 5. Coronary artery disease involving native heart without angina pectoris, unspecified vessel or lesion type I25.10 414.01 NUCLEAR CARDIAC BLOOD POOL MUGA REST/STRS 6. Dizziness R42 780.4 7. CHF NYHA class III (symptoms with mildly strenuous activities), chronic, systolic ([facility]) M61.77 526.14   
  428.0 8. Anticoagulated Z79.01 V58.61 New cardiomyopathy (now 30-35% from 50-55% in February per echo), thought to be possibly pacemaker induced. NYHA III chronic systolic CHF, on appropriate GDMT with exception of ACEi/ARB (renal function).   However, more recent cardiac cath showed LVEF 45-60%. No critical stenosis noted. Will obtain MUGA scan to definitively assess LVEF. S/p AV node ablation & leadless pacemaker in 02/2020. Device check on 05/05/2020 showed proper function, adequate generator longevity. Pacer dependent s/p AV node ablation. LRL remains at 80 bpm. 
 
If LVEF remains <35%, would consider upgrade to biventricular ICD. If >35% & <50%, would consider biventricular pacemaker. Discussed with patient & daughter, including risks/benefits. BP well controlled here, high prior to AM medications per her report. Dizziness may be attributable to moderate to severe carotid stenosis. She will follow up with vascular surgery and/or neurointerventional team. 
 
Anticoagulated with low dose Eliquis (age, renal function), denies bleeding issues. Will schedule MUGA as discussed above. Remote pacer checks q 3 months. EP clinic follow up as noted below, sooner if device upgrade is indicated. Follow up with Dr. Yessy Mahan as previously scheduled. Future Appointments Date Time Provider Carly Ramila 8/12/2020  1:15 PM REMOTE1, [address]  
9/1/2020 10:20 AM Greer Blount  [address]  
11/16/2020 10:00 AM REMOTE1, [address]  
2/17/2021  8:00 AM REMOTE1, [address]  
5/18/2021 10:30 AM PACEMAKER3, [address]  
5/18/2021 10:40 AM Fern Mathur  [address] Thank you for involving me in this patient's care and please call with further concerns or questions. Elissa Gan M.D. [facility] [place] Electrophysiology/Cardiology [facility] [place] , [address]                             
[phone]

## 2020-06-25 NOTE — TELEPHONE ENCOUNTER
6/25/20 6:19 PM--contacted patient who states she is doing OK but is feeling confused about next steps to evaluate her carotid arteries. She has been advised there are two ways of taking care of blockage in carotid arteries: open, as her surgeon Dr. Abdi Piedra performs, and stenting as Dr. Shila Posadas provides. Advised her to meet with Dr. Gifty Cox with her daughter and inform him of all she has told me so he can help her and her daughter determine the best next steps in making this decision. She agrees to this and states she will call him tomorrow. I advised I'll call her back for a final time next week.

## 2020-06-30 NOTE — PROGRESS NOTES
Addendum to d/c summary . During this hospital stay she was treated for Acute on Chronic Combined Systolic and Diastolic Heart Failure.

## 2020-07-01 NOTE — TELEPHONE ENCOUNTER
Identifiers x 2. Confirmed with patient that she is no longer taking enalapril. To send prilosec prescription to PCP. Confirmed follow up appointments.     Future Appointments   Date Time Provider Carly Mcgrath   7/20/2020 10:00 AM NUCLEAR, [address]   8/12/2020  1:15 PM REMOTE1, [address]   9/1/2020 10:20 AM Smooth Brush  [address]   11/16/2020 10:00 AM REMOTE1, [address]   2/17/2021  8:00 AM REMOTE1, [address]   5/18/2021 10:30 AM PACEMAKER3, [address]   5/18/2021 10:40 AM Danni Bentley  [address]

## 2020-07-02 NOTE — TELEPHONE ENCOUNTER
7/2/20 6:26 PM     Patient resolved from Transition of Care episode on 7/2/20. ACM/CTN was unsuccessful at contacting this patient today. Patient/family was provided the following resources and education related to COVID-19 during the initial call:                         Signs, symptoms and red flags related to COVID-19            CDC exposure and quarantine guidelines            Conduit exposure contact - [phone]            Contact for their local Department of Health                 Patient has not had any additional ED or hospital visits. No further outreach scheduled with this CTN/ACM. Episode of Care resolved. Patient has this CTN/ACM contact information if future needs arise.

## 2020-07-13 NOTE — DISCHARGE INSTRUCTIONS
I have discussed her presentation with Dr. Benedicto Drummond. He would like to put you on this antibiotic to use once a day and will test you for COVID. He would like you to call him in the next 2 to 3 days or sooner if symptoms worsen. He should be getting the results of those tests shortly. Follow-up with your own physician as directed. Return if symptoms are worsening.

## 2020-07-13 NOTE — ED TRIAGE NOTES
Triage: Pt arrives from home with CC shortness of breath and concern for Covid 19. Pt reports she has been in contact with her sister who tested positive for it. She was prescribed doxy and prednisone by Dr. Doris Simon but continues to have SOB.

## 2020-07-13 NOTE — ED PROVIDER NOTES
HPI this is an 52-year-old female with a history of A. fib coronary artery disease cardiomyopathy CVA and diabetes. Last week her sister was diagnosed with COVID. The patient has been having some shortness of breath without cough or congestion. She has not had any fever or chills. Her primary physician has placed her on steroid and doxycycline which have not helped. She has had no nausea or vomiting and no diarrhea. She presents today because her physician requested her come over for evaluation for COVID. She denies any other acute symptomatology. She has been eating and drinking appropriately. Past Medical History:  
Diagnosis Date  Atrial fibrillation ([facility] [address]  CAD (coronary artery disease)  Cardiomyopathy ([facility] [address] 6/11/2020 Acute, 6/8/2020.  Cerebrovascular accident stroke, other, unspec 11/18/2010  Coronary atherosclerosis of native coronary artery 11/18/2010  Dermatitis 6/15/2020  Essential hypertension  Essential hypertension, benign 11/18/2010  HLD (hyperlipidemia) 11/18/2010  PVD (peripheral vascular disease) ([facility] [address] 2017  Type II or unspecified type diabetes mellitus without mention of complication, not stated as uncontrolled 11/18/2010  Zoster Past Surgical History:  
Procedure Laterality Date  HX CORONARY STENT PLACEMENT    
 HX HEART CATHETERIZATION    
 [address]  [place] ICAR CATHETER ABLATION ATRIOVENTR NODE FUNCTION N/A 2/5/2020 ABLATION AV NODE performed by Leti Jara MD at [address] CATH LAB  [place] TCAT INSJ/RPL PERM LEADLESS PACEMAKER RV W/IMG N/A 2/5/2020 INSERT OR REPLACE TRANSCATH PPM LEADLESS performed by Leti Jara MD at [address] CATH LAB No family history on file. Social History Socioeconomic History  Marital status:  Spouse name: Not on file  Number of children: Not on file  Years of education: Not on file  Highest education level: Not on file Occupational History  Not on file Social Needs  Financial resource strain: Not on file  Food insecurity Worry: Not on file Inability: Not on file  Transportation needs Medical: Not on file Non-medical: Not on file Tobacco Use  Smoking status: Former Smoker Packs/day: 0.80 Years: 20.00 Pack years: 16.00 Types: Cigarettes Last attempt to quit: 1986 Years since quittin.2  Smokeless tobacco: Never Used Substance and Sexual Activity  Alcohol use: No  
 Drug use: No  
 Sexual activity: Not on file Lifestyle  Physical activity Days per week: Not on file Minutes per session: Not on file  Stress: Not on file Relationships  Social connections Talks on phone: Not on file Gets together: Not on file Attends Baptism service: Not on file Active member of club or organization: Not on file Attends meetings of clubs or organizations: Not on file Relationship status: Not on file  Intimate partner violence Fear of current or ex partner: Not on file Emotionally abused: Not on file Physically abused: Not on file Forced sexual activity: Not on file Other Topics Concern  Not on file Social History Narrative  Not on file ALLERGIES: Bactrim [sulfamethoprim]; Clonidine; Codeine; Keflin; Pcn [penicillins]; and Tramadol Review of Systems Constitutional: Negative for activity change, appetite change and fatigue. HENT: Negative for ear pain, facial swelling, sore throat and trouble swallowing. Eyes: Negative for pain, discharge and visual disturbance. Respiratory: Positive for shortness of breath. Negative for chest tightness and wheezing. Cardiovascular: Negative for chest pain and palpitations. Gastrointestinal: Negative for abdominal pain, blood in stool, nausea and vomiting. Genitourinary: Negative for difficulty urinating, flank pain and hematuria. Musculoskeletal: Negative for arthralgias, joint swelling, myalgias and neck pain. Skin: Negative for color change and rash. Neurological: Negative for dizziness, weakness, numbness and headaches. Hematological: Negative for adenopathy. Does not bruise/bleed easily. Psychiatric/Behavioral: Negative for behavioral problems, confusion and sleep disturbance. All other systems reviewed and are negative. Vitals:  
 07/13/20 1123 BP: 162/63 Pulse: 79 Resp: 16 Temp: 97.2 °F (36.2 °C) SpO2: 94% Physical Exam 
Vitals signs and nursing note reviewed. Constitutional:   
   General: She is not in acute distress. Appearance: She is well-developed. HENT:  
   Head: Normocephalic and atraumatic. Nose: Nose normal.  
Eyes:  
   General: No scleral icterus. Conjunctiva/sclera: Conjunctivae normal.  
   Pupils: Pupils are equal, round, and reactive to light. Neck: Musculoskeletal: Normal range of motion and neck supple. Thyroid: No thyromegaly. Vascular: No JVD. Trachea: No tracheal deviation. Comments: No carotid bruits noted. Cardiovascular:  
   Rate and Rhythm: Normal rate and regular rhythm. Heart sounds: Normal heart sounds. No murmur. No friction rub. No gallop. Pulmonary:  
   Effort: Pulmonary effort is normal. No respiratory distress. Breath sounds: Rhonchi (Few noted bilaterally) present. No wheezing. Comments: Patient is speaking in full sentences. Chest:  
   Chest wall: No tenderness. Abdominal:  
   General: Bowel sounds are normal. There is no distension. Palpations: Abdomen is soft. There is no mass. Tenderness: There is no abdominal tenderness. There is no guarding or rebound. Musculoskeletal: Normal range of motion. General: No tenderness. Lymphadenopathy:  
   Cervical: No cervical adenopathy. Skin: 
   General: Skin is warm and dry. Findings: No erythema or rash. Neurological: Mental Status: She is alert and oriented to person, place, and time. Cranial Nerves: No cranial nerve deficit. Coordination: Coordination normal.  
   Deep Tendon Reflexes: Reflexes are normal and symmetric. Psychiatric:     
   Behavior: Behavior normal.     
   Thought Content: Thought content normal.     
   Judgment: Judgment normal.  
 
  
 
MDM Number of Diagnoses or Management Options Shortness of breath: new and requires workup Upper respiratory tract infection, unspecified type: new and requires workup Amount and/or Complexity of Data Reviewed Clinical lab tests: ordered and reviewed Tests in the radiology section of CPT®: ordered and reviewed Decide to obtain previous medical records or to obtain history from someone other than the patient: yes Review and summarize past medical records: yes Risk of Complications, Morbidity, and/or Mortality Presenting problems: high Diagnostic procedures: high Management options: high Patient Progress Patient progress: stable Procedures Patient's labs are remarkable for a glucose of 296. Her BUN and creatinine are elevated as normal at 33 and 1.44. The patient's transaminases are normal. Her chest x-ray demonstrates perhaps some mild peripheral airspace disease in the left midlung zone and could be pneumonia versus a viral pneumonia according to the radiologist. Her chest x-ray demonstrates perhaps some mild peripheral airspace disease in the left midlung zone and could be pneumonia versus a viral pneumonia according to the radiologist. 
 
Consult is placed with Dr. Hiral Grimaldo. Discussed all findings and it was felt the patient could be discharged home to follow-up with him in the office in the next 24 to 48 hours as needed. Patient and family agree.

## 2020-07-14 NOTE — TELEPHONE ENCOUNTER
20 4:00 PM--called pt's cell and her sister answered. She informed me that patient  early this morning. I expressed my condolences and told her she and the rest of Ms. Dunaway family will be in my thoughts and prayers.

## 2020-08-20 NOTE — PROGRESS NOTES
Bedside shift change report given to [facility] LETITIA Garzon (oncoming nurse) by Jayda Ricardo RN (offgoing nurse). Report included the following information SBAR, Kardex, Recent Results and Cardiac Rhythm SB. Problem: Falls - Risk of  Goal: *Absence of Falls  Document Iain Fall Risk and appropriate interventions in the flowsheet.    Outcome: Progressing Towards Goal  Fall Risk Interventions:  Mobility Interventions: Communicate number of staff needed for ambulation/transfer, Patient to call before getting OOB, Utilize walker, cane, or other assitive device     Mentation Interventions: Door open when patient unattended, Increase mobility, More frequent rounding, Reorient patient     Medication Interventions: Patient to call before getting OOB, Teach patient to arise slowly     Elimination Interventions: Call light in reach, Patient to call for help with toileting needs, Toileting schedule/hourly rounds              Problem: Pressure Injury - Risk of  Goal: *Prevention of pressure ulcer  Outcome: Progressing Towards Goal  Reminded patient to change positions often to prevent pressure areas This is a 88-year-old female with HTN, dementia, recent hospitalization at [facility] found to have L pubic ramus fracture, R sacral fracture, and R UPJ dilation, recent UTI, presenting with abdominal pain and decline in functional status over the last month. Found to have leukocytosis 17.7. Pt admitted for management of pain and sepsis s/p IR drainage of 1.4 L of pus from right kidney mass. This is a 88-year-old female with HTN, dementia, recent hospitalization at [facility] found to have L pubic ramus fracture, R sacral fracture, and R UPJ dilation, recent UTI, presenting with abdominal pain and decline in functional status over the last month. Found to have leukocytosis 17.7. Pt admitted for management of pain and sepsis s/p IR drainage of 1.4 L of pus from dilated ureter.

## 2020-09-08 RX ORDER — APIXABAN 2.5 MG/1
TABLET, FILM COATED ORAL
Qty: 180 TAB | Refills: 2 | OUTPATIENT
Start: 2020-09-08

## 2020-10-17 RX ORDER — AMLODIPINE BESYLATE 5 MG/1
TABLET ORAL
Qty: 90 TAB | Refills: 3 | OUTPATIENT
Start: 2020-10-17

## 2020-12-16 RX ORDER — DILTIAZEM HYDROCHLORIDE 60 MG/1
TABLET, FILM COATED ORAL
Qty: 225 TAB | Refills: 2 | OUTPATIENT
Start: 2020-12-16

## 2023-07-24 NOTE — PROGRESS NOTES
HISTORY OF PRESENT ILLNESS  Bryon Irene is a 81 y.o. female. She has h/o HTN, HLD, AODM and CAD  And PAF (diagnosed on 4/17 for which she underwent successful cardioversion on oac and amiodarone)here for follow up  Stress test normal on 11/10    Doppler /US renal NO MANDO b/l    PTCA and stent of right coronary artery in 1996 July 26, 2007. The cardiac catheterization revealed mild 10% tapering of the ostium. The left anterior descending artery had mild intimal disease throughout. There was a 30% stenosis in the proximal portion of the LAD between the first and second septal . The remainder of the LAD had only mild intimal disease. The circumflex artery had a 30%-40% stenosis in the proximal mid segment prior to take off of obtuse marginal branch. The right coronary artery was a large dominant artery with 80% proximal stenosis followed by 90% proximal to mid stenosis, followed by 30% residual stenosis. Ejection fraction was estimated at 50%-55%. 2/14: nuclear stress test: mild ischemia mid distal kailey lateral wall EF 59%    3/14: cardiac catheterization:Left main: The vessel was normal sized. Angiography  showed minor luminal irregularities. LAD: The vessel was normal sized. Angiography showed mild atherosclerosis. There was a 30 % stenosis in the  middle third of the vessel segment. 1st diagonal: The vessel was small  sized. Angiography showed minor luminal irregularities. 2nd diagonal: The  vessel was normal sized. Angiography showed mild atherosclerosis. Circumflex: The vessel was normal sized. Angiography showed mild  atherosclerosis. There was a 30 % stenosis. RCA: The vessel was large  sized (dominant). Angiography showed mild atherosclerosis and 3 patent  prior stents.  There was a tubular 30 % stenosis in the distal third of the  vessel segment.    MEDICAL RX ONLY at that time   Stopped niacin in 2014 for \"sick \" feeling    Seen in [facility] for HTN on 2/15 aldactone increased to 25 mg at that time  Renal US on 3/15 no MANDO  ON 2/17:Right mid superficial femoral artery to   below-knee popliteal artery bypass using nonreversed saphenous vein. ON 4/17 admitted to [facility] for sob and found to be in AF    Nuclear stress test on 4/25/17:No ischemia demonstrated. No infarct visible. LVEF 61%.      Chest ct on 4/17:1. Atherosclerotic aorta with coronary artery calcification. 2. Small bilateral pleural effusions with right pleural calcifications. 3. Status post cholecystectomy. Valentin/Cardioversion  on 4/28/17:VALENTIN with normal EF mild mr and TR no evidence for gross intracardiac thrombi  Cardioversion: 150 joules were delivered in a synch fashion with restoration of NSR  Amiodarone started lopressor decreased  Head ct on 8/18/17 severe chronic microvascular ischemic change.      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  Amiodarone stopped with concerns for toxicity.  Also noted to hyponatremic.    Nephrology consulted and felt low Na related to HCTZ and Bactrim use - both stopped indefinitely                        - Bumex 1 mg PO daily started and continued through discharge                        - Na improve to 130 before discharge                        - Patient to follow up with Nephrology as needed. Pulmonary consulted and felt no Amiodarone toxicity, SOB related to COPD and volume                        - SOB improved as Na improved and Bumex was used.      ECHO on 8/17:Left ventricle: Systolic function was normal. Ejection fraction was  estimated in the range of 55 % to 60 %. Suboptimal endocardial  visualization limits wall motion analysis. Wall thickness was mildly  increased. Left atrium: The atrium was mildly dilated. Mitral valve: There was mild regurgitation. Aortic valve: Leaflets exhibited lower normal cuspal separation and  sclerosis. Tricuspid valve: There was mild regurgitation.   CTA  8/17 of chest without toxicity changes or PTE: . There is a mild to moderate-sized right pleural effusion and a mild sized  left pleural effusion. The heart is enlarged there is no pericardial effusion. There is no mediastinal masses or adenopathy. There is no evidence for pulmonary  emboli. There is no shift or pneumothorax. There are mild congestive changes. Mild emphysema. No focal consolidation or mass.      IMPRESSION  IMPRESSION: Bilateral effusions and cardiomegaly. No pulmonary emboli.          Past Medical History:   Diagnosis Date    Atrial fibrillation ([address]       CAD (coronary artery disease)       Cerebrovascular accident stroke, other, unspec 11/18/2010    Coronary atherosclerosis of native coronary artery 11/18/2010    Essential hypertension       Essential hypertension, benign 11/18/2010    HLD (hyperlipidemia) 11/18/2010    PVD (peripheral vascular disease) ([facility] [address] 2017    Type II or unspecified type diabetes mellitus without mention of complication, not stated as uncontrolled 11/18/2010                 Past Surgical History:   Procedure Laterality Date    HX CORONARY STENT PLACEMENT          HX HEART CATHETERIZATION          HX HYSTERECTOMY    1972       HPI      In the last several weeks she has been noticing shortness of breath with activities which is somewhat new for her chest pains. Complains of right lower extremity edema since her femoropopliteal bypass of February 2017  Review of Systems   Respiratory: Positive for shortness of breath. Cardiovascular: Positive for leg swelling. Negative for chest pain, palpitations, orthopnea, claudication and PND. Visit Vitals    /70 (BP 1 Location: Left arm, BP Patient Position: Sitting)    Pulse 70    Resp 16    Ht 5' (1.524 m)    Wt 81.2 kg (179 lb)    SpO2 97%    BMI 34.96 kg/m2       Physical Exam   Neck: No JVD present. Carotid bruit is not present. Cardiovascular: Normal rate and regular rhythm.     Pulmonary/Chest: Effort normal and breath sounds normal.   Abdominal: Soft. Musculoskeletal: She exhibits edema. 2+ on right   Psychiatric: She has a normal mood and affect. Current Outpatient Prescriptions on File Prior to Visit   Medication Sig Dispense Refill    isosorbide mononitrate ER (IMDUR) 60 mg CR tablet TAKE 1 TABLET TWICE A  Tab 2    glucose blood VI test strips (BLOOD GLUCOSE TEST) strip Check blood sugar 4 times a day 200 Strip 11    omeprazole (PRILOSEC) 20 mg capsule TAKE 1 CAPSULE DAILY 90 Cap 3    doxycycline (VIBRAMYCIN) 100 mg capsule Take 1 Cap by mouth two (2) times a day. 28 Cap 0    acyclovir (ZOVIRAX) 5 % ointment Apply  to affected area every three (3) hours. 45 g 0    mupirocin calcium (BACTROBAN) 2 % topical cream Apply  to affected area two (2) times a day. 45 g 0    aspirin 81 mg chewable tablet Take 1 Tab by mouth daily. 90 Tab 3    insulin detemir (LEVEMIR FLEXPEN) 100 unit/mL (3 mL) inpn 15 Units by SubCUTAneous route daily. In the morning 5 Pen 4    mupirocin (BACTROBAN) 2 % ointment APPLY TO THE AFFECTED AREA EVERY DAY 22 g 0    amLODIPine (NORVASC) 5 mg tablet Take 1 Tab by mouth daily. 90 Tab 3    enalapril (VASOTEC) 20 mg tablet TAKE 1 TABLET TWICE A  Tab 3    bumetanide (BUMEX) 1 mg tablet Take 1 Tab by mouth daily. 90 Tab 3    apixaban (ELIQUIS) 2.5 mg tablet Take 2.5 mg by mouth two (2) times a day.  insulin aspart (NOVOLOG FLEXPEN) 100 unit/mL inpn Inject 7 units before each meal ( tid ac meals) or as directed 15 Pen 3    levoFLOXacin (LEVAQUIN) 250 mg tablet Take 1 Tab by mouth daily. 10 Tab 0    cholecalciferol, vitamin D3, (VITAMIN D3) 2,000 unit tab Take  by mouth.  glipiZIDE (GLUCOTROL) 5 mg tablet Take 10 mg by mouth two (2) times a day.  atorvastatin (LIPITOR) 40 mg tablet Take 20 mg by mouth nightly.  azelastine (ASTELIN) 137 mcg (0.1 %) nasal spray 1 [place] by Both Nostrils route two (2) times a day.  Use in each nostril as directed 1 Bottle 12    hydrALAZINE (APRESOLINE) 50 mg tablet Take 1 Tab by mouth three (3) times daily. 270 Tab 2    metoprolol tartrate (LOPRESSOR) 50 mg tablet Take 1 Tab by mouth two (2) times a day. 180 Tab 3    nitroglycerin (NITROSTAT) 0.4 mg SL tablet 1 Tab by SubLINGual route every five (5) minutes as needed for Chest Pain. 25 Tab 3    omeprazole (PRILOSEC) 20 mg capsule Take 1 Cap by mouth daily. 90 Cap 3     No current facility-administered medications on file prior to visit. ASSESSMENT and PLAN  HTN:not quite controlled, increased hydralazine to 75 mg tid  she is not taking clonidine any longer  PAF:she remains  in NSR after cardioversion her ECG today shows NSR and minor nstt. She is now off amiodarone since her intolerance to it  Continue eliquis no untoward effects thus far. Aware that AF may be recurring off amiodarone. Continue same dose of eliquis for now,decreased eliquis to 2,5 mg bid  CAD: sob of concern in ythis diabetic female patient.   Discussed options proceed with nuclear stress test  Her ECG showed with NSR and no significant st t changes continue toprol imdur asa.  normal stress test on 4/25/17  HLD:closely followed by her PCP and well controlled  AODM: also closely followed by her PCP  Hyponatremia:off hctz and follow by nephrology  Right leg cellulitis: resolved but still swollen rle ,proceed with us  Otherwise if all ok I will see her back after tests  BMP/CBC/EKG

## 2023-11-07 NOTE — PROGRESS NOTES
Admission Medication Reconciliation: 
 
Information obtained from:  Patient RxQuery data available¹:  YES Comments/Recommendations: Updated PTA meds/reviewed patient's allergies. 1)  Medication history obtained from patient. She was able to recall her medications upon questioning. 2)  Medication changes (since last review): Added 
- None Adjusted - Bumetanide to 1 mg daily - Levemir to 10 units once daily Removed - Levofloxacin - Prednisone Both completed earlier this month following previous discharge 3)  She mentions that the doctor has stopped her metoprolol. However, I cannot find any office visit that endorses this. Initiated on last admission following pacemaker placement. It has been left on the list. 4) Her insulin usage has changed due to poor appetite in recent weeks. She has not been utilizing her novolog and her Levemir dosage has decreased. ¹RxQuery pharmacy benefit data reflects medications filled and processed through the patient's insurance, however  
this data does NOT capture whether the medication was picked up or is currently being taken by the patient. Allergies:  Bactrim [sulfamethoprim]; Clonidine; Codeine; Keflin; Pcn [penicillins]; and Tramadol Significant PMH/Disease States:  
Past Medical History:  
Diagnosis Date Atrial fibrillation ([facility] [address] CAD (coronary artery disease) Cerebrovascular accident stroke, other, unspec 11/18/2010 Coronary atherosclerosis of native coronary artery 11/18/2010 Essential hypertension Essential hypertension, benign 11/18/2010 HLD (hyperlipidemia) 11/18/2010 PVD (peripheral vascular disease) ([facility] [address] 2017 Type II or unspecified type diabetes mellitus without mention of complication, not stated as uncontrolled 11/18/2010 [place] Chief Complaint for this Admission: Chief Complaint Patient presents with Shortness of Breath Prior to Admission Medications: Prior to Admission Medications Prescriptions Last Dose Informant Taking?  
acetaminophen (TYLENOL) 325 mg tablet   Yes Sig: Take 2 Tabs by mouth every four (4) hours as needed for Pain or Fever. acyclovir (ZOVIRAX) 5 % ointment   Yes Sig: Use as directed  
albuterol (PROAIR HFA) 90 mcg/actuation inhaler   Yes Sig: Take 2 Puffs by inhalation every four (4) hours as needed for Wheezing or Shortness of Breath. allopurinol (ZYLOPRIM) 100 mg tablet 2020 at Unknown time  Yes Sig: Take 100 mg by mouth daily. amLODIPine (NORVASC) 5 mg tablet 2020 at Unknown time  Yes Sig: Take 5 mg by mouth daily. apixaban (ELIQUIS) 2.5 mg tablet 2020 at Unknown time  Yes Sig: Take 1 Tab by mouth two (2) times a day. aspirin 81 mg chewable tablet 2020 at Unknown time  Yes Sig: Take 81 mg by mouth nightly. atorvastatin (LIPITOR) 20 mg tablet 2020 at Unknown time  Yes Sig: Take 20 mg by mouth nightly. azelastine (ASTEPRO) 0.15 % (205.5 mcg)   Yes [place] by Both Nostrils route nightly. budesonide-formoterol (SYMBICORT) 160-4.5 mcg/actuation HFAA 2020 at Unknown time  Yes Sig: Take 2 Puffs by inhalation two (2) times a day. After each use, rinse mouth with water  
bumetanide (BUMEX) 1 mg tablet 2020 at Unknown time  Yes Sig: Take 1 mg by mouth daily. Takes 1 mg or 2 mg daily on alternating days   
cholecalciferol, vitamin D3, (VITAMIN D3) 2,000 unit tab 2020 at Unknown time  Yes Sig: Take 2,000 Units by mouth daily. clotrimazole-betamethasone (LOTRISONE) topical cream 2020 at Unknown time  Yes Sig: Apply  to affected area two (2) times daily as needed for Skin Irritation or Other (Rash). enalapril (VASOTEC) 20 mg tablet 2020 at Unknown time  Yes Sig: TAKE 1 TABLET TWICE A DAY  
glipiZIDE (GLUCOTROL) 5 mg tablet 2020 at Unknown time  Yes Sig: TAKE 2 TABLETS TWICE A DAY  
 insulin aspart U-100 (NOVOLOG FLEXPEN U-100 INSULIN) 100 unit/mL (3 mL) inpn Not Taking at Unknown time  No  
Sig: INJECT 7 UNITS BEFORE EACH MEAL 3 TIMES A DAY OR AS   DIRECTED  
insulin detemir U-100 (LEVEMIR U-100 INSULIN) 100 unit/mL injection 2020 at Unknown time  Yes Sig: 10 Units by SubCUTAneous route daily. Takes 20 units every morning, and takes 8-10 units every evening as needed/as directed based on blood sugar   
isosorbide mononitrate ER (IMDUR) 60 mg CR tablet 2020 at Unknown time  Yes Sig: TAKE 1 TABLET TWICE A DAY  
metoprolol succinate (TOPROL-XL) 50 mg XL tablet   Yes Sig: Take 50 mg by mouth nightly. mupirocin calcium (BACTROBAN) 2 % topical cream   Yes Sig: Apply  to affected area two (2) times daily as needed. nitroglycerin (NITROSTAT) 0.4 mg SL tablet   Yes Si Tab by SubLINGual route every five (5) minutes as needed for Chest Pain. nystatin (MYCOSTATIN) powder   Yes Sig: Apply  to affected area two (2) times daily as needed for Other (Rash). omeprazole (PRILOSEC) 20 mg capsule 2020 at Unknown time  Yes Sig: TAKE 1 CAPSULE DAILY Facility-Administered Medications: None Please contact the main inpatient pharmacy with any questions or concerns at [phone] and we will direct you to the clinical pharmacist covering this patient's care while in-house.   
Elyssa Nazario, LINDAD 
 
 Detail Level: Detailed Show Applicator Variable?: Yes Render Note In Bullet Format When Appropriate: No Duration Of Freeze Thaw-Cycle (Seconds): 0 Post-Care Instructions: I reviewed with the patient in detail post-care instructions. Patient is to wear sunprotection, and avoid picking at any of the treated lesions. Pt may apply Vaseline to crusted or scabbing areas. Consent: The patient's consent was obtained including but not limited to risks of crusting, scabbing, blistering, scarring, darker or lighter pigmentary change, recurrence, incomplete removal and infection.

## 2024-03-28 NOTE — CONSULTS
"SUBJECTIVE:  Subjective  Clayton Cuadra is a 16 m.o. male who is here with father for Well Child    HPI  Current concerns include nothing.    Nutrition:  Current diet:well balanced diet- three meals/healthy snacks most days and drinks milk/other calcium sources    Elimination:  Stool consistency and frequency: Normal    Sleep:no problems    Dental home? no    Social Screening:  Current  arrangements:     Caregiver concerns regarding:  Hearing? no  Vision? no  Motor skills? no  Behavior/Activity? no    Developmental Screening:         3/28/2024     9:45 AM 3/26/2024     4:44 PM 12/19/2023    10:30 AM 12/14/2023     8:26 AM 9/22/2023     8:45 AM 9/18/2023    10:39 AM 6/16/2023    10:22 AM   SWYC Milestones (15-months)   Calls you "mama" or "adrienne" or similar name somewhat  somewhat  not yet     Looks around when you say things like "Where's your bottle?" or "Where's your blanket? very much  very much  somewhat     Copies sounds that you make somewhat  somewhat  very much     Walks across a room without help very much  very much  not yet     Follows directions - like "Come here" or "Give me the ball" very much  very much  somewhat     Runs very much         Walks up stairs with help somewhat         Kicks a ball somewhat         Names at least 5 familiar objects - like ball or milk not yet         Names at least 5 body parts - like nose, hand, or tummy not yet         (Patient-Entered) Total Development Score - 15 months  12  Incomplete  Incomplete Incomplete   No SWYC result filed: not completed or not in appropriate age range for screening.  No MCHAT result filed: not completed within past 7 days or not in age range for screening.    Review of Systems  A comprehensive review of symptoms was completed and negative except as noted above.     OBJECTIVE:  Vital signs  Vitals:    03/28/24 0947   Weight: 12.7 kg (28 lb 1 oz)   Height: 2' 10.65" (0.88 m)   HC: 49 cm (19.29")       Physical " Acute Kidney Insufficiency and Hyponatremia Consult    Subjective:     Dk Marquez is a 81 y.o.  female who has been admitted to the hospital for dizziness  . Patient was referred for acute renal disease and hyponatremia. The patient has a h/o diabetes and hypertension. She has been on HCTZ for years. She was recently placed on Bactrim (PTA) and is still on it. She has had mild hyponatremia since 2012 when the Na was 132-134  However, it has been a bit lower --in the range of 129 since 4/17. It was as low as 124 in 7/18. It was 129 on 7/31, 127 on 8/23 and 125 today. The pt's HCTZ has been d/c'd and NS ordered. She also has had a slightly elevated creat at 1.1 - 1.2 since 4/16. The Bun/creat was 17/1.2 on 8/17, 16/1.3 on 8/23, and 18/1.5 today. The patient says she has been feeling dizzy for several months but came to the hospital because it was getting worse. She says she is always a little SOB. That got much worse this morning but is much better now after a breathing treatment. The patient has a h/o of PAF and was cardioverted in 4/17.           Patient Active Problem List    Diagnosis Date Noted    SOB (shortness of breath) 08/23/2017    Atrial fibrillation ([address] 04/24/2017    PVD (peripheral vascular disease) with claudication ([address] 02/20/2017    Hypertension complicating diabetes ([address] 11/17/2016    Coronary atherosclerosis of native coronary artery 11/18/2010    HLD (hyperlipidemia) 11/18/2010    Essential hypertension, benign 11/18/2010    Cerebrovascular accident stroke, other, unspec 11/18/2010     Past Medical History:   Diagnosis Date    Atrial fibrillation ([address]     CAD (coronary artery disease)     Cerebrovascular accident stroke, other, unspec 11/18/2010    Coronary atherosclerosis of native coronary artery 11/18/2010    Essential hypertension     Essential hypertension, benign 11/18/2010    HLD (hyperlipidemia) 11/18/2010    PVD (peripheral vascular disease) ([facility] [address] 2017    Type II or unspecified type diabetes mellitus without mention of complication, not stated as uncontrolled 11/18/2010      Past Surgical History:   Procedure Laterality Date    HX CORONARY STENT PLACEMENT      HX HEART CATHETERIZATION      HX HYSTERECTOMY  1972     History reviewed. No pertinent family history. Social History   Substance Use Topics    Smoking status: Former Smoker     Packs/day: 0.80     Years: 20.00     Types: Cigarettes     Quit date: 4/18/1986    Smokeless tobacco: Never Used    Alcohol use No      Allergies   Allergen Reactions    Clonidine Shortness of Breath and Swelling     Lightheaded,     Codeine Unknown (comments)    Keflin Itching    Pcn [Penicillins] Unknown (comments)    Tramadol Other (comments)     Makes her head feel swishy      Prior to Admission medications    Medication Sig Start Date End Date Taking? Authorizing Provider   insulin detemir (LEVEMIR FLEXPEN) 100 unit/mL (3 mL) inpn 10 Units by SubCUTAneous route daily. Yes Historical Provider   trimethoprim-sulfamethoxazole (BACTRIM DS) 160-800 mg per tablet Take 1 Tab by mouth two (2) times a day. Yes Historical Provider   glipiZIDE (GLUCOTROL) 5 mg tablet Take 10 mg by mouth daily. Glipizide IR 10 mg before, 5 mg before dinner   Yes Historical Provider   glipiZIDE (GLUCOTROL) 5 mg tablet Take 5 mg by mouth Daily (before dinner). Glipizide IR 10 mg before, 5 mg before dinner   Yes Historical Provider   hydroCHLOROthiazide (HYDRODIURIL) 12.5 mg tablet Take 12.5 mg by mouth daily. Yes Historical Provider   atorvastatin (LIPITOR) 40 mg tablet Take 40 mg by mouth nightly. Yes Historical Provider   meclizine (ANTIVERT) 25 mg tablet Take 1 Tab by mouth three (3) times daily as needed for up to 10 days. 8/18/17 8/28/17 Yes Omaira Quan MD   cloNIDine HCl (CATAPRES) 0.1 mg tablet Take 1 Tab by mouth two (2) times a day.  8/18/17  Yes Genevieve Dominguez IV, MD   azelastine Exam  Constitutional:       General: He is not in acute distress.     Appearance: He is well-developed.   HENT:      Head: Normocephalic and atraumatic.      Right Ear: Tympanic membrane and external ear normal.      Left Ear: Tympanic membrane and external ear normal.      Nose: Nose normal.      Mouth/Throat:      Mouth: Mucous membranes are moist.      Pharynx: Oropharynx is clear.   Eyes:      General: Lids are normal.      Conjunctiva/sclera: Conjunctivae normal.      Pupils: Pupils are equal, round, and reactive to light.   Neck:      Trachea: Trachea normal.   Cardiovascular:      Rate and Rhythm: Normal rate and regular rhythm.      Heart sounds: S1 normal and S2 normal. No murmur heard.     No friction rub. No gallop.   Pulmonary:      Effort: Pulmonary effort is normal. No respiratory distress.      Breath sounds: Normal breath sounds and air entry. No wheezing or rales.   Abdominal:      General: Bowel sounds are normal.      Palpations: Abdomen is soft. There is no mass.      Tenderness: There is no abdominal tenderness. There is no guarding or rebound.   Musculoskeletal:         General: Normal range of motion.      Cervical back: Normal range of motion and neck supple.   Skin:     General: Skin is warm.      Findings: No rash.   Neurological:      Mental Status: He is alert.      Coordination: Coordination normal.      Gait: Gait normal.          ASSESSMENT/PLAN:  Clayton was seen today for well child.    Diagnoses and all orders for this visit:    Encounter for well child check without abnormal findings    Need for vaccination  -     DTaP vaccine less than 8yo IM  -     Pneumococcal Conjugate Vaccine (20 Valent) (IM)(Preferred)  -     Varicella vaccine subcutaneous    Encounter for screening for global developmental delays (milestones)  -     SWYC-Developmental Test         Preventive Health Issues Addressed:  1. Anticipatory guidance discussed and a handout covering well-child issues for age was  (ASTELIN) 137 mcg (0.1 %) nasal spray 1 [place] by Both Nostrils route two (2) times a day. Use in each nostril as directed 8/8/17  Yes Malachi Wang IV, MD   hydrALAZINE (APRESOLINE) 50 mg tablet Take 1 Tab by mouth three (3) times daily. 8/7/17  Yes Caprice Guerrero MD   metoprolol tartrate (LOPRESSOR) 50 mg tablet Take 1 Tab by mouth two (2) times a day. 7/17/17  Yes Caprice Guerrero MD   enalapril (VASOTEC) 20 mg tablet TAKE 1 TABLET TWICE A DAY 7/17/17  Yes Caprice Guerrero MD   apixaban (ELIQUIS) 5 mg tablet Take 1 Tab by mouth two (2) times a day. Indications: PREVENT THROMBOEMBOLISM IN CHRONIC ATRIAL FIBRILLATION 7/17/17  Yes Caprice Guerrero MD   amiodarone (PACERONE) 100 mg tablet Take 1 Tab by mouth daily. 7/17/17  Yes Caprice Guerrero MD   nitroglycerin (NITROSTAT) 0.4 mg SL tablet 1 Tab by SubLINGual route every five (5) minutes as needed for Chest Pain. 7/17/17  Yes Caprice Guerrero MD   omeprazole (PRILOSEC) 20 mg capsule Take 1 Cap by mouth daily. 7/14/17  Yes Malachi Wang IV, MD   aspirin 81 mg chewable tablet Take 1 Tab by mouth daily. 4/28/17  Yes Fermín Conte PA-C   isosorbide mononitrate ER (IMDUR) 60 mg CR tablet Take 1 Tab by mouth two (2) times a day. 12/15/16  Yes Caprice Guerrero MD   cholecalciferol, vitamin d3, (VITAMIN D) 1,000 unit tablet Take 2,000 Units by mouth daily.    Yes Historical Provider     Current Facility-Administered Medications   Medication Dose Route Frequency    apixaban (ELIQUIS) tablet 2.5 mg  2.5 mg Oral Q12H    insulin lispro (HUMALOG) injection   SubCUTAneous AC&HS    glucose chewable tablet 16 g  4 Tab Oral PRN    dextrose (D50W) injection syrg 12.5-25 g  12.5-25 g IntraVENous PRN    glucagon (GLUCAGEN) injection 1 mg  1 mg IntraMUSCular PRN    albuterol-ipratropium (DUO-NEB) 2.5 MG-0.5 MG/3 ML  3 mL Nebulization Q6H PRN    LORazepam (ATIVAN) tablet 0.5 mg  0.5 mg Oral Q6H PRN    bumetanide (BUMEX) injection 1 mg  1 mg IntraVENous ONCE    sodium provided.    2. Growth and development were reviewed/discussed and are within acceptable ranges for age.    3. Immunizations and screening tests today: per orders.        Follow Up:  Follow up in about 3 months (around 6/28/2024).     chloride (NS) flush 5-10 mL  5-10 mL IntraVENous Q8H    sodium chloride (NS) flush 5-10 mL  5-10 mL IntraVENous PRN    atorvastatin (LIPITOR) tablet 40 mg  40 mg Oral QHS    glipiZIDE (GLUCOTROL) tablet 10 mg  10 mg Oral ACB    glipiZIDE (GLUCOTROL) tablet 5 mg  5 mg Oral ACD    hydrALAZINE (APRESOLINE) tablet 75 mg  75 mg Oral TID    azelastine (ASTELIN) 137mcg/spray nasal spray  1 Spray Both Nostrils BID    metoprolol tartrate (LOPRESSOR) tablet 100 mg  100 mg Oral BID    aspirin chewable tablet 81 mg  81 mg Oral DAILY    insulin glargine (LANTUS) injection 10 Units  10 Units SubCUTAneous DAILY    trimethoprim-sulfamethoxazole (BACTRIM DS, SEPTRA DS) 160-800 mg per tablet 1 Tab  1 Tab Oral BID       Review of Systems:  Constitutional: negative  Ears, nose, mouth, throat, and face: negative  Respiratory: positive for chronic SOB --worse this AM and better now.   Cardiovascular: negative  Gastrointestinal: negative  Genitourinary:negative  Musculoskeletal:negative  Neurological: positive for dizziness and although she says it is more like a \"strange feeling\" in her head  Skin: no rashes or lesions    Objective:     Patient Vitals for the past 8 hrs:   BP Temp Pulse Resp SpO2   17 1647 183/47 - (!) 56 - 97 %   17 1528 184/45 98 °F (36.7 °C) (!) 55 18 98 %   17 1425 (!) 146/35 - (!) 57 - -   17 1247 184/51 - (!) 55 - -   17 1156 - - - - (!) 54 %   17 1105 154/52 97.9 °F (36.6 °C) (!) 54 18 95 %   17 0956 (!) 204/46 - 61 - 100 %      Temp (24hrs), Av.9 °F (36.6 °C), Min:97.6 °F (36.4 °C), Max:98.3 °F (36.8 °C)    Intake and Output:   1901 -  0700  In: 270 [P.O.:270]  Out: 800 [Urine:800]   0701 -  1900  In: 240 [P.O.:240]  Out: 300 [Urine:300]    Physical Exam:  Visit Vitals    /47    Pulse (!) 56    Temp 98 °F (36.7 °C)    Resp 18    Ht 5' 3\" (1.6 m)    Wt 78.8 kg (173 lb 11.6 oz)    SpO2 97%    Breastfeeding No    BMI 30.77 kg/m2     General appearance: alert, cooperative, no distress, moderately obese  Head: Normocephalic, without obvious abnormality, atraumatic  Neck: no JVD and supple  Lungs: diminished breath sounds in the bases  Heart: regular rate and rhythm; no gallop or rub  Abdomen: soft, non-tender. No masses,  no organomegaly  Extremities: 1+ edema  Skin: Skin color, texture, turgor normal. No rashes or lesions  Neurologic: Grossly normal  Musculoskeletal: grossly unremarkable    Data Review:   CBC:   Lab Results   Component Value Date/Time    WBC 11.6 08/24/2017 04:35 AM    RBC 4.25 08/24/2017 04:35 AM    HGB 11.0 08/24/2017 04:35 AM    HCT 33.4 08/24/2017 04:35 AM    PLATELET 066 57/34/0811 04:35 AM   , CMP:   Lab Results   Component Value Date/Time    Glucose 45 08/24/2017 04:35 AM    Sodium 125 08/24/2017 04:35 AM    Potassium 4.4 08/24/2017 04:35 AM    Chloride 95 08/24/2017 04:35 AM    CO2 21 08/24/2017 04:35 AM    BUN 18 08/24/2017 04:35 AM    Creatinine 1.55 08/24/2017 04:35 AM    Calcium 9.0 08/24/2017 04:35 AM    Anion gap 9 08/24/2017 04:35 AM    BUN/Creatinine ratio 12 08/24/2017 04:35 AM    AST (SGOT) 32 08/23/2017 02:29 PM    Alk. phosphatase 88 08/23/2017 02:29 PM    Protein, total 6.6 08/23/2017 02:29 PM    Albumin 3.4 08/23/2017 02:29 PM    Globulin 3.2 08/23/2017 02:29 PM    A-G Ratio 1.1 08/23/2017 02:29 PM       Reviewed: x-rays and labs    Assessment:     Hyponatremia. HCTZ is a major factor. The patient appears to have mild volume overload. She may also have an underlying mild impairment in diluting capacity. SIADH is possible but the diagnosis can not be made until other factors have been eliminated. Acute renal failure. Etiology uncertain. The labs look pre-renal, but the patient has edema and pleural effusions. The Bun/creat was 17/1.2 on 8/17, 16/1.3 on 8/23, and 18/1.5 today. The Bun/creat was 17/1.2 on 8/17, 16/1.3 on 8/23, and 18/1.5 today.    She has been on Bactrim since prior to admission raising the possibility of interstitial nephritis. Chronic renal insufficiency, stage III. Baseline creat 1.2 - 1.2. Dyspnea. Pleural effusions noted. Believe diuresis rather than NS is in order now. Diabetes Mellitus  PAF  CAD. Obesity        Plan:     Urine lytes and osm before Bumex. Stop normal saline. Give one small dose of Bumex. Fluid restriction  --of note, the patient has probably had >1500 cc already today. HCTZ and the ACE-I have been d/c'd. Would probably not use a thiazide on this patient again. Switch to an antibiotic other than Bactrim, if possible. Above d/w the patient and her sister. Also d/w her RN. Chart reviewed. Pertinent Notes Reviewed. Medications list Personally Reviewed. Elkin Colin, [address] [facility]  [facility] [id number], [address] [facility]  Phone - [phone]    Fax - [phone]  [email or web address][email or web address]                                         [facility]   [address] [address] [address], [facility]  Phone - [phone]  Fax - [phone]. [employer]

## 2024-07-09 NOTE — PATIENT INSTRUCTIONS
Decrease Eliquis 2.5 mg twice a day    Increase Hydralazine 50 mg three times a day    Follow up with Bladimir Moreira in 4 months 55 55 35 35 55

## (undated) DEVICE — CATH FOL TY IC BAG 16FR 2000ML -- CONVERT TO ITEM 363158

## (undated) DEVICE — DEVON™ KNEE AND BODY STRAP 60" X 3" (1.5 M X 7.6 CM): Brand: DEVON

## (undated) DEVICE — SOLUTION IV 500ML 0.9% SOD CHL FLX CONT

## (undated) DEVICE — SUTURE VCRL SZ 2-0 L36IN ABSRB UD L40MM CT 1/2 CIR J957H

## (undated) DEVICE — INTRO SHTH CATH 23F 55.7CM --

## (undated) DEVICE — GLIDESHEATH SLENDER STAINLESS STEEL KIT: Brand: GLIDESHEATH SLENDER

## (undated) DEVICE — BAG ISOL TRNSPRT 18X18.5IN LF --

## (undated) DEVICE — KIT MED IMAG CNTRST AGNT W/ IOPAMIDOL REUSE

## (undated) DEVICE — VASCULAR-RICHMOND-LF: Brand: MEDLINE INDUSTRIES, INC.

## (undated) DEVICE — FASCIAL DILATOR SET: Brand: COOK

## (undated) DEVICE — SUT PROL 0 30IN CT1 BLU --

## (undated) DEVICE — PINNACLE INTRODUCER SHEATH: Brand: PINNACLE

## (undated) DEVICE — STERILE POLYISOPRENE POWDER-FREE SURGICAL GLOVES WITH EMOLLIENT COATING: Brand: PROTEXIS

## (undated) DEVICE — SPECIAL PROCEDURE DRAPE 32" X 34": Brand: SPECIAL PROCEDURE DRAPE

## (undated) DEVICE — ANGIOGRAPHY KIT

## (undated) DEVICE — Z INACTIVE NO USAGE TURNOVER KIT RM CLEANOP

## (undated) DEVICE — TR BAND RADIAL ARTERY COMPRESSION DEVICE: Brand: TR BAND

## (undated) DEVICE — PACK PROCEDURE SURG HRT CATH

## (undated) DEVICE — 1200 GUARD II KIT W/5MM TUBE W/O VAC TUBE: Brand: GUARDIAN

## (undated) DEVICE — 3M™ TEGADERM™ TRANSPARENT FILM DRESSING FRAME STYLE, 1626W, 4 IN X 4-3/4 IN (10 CM X 12 CM), 50/CT 4CT/CASE: Brand: 3M™ TEGADERM™

## (undated) DEVICE — HANDLE LT SNAP ON ULT DURABLE LENS FOR TRUMPF ALC DISPOSABLE

## (undated) DEVICE — AMPLATZ EXTRA STIFF WIRE GUIDE: Brand: AMPLATZ

## (undated) DEVICE — KIT MFLD ISOLATN NACL CNTRST PRT TBNG SPIK W/ PRSS TRNSDUC

## (undated) DEVICE — SUTURE PROL SZ 5-0 L30IN NONABSORBABLE BLU L13MM RB-2 1/2 8710H

## (undated) DEVICE — REM POLYHESIVE ADULT PATIENT RETURN ELECTRODE: Brand: VALLEYLAB

## (undated) DEVICE — SPLINT WR POS F/ARTERIAL ACC -- BX/10

## (undated) DEVICE — DRESSING HEMOSTATIC SFT INTVENT W/O SLT DBL WRP QUIKCLOT LF

## (undated) DEVICE — ANGIOGRAPHIC CATHETER: Brand: IMPULSE™

## (undated) DEVICE — Device

## (undated) DEVICE — SUT PROL 6-0 24IN BV1 DA BLU --

## (undated) DEVICE — Device: Brand: PADPRO

## (undated) DEVICE — DRAPE PRB US TRNSDCR 6X96IN --

## (undated) DEVICE — NON-REM POLYHESIVE PATIENT RETURN ELECTRODE: Brand: VALLEYLAB

## (undated) DEVICE — SLIM BODY SKIN STAPLER: Brand: APPOSE ULC

## (undated) DEVICE — (D)PREP SKN CHLRAPRP APPL 26ML -- CONVERT TO ITEM 371833

## (undated) DEVICE — DRAPE,REIN 53X77,STERILE: Brand: MEDLINE

## (undated) DEVICE — HI-TORQUE VERSACORE MODIFIED J GUIDE WIRE SYSTEM 145 CM: Brand: HI-TORQUE VERSACORE

## (undated) DEVICE — GUIDEWIRE VASC L260CM DIA0.035IN TIP L3MM STD EXCHG PTFE J

## (undated) DEVICE — NEEDLE ANGIO 18GAX7CM SECURELOC

## (undated) DEVICE — SOLUTION IRRIG 1000ML H2O STRL BLT

## (undated) DEVICE — Z DISCONTINUED USE 2425483 (LOW STOCK PER MEDLINE) TAPE UMB L18IN DIA1/8IN WHT COT NONABSORBABLE W/O NDL FOR

## (undated) DEVICE — CATHETER DIAG 6FR L110CM INTRO 6FR BLLN DIA9MM 1CC PULM ART

## (undated) DEVICE — KIT HND CTRL 3 W STPCOCK ROT END 54IN PREM HI PRSS TBNG AT

## (undated) DEVICE — PRESSURE MONITORING SET: Brand: TRUWAVE

## (undated) DEVICE — SYR 50ML LR LCK 1ML GRAD NSAF --

## (undated) DEVICE — SPONGE GZ W4XL4IN COT 12 PLY TYP VII WVN C FLD DSGN